# Patient Record
Sex: MALE | Race: OTHER | HISPANIC OR LATINO | URBAN - METROPOLITAN AREA
[De-identification: names, ages, dates, MRNs, and addresses within clinical notes are randomized per-mention and may not be internally consistent; named-entity substitution may affect disease eponyms.]

---

## 2017-03-29 ENCOUNTER — EMERGENCY (EMERGENCY)
Facility: HOSPITAL | Age: 57
LOS: 0 days | Discharge: HOME | End: 2017-03-29
Attending: EMERGENCY MEDICINE
Payer: MEDICAID

## 2017-03-29 VITALS
RESPIRATION RATE: 18 BRPM | SYSTOLIC BLOOD PRESSURE: 162 MMHG | OXYGEN SATURATION: 100 % | HEART RATE: 64 BPM | DIASTOLIC BLOOD PRESSURE: 97 MMHG | TEMPERATURE: 98 F

## 2017-03-29 VITALS
TEMPERATURE: 98 F | OXYGEN SATURATION: 99 % | RESPIRATION RATE: 16 BRPM | DIASTOLIC BLOOD PRESSURE: 114 MMHG | SYSTOLIC BLOOD PRESSURE: 185 MMHG | HEART RATE: 70 BPM | HEIGHT: 67 IN | WEIGHT: 278 LBS

## 2017-03-29 DIAGNOSIS — K26.9 DUODENAL ULCER, UNSPECIFIED AS ACUTE OR CHRONIC, WITHOUT HEMORRHAGE OR PERFORATION: ICD-10-CM

## 2017-03-29 DIAGNOSIS — Z79.1 LONG TERM (CURRENT) USE OF NON-STEROIDAL ANTI-INFLAMMATORIES (NSAID): ICD-10-CM

## 2017-03-29 DIAGNOSIS — K27.9 PEPTIC ULCER, SITE UNSPECIFIED, UNSPECIFIED AS ACUTE OR CHRONIC, WITHOUT HEMORRHAGE OR PERFORATION: ICD-10-CM

## 2017-03-29 DIAGNOSIS — M17.9 OSTEOARTHRITIS OF KNEE, UNSPECIFIED: ICD-10-CM

## 2017-03-29 DIAGNOSIS — R60.9 EDEMA, UNSPECIFIED: ICD-10-CM

## 2017-03-29 DIAGNOSIS — E66.01 MORBID (SEVERE) OBESITY DUE TO EXCESS CALORIES: ICD-10-CM

## 2017-03-29 DIAGNOSIS — K46.9 UNSPECIFIED ABDOMINAL HERNIA WITHOUT OBSTRUCTION OR GANGRENE: Chronic | ICD-10-CM

## 2017-03-29 DIAGNOSIS — R58 HEMORRHAGE, NOT ELSEWHERE CLASSIFIED: Chronic | ICD-10-CM

## 2017-03-29 DIAGNOSIS — Z79.82 LONG TERM (CURRENT) USE OF ASPIRIN: ICD-10-CM

## 2017-03-29 DIAGNOSIS — M79.606 PAIN IN LEG, UNSPECIFIED: ICD-10-CM

## 2017-03-29 DIAGNOSIS — Z98.89 OTHER SPECIFIED POSTPROCEDURAL STATES: Chronic | ICD-10-CM

## 2017-03-29 DIAGNOSIS — I10 ESSENTIAL (PRIMARY) HYPERTENSION: ICD-10-CM

## 2017-03-29 LAB
ALBUMIN SERPL ELPH-MCNC: 3.2 G/DL — LOW (ref 3.3–5)
ALP SERPL-CCNC: 93 U/L — SIGNIFICANT CHANGE UP (ref 40–120)
ALT FLD-CCNC: 24 U/L — SIGNIFICANT CHANGE UP (ref 12–78)
ANION GAP SERPL CALC-SCNC: 6 MMOL/L — SIGNIFICANT CHANGE UP (ref 5–17)
ANISOCYTOSIS BLD QL: SIGNIFICANT CHANGE UP
APTT BLD: 24.9 SEC — LOW (ref 27.5–37.4)
AST SERPL-CCNC: 27 U/L — SIGNIFICANT CHANGE UP (ref 15–37)
BILIRUB SERPL-MCNC: 0.3 MG/DL — SIGNIFICANT CHANGE UP (ref 0.2–1.2)
BUN SERPL-MCNC: 19 MG/DL — SIGNIFICANT CHANGE UP (ref 7–23)
CALCIUM SERPL-MCNC: 8 MG/DL — LOW (ref 8.5–10.1)
CHLORIDE SERPL-SCNC: 104 MMOL/L — SIGNIFICANT CHANGE UP (ref 96–108)
CO2 SERPL-SCNC: 32 MMOL/L — HIGH (ref 22–31)
CREAT SERPL-MCNC: 0.8 MG/DL — SIGNIFICANT CHANGE UP (ref 0.5–1.3)
EOSINOPHIL NFR BLD AUTO: 3 % — SIGNIFICANT CHANGE UP (ref 0–6)
GLUCOSE SERPL-MCNC: 102 MG/DL — HIGH (ref 70–99)
HCT VFR BLD CALC: 25.9 % — LOW (ref 39–50)
HGB BLD-MCNC: 7.5 G/DL — LOW (ref 13–17)
HYPOCHROMIA BLD QL: SIGNIFICANT CHANGE UP
INR BLD: 1.07 RATIO — SIGNIFICANT CHANGE UP (ref 0.88–1.16)
LYMPHOCYTES # BLD AUTO: 24 % — SIGNIFICANT CHANGE UP (ref 13–44)
MCHC RBC-ENTMCNC: 17.8 PG — LOW (ref 27–34)
MCHC RBC-ENTMCNC: 29.1 GM/DL — LOW (ref 32–36)
MCV RBC AUTO: 61.2 FL — LOW (ref 80–100)
MICROCYTES BLD QL: SLIGHT — SIGNIFICANT CHANGE UP
MONOCYTES NFR BLD AUTO: 14 % — SIGNIFICANT CHANGE UP (ref 2–14)
NEUTROPHILS NFR BLD AUTO: 59 % — SIGNIFICANT CHANGE UP (ref 43–77)
NT-PROBNP SERPL-SCNC: 278 PG/ML — HIGH (ref 0–125)
PLAT MORPH BLD: NORMAL — SIGNIFICANT CHANGE UP
PLATELET # BLD AUTO: 353 K/UL — SIGNIFICANT CHANGE UP (ref 150–400)
POIKILOCYTOSIS BLD QL AUTO: SIGNIFICANT CHANGE UP
POLYCHROMASIA BLD QL SMEAR: SLIGHT — SIGNIFICANT CHANGE UP
POTASSIUM SERPL-MCNC: 3.8 MMOL/L — SIGNIFICANT CHANGE UP (ref 3.5–5.3)
POTASSIUM SERPL-SCNC: 3.8 MMOL/L — SIGNIFICANT CHANGE UP (ref 3.5–5.3)
PROT SERPL-MCNC: 6.9 GM/DL — SIGNIFICANT CHANGE UP (ref 6–8.3)
PROTHROM AB SERPL-ACNC: 11.7 SEC — SIGNIFICANT CHANGE UP (ref 9.8–12.7)
RBC # BLD: 4.23 M/UL — SIGNIFICANT CHANGE UP (ref 4.2–5.8)
RBC # FLD: 17.4 % — HIGH (ref 11–15)
RBC BLD AUTO: ABNORMAL
SODIUM SERPL-SCNC: 142 MMOL/L — SIGNIFICANT CHANGE UP (ref 135–145)
WBC # BLD: 7.4 K/UL — SIGNIFICANT CHANGE UP (ref 3.8–10.5)
WBC # FLD AUTO: 7.4 K/UL — SIGNIFICANT CHANGE UP (ref 3.8–10.5)

## 2017-03-29 PROCEDURE — 99285 EMERGENCY DEPT VISIT HI MDM: CPT

## 2017-03-29 PROCEDURE — 71010: CPT | Mod: 26

## 2017-03-29 PROCEDURE — 93970 EXTREMITY STUDY: CPT | Mod: 26

## 2017-03-29 RX ORDER — FUROSEMIDE 40 MG
20 TABLET ORAL ONCE
Qty: 0 | Refills: 0 | Status: COMPLETED | OUTPATIENT
Start: 2017-03-29 | End: 2017-03-29

## 2017-03-29 RX ADMIN — Medication 20 MILLIGRAM(S): at 17:24

## 2017-03-29 NOTE — ED PROVIDER NOTE - OBJECTIVE STATEMENT
58 yo male with hypertension presents with bilateral leg swelling x3 days. Patient denies chest pain, shortness of breath, fever, chills, nausea, vomiting, abd pain, trauma, fall.

## 2017-03-29 NOTE — ED PROVIDER NOTE - MEDICAL DECISION MAKING DETAILS
Patient with lower extremity edema for three days, negative for dvt; patient instructed to f/u with pmd

## 2017-03-29 NOTE — ED PROVIDER NOTE - PMH
Duodenal ulcer disease    Hypertension    Hypertension    Knee osteoarthritis    Morbid obesity    Osteoarthritis    PUD (peptic ulcer disease)

## 2017-03-29 NOTE — ED PROVIDER NOTE - PSH
Abdominal hernia    Gastroduodenal artery bleed  IR embolization of gastroduodenal artery  H/O ventral hernia repair

## 2017-04-24 ENCOUNTER — INPATIENT (INPATIENT)
Facility: HOSPITAL | Age: 57
LOS: 7 days | Discharge: HOME | End: 2017-05-02
Attending: INTERNAL MEDICINE | Admitting: INTERNAL MEDICINE
Payer: MEDICAID

## 2017-04-24 VITALS
WEIGHT: 274.92 LBS | OXYGEN SATURATION: 100 % | HEART RATE: 70 BPM | RESPIRATION RATE: 18 BRPM | DIASTOLIC BLOOD PRESSURE: 119 MMHG | SYSTOLIC BLOOD PRESSURE: 213 MMHG | HEIGHT: 66 IN | TEMPERATURE: 99 F

## 2017-04-24 DIAGNOSIS — R58 HEMORRHAGE, NOT ELSEWHERE CLASSIFIED: Chronic | ICD-10-CM

## 2017-04-24 DIAGNOSIS — Z98.89 OTHER SPECIFIED POSTPROCEDURAL STATES: Chronic | ICD-10-CM

## 2017-04-24 DIAGNOSIS — K46.9 UNSPECIFIED ABDOMINAL HERNIA WITHOUT OBSTRUCTION OR GANGRENE: Chronic | ICD-10-CM

## 2017-04-24 LAB — OB PNL STL: NEGATIVE — SIGNIFICANT CHANGE UP

## 2017-04-24 PROCEDURE — 99285 EMERGENCY DEPT VISIT HI MDM: CPT

## 2017-04-24 RX ORDER — FAMOTIDINE 10 MG/ML
20 INJECTION INTRAVENOUS ONCE
Qty: 0 | Refills: 0 | Status: COMPLETED | OUTPATIENT
Start: 2017-04-24 | End: 2017-04-24

## 2017-04-24 NOTE — ED ADULT TRIAGE NOTE - CHIEF COMPLAINT QUOTE
Sent by PMD for low H&H pt states that he was having bloody stools ongoing for 2 weeks which has now dissipated, states he is having abdominal pain " a little", pt states he is having swelling to the BLE taking lasix however states its not helping denies SOB or CP, HTN in triage, denies HA

## 2017-04-25 DIAGNOSIS — K92.1 MELENA: ICD-10-CM

## 2017-04-25 DIAGNOSIS — I10 ESSENTIAL (PRIMARY) HYPERTENSION: ICD-10-CM

## 2017-04-25 DIAGNOSIS — D64.9 ANEMIA, UNSPECIFIED: ICD-10-CM

## 2017-04-25 DIAGNOSIS — E66.01 MORBID (SEVERE) OBESITY DUE TO EXCESS CALORIES: ICD-10-CM

## 2017-04-25 DIAGNOSIS — D50.0 IRON DEFICIENCY ANEMIA SECONDARY TO BLOOD LOSS (CHRONIC): ICD-10-CM

## 2017-04-25 DIAGNOSIS — R10.13 EPIGASTRIC PAIN: ICD-10-CM

## 2017-04-25 LAB
ALBUMIN SERPL ELPH-MCNC: 3.2 G/DL — LOW (ref 3.3–5)
ALP SERPL-CCNC: 96 U/L — SIGNIFICANT CHANGE UP (ref 40–120)
ALT FLD-CCNC: 17 U/L — SIGNIFICANT CHANGE UP (ref 12–78)
ANION GAP SERPL CALC-SCNC: 7 MMOL/L — SIGNIFICANT CHANGE UP (ref 5–17)
ANION GAP SERPL CALC-SCNC: 7 MMOL/L — SIGNIFICANT CHANGE UP (ref 5–17)
ANISOCYTOSIS BLD QL: SIGNIFICANT CHANGE UP
APPEARANCE UR: CLEAR — SIGNIFICANT CHANGE UP
APTT BLD: 31.4 SEC — SIGNIFICANT CHANGE UP (ref 27.5–37.4)
AST SERPL-CCNC: 27 U/L — SIGNIFICANT CHANGE UP (ref 15–37)
BILIRUB SERPL-MCNC: 0.2 MG/DL — SIGNIFICANT CHANGE UP (ref 0.2–1.2)
BILIRUB UR-MCNC: NEGATIVE — SIGNIFICANT CHANGE UP
BLD GP AB SCN SERPL QL: SIGNIFICANT CHANGE UP
BUN SERPL-MCNC: 13 MG/DL — SIGNIFICANT CHANGE UP (ref 7–23)
BUN SERPL-MCNC: 18 MG/DL — SIGNIFICANT CHANGE UP (ref 7–23)
CALCIUM SERPL-MCNC: 8.1 MG/DL — LOW (ref 8.5–10.1)
CALCIUM SERPL-MCNC: 8.2 MG/DL — LOW (ref 8.5–10.1)
CHLORIDE SERPL-SCNC: 105 MMOL/L — SIGNIFICANT CHANGE UP (ref 96–108)
CHLORIDE SERPL-SCNC: 105 MMOL/L — SIGNIFICANT CHANGE UP (ref 96–108)
CO2 SERPL-SCNC: 30 MMOL/L — SIGNIFICANT CHANGE UP (ref 22–31)
CO2 SERPL-SCNC: 30 MMOL/L — SIGNIFICANT CHANGE UP (ref 22–31)
COLOR SPEC: YELLOW — SIGNIFICANT CHANGE UP
CREAT SERPL-MCNC: 0.59 MG/DL — SIGNIFICANT CHANGE UP (ref 0.5–1.3)
CREAT SERPL-MCNC: 0.72 MG/DL — SIGNIFICANT CHANGE UP (ref 0.5–1.3)
DIFF PNL FLD: NEGATIVE — SIGNIFICANT CHANGE UP
ELLIPTOCYTES BLD QL SMEAR: SLIGHT — SIGNIFICANT CHANGE UP
EOSINOPHIL NFR BLD AUTO: 2 % — SIGNIFICANT CHANGE UP (ref 0–6)
FERRITIN SERPL-MCNC: 8 NG/ML — LOW (ref 30–400)
GLUCOSE SERPL-MCNC: 89 MG/DL — SIGNIFICANT CHANGE UP (ref 70–99)
GLUCOSE SERPL-MCNC: 89 MG/DL — SIGNIFICANT CHANGE UP (ref 70–99)
GLUCOSE UR QL: NEGATIVE MG/DL — SIGNIFICANT CHANGE UP
HCT VFR BLD CALC: 25.2 % — LOW (ref 39–50)
HCT VFR BLD CALC: 25.5 % — LOW (ref 39–50)
HCT VFR BLD CALC: 25.9 % — LOW (ref 39–50)
HCT VFR BLD CALC: 26.4 % — LOW (ref 39–50)
HGB BLD-MCNC: 7.7 G/DL — LOW (ref 13–17)
HGB BLD-MCNC: 7.7 G/DL — LOW (ref 13–17)
HGB BLD-MCNC: 7.8 G/DL — LOW (ref 13–17)
HGB BLD-MCNC: 8.1 G/DL — LOW (ref 13–17)
HYPOCHROMIA BLD QL: SIGNIFICANT CHANGE UP
INR BLD: 1.13 RATIO — SIGNIFICANT CHANGE UP (ref 0.88–1.16)
IRON SATN MFR SERPL: 13 UG/DL — LOW (ref 45–165)
IRON SATN MFR SERPL: 3 % — LOW (ref 16–55)
KETONES UR-MCNC: NEGATIVE — SIGNIFICANT CHANGE UP
LEUKOCYTE ESTERASE UR-ACNC: NEGATIVE — SIGNIFICANT CHANGE UP
LYMPHOCYTES # BLD AUTO: 20 % — SIGNIFICANT CHANGE UP (ref 13–44)
MACROCYTES BLD QL: SLIGHT — SIGNIFICANT CHANGE UP
MCHC RBC-ENTMCNC: 18.4 PG — LOW (ref 27–34)
MCHC RBC-ENTMCNC: 18.6 PG — LOW (ref 27–34)
MCHC RBC-ENTMCNC: 30.2 GM/DL — LOW (ref 32–36)
MCHC RBC-ENTMCNC: 30.2 GM/DL — LOW (ref 32–36)
MCHC RBC-ENTMCNC: 30.5 GM/DL — LOW (ref 32–36)
MCHC RBC-ENTMCNC: 30.6 GM/DL — LOW (ref 32–36)
MCV RBC AUTO: 60 FL — LOW (ref 80–100)
MCV RBC AUTO: 60.8 FL — LOW (ref 80–100)
MCV RBC AUTO: 61 FL — LOW (ref 80–100)
MCV RBC AUTO: 61.1 FL — LOW (ref 80–100)
MICROCYTES BLD QL: SLIGHT — SIGNIFICANT CHANGE UP
MONOCYTES NFR BLD AUTO: 9 % — SIGNIFICANT CHANGE UP (ref 2–14)
NEUTROPHILS NFR BLD AUTO: 67 % — SIGNIFICANT CHANGE UP (ref 43–77)
NEUTS BAND # BLD: 2 % — SIGNIFICANT CHANGE UP (ref 0–8)
NITRITE UR-MCNC: NEGATIVE — SIGNIFICANT CHANGE UP
NT-PROBNP SERPL-SCNC: 247 PG/ML — HIGH (ref 0–125)
OVALOCYTES BLD QL SMEAR: SLIGHT — SIGNIFICANT CHANGE UP
PH UR: 7 — SIGNIFICANT CHANGE UP (ref 5–8)
PLAT MORPH BLD: NORMAL — SIGNIFICANT CHANGE UP
PLATELET # BLD AUTO: 299 K/UL — SIGNIFICANT CHANGE UP (ref 150–400)
PLATELET # BLD AUTO: 303 K/UL — SIGNIFICANT CHANGE UP (ref 150–400)
PLATELET # BLD AUTO: 308 K/UL — SIGNIFICANT CHANGE UP (ref 150–400)
PLATELET # BLD AUTO: 324 K/UL — SIGNIFICANT CHANGE UP (ref 150–400)
POIKILOCYTOSIS BLD QL AUTO: SIGNIFICANT CHANGE UP
POLYCHROMASIA BLD QL SMEAR: SLIGHT — SIGNIFICANT CHANGE UP
POTASSIUM SERPL-MCNC: 3.5 MMOL/L — SIGNIFICANT CHANGE UP (ref 3.5–5.3)
POTASSIUM SERPL-MCNC: 4 MMOL/L — SIGNIFICANT CHANGE UP (ref 3.5–5.3)
POTASSIUM SERPL-SCNC: 3.5 MMOL/L — SIGNIFICANT CHANGE UP (ref 3.5–5.3)
POTASSIUM SERPL-SCNC: 4 MMOL/L — SIGNIFICANT CHANGE UP (ref 3.5–5.3)
PROT SERPL-MCNC: 7.2 GM/DL — SIGNIFICANT CHANGE UP (ref 6–8.3)
PROT UR-MCNC: NEGATIVE MG/DL — SIGNIFICANT CHANGE UP
PROTHROM AB SERPL-ACNC: 12.3 SEC — SIGNIFICANT CHANGE UP (ref 9.8–12.7)
RBC # BLD: 4.17 M/UL — LOW (ref 4.2–5.8)
RBC # BLD: 4.2 M/UL — SIGNIFICANT CHANGE UP (ref 4.2–5.8)
RBC # BLD: 4.25 M/UL — SIGNIFICANT CHANGE UP (ref 4.2–5.8)
RBC # BLD: 4.34 M/UL — SIGNIFICANT CHANGE UP (ref 4.2–5.8)
RBC # FLD: 17.1 % — HIGH (ref 11–15)
RBC # FLD: 17.4 % — HIGH (ref 11–15)
RBC # FLD: 17.5 % — HIGH (ref 11–15)
RBC # FLD: 17.6 % — HIGH (ref 11–15)
RBC BLD AUTO: ABNORMAL
ROULEAUX BLD QL SMEAR: PRESENT — SIGNIFICANT CHANGE UP
SCHISTOCYTES BLD QL AUTO: SLIGHT — SIGNIFICANT CHANGE UP
SODIUM SERPL-SCNC: 142 MMOL/L — SIGNIFICANT CHANGE UP (ref 135–145)
SODIUM SERPL-SCNC: 142 MMOL/L — SIGNIFICANT CHANGE UP (ref 135–145)
SP GR SPEC: 1.01 — SIGNIFICANT CHANGE UP (ref 1.01–1.02)
SPHEROCYTES BLD QL SMEAR: SLIGHT — SIGNIFICANT CHANGE UP
TIBC SERPL-MCNC: 373 UG/DL — SIGNIFICANT CHANGE UP (ref 220–430)
TROPONIN I SERPL-MCNC: <.015 NG/ML — SIGNIFICANT CHANGE UP (ref 0.01–0.04)
UIBC SERPL-MCNC: 360 UG/DL — SIGNIFICANT CHANGE UP (ref 110–370)
UROBILINOGEN FLD QL: NEGATIVE MG/DL — SIGNIFICANT CHANGE UP
WBC # BLD: 6.5 K/UL — SIGNIFICANT CHANGE UP (ref 3.8–10.5)
WBC # BLD: 6.8 K/UL — SIGNIFICANT CHANGE UP (ref 3.8–10.5)
WBC # BLD: 7.2 K/UL — SIGNIFICANT CHANGE UP (ref 3.8–10.5)
WBC # BLD: 7.9 K/UL — SIGNIFICANT CHANGE UP (ref 3.8–10.5)
WBC # FLD AUTO: 6.5 K/UL — SIGNIFICANT CHANGE UP (ref 3.8–10.5)
WBC # FLD AUTO: 6.8 K/UL — SIGNIFICANT CHANGE UP (ref 3.8–10.5)
WBC # FLD AUTO: 7.2 K/UL — SIGNIFICANT CHANGE UP (ref 3.8–10.5)
WBC # FLD AUTO: 7.9 K/UL — SIGNIFICANT CHANGE UP (ref 3.8–10.5)

## 2017-04-25 PROCEDURE — 74177 CT ABD & PELVIS W/CONTRAST: CPT | Mod: 26

## 2017-04-25 PROCEDURE — 71010: CPT | Mod: 26

## 2017-04-25 PROCEDURE — 99223 1ST HOSP IP/OBS HIGH 75: CPT

## 2017-04-25 PROCEDURE — 93970 EXTREMITY STUDY: CPT | Mod: 26

## 2017-04-25 RX ORDER — PANTOPRAZOLE SODIUM 20 MG/1
40 TABLET, DELAYED RELEASE ORAL DAILY
Qty: 0 | Refills: 0 | Status: DISCONTINUED | OUTPATIENT
Start: 2017-04-25 | End: 2017-04-29

## 2017-04-25 RX ORDER — CARVEDILOL PHOSPHATE 80 MG/1
6.25 CAPSULE, EXTENDED RELEASE ORAL EVERY 12 HOURS
Qty: 0 | Refills: 0 | Status: DISCONTINUED | OUTPATIENT
Start: 2017-04-25 | End: 2017-05-02

## 2017-04-25 RX ADMIN — CARVEDILOL PHOSPHATE 6.25 MILLIGRAM(S): 80 CAPSULE, EXTENDED RELEASE ORAL at 17:10

## 2017-04-25 RX ADMIN — FAMOTIDINE 20 MILLIGRAM(S): 10 INJECTION INTRAVENOUS at 02:11

## 2017-04-25 RX ADMIN — Medication 5 MILLIGRAM(S): at 07:18

## 2017-04-25 RX ADMIN — PANTOPRAZOLE SODIUM 40 MILLIGRAM(S): 20 TABLET, DELAYED RELEASE ORAL at 11:48

## 2017-04-25 NOTE — ED PROVIDER NOTE - PHYSICAL EXAMINATION
GEN: Alert, NAD  HEAD: atraumatic, EOMI, PERRL, normal lids/conjunctiva  ENT: normal hearing, patent oropharynx without erythema/exudate, uvula midline  NECK: +supple, no tenderness/meningismus, +Trachea midline  PULM: Bilateral BS, normal resp effort, no wheeze/stridor/retractions  CV: RRR, no M/R/G, +dist ext pulses  ABD: soft, ND, mild epigastric pain   rectal - brown stools  BACK: no CVAT, no midline tenderness  MSK: no edema/erythema/cyanosis  SKIN: no rash  NEURO: AAOx3, no sensory/motor deficits, CN 2-12 intact

## 2017-04-25 NOTE — ED PROVIDER NOTE - CARE PLAN
Principal Discharge DX:	Anemia, unspecified type  Secondary Diagnosis:	Epigastric pain  Secondary Diagnosis:	Bloody stools

## 2017-04-25 NOTE — CONSULT NOTE ADULT - ASSESSMENT
HPI:  58 y/o man PMH of PUD, GI bleeding, HTN, comes in with epigastric pain, intermittent bloody stools for past 2 wks and was called by PMD to come in for low Hg. Pt denies feeling lightheaded, mild epigastric pain with some nausea, but no vomiting/hematemesis. Has noted melanotic stool several days ago. No use of NSAIDS.  No flank pain, no fever/chills, No photophobia/eye pain/changes in vision, No ear pain/sore throat/dysphagia, No chest pain/palpitations, no SOB/cough/wheeze/stridor, No V/D, no dysuria/frequency/discharge, No neck/back pain, no rash, no changes in neurological status/function. Pt with stable vital signs in ED. (25 Apr 2017 06:28)  Consult called for severe anemia. History obtained via Cyber[employer]  # [id number]  The patient has a history of GI bleed, the last one in February of 2016. EGD/colonoscopy revealed gastric ulcer. Chart reveals that patient had GI bleed 2015 which needed IR for embolization of gastric ulcer.   Mr. Keith states that a couple of days ago he had diarrhea that consisted of dark, red stool. He had a vague abdominal pain and mild nusea. He went to his PMD who referred him to the ER with a low hemoglobin. The ER reports that the rectal exam revealed brown stool which was heme negative.  He denies recent use os ASA, blood thinners, NSAIDs or ETOH.  The patient has been anemic in the past with a low MCV.

## 2017-04-25 NOTE — H&P ADULT. - HISTORY OF PRESENT ILLNESS
58 y/o man PMH of PUD, GI bleeding, HTN, comes in with epigastric pain, intermittent bloody stools for past 2 wks and was called by PMD to come in for low Hg. Pt denies feeling lightheaded, mild epigastric pain with some nausea, but no vomiting/hematemesis. Has noted melanotic stool several days ago. No use of NSAIDS.  No flank pain, no fever/chills, No photophobia/eye pain/changes in vision, No ear pain/sore throat/dysphagia, No chest pain/palpitations, no SOB/cough/wheeze/stridor, No V/D, no dysuria/frequency/discharge, No neck/back pain, no rash, no changes in neurological status/function. Pt with stable vital signs in ED.

## 2017-04-25 NOTE — H&P ADULT. - ASSESSMENT
56 y/o man with history PUD, GI bleed presents with melanotic stools and low HCT. No hematemesis, claims compliance with medications, and no use NSAIDS.  Pt typed and screened in ED, started on IV PPI. Pt has chronic anemia with low MCV, had previous decreased ferritin.

## 2017-04-25 NOTE — H&P ADULT. - MUSCULOSKELETAL
details… detailed exam no joint swelling/no joint erythema/ROM intact/no calf tenderness/normal strength/no joint warmth

## 2017-04-25 NOTE — CONSULT NOTE ADULT - SUBJECTIVE AND OBJECTIVE BOX
HPI:  56 y/o man PMH of PUD, GI bleeding, HTN, comes in with epigastric pain, intermittent bloody stools for past 2 wks and was called by PMD to come in for low Hg. Pt denies feeling lightheaded, mild epigastric pain with some nausea, but no vomiting/hematemesis. Has noted melanotic stool several days ago. No use of NSAIDS.  No flank pain, no fever/chills, No photophobia/eye pain/changes in vision, No ear pain/sore throat/dysphagia, No chest pain/palpitations, no SOB/cough/wheeze/stridor, No V/D, no dysuria/frequency/discharge, No neck/back pain, no rash, no changes in neurological status/function. Pt with stable vital signs in ED. (2017 06:28)  Consult called for severe anemia. History obtained via Cyber[employer]  # [id number]  The patient has a history of GI bleed, the last one in 2016. EGD/colonoscopy revealed gastric ulcer. Chart reveals that patient had GI bleed  which needed IR for embolization of gastric ulcer.   Mr. Keith states that a couple of days ago he had diarrhea that consisted of dark, red stool. He had a vague abdominal pain and mild nusea. He went to his PMD who referred him to the ER with a low hemoglobin. The ER reports that the rectal exam revealed brown stool which was heme negative.  He denies recent use os ASA, blood thinners, NSAIDs or ETOH.  The patient has been anemic in the past with a low MCV.       PAST MEDICAL & SURGICAL HISTORY:  Knee osteoarthritis  Duodenal ulcer disease  Morbid obesity  Hypertension  Osteoarthritis  PUD (peptic ulcer disease)  Hypertension  Gastroduodenal artery bleed: IR embolization of gastroduodenal artery  Abdominal hernia  H/O ventral hernia repair  No significant past surgical history      MEDICATIONS  (STANDING):  pantoprazole  Injectable 40milliGRAM(s) IV Push daily  enalapril 5milliGRAM(s) Oral daily  carvedilol 6.25milliGRAM(s) Oral every 12 hours    MEDICATIONS  (PRN):      Allergies    No Known Allergies    Intolerances        FAMILY HISTORY:  No pertinent family history in first degree relatives  No pertinent family history in first degree relatives      REVIEW OF SYSTEMS:    CONSTITUTIONAL: No fever, weight loss, or fatigue  EYES: No eye pain, visual disturbances, or discharge  ENMT:  No difficulty hearing, tinnitus, vertigo; No sinus or throat pain  NECK: No pain or stiffness  BREASTS: No pain, masses, or nipple discharge  RESPIRATORY: No cough, wheezing, chills or hemoptysis; No shortness of breath  CARDIOVASCULAR: No chest pain, palpitations, dizziness, or leg swelling  GASTROINTESTINAL: See above  GENITOURINARY: No dysuria, frequency, hematuria, or incontinence  NEUROLOGICAL: No headaches, memory loss, loss of strength, numbness, or tremors  SKIN: No itching, burning, rashes, or lesions   LYMPH NODES: No enlarged glands  ENDOCRINE: No heat or cold intolerance; No hair loss  MUSCULOSKELETAL: No joint pain or swelling; No muscle, back, or extremity pain  PSYCHIATRIC: No depression, anxiety, mood swings, or difficulty sleeping  HEME/LYMPH: No easy bruising, or bleeding gums  ALLERGY AND IMMUNOLOGIC: No hives or eczema          SOCIAL HISTORY:    FAMILY HISTORY:  No pertinent family history in first degree relatives  No pertinent family history in first degree relatives      Vital Signs Last 24 Hrs  T(C): 36.7, Max: 37.1 ( @ 20:23)  T(F): 98, Max: 98.7 ( @ 20:23)  HR: 78 (65 - 78)  BP: 148/99 (148/99 - 213/119)  BP(mean): --  RR: 17 (17 - 19)  SpO2: 98% (98% - 100%)    PHYSICAL EXAM:    GENERAL: NAD, well-groomed, well-developed  HEAD:  Atraumatic, Normocephalic  EYES: EOMI, PERRLA, conjunctiva and sclera clear  NECK: Supple, No JVD, Normal thyroid  NERVOUS SYSTEM:  Alert & Oriented X3, Good concentration; Motor Strength 5/5 B/L upper and lower extremities;   CHEST/LUNG: Clear to percussion bilaterally; No rales, rhonchi, wheezing, or rubs  HEART: Regular rate and rhythm; No murmurs, rubs, or gallops  ABDOMEN: Soft, Nontender, Nondistended; Bowel sounds present  EXTREMITIES:  2+ Peripheral Pulses, No clubbing, cyanosis, or edema  LYMPH: No lymphadenopathy noted   RECTAL: Deferred   SKIN: No rashes or lesions    LABS:                        7.8    6.5   )-----------( 303      ( 2017 11:46 )             25.9     2017 08:22    142    |  105    |  13     ----------------------------<  89     3.5     |  30     |  0.59   2017 01:14    142    |  105    |  18     ----------------------------<  89     4.0     |  30     |  0.72     Ca    8.2        2017 08:22  Ca    8.1        2017 01:14    TPro  7.2    /  Alb  3.2    /  TBili  0.2    /  DBili  x      /  AST  27     /  ALT  17     /  AlkPhos  96     2017 01:14    PT/INR - ( 2017 01:14 )   PT: 12.3 sec;   INR: 1.13 ratio         PTT - ( 2017 01:14 )  PTT:31.4 sec  Urinalysis Basic - ( 2017 02:20 )    Color: Yellow / Appearance: Clear / S.010 / pH: x  Gluc: x / Ketone: Negative  / Bili: Negative / Urobili: Negative mg/dL   Blood: x / Protein: Negative mg/dL / Nitrite: Negative   Leuk Esterase: Negative / RBC: x / WBC x   Sq Epi: x / Non Sq Epi: x / Bacteria: x          RADIOLOGY & ADDITIONAL STUDIES:    EXAM:  CT ABDOMEN AND PELVIS IC                            PROCEDURE DATE:  2017        INTERPRETATION:  CT ABDOMEN AND PELVIS WITH CONTRAST    INDICATION: Epigastric pain.    TECHNIQUE: Contrast enhanced CT of the abdomen and pelvis. 90 mL of   Omnipaque 350 contrast material was injected IV.    COMPARISON: 2016    FINDINGS:    Lower Thorax: No consolidation or effusion.        Liver: No suspicious lesions.      Biliary: No dilatation.      Spleen: No suspicious lesions.      Pancreas: No inflammatory changes or ductal dilatation. Surgical clips   noted near the neck of the pancreas.  Adrenals: Normal.      Kidneys: No hydronephrosis or solid mass.      Vessels: Normal caliber.        GI tract: No evidence of small bowel obstruction. No wall thickening or   inflammatory changes.    Peritoneum/retroperitoneum and mesentery: No free air. No organized fluid   collection. No adenopathy.        Pelvic organs/Bladder: No pelvic masses. Bladder is normal.        Abdominal wall: Fat-containing umbilical hernia.  Bones and soft tissues: No destructive lesion. Degenerative changes of   the spine. Grade 1 anterolisthesis of L5. Left L5 spondylolysis.    IMPRESSION:    No acute findings. No interval change.              YOUNG MARINELLI M.D., ATTENDING RADIOLOGIST  This document has been electronically signed. 2017  1:19AM

## 2017-04-25 NOTE — ED PROVIDER NOTE - PRESENTING SYMPTOMS DETAILS
57M w a hx of PUD, htn, comes in w epigastric pain, intermittent bloody stools past 2 wks and was called by pmd to come in for low Hg. pt denies feeling lightheaded, mild epigastric pain w nausea, but no vomiting/hematemesis. no flank pain  ROS: No fever/chills, No photophobia/eye pain/changes in vision, No ear pain/sore throat/dysphagia, No chest pain/palpitations, no SOB/cough/wheeze/stridor, No V/D, no dysuria/frequency/discharge, No neck/back pain, no rash, no changes in neurological status/function.

## 2017-04-25 NOTE — H&P ADULT. - NEGATIVE MUSCULOSKELETAL SYMPTOMS
no arthralgia/no arthritis/no neck pain/no back pain/no myalgia/no muscle weakness/no joint swelling

## 2017-04-25 NOTE — H&P ADULT. - NEGATIVE NEUROLOGICAL SYMPTOMS
no loss of sensation/no weakness/no transient paralysis/no loss of consciousness/no facial palsy/no vertigo/no paresthesias/no confusion/no syncope/no generalized seizures/no headache

## 2017-04-25 NOTE — H&P ADULT. - RS GEN PE MLT RESP DETAILS PC
no rhonchi/breath sounds equal/good air movement/clear to auscultation bilaterally/no wheezes/no rales/respirations non-labored/airway patent

## 2017-04-26 ENCOUNTER — RESULT REVIEW (OUTPATIENT)
Age: 57
End: 2017-04-26

## 2017-04-26 DIAGNOSIS — E66.9 OBESITY, UNSPECIFIED: ICD-10-CM

## 2017-04-26 DIAGNOSIS — K92.2 GASTROINTESTINAL HEMORRHAGE, UNSPECIFIED: ICD-10-CM

## 2017-04-26 DIAGNOSIS — I10 ESSENTIAL (PRIMARY) HYPERTENSION: ICD-10-CM

## 2017-04-26 DIAGNOSIS — K92.1 MELENA: ICD-10-CM

## 2017-04-26 DIAGNOSIS — K27.9 PEPTIC ULCER, SITE UNSPECIFIED, UNSPECIFIED AS ACUTE OR CHRONIC, WITHOUT HEMORRHAGE OR PERFORATION: ICD-10-CM

## 2017-04-26 LAB
CULTURE RESULTS: SIGNIFICANT CHANGE UP
HCT VFR BLD CALC: 26.5 % — LOW (ref 39–50)
HCT VFR BLD CALC: 26.7 % — LOW (ref 39–50)
HGB BLD-MCNC: 7.9 G/DL — LOW (ref 13–17)
HGB BLD-MCNC: 8.1 G/DL — LOW (ref 13–17)
MCHC RBC-ENTMCNC: 18.4 PG — LOW (ref 27–34)
MCHC RBC-ENTMCNC: 18.4 PG — LOW (ref 27–34)
MCHC RBC-ENTMCNC: 29.9 GM/DL — LOW (ref 32–36)
MCHC RBC-ENTMCNC: 30.3 GM/DL — LOW (ref 32–36)
MCV RBC AUTO: 60.9 FL — LOW (ref 80–100)
MCV RBC AUTO: 61.5 FL — LOW (ref 80–100)
PLATELET # BLD AUTO: 302 K/UL — SIGNIFICANT CHANGE UP (ref 150–400)
PLATELET # BLD AUTO: 318 K/UL — SIGNIFICANT CHANGE UP (ref 150–400)
RBC # BLD: 4.31 M/UL — SIGNIFICANT CHANGE UP (ref 4.2–5.8)
RBC # BLD: 4.38 M/UL — SIGNIFICANT CHANGE UP (ref 4.2–5.8)
RBC # FLD: 17.3 % — HIGH (ref 11–15)
RBC # FLD: 17.6 % — HIGH (ref 11–15)
SPECIMEN SOURCE: SIGNIFICANT CHANGE UP
WBC # BLD: 5.9 K/UL — SIGNIFICANT CHANGE UP (ref 3.8–10.5)
WBC # BLD: 8.1 K/UL — SIGNIFICANT CHANGE UP (ref 3.8–10.5)
WBC # FLD AUTO: 5.9 K/UL — SIGNIFICANT CHANGE UP (ref 3.8–10.5)
WBC # FLD AUTO: 8.1 K/UL — SIGNIFICANT CHANGE UP (ref 3.8–10.5)

## 2017-04-26 PROCEDURE — 99233 SBSQ HOSP IP/OBS HIGH 50: CPT

## 2017-04-26 RX ORDER — FUROSEMIDE 40 MG
40 TABLET ORAL DAILY
Qty: 0 | Refills: 0 | Status: DISCONTINUED | OUTPATIENT
Start: 2017-04-26 | End: 2017-05-02

## 2017-04-26 RX ADMIN — Medication 40 MILLIGRAM(S): at 12:08

## 2017-04-26 RX ADMIN — PANTOPRAZOLE SODIUM 40 MILLIGRAM(S): 20 TABLET, DELAYED RELEASE ORAL at 12:12

## 2017-04-26 RX ADMIN — Medication 5 MILLIGRAM(S): at 12:08

## 2017-04-26 RX ADMIN — CARVEDILOL PHOSPHATE 6.25 MILLIGRAM(S): 80 CAPSULE, EXTENDED RELEASE ORAL at 05:35

## 2017-04-26 RX ADMIN — Medication 5 MILLIGRAM(S): at 05:35

## 2017-04-26 RX ADMIN — CARVEDILOL PHOSPHATE 6.25 MILLIGRAM(S): 80 CAPSULE, EXTENDED RELEASE ORAL at 18:08

## 2017-04-26 NOTE — DIETITIAN INITIAL EVALUATION ADULT. - DIET TYPE
DASH/TLC (sodium and cholesterol restricted diet)/04-26, NPO after midnight except meds, ice chips , sips of water 04-26/regular

## 2017-04-26 NOTE — PROGRESS NOTE ADULT - SUBJECTIVE AND OBJECTIVE BOX
Patient is a 57y old  Male who presents with a chief complaint of     HPI:  56 y/o man PMH of PUD, GI bleeding, HTN, comes in with epigastric pain, intermittent bloody stools for past 2 wks and was called by PMD to come in for low Hg. Pt denies feeling lightheaded, mild epigastric pain with some nausea, but no vomiting/hematemesis. Has noted melanotic stool several days ago. No use of NSAIDS.  No flank pain, no fever/chills, No photophobia/eye pain/changes in vision, No ear pain/sore throat/dysphagia, No chest pain/palpitations, no SOB/cough/wheeze/stridor, No V/D, no dysuria/frequency/discharge, No neck/back pain, no rash, no changes in neurological status/function. Pt with stable vital signs in ED. (2017 06:28)      INTERVAL HPI/OVERNIGHT EVENTS:  Patient had one dark ( not black ) bowel movement this am. None other since I saw patient yesterday. No abdominal pain.    MEDICATIONS  (STANDING):  pantoprazole  Injectable 40milliGRAM(s) IV Push daily  enalapril 5milliGRAM(s) Oral daily  carvedilol 6.25milliGRAM(s) Oral every 12 hours    MEDICATIONS  (PRN):      FAMILY HISTORY:  No pertinent family history in first degree relatives  No pertinent family history in first degree relatives      Allergies    No Known Allergies    Intolerances        PMH/PSH:  Knee osteoarthritis  Duodenal ulcer disease  Morbid obesity  Hypertension  Osteoarthritis  PUD (peptic ulcer disease)  Hypertension  Gastroduodenal artery bleed  Abdominal hernia  H/O ventral hernia repair  No significant past surgical history        REVIEW OF SYSTEMS:  CONSTITUTIONAL: No fever, weight loss, or fatigue  EYES: No eye pain, visual disturbances, or discharge  ENMT:  No difficulty hearing, tinnitus, vertigo; No sinus or throat pain  NECK: No pain or stiffness  BREASTS: No pain, masses, or nipple discharge  RESPIRATORY: No cough, wheezing, chills or hemoptysis; No shortness of breath  CARDIOVASCULAR: No chest pain, palpitations, dizziness, or leg swelling  GASTROINTESTINAL: No abdominal or epigastric pain. No nausea, vomiting, or hematemesis; No diarrhea or constipation. No melena or hematochezia.  GENITOURINARY: No dysuria, frequency, hematuria, or incontinence  NEUROLOGICAL: No headaches, memory loss, loss of strength, numbness, or tremors  SKIN: No itching, burning, rashes, or lesions       Vital Signs Last 24 Hrs  T(C): 36.9, Max: 37.1 ( @ 10:09)  T(F): 98.4, Max: 98.8 ( @ 10:09)  HR: 66 (62 - 82)  BP: 146/92 (146/92 - 172/102)  BP(mean): --  RR: 16 (15 - 18)  SpO2: 98% (96% - 99%)    PHYSICAL EXAM:  GENERAL: NAD, well-groomed, well-developed  HEAD:  Atraumatic, Normocephalic  EYES: EOMI, PERRLA, conjunctiva and sclera clear  NECK: Supple, No JVD, Normal thyroid  NERVOUS SYSTEM:  Alert & Oriented X3, Good concentration; Motor Strength 5/5 B/L upper and lower extremities  CHEST/LUNG: Clear to percussion bilaterally; No rales, rhonchi, wheezing, or rubs  HEART: Regular rate and rhythm; No murmurs, rubs, or gallops  ABDOMEN: Soft, Nontender, Nondistended; Bowel sounds present  EXTREMITIES:  2+ Peripheral Pulses, No clubbing, orcyanosis,   LYMPH: No lymphadenopathy noted  SKIN: No rashes or lesions    LAB                          7.9    5.9   )-----------( 302      ( 2017 08:17 )             26.5       CBC:   @ 08:17  WBC 5.9   Hgb 7.9   Hct 26.5   Plts 302     @ 20:18  WBC 7.2   Hgb 8.1   Hct 26.4   Plts 324     @ 11:46  WBC 6.5   Hgb 7.8   Hct 25.9   Plts 303     @ 08:22  WBC 6.8   Hgb 7.7   Hct 25.5   Plts 299     @ 01:14  WBC 7.9   Hgb 7.7   Hct 25.2   Plts 308        Chemistry:   @ 08:22  Na+ 142  K+ 3.5  Cl- 105  CO2 30  BUN 13  Cr 0.59      @ 01:14  Na+ 142  K+ 4.0  Cl- 105  CO2 30  BUN 18  Cr 0.72         Glucose, Serum: 89 mg/dL ( @ 08:22)  Glucose, Serum: 89 mg/dL ( @ 01:14)      2017 08:22    142    |  105    |  13     ----------------------------<  89     3.5     |  30     |  0.59   2017 01:14    142    |  105    |  18     ----------------------------<  89     4.0     |  30     |  0.72     Ca    8.2        2017 08:22  Ca    8.1        2017 01:14    TPro  7.2    /  Alb  3.2    /  TBili  0.2    /  DBili  x      /  AST  27     /  ALT  17     /  AlkPhos  96     2017 01:14      PT/INR - ( 2017 01:14 )   PT: 12.3 sec;   INR: 1.13 ratio         PTT - ( 2017 01:14 )  PTT:31.4 sec    Urinalysis Basic - ( 2017 02:20 )    Color: Yellow / Appearance: Clear / S.010 / pH: x  Gluc: x / Ketone: Negative  / Bili: Negative / Urobili: Negative mg/dL   Blood: x / Protein: Negative mg/dL / Nitrite: Negative   Leuk Esterase: Negative / RBC: x / WBC x   Sq Epi: x / Non Sq Epi: x / Bacteria: x        CAPILLARY BLOOD GLUCOSE          RADIOLOGY & ADDITIONAL TESTS:    Imaging Personally Reviewed:  [ ] YES  [ ] NO    Consultant(s) Notes Reviewed:  [ ] YES  [ ] NO    Care Discussed with Consultants/Other Providers [ ] YES  [ ] NO

## 2017-04-26 NOTE — DIETITIAN INITIAL EVALUATION ADULT. - OTHER INFO
Pt seen due to RN consult for BMI>40.  Diet hx reveals consumption of 3 meals daily.  Pt reports that he tolerated clear liquids, diet progression to solid foods noted today.  Plan for -27 noted.

## 2017-04-26 NOTE — DIETITIAN INITIAL EVALUATION ADULT. - ENERGY NEEDS
Height (cm): 167.6 (04-25)  Weight (kg): 136 (04-25)  BMI (kg/m2): 48.4 (04-25)  IBW: 64.4kg  % IBW: 209%   UBW: 125 kg   %UBW: 107%, wt. loss of 1.4 kg since adm noted

## 2017-04-26 NOTE — PROGRESS NOTE ADULT - SUBJECTIVE AND OBJECTIVE BOX
CC/F/U for: GIB, anemia    HPI:  58 y/o man PMH of PUD, GI bleeding, HTN, comes in with epigastric pain, intermittent bloody stools for past 2 wks and was called by PMD to come in for low Hg. Pt denies feeling lightheaded, mild epigastric pain with some nausea, but no vomiting/hematemesis. Has noted melanotic stool several days ago. No use of NSAIDS.  No flank pain, no fever/chills, No photophobia/eye pain/changes in vision, No ear pain/sore throat/dysphagia, No chest pain/palpitations, no SOB/cough/wheeze/stridor, No V/D, no dysuria/frequency/discharge, No neck/back pain, no rash, no changes in neurological status/function. Pt with stable vital signs in ED. (2017 06:28)        INTERVAL HPI/OVERNIGHT EVENTS:  Pt seen and examined at bedside.     Allergies/Intolerance: No Known Allergies      MEDICATIONS  (STANDING):  pantoprazole  Injectable 40milliGRAM(s) IV Push daily  enalapril 5milliGRAM(s) Oral daily  carvedilol 6.25milliGRAM(s) Oral every 12 hours    MEDICATIONS  (PRN):        ROS: as above; all other systems reviewed and wnl      PHYSICAL EXAMINATION:  Vital Signs Last 24 Hrs  T(C): 36.9, Max: 36.9 (04-25 @ 16:56)  T(F): 98.4, Max: 98.4 (04-25 @ 16:56)  HR: 62 (62 - 82)  BP: 158/106 (157/92 - 172/102)  BP(mean): --  RR: 18 (17 - 18)  SpO2: 96% (96% - 99%)  CAPILLARY BLOOD GLUCOSE      GENERAL: obese, middle-aged male; NAD  HEAD:  atraumatic, normocephalic  EYES: sclera anicteric  ENMT: mucous membranes moist  NECK: supple, No JVD  CHEST/LUNG: clear to auscultation bilaterally; no rales, rhonchi, or wheezing b/l  HEART: normal S1, S2  ABDOMEN: obese, BS+, soft, NT   EXTREMITIES:  pulses palpable; no clubbing, cyanosis; 3+ edema b/l LEs from knees to feet  NEURO: awake, alert, interactive; moves all extremities        LABS:                        7.9    5.9   )-----------( 302      ( 2017 08:17 )             26.5         142  |  105  |  13  ----------------------------<  89  3.5   |  30  |  0.59    Ca    8.2<L>      2017 08:22    TPro  7.2  /  Alb  3.2<L>  /  TBili  0.2  /  DBili  x   /  AST  27  /  ALT  17  /  AlkPhos  96      PT/INR - ( 2017 01:14 )   PT: 12.3 sec;   INR: 1.13 ratio         PTT - ( 2017 01:14 )  PTT:31.4 sec  Urinalysis Basic - ( 2017 02:20 )    Color: Yellow / Appearance: Clear / S.010 / pH: x  Gluc: x / Ketone: Negative  / Bili: Negative / Urobili: Negative mg/dL   Blood: x / Protein: Negative mg/dL / Nitrite: Negative   Leuk Esterase: Negative / RBC: x / WBC x   Sq Epi: x / Non Sq Epi: x / Bacteria: x          RADIOLOGY & ADDITIONAL TESTS:  CXR : IMPRESSION: No focal airspace opacities.    CTAP w/IV contr : IMPRESSION: No acute findings. No interval change.    b/l LE dopplers : IMPRESSION:  No evidence of acute DVT within the visualized deep venous system of   either lower extremity.      ASSESSMENT AND PLAN:  57M, morbid obesity, former heavy etoh abuse (last etoh ), HTN, PUD, hx GIB w/venous duodenal ulcer 2015 (anemia to 5.6, IR embolization of gastroduodenal artery), hx chronic anemia (hgb 7.5 (3/2017), 8-9 (); adm w/epigastric pain, intermittent bloody stools x2wks, melanotic stools x past few day, found to have low h/h by PMD, called at home to go to ED for further eval; in ED, hgb 7.7, CTAP neg, UA neg, CXR neg, LE dopplers neg; GI/Bienstock consulted. Pt denies further episodes of bleeding in hospital. Pt currently NPO, and on IV PPI.  For repeat h/h this PM to determine trend; for now, holding off on transfusion as pt not far from baseline, though low threshold to transfuse (if hgb remains <8). CC/F/U for: GIB, anemia    HPI:  58 y/o man PMH of PUD, GI bleeding, HTN, comes in with epigastric pain, intermittent bloody stools for past 2 wks and was called by PMD to come in for low Hg. Pt denies feeling lightheaded, mild epigastric pain with some nausea, but no vomiting/hematemesis. Has noted melanotic stool several days ago. No use of NSAIDS.  No flank pain, no fever/chills, No photophobia/eye pain/changes in vision, No ear pain/sore throat/dysphagia, No chest pain/palpitations, no SOB/cough/wheeze/stridor, No V/D, no dysuria/frequency/discharge, No neck/back pain, no rash, no changes in neurological status/function. Pt with stable vital signs in ED. (2017 06:28)        INTERVAL HPI/OVERNIGHT EVENTS:  Pt seen and examined at bedside.     Allergies/Intolerance: No Known Allergies      MEDICATIONS  (STANDING):  pantoprazole  Injectable 40milliGRAM(s) IV Push daily  enalapril 5milliGRAM(s) Oral daily  carvedilol 6.25milliGRAM(s) Oral every 12 hours    MEDICATIONS  (PRN):        ROS: as above; all other systems reviewed and wnl      PHYSICAL EXAMINATION:  Vital Signs Last 24 Hrs  T(C): 36.9, Max: 36.9 (04-25 @ 16:56)  T(F): 98.4, Max: 98.4 (04-25 @ 16:56)  HR: 62 (62 - 82)  BP: 158/106 (157/92 - 172/102)  BP(mean): --  RR: 18 (17 - 18)  SpO2: 96% (96% - 99%)  CAPILLARY BLOOD GLUCOSE      GENERAL: obese, middle-aged male; NAD  HEAD:  atraumatic, normocephalic  EYES: sclera anicteric  ENMT: mucous membranes moist  NECK: supple, No JVD  CHEST/LUNG: clear to auscultation bilaterally; no rales, rhonchi, or wheezing b/l  HEART: normal S1, S2  ABDOMEN: obese, BS+, soft, NT   EXTREMITIES:  pulses palpable; no clubbing, cyanosis; 3+ edema b/l LEs from knees to feet  NEURO: awake, alert, interactive; moves all extremities        LABS:                        7.9    5.9   )-----------( 302      ( 2017 08:17 )             26.5         142  |  105  |  13  ----------------------------<  89  3.5   |  30  |  0.59    Ca    8.2<L>      2017 08:22    TPro  7.2  /  Alb  3.2<L>  /  TBili  0.2  /  DBili  x   /  AST  27  /  ALT  17  /  AlkPhos  96      PT/INR - ( 2017 01:14 )   PT: 12.3 sec;   INR: 1.13 ratio         PTT - ( 2017 01:14 )  PTT:31.4 sec  Urinalysis Basic - ( 2017 02:20 )    Color: Yellow / Appearance: Clear / S.010 / pH: x  Gluc: x / Ketone: Negative  / Bili: Negative / Urobili: Negative mg/dL   Blood: x / Protein: Negative mg/dL / Nitrite: Negative   Leuk Esterase: Negative / RBC: x / WBC x   Sq Epi: x / Non Sq Epi: x / Bacteria: x          RADIOLOGY & ADDITIONAL TESTS:  CXR : IMPRESSION: No focal airspace opacities.    CTAP w/IV contr : IMPRESSION: No acute findings. No interval change.    b/l LE dopplers : IMPRESSION:  No evidence of acute DVT within the visualized deep venous system of   either lower extremity.      ASSESSMENT AND PLAN:  57M, morbid obesity, former heavy etoh abuse (last etoh ), HTN, PUD, hx GIB w/venous duodenal ulcer 2015 (anemia to 5.6, IR embolization of gastroduodenal artery), hx chronic anemia (hgb 7.5 (3/2017), 8-9 (); adm w/epigastric pain, intermittent bloody stools x2wks, melanotic stools x past few day, found to have low h/h by PMD, called at home to go to ED for further eval; in ED, hgb 7.7, CTAP neg, UA neg, CXR neg, LE dopplers neg; undergoing GI eval; also w/significant LE edema    GI/HEME;   *GIB - GI/Bienstock consulted; no further episodes of bleeding in hospital; currently on clear liquids; for EGD tomorrow    *anemia - chronic component, w/h/h in range of 8s-9s chronically; etiology unclear; for transfusion for h/h <8; serial h/h to trend    CV:   *HTN, uncontrolled - restart o/p regimen of lasix; increase enalapril to o/p dose of 10mg; continue coreg as currently; d/c IVFs as pt taking po    *LE edema - TTE  w/nl EF 55-60; will repeat during this admit, given pt w/significant LE b/l; denies prior hx of cardiac dz      OTHER: dvt prophy w/IPCs given acute bleeding

## 2017-04-26 NOTE — DIETITIAN INITIAL EVALUATION ADULT. - PERTINENT LABORATORY DATA
04-26   Hgb 7.9 g/dL<L> Hct 26.5 %<L> 04-25 Alb 3.2 g/dL serum pro-brain natriuretic peptide 247 pg/dL

## 2017-04-27 DIAGNOSIS — R60.0 LOCALIZED EDEMA: ICD-10-CM

## 2017-04-27 DIAGNOSIS — L03.115 CELLULITIS OF RIGHT LOWER LIMB: ICD-10-CM

## 2017-04-27 DIAGNOSIS — S81.801A UNSPECIFIED OPEN WOUND, RIGHT LOWER LEG, INITIAL ENCOUNTER: ICD-10-CM

## 2017-04-27 LAB
HCT VFR BLD CALC: 26 % — LOW (ref 39–50)
HGB BLD-MCNC: 7.8 G/DL — LOW (ref 13–17)
MCHC RBC-ENTMCNC: 18.1 PG — LOW (ref 27–34)
MCHC RBC-ENTMCNC: 30 GM/DL — LOW (ref 32–36)
MCV RBC AUTO: 60.4 FL — LOW (ref 80–100)
PLATELET # BLD AUTO: 324 K/UL — SIGNIFICANT CHANGE UP (ref 150–400)
RBC # BLD: 4.3 M/UL — SIGNIFICANT CHANGE UP (ref 4.2–5.8)
RBC # FLD: 17.6 % — HIGH (ref 11–15)
WBC # BLD: 6.3 K/UL — SIGNIFICANT CHANGE UP (ref 3.8–10.5)
WBC # FLD AUTO: 6.3 K/UL — SIGNIFICANT CHANGE UP (ref 3.8–10.5)

## 2017-04-27 PROCEDURE — 99233 SBSQ HOSP IP/OBS HIGH 50: CPT

## 2017-04-27 PROCEDURE — 88305 TISSUE EXAM BY PATHOLOGIST: CPT | Mod: 26

## 2017-04-27 PROCEDURE — 88312 SPECIAL STAINS GROUP 1: CPT | Mod: 26

## 2017-04-27 RX ORDER — VANCOMYCIN HCL 1 G
1000 VIAL (EA) INTRAVENOUS ONCE
Qty: 0 | Refills: 0 | Status: COMPLETED | OUTPATIENT
Start: 2017-04-27 | End: 2017-04-27

## 2017-04-27 RX ORDER — VANCOMYCIN HCL 1 G
1000 VIAL (EA) INTRAVENOUS EVERY 8 HOURS
Qty: 0 | Refills: 0 | Status: DISCONTINUED | OUTPATIENT
Start: 2017-04-28 | End: 2017-05-02

## 2017-04-27 RX ORDER — SOD SULF/SODIUM/NAHCO3/KCL/PEG
1000 SOLUTION, RECONSTITUTED, ORAL ORAL ONCE
Qty: 0 | Refills: 0 | Status: COMPLETED | OUTPATIENT
Start: 2017-04-27 | End: 2017-04-27

## 2017-04-27 RX ORDER — SODIUM CHLORIDE 9 MG/ML
1000 INJECTION, SOLUTION INTRAVENOUS
Qty: 0 | Refills: 0 | Status: DISCONTINUED | OUTPATIENT
Start: 2017-04-27 | End: 2017-04-27

## 2017-04-27 RX ORDER — VANCOMYCIN HCL 1 G
VIAL (EA) INTRAVENOUS
Qty: 0 | Refills: 0 | Status: DISCONTINUED | OUTPATIENT
Start: 2017-04-27 | End: 2017-05-02

## 2017-04-27 RX ADMIN — Medication 250 MILLIGRAM(S): at 23:39

## 2017-04-27 RX ADMIN — Medication 40 MILLIGRAM(S): at 06:03

## 2017-04-27 RX ADMIN — PANTOPRAZOLE SODIUM 40 MILLIGRAM(S): 20 TABLET, DELAYED RELEASE ORAL at 11:55

## 2017-04-27 RX ADMIN — CARVEDILOL PHOSPHATE 6.25 MILLIGRAM(S): 80 CAPSULE, EXTENDED RELEASE ORAL at 17:01

## 2017-04-27 RX ADMIN — SODIUM CHLORIDE 75 MILLILITER(S): 9 INJECTION, SOLUTION INTRAVENOUS at 09:32

## 2017-04-27 RX ADMIN — CARVEDILOL PHOSPHATE 6.25 MILLIGRAM(S): 80 CAPSULE, EXTENDED RELEASE ORAL at 06:03

## 2017-04-27 RX ADMIN — Medication 10 MILLIGRAM(S): at 06:03

## 2017-04-27 RX ADMIN — Medication 1000 MILLILITER(S): at 17:01

## 2017-04-27 RX ADMIN — Medication 1000 MILLILITER(S): at 20:48

## 2017-04-27 NOTE — BRIEF OPERATIVE NOTE - PRE-OP DX
Gastrointestinal hemorrhage, unspecified gastrointestinal hemorrhage type  04/27/2017    Active  Mina Norton

## 2017-04-27 NOTE — PROGRESS NOTE ADULT - SUBJECTIVE AND OBJECTIVE BOX
Upper esophagogastroduodenoscopy/ENDOSCOPY    -Informed consent obtained from patient prior to exam.     Indication:   GI bleed     Anesthesia: as per anesthesiologist  provided by:    Esophogus:  WNL                                                Large Hiatal Hernia    Stomach: Focal areas of erythema, edematous pre pyloric area.   - antrum , s/p biopsy    Duodenum:  WNL    The patient tolerated the procedure well.    Findings:  As above     Plan:  colonoscopy

## 2017-04-27 NOTE — PROGRESS NOTE ADULT - SUBJECTIVE AND OBJECTIVE BOX
CC/F/U for: GIB, anemia    HPI:  56 y/o man PMH of PUD, GI bleeding, HTN, comes in with epigastric pain, intermittent bloody stools for past 2 wks and was called by PMD to come in for low Hg. Pt denies feeling lightheaded, mild epigastric pain with some nausea, but no vomiting/hematemesis. Has noted melanotic stool several days ago. No use of NSAIDS.  No flank pain, no fever/chills, No photophobia/eye pain/changes in vision, No ear pain/sore throat/dysphagia, No chest pain/palpitations, no SOB/cough/wheeze/stridor, No V/D, no dysuria/frequency/discharge, No neck/back pain, no rash, no changes in neurological status/function. Pt with stable vital signs in ED. (25 Apr 2017 06:28)        INTERVAL HPI/OVERNIGHT EVENTS:  Pt seen and examined at bedside.     Allergies/Intolerance: No Known Allergies      MEDICATIONS  (STANDING):  pantoprazole  Injectable 40milliGRAM(s) IV Push daily  carvedilol 6.25milliGRAM(s) Oral every 12 hours  enalapril 10milliGRAM(s) Oral daily  furosemide    Tablet 40milliGRAM(s) Oral daily  polyethylene glycol/electrolyte Solution 1000milliLiter(s) Oral once  polyethylene glycol/electrolyte Solution 1000milliLiter(s) Oral once    MEDICATIONS  (PRN):        ROS: as above; all other systems reviewed and wnl      PHYSICAL EXAMINATION:  Vital Signs Last 24 Hrs  T(C): 36.6, Max: 37.1 (04-26 @ 17:12)  T(F): 97.8, Max: 98.8 (04-26 @ 17:12)  HR: 68 (58 - 68)  BP: 132/92 (122/61 - 170/111)  BP(mean): --  RR: 18 (14 - 21)  SpO2: 98% (97% - 100%)  CAPILLARY BLOOD GLUCOSE      GENERAL: obese, middle-aged male; NAD  HEAD:  atraumatic, normocephalic  EYES: sclera anicteric  ENMT: mucous membranes moist  NECK: supple, No JVD  CHEST/LUNG: clear to auscultation bilaterally; no rales, rhonchi, or wheezing b/l  HEART: normal S1, S2  ABDOMEN: obese, BS+, soft, NT   EXTREMITIES:  pulses palpable; no clubbing, cyanosis; 3+ edema b/l LEs from knees to feet; RLE wound, now open, draining  NEURO: awake, alert, interactive; moves all extremities      LABS:                        7.8    6.3   )-----------( 324      ( 27 Apr 2017 08:01 )             26.0       ASSESSMENT AND PLAN:  57M, morbid obesity, former heavy etoh abuse (last etoh 2010), HTN, PUD, hx GIB w/venous duodenal ulcer 2/2015 (anemia to 5.6, IR embolization of gastroduodenal artery), hx chronic anemia (hgb 7.5 (3/2017), 8-9 (2016); adm w/epigastric pain, intermittent bloody stools x2wks, melanotic stools x past few day, found to have low h/h by PMD, called at home to go to ED for further eval; in ED, hgb 7.7, CTAP neg, UA neg, CXR neg, LE dopplers neg; undergoing GI eval; also w/significant LE edema    GI/HEME;   *GIB - GI/Bienstock consulted; no further episodes of bleeding in hospital; s/p EGD 4/26 w/findings of gastritis and hiatal hernia; pt for colonoscopy as per GI    *anemia - chronic component, w/h/h in range of 8s-9s chronically; etiology unclear; for transfusion for h/h <8; serial h/h to trend      CV:   *HTN - BP now improved on regimen of lasix, enalapril, and coreg    *LE edema - TTE 2015 w/nl EF 55-60; repeat TTE 4/26/17 w/nl EF, no valvular heartdz; etiology of LE edema likely venous insufficiency; now pt w/open leg wound, draining - continue Lasix as per home meds; consult ID/Mangraj for input on cellulitis/abscess and need for tx; pt w/o leukocytosis or fevers      OTHER: dvt prophy w/IPCs given acute bleeding

## 2017-04-27 NOTE — CONSULT NOTE ADULT - SUBJECTIVE AND OBJECTIVE BOX
Infectious Diseases - Attending at Dr. Coburn    HPI:  56 y/o man PMH of PUD, GI bleeding, HTN, comes in with epigastric pain, intermittent bloody stools for past 2 wks and was called by PMD to come in for low Hg. Pt denies feeling lightheaded, mild epigastric pain with some nausea, but no vomiting/hematemesis. Has noted melanotic stool several days ago. No use of NSAIDS.  No flank pain, no fever/chills, No photophobia/eye pain/changes in vision, No ear pain/sore throat/dysphagia, No chest pain/palpitations, no SOB/cough/wheeze/stridor, No V/D, no dysuria/frequency/discharge, No neck/back pain, no rash, no changes in neurological status/function. Pt with stable vital signs in ED. (25 Apr 2017 06:28)  c/o right leg swelling and pain with wound for 3 weeks,no direct trauma.      PAST MEDICAL & SURGICAL HISTORY:  Knee osteoarthritis  Duodenal ulcer disease  Morbid obesity  Hypertension  Osteoarthritis  PUD (peptic ulcer disease)  Hypertension  Gastroduodenal artery bleed: IR embolization of gastroduodenal artery  Abdominal hernia  H/O ventral hernia repair  No significant past surgical history      Allergies    No Known Allergies    Intolerances        FAMILY HISTORY:  No pertinent family history in first degree relatives  No pertinent family history in first degree relatives      SOCIAL HISTORY:no smoking    REVIEW OF SYSTEMS:    Constitutional: No fever, weight loss or fatigue  Eyes: No eye pain, visual disturbances, or discharge  ENT:  No difficulty hearing, tinnitus, vertigo; No sinus or throat pain  Neck: No pain or stiffness  Respiratory: No cough, wheezing, chills or hemoptysis  Cardiovascular: No chest pain, palpitations, shortness of breath, dizziness or leg swelling  Gastrointestinal: diarrhea, melanotic stool  Genitourinary: No dysuria, frequency, hematuria or incontinence  Neurological: No headaches, memory loss, loss of strength, numbness or tremors  Skin: rle wound with pus  Musculoskletal:right leg pain      MEDICATIONS  (STANDING):  pantoprazole  Injectable 40milliGRAM(s) IV Push daily  carvedilol 6.25milliGRAM(s) Oral every 12 hours  enalapril 10milliGRAM(s) Oral daily  furosemide    Tablet 40milliGRAM(s) Oral daily  vancomycin  IVPB  IV Intermittent     MEDICATIONS  (PRN):      Vital Signs Last 24 Hrs  T(C): 37.1, Max: 37.1 (04-26 @ 23:42)  T(F): 98.8, Max: 98.8 (04-26 @ 23:42)  HR: 69 (60 - 69)  BP: 155/99 (122/61 - 158/89)  BP(mean): --  RR: 18 (14 - 21)  SpO2: 99% (97% - 100%)    PHYSICAL EXAM:    Constitutional: NAD, well-groomed, well-developed  HEENT: PERRLA, EOMI, Normal Hearing, MMM  Neck: No LAD, No JVD  Back: Normal spine flexure, No CVA tenderness  Respiratory: CTAB/L  Cardiovascular: S1 and S2, RRR, no M/G/R  Gastrointestinal: BS+, soft, NT/ND  Extremities: No peripheral edema  Vascular: 2+ peripheral pulses  Neurological: A/O x 3, no focal deficits  Skin:RLE erythema and open wound with pus on the shin      LABS:                        7.8    6.3   )-----------( 324      ( 27 Apr 2017 08:01 )             26.0                 MICROBIOLOGY:  RECENT CULTURES:  04-25 .Urine Clean Catch (Midstream) XXXX XXXX   <10,000 CFU/ml Normal Urogenital maureen present          RADIOLOGY & ADDITIONAL STUDIES:

## 2017-04-28 ENCOUNTER — TRANSCRIPTION ENCOUNTER (OUTPATIENT)
Age: 57
End: 2017-04-28

## 2017-04-28 LAB
ANION GAP SERPL CALC-SCNC: 9 MMOL/L — SIGNIFICANT CHANGE UP (ref 5–17)
BUN SERPL-MCNC: 14 MG/DL — SIGNIFICANT CHANGE UP (ref 7–23)
CALCIUM SERPL-MCNC: 8.6 MG/DL — SIGNIFICANT CHANGE UP (ref 8.5–10.1)
CHLORIDE SERPL-SCNC: 105 MMOL/L — SIGNIFICANT CHANGE UP (ref 96–108)
CO2 SERPL-SCNC: 29 MMOL/L — SIGNIFICANT CHANGE UP (ref 22–31)
CREAT SERPL-MCNC: 0.86 MG/DL — SIGNIFICANT CHANGE UP (ref 0.5–1.3)
GLUCOSE SERPL-MCNC: 94 MG/DL — SIGNIFICANT CHANGE UP (ref 70–99)
HCT VFR BLD CALC: 27.4 % — LOW (ref 39–50)
HGB BLD-MCNC: 8.3 G/DL — LOW (ref 13–17)
MCHC RBC-ENTMCNC: 18.4 PG — LOW (ref 27–34)
MCHC RBC-ENTMCNC: 30.3 GM/DL — LOW (ref 32–36)
MCV RBC AUTO: 60.8 FL — LOW (ref 80–100)
PLATELET # BLD AUTO: 321 K/UL — SIGNIFICANT CHANGE UP (ref 150–400)
POTASSIUM SERPL-MCNC: 3.4 MMOL/L — LOW (ref 3.5–5.3)
POTASSIUM SERPL-SCNC: 3.4 MMOL/L — LOW (ref 3.5–5.3)
RBC # BLD: 4.5 M/UL — SIGNIFICANT CHANGE UP (ref 4.2–5.8)
RBC # FLD: 17.3 % — HIGH (ref 11–15)
SODIUM SERPL-SCNC: 143 MMOL/L — SIGNIFICANT CHANGE UP (ref 135–145)
SURGICAL PATHOLOGY FINAL REPORT - CH: SIGNIFICANT CHANGE UP
VANCOMYCIN TROUGH SERPL-MCNC: 17.5 UG/ML — SIGNIFICANT CHANGE UP (ref 10–20)
WBC # BLD: 8 K/UL — SIGNIFICANT CHANGE UP (ref 3.8–10.5)
WBC # FLD AUTO: 8 K/UL — SIGNIFICANT CHANGE UP (ref 3.8–10.5)

## 2017-04-28 RX ADMIN — Medication 250 MILLIGRAM(S): at 13:55

## 2017-04-28 RX ADMIN — Medication 40 MILLIGRAM(S): at 05:43

## 2017-04-28 RX ADMIN — CARVEDILOL PHOSPHATE 6.25 MILLIGRAM(S): 80 CAPSULE, EXTENDED RELEASE ORAL at 05:43

## 2017-04-28 RX ADMIN — Medication 250 MILLIGRAM(S): at 07:00

## 2017-04-28 RX ADMIN — Medication 10 MILLIGRAM(S): at 05:43

## 2017-04-28 RX ADMIN — CARVEDILOL PHOSPHATE 6.25 MILLIGRAM(S): 80 CAPSULE, EXTENDED RELEASE ORAL at 17:53

## 2017-04-28 RX ADMIN — PANTOPRAZOLE SODIUM 40 MILLIGRAM(S): 20 TABLET, DELAYED RELEASE ORAL at 13:36

## 2017-04-28 RX ADMIN — Medication 250 MILLIGRAM(S): at 21:48

## 2017-04-28 NOTE — CHART NOTE - NSCHARTNOTEFT_GEN_A_CORE
No further bleeding  VSS, afebrile  Lungs - clear  Heart - s1s2 , wnl  abd - bs (+), wnl    For colonoscopy today

## 2017-04-28 NOTE — PHYSICAL THERAPY INITIAL EVALUATION ADULT - MODALITIES TREATMENT COMMENTS
Pt is on a Cyanto P500 low air loss surface. Pt presents with ruptured bullae over anteromedial aspect of RLE likely due to BLE edema. Pt endorses recent ruptured of serous-filled bualle at home. Wound is clean with no signs of infection. No drainage noted at this time. Periwound skin intact. + edema noted bilaterally. + erythema & increased warmth noted bilaterally. Faint DP/PT pulses bilaterally likely due to edema.

## 2017-04-28 NOTE — PROGRESS NOTE ADULT - SUBJECTIVE AND OBJECTIVE BOX
Colonoscopy    -Informed consent obtained from patient prior to exam.     Indication: GI bleed    Anesthesia:   as per anesthesia  provided by:    Rectum:  hemorrhoid    Sigmoid:     Descending colon:   Transverse Colon:  Ascending Colon:  Diverticulum    Cecum:  WNL      The patient tolerated the procedure well.    Findings:  diverticulum, hemorrhoids    Plan:  advance diet, patient cleared from GI point of view for discharge.

## 2017-04-28 NOTE — PHYSICAL THERAPY INITIAL EVALUATION ADULT - MODIFIED CLINICAL TEST OF SENSORY INTEGRATION IN BALANCE TEST
Barthel Index: Feeding Score _10/10__, Bathing Score _5/5__, Grooming Score _5/5__, Dressing Score _10/10__, Bowels Score __10/10_, Bladder Score _10/10__, Toilet Score _10/10__, Transfers Score _10/15__, Mobility Score __0/15_, Stairs Score _0/10__,     Total Score ___0/100

## 2017-04-28 NOTE — PHYSICAL THERAPY INITIAL EVALUATION ADULT - PERTINENT HX OF CURRENT PROBLEM, REHAB EVAL
Pt is a 58yo [place]ish-speaking M admitted secondary to abdominal pain & melena. TTE: EF: 55-60%, mild aortic regurgitation, dilation of aortic root, mildly dilated aortic root, & mild aortic valve disease. X-ray chest: negative. UD Duplex BLEs: negative for DVT in either LE. CT abdomen & pelvis: no acute findings. Pt s/p EGD with biospy on 4/27/17.

## 2017-04-28 NOTE — BRIEF OPERATIVE NOTE - POST-OP DX
Diverticulosis of large intestine without hemorrhage  04/28/2017    Mina Deshpande  Hemorrhoids, unspecified hemorrhoid type  04/28/2017    Mina Deshpande  Hiatal hernia  04/27/2017    Mina Deshpande

## 2017-04-28 NOTE — PROGRESS NOTE ADULT - SUBJECTIVE AND OBJECTIVE BOX
Patient is a 57y old  Male who presents with a chief complaint of diarrhea with melanotic stool    INTERVAL HPI / OVERNIGHT EVENTS:continues to have RLE pain ,no improvement    MEDICATIONS  (STANDING):  pantoprazole  Injectable 40milliGRAM(s) IV Push daily  carvedilol 6.25milliGRAM(s) Oral every 12 hours  enalapril 10milliGRAM(s) Oral daily  furosemide    Tablet 40milliGRAM(s) Oral daily  vancomycin  IVPB  IV Intermittent   vancomycin  IVPB 1000milliGRAM(s) IV Intermittent every 8 hours    MEDICATIONS  (PRN):      Vital Signs Last 24 Hrs  T(C): 36.3, Max: 37.7 (04-27 @ 23:22)  T(F): 97.4, Max: 99.8 (04-27 @ 23:22)  HR: 60 (60 - 69)  BP: 150/78 (144/72 - 158/85)  BP(mean): --  RR: 16 (15 - 18)  SpO2: 98% (97% - 99%)    PHYSICAL EXAM:  General :NAD  Constitutional:  well-groomed, well-developed  Respiratory: CTAB/L  Cardiovascular: S1 and S2, RRR, no M/G/R  Gastrointestinal: BS+, soft, NT/ND  Extremities: No peripheral edema  Vascular: 2+ peripheral pulses  Skin RLE swelling >Left leg  Right leg wound dressed      LABS:                        8.3    8.0   )-----------( 321      ( 28 Apr 2017 07:35 )             27.4     04-28    143  |  105  |  14  ----------------------------<  94  3.4<L>   |  29  |  0.86    Ca    8.6      28 Apr 2017 07:35            MICROBIOLOGY:  RECENT CULTURES:  04-25 .Urine Clean Catch (Midstream) XXXX XXXX   <10,000 CFU/ml Normal Urogenital maureen present          RADIOLOGY & ADDITIONAL STUDIES:

## 2017-04-28 NOTE — PROGRESS NOTE ADULT - SUBJECTIVE AND OBJECTIVE BOX
CC/F/U for:     HPI:  58 y/o man PMH of PUD, GI bleeding, HTN, comes in with epigastric pain, intermittent bloody stools for past 2 wks and was called by PMD to come in for low Hg. Pt denies feeling lightheaded, mild epigastric pain with some nausea, but no vomiting/hematemesis. Has noted melanotic stool several days ago. No use of NSAIDS.  No flank pain, no fever/chills, No photophobia/eye pain/changes in vision, No ear pain/sore throat/dysphagia, No chest pain/palpitations, no SOB/cough/wheeze/stridor, No V/D, no dysuria/frequency/discharge, No neck/back pain, no rash, no changes in neurological status/function. Pt with stable vital signs in ED. (25 Apr 2017 06:28)        INTERVAL HPI/OVERNIGHT EVENTS:  Pt seen and examined at bedside; for colonoscopy today.     Allergies/Intolerance: No Known Allergies      MEDICATIONS  (STANDING):  pantoprazole  Injectable 40milliGRAM(s) IV Push daily  carvedilol 6.25milliGRAM(s) Oral every 12 hours  enalapril 10milliGRAM(s) Oral daily  furosemide    Tablet 40milliGRAM(s) Oral daily  vancomycin  IVPB  IV Intermittent   vancomycin  IVPB 1000milliGRAM(s) IV Intermittent every 8 hours    MEDICATIONS  (PRN):        ROS: as above; all other systems reviewed and wnl      PHYSICAL EXAMINATION:  Vital Signs Last 24 Hrs  T(C): 36.3, Max: 37.7 (04-27 @ 23:22)  T(F): 97.4, Max: 99.8 (04-27 @ 23:22)  HR: 60 (60 - 69)  BP: 150/78 (144/72 - 158/85)  BP(mean): --  RR: 16 (15 - 18)  SpO2: 98% (97% - 99%)  CAPILLARY BLOOD GLUCOSE      GENERAL: obese, middle-aged male; NAD  HEAD:  atraumatic, normocephalic  EYES: sclera anicteric  ENMT: mucous membranes moist  NECK: supple, No JVD  CHEST/LUNG: clear to auscultation bilaterally; no rales, rhonchi, or wheezing b/l  HEART: normal S1, S2  ABDOMEN: obese, BS+, soft, NT   EXTREMITIES:  pulses palpable; no clubbing, cyanosis; 3+ edema b/l LEs from knees to feet; RLE wound, bandaged  NEURO: awake, alert, interactive; moves all extremities    LABS:                        8.3    8.0   )-----------( 321      ( 28 Apr 2017 07:35 )             27.4     04-28    143  |  105  |  14  ----------------------------<  94  3.4<L>   |  29  |  0.86    Ca    8.6      28 Apr 2017 07:35        ASSESSMENT AND PLAN:  57M, morbid obesity, former heavy etoh abuse (last etoh 2010), HTN, PUD, hx GIB w/venous duodenal ulcer 2/2015 (anemia to 5.6, IR embolization of gastroduodenal artery), hx chronic anemia (hgb 7.5 (3/2017), 8-9 (2016); adm w/epigastric pain, intermittent bloody stools x2wks, melanotic stools x past few day, found to have low h/h by PMD, called at home to go to ED for further eval; in ED, hgb 7.7, CTAP neg, UA neg, CXR neg, LE dopplers neg; undergoing GI eval; also w/significant LE edema    GI/HEME;   *GIB - GI/Bienstock consulted; no further episodes of bleeding in hospital; s/p EGD 4/26 w/findings of gastritis and hiatal hernia; pt for colonoscopy as per GI    *anemia - chronic component, w/h/h in range of 8s-9s chronically; etiology unclear; for transfusion for h/h <8; serial h/h to trend      CV:   *HTN - BP now improved on regimen of lasix, enalapril, and coreg    *LE edema - TTE 2015 w/nl EF 55-60; repeat TTE 4/26/17 w/nl EF, no valvular heartdz; etiology of LE edema likely venous insufficiency; now pt w/open leg wound, draining - continue Lasix as per home meds; consult ID/Mangraj for input on cellulitis/abscess and need for tx; pt w/o leukocytosis or fevers      OTHER: dvt prophy w/IPCs given acute bleeding CC/F/U for: GIB, anemia, RLE cellulitis    HPI:  58 y/o man PMH of PUD, GI bleeding, HTN, comes in with epigastric pain, intermittent bloody stools for past 2 wks and was called by PMD to come in for low Hg. Pt denies feeling lightheaded, mild epigastric pain with some nausea, but no vomiting/hematemesis. Has noted melanotic stool several days ago. No use of NSAIDS.  No flank pain, no fever/chills, No photophobia/eye pain/changes in vision, No ear pain/sore throat/dysphagia, No chest pain/palpitations, no SOB/cough/wheeze/stridor, No V/D, no dysuria/frequency/discharge, No neck/back pain, no rash, no changes in neurological status/function. Pt with stable vital signs in ED. (25 Apr 2017 06:28)        INTERVAL HPI/OVERNIGHT EVENTS:  Pt seen and examined at bedside; for colonoscopy today.     Allergies/Intolerance: No Known Allergies      MEDICATIONS  (STANDING):  pantoprazole  Injectable 40milliGRAM(s) IV Push daily  carvedilol 6.25milliGRAM(s) Oral every 12 hours  enalapril 10milliGRAM(s) Oral daily  furosemide    Tablet 40milliGRAM(s) Oral daily  vancomycin  IVPB  IV Intermittent   vancomycin  IVPB 1000milliGRAM(s) IV Intermittent every 8 hours    MEDICATIONS  (PRN):        ROS: as above; all other systems reviewed and wnl      PHYSICAL EXAMINATION:  Vital Signs Last 24 Hrs  T(C): 36.3, Max: 37.7 (04-27 @ 23:22)  T(F): 97.4, Max: 99.8 (04-27 @ 23:22)  HR: 60 (60 - 69)  BP: 150/78 (144/72 - 158/85)  BP(mean): --  RR: 16 (15 - 18)  SpO2: 98% (97% - 99%)  CAPILLARY BLOOD GLUCOSE      GENERAL: obese, middle-aged male; NAD  HEAD:  atraumatic, normocephalic  EYES: sclera anicteric  ENMT: mucous membranes moist  NECK: supple, No JVD  CHEST/LUNG: clear to auscultation bilaterally; no rales, rhonchi, or wheezing b/l  HEART: normal S1, S2  ABDOMEN: obese, BS+, soft, NT   EXTREMITIES:  pulses palpable; no clubbing, cyanosis; 3+ edema b/l LEs from knees to feet; RLE wound, bandaged  NEURO: awake, alert, interactive; moves all extremities    LABS:                        8.3    8.0   )-----------( 321      ( 28 Apr 2017 07:35 )             27.4     04-28    143  |  105  |  14  ----------------------------<  94  3.4<L>   |  29  |  0.86    Ca    8.6      28 Apr 2017 07:35        ASSESSMENT AND PLAN:  57M, morbid obesity, former heavy etoh abuse (last etoh 2010), HTN, PUD, hx GIB w/venous duodenal ulcer 2/2015 (anemia to 5.6, IR embolization of gastroduodenal artery), hx chronic anemia (hgb 7.5 (3/2017), 8-9 (2016); adm w/epigastric pain, intermittent bloody stools x2wks, melanotic stools x past few day, found to have low h/h by PMD, called at home to go to ED for further eval; in ED, hgb 7.7, CTAP neg, UA neg, CXR neg, LE dopplers neg; undergoing GI eval; also w/significant LE edema    GI/HEME;   *GIB - GI/Bienstock consulted; no further episodes of bleeding in hospital; s/p EGD 4/26 w/findings of gastritis and hiatal hernia; pt for colonoscopy today as per GI    *anemia - chronic component, w/h/h in range of 8s-9s chronically; etiology unclear; for transfusion for h/h <8; serial h/h to trend      CV:   *HTN - BP improved on regimen of lasix, enalapril, and coreg    *LE edema - TTE 2015 w/nl EF 55-60; repeat TTE 4/26/17 w/nl EF, no valvular heartdz; etiology of LE edema likely venous insufficiency - continue Lasix as per home meds;       ID: RLE cellulitis w/open leg wound, draining   -started on vancomycin as per ID/Sheryl; follow vanco levels  -f/u wound cxs  -pt w/o leukocytosis or fevers; follow wbcs      OTHER: dvt prophy w/IPCs given acute bleeding

## 2017-04-29 LAB
ANION GAP SERPL CALC-SCNC: 8 MMOL/L — SIGNIFICANT CHANGE UP (ref 5–17)
BUN SERPL-MCNC: 14 MG/DL — SIGNIFICANT CHANGE UP (ref 7–23)
CALCIUM SERPL-MCNC: 8.3 MG/DL — LOW (ref 8.5–10.1)
CHLORIDE SERPL-SCNC: 103 MMOL/L — SIGNIFICANT CHANGE UP (ref 96–108)
CO2 SERPL-SCNC: 29 MMOL/L — SIGNIFICANT CHANGE UP (ref 22–31)
CREAT SERPL-MCNC: 0.84 MG/DL — SIGNIFICANT CHANGE UP (ref 0.5–1.3)
GLUCOSE SERPL-MCNC: 111 MG/DL — HIGH (ref 70–99)
HCT VFR BLD CALC: 27.2 % — LOW (ref 39–50)
HGB BLD-MCNC: 7.9 G/DL — LOW (ref 13–17)
MCHC RBC-ENTMCNC: 17.8 PG — LOW (ref 27–34)
MCHC RBC-ENTMCNC: 29.1 GM/DL — LOW (ref 32–36)
MCV RBC AUTO: 61.3 FL — LOW (ref 80–100)
PLATELET # BLD AUTO: 318 K/UL — SIGNIFICANT CHANGE UP (ref 150–400)
POTASSIUM SERPL-MCNC: 3.9 MMOL/L — SIGNIFICANT CHANGE UP (ref 3.5–5.3)
POTASSIUM SERPL-SCNC: 3.9 MMOL/L — SIGNIFICANT CHANGE UP (ref 3.5–5.3)
RBC # BLD: 4.43 M/UL — SIGNIFICANT CHANGE UP (ref 4.2–5.8)
RBC # FLD: 18.2 % — HIGH (ref 11–15)
SODIUM SERPL-SCNC: 140 MMOL/L — SIGNIFICANT CHANGE UP (ref 135–145)
VANCOMYCIN TROUGH SERPL-MCNC: 15.2 UG/ML — SIGNIFICANT CHANGE UP (ref 10–20)
WBC # BLD: 7.8 K/UL — SIGNIFICANT CHANGE UP (ref 3.8–10.5)
WBC # FLD AUTO: 7.8 K/UL — SIGNIFICANT CHANGE UP (ref 3.8–10.5)

## 2017-04-29 PROCEDURE — 99232 SBSQ HOSP IP/OBS MODERATE 35: CPT

## 2017-04-29 RX ORDER — SODIUM CHLORIDE 9 MG/ML
1000 INJECTION, SOLUTION INTRAVENOUS
Qty: 0 | Refills: 0 | Status: DISCONTINUED | OUTPATIENT
Start: 2017-04-29 | End: 2017-04-29

## 2017-04-29 RX ORDER — FERROUS SULFATE 325(65) MG
325 TABLET ORAL DAILY
Qty: 0 | Refills: 0 | Status: DISCONTINUED | OUTPATIENT
Start: 2017-04-29 | End: 2017-05-02

## 2017-04-29 RX ORDER — PANTOPRAZOLE SODIUM 20 MG/1
40 TABLET, DELAYED RELEASE ORAL
Qty: 0 | Refills: 0 | Status: DISCONTINUED | OUTPATIENT
Start: 2017-04-29 | End: 2017-05-02

## 2017-04-29 RX ADMIN — SODIUM CHLORIDE 100 MILLILITER(S): 9 INJECTION, SOLUTION INTRAVENOUS at 00:17

## 2017-04-29 RX ADMIN — Medication 10 MILLIGRAM(S): at 05:20

## 2017-04-29 RX ADMIN — CARVEDILOL PHOSPHATE 6.25 MILLIGRAM(S): 80 CAPSULE, EXTENDED RELEASE ORAL at 17:30

## 2017-04-29 RX ADMIN — CARVEDILOL PHOSPHATE 6.25 MILLIGRAM(S): 80 CAPSULE, EXTENDED RELEASE ORAL at 05:20

## 2017-04-29 RX ADMIN — Medication 250 MILLIGRAM(S): at 22:11

## 2017-04-29 RX ADMIN — PANTOPRAZOLE SODIUM 40 MILLIGRAM(S): 20 TABLET, DELAYED RELEASE ORAL at 12:43

## 2017-04-29 RX ADMIN — Medication 40 MILLIGRAM(S): at 05:20

## 2017-04-29 RX ADMIN — Medication 250 MILLIGRAM(S): at 05:20

## 2017-04-29 RX ADMIN — Medication 250 MILLIGRAM(S): at 14:54

## 2017-04-29 NOTE — PROGRESS NOTE ADULT - SUBJECTIVE AND OBJECTIVE BOX
CC/F/U for:    INTERVAL HPI/OVERNIGHT EVENTS:  Pt seen and examined at bedside.     Allergies/Intolerance: No Known Allergies      MEDICATIONS  (STANDING):  carvedilol 6.25milliGRAM(s) Oral every 12 hours  enalapril 10milliGRAM(s) Oral daily  furosemide    Tablet 40milliGRAM(s) Oral daily  vancomycin  IVPB  IV Intermittent   vancomycin  IVPB 1000milliGRAM(s) IV Intermittent every 8 hours  pantoprazole    Tablet 40milliGRAM(s) Oral before breakfast    MEDICATIONS  (PRN):        ROS: all systems reviewed and wnl      PHYSICAL EXAMINATION:  Vital Signs Last 24 Hrs  T(C): 36.7, Max: 37.1 (04-28 @ 16:31)  T(F): 98, Max: 98.8 (04-28 @ 16:31)  HR: 67 (58 - 76)  BP: 119/79 (119/79 - 167/97)  BP(mean): --  RR: 18 (16 - 21)  SpO2: 96% (95% - 100%)  CAPILLARY BLOOD GLUCOSE      GENERAL: NAD, well-groomed, well-developed  HEAD:  atraumatic, normocephalic  EYES: sclera anicteric  ENMT: mucous membranes moist  NECK: supple, No JVD  CHEST/LUNG: clear to auscultation bilaterally; no rales, rhonchi, or wheezing b/l  HEART: normal S1, S2  ABDOMEN: BS+, soft, ND, NT   EXTREMITIES:  pulses palpable; no clubbing, cyanosis, or edema b/l LEs  NEURO: awake, alert, interactive; moves all extremities  SKIN: no rashes or lesions      LABS:                        7.9    7.8   )-----------( 318      ( 29 Apr 2017 07:49 )             27.2     04-29    140  |  103  |  14  ----------------------------<  111<H>  3.9   |  29  |  0.84    Ca    8.3<L>      29 Apr 2017 07:49              RADIOLOGY & ADDITIONAL TESTS: none.      ASSESSMENT AND PLAN: CC/F/U for:    INTERVAL HPI/OVERNIGHT EVENTS:  Pt seen and examined at bedside.     Allergies/Intolerance: No Known Allergies      MEDICATIONS  (STANDING):  carvedilol 6.25milliGRAM(s) Oral every 12 hours  enalapril 10milliGRAM(s) Oral daily  furosemide    Tablet 40milliGRAM(s) Oral daily  vancomycin  IVPB  IV Intermittent   vancomycin  IVPB 1000milliGRAM(s) IV Intermittent every 8 hours  pantoprazole    Tablet 40milliGRAM(s) Oral before breakfast    MEDICATIONS  (PRN):        ROS: all systems reviewed and wnl      PHYSICAL EXAMINATION:  Vital Signs Last 24 Hrs  T(C): 36.7, Max: 37.1 (04-28 @ 16:31)  T(F): 98, Max: 98.8 (04-28 @ 16:31)  HR: 67 (58 - 76)  BP: 119/79 (119/79 - 167/97)  BP(mean): --  RR: 18 (16 - 21)  SpO2: 96% (95% - 100%)  CAPILLARY BLOOD GLUCOSE      GENERAL: NAD, well-groomed, well-developed  HEAD:  atraumatic, normocephalic  EYES: sclera anicteric  ENMT: mucous membranes moist  NECK: supple, No JVD  CHEST/LUNG: clear to auscultation bilaterally; no rales, rhonchi, or wheezing b/l  HEART: normal S1, S2  ABDOMEN: BS+, soft, ND, NT   EXTREMITIES:  pulses palpable; no clubbing, cyanosis, or edema b/l LEs  NEURO: awake, alert, interactive; moves all extremities  SKIN: no rashes or lesions      LABS:                        7.9    7.8   )-----------( 318      ( 29 Apr 2017 07:49 )             27.2     04-29    140  |  103  |  14  ----------------------------<  111<H>  3.9   |  29  |  0.84    Ca    8.3<L>      29 Apr 2017 07:49              RADIOLOGY & ADDITIONAL TESTS: none.      ASSESSMENT AND PLAN: ASSESSMENT AND PLAN:  57 year old male morbid obesity, former heavy etoh abuse (last etoh 2010), HTN, PUD, hx GIB w/venous duodenal ulcer 2/2015 (anemia to 5.6, IR embolization of gastroduodenal artery), hx chronic anemia (hgb 7.5 (3/2017), 8-9 (2016); adm w/epigastric pain, intermittent bloody stools x2wks, melanotic stools x past few day, found to have low h/h by PMD, called at home to go to ED for further eval; in ED, hgb 7.7, CTAP neg, UA neg, CXR neg, LE dopplers neg; undergoing GI eval; also w/significant LE edema    GI/HEME;   *GIB - GI/Bienstock consulted; no further episodes of bleeding in hospital; s/p EGD 4/26 w/findings of gastritis and hiatal hernia; pt for colonoscopy today as per GI. CBC stable tody. Anemia - chronic component, w/h/h in range of 8s-9s chronically; etiology unclear; for transfusion for h/h <8; serial h/h to trend. No need for PRBC today.       CV:   *HTN - BP improved on regimen of lasix, enalapril, and coreg    *LE edema - TTE 2015 w/nl EF 55-60; repeat TTE 4/26/17 w/nl EF, no valvular heartdz; etiology of LE edema likely venous insufficiency - continue Lasix as per home meds;       ID: RLE cellulitis w/open leg wound, draining. Keep leg elevated. Continue IV Vancomycin. Vancomycin trough requested. CC/F/U for: Anemia, GI blood loss and right leg cellulitis.     INTERVAL HPI/OVERNIGHT EVENTS:  Pt seen and examined at bedside.     Allergies/Intolerance: No Known Allergies      MEDICATIONS  (STANDING):  carvedilol 6.25milliGRAM(s) Oral every 12 hours  enalapril 10milliGRAM(s) Oral daily  furosemide    Tablet 40milliGRAM(s) Oral daily  vancomycin  IVPB  IV Intermittent   vancomycin  IVPB 1000milliGRAM(s) IV Intermittent every 8 hours  pantoprazole    Tablet 40milliGRAM(s) Oral before breakfast    MEDICATIONS  (PRN):        ROS: all systems reviewed and wnl      PHYSICAL EXAMINATION:  Vital Signs Last 24 Hrs  T(C): 36.7, Max: 37.1 (04-28 @ 16:31)  T(F): 98, Max: 98.8 (04-28 @ 16:31)  HR: 67 (58 - 76)  BP: 119/79 (119/79 - 167/97)  BP(mean): --  RR: 18 (16 - 21)  SpO2: 96% (95% - 100%)  CAPILLARY BLOOD GLUCOSE      GENERAL: NAD, well-groomed, well-developed  HEAD:  atraumatic, normocephalic  EYES: sclera anicteric  ENMT: mucous membranes moist  NECK: supple, No JVD  CHEST/LUNG: clear to auscultation bilaterally; no rales, rhonchi, or wheezing b/l  HEART: normal S1, S2  ABDOMEN: BS+, soft, ND, NT   EXTREMITIES:  pulses palpable; no clubbing, cyanosis, or edema b/l LEs  NEURO: awake, alert, interactive; moves all extremities  SKIN: no rashes or lesions      LABS:                        7.9    7.8   )-----------( 318      ( 29 Apr 2017 07:49 )             27.2     04-29    140  |  103  |  14  ----------------------------<  111<H>  3.9   |  29  |  0.84    Ca    8.3<L>      29 Apr 2017 07:49              RADIOLOGY & ADDITIONAL TESTS: none.      ASSESSMENT AND PLAN: ASSESSMENT AND PLAN:  57 year old male morbid obesity, former heavy etoh abuse (last etoh 2010), HTN, PUD, hx GIB w/venous duodenal ulcer 2/2015 (anemia to 5.6, IR embolization of gastroduodenal artery), hx chronic anemia (hgb 7.5 (3/2017), 8-9 (2016); adm w/epigastric pain, intermittent bloody stools x2wks, melanotic stools x past few day, found to have low h/h by PMD, called at home to go to ED for further eval; in ED, hgb 7.7, CTAP neg, UA neg, CXR neg, LE dopplers neg; undergoing GI eval; also w/significant LE edema    GI/HEME;   *GIB - GI/Bienstock consulted; no further episodes of bleeding in hospital; s/p EGD 4/26 w/findings of gastritis and hiatal hernia; pt for colonoscopy today as per GI. CBC stable tody. Anemia - chronic component, w/h/h in range of 8s-9s chronically; etiology unclear; for transfusion for h/h <8; serial h/h to trend. No need for PRBC today.       CV:   *HTN - BP improved on regimen of lasix 40 mg/day, enalapril 10 mg/day and coreg 6.25 mg bid.     *LE edema - TTE 2015 w/nl EF 55-60; repeat TTE 4/26/17 w/nl EF, no valvular heart dz; etiology of LE edema likely venous insufficiency - continue Lasix as per home meds;       ID: RLE cellulitis w/open leg wound, draining. Keep leg elevated. Continue IV Vancomycin. Vancomycin trough requested. CC/F/U for: Anemia, GI blood loss and right leg cellulitis improving.     INTERVAL HPI/OVERNIGHT EVENTS:  Pt seen and examined at bedside.     Allergies/Intolerance: No Known Allergies      MEDICATIONS  (STANDING):  carvedilol 6.25milliGRAM(s) Oral every 12 hours  enalapril 10milliGRAM(s) Oral daily  furosemide    Tablet 40milliGRAM(s) Oral daily  vancomycin  IVPB  IV Intermittent   vancomycin  IVPB 1000milliGRAM(s) IV Intermittent every 8 hours  pantoprazole    Tablet 40milliGRAM(s) Oral before breakfast    MEDICATIONS  (PRN):        ROS: all systems reviewed and wnl      PHYSICAL EXAMINATION:  Vital Signs Last 24 Hrs  T(C): 36.7, Max: 37.1 (04-28 @ 16:31)  T(F): 98, Max: 98.8 (04-28 @ 16:31)  HR: 67 (58 - 76)  BP: 119/79 (119/79 - 167/97)  BP(mean): --  RR: 18 (16 - 21)  SpO2: 96% (95% - 100%)  CAPILLARY BLOOD GLUCOSE      GENERAL: NAD, well-groomed, well-developed  HEAD:  atraumatic, normocephalic  EYES: sclera anicteric  ENMT: mucous membranes moist  NECK: supple, No JVD  CHEST/LUNG: clear to auscultation bilaterally; no rales, rhonchi, or wheezing b/l  HEART: normal S1, S2  ABDOMEN: BS+, soft, ND, NT   EXTREMITIES:  pulses palpable; no clubbing, cyanosis. Mild leg edema. Right leg cellulitis improving.   NEURO: awake, alert, interactive; moves all extremities  SKIN: no rashes or lesions      LABS:                        7.9    7.8   )-----------( 318      ( 29 Apr 2017 07:49 )             27.2     04-29    140  |  103  |  14  ----------------------------<  111<H>  3.9   |  29  |  0.84    Ca    8.3<L>      29 Apr 2017 07:49              RADIOLOGY & ADDITIONAL TESTS: none.      ASSESSMENT AND PLAN: ASSESSMENT AND PLAN:  57 year old male morbid obesity, former heavy etoh abuse (last etoh 2010), HTN, PUD, hx GIB w/venous duodenal ulcer 2/2015 (anemia to 5.6, IR embolization of gastroduodenal artery), hx chronic anemia (hgb 7.5 (3/2017), 8-9 (2016); adm w/epigastric pain, intermittent bloody stools x2wks, melanotic stools x past few day, found to have low h/h by PMD, called at home to go to ED for further eval; in ED, hgb 7.7, CTAP neg, UA neg, CXR neg, LE dopplers neg; undergoing GI eval; also w/significant LE edema    GI/HEME;   *GIB - GI/Bienstock consulted; no further episodes of bleeding in hospital; s/p EGD 4/26 w/findings of gastritis and hiatal hernia; pt had colonoscopy yesterday. CBC stable tody. Anemia - chronic component, w/h/h in range of 8s-9s chronically; etiology unclear; for transfusion for h/h <8; serial h/h to trend. No need for PRBC today. Continue PPI. Add ferous sulfate 325 mg/day.        CV:   *HTN - BP improved on regimen of lasix 40 mg/day, enalapril 10 mg/day and coreg 6.25 mg bid.     *LE edema - TTE 2015 w/nl EF 55-60; repeat TTE 4/26/17 w/nl EF, no valvular heart dz; etiology of LE edema likely venous insufficiency - continue Lasix as per home meds;       ID: RLE cellulitis w/open leg wound, draining. Keep leg elevated. Continue IV Vancomycin. Vancomycin trough requested.  Discharge home when cleared by ID.

## 2017-04-30 LAB
-  AMPICILLIN/SULBACTAM: SIGNIFICANT CHANGE UP
-  CEFAZOLIN: SIGNIFICANT CHANGE UP
-  CIPROFLOXACIN: SIGNIFICANT CHANGE UP
-  CLINDAMYCIN: SIGNIFICANT CHANGE UP
-  DAPTOMYCIN: SIGNIFICANT CHANGE UP
-  ERYTHROMYCIN: SIGNIFICANT CHANGE UP
-  GENTAMICIN: SIGNIFICANT CHANGE UP
-  LEVOFLOXACIN: SIGNIFICANT CHANGE UP
-  LINEZOLID: SIGNIFICANT CHANGE UP
-  MOXIFLOXACIN(AEROBIC): SIGNIFICANT CHANGE UP
-  OXACILLIN: SIGNIFICANT CHANGE UP
-  PENICILLIN: SIGNIFICANT CHANGE UP
-  RIFAMPIN: SIGNIFICANT CHANGE UP
-  TETRACYCLINE: SIGNIFICANT CHANGE UP
-  TRIMETHOPRIM/SULFAMETHOXAZOLE: SIGNIFICANT CHANGE UP
-  VANCOMYCIN: SIGNIFICANT CHANGE UP
HCT VFR BLD CALC: 28 % — LOW (ref 39–50)
HGB BLD-MCNC: 8.5 G/DL — LOW (ref 13–17)
MCHC RBC-ENTMCNC: 18.5 PG — LOW (ref 27–34)
MCHC RBC-ENTMCNC: 30.4 GM/DL — LOW (ref 32–36)
MCV RBC AUTO: 61 FL — LOW (ref 80–100)
METHOD TYPE: SIGNIFICANT CHANGE UP
PLATELET # BLD AUTO: 295 K/UL — SIGNIFICANT CHANGE UP (ref 150–400)
RBC # BLD: 4.59 M/UL — SIGNIFICANT CHANGE UP (ref 4.2–5.8)
RBC # FLD: 17.6 % — HIGH (ref 11–15)
WBC # BLD: 7.3 K/UL — SIGNIFICANT CHANGE UP (ref 3.8–10.5)
WBC # FLD AUTO: 7.3 K/UL — SIGNIFICANT CHANGE UP (ref 3.8–10.5)

## 2017-04-30 PROCEDURE — 99232 SBSQ HOSP IP/OBS MODERATE 35: CPT

## 2017-04-30 RX ORDER — POTASSIUM CHLORIDE 20 MEQ
20 PACKET (EA) ORAL DAILY
Qty: 0 | Refills: 0 | Status: DISCONTINUED | OUTPATIENT
Start: 2017-04-30 | End: 2017-05-02

## 2017-04-30 RX ADMIN — PANTOPRAZOLE SODIUM 40 MILLIGRAM(S): 20 TABLET, DELAYED RELEASE ORAL at 08:15

## 2017-04-30 RX ADMIN — Medication 250 MILLIGRAM(S): at 05:48

## 2017-04-30 RX ADMIN — Medication 40 MILLIGRAM(S): at 05:46

## 2017-04-30 RX ADMIN — CARVEDILOL PHOSPHATE 6.25 MILLIGRAM(S): 80 CAPSULE, EXTENDED RELEASE ORAL at 17:06

## 2017-04-30 RX ADMIN — Medication 10 MILLIGRAM(S): at 05:46

## 2017-04-30 RX ADMIN — Medication 325 MILLIGRAM(S): at 12:44

## 2017-04-30 RX ADMIN — CARVEDILOL PHOSPHATE 6.25 MILLIGRAM(S): 80 CAPSULE, EXTENDED RELEASE ORAL at 05:46

## 2017-04-30 RX ADMIN — Medication 20 MILLIEQUIVALENT(S): at 12:44

## 2017-04-30 RX ADMIN — Medication 250 MILLIGRAM(S): at 22:24

## 2017-04-30 RX ADMIN — Medication 250 MILLIGRAM(S): at 13:51

## 2017-04-30 NOTE — PROGRESS NOTE ADULT - SUBJECTIVE AND OBJECTIVE BOX
CC/F/U for:  GI bleed, anemia and RLE MRSA cellulitis. No signs of bleeding past 24 hours.     INTERVAL HPI/OVERNIGHT EVENTS:  Pt seen and examined at bedside.     Allergies/Intolerance: No Known Allergies      MEDICATIONS  (STANDING):  carvedilol 6.25milliGRAM(s) Oral every 12 hours  enalapril 10milliGRAM(s) Oral daily  furosemide    Tablet 40milliGRAM(s) Oral daily  vancomycin  IVPB  IV Intermittent   vancomycin  IVPB 1000milliGRAM(s) IV Intermittent every 8 hours  pantoprazole    Tablet 40milliGRAM(s) Oral before breakfast  ferrous    sulfate 325milliGRAM(s) Oral daily    MEDICATIONS  (PRN):        ROS: all systems reviewed and wnl      PHYSICAL EXAMINATION:  Vital Signs Last 24 Hrs  T(C): 36.2, Max: 37.6 (04-29 @ 23:37)  T(F): 97.2, Max: 99.6 (04-29 @ 23:37)  HR: 65 (63 - 67)  BP: 127/86 (119/79 - 142/85)  BP(mean): --  RR: 18 (17 - 18)  SpO2: 98% (96% - 100%)  CAPILLARY BLOOD GLUCOSE      GENERAL: NAD, well-groomed, well-developed  HEAD:  atraumatic, normocephalic  EYES: sclera anicteric  ENMT: mucous membranes moist  NECK: supple, No JVD  CHEST/LUNG: clear to auscultation bilaterally; no rales, rhonchi, or wheezing b/l  HEART: normal S1, S2  ABDOMEN: BS+, soft, ND, NT   EXTREMITIES:  pulses palpable; no clubbing, cyanosis, or edema b/l LEs  NEURO: awake, alert, interactive; moves all extremities  SKIN: no rashes or lesions      LABS:                        7.9    7.8   )-----------( 318      ( 29 Apr 2017 07:49 )             27.2     04-29    140  |  103  |  14  ----------------------------<  111<H>  3.9   |  29  |  0.84    Ca    8.3<L>      29 Apr 2017 07:49        Assesment and Plan: 57 year old male morbid obesity, former heavy etoh abuse (last etoh 2010), HTN, PUD, hx GIB w/venous duodenal ulcer 2/2015 (anemia to 5.6, IR embolization of gastroduodenal artery), hx chronic anemia (hgb 7.5 (3/2017), 8-9 (2016); adm w/epigastric pain, intermittent bloody stools x2wks, melanotic stools x past few day, found to have low h/h by PMD, called at home to go to ED for further eval; in ED, hgb 7.7, CTAP neg, UA neg, CXR neg, LE dopplers neg. Also w/significant LE edema    GI/HEME;   *GIB - GI/[facility] consulted; no further episodes of bleeding in hospital; s/p EGD 4/26 w/findings of gastritis and hiatal hernia; pt had colonoscopy yesterday notable for diverticulosis, no signs of active bleeding sere seen. CBC pending. Anemia - chronic component, w/h/h in range of 8s-9s chronically; etiology unclear; for transfusion for h/h <8; serial h/h to trend. No need for PRBC today. Continue PPI. Add ferous sulfate 325 mg/day.  Follow CBC today.       CV:   *HTN - BP improved on regimen of lasix 40 mg/day, enalapril 10 mg/day and coreg 6.25 mg bid. BP at goal 127/86.  TTE 4/26/17 w/nl EF, no valvular heart dz; etiology of LE edema likely venous insufficiency - continue Lasix as per home meds.        ID: RLE cellulitis w/open leg wound, draining. Keep leg elevated. Continue IV Vancomycin. Vancomycin trough at goal 15.2. Discharge home when cleared by ID. CC/F/U for:  GI bleed, anemia and RLE MRSA cellulitis. No signs of bleeding past 24 hours. No events past 24 hours.      INTERVAL HPI/OVERNIGHT EVENTS:  Pt seen and examined at bedside.     Allergies/Intolerance: No Known Allergies      MEDICATIONS  (STANDING):  carvedilol 6.25milliGRAM(s) Oral every 12 hours  enalapril 10milliGRAM(s) Oral daily  furosemide    Tablet 40milliGRAM(s) Oral daily  vancomycin  IVPB  IV Intermittent   vancomycin  IVPB 1000milliGRAM(s) IV Intermittent every 8 hours  pantoprazole    Tablet 40milliGRAM(s) Oral before breakfast  ferrous    sulfate 325milliGRAM(s) Oral daily    MEDICATIONS  (PRN):        ROS: all systems reviewed and wnl      PHYSICAL EXAMINATION:  Vital Signs Last 24 Hrs  T(C): 36.2, Max: 37.6 (04-29 @ 23:37)  T(F): 97.2, Max: 99.6 (04-29 @ 23:37)  HR: 65 (63 - 67)  BP: 127/86 (119/79 - 142/85)  BP(mean): --  RR: 18 (17 - 18)  SpO2: 98% (96% - 100%)  CAPILLARY BLOOD GLUCOSE      GENERAL: NAD, well-groomed, well-developed  HEAD:  atraumatic, normocephalic  EYES: sclera anicteric  ENMT: mucous membranes moist  NECK: supple, No JVD  CHEST/LUNG: clear to auscultation bilaterally; no rales, rhonchi, or wheezing b/l  HEART: normal S1, S2  ABDOMEN: BS+, soft, ND, NT   EXTREMITIES:  pulses palpable; no clubbing, cyanosis, or edema b/l LEs  NEURO: awake, alert, interactive; moves all extremities  SKIN: no rashes or lesions      LABS:                        7.9    7.8   )-----------( 318      ( 29 Apr 2017 07:49 )             27.2     04-29    140  |  103  |  14  ----------------------------<  111<H>  3.9   |  29  |  0.84    Ca    8.3<L>      29 Apr 2017 07:49        Assesment and Plan: 57 year old male morbid obesity, former heavy etoh abuse (last etoh 2010), HTN, PUD, hx GIB w/venous duodenal ulcer 2/2015 (anemia to 5.6, IR embolization of gastroduodenal artery), hx chronic anemia (hgb 7.5 (3/2017), 8-9 (2016); adm w/epigastric pain, intermittent bloody stools x2wks, melanotic stools x past few day, found to have low h/h by PMD, called at home to go to ED for further eval; in ED, hgb 7.7, CTAP neg, UA neg, CXR neg, LE dopplers neg. Also w/significant LE edema    GI/HEME;   GIB - Dr. Norton consulted; no further episodes of bleeding in hospital; s/p EGD 4/26 w/findings of gastritis and hiatal hernia; pt had colonoscopy yesterday notable for diverticulosis, no signs of active bleeding sere seen. CBC pending. Anemia - chronic component, w/h/h in range of 8s-9s chronically; etiology unclear; for transfusion for h/h <8; serial h/h to trend. No need for PRBC today. Continue PPI. Add ferous sulfate 325 mg/day.  Follow CBC today.       CV:   *HTN - BP improved on regimen of lasix 40 mg/day, enalapril 10 mg/day and coreg 6.25 mg bid. BP at goal 127/86.  TTE 4/26/17 w/nl EF, no valvular heart dz; etiology of LE edema likely venous insufficiency - continue Lasix as per home meds.  Add daily KCL.       ID: RLE cellulitis w/open leg wound, draining. Keep leg elevated. Continue IV Vancomycin. Vancomycin trough at goal 15.2. Improving with IV Vancomycin.     Discharge home likely AM Monday with home care for dressing changes on right leg. CC/F/U for:  GI bleed, anemia and RLE MRSA cellulitis. No signs of bleeding past 24 hours. No events past 24 hours.      INTERVAL HPI/OVERNIGHT EVENTS:  Pt seen and examined at bedside.     Allergies/Intolerance: No Known Allergies      MEDICATIONS  (STANDING):  carvedilol 6.25milliGRAM(s) Oral every 12 hours  enalapril 10milliGRAM(s) Oral daily  furosemide    Tablet 40milliGRAM(s) Oral daily  vancomycin  IVPB  IV Intermittent   vancomycin  IVPB 1000milliGRAM(s) IV Intermittent every 8 hours  pantoprazole    Tablet 40milliGRAM(s) Oral before breakfast  ferrous    sulfate 325milliGRAM(s) Oral daily    MEDICATIONS  (PRN):        ROS: all systems reviewed and wnl      PHYSICAL EXAMINATION:  Vital Signs Last 24 Hrs  T(C): 36.2, Max: 37.6 (04-29 @ 23:37)  T(F): 97.2, Max: 99.6 (04-29 @ 23:37)  HR: 65 (63 - 67)  BP: 127/86 (119/79 - 142/85)  BP(mean): --  RR: 18 (17 - 18)  SpO2: 98% (96% - 100%)  CAPILLARY BLOOD GLUCOSE      GENERAL: NAD, well-groomed, well-developed  HEAD:  atraumatic, normocephalic  EYES: sclera anicteric  ENMT: mucous membranes moist  NECK: supple, No JVD  CHEST/LUNG: clear to auscultation bilaterally; no rales, rhonchi, or wheezing b/l  HEART: normal S1, S2  ABDOMEN: BS+, soft, ND, NT   EXTREMITIES:  pulses palpable; no clubbing, cyanosis. Mild leg edema. RLL cellulitis improved. Fully dressed.   NEURO: awake, alert, interactive; moves all extremities  SKIN: no rashes or lesions      LABS:                        7.9    7.8   )-----------( 318      ( 29 Apr 2017 07:49 )             27.2     04-29    140  |  103  |  14  ----------------------------<  111<H>  3.9   |  29  |  0.84    Ca    8.3<L>      29 Apr 2017 07:49        Assesment and Plan: 57 year old male morbid obesity, former heavy etoh abuse (last etoh 2010), HTN, PUD, hx GIB w/venous duodenal ulcer 2/2015 (anemia to 5.6, IR embolization of gastroduodenal artery), hx chronic anemia (hgb 7.5 (3/2017), 8-9 (2016); adm w/epigastric pain, intermittent bloody stools x2wks, melanotic stools x past few day, found to have low h/h by PMD, called at home to go to ED for further eval; in ED, hgb 7.7, CTAP neg, UA neg, CXR neg, LE dopplers neg. Also w/significant LE edema    GI/HEME;   GIB - Dr. Norton consulted; no further episodes of bleeding in hospital; s/p EGD 4/26 w/findings of gastritis and hiatal hernia; pt had colonoscopy yesterday notable for diverticulosis, no signs of active bleeding sere seen. CBC pending. Anemia - chronic component, w/h/h in range of 8s-9s chronically; etiology unclear; for transfusion for h/h <8; serial h/h to trend. No need for PRBC today. Continue PPI. Add ferous sulfate 325 mg/day.  Follow CBC today.       CV:   *HTN - BP improved on regimen of lasix 40 mg/day, enalapril 10 mg/day and coreg 6.25 mg bid. BP at goal 127/86.  TTE 4/26/17 w/nl EF, no valvular heart dz; etiology of LE edema likely venous insufficiency - continue Lasix as per home meds.  Add daily KCL.       ID: RLE cellulitis w/open leg wound, draining. Keep leg elevated. Continue IV Vancomycin. Vancomycin trough at goal 15.2. Improving with IV Vancomycin.     Discharge home likely AM Monday with home care for dressing changes on right leg.

## 2017-04-30 NOTE — CHART NOTE - NSCHARTNOTEFT_GEN_A_CORE
Medicine PA    informed by RN that patient c/o b/l leg pain. Patient seen and examined at bedside. Patient c/o b/l knee pain, Left>right, x a few days, states this pain is not new. He states swelling has decreased over the past week. He uses crutches for ambulation. Hx of knee OA.  Denies CP, palpitations, sob, dizziness.    Vital Signs Last 24 Hrs  T(C): 37.1, Max: 37.6 (04-29 @ 23:37)  T(F): 98.8, Max: 99.6 (04-29 @ 23:37)  HR: 59 (59 - 67)  BP: 142/80 (127/86 - 142/85)  BP(mean): --  RR: 18 (18 - 18)  SpO2: 97% (97% - 98%)    G: Laying in bed, mild distress due to pain in legs with movement.  Lungs:   Heart:  Extr: B/L LEs with extensive swelling, 4+ pitting edema b/l. Diffuse tenderness to palpation over legs b/l. Right leg with open wound, dressing clean/dry/intact and surrounding cellulitis.    A/P: 56yo morbidly obese male with PMH knee osteoarthrtis, HTN, PUD, HTN admitted with Medicine PA    Informed by RN that patient c/o b/l leg pain. Patient seen and examined at bedside. Patient c/o b/l LE pain, Left knee>right, x a few days, states he has had this pain for a long time. He states swelling has decreased over the past week. He uses crutches for ambulation. Hx of knee OA.  Denies CP, palpitations, sob, dizziness.    Vital Signs Last 24 Hrs  T(C): 37.1, Max: 37.6 (04-29 @ 23:37)  T(F): 98.8, Max: 99.6 (04-29 @ 23:37)  HR: 59 (59 - 67)  BP: 142/80 (127/86 - 142/85)  BP(mean): --  RR: 18 (18 - 18)  SpO2: 97% (97% - 98%)    G: Laying in bed, mild distress due to pain in legs with movement.  Lungs: CTA b/l  Heart: S1S2  Extr: B/L LEs with extensive swelling, 4+ pitting edema b/l. Diffuse tenderness to palpation over legs b/l. Right leg with open wound, dressing clean/dry/intact and surrounding cellulitis. Right calf tenderness >Left leg.    A/P: 58yo morbidly obese male with PMH knee osteoarthrtis, HTN, PUD, HTN admitted with epigastric pain, intermittent bloody stools for past 2 wks and was called by PMD to come in for low Hg. A/w Right leg cellulitis and open wound to RLE. Now c/o Left knee pain>right.  -Patient with extensive LE swelling and diffuse tenderness, right calf tenderness>left.  -Duplex venous US B/L ordered to r/o DVT  -Percocet given for pain x1.  -continue current medical management. Continue to monitor patient. Medicine PA    Informed by RN that patient c/o b/l leg pain. Patient seen and examined at bedside. Patient c/o b/l LE pain, Left knee>right, x a few days, states he has had this pain for a long time. He states swelling has decreased over the past week. He uses crutches for ambulation. Hx of knee OA.  Denies CP, palpitations, sob, dizziness.    Vital Signs Last 24 Hrs  T(C): 37.1, Max: 37.6 (04-29 @ 23:37)  T(F): 98.8, Max: 99.6 (04-29 @ 23:37)  HR: 59 (59 - 67)  BP: 142/80 (127/86 - 142/85)  BP(mean): --  RR: 18 (18 - 18)  SpO2: 97% (97% - 98%)    G: Laying in bed, mild distress due to pain in legs with movement.  Lungs: CTA b/l  Heart: S1S2  Extr: B/L LEs with extensive swelling, 4+ pitting edema b/l. Diffuse tenderness to palpation over legs b/l. Right leg with open wound, dressing clean/dry/intact and surrounding cellulitis. Right calf tenderness >Left leg.    A/P: 56yo morbidly obese male with PMH knee osteoarthrtis, HTN, PUD, HTN admitted with epigastric pain, intermittent bloody stools for past 2 wks and was called by PMD to come in for low Hg. A/w Right leg cellulitis and open wound to RLE. Now c/o Left knee pain>right.  -Patient with extensive LE swelling and diffuse tenderness, right calf tenderness>left.  -Duplex venous US B/L ordered to r/o DVT  -Percocet given for pain x1.  -continue current medical management. Continue to monitor patient.    Addendum 5:46am  Patient states pain is better after receiving percocet. Patient resting comfortably in bed.

## 2017-04-30 NOTE — PROGRESS NOTE ADULT - SUBJECTIVE AND OBJECTIVE BOX
Patient is a 57y old  Male who presents with a chief complaint of     HPI:  56 y/o man PMH of PUD, GI bleeding, HTN, comes in with epigastric pain, intermittent bloody stools for past 2 wks and was called by PMD to come in for low Hg. Pt denies feeling lightheaded, mild epigastric pain with some nausea, but no vomiting/hematemesis. Has noted melanotic stool several days ago. No use of NSAIDS.  No flank pain, no fever/chills, No photophobia/eye pain/changes in vision, No ear pain/sore throat/dysphagia, No chest pain/palpitations, no SOB/cough/wheeze/stridor, No V/D, no dysuria/frequency/discharge, No neck/back pain, no rash, no changes in neurological status/function. Pt with stable vital signs in ED. (25 Apr 2017 06:28)      INTERVAL HPI/OVERNIGHT EVENTS:  The patient denies melena, hematochezia, hematemesis, nausea, vomiting, abdominal pain, constipation, diarrhea, or change in bowel movements. He is tolerating a regular diet.   MEDICATIONS  (STANDING):  carvedilol 6.25milliGRAM(s) Oral every 12 hours  enalapril 10milliGRAM(s) Oral daily  furosemide    Tablet 40milliGRAM(s) Oral daily  vancomycin  IVPB  IV Intermittent   vancomycin  IVPB 1000milliGRAM(s) IV Intermittent every 8 hours  pantoprazole    Tablet 40milliGRAM(s) Oral before breakfast  ferrous    sulfate 325milliGRAM(s) Oral daily  potassium chloride    Tablet ER 20milliEquivalent(s) Oral daily    MEDICATIONS  (PRN):      FAMILY HISTORY:  No pertinent family history in first degree relatives  No pertinent family history in first degree relatives      Allergies    No Known Allergies    Intolerances        PMH/PSH:  Knee osteoarthritis  Duodenal ulcer disease  Morbid obesity  Hypertension  Osteoarthritis  PUD (peptic ulcer disease)  Hypertension  Gastroduodenal artery bleed  Abdominal hernia  H/O ventral hernia repair  No significant past surgical history        REVIEW OF SYSTEMS:  CONSTITUTIONAL: No fever, weight loss, or fatigue  NECK: No pain or stiffness  BREASTS: No pain, masses, or nipple discharge  RESPIRATORY: No cough, wheezing, chills or hemoptysis; No shortness of breath  CARDIOVASCULAR: No chest pain, palpitations, dizziness, or leg swelling  GASTROINTESTINAL: No abdominal or epigastric pain. No nausea, vomiting, or hematemesis; No diarrhea or constipation. No melena or hematochezia.      Vital Signs Last 24 Hrs  T(C): 37.1, Max: 37.6 (04-29 @ 23:37)  T(F): 98.8, Max: 99.6 (04-29 @ 23:37)  HR: 59 (59 - 67)  BP: 142/80 (127/86 - 142/85)  BP(mean): --  RR: 18 (18 - 18)  SpO2: 97% (97% - 98%)    PHYSICAL EXAM:  GENERAL: NAD, well-groomed, well-developed  HEAD:  Atraumatic, Normocephalic  EYES: EOMI, PERRLA, conjunctiva and sclera clear  ENMT: No tonsillar erythema, exudates, or enlargement; Moist mucous membranes, Good dentition, No lesions  NECK: Supple, No JVD, Normal thyroid  NERVOUS SYSTEM:  Alert & Oriented X3, Good concentration; Motor Strength 5/5 B/L upper and lower extremities; DTRs 2+ intact and symmetric  CHEST/LUNG: Clear to percussion bilaterally; No rales, rhonchi, wheezing, or rubs  HEART: Regular rate and rhythm; No murmurs, rubs, or gallops  ABDOMEN: Soft, Nontender, Nondistended; Bowel sounds present    LAB                          8.5    7.3   )-----------( 295      ( 30 Apr 2017 12:37 )             28.0       CBC:  04-30 @ 12:37  WBC 7.3   Hgb 8.5   Hct 28.0   Plts 295    04-29 @ 07:49  WBC 7.8   Hgb 7.9   Hct 27.2   Plts 318    04-28 @ 07:35  WBC 8.0   Hgb 8.3   Hct 27.4   Plts 321    04-27 @ 08:01  WBC 6.3   Hgb 7.8   Hct 26.0   Plts 324    04-26 @ 20:00  WBC 8.1   Hgb 8.1   Hct 26.7   Plts 318    04-26 @ 08:17  WBC 5.9   Hgb 7.9   Hct 26.5   Plts 302    04-25 @ 20:18  WBC 7.2   Hgb 8.1   Hct 26.4   Plts 324    04-25 @ 11:46  WBC 6.5   Hgb 7.8   Hct 25.9   Plts 303    04-25 @ 08:22  WBC 6.8   Hgb 7.7   Hct 25.5   Plts 299    04-25 @ 01:14  WBC 7.9   Hgb 7.7   Hct 25.2   Plts 308        Chemistry:  04-29 @ 07:49  Na+ 140  K+ 3.9  Cl- 103  CO2 29  BUN 14  Cr 0.84     04-28 @ 07:35  Na+ 143  K+ 3.4  Cl- 105  CO2 29  BUN 14  Cr 0.86     04-25 @ 08:22  Na+ 142  K+ 3.5  Cl- 105  CO2 30  BUN 13  Cr 0.59     04-25 @ 01:14  Na+ 142  K+ 4.0  Cl- 105  CO2 30  BUN 18  Cr 0.72         Glucose, Serum: 111 mg/dL (04-29 @ 07:49)  Glucose, Serum: 94 mg/dL (04-28 @ 07:35)  Glucose, Serum: 89 mg/dL (04-25 @ 08:22)  Glucose, Serum: 89 mg/dL (04-25 @ 01:14)      29 Apr 2017 07:49    140    |  103    |  14     ----------------------------<  111    3.9     |  29     |  0.84   28 Apr 2017 07:35    143    |  105    |  14     ----------------------------<  94     3.4     |  29     |  0.86   25 Apr 2017 08:22    142    |  105    |  13     ----------------------------<  89     3.5     |  30     |  0.59   25 Apr 2017 01:14    142    |  105    |  18     ----------------------------<  89     4.0     |  30     |  0.72     Ca    8.3        29 Apr 2017 07:49  Ca    8.6        28 Apr 2017 07:35  Ca    8.2        25 Apr 2017 08:22  Ca    8.1        25 Apr 2017 01:14    TPro  7.2    /  Alb  3.2    /  TBili  0.2    /  DBili  x      /  AST  27     /  ALT  17     /  AlkPhos  96     25 Apr 2017 01:14              CAPILLARY BLOOD GLUCOSE          RADIOLOGY & ADDITIONAL TESTS:    Imaging Personally Reviewed:  [ ] YES  [ ] NO    Consultant(s) Notes Reviewed:  [ ] YES  [ ] NO    Care Discussed with Consultants/Other Providers [ ] YES  [ ] NO

## 2017-05-01 PROCEDURE — 99233 SBSQ HOSP IP/OBS HIGH 50: CPT

## 2017-05-01 PROCEDURE — 74250 X-RAY XM SM INT 1CNTRST STD: CPT | Mod: 26

## 2017-05-01 PROCEDURE — 93970 EXTREMITY STUDY: CPT | Mod: 26

## 2017-05-01 RX ADMIN — Medication 250 MILLIGRAM(S): at 05:17

## 2017-05-01 RX ADMIN — Medication 250 MILLIGRAM(S): at 21:29

## 2017-05-01 RX ADMIN — Medication 40 MILLIGRAM(S): at 05:24

## 2017-05-01 RX ADMIN — Medication 325 MILLIGRAM(S): at 15:59

## 2017-05-01 RX ADMIN — Medication 10 MILLIGRAM(S): at 05:24

## 2017-05-01 RX ADMIN — Medication 20 MILLIEQUIVALENT(S): at 15:59

## 2017-05-01 RX ADMIN — Medication 250 MILLIGRAM(S): at 13:59

## 2017-05-01 RX ADMIN — CARVEDILOL PHOSPHATE 6.25 MILLIGRAM(S): 80 CAPSULE, EXTENDED RELEASE ORAL at 17:23

## 2017-05-01 NOTE — PROGRESS NOTE ADULT - SUBJECTIVE AND OBJECTIVE BOX
Patient is a 57y old  Male who presents with a chief complaint of     INTERVAL HPI / OVERNIGHT EVENTS:    MEDICATIONS  (STANDING):  carvedilol 6.25milliGRAM(s) Oral every 12 hours  enalapril 10milliGRAM(s) Oral daily  furosemide    Tablet 40milliGRAM(s) Oral daily  vancomycin  IVPB  IV Intermittent   vancomycin  IVPB 1000milliGRAM(s) IV Intermittent every 8 hours  pantoprazole    Tablet 40milliGRAM(s) Oral before breakfast  ferrous    sulfate 325milliGRAM(s) Oral daily  potassium chloride    Tablet ER 20milliEquivalent(s) Oral daily    MEDICATIONS  (PRN):      Vital Signs Last 24 Hrs  T(C): 36.5, Max: 37.1 (05-01 @ 05:28)  T(F): 97.7, Max: 98.8 (05-01 @ 05:28)  HR: 68 (54 - 68)  BP: 155/99 (135/54 - 155/99)  BP(mean): --  RR: 17 (17 - 18)  SpO2: 100% (98% - 100%)    PHYSICAL EXAM:  General :NAD  Constitutional:  well-groomed, well-developed  Respiratory: CTAB/L  Cardiovascular: S1 and S2, RRR, no M/G/R  Gastrointestinal: BS+, soft, NT/ND  Extremities: No peripheral edema  Vascular: 2+ peripheral pulses  Skin: No rashes      LABS:                        8.5    7.3   )-----------( 295      ( 30 Apr 2017 12:37 )             28.0                 MICROBIOLOGY:  RECENT CULTURES:  04-28 .Abscess Leg - Right Methicillin resistant Staphylococcus aureus XXXX   Numerous Methicillin resistant Staphylococcus aureus  ***********Note************  This isolate demonstrates inducible  clindamycin resistance.  Clindamycin may still be effective in some patients.  Numerous Corynebacterium species    04-25 .Urine Clean Catch (Midstream) XXXX XXXX   <10,000 CFU/ml Normal Urogenital maureen present          RADIOLOGY & ADDITIONAL STUDIES: Patient is a 57y old  Male who presents with a chief complaint of diarrhea     INTERVAL HPI / OVERNIGHT EVENTS: Right leg pain resolved    MEDICATIONS  (STANDING):  carvedilol 6.25milliGRAM(s) Oral every 12 hours  enalapril 10milliGRAM(s) Oral daily  furosemide    Tablet 40milliGRAM(s) Oral daily  vancomycin  IVPB  IV Intermittent   vancomycin  IVPB 1000milliGRAM(s) IV Intermittent every 8 hours  pantoprazole    Tablet 40milliGRAM(s) Oral before breakfast  ferrous    sulfate 325milliGRAM(s) Oral daily  potassium chloride    Tablet ER 20milliEquivalent(s) Oral daily    MEDICATIONS  (PRN):      Vital Signs Last 24 Hrs  T(C): 36.5, Max: 37.1 (05-01 @ 05:28)  T(F): 97.7, Max: 98.8 (05-01 @ 05:28)  HR: 68 (54 - 68)  BP: 155/99 (135/54 - 155/99)  BP(mean): --  RR: 17 (17 - 18)  SpO2: 100% (98% - 100%)    PHYSICAL EXAM:  General :NAD  Constitutional:  well-groomed, well-developed  Respiratory: CTAB/L  Cardiovascular: S1 and S2, RRR, no M/G/R  Gastrointestinal: BS+, soft, NT/ND  Extremities: No peripheral edema  Vascular: 2+ peripheral pulses  Skin: right leg erythema significantly better,wound is clean ,both LE have some pitting edema      LABS:                        8.5    7.3   )-----------( 295      ( 30 Apr 2017 12:37 )             28.0                 MICROBIOLOGY:  RECENT CULTURES:  04-28 .Abscess Leg - Right Methicillin resistant Staphylococcus aureus XXXX   Numerous Methicillin resistant Staphylococcus aureus  ***********Note************  This isolate demonstrates inducible  clindamycin resistance.  Clindamycin may still be effective in some patients.  Numerous Corynebacterium species    04-25 .Urine Clean Catch (Midstream) XXXX XXXX   <10,000 CFU/ml Normal Urogenital maureen present          RADIOLOGY & ADDITIONAL STUDIES:

## 2017-05-01 NOTE — PROGRESS NOTE ADULT - SUBJECTIVE AND OBJECTIVE BOX
Patient is a 57y old  Male who presents with a chief complaint of     HPI:  58 y/o man PMH of PUD, GI bleeding, HTN, comes in with epigastric pain, intermittent bloody stools for past 2 wks and was called by PMD to come in for low Hg. Pt denies feeling lightheaded, mild epigastric pain with some nausea, but no vomiting/hematemesis. Has noted melanotic stool several days ago. No use of NSAIDS.  No flank pain, no fever/chills, No photophobia/eye pain/changes in vision, No ear pain/sore throat/dysphagia, No chest pain/palpitations, no SOB/cough/wheeze/stridor, No V/D, no dysuria/frequency/discharge, No neck/back pain, no rash, no changes in neurological status/function. Pt with stable vital signs in ED. (25 Apr 2017 06:28)      INTERVAL HPI/OVERNIGHT EVENTS:  The patient denies melena, hematochezia, hematemesis, nausea, vomiting, abdominal pain, constipation, diarrhea, or change in bowel movements   Had SBS today.     MEDICATIONS  (STANDING):  carvedilol 6.25milliGRAM(s) Oral every 12 hours  enalapril 10milliGRAM(s) Oral daily  furosemide    Tablet 40milliGRAM(s) Oral daily  vancomycin  IVPB  IV Intermittent   vancomycin  IVPB 1000milliGRAM(s) IV Intermittent every 8 hours  pantoprazole    Tablet 40milliGRAM(s) Oral before breakfast  ferrous    sulfate 325milliGRAM(s) Oral daily  potassium chloride    Tablet ER 20milliEquivalent(s) Oral daily    MEDICATIONS  (PRN):      FAMILY HISTORY:  No pertinent family history in first degree relatives  No pertinent family history in first degree relatives      Allergies    No Known Allergies    Intolerances        PMH/PSH:  Knee osteoarthritis  Duodenal ulcer disease  Morbid obesity  Hypertension  Osteoarthritis  PUD (peptic ulcer disease)  Hypertension  Gastroduodenal artery bleed  Abdominal hernia  H/O ventral hernia repair  No significant past surgical history        REVIEW OF SYSTEMS:  CONSTITUTIONAL: No fever, weight loss, or fatigue  EYES: No eye pain, visual disturbances, or discharge  ENMT:  No difficulty hearing, tinnitus, vertigo; No sinus or throat pain  NECK: No pain or stiffness  BREASTS: No pain, masses, or nipple discharge  RESPIRATORY: No cough, wheezing, chills or hemoptysis; No shortness of breath  CARDIOVASCULAR: No chest pain, palpitations, dizziness, or leg swelling  GASTROINTESTINAL: No abdominal or epigastric pain. No nausea, vomiting, or hematemesis; No diarrhea or constipation. No melena or hematochezia.  GENITOURINARY: No dysuria, frequency, hematuria, or incontinence  NEUROLOGICAL: No headaches, memory loss, loss of strength, numbness, or tremors  SKIN: No itching, burning, rashes, or lesions       Vital Signs Last 24 Hrs  T(C): 36.5, Max: 37.1 (05-01 @ 05:28)  T(F): 97.7, Max: 98.8 (05-01 @ 05:28)  HR: 68 (54 - 68)  BP: 155/99 (135/54 - 155/99)  BP(mean): --  RR: 17 (17 - 18)  SpO2: 100% (98% - 100%)    PHYSICAL EXAM:  GENERAL: NAD, well-groomed, well-developed  HEAD:  Atraumatic, Normocephalic  EYES: EOMI, PERRLA, conjunctiva and sclera clear  NECK: Supple, No JVD, Normal thyroid  NERVOUS SYSTEM:  Alert & Oriented X3, Good concentration; Motor Strength 5/5 B/L upper and lower extremities; DTRs 2+ intact and symmetric  CHEST/LUNG: Clear to percussion bilaterally; No rales, rhonchi, wheezing, or rubs  HEART: Regular rate and rhythm; No murmurs, rubs, or gallops  ABDOMEN: Soft, Nontender, Nondistended; Bowel sounds present      LAB                          8.5    7.3   )-----------( 295      ( 30 Apr 2017 12:37 )             28.0       CBC:  04-30 @ 12:37  WBC 7.3   Hgb 8.5   Hct 28.0   Plts 295    04-29 @ 07:49  WBC 7.8   Hgb 7.9   Hct 27.2   Plts 318    04-28 @ 07:35  WBC 8.0   Hgb 8.3   Hct 27.4   Plts 321    04-27 @ 08:01  WBC 6.3   Hgb 7.8   Hct 26.0   Plts 324    04-26 @ 20:00  WBC 8.1   Hgb 8.1   Hct 26.7   Plts 318    04-26 @ 08:17  WBC 5.9   Hgb 7.9   Hct 26.5   Plts 302    04-25 @ 20:18  WBC 7.2   Hgb 8.1   Hct 26.4   Plts 324    04-25 @ 11:46  WBC 6.5   Hgb 7.8   Hct 25.9   Plts 303    04-25 @ 08:22  WBC 6.8   Hgb 7.7   Hct 25.5   Plts 299    04-25 @ 01:14  WBC 7.9   Hgb 7.7   Hct 25.2   Plts 308        Chemistry:  04-29 @ 07:49  Na+ 140  K+ 3.9  Cl- 103  CO2 29  BUN 14  Cr 0.84     04-28 @ 07:35  Na+ 143  K+ 3.4  Cl- 105  CO2 29  BUN 14  Cr 0.86     04-25 @ 08:22  Na+ 142  K+ 3.5  Cl- 105  CO2 30  BUN 13  Cr 0.59     04-25 @ 01:14  Na+ 142  K+ 4.0  Cl- 105  CO2 30  BUN 18  Cr 0.72         Glucose, Serum: 111 mg/dL (04-29 @ 07:49)  Glucose, Serum: 94 mg/dL (04-28 @ 07:35)  Glucose, Serum: 89 mg/dL (04-25 @ 08:22)  Glucose, Serum: 89 mg/dL (04-25 @ 01:14)      29 Apr 2017 07:49    140    |  103    |  14     ----------------------------<  111    3.9     |  29     |  0.84   28 Apr 2017 07:35    143    |  105    |  14     ----------------------------<  94     3.4     |  29     |  0.86   25 Apr 2017 08:22    142    |  105    |  13     ----------------------------<  89     3.5     |  30     |  0.59   25 Apr 2017 01:14    142    |  105    |  18     ----------------------------<  89     4.0     |  30     |  0.72     Ca    8.3        29 Apr 2017 07:49  Ca    8.6        28 Apr 2017 07:35  Ca    8.2        25 Apr 2017 08:22  Ca    8.1        25 Apr 2017 01:14    TPro  7.2    /  Alb  3.2    /  TBili  0.2    /  DBili  x      /  AST  27     /  ALT  17     /  AlkPhos  96     25 Apr 2017 01:14              CAPILLARY BLOOD GLUCOSE          RADIOLOGY & ADDITIONAL TESTS:    EXAM:  SMALL BOWEL ONLY                            PROCEDURE DATE:  05/01/2017        INTERPRETATION:  CLINICAL INFORMATION: GI bleed    PROCEDURE:     Single contrast small bowel follow through study were performed. Multiple   digital spot films were obtained during the study.    FINDINGS:      image demonstrates coils within the right upper quadrant.    The stomach is well outlined and there is no evidence of ulcer, mass or   other significant abnormality.  Contrast medium flows through the pylorus   into the duodenum without evidence of obstruction or significant delay.    The duodenal bulb and sweep as well as the visualized small bowel are   unremarkable.    The small bowel mucosal pattern appears unremarkable.  There is no   evidence of mass or ulcerations. Contrast reaches the distal small bowel   and 4 hours and 15 minutes. Adequate compression could not be obtained to   evaluate the terminal ileum with spot radiographs.    IMPRESSION:     Slightly delayed small bowel transit time. Otherwise, unremarkable exam.                SABAS ROBERSON M.D., ATTENDING RADIOLOGIST  This document has been electronically signed. May  1 2017  2:44PM                Imaging Personally Reviewed:  [ ] YES  [ ] NO    Consultant(s) Notes Reviewed:  [ ] YES  [ ] NO    Care Discussed with Consultants/Other Providers [ ] YES  [ ] NO

## 2017-05-01 NOTE — PROGRESS NOTE ADULT - SUBJECTIVE AND OBJECTIVE BOX
CC/F/U for: GIB, anemia, RLE cellulitis    HPI:  56 y/o man PMH of PUD, GI bleeding, HTN, comes in with epigastric pain, intermittent bloody stools for past 2 wks and was called by PMD to come in for low Hg. Pt denies feeling lightheaded, mild epigastric pain with some nausea, but no vomiting/hematemesis. Has noted melanotic stool several days ago. No use of NSAIDS.  No flank pain, no fever/chills, No photophobia/eye pain/changes in vision, No ear pain/sore throat/dysphagia, No chest pain/palpitations, no SOB/cough/wheeze/stridor, No V/D, no dysuria/frequency/discharge, No neck/back pain, no rash, no changes in neurological status/function. Pt with stable vital signs in ED. (25 Apr 2017 06:28)        INTERVAL HPI/OVERNIGHT EVENTS:  Pt seen and examined at bedside; for small bowel series today    Allergies/Intolerance: No Known Allergies      MEDICATIONS  (STANDING):  carvedilol 6.25milliGRAM(s) Oral every 12 hours  enalapril 10milliGRAM(s) Oral daily  furosemide    Tablet 40milliGRAM(s) Oral daily  vancomycin  IVPB  IV Intermittent   vancomycin  IVPB 1000milliGRAM(s) IV Intermittent every 8 hours  pantoprazole    Tablet 40milliGRAM(s) Oral before breakfast  ferrous    sulfate 325milliGRAM(s) Oral daily  potassium chloride    Tablet ER 20milliEquivalent(s) Oral daily    MEDICATIONS  (PRN):        ROS: as above; all other systems reviewed and wnl      PHYSICAL EXAMINATION:  Vital Signs Last 24 Hrs  T(C): 37.1, Max: 37.1 (04-30 @ 16:22)  T(F): 98.8, Max: 98.8 (04-30 @ 16:22)  HR: 54 (54 - 64)  BP: 142/96 (135/54 - 142/96)  BP(mean): --  RR: 18 (17 - 18)  SpO2: 100% (97% - 100%)  CAPILLARY BLOOD GLUCOSE      GENERAL: obese, middle-aged male; NAD  HEAD:  atraumatic, normocephalic  EYES: sclera anicteric  ENMT: mucous membranes moist  NECK: supple, No JVD  CHEST/LUNG: clear to auscultation bilaterally; no rales, rhonchi, or wheezing b/l  HEART: normal S1, S2  ABDOMEN: obese, BS+, soft, NT   EXTREMITIES:  pulses palpable; no clubbing, cyanosis; 3+ edema b/l LEs from knees to feet; RLE wound, bandaged  NEURO: awake, alert, interactive; moves all extremities        LABS:                        8.5    7.3   )-----------( 295      ( 30 Apr 2017 12:37 )             28.0         ASSESSMENT AND PLAN:  57M, morbid obesity, former heavy etoh abuse (last etoh 2010), HTN, PUD, hx GIB w/venous duodenal ulcer 2/2015 (anemia to 5.6, IR embolization of gastroduodenal artery), hx chronic anemia (hgb 7.5 (3/2017), 8-9 (2016); adm w/epigastric pain, intermittent bloody stools x2wks, melanotic stools x past few day, found to have low h/h by PMD, called at home to go to ED for further eval; in ED, hgb 7.7, CTAP neg, UA neg, CXR neg, LE dopplers neg; undergoing GI eval; also w/significant LE edema, +cellulitis, and draining wound, cxs w/+MRSA    GI/HEME;   *GIB - GI/Bienstock consulted; no further episodes of bleeding in hospital; s/p EGD 4/26 w/findings of gastritis and hiatal hernia; s/p colonoscopy 4/28 w/diverticulosis, but no acute bleeding; for small bowel series today as per GI    *anemia - chronic component, w/h/h in range of 8s-9s chronically; etiology unclear; for transfusion for h/h <8; serial h/h to trend      CV:   *HTN - BP better on regimen of lasix, enalapril, and coreg; monitor for need to uptitrate    *LE edema - TTE 2015 w/nl EF 55-60; repeat TTE 4/26/17 w/nl EF, no valvular heart dz; etiology of LE edema likely venous insufficiency - continue Lasix as per home meds;       ID: RLE MRSA cellulitis w/open leg wound, draining  -continue  vancomycin as per ID/Celioj; follow vanco levels  -pt w/o leukocytosis or fevers; follow wbcs      OTHER: dvt prophy w/IPCs given acute bleeding

## 2017-05-01 NOTE — PROGRESS NOTE ADULT - PROBLEM SELECTOR PROBLEM 6
Uncontrolled hypertension

## 2017-05-01 NOTE — PROGRESS NOTE ADULT - PROBLEM SELECTOR PROBLEM 2
Anemia, unspecified type
Anemia, unspecified type
Cellulitis of right lower extremity
Anemia, unspecified type
Bilateral edema of lower extremity
Anemia, unspecified type

## 2017-05-01 NOTE — PROGRESS NOTE ADULT - ASSESSMENT
57 yr old male seen with
57 yr old male seen with
GI bleed - See EGD/colonoscopy report. H/H stable. No GI symptoms. Tolerating regular diet.  - SBS in AM
GI bleed - See EGD/colonoscopy report. SBS negative. H/H stable. No GI symptoms. Tolerating regular diet.  - Will follow on PRN basis
HPI:  58 y/o man PMH of PUD, GI bleeding, HTN, comes in with epigastric pain, intermittent bloody stools for past 2 wks and was called by PMD to come in for low Hg. Pt denies feeling lightheaded, mild epigastric pain with some nausea, but no vomiting/hematemesis. Has noted melanotic stool several days ago. No use of NSAIDS.  No flank pain, no fever/chills, No photophobia/eye pain/changes in vision, No ear pain/sore throat/dysphagia, No chest pain/palpitations, no SOB/cough/wheeze/stridor, No V/D, no dysuria/frequency/discharge, No neck/back pain, no rash, no changes in neurological status/function. Pt with stable vital signs in ED. (25 Apr 2017 06:28)  Consult called for severe anemia. History obtained via [employer]  # [id number]  The patient has a history of GI bleed, the last one in February of 2016. EGD/colonoscopy revealed gastric ulcer. Chart reveals that patient had GI bleed 2015 which needed IR for embolization of gastric ulcer.   Mr. Keith states that a couple of days ago he had diarrhea that consisted of dark, red stool. He had a vague abdominal pain and mild nusea. He went to his PMD who referred him to the ER with a low hemoglobin. The ER reports that the rectal exam revealed brown stool which was heme negative.  He denies recent use os ASA, blood thinners, NSAIDs or ETOH.  The patient has been anemic in the past with a low MCV.
See above assesent and plan.
See above assesent and plan.

## 2017-05-01 NOTE — PROGRESS NOTE ADULT - PROBLEM SELECTOR PROBLEM 10
Cellulitis of right lower extremity

## 2017-05-01 NOTE — PROGRESS NOTE ADULT - PROBLEM SELECTOR PROBLEM 4
Melanotic stools

## 2017-05-01 NOTE — PROGRESS NOTE ADULT - PROBLEM SELECTOR PROBLEM 5
PUD (peptic ulcer disease)

## 2017-05-01 NOTE — PROGRESS NOTE ADULT - PROBLEM SELECTOR PLAN 1
cont Vanco (day 5 ) wound now clean and cellulitis resolving   switch to PO doxy to finish the course of 2 weeks of antibiotics on discharge  cont wound care  cleared for d.c from ID side  while on vanco -monitor BMP and check levels
No signs of active bleeding. H/H unchanged. 1) advance diet and observe  2) EGD Thursday.
f/u on c/s   wound dressed   cont vanco

## 2017-05-01 NOTE — PROGRESS NOTE ADULT - PROBLEM SELECTOR PROBLEM 8
Bilateral edema of lower extremity

## 2017-05-01 NOTE — PROGRESS NOTE ADULT - PROBLEM SELECTOR PROBLEM 1
Gastrointestinal hemorrhage, unspecified gastrointestinal hemorrhage type
Anemia, unspecified type
Gastrointestinal hemorrhage, unspecified gastrointestinal hemorrhage type
MRSA cellulitis
Open wound of lower extremity, right, initial encounter
Gastrointestinal hemorrhage, unspecified gastrointestinal hemorrhage type

## 2017-05-01 NOTE — PROGRESS NOTE ADULT - PROBLEM SELECTOR PROBLEM 7
Non morbid obesity, unspecified obesity type

## 2017-05-01 NOTE — PROGRESS NOTE ADULT - PROBLEM SELECTOR PROBLEM 9
Open wound of lower extremity, right, initial encounter

## 2017-05-01 NOTE — PROGRESS NOTE ADULT - PROBLEM SELECTOR PROBLEM 3
Bilateral edema of lower extremity
Bloody stools

## 2017-05-02 VITALS — RESPIRATION RATE: 18 BRPM | OXYGEN SATURATION: 97 % | HEART RATE: 66 BPM

## 2017-05-02 DIAGNOSIS — L03.90 CELLULITIS, UNSPECIFIED: ICD-10-CM

## 2017-05-02 PROCEDURE — 99239 HOSP IP/OBS DSCHRG MGMT >30: CPT

## 2017-05-02 RX ORDER — FERROUS SULFATE 325(65) MG
1 TABLET ORAL
Qty: 30 | Refills: 0 | OUTPATIENT
Start: 2017-05-02 | End: 2017-06-01

## 2017-05-02 RX ADMIN — Medication 325 MILLIGRAM(S): at 11:33

## 2017-05-02 RX ADMIN — PANTOPRAZOLE SODIUM 40 MILLIGRAM(S): 20 TABLET, DELAYED RELEASE ORAL at 08:24

## 2017-05-02 RX ADMIN — Medication 40 MILLIGRAM(S): at 05:54

## 2017-05-02 RX ADMIN — Medication 20 MILLIEQUIVALENT(S): at 11:33

## 2017-05-02 RX ADMIN — CARVEDILOL PHOSPHATE 6.25 MILLIGRAM(S): 80 CAPSULE, EXTENDED RELEASE ORAL at 05:54

## 2017-05-02 RX ADMIN — Medication 250 MILLIGRAM(S): at 13:32

## 2017-05-02 RX ADMIN — Medication 10 MILLIGRAM(S): at 05:54

## 2017-05-02 RX ADMIN — CARVEDILOL PHOSPHATE 6.25 MILLIGRAM(S): 80 CAPSULE, EXTENDED RELEASE ORAL at 17:10

## 2017-05-02 RX ADMIN — Medication 250 MILLIGRAM(S): at 05:54

## 2017-05-02 NOTE — DISCHARGE NOTE ADULT - HOSPITAL COURSE
57M, morbid obesity, former heavy etoh abuse (last etoh 2010), HTN, PUD, hx GIB w/venous duodenal ulcer 2/2015 (anemia to 5.6, IR embolization of gastroduodenal artery), hx chronic anemia (hgb 7.5 (3/2017), 8-9 (2016); adm w/epigastric pain, intermittent bloody stools x2wks, melanotic stools x past few day, found to have low h/h by PMD, called at home to go to ED for further eval; in ED, hgb 7.7, CTAP neg, UA neg, CXR neg, LE dopplers neg; undergoing GI eval; also w/significant LE edema, +cellulitis, and draining wound, cxs w/+MRSA    GI/HEME;   *GIB - GI/Bienstock consulted; no further episodes of bleeding in hospital; s/p EGD 4/26 w/findings of gastritis and hiatal hernia; s/p colonoscopy 4/28 w/diverticulosis, but no acute bleeding; for small bowel series today as per GI    *anemia - chronic component, w/h/h in range of 8s-9s chronically; etiology unclear; for transfusion for h/h <8; serial h/h to trend      CV:   *HTN - BP better on regimen of lasix, enalapril, and coreg; monitor for need to uptitrate    *LE edema - TTE 2015 w/nl EF 55-60; repeat TTE 4/26/17 w/nl EF, no valvular heart dz; etiology of LE edema likely venous insufficiency - continue Lasix as per home meds;       ID: GLORIA MRSA cellulitis w/open leg wound, draining  -continue  vancomycin as per ID/Celioj; follow vanco levels  -pt w/o leukocytosis or fevers; follow wbcs      OTHER: dvt prophy w/IPCs given acute bleeding 57M, morbid obesity, former heavy etoh abuse (last etoh 2010), HTN, PUD, hx GIB w/venous duodenal ulcer 2/2015 (anemia to 5.6, IR embolization of gastroduodenal artery), hx chronic anemia (hgb 7.5 (3/2017), 8-9 (2016); adm w/epigastric pain, intermittent bloody stools x2wks, melanotic stools x past few day, found to have low h/h by PMD, called at home to go to ED for further eval; in ED, hgb 7.7, CTAP neg, UA neg, CXR neg, LE dopplers neg; underwent GI eval w/GI consult/Dr Norton, s/ps/p EGD 4/26 w/findings of gastritis and hiatal hernia; s/p colonoscopy 4/28 w/diverticulosis, but no acute bleeding; s/p small bowel series 5/1 w/neg findings; recomm for outpatient followup with GI. Of note, hemoglobin remained stable during hospitalization ~8s range, and pt did not require transfusion.      Pt also w/significant LE edema, +cellulitis, and draining wound, cxs w/+MRSA; ID/Mangraj consulted; pt tx'd w/vanco IV, then transitioned to oral doxycyline and recomm for 14d total therapy.  Pt also eval for LE edema, with LE dopplers which were negative, and TTE which showed normal EF.  LE edema likely 2/2 chronic venostasis.  Pt to continue wound care for RLE wound as outpatient with home services. Pt continued on home regimen of Lasix.  For BP control, pt continued on home meds.  All diagnoses/mgmt plans/followup instructions reviewed w/pt, all questions answered.

## 2017-05-02 NOTE — DISCHARGE NOTE ADULT - MEDICATION SUMMARY - MEDICATIONS TO TAKE
I will START or STAY ON the medications listed below when I get home from the hospital:    acetaminophen-codeine 300 mg-15 mg oral tablet  -- 1 tab(s) by mouth every 6 hours MDD:4 tabs as needed for severe pain only  -- Caution federal law prohibits the transfer of this drug to any person other  than the person for whom it was prescribed.  May cause drowsiness.  Alcohol may intensify this effect.  Use care when operating dangerous machinery.  This product contains acetaminophen.  Do not use  with any other product containing acetaminophen to prevent possible liver damage.  Using more of this medication than prescribed may cause serious breathing problems.    -- Indication: For Pain    aspirin 81 mg oral tablet  -- 1 tab(s) by mouth once a day  Do not take for 1 week. Resume on March 23, 2016.  -- Indication: For Prevention    enalapril 10 mg oral tablet  -- 1 tab(s) by mouth once a day  -- Indication: For Bp    doxycycline hyclate 100 mg oral tablet  -- 1 tab(s) by mouth every 12 hours x 10 days  -- Avoid prolonged or excessive exposure to direct and/or artificial sunlight while taking this medication.  Do not take this drug if you are pregnant.  Finish all this medication unless otherwise directed by prescriber.  Medication should be taken with plenty of water.    -- Indication: For ANtibiotics    carvedilol 3.125 mg oral tablet  -- 1 tab(s) by mouth every 12 hours  -- Indication: For heart, bp    furosemide 40 mg oral tablet  -- 1 tab(s) by mouth once a day  -- Indication: For Bp    FeroSul 325 mg (65 mg elemental iron) oral tablet  -- 1 tab(s) by mouth once a day  -- Indication: For ANEMIA    potassium chloride 20 mEq oral tablet, extended release  -- 1 tab(s) by mouth once a day  -- Indication: For Mineral    pantoprazole 40 mg oral delayed release tablet  -- 1 tab(s) by mouth 2 times a day (before meals)  -- Indication: For stomach

## 2017-05-02 NOTE — DISCHARGE NOTE ADULT - CARE PLAN
Principal Discharge DX:	Gastrointestinal hemorrhage, unspecified gastrointestinal hemorrhage type  Goal:	resolved; continue medications; followup with Gastroenterologist within 3-5 days of discharge  Instructions for follow-up, activity and diet:	resolved; continue medications; followup with Gastroenterologist within 3-5 days of discharge  Secondary Diagnosis:	Bloody stools  Goal:	resolved; continue medications; followup with Gastroenterologist within 3-5 days of discharge  Instructions for follow-up, activity and diet:	resolved; continue medications; followup with Gastroenterologist within 3-5 days of discharge  Secondary Diagnosis:	Melanotic stools  Goal:	resolved; continue medications; followup with Gastroenterologist within 3-5 days of discharge  Instructions for follow-up, activity and diet:	resolved; continue medications; followup with Gastroenterologist within 3-5 days of discharge  Secondary Diagnosis:	Anemia, unspecified type  Goal:	stable; continue medications; followup with Gastroenterologist within 3-5 days of discharge  Instructions for follow-up, activity and diet:	stable; continue medications; followup with Gastroenterologist within 3-5 days of discharge  Secondary Diagnosis:	MRSA cellulitis  Goal:	continue antibiotics; followup with PMD and Infectious Diseases, Dr Saucedo, within 3-5 days of discharge  Instructions for follow-up, activity and diet:	continue antibiotics; followup with PMD and Infectious Diseases, Dr Saucedo, within 3-5 days of discharge  Secondary Diagnosis:	Open wound of lower extremity, right, initial encounter  Goal:	continue wound care; followup with PMD within 3-5 days of discharge  Instructions for follow-up, activity and diet:	continue wound care; followup with PMD within 3-5 days of discharge  Secondary Diagnosis:	Essential hypertension  Goal:	continue medications; followup with PMD within 3-5 days of discharge  Instructions for follow-up, activity and diet:	continue medications; followup with PMD within 3-5 days of discharge

## 2017-05-02 NOTE — DISCHARGE NOTE ADULT - MEDICATION SUMMARY - MEDICATIONS TO STOP TAKING
I will STOP taking the medications listed below when I get home from the hospital:    mupirocin 2% topical cream  -- Apply on skin to affected area 3 times a day     mg oral tablet  -- 1 tab(s) by mouth every 6 hours  -- Do not take this drug if you are pregnant.  It is very important that you take or use this exactly as directed.  Do not skip doses or discontinue unless directed by your doctor.  May cause drowsiness or dizziness.  Obtain medical advice before taking any non-prescription drugs as some may affect the action of this medication.  Take with food or milk.    Cetraxal 0.2% otic solution  -- 4 drops to left ear twice daily  -- For the ear.  It is very important that you take or use this exactly as directed.  Do not skip doses or discontinue unless directed by your doctor.    Pepcid AC Maximum Strength 20 mg oral tablet  -- 1 tab(s) by mouth once a day  -- It is very important that you take or use this exactly as directed.  Do not skip doses or discontinue unless directed by your doctor.  Obtain medical advice before taking any non-prescription drugs as some may affect the action of this medication.

## 2017-05-02 NOTE — DISCHARGE NOTE ADULT - PLAN OF CARE
resolved; continue medications; followup with Gastroenterologist within 3-5 days of discharge stable; continue medications; followup with Gastroenterologist within 3-5 days of discharge continue antibiotics; followup with PMD and Infectious Diseases, Dr Saucedo, within 3-5 days of discharge continue wound care; followup with PMD within 3-5 days of discharge continue medications; followup with PMD within 3-5 days of discharge

## 2017-05-02 NOTE — DISCHARGE NOTE ADULT - PATIENT PORTAL LINK FT
“You can access the Follow[facility] Patient Portal, offered by [facility], by registering with the following website: [email or web address]”

## 2017-05-02 NOTE — DISCHARGE NOTE ADULT - PROVIDER TOKENS
FREE:[LAST:[Followup with PMD and Infectious Diseases, Dr Saucedo , within 3-5 days of discharge],PHONE:[(   )    -],FAX:[(   )    -]],TOKEN:'[id number]'

## 2017-05-02 NOTE — DISCHARGE NOTE ADULT - CARE PROVIDER_API CALL
Followup with PMD and Infectious Diseases, Dr Saucedo , within 3-5 days of discharge,   Phone: (   )    -  Fax: (   )    -    Archana Saucedo (HERMINIA), Infectious Disease; Internal Medicine  [address] [place] [address]  Phone: [phone]  Fax: [phone]

## 2017-05-03 LAB
CULTURE RESULTS: SIGNIFICANT CHANGE UP
ORGANISM # SPEC MICROSCOPIC CNT: SIGNIFICANT CHANGE UP
ORGANISM # SPEC MICROSCOPIC CNT: SIGNIFICANT CHANGE UP
SPECIMEN SOURCE: SIGNIFICANT CHANGE UP

## 2017-05-04 DIAGNOSIS — B95.62 METHICILLIN RESISTANT STAPHYLOCOCCUS AUREUS INFECTION AS THE CAUSE OF DISEASES CLASSIFIED ELSEWHERE: ICD-10-CM

## 2017-05-04 DIAGNOSIS — K92.1 MELENA: ICD-10-CM

## 2017-05-04 DIAGNOSIS — K44.9 DIAPHRAGMATIC HERNIA WITHOUT OBSTRUCTION OR GANGRENE: ICD-10-CM

## 2017-05-04 DIAGNOSIS — K57.30 DIVERTICULOSIS OF LARGE INTESTINE WITHOUT PERFORATION OR ABSCESS WITHOUT BLEEDING: ICD-10-CM

## 2017-05-04 DIAGNOSIS — Z79.1 LONG TERM (CURRENT) USE OF NON-STEROIDAL ANTI-INFLAMMATORIES (NSAID): ICD-10-CM

## 2017-05-04 DIAGNOSIS — L03.115 CELLULITIS OF RIGHT LOWER LIMB: ICD-10-CM
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
## 2017-05-04 DIAGNOSIS — D50.0 IRON DEFICIENCY ANEMIA SECONDARY TO BLOOD LOSS (CHRONIC): ICD-10-CM
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
## 2017-05-04 DIAGNOSIS — I87.8 OTHER SPECIFIED DISORDERS OF VEINS: ICD-10-CM

## 2017-05-04 DIAGNOSIS — Z79.82 LONG TERM (CURRENT) USE OF ASPIRIN: ICD-10-CM

## 2017-05-04 DIAGNOSIS — E66.01 MORBID (SEVERE) OBESITY DUE TO EXCESS CALORIES: ICD-10-CM

## 2017-05-04 DIAGNOSIS — K27.9 PEPTIC ULCER, SITE UNSPECIFIED, UNSPECIFIED AS ACUTE OR CHRONIC, WITHOUT HEMORRHAGE OR PERFORATION: ICD-10-CM

## 2017-05-04 DIAGNOSIS — Z79.2 LONG TERM (CURRENT) USE OF ANTIBIOTICS: ICD-10-CM

## 2017-05-04 DIAGNOSIS — Z79.891 LONG TERM (CURRENT) USE OF OPIATE ANALGESIC: ICD-10-CM

## 2017-05-04 DIAGNOSIS — S81.801A UNSPECIFIED OPEN WOUND, RIGHT LOWER LEG, INITIAL ENCOUNTER: ICD-10-CM

## 2017-05-04 DIAGNOSIS — Z87.19 PERSONAL HISTORY OF OTHER DISEASES OF THE DIGESTIVE SYSTEM: ICD-10-CM

## 2017-05-04 DIAGNOSIS — K64.9 UNSPECIFIED HEMORRHOIDS: ICD-10-CM

## 2017-05-04 DIAGNOSIS — M17.10 UNILATERAL PRIMARY OSTEOARTHRITIS, UNSPECIFIED KNEE: ICD-10-CM

## 2017-05-04 DIAGNOSIS — I10 ESSENTIAL (PRIMARY) HYPERTENSION: ICD-10-CM
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
## 2017-05-04 DIAGNOSIS — K29.70 GASTRITIS, UNSPECIFIED, WITHOUT BLEEDING: ICD-10-CM

## 2017-09-20 NOTE — PHYSICAL THERAPY INITIAL EVALUATION ADULT - HEALTH SCREEN CRITERIA
montelukast (SINGULAIR) 10 MG tablet       Last Written Prescription Date: 03/24/17  Last Fill Quantity: 90 tablet, # refills: 1    Last Office Visit with [facility], [facility] or [facility] prescribing provider:  05/05/2017   Future Office Visit:       Date of Last Asthma Action Plan Letter:   Asthma Action Plan Q1 Year    Topic Date Due     Asthma Action Plan - yearly  09/03/2016      Asthma Control Test:   ACT Total Scores 5/5/2017   ACT TOTAL SCORE -   ASTHMA ER VISITS -   ASTHMA HOSPITALIZATIONS -   ACT TOTAL SCORE (Goal Greater than or Equal to 20) 18   In the past 12 months, how many times did you visit the emergency room for your asthma without being admitted to the hospital? 0   In the past 12 months, how many times were you hospitalized overnight because of your asthma? 0       Date of Last Spirometry Test:   No results found for this or any previous visit.            
yes

## 2017-10-17 ENCOUNTER — INPATIENT (INPATIENT)
Facility: HOSPITAL | Age: 57
LOS: 1 days | Discharge: HOME | End: 2017-10-19
Attending: INTERNAL MEDICINE | Admitting: INTERNAL MEDICINE
Payer: MEDICAID

## 2017-10-17 VITALS
WEIGHT: 274.92 LBS | HEART RATE: 69 BPM | SYSTOLIC BLOOD PRESSURE: 199 MMHG | TEMPERATURE: 98 F | RESPIRATION RATE: 19 BRPM | DIASTOLIC BLOOD PRESSURE: 132 MMHG | OXYGEN SATURATION: 99 % | HEIGHT: 66 IN

## 2017-10-17 DIAGNOSIS — L97.909 NON-PRESSURE CHRONIC ULCER OF UNSPECIFIED PART OF UNSPECIFIED LOWER LEG WITH UNSPECIFIED SEVERITY: ICD-10-CM

## 2017-10-17 DIAGNOSIS — K46.9 UNSPECIFIED ABDOMINAL HERNIA WITHOUT OBSTRUCTION OR GANGRENE: Chronic | ICD-10-CM

## 2017-10-17 DIAGNOSIS — M17.10 UNILATERAL PRIMARY OSTEOARTHRITIS, UNSPECIFIED KNEE: ICD-10-CM

## 2017-10-17 DIAGNOSIS — R26.2 DIFFICULTY IN WALKING, NOT ELSEWHERE CLASSIFIED: ICD-10-CM

## 2017-10-17 DIAGNOSIS — R58 HEMORRHAGE, NOT ELSEWHERE CLASSIFIED: Chronic | ICD-10-CM

## 2017-10-17 DIAGNOSIS — Z98.89 OTHER SPECIFIED POSTPROCEDURAL STATES: Chronic | ICD-10-CM

## 2017-10-17 DIAGNOSIS — I10 ESSENTIAL (PRIMARY) HYPERTENSION: ICD-10-CM

## 2017-10-17 LAB
ALBUMIN SERPL ELPH-MCNC: 3.2 G/DL — LOW (ref 3.3–5)
ALP SERPL-CCNC: 112 U/L — SIGNIFICANT CHANGE UP (ref 40–120)
ALT FLD-CCNC: 20 U/L — SIGNIFICANT CHANGE UP (ref 12–78)
ANION GAP SERPL CALC-SCNC: 8 MMOL/L — SIGNIFICANT CHANGE UP (ref 5–17)
APPEARANCE UR: CLEAR — SIGNIFICANT CHANGE UP
APTT BLD: 33.9 SEC — SIGNIFICANT CHANGE UP (ref 27.5–37.4)
AST SERPL-CCNC: 19 U/L — SIGNIFICANT CHANGE UP (ref 15–37)
BACTERIA # UR AUTO: ABNORMAL
BASOPHILS # BLD AUTO: 0.1 K/UL — SIGNIFICANT CHANGE UP (ref 0–0.2)
BASOPHILS NFR BLD AUTO: 0.9 % — SIGNIFICANT CHANGE UP (ref 0–2)
BILIRUB SERPL-MCNC: 0.3 MG/DL — SIGNIFICANT CHANGE UP (ref 0.2–1.2)
BILIRUB UR-MCNC: NEGATIVE — SIGNIFICANT CHANGE UP
BUN SERPL-MCNC: 22 MG/DL — SIGNIFICANT CHANGE UP (ref 7–23)
CALCIUM SERPL-MCNC: 8.7 MG/DL — SIGNIFICANT CHANGE UP (ref 8.5–10.1)
CHLORIDE SERPL-SCNC: 103 MMOL/L — SIGNIFICANT CHANGE UP (ref 96–108)
CK MB BLD-MCNC: 2 % — SIGNIFICANT CHANGE UP (ref 0–3.5)
CK MB CFR SERPL CALC: 3 NG/ML — SIGNIFICANT CHANGE UP (ref 0.5–3.6)
CK SERPL-CCNC: 151 U/L — SIGNIFICANT CHANGE UP (ref 26–308)
CO2 SERPL-SCNC: 29 MMOL/L — SIGNIFICANT CHANGE UP (ref 22–31)
COLOR SPEC: YELLOW — SIGNIFICANT CHANGE UP
COMMENT - URINE: SIGNIFICANT CHANGE UP
CREAT SERPL-MCNC: 0.69 MG/DL — SIGNIFICANT CHANGE UP (ref 0.5–1.3)
DIFF PNL FLD: ABNORMAL
EOSINOPHIL # BLD AUTO: 0.2 K/UL — SIGNIFICANT CHANGE UP (ref 0–0.5)
EOSINOPHIL NFR BLD AUTO: 2 % — SIGNIFICANT CHANGE UP (ref 0–6)
EPI CELLS # UR: SIGNIFICANT CHANGE UP
GLUCOSE SERPL-MCNC: 114 MG/DL — HIGH (ref 70–99)
GLUCOSE UR QL: NEGATIVE MG/DL — SIGNIFICANT CHANGE UP
HCT VFR BLD CALC: 40.1 % — SIGNIFICANT CHANGE UP (ref 39–50)
HGB BLD-MCNC: 12.9 G/DL — LOW (ref 13–17)
INR BLD: 1.05 RATIO — SIGNIFICANT CHANGE UP (ref 0.88–1.16)
KETONES UR-MCNC: NEGATIVE — SIGNIFICANT CHANGE UP
LACTATE SERPL-SCNC: 0.9 MMOL/L — SIGNIFICANT CHANGE UP (ref 0.7–2)
LEUKOCYTE ESTERASE UR-ACNC: NEGATIVE — SIGNIFICANT CHANGE UP
LYMPHOCYTES # BLD AUTO: 1.5 K/UL — SIGNIFICANT CHANGE UP (ref 1–3.3)
LYMPHOCYTES # BLD AUTO: 18.2 % — SIGNIFICANT CHANGE UP (ref 13–44)
MCHC RBC-ENTMCNC: 28.5 PG — SIGNIFICANT CHANGE UP (ref 27–34)
MCHC RBC-ENTMCNC: 32.2 GM/DL — SIGNIFICANT CHANGE UP (ref 32–36)
MCV RBC AUTO: 88.4 FL — SIGNIFICANT CHANGE UP (ref 80–100)
MONOCYTES # BLD AUTO: 0.6 K/UL — SIGNIFICANT CHANGE UP (ref 0–0.9)
MONOCYTES NFR BLD AUTO: 7.1 % — SIGNIFICANT CHANGE UP (ref 2–14)
NEUTROPHILS # BLD AUTO: 6 K/UL — SIGNIFICANT CHANGE UP (ref 1.8–7.4)
NEUTROPHILS NFR BLD AUTO: 71.8 % — SIGNIFICANT CHANGE UP (ref 43–77)
NITRITE UR-MCNC: NEGATIVE — SIGNIFICANT CHANGE UP
NT-PROBNP SERPL-SCNC: 225 PG/ML — HIGH (ref 0–125)
PH UR: 7 — SIGNIFICANT CHANGE UP (ref 5–8)
PLATELET # BLD AUTO: 250 K/UL — SIGNIFICANT CHANGE UP (ref 150–400)
POTASSIUM SERPL-MCNC: 3.8 MMOL/L — SIGNIFICANT CHANGE UP (ref 3.5–5.3)
POTASSIUM SERPL-SCNC: 3.8 MMOL/L — SIGNIFICANT CHANGE UP (ref 3.5–5.3)
PROT SERPL-MCNC: 7.5 GM/DL — SIGNIFICANT CHANGE UP (ref 6–8.3)
PROT UR-MCNC: NEGATIVE MG/DL — SIGNIFICANT CHANGE UP
PROTHROM AB SERPL-ACNC: 11.5 SEC — SIGNIFICANT CHANGE UP (ref 9.8–12.7)
RBC # BLD: 4.53 M/UL — SIGNIFICANT CHANGE UP (ref 4.2–5.8)
RBC # FLD: 14.3 % — SIGNIFICANT CHANGE UP (ref 11–15)
RBC CASTS # UR COMP ASSIST: SIGNIFICANT CHANGE UP /HPF (ref 0–4)
SODIUM SERPL-SCNC: 140 MMOL/L — SIGNIFICANT CHANGE UP (ref 135–145)
SP GR SPEC: 1.01 — SIGNIFICANT CHANGE UP (ref 1.01–1.02)
TROPONIN I SERPL-MCNC: <.015 NG/ML — SIGNIFICANT CHANGE UP (ref 0.01–0.04)
UROBILINOGEN FLD QL: NEGATIVE MG/DL — SIGNIFICANT CHANGE UP
WBC # BLD: 8.3 K/UL — SIGNIFICANT CHANGE UP (ref 3.8–10.5)
WBC # FLD AUTO: 8.3 K/UL — SIGNIFICANT CHANGE UP (ref 3.8–10.5)

## 2017-10-17 PROCEDURE — 73562 X-RAY EXAM OF KNEE 3: CPT | Mod: 26,50

## 2017-10-17 PROCEDURE — 99285 EMERGENCY DEPT VISIT HI MDM: CPT | Mod: 25

## 2017-10-17 PROCEDURE — 99223 1ST HOSP IP/OBS HIGH 75: CPT

## 2017-10-17 PROCEDURE — 73590 X-RAY EXAM OF LOWER LEG: CPT | Mod: 26,RT

## 2017-10-17 PROCEDURE — 93970 EXTREMITY STUDY: CPT | Mod: 26

## 2017-10-17 RX ORDER — FUROSEMIDE 40 MG
40 TABLET ORAL DAILY
Qty: 0 | Refills: 0 | Status: DISCONTINUED | OUTPATIENT
Start: 2017-10-17 | End: 2017-10-19

## 2017-10-17 RX ORDER — MORPHINE SULFATE 50 MG/1
4 CAPSULE, EXTENDED RELEASE ORAL ONCE
Qty: 0 | Refills: 0 | Status: DISCONTINUED | OUTPATIENT
Start: 2017-10-17 | End: 2017-10-17

## 2017-10-17 RX ORDER — ENOXAPARIN SODIUM 100 MG/ML
40 INJECTION SUBCUTANEOUS EVERY 24 HOURS
Qty: 0 | Refills: 0 | Status: DISCONTINUED | OUTPATIENT
Start: 2017-10-17 | End: 2017-10-19

## 2017-10-17 RX ORDER — CARVEDILOL PHOSPHATE 80 MG/1
3.12 CAPSULE, EXTENDED RELEASE ORAL EVERY 12 HOURS
Qty: 0 | Refills: 0 | Status: DISCONTINUED | OUTPATIENT
Start: 2017-10-17 | End: 2017-10-19

## 2017-10-17 RX ORDER — ACETAMINOPHEN 500 MG
650 TABLET ORAL EVERY 6 HOURS
Qty: 0 | Refills: 0 | Status: DISCONTINUED | OUTPATIENT
Start: 2017-10-17 | End: 2017-10-19

## 2017-10-17 RX ORDER — KETOROLAC TROMETHAMINE 30 MG/ML
30 SYRINGE (ML) INJECTION ONCE
Qty: 0 | Refills: 0 | Status: DISCONTINUED | OUTPATIENT
Start: 2017-10-17 | End: 2017-10-17

## 2017-10-17 RX ORDER — INFLUENZA VIRUS VACCINE 15; 15; 15; 15 UG/.5ML; UG/.5ML; UG/.5ML; UG/.5ML
0.5 SUSPENSION INTRAMUSCULAR ONCE
Qty: 0 | Refills: 0 | Status: DISCONTINUED | OUTPATIENT
Start: 2017-10-17 | End: 2017-10-19

## 2017-10-17 RX ADMIN — Medication 650 MILLIGRAM(S): at 17:10

## 2017-10-17 RX ADMIN — MORPHINE SULFATE 4 MILLIGRAM(S): 50 CAPSULE, EXTENDED RELEASE ORAL at 07:05

## 2017-10-17 RX ADMIN — Medication 10 MILLIGRAM(S): at 12:21

## 2017-10-17 RX ADMIN — ENOXAPARIN SODIUM 40 MILLIGRAM(S): 100 INJECTION SUBCUTANEOUS at 12:21

## 2017-10-17 RX ADMIN — Medication 650 MILLIGRAM(S): at 18:17

## 2017-10-17 RX ADMIN — Medication 40 MILLIGRAM(S): at 12:21

## 2017-10-17 RX ADMIN — MORPHINE SULFATE 4 MILLIGRAM(S): 50 CAPSULE, EXTENDED RELEASE ORAL at 05:47

## 2017-10-17 RX ADMIN — CARVEDILOL PHOSPHATE 3.12 MILLIGRAM(S): 80 CAPSULE, EXTENDED RELEASE ORAL at 17:09

## 2017-10-17 RX ADMIN — Medication 30 MILLIGRAM(S): at 05:47

## 2017-10-17 RX ADMIN — Medication 100 MILLIGRAM(S): at 05:47

## 2017-10-17 RX ADMIN — Medication 30 MILLIGRAM(S): at 07:05

## 2017-10-17 NOTE — ED PROVIDER NOTE - MEDICAL DECISION MAKING DETAILS
Pt w above dx, improved in ED w meds given.  Pt unable to ambulate.  Given first dose of abx in ED.  Plan to admit for further eval/mgmt.

## 2017-10-17 NOTE — ED ADULT TRIAGE NOTE - CHIEF COMPLAINT QUOTE
BIBA  bilateral knee & LE swelling x 2 weeks.  pt denies injury trauma.  pt states he is unable to walk

## 2017-10-17 NOTE — ED PROVIDER NOTE - OBJECTIVE STATEMENT
Pertinent PMH/PSH/FHx/SHx and Review of Systems contained within:    56yo M w PMH of HTN presents to ED c/o bilateral leg pain & inability to ambulate.  Pt states he noted incr swelling in LE for few weeks, then he had fall about 2wks ago w injury to RLE.  Pt since has had large wound.  Pt has been dressing wound and using crutches.  Pt states he now has severe pain in knees bilaterally.  Denies fever, chills, CP, SOB, abd pain.    No fever/chills, No photophobia/eye pain/changes in vision, No ear pain/sore throat/dysphagia, No chest pain/palpitations, no SOB/cough/wheeze/stridor, No abdominal pain, no dysuria/frequency/discharge, No neck/back pain, no rash, no changes in neurological status/function.

## 2017-10-17 NOTE — ED PROVIDER NOTE - PHYSICAL EXAMINATION
Gen: Alert, c/o pain  Head: NC, AT, EOMI, normal lids/conjunctiva  ENT: normal hearing, patent oropharynx, MMM  Neck: supple, no tenderness/meningismus, FROM  Pulm: Bilateral clear BS, normal resp effort, no wheeze/stridor/retractions  CV: RRR, no M/R/G, +dist pulses  Abd: soft, NT/ND, +BS, no guarding/rebound tenderness, +obsese  Mskel: bilateral LE swelling w erythema, +10x7cm ulcer over medial RLE w purulent drainage, +bilateral knee pain w ROM, sensation intact  Skin: no rash  Neuro: AAOx3, no sensory/motor deficits

## 2017-10-17 NOTE — H&P ADULT - HISTORY OF PRESENT ILLNESS
56yo M w PMH of HTN presents to ED c/o bilateral leg pain & inability to ambulate.  Pt states he noted incr swelling in LE for few weeks, then he had fall about 2wks ago w injury to RLE.  Pt since has had large wound.  Pt has been dressing wound and using crutches.  Pt states he now has severe pain in knees bilaterally.  Denies fever, chills, CP, SOB, abd pain.

## 2017-10-17 NOTE — H&P ADULT - NSHPREVIEWOFSYSTEMS_GEN_ALL_CORE
CONSTITUTIONAL: No fever, weight loss, or fatigue  EYES: No eye pain, visual disturbances, or discharge  ENMT:  No difficulty hearing, tinnitus, vertigo; No sinus or throat pain  NECK: No pain or stiffness  RESPIRATORY: No cough, wheezing, chills or hemoptysis; No shortness of breath  CARDIOVASCULAR: No chest pain, palpitations, dizziness, or leg swelling  GASTROINTESTINAL: No abdominal or epigastric pain. No nausea, vomiting, or hematemesis; No diarrhea or constipation. No melena or hematochezia.  GENITOURINARY: No dysuria, frequency, hematuria, or incontinence  NEUROLOGICAL: No headaches, memory loss, loss of strength, numbness, or tremors  SKIN: right leg ulcer   LYMPH NODES: No enlarged glands  ENDOCRINE: No heat or cold intolerance; No hair loss  MUSCULOSKELETAL: bilateral knee swelling and bilateral leg edema   PSYCHIATRIC: No depression, anxiety, mood swings, or difficulty sleeping  HEME/LYMPH: No easy bruising, or bleeding gums  ALLERGY AND IMMUNOLOGIC: No hives or eczema

## 2017-10-17 NOTE — ED ADULT NURSE REASSESSMENT NOTE - NS ED NURSE REASSESS COMMENT FT1
Assumed care of patient at this time with complaint of bilateral leg edema, pt A/0x4, right leg ulcer covered and wrapped by MD, no signs of acute distress, will continue to monitor and provide care as needed.

## 2017-10-18 DIAGNOSIS — E66.01 MORBID (SEVERE) OBESITY DUE TO EXCESS CALORIES: ICD-10-CM

## 2017-10-18 PROCEDURE — 99233 SBSQ HOSP IP/OBS HIGH 50: CPT

## 2017-10-18 RX ORDER — OXYCODONE AND ACETAMINOPHEN 5; 325 MG/1; MG/1
1 TABLET ORAL ONCE
Qty: 0 | Refills: 0 | Status: DISCONTINUED | OUTPATIENT
Start: 2017-10-18 | End: 2017-10-18

## 2017-10-18 RX ADMIN — OXYCODONE AND ACETAMINOPHEN 1 TABLET(S): 5; 325 TABLET ORAL at 05:55

## 2017-10-18 RX ADMIN — ENOXAPARIN SODIUM 40 MILLIGRAM(S): 100 INJECTION SUBCUTANEOUS at 13:04

## 2017-10-18 RX ADMIN — CARVEDILOL PHOSPHATE 3.12 MILLIGRAM(S): 80 CAPSULE, EXTENDED RELEASE ORAL at 17:19

## 2017-10-18 RX ADMIN — Medication 10 MILLIGRAM(S): at 05:10

## 2017-10-18 RX ADMIN — Medication 40 MILLIGRAM(S): at 05:07

## 2017-10-18 RX ADMIN — CARVEDILOL PHOSPHATE 3.12 MILLIGRAM(S): 80 CAPSULE, EXTENDED RELEASE ORAL at 05:08

## 2017-10-18 RX ADMIN — OXYCODONE AND ACETAMINOPHEN 1 TABLET(S): 5; 325 TABLET ORAL at 07:00

## 2017-10-18 NOTE — PHYSICAL THERAPY INITIAL EVALUATION ADULT - CRITERIA FOR SKILLED THERAPEUTIC INTERVENTIONS
impairments found/therapy frequency/rehab potential/anticipated discharge recommendation/functional limitations in following categories/risk reduction/prevention
risk reduction/prevention/impairments found/rehab potential/anticipated discharge recommendation/functional limitations in following categories/therapy frequency

## 2017-10-18 NOTE — PROGRESS NOTE ADULT - ASSESSMENT
58yo M w PMH of HTN presents to ED c/o bilateral leg pain & inability to ambulate.  Pt states he noted incr swelling in LE for few weeks, then he had fall about 2wks ago w injury to RLE.  Pt since has had large wound.  Pt has been dressing wound and using crutches.  Pt states he now has severe pain in knees bilaterally.  Denies fever, chills, CP, SOB, abd pain. BL LE doppler us- No evidence of bilateral lower extremity deep venous thrombosis. Subcutaneous edema bilaterally. BL Knee xr- Severe arthritic changes both knees.

## 2017-10-18 NOTE — PHYSICAL THERAPY INITIAL EVALUATION ADULT - PLANNED THERAPY INTERVENTIONS, PT EVAL
gait training/strengthening/transfer training/balance training
gait training/transfer training/strengthening/balance training

## 2017-10-18 NOTE — PHYSICAL THERAPY INITIAL EVALUATION ADULT - ASR EQUIP NEEDS DISCH PT EVAL
pt has friends axillary crutches. Pt has his own standard cane
pt has friends axillary crutches. Pt has his own standard cane

## 2017-10-18 NOTE — PHYSICAL THERAPY INITIAL EVALUATION ADULT - MODIFIED CLINICAL TEST OF SENSORY INTEGRATION IN BALANCE TEST
Barthel Index: Feeding Score _10__, Bathing Score __0_, Grooming Score _5__, Dressing Score _5__, Bowels Score __10_, Bladder Score __10_, Toilet Score __10_, Transfers Score _10__, Mobility Score _0__, Stairs Score _0__,     Total Score __60_
Barthel Index: Feeding Score _10__, Bathing Score __0_, Grooming Score _5__, Dressing Score _5__, Bowels Score __10_, Bladder Score __10_, Toilet Score __10_, Transfers Score _10__, Mobility Score _0__, Stairs Score _0__,     Total Score __60_

## 2017-10-18 NOTE — PHYSICAL THERAPY INITIAL EVALUATION ADULT - IMPAIRMENTS FOUND, PT EVAL
gait, locomotion, and balance/aerobic capacity/endurance/muscle strength
aerobic capacity/endurance/gait, locomotion, and balance/muscle strength

## 2017-10-19 ENCOUNTER — TRANSCRIPTION ENCOUNTER (OUTPATIENT)
Age: 57
End: 2017-10-19

## 2017-10-19 VITALS
RESPIRATION RATE: 17 BRPM | SYSTOLIC BLOOD PRESSURE: 142 MMHG | DIASTOLIC BLOOD PRESSURE: 62 MMHG | HEART RATE: 64 BPM | TEMPERATURE: 99 F | OXYGEN SATURATION: 92 %

## 2017-10-19 PROCEDURE — 99239 HOSP IP/OBS DSCHRG MGMT >30: CPT

## 2017-10-19 RX ADMIN — Medication 650 MILLIGRAM(S): at 05:48

## 2017-10-19 RX ADMIN — Medication 40 MILLIGRAM(S): at 05:48

## 2017-10-19 RX ADMIN — CARVEDILOL PHOSPHATE 3.12 MILLIGRAM(S): 80 CAPSULE, EXTENDED RELEASE ORAL at 05:49

## 2017-10-19 RX ADMIN — Medication 650 MILLIGRAM(S): at 06:15

## 2017-10-19 RX ADMIN — Medication 10 MILLIGRAM(S): at 12:09

## 2017-10-19 RX ADMIN — Medication 10 MILLIGRAM(S): at 05:48

## 2017-10-19 RX ADMIN — ENOXAPARIN SODIUM 40 MILLIGRAM(S): 100 INJECTION SUBCUTANEOUS at 12:09

## 2017-10-19 NOTE — DIETITIAN INITIAL EVALUATION ADULT. - NS AS NUTRI INTERV MEALS SNACK
General/healthful diet/Recommend: Dash/TLC, 1800 calorie controlled/[place] - modified diet/Energy - modified diet/Fat - modified diet

## 2017-10-19 NOTE — DISCHARGE NOTE ADULT - SECONDARY DIAGNOSIS.
Morbid obesity with body mass index of 45.0-49.9 in adult Essential hypertension Ulcer of right lower extremity, unspecified ulcer stage Primary osteoarthritis of knee, unspecified laterality

## 2017-10-19 NOTE — DISCHARGE NOTE ADULT - PLAN OF CARE
pain mgmt 1800 calorie controlled, Dash/TLC follow up with pmd cont on home meds and follow up with pmd follow up with pmd and wound care clinic

## 2017-10-19 NOTE — DIETITIAN INITIAL EVALUATION ADULT. - NS AS NUTRI INTERV ED CONTENT
Pressure ulcer nutrition therapy; weight loss tips; low sodium nutrition therapy, heart healthy cooking and shopping tips - Vietnamese literature/Purpose of the nutrition education/Survival information

## 2017-10-19 NOTE — CHART NOTE - NSCHARTNOTEFT_GEN_A_CORE
Upon Nutritional Assessment by the Registered Dietitian your patient was determined to meet criteria / has evidence of the following diagnosis/diagnoses:          [ ]  Mild Protein Calorie Malnutrition        [ ]  Moderate Protein Calorie Malnutrition        [ ] Severe Protein Calorie Malnutrition        [ ] Unspecified Protein Calorie Malnutrition        [ ] Underweight / BMI <19        [x ] Morbid Obesity / BMI > 40      Findings as based on:  •  Comprehensive nutrition assessment and consultation  •  Calorie counts (nutrient intake analysis)  •  Food acceptance and intake status from observations by staff  •  Follow up  •  Patient education  •  Intervention secondary to interdisciplinary rounds  •   concerns      Treatment:    The following diet has been recommended: 1800 calorie controlled, Dash/TLC      PROVIDER Section:     By signing this assessment you are acknowledging and agree with the diagnosis/diagnoses assigned by the Registered Dietitian    Comments:

## 2017-10-19 NOTE — DISCHARGE NOTE ADULT - MEDICATION SUMMARY - MEDICATIONS TO TAKE
I will START or STAY ON the medications listed below when I get home from the hospital:    enalapril 10 mg oral tablet  -- 1 tab(s) by mouth once a day  -- Indication: For Essential hypertension    carvedilol 3.125 mg oral tablet  -- 1 tab(s) by mouth 2 times a day  -- Indication: For Essential hypertension    furosemide 40 mg oral tablet  -- 1 tab(s) by mouth once a day  -- Indication: For Essential hypertension

## 2017-10-19 NOTE — DISCHARGE NOTE ADULT - CARE PROVIDER_API CALL
Marc Lima), Surgery  [address] [facility]  [address] [place] [address]  Phone: [phone]  Fax: [phone]

## 2017-10-19 NOTE — DISCHARGE NOTE ADULT - HOSPITAL COURSE
58yo M w PMH of HTN presents to ED c/o bilateral leg pain & inability to ambulate.  Pt states he noted incr swelling in LE for few weeks, then he had fall about 2wks ago w injury to RLE.  Pt since has had large wound.  Pt has been dressing wound and using crutches.  Pt states he now has severe pain in knees bilaterally.  Denies fever, chills, CP, SOB, abd pain. BL LE doppler us- No evidence of bilateral lower extremity deep venous thrombosis. Subcutaneous edema bilaterally. BL Knee xr- Severe arthritic changes both knees. Evaluated by wound care, stable for d/c and follow up as out pt wound care clinic

## 2017-10-19 NOTE — DISCHARGE NOTE ADULT - CARE PLAN
Principal Discharge DX:	Inability to ambulate due to multiple joints  Goal:	pain mgmt  Instructions for follow-up, activity and diet:	follow up with pmd  Secondary Diagnosis:	Essential hypertension  Instructions for follow-up, activity and diet:	cont on home meds and follow up with pmd  Secondary Diagnosis:	Ulcer of right lower extremity, unspecified ulcer stage  Instructions for follow-up, activity and diet:	follow up with pmd and wound care clinic  Secondary Diagnosis:	Primary osteoarthritis of knee, unspecified laterality  Instructions for follow-up, activity and diet:	pain mgmt  Secondary Diagnosis:	Morbid obesity with body mass index of 45.0-49.9 in adult  Instructions for follow-up, activity and diet:	1800 calorie controlled, Dash/TLC

## 2017-10-19 NOTE — DISCHARGE NOTE ADULT - PATIENT PORTAL LINK FT
“You can access the Follow[facility] Patient Portal, offered by [facility], by registering with the following website: [email or web address]”

## 2017-10-19 NOTE — DIETITIAN INITIAL EVALUATION ADULT. - OTHER INFO
Pt seen today due to MD referral for Assessment & Education. Pt lives @ home alone. His cousin brings him food. He denies food allergies. Last BM on 10/17. Consuming 100% of Regular diet. Diet recall reflects high fat/sodium/calorie intake.

## 2017-10-23 DIAGNOSIS — R60.0 LOCALIZED EDEMA: ICD-10-CM

## 2017-10-23 DIAGNOSIS — L97.919 NON-PRESSURE CHRONIC ULCER OF UNSPECIFIED PART OF RIGHT LOWER LEG WITH UNSPECIFIED SEVERITY: ICD-10-CM

## 2017-10-23 DIAGNOSIS — E66.01 MORBID (SEVERE) OBESITY DUE TO EXCESS CALORIES: ICD-10-CM

## 2017-10-23 DIAGNOSIS — R26.2 DIFFICULTY IN WALKING, NOT ELSEWHERE CLASSIFIED: ICD-10-CM

## 2017-10-23 DIAGNOSIS — M17.0 BILATERAL PRIMARY OSTEOARTHRITIS OF KNEE: ICD-10-CM

## 2017-10-23 DIAGNOSIS — I87.2 VENOUS INSUFFICIENCY (CHRONIC) (PERIPHERAL): ICD-10-CM

## 2017-10-23 DIAGNOSIS — I10 ESSENTIAL (PRIMARY) HYPERTENSION: ICD-10-CM

## 2017-12-07 RX ORDER — POTASSIUM CHLORIDE 20 MEQ
1 PACKET (EA) ORAL
Qty: 0 | Refills: 0 | COMMUNITY

## 2018-02-14 ENCOUNTER — RESULT REVIEW (OUTPATIENT)
Age: 58
End: 2018-02-14

## 2018-02-14 ENCOUNTER — INPATIENT (INPATIENT)
Facility: HOSPITAL | Age: 58
LOS: 1 days | Discharge: HOME | End: 2018-02-16
Attending: SURGERY | Admitting: SURGERY
Payer: MEDICAID

## 2018-02-14 ENCOUNTER — TRANSCRIPTION ENCOUNTER (OUTPATIENT)
Age: 58
End: 2018-02-14

## 2018-02-14 VITALS
OXYGEN SATURATION: 98 % | TEMPERATURE: 98 F | WEIGHT: 265 LBS | HEART RATE: 87 BPM | DIASTOLIC BLOOD PRESSURE: 120 MMHG | HEIGHT: 66 IN | RESPIRATION RATE: 22 BRPM | SYSTOLIC BLOOD PRESSURE: 160 MMHG

## 2018-02-14 DIAGNOSIS — I10 ESSENTIAL (PRIMARY) HYPERTENSION: ICD-10-CM

## 2018-02-14 DIAGNOSIS — K46.9 UNSPECIFIED ABDOMINAL HERNIA WITHOUT OBSTRUCTION OR GANGRENE: Chronic | ICD-10-CM

## 2018-02-14 DIAGNOSIS — Z98.89 OTHER SPECIFIED POSTPROCEDURAL STATES: Chronic | ICD-10-CM

## 2018-02-14 DIAGNOSIS — R58 HEMORRHAGE, NOT ELSEWHERE CLASSIFIED: Chronic | ICD-10-CM

## 2018-02-14 DIAGNOSIS — K42.9 UMBILICAL HERNIA WITHOUT OBSTRUCTION OR GANGRENE: ICD-10-CM

## 2018-02-14 DIAGNOSIS — Z29.9 ENCOUNTER FOR PROPHYLACTIC MEASURES, UNSPECIFIED: ICD-10-CM

## 2018-02-14 DIAGNOSIS — L97.919 NON-PRESSURE CHRONIC ULCER OF UNSPECIFIED PART OF RIGHT LOWER LEG WITH UNSPECIFIED SEVERITY: ICD-10-CM

## 2018-02-14 LAB
ALBUMIN SERPL ELPH-MCNC: 3.4 G/DL — SIGNIFICANT CHANGE UP (ref 3.3–5)
ALP SERPL-CCNC: 107 U/L — SIGNIFICANT CHANGE UP (ref 40–120)
ALT FLD-CCNC: 19 U/L — SIGNIFICANT CHANGE UP (ref 12–78)
ANION GAP SERPL CALC-SCNC: 10 MMOL/L — SIGNIFICANT CHANGE UP (ref 5–17)
APTT BLD: 33.9 SEC — SIGNIFICANT CHANGE UP (ref 27.5–37.4)
AST SERPL-CCNC: 18 U/L — SIGNIFICANT CHANGE UP (ref 15–37)
BASOPHILS # BLD AUTO: 0.06 K/UL — SIGNIFICANT CHANGE UP (ref 0–0.2)
BASOPHILS NFR BLD AUTO: 0.6 % — SIGNIFICANT CHANGE UP (ref 0–2)
BILIRUB SERPL-MCNC: 0.3 MG/DL — SIGNIFICANT CHANGE UP (ref 0.2–1.2)
BLD GP AB SCN SERPL QL: SIGNIFICANT CHANGE UP
BUN SERPL-MCNC: 13 MG/DL — SIGNIFICANT CHANGE UP (ref 7–23)
CALCIUM SERPL-MCNC: 8.4 MG/DL — LOW (ref 8.5–10.1)
CHLORIDE SERPL-SCNC: 101 MMOL/L — SIGNIFICANT CHANGE UP (ref 96–108)
CK MB BLD-MCNC: 2 % — SIGNIFICANT CHANGE UP (ref 0–3.5)
CK MB CFR SERPL CALC: 2.9 NG/ML — SIGNIFICANT CHANGE UP (ref 0.5–3.6)
CK SERPL-CCNC: 143 U/L — SIGNIFICANT CHANGE UP (ref 26–308)
CO2 SERPL-SCNC: 28 MMOL/L — SIGNIFICANT CHANGE UP (ref 22–31)
CREAT SERPL-MCNC: 0.64 MG/DL — SIGNIFICANT CHANGE UP (ref 0.5–1.3)
EOSINOPHIL # BLD AUTO: 0.14 K/UL — SIGNIFICANT CHANGE UP (ref 0–0.5)
EOSINOPHIL NFR BLD AUTO: 1.4 % — SIGNIFICANT CHANGE UP (ref 0–6)
GLUCOSE SERPL-MCNC: 92 MG/DL — SIGNIFICANT CHANGE UP (ref 70–99)
HCT VFR BLD CALC: 41.9 % — SIGNIFICANT CHANGE UP (ref 39–50)
HGB BLD-MCNC: 13.5 G/DL — SIGNIFICANT CHANGE UP (ref 13–17)
IMM GRANULOCYTES NFR BLD AUTO: 0.6 % — SIGNIFICANT CHANGE UP (ref 0–1.5)
INR BLD: 1.1 RATIO — SIGNIFICANT CHANGE UP (ref 0.88–1.16)
LACTATE SERPL-SCNC: 0.8 MMOL/L — SIGNIFICANT CHANGE UP (ref 0.7–2)
LIDOCAIN IGE QN: 87 U/L — SIGNIFICANT CHANGE UP (ref 73–393)
LYMPHOCYTES # BLD AUTO: 1.56 K/UL — SIGNIFICANT CHANGE UP (ref 1–3.3)
LYMPHOCYTES # BLD AUTO: 15.3 % — SIGNIFICANT CHANGE UP (ref 13–44)
MCHC RBC-ENTMCNC: 27.8 PG — SIGNIFICANT CHANGE UP (ref 27–34)
MCHC RBC-ENTMCNC: 32.2 GM/DL — SIGNIFICANT CHANGE UP (ref 32–36)
MCV RBC AUTO: 86.4 FL — SIGNIFICANT CHANGE UP (ref 80–100)
MONOCYTES # BLD AUTO: 0.65 K/UL — SIGNIFICANT CHANGE UP (ref 0–0.9)
MONOCYTES NFR BLD AUTO: 6.4 % — SIGNIFICANT CHANGE UP (ref 2–14)
NEUTROPHILS # BLD AUTO: 7.7 K/UL — HIGH (ref 1.8–7.4)
NEUTROPHILS NFR BLD AUTO: 75.7 % — SIGNIFICANT CHANGE UP (ref 43–77)
NRBC # BLD: 0 /100 WBCS — SIGNIFICANT CHANGE UP (ref 0–0)
PLATELET # BLD AUTO: 354 K/UL — SIGNIFICANT CHANGE UP (ref 150–400)
POTASSIUM SERPL-MCNC: 3.1 MMOL/L — LOW (ref 3.5–5.3)
POTASSIUM SERPL-SCNC: 3.1 MMOL/L — LOW (ref 3.5–5.3)
PROT SERPL-MCNC: 8.4 GM/DL — HIGH (ref 6–8.3)
PROTHROM AB SERPL-ACNC: 12 SEC — SIGNIFICANT CHANGE UP (ref 9.8–12.7)
RBC # BLD: 4.85 M/UL — SIGNIFICANT CHANGE UP (ref 4.2–5.8)
RBC # FLD: 13.7 % — SIGNIFICANT CHANGE UP (ref 10.3–14.5)
SODIUM SERPL-SCNC: 139 MMOL/L — SIGNIFICANT CHANGE UP (ref 135–145)
TROPONIN I SERPL-MCNC: <.015 NG/ML — SIGNIFICANT CHANGE UP (ref 0.01–0.04)
WBC # BLD: 10.17 K/UL — SIGNIFICANT CHANGE UP (ref 3.8–10.5)
WBC # FLD AUTO: 10.17 K/UL — SIGNIFICANT CHANGE UP (ref 3.8–10.5)

## 2018-02-14 PROCEDURE — 99285 EMERGENCY DEPT VISIT HI MDM: CPT | Mod: 25

## 2018-02-14 PROCEDURE — 74177 CT ABD & PELVIS W/CONTRAST: CPT | Mod: 26

## 2018-02-14 PROCEDURE — 88302 TISSUE EXAM BY PATHOLOGIST: CPT | Mod: 26

## 2018-02-14 RX ORDER — HEPARIN SODIUM 5000 [USP'U]/ML
5000 INJECTION INTRAVENOUS; SUBCUTANEOUS EVERY 12 HOURS
Qty: 0 | Refills: 0 | Status: DISCONTINUED | OUTPATIENT
Start: 2018-02-14 | End: 2018-02-14

## 2018-02-14 RX ORDER — SODIUM CHLORIDE 9 MG/ML
1000 INJECTION, SOLUTION INTRAVENOUS
Qty: 0 | Refills: 0 | Status: DISCONTINUED | OUTPATIENT
Start: 2018-02-14 | End: 2018-02-14

## 2018-02-14 RX ORDER — CARVEDILOL PHOSPHATE 80 MG/1
1 CAPSULE, EXTENDED RELEASE ORAL
Qty: 0 | Refills: 0 | COMMUNITY

## 2018-02-14 RX ORDER — ACETAMINOPHEN 500 MG
650 TABLET ORAL EVERY 6 HOURS
Qty: 0 | Refills: 0 | Status: DISCONTINUED | OUTPATIENT
Start: 2018-02-14 | End: 2018-02-16

## 2018-02-14 RX ORDER — KETOROLAC TROMETHAMINE 30 MG/ML
30 SYRINGE (ML) INJECTION ONCE
Qty: 0 | Refills: 0 | Status: DISCONTINUED | OUTPATIENT
Start: 2018-02-14 | End: 2018-02-14

## 2018-02-14 RX ORDER — OXYCODONE AND ACETAMINOPHEN 5; 325 MG/1; MG/1
1 TABLET ORAL EVERY 4 HOURS
Qty: 0 | Refills: 0 | Status: DISCONTINUED | OUTPATIENT
Start: 2018-02-14 | End: 2018-02-16

## 2018-02-14 RX ORDER — SODIUM CHLORIDE 9 MG/ML
1000 INJECTION, SOLUTION INTRAVENOUS
Qty: 0 | Refills: 0 | Status: DISCONTINUED | OUTPATIENT
Start: 2018-02-14 | End: 2018-02-16

## 2018-02-14 RX ORDER — MORPHINE SULFATE 50 MG/1
4 CAPSULE, EXTENDED RELEASE ORAL ONCE
Qty: 0 | Refills: 0 | Status: DISCONTINUED | OUTPATIENT
Start: 2018-02-14 | End: 2018-02-14

## 2018-02-14 RX ORDER — DOCUSATE SODIUM 100 MG
100 CAPSULE ORAL THREE TIMES A DAY
Qty: 0 | Refills: 0 | Status: DISCONTINUED | OUTPATIENT
Start: 2018-02-14 | End: 2018-02-16

## 2018-02-14 RX ORDER — ONDANSETRON 8 MG/1
4 TABLET, FILM COATED ORAL EVERY 6 HOURS
Qty: 0 | Refills: 0 | Status: DISCONTINUED | OUTPATIENT
Start: 2018-02-14 | End: 2018-02-14

## 2018-02-14 RX ORDER — HYDROMORPHONE HYDROCHLORIDE 2 MG/ML
1 INJECTION INTRAMUSCULAR; INTRAVENOUS; SUBCUTANEOUS EVERY 4 HOURS
Qty: 0 | Refills: 0 | Status: DISCONTINUED | OUTPATIENT
Start: 2018-02-14 | End: 2018-02-14

## 2018-02-14 RX ORDER — HEPARIN SODIUM 5000 [USP'U]/ML
5000 INJECTION INTRAVENOUS; SUBCUTANEOUS EVERY 8 HOURS
Qty: 0 | Refills: 0 | Status: DISCONTINUED | OUTPATIENT
Start: 2018-02-14 | End: 2018-02-16

## 2018-02-14 RX ORDER — POTASSIUM CHLORIDE 20 MEQ
40 PACKET (EA) ORAL ONCE
Qty: 0 | Refills: 0 | Status: COMPLETED | OUTPATIENT
Start: 2018-02-14 | End: 2018-02-14

## 2018-02-14 RX ORDER — SODIUM CHLORIDE 9 MG/ML
3 INJECTION INTRAMUSCULAR; INTRAVENOUS; SUBCUTANEOUS ONCE
Qty: 0 | Refills: 0 | Status: COMPLETED | OUTPATIENT
Start: 2018-02-14 | End: 2018-02-14

## 2018-02-14 RX ORDER — FUROSEMIDE 40 MG
40 TABLET ORAL DAILY
Qty: 0 | Refills: 0 | Status: DISCONTINUED | OUTPATIENT
Start: 2018-02-14 | End: 2018-02-16

## 2018-02-14 RX ORDER — SODIUM CHLORIDE 9 MG/ML
1000 INJECTION INTRAMUSCULAR; INTRAVENOUS; SUBCUTANEOUS ONCE
Qty: 0 | Refills: 0 | Status: COMPLETED | OUTPATIENT
Start: 2018-02-14 | End: 2018-02-14

## 2018-02-14 RX ORDER — SENNA PLUS 8.6 MG/1
2 TABLET ORAL AT BEDTIME
Qty: 0 | Refills: 0 | Status: DISCONTINUED | OUTPATIENT
Start: 2018-02-14 | End: 2018-02-16

## 2018-02-14 RX ORDER — OXYCODONE AND ACETAMINOPHEN 5; 325 MG/1; MG/1
2 TABLET ORAL EVERY 4 HOURS
Qty: 0 | Refills: 0 | Status: DISCONTINUED | OUTPATIENT
Start: 2018-02-14 | End: 2018-02-16

## 2018-02-14 RX ORDER — ONDANSETRON 8 MG/1
4 TABLET, FILM COATED ORAL EVERY 6 HOURS
Qty: 0 | Refills: 0 | Status: DISCONTINUED | OUTPATIENT
Start: 2018-02-14 | End: 2018-02-16

## 2018-02-14 RX ORDER — PIPERACILLIN AND TAZOBACTAM 4; .5 G/20ML; G/20ML
3.38 INJECTION, POWDER, LYOPHILIZED, FOR SOLUTION INTRAVENOUS ONCE
Qty: 0 | Refills: 0 | Status: COMPLETED | OUTPATIENT
Start: 2018-02-14 | End: 2018-02-14

## 2018-02-14 RX ORDER — HYDROMORPHONE HYDROCHLORIDE 2 MG/ML
1 INJECTION INTRAMUSCULAR; INTRAVENOUS; SUBCUTANEOUS
Qty: 0 | Refills: 0 | Status: DISCONTINUED | OUTPATIENT
Start: 2018-02-14 | End: 2018-02-14

## 2018-02-14 RX ORDER — ACETAMINOPHEN 500 MG
1000 TABLET ORAL ONCE
Qty: 0 | Refills: 0 | Status: COMPLETED | OUTPATIENT
Start: 2018-02-14 | End: 2018-02-14

## 2018-02-14 RX ORDER — FENTANYL CITRATE 50 UG/ML
50 INJECTION INTRAVENOUS
Qty: 0 | Refills: 0 | Status: DISCONTINUED | OUTPATIENT
Start: 2018-02-14 | End: 2018-02-14

## 2018-02-14 RX ORDER — PIPERACILLIN AND TAZOBACTAM 4; .5 G/20ML; G/20ML
3.38 INJECTION, POWDER, LYOPHILIZED, FOR SOLUTION INTRAVENOUS EVERY 8 HOURS
Qty: 0 | Refills: 0 | Status: DISCONTINUED | OUTPATIENT
Start: 2018-02-14 | End: 2018-02-16

## 2018-02-14 RX ORDER — IOHEXOL 300 MG/ML
30 INJECTION, SOLUTION INTRAVENOUS ONCE
Qty: 0 | Refills: 0 | Status: COMPLETED | OUTPATIENT
Start: 2018-02-14 | End: 2018-02-14

## 2018-02-14 RX ORDER — MORPHINE SULFATE 50 MG/1
4 CAPSULE, EXTENDED RELEASE ORAL EVERY 4 HOURS
Qty: 0 | Refills: 0 | Status: DISCONTINUED | OUTPATIENT
Start: 2018-02-14 | End: 2018-02-16

## 2018-02-14 RX ORDER — HYDROMORPHONE HYDROCHLORIDE 2 MG/ML
0.5 INJECTION INTRAMUSCULAR; INTRAVENOUS; SUBCUTANEOUS EVERY 4 HOURS
Qty: 0 | Refills: 0 | Status: DISCONTINUED | OUTPATIENT
Start: 2018-02-14 | End: 2018-02-14

## 2018-02-14 RX ORDER — POTASSIUM CHLORIDE 20 MEQ
20 PACKET (EA) ORAL DAILY
Qty: 0 | Refills: 0 | Status: DISCONTINUED | OUTPATIENT
Start: 2018-02-14 | End: 2018-02-16

## 2018-02-14 RX ORDER — PIPERACILLIN AND TAZOBACTAM 4; .5 G/20ML; G/20ML
3.38 INJECTION, POWDER, LYOPHILIZED, FOR SOLUTION INTRAVENOUS EVERY 8 HOURS
Qty: 0 | Refills: 0 | Status: DISCONTINUED | OUTPATIENT
Start: 2018-02-14 | End: 2018-02-14

## 2018-02-14 RX ADMIN — Medication 400 MILLIGRAM(S): at 20:15

## 2018-02-14 RX ADMIN — SODIUM CHLORIDE 3 MILLILITER(S): 9 INJECTION INTRAMUSCULAR; INTRAVENOUS; SUBCUTANEOUS at 03:17

## 2018-02-14 RX ADMIN — Medication 40 MILLIEQUIVALENT(S): at 15:12

## 2018-02-14 RX ADMIN — SODIUM CHLORIDE 1000 MILLILITER(S): 9 INJECTION INTRAMUSCULAR; INTRAVENOUS; SUBCUTANEOUS at 03:17

## 2018-02-14 RX ADMIN — Medication 30 MILLIGRAM(S): at 07:45

## 2018-02-14 RX ADMIN — HEPARIN SODIUM 5000 UNIT(S): 5000 INJECTION INTRAVENOUS; SUBCUTANEOUS at 22:32

## 2018-02-14 RX ADMIN — SODIUM CHLORIDE 120 MILLILITER(S): 9 INJECTION, SOLUTION INTRAVENOUS at 15:12

## 2018-02-14 RX ADMIN — SODIUM CHLORIDE 120 MILLILITER(S): 9 INJECTION, SOLUTION INTRAVENOUS at 22:31

## 2018-02-14 RX ADMIN — Medication 2.5 MILLIGRAM(S): at 16:22

## 2018-02-14 RX ADMIN — Medication 100 MILLIGRAM(S): at 22:32

## 2018-02-14 RX ADMIN — SODIUM CHLORIDE 100 MILLILITER(S): 9 INJECTION, SOLUTION INTRAVENOUS at 19:25

## 2018-02-14 RX ADMIN — IOHEXOL 30 MILLILITER(S): 300 INJECTION, SOLUTION INTRAVENOUS at 03:17

## 2018-02-14 RX ADMIN — Medication 30 MILLIGRAM(S): at 10:40

## 2018-02-14 RX ADMIN — MORPHINE SULFATE 4 MILLIGRAM(S): 50 CAPSULE, EXTENDED RELEASE ORAL at 04:15

## 2018-02-14 RX ADMIN — SENNA PLUS 2 TABLET(S): 8.6 TABLET ORAL at 22:31

## 2018-02-14 RX ADMIN — Medication 1000 MILLIGRAM(S): at 20:30

## 2018-02-14 RX ADMIN — MORPHINE SULFATE 4 MILLIGRAM(S): 50 CAPSULE, EXTENDED RELEASE ORAL at 03:16

## 2018-02-14 RX ADMIN — PIPERACILLIN AND TAZOBACTAM 200 GRAM(S): 4; .5 INJECTION, POWDER, LYOPHILIZED, FOR SOLUTION INTRAVENOUS at 12:22

## 2018-02-14 RX ADMIN — Medication 40 MILLIEQUIVALENT(S): at 05:25

## 2018-02-14 NOTE — H&P ADULT - NSHPPHYSICALEXAM_GEN_ALL_CORE
Vital Signs Last 24 Hrs  T(C): 36.7 (14 Feb 2018 13:15), Max: 36.9 (14 Feb 2018 00:13)  T(F): 98 (14 Feb 2018 13:15), Max: 98.4 (14 Feb 2018 00:13)  HR: 70 (14 Feb 2018 13:15) (68 - 87)  BP: 156/96 (14 Feb 2018 13:15) (156/96 - 161/100)  RR: 18 (14 Feb 2018 13:15) (18 - 22)  SpO2: 99% (14 Feb 2018 13:15) (98% - 99%)    PHYSICAL EXAM:  GENERAL: NAD, morbidly obese, well-groomed  HEAD:  Atraumatic, Normocephalic  EYES: EOMI, PERRL, conjunctiva and sclera clear  ENMT: No tonsillar erythema, exudates, or enlargement; Moist mucous membranes  NECK: Supple, No JVD, Normal thyroid  NERVOUS SYSTEM:  Alert & Oriented X3, Good concentration; Motor Strength 5/5 B/L upper and lower extremities  CHEST/LUNG: Clear to auscultation bilaterally; No rales, rhonchi, wheezing, or rubs  HEART: Regular rate and rhythm; No murmurs appreciated  ABDOMEN: INCARCERATED UMBILICAL HERNIA, TENDER, Bowel sounds present  MSK: ROM intact in all extremities, 5/5 strength in upper and lower extremities, B/L.  EXTREMITIES:  2+ Peripheral Pulses, No clubbing, cyanosis, or edema  LYMPH: No lymphadenopathy noted  SKIN: No rashes or lesions Vital Signs Last 24 Hrs  T(C): 36.7 (14 Feb 2018 13:15), Max: 36.9 (14 Feb 2018 00:13)  T(F): 98 (14 Feb 2018 13:15), Max: 98.4 (14 Feb 2018 00:13)  HR: 70 (14 Feb 2018 13:15) (68 - 87)  BP: 156/96 (14 Feb 2018 13:15) (156/96 - 161/100)  RR: 18 (14 Feb 2018 13:15) (18 - 22)  SpO2: 99% (14 Feb 2018 13:15) (98% - 99%)    PHYSICAL EXAM:  GENERAL: NAD, morbidly obese, well-groomed  HEAD:  Atraumatic, Normocephalic  EYES: EOMI, PERRL, conjunctiva and sclera clear  ENMT: No tonsillar erythema, exudates, or enlargement; Moist mucous membranes  NECK: Supple, No JVD, Normal thyroid  NERVOUS SYSTEM:  Alert & Oriented X3, Good concentration; Motor Strength 5/5 B/L upper and lower extremities  CHEST/LUNG: Clear to auscultation bilaterally; No rales, rhonchi, wheezing, or rubs  HEART: Regular rate and rhythm; No murmurs appreciated  ABDOMEN: healed upper abdomen midline incision.  + INCARCERATED UMBILICAL HERNIA mass , TENDER to touch, + Bowel sounds no guarding   MSK: ROM intact in all extremities, 5/5 strength in upper and lower extremities, B/L.  EXTREMITIES:  2+ Peripheral Pulses, No clubbing, cyanosis, or edema  LYMPH: No lymphadenopathy noted  SKIN: No rashes or lesions

## 2018-02-14 NOTE — H&P ADULT - PSH
Abdominal hernia    Gastroduodenal artery bleed  IR embolization of gastroduodenal artery  H/O ventral hernia repair    No significant past surgical history

## 2018-02-14 NOTE — ED PROVIDER NOTE - OBJECTIVE STATEMENT
Pertinent PMH/PSH/FHx/SHx and Review of Systems contained within:  59 yo m with PMH of HTN and morbid obesity presents in ED c/o umbilical pain from his hernia. No aggravating or relieving factors, No fever/chills, No photophobia/eye pain/changes in vision, No ear pain/sore throat/dysphagia, No chest pain/palpitations, no SOB/cough/wheeze/stridor, No N/V/D, no dysuria/frequency/discharge, No neck/back pain, no rash, no changes in neurological status/function.

## 2018-02-14 NOTE — ED PROVIDER NOTE - MEDICAL DECISION MAKING DETAILS
Case discussed with Tiki. RLE ulcers cleaned and dressed. No pending re-eval for pain control. Patient care transitioned to incoming team.  All decisions regarding the progression of care will be made at their discretion.

## 2018-02-14 NOTE — ED ADULT NURSE REASSESSMENT NOTE - NS ED NURSE REASSESS COMMENT FT1
No apparent change in pt's condition. No apparent respiratory distress noted. NPO status maintained. Pt awaiting transfer to bed assigned.

## 2018-02-14 NOTE — ED ADULT NURSE NOTE - OBJECTIVE STATEMENT
59 y/o male PMHx HTN, presents to the ED with RLQ pain, dysuriaand R- leg pain, patient states I have an ulcer on my leg for a couple months now and I have a hernia. Denies N/V, fevers, chills 57 y/o male PMHx HTN, presents to the ED with RLQ pain, and R- leg pain,x 1week patient states I have an ulcer on my leg for a couple months now and I have a hernia. Denies N/V, fevers, chills

## 2018-02-14 NOTE — ED PROVIDER NOTE - PROGRESS NOTE DETAILS
endorsed by dr. cardoza pending reassessment. Pt still in pain, dr. villanueva called, aware for admission. p[t well appearing otherwise and in no distress.

## 2018-02-14 NOTE — H&P ADULT - ASSESSMENT
59 YO M with a sig PMHx of HTN and sig PSHx of abdominal hernia repair, presenting today for abdominal pain x 5 days. Patient being admitted for emergent hernia repair for incarcerated ventral hernia repair. 59 YO M with a sig PMHx of HTN and sig PSHx of abdominal hernia repair, presenting today for abdominal pain x 5 days. due to incarcerated ventral hernia

## 2018-02-14 NOTE — H&P ADULT - HISTORY OF PRESENT ILLNESS
57 YO M with a sig PMHx of HTN and sig PSHx of abdominal hernia repair, presenting today for abdominal pain x 5 days. Patient states that the pain has been progressively getting worse and came into the ED for 10/10 stabbing pain last night. Patient states the 3 days ago he had chills and night sweats. Patient denies any nausea, vomiting, diarrhea, constipation, chest pain, SOB, palpitations, lightheadedness, or dizziness. 57 YO M with a sig PMHx of HTN and sig PSHx of abdominal hernia repair, presenting today for periumbilical abdominal pain x 5 days.  5 days PTA, patient developed periumbilical pain associated with tender bulging mass on the umbilical area.  There were no known triggering factors.  Pain persist and worsened on activity that inc abdominal pressure. Patient complaint of chills and night sweats.  Pain gradually worsened and hernia mass increased prompting this consult and admission. Patient denies any nausea, vomiting, diarrhea, constipation, chest pain, SOB, palpitations, lightheadedness, or dizziness.

## 2018-02-14 NOTE — BRIEF OPERATIVE NOTE - PRE-OP DX
Ventral hernia with gangrene  02/14/2018  Grangrenous omentum hernial content  Active  Sherly Suarez

## 2018-02-14 NOTE — BRIEF OPERATIVE NOTE - PROCEDURE
<<-----Click on this checkbox to enter Procedure Ventral hernia repair with mesh  02/14/2018    Active  [id number]

## 2018-02-14 NOTE — ED ADULT NURSE NOTE - PMH
Duodenal ulcer disease    HTN (hypertension)    Hypertension    Hypertension    Knee osteoarthritis    Morbid obesity    Osteoarthritis    PUD (peptic ulcer disease)

## 2018-02-14 NOTE — H&P ADULT - PROBLEM SELECTOR PLAN 1
- Emergent OR today for hernia repair of incarcerated ventral hernia repair  - Electrolyte replacement as needed  - LR @ 120/hour  - Continue to monitor - Emergent OR today for hernia repair of incarcerated ventral hernia repair  NPO    - Electrolyte replacement as needed  - LR @ 120/hour  - Continue to monitor

## 2018-02-15 LAB
ALBUMIN SERPL ELPH-MCNC: 2.6 G/DL — LOW (ref 3.3–5)
ALP SERPL-CCNC: 84 U/L — SIGNIFICANT CHANGE UP (ref 40–120)
ALT FLD-CCNC: 15 U/L — SIGNIFICANT CHANGE UP (ref 12–78)
ANION GAP SERPL CALC-SCNC: 4 MMOL/L — LOW (ref 5–17)
AST SERPL-CCNC: 13 U/L — LOW (ref 15–37)
BASOPHILS # BLD AUTO: 0.05 K/UL — SIGNIFICANT CHANGE UP (ref 0–0.2)
BASOPHILS NFR BLD AUTO: 0.7 % — SIGNIFICANT CHANGE UP (ref 0–2)
BILIRUB SERPL-MCNC: 0.5 MG/DL — SIGNIFICANT CHANGE UP (ref 0.2–1.2)
BUN SERPL-MCNC: 12 MG/DL — SIGNIFICANT CHANGE UP (ref 7–23)
CALCIUM SERPL-MCNC: 7.9 MG/DL — LOW (ref 8.5–10.1)
CHLORIDE SERPL-SCNC: 104 MMOL/L — SIGNIFICANT CHANGE UP (ref 96–108)
CO2 SERPL-SCNC: 31 MMOL/L — SIGNIFICANT CHANGE UP (ref 22–31)
CREAT SERPL-MCNC: 0.65 MG/DL — SIGNIFICANT CHANGE UP (ref 0.5–1.3)
EOSINOPHIL # BLD AUTO: 0.08 K/UL — SIGNIFICANT CHANGE UP (ref 0–0.5)
EOSINOPHIL NFR BLD AUTO: 1.1 % — SIGNIFICANT CHANGE UP (ref 0–6)
GLUCOSE SERPL-MCNC: 73 MG/DL — SIGNIFICANT CHANGE UP (ref 70–99)
HCT VFR BLD CALC: 37.2 % — LOW (ref 39–50)
HGB BLD-MCNC: 11.5 G/DL — LOW (ref 13–17)
IMM GRANULOCYTES NFR BLD AUTO: 0.5 % — SIGNIFICANT CHANGE UP (ref 0–1.5)
LYMPHOCYTES # BLD AUTO: 1.36 K/UL — SIGNIFICANT CHANGE UP (ref 1–3.3)
LYMPHOCYTES # BLD AUTO: 17.9 % — SIGNIFICANT CHANGE UP (ref 13–44)
MAGNESIUM SERPL-MCNC: 2.1 MG/DL — SIGNIFICANT CHANGE UP (ref 1.6–2.6)
MCHC RBC-ENTMCNC: 27.2 PG — SIGNIFICANT CHANGE UP (ref 27–34)
MCHC RBC-ENTMCNC: 30.9 GM/DL — LOW (ref 32–36)
MCV RBC AUTO: 87.9 FL — SIGNIFICANT CHANGE UP (ref 80–100)
MONOCYTES # BLD AUTO: 0.49 K/UL — SIGNIFICANT CHANGE UP (ref 0–0.9)
MONOCYTES NFR BLD AUTO: 6.5 % — SIGNIFICANT CHANGE UP (ref 2–14)
NEUTROPHILS # BLD AUTO: 5.56 K/UL — SIGNIFICANT CHANGE UP (ref 1.8–7.4)
NEUTROPHILS NFR BLD AUTO: 73.3 % — SIGNIFICANT CHANGE UP (ref 43–77)
NRBC # BLD: 0 /100 WBCS — SIGNIFICANT CHANGE UP (ref 0–0)
PHOSPHATE SERPL-MCNC: 3.4 MG/DL — SIGNIFICANT CHANGE UP (ref 2.5–4.5)
PLATELET # BLD AUTO: 289 K/UL — SIGNIFICANT CHANGE UP (ref 150–400)
POTASSIUM SERPL-MCNC: 3.5 MMOL/L — SIGNIFICANT CHANGE UP (ref 3.5–5.3)
POTASSIUM SERPL-SCNC: 3.5 MMOL/L — SIGNIFICANT CHANGE UP (ref 3.5–5.3)
PROT SERPL-MCNC: 6.6 GM/DL — SIGNIFICANT CHANGE UP (ref 6–8.3)
RBC # BLD: 4.23 M/UL — SIGNIFICANT CHANGE UP (ref 4.2–5.8)
RBC # FLD: 14.1 % — SIGNIFICANT CHANGE UP (ref 10.3–14.5)
SODIUM SERPL-SCNC: 139 MMOL/L — SIGNIFICANT CHANGE UP (ref 135–145)
WBC # BLD: 7.58 K/UL — SIGNIFICANT CHANGE UP (ref 3.8–10.5)
WBC # FLD AUTO: 7.58 K/UL — SIGNIFICANT CHANGE UP (ref 3.8–10.5)

## 2018-02-15 PROCEDURE — 49561: CPT | Mod: AS

## 2018-02-15 RX ORDER — CARVEDILOL PHOSPHATE 80 MG/1
3.12 CAPSULE, EXTENDED RELEASE ORAL EVERY 12 HOURS
Qty: 0 | Refills: 0 | Status: DISCONTINUED | OUTPATIENT
Start: 2018-02-15 | End: 2018-02-16

## 2018-02-15 RX ORDER — HYDRALAZINE HCL 50 MG
10 TABLET ORAL ONCE
Qty: 0 | Refills: 0 | Status: COMPLETED | OUTPATIENT
Start: 2018-02-15 | End: 2018-02-15

## 2018-02-15 RX ADMIN — CARVEDILOL PHOSPHATE 3.12 MILLIGRAM(S): 80 CAPSULE, EXTENDED RELEASE ORAL at 18:28

## 2018-02-15 RX ADMIN — HEPARIN SODIUM 5000 UNIT(S): 5000 INJECTION INTRAVENOUS; SUBCUTANEOUS at 21:39

## 2018-02-15 RX ADMIN — OXYCODONE AND ACETAMINOPHEN 1 TABLET(S): 5; 325 TABLET ORAL at 21:40

## 2018-02-15 RX ADMIN — Medication 40 MILLIGRAM(S): at 05:14

## 2018-02-15 RX ADMIN — SENNA PLUS 2 TABLET(S): 8.6 TABLET ORAL at 21:39

## 2018-02-15 RX ADMIN — Medication 20 MILLIEQUIVALENT(S): at 11:49

## 2018-02-15 RX ADMIN — PIPERACILLIN AND TAZOBACTAM 25 GRAM(S): 4; .5 INJECTION, POWDER, LYOPHILIZED, FOR SOLUTION INTRAVENOUS at 18:28

## 2018-02-15 RX ADMIN — HEPARIN SODIUM 5000 UNIT(S): 5000 INJECTION INTRAVENOUS; SUBCUTANEOUS at 05:15

## 2018-02-15 RX ADMIN — Medication 10 MILLIGRAM(S): at 16:13

## 2018-02-15 RX ADMIN — OXYCODONE AND ACETAMINOPHEN 1 TABLET(S): 5; 325 TABLET ORAL at 20:40

## 2018-02-15 RX ADMIN — OXYCODONE AND ACETAMINOPHEN 2 TABLET(S): 5; 325 TABLET ORAL at 08:50

## 2018-02-15 RX ADMIN — Medication 100 MILLIGRAM(S): at 21:39

## 2018-02-15 RX ADMIN — PIPERACILLIN AND TAZOBACTAM 25 GRAM(S): 4; .5 INJECTION, POWDER, LYOPHILIZED, FOR SOLUTION INTRAVENOUS at 10:12

## 2018-02-15 RX ADMIN — HEPARIN SODIUM 5000 UNIT(S): 5000 INJECTION INTRAVENOUS; SUBCUTANEOUS at 13:34

## 2018-02-15 RX ADMIN — PIPERACILLIN AND TAZOBACTAM 25 GRAM(S): 4; .5 INJECTION, POWDER, LYOPHILIZED, FOR SOLUTION INTRAVENOUS at 02:49

## 2018-02-15 RX ADMIN — SODIUM CHLORIDE 120 MILLILITER(S): 9 INJECTION, SOLUTION INTRAVENOUS at 01:52

## 2018-02-15 RX ADMIN — OXYCODONE AND ACETAMINOPHEN 2 TABLET(S): 5; 325 TABLET ORAL at 09:47

## 2018-02-15 RX ADMIN — Medication 10 MILLIGRAM(S): at 05:14

## 2018-02-15 RX ADMIN — Medication 100 MILLIGRAM(S): at 05:13

## 2018-02-15 RX ADMIN — Medication 100 MILLIGRAM(S): at 13:34

## 2018-02-15 NOTE — DISCHARGE NOTE ADULT - ADDITIONAL INSTRUCTIONS
Please notify MD sooner or return to the ER with any new or worsening symptoms, uncontrollable pain, foul smelling discharge or drainage from wound, excessive bleeding or swelling, fever >101, chest pain, shortness of breath, abdominal pain, nausea/vomiting, or other concerns/problems.

## 2018-02-15 NOTE — DISCHARGE NOTE ADULT - MEDICATION SUMMARY - MEDICATIONS TO TAKE
I will START or STAY ON the medications listed below when I get home from the hospital:    oxyCODONE-acetaminophen 7.5 mg-325 mg oral tablet  -- 1 tab(s) by mouth every 6 hours, As Needed -for moderate pain MDD:4   -- Caution federal law prohibits the transfer of this drug to any person other  than the person for whom it was prescribed.  May cause drowsiness.  Alcohol may intensify this effect.  Use care when operating dangerous machinery.  This prescription cannot be refilled.  This product contains acetaminophen.  Do not use  with any other product containing acetaminophen to prevent possible liver damage.  Using more of this medication than prescribed may cause serious breathing problems.    -- Indication: For Postop pain    enalapril 10 mg oral tablet  -- 1 tab(s) by mouth once a day  -- Indication: For Htn    carvedilol 3.125 mg oral tablet  -- 1 tab(s) by mouth every 12 hours  -- Indication: For Htn    furosemide 40 mg oral tablet  -- 1 tab(s) by mouth once a day  -- Indication: For Htn    docusate sodium 100 mg oral capsule  -- 1 cap(s) by mouth 2 times a day, As Needed -for constipation   -- Indication: For constipation    potassium chloride 20 mEq oral tablet, extended release  -- 1 tab(s) by mouth once a day  -- Indication: For supplement

## 2018-02-15 NOTE — DISCHARGE NOTE ADULT - NS AS ACTIVITY OBS
No Heavy lifting/straining/Showering allowed Walking-Outdoors allowed/No Heavy lifting/straining/Showering allowed/Walking-Indoors allowed

## 2018-02-15 NOTE — DISCHARGE NOTE ADULT - HOSPITAL COURSE
58 year old male PMHx HTN, duodenal ulcer, OA, PUD S/P repair of incarcerated ventral hernia. Operation went. Patient remained hemodynamically stable. Patient tolerated advancement of diet.  At the time of discharge, the patient was hemodynamically stable, was tolerating PO diet, was voiding urine, was ambulating, and was comfortable with adequate pain control. No nausea, vomiting, fever, chills. Patient instructed to follow up and to call the office to make an appointment. Patient instructed to call for any fever over 101, nausea, vomiting, severe pain, no passing of gas or bowel movement. Patient understood. 58 year old male PMHx HTN, duodenal ulcer, OA, PUD S/P repair of incarcerated ventral hernia. Operation went well, no intraop complications. Patient did well postoperatively, on IV abx for gangrenous omental content, remained hemodynamically stable. Patient tolerated advancement of diet with return of bowel function. Leukocytosis resolved, and patient afebrile.   At the time of discharge, the patient was hemodynamically stable, was tolerating PO diet, was voiding urine, was ambulating, and was comfortable with adequate pain control. No nausea, vomiting, fever, chills. Patient instructed to follow up and to call the office to make an appointment. Patient instructed to call for any fever over 101, nausea, vomiting, severe pain, no passing of gas or bowel movement. Patient understood.

## 2018-02-15 NOTE — DISCHARGE NOTE ADULT - CARE PLAN
Principal Discharge DX:	Incarcerated ventral hernia  Goal:	Post-op pain control  Assessment and plan of treatment:	Follow up in 7-10 days with Dr. Chavarria. Please call the office to make an appointment. Activity as tolerated. Rest as needed. You may eat a regular diet. Do not lift anything heavier than 10 pounds. You may take motrin or advil for mild pain as needed, in addition to prescribed narcotic medication. Do not drive while taking narcotic pain medication. You may take over the counter stool softeners as needed. Call for any fever over 101, nausea, vomiting, severe pain, no passing of gas or bowel movement. You may shower and pat dry abdomen. Leave the white steri strips in place; they will fall off in 5-7 days.  Secondary Diagnosis:	Essential hypertension  Assessment and plan of treatment:	Follow up with your primary care physician.  Secondary Diagnosis:	PUD (peptic ulcer disease)  Assessment and plan of treatment:	Follow up with your primary care physician. Principal Discharge DX:	Incarcerated ventral hernia  Goal:	Post-op pain control  Assessment and plan of treatment:	Follow up in 7-10 days with Dr. Chavarria. Please call the office to make an appointment. Activity as tolerated. Rest as needed. You may eat a low residue diet. Do not lift anything heavier than 10 pounds. You may take motrin or advil for mild pain as needed, in addition to prescribed narcotic medication. Do not drive while taking narcotic pain medication. You may take over the counter stool softeners as needed. Call for any fever over 101, nausea, vomiting, severe pain, no passing of gas or bowel movement. You may shower and pat dry abdomen. Leave the white steri strips in place; they will fall off in 5-7 days.  Secondary Diagnosis:	Essential hypertension  Assessment and plan of treatment:	Follow up with your primary care physician.  Secondary Diagnosis:	PUD (peptic ulcer disease)  Assessment and plan of treatment:	Follow up with your primary care physician. Principal Discharge DX:	Incarcerated ventral hernia  Goal:	Post-op pain control  Assessment and plan of treatment:	Follow up in 7-10 days with Dr. Chavarria. Please call the office to make an appointment. Activity as tolerated. Rest as needed. You may eat a low residue diet. Do not lift anything heavier than 10 pounds. You may take motrin or advil for mild pain as needed, in addition to prescribed narcotic medication. Do not drive while taking narcotic pain medication. You may take over the counter stool softeners as needed. Call for any fever over 101, nausea, vomiting, severe pain, no passing of gas or bowel movement. You may shower and pat dry abdomen. Leave the white steri strips in place; they will fall off on their own.  Secondary Diagnosis:	Essential hypertension  Assessment and plan of treatment:	Resume home medications, and follow up with primary care physician.  Secondary Diagnosis:	PUD (peptic ulcer disease)  Assessment and plan of treatment:	Resume home medications, and follow up with primary care physician.

## 2018-02-15 NOTE — DISCHARGE NOTE ADULT - PLAN OF CARE
Post-op pain control Follow up in 7-10 days with Dr. Chavarria. Please call the office to make an appointment. Activity as tolerated. Rest as needed. You may eat a regular diet. Do not lift anything heavier than 10 pounds. You may take motrin or advil for mild pain as needed, in addition to prescribed narcotic medication. Do not drive while taking narcotic pain medication. You may take over the counter stool softeners as needed. Call for any fever over 101, nausea, vomiting, severe pain, no passing of gas or bowel movement. You may shower and pat dry abdomen. Leave the white steri strips in place; they will fall off in 5-7 days. Follow up with your primary care physician. Follow up in 7-10 days with Dr. Chavarria. Please call the office to make an appointment. Activity as tolerated. Rest as needed. You may eat a low residue diet. Do not lift anything heavier than 10 pounds. You may take motrin or advil for mild pain as needed, in addition to prescribed narcotic medication. Do not drive while taking narcotic pain medication. You may take over the counter stool softeners as needed. Call for any fever over 101, nausea, vomiting, severe pain, no passing of gas or bowel movement. You may shower and pat dry abdomen. Leave the white steri strips in place; they will fall off in 5-7 days. Follow up in 7-10 days with Dr. Chavarria. Please call the office to make an appointment. Activity as tolerated. Rest as needed. You may eat a low residue diet. Do not lift anything heavier than 10 pounds. You may take motrin or advil for mild pain as needed, in addition to prescribed narcotic medication. Do not drive while taking narcotic pain medication. You may take over the counter stool softeners as needed. Call for any fever over 101, nausea, vomiting, severe pain, no passing of gas or bowel movement. You may shower and pat dry abdomen. Leave the white steri strips in place; they will fall off on their own. Resume home medications, and follow up with primary care physician.

## 2018-02-15 NOTE — DISCHARGE NOTE ADULT - CARE PROVIDER_API CALL
Simin Chvaarria), Surgery  [address] [facility] [address] [place] [address]  Phone: [phone]  Fax: [phone]

## 2018-02-15 NOTE — DISCHARGE NOTE ADULT - PATIENT PORTAL LINK FT
You can access the [facility] Patient Portal, offered by [facility], by registering with the following website: [email or web address]

## 2018-02-15 NOTE — DISCHARGE NOTE ADULT - INSTRUCTIONS
You may eat a regular diet. You may eat a low residue diet. You may eat a low residue diet.  drink plenty of fluids

## 2018-02-16 VITALS
SYSTOLIC BLOOD PRESSURE: 135 MMHG | OXYGEN SATURATION: 98 % | TEMPERATURE: 98 F | RESPIRATION RATE: 17 BRPM | HEART RATE: 62 BPM | DIASTOLIC BLOOD PRESSURE: 79 MMHG

## 2018-02-16 LAB
ALBUMIN SERPL ELPH-MCNC: 2.5 G/DL — LOW (ref 3.3–5)
ALP SERPL-CCNC: 87 U/L — SIGNIFICANT CHANGE UP (ref 40–120)
ALT FLD-CCNC: 14 U/L — SIGNIFICANT CHANGE UP (ref 12–78)
ANION GAP SERPL CALC-SCNC: 7 MMOL/L — SIGNIFICANT CHANGE UP (ref 5–17)
AST SERPL-CCNC: 13 U/L — LOW (ref 15–37)
BILIRUB SERPL-MCNC: 0.5 MG/DL — SIGNIFICANT CHANGE UP (ref 0.2–1.2)
BUN SERPL-MCNC: 6 MG/DL — LOW (ref 7–23)
CALCIUM SERPL-MCNC: 8.1 MG/DL — LOW (ref 8.5–10.1)
CHLORIDE SERPL-SCNC: 102 MMOL/L — SIGNIFICANT CHANGE UP (ref 96–108)
CO2 SERPL-SCNC: 30 MMOL/L — SIGNIFICANT CHANGE UP (ref 22–31)
CREAT SERPL-MCNC: 0.69 MG/DL — SIGNIFICANT CHANGE UP (ref 0.5–1.3)
GLUCOSE SERPL-MCNC: 91 MG/DL — SIGNIFICANT CHANGE UP (ref 70–99)
HCT VFR BLD CALC: 37.1 % — LOW (ref 39–50)
HGB BLD-MCNC: 11.8 G/DL — LOW (ref 13–17)
MAGNESIUM SERPL-MCNC: 2 MG/DL — SIGNIFICANT CHANGE UP (ref 1.6–2.6)
MCHC RBC-ENTMCNC: 28 PG — SIGNIFICANT CHANGE UP (ref 27–34)
MCHC RBC-ENTMCNC: 31.8 GM/DL — LOW (ref 32–36)
MCV RBC AUTO: 88.1 FL — SIGNIFICANT CHANGE UP (ref 80–100)
NRBC # BLD: 0 /100 WBCS — SIGNIFICANT CHANGE UP (ref 0–0)
PHOSPHATE SERPL-MCNC: 3.1 MG/DL — SIGNIFICANT CHANGE UP (ref 2.5–4.5)
PLATELET # BLD AUTO: 282 K/UL — SIGNIFICANT CHANGE UP (ref 150–400)
POTASSIUM SERPL-MCNC: 3.6 MMOL/L — SIGNIFICANT CHANGE UP (ref 3.5–5.3)
POTASSIUM SERPL-SCNC: 3.6 MMOL/L — SIGNIFICANT CHANGE UP (ref 3.5–5.3)
PROT SERPL-MCNC: 6.7 GM/DL — SIGNIFICANT CHANGE UP (ref 6–8.3)
RBC # BLD: 4.21 M/UL — SIGNIFICANT CHANGE UP (ref 4.2–5.8)
RBC # FLD: 14.1 % — SIGNIFICANT CHANGE UP (ref 10.3–14.5)
SODIUM SERPL-SCNC: 139 MMOL/L — SIGNIFICANT CHANGE UP (ref 135–145)
SURGICAL PATHOLOGY FINAL REPORT - CH: SIGNIFICANT CHANGE UP
WBC # BLD: 6.93 K/UL — SIGNIFICANT CHANGE UP (ref 3.8–10.5)
WBC # FLD AUTO: 6.93 K/UL — SIGNIFICANT CHANGE UP (ref 3.8–10.5)

## 2018-02-16 RX ORDER — CARVEDILOL PHOSPHATE 80 MG/1
1 CAPSULE, EXTENDED RELEASE ORAL
Qty: 0 | Refills: 0 | COMMUNITY
Start: 2018-02-16

## 2018-02-16 RX ORDER — DOCUSATE SODIUM 100 MG
1 CAPSULE ORAL
Qty: 40 | Refills: 0 | OUTPATIENT
Start: 2018-02-16 | End: 2018-03-07

## 2018-02-16 RX ADMIN — HEPARIN SODIUM 5000 UNIT(S): 5000 INJECTION INTRAVENOUS; SUBCUTANEOUS at 13:29

## 2018-02-16 RX ADMIN — CARVEDILOL PHOSPHATE 3.12 MILLIGRAM(S): 80 CAPSULE, EXTENDED RELEASE ORAL at 05:04

## 2018-02-16 RX ADMIN — Medication 10 MILLIGRAM(S): at 05:04

## 2018-02-16 RX ADMIN — PIPERACILLIN AND TAZOBACTAM 25 GRAM(S): 4; .5 INJECTION, POWDER, LYOPHILIZED, FOR SOLUTION INTRAVENOUS at 01:03

## 2018-02-16 RX ADMIN — Medication 100 MILLIGRAM(S): at 13:29

## 2018-02-16 RX ADMIN — Medication 20 MILLIEQUIVALENT(S): at 12:52

## 2018-02-16 RX ADMIN — HEPARIN SODIUM 5000 UNIT(S): 5000 INJECTION INTRAVENOUS; SUBCUTANEOUS at 05:04

## 2018-02-16 RX ADMIN — OXYCODONE AND ACETAMINOPHEN 2 TABLET(S): 5; 325 TABLET ORAL at 13:41

## 2018-02-16 RX ADMIN — OXYCODONE AND ACETAMINOPHEN 2 TABLET(S): 5; 325 TABLET ORAL at 04:59

## 2018-02-16 RX ADMIN — Medication 40 MILLIGRAM(S): at 05:04

## 2018-02-16 RX ADMIN — OXYCODONE AND ACETAMINOPHEN 2 TABLET(S): 5; 325 TABLET ORAL at 05:59

## 2018-02-16 RX ADMIN — PIPERACILLIN AND TAZOBACTAM 25 GRAM(S): 4; .5 INJECTION, POWDER, LYOPHILIZED, FOR SOLUTION INTRAVENOUS at 12:51

## 2018-02-16 RX ADMIN — Medication 100 MILLIGRAM(S): at 05:04

## 2018-02-16 NOTE — PROGRESS NOTE ADULT - SUBJECTIVE AND OBJECTIVE BOX
SURGERY PROGRESS HPI:  S/P repair of incarcerated ventral hernia POD#1  Pt seen and examined at bedside. Pain is well controlled on pain medication. Pt denies complaints. Pt tolerating clear liquid diet. Pt denies nausea and vomiting. No BM/flatus. Voiding well. Pt denies chest pain, SOB, dizziness, fever, chills. Ambulating to the bathroom.      Vital Signs Last 24 Hrs  T(C): 36.9 (15 Feb 2018 04:50), Max: 37.2 (14 Feb 2018 21:50)  T(F): 98.4 (15 Feb 2018 04:50), Max: 98.9 (14 Feb 2018 21:50)  HR: 67 (15 Feb 2018 04:50) (56 - 81)  BP: 141/82 (15 Feb 2018 04:50) (116/73 - 170/100)  BP(mean): --  RR: 18 (15 Feb 2018 04:50) (14 - 20)  SpO2: 94% (15 Feb 2018 04:50) (94% - 99%)      PHYSICAL EXAM:    GENERAL: Alert, NAD  CHEST/LUNG: Clear to auscultation bilaterally, respirations nonlabored  HEART: S1S2, Regular rate and rhythm  ABDOMEN: Dressing C/D/I; hypoactive Bowel sounds, soft, Appropriate incisional tenderness, obese Nondistended  EXTREMITIES:  no calf tenderness, No edema, intermittent compression devices in place bilaterally    I&O's Detail    14 Feb 2018 07:01  -  15 Feb 2018 05:46  --------------------------------------------------------  IN:    IV PiggyBack: 100 mL    lactated ringers.: 120 mL    lactated ringers.: 195 mL  Total IN: 415 mL    OUT:    Voided: 300 mL  Total OUT: 300 mL    Total NET: 115 mL          LABS:                        13.5   10.17 )-----------( 354      ( 14 Feb 2018 03:07 )             41.9     02-14    139  |  101  |  13  ----------------------------<  92  3.1<L>   |  28  |  0.64    Ca    8.4<L>      14 Feb 2018 03:07    TPro  8.4<H>  /  Alb  3.4  /  TBili  0.3  /  DBili  x   /  AST  18  /  ALT  19  /  AlkPhos  107  02-14    PT/INR - ( 14 Feb 2018 03:07 )   PT: 12.0 sec;   INR: 1.10 ratio         PTT - ( 14 Feb 2018 03:07 )  PTT:33.9 sec      Assessment: 58 year old male PMHx HTN, duodenal ulcer, OA, PUD S/P repair of incarcerated ventral hernia POD#1    Plan:  - local wound care  - DVT prophylaxis, Incentive Spirometer, OOB, Ambulating, pain control  - Continue antibiotics therapeutically  - f/u labs   - will discuss with surgical attending
Post-op check    S/P repair of incarcerated ventral hernia POD#0  58 year old Male seen and examined at bedside. Admits to abdominal pain well controlled with medication. Denies chest pain, shortness of breath, nausea/ vomiting, and dizziness. Tolerating water at beside. No void yet. No ambulation yet.    Vital Signs Last 24 Hrs  T(F): 98.9 (02-14-18 @ 21:50), Max: 98.9 (02-14-18 @ 21:50)  HR: 56 (02-14-18 @ 21:50)  BP: 127/75 (02-14-18 @ 21:50)  RR: 16 (02-14-18 @ 21:50)  SpO2: 99% (02-14-18 @ 21:50)  Wt(kg): --   CAPILLARY BLOOD GLUCOSE      GENERAL: Alert, NAD  CHEST/LUNG: Clear to auscultation bilaterally, respirations nonlabored  HEART: S1S2, Regular rate and rhythm  ABDOMEN: Dressing C/D/I; hypoactive Bowel sounds, soft, Appropriate incisional tenderness, obese Nondistended  EXTREMITIES:  no calf tenderness, No edema, intermittent compression devices in place bilaterally       Assessment: 58 year old male PMHx HTN, duodenal ulcer, OA, PUD S/P repair of incarcerated ventral hernia POD#0    Plan:  - local wound care  - DVT prophylaxis, Incentive Spirometer, OOB, Ambulating, pain control  - Continue antibiotics therapeutically  - f/u labs   - will discuss with surgical attending
SURGERY PROGRESS HPI:  S/P repair of incarcerated ventral hernia POD#2  Pt seen and examined at bedside. Pain is well controlled on pain medication. Pt denies complaints. Pt tolerating clear liquid diet. Pt denies nausea and vomiting. +Flatus. No BM. Voiding well. Pt denies chest pain, SOB, dizziness, fever, chills. Ambulating.      Vital Signs Last 24 Hrs  T(C): 37.3 (15 Feb 2018 23:32), Max: 37.3 (15 Feb 2018 23:32)  T(F): 99.1 (15 Feb 2018 23:32), Max: 99.1 (15 Feb 2018 23:32)  HR: 73 (15 Feb 2018 23:32) (67 - 73)  BP: 155/84 (15 Feb 2018 23:32) (140/76 - 180/117)  BP(mean): --  RR: 18 (15 Feb 2018 23:32) (17 - 20)  SpO2: 96% (15 Feb 2018 23:32) (94% - 99%)      PHYSICAL EXAM:    GENERAL: NAD  HEAD:  Atraumatic, Normocephalic  CHEST/LUNG: Clear to ausculation, bilaterally   HEART: RRR S1S2  ABDOMEN: Obese, Dressing clean/dry/intact and removed to find steri strips intact. +BS, soft, appropriate incisional tenderness  EXTREMITIES:  calf soft, non tender b/l    I&O's Detail    14 Feb 2018 07:01  -  15 Feb 2018 07:00  --------------------------------------------------------  IN:    lactated ringers.: 195 mL    lactated ringers.: 1320 mL    Solution: 200 mL  Total IN: 1715 mL    OUT:    Voided: 300 mL  Total OUT: 300 mL    Total NET: 1415 mL      15 Feb 2018 07:01  -  16 Feb 2018 05:09  --------------------------------------------------------  IN:    lactated ringers.: 1440 mL    Oral Fluid: 450 mL    Solution: 150 mL  Total IN: 2040 mL    OUT:    Voided: 1400 mL  Total OUT: 1400 mL    Total NET: 640 mL          LABS:                        11.5   7.58  )-----------( 289      ( 15 Feb 2018 07:01 )             37.2     02-15    139  |  104  |  12  ----------------------------<  73  3.5   |  31  |  0.65    Ca    7.9<L>      15 Feb 2018 07:01  Phos  3.4     02-15  Mg     2.1     02-15    TPro  6.6  /  Alb  2.6<L>  /  TBili  0.5  /  DBili  x   /  AST  13<L>  /  ALT  15  /  AlkPhos  84  02-15        Assessment: 58 year old male PMHx HTN, duodenal ulcer, OA, PUD S/P repair of incarcerated ventral hernia POD#2    Plan:  - advance diet  - local wound care  - DVT prophylaxis, Incentive Spirometer, OOB, Ambulating, pain control  - Continue antibiotics therapeutically  - f/u labs   - will discuss with surgical attending

## 2018-02-20 LAB
CULTURE RESULTS: SIGNIFICANT CHANGE UP
SPECIMEN SOURCE: SIGNIFICANT CHANGE UP

## 2018-02-22 ENCOUNTER — TRANSCRIPTION ENCOUNTER (OUTPATIENT)
Age: 58
End: 2018-02-22

## 2018-02-28 DIAGNOSIS — R10.9 UNSPECIFIED ABDOMINAL PAIN: ICD-10-CM

## 2018-02-28 DIAGNOSIS — D72.829 ELEVATED WHITE BLOOD CELL COUNT, UNSPECIFIED: ICD-10-CM

## 2018-02-28 DIAGNOSIS — K43.6 OTHER AND UNSPECIFIED VENTRAL HERNIA WITH OBSTRUCTION, WITHOUT GANGRENE: ICD-10-CM

## 2018-02-28 DIAGNOSIS — I10 ESSENTIAL (PRIMARY) HYPERTENSION: ICD-10-CM

## 2018-02-28 DIAGNOSIS — K26.9 DUODENAL ULCER, UNSPECIFIED AS ACUTE OR CHRONIC, WITHOUT HEMORRHAGE OR PERFORATION: ICD-10-CM

## 2018-02-28 DIAGNOSIS — M19.90 UNSPECIFIED OSTEOARTHRITIS, UNSPECIFIED SITE: ICD-10-CM

## 2018-02-28 DIAGNOSIS — L97.819 NON-PRESSURE CHRONIC ULCER OF OTHER PART OF RIGHT LOWER LEG WITH UNSPECIFIED SEVERITY: ICD-10-CM

## 2018-02-28 DIAGNOSIS — Z28.21 IMMUNIZATION NOT CARRIED OUT BECAUSE OF PATIENT REFUSAL: ICD-10-CM

## 2018-02-28 DIAGNOSIS — K42.1 UMBILICAL HERNIA WITH GANGRENE: ICD-10-CM

## 2018-02-28 DIAGNOSIS — E66.01 MORBID (SEVERE) OBESITY DUE TO EXCESS CALORIES: ICD-10-CM

## 2018-03-20 ENCOUNTER — EMERGENCY (EMERGENCY)
Facility: HOSPITAL | Age: 58
LOS: 0 days | Discharge: HOME | End: 2018-03-20
Attending: EMERGENCY MEDICINE
Payer: MEDICAID

## 2018-03-20 VITALS
SYSTOLIC BLOOD PRESSURE: 154 MMHG | OXYGEN SATURATION: 98 % | TEMPERATURE: 99 F | DIASTOLIC BLOOD PRESSURE: 90 MMHG | HEART RATE: 92 BPM | RESPIRATION RATE: 17 BRPM

## 2018-03-20 VITALS
OXYGEN SATURATION: 98 % | DIASTOLIC BLOOD PRESSURE: 117 MMHG | SYSTOLIC BLOOD PRESSURE: 161 MMHG | TEMPERATURE: 98 F | HEIGHT: 66 IN | WEIGHT: 287.04 LBS | HEART RATE: 87 BPM | RESPIRATION RATE: 20 BRPM

## 2018-03-20 DIAGNOSIS — Z98.89 OTHER SPECIFIED POSTPROCEDURAL STATES: Chronic | ICD-10-CM

## 2018-03-20 DIAGNOSIS — L97.911 NON-PRESSURE CHRONIC ULCER OF UNSPECIFIED PART OF RIGHT LOWER LEG LIMITED TO BREAKDOWN OF SKIN: ICD-10-CM

## 2018-03-20 DIAGNOSIS — L03.115 CELLULITIS OF RIGHT LOWER LIMB: ICD-10-CM

## 2018-03-20 DIAGNOSIS — I10 ESSENTIAL (PRIMARY) HYPERTENSION: ICD-10-CM

## 2018-03-20 DIAGNOSIS — R58 HEMORRHAGE, NOT ELSEWHERE CLASSIFIED: Chronic | ICD-10-CM

## 2018-03-20 DIAGNOSIS — E66.9 OBESITY, UNSPECIFIED: ICD-10-CM

## 2018-03-20 DIAGNOSIS — K46.9 UNSPECIFIED ABDOMINAL HERNIA WITHOUT OBSTRUCTION OR GANGRENE: Chronic | ICD-10-CM

## 2018-03-20 DIAGNOSIS — M79.661 PAIN IN RIGHT LOWER LEG: ICD-10-CM

## 2018-03-20 LAB
ALBUMIN SERPL ELPH-MCNC: 2.9 G/DL — LOW (ref 3.3–5)
ALP SERPL-CCNC: 90 U/L — SIGNIFICANT CHANGE UP (ref 40–120)
ALT FLD-CCNC: 16 U/L — SIGNIFICANT CHANGE UP (ref 12–78)
ANION GAP SERPL CALC-SCNC: 6 MMOL/L — SIGNIFICANT CHANGE UP (ref 5–17)
AST SERPL-CCNC: 20 U/L — SIGNIFICANT CHANGE UP (ref 15–37)
BASOPHILS # BLD AUTO: 0.04 K/UL — SIGNIFICANT CHANGE UP (ref 0–0.2)
BASOPHILS NFR BLD AUTO: 0.5 % — SIGNIFICANT CHANGE UP (ref 0–2)
BILIRUB SERPL-MCNC: 0.4 MG/DL — SIGNIFICANT CHANGE UP (ref 0.2–1.2)
BUN SERPL-MCNC: 16 MG/DL — SIGNIFICANT CHANGE UP (ref 7–23)
CALCIUM SERPL-MCNC: 8.3 MG/DL — LOW (ref 8.5–10.1)
CHLORIDE SERPL-SCNC: 106 MMOL/L — SIGNIFICANT CHANGE UP (ref 96–108)
CO2 SERPL-SCNC: 31 MMOL/L — SIGNIFICANT CHANGE UP (ref 22–31)
CREAT SERPL-MCNC: 0.59 MG/DL — SIGNIFICANT CHANGE UP (ref 0.5–1.3)
CRP SERPL-MCNC: 0.9 MG/DL — HIGH (ref 0–0.4)
EOSINOPHIL # BLD AUTO: 0.15 K/UL — SIGNIFICANT CHANGE UP (ref 0–0.5)
EOSINOPHIL NFR BLD AUTO: 2 % — SIGNIFICANT CHANGE UP (ref 0–6)
ERYTHROCYTE [SEDIMENTATION RATE] IN BLOOD: 14 MM/HR — SIGNIFICANT CHANGE UP (ref 0–20)
GLUCOSE SERPL-MCNC: 91 MG/DL — SIGNIFICANT CHANGE UP (ref 70–99)
HCT VFR BLD CALC: 37.9 % — LOW (ref 39–50)
HGB BLD-MCNC: 11.9 G/DL — LOW (ref 13–17)
IMM GRANULOCYTES NFR BLD AUTO: 0.4 % — SIGNIFICANT CHANGE UP (ref 0–1.5)
LACTATE SERPL-SCNC: 1.6 MMOL/L — SIGNIFICANT CHANGE UP (ref 0.7–2)
LYMPHOCYTES # BLD AUTO: 1.47 K/UL — SIGNIFICANT CHANGE UP (ref 1–3.3)
LYMPHOCYTES # BLD AUTO: 19.9 % — SIGNIFICANT CHANGE UP (ref 13–44)
MCHC RBC-ENTMCNC: 27.3 PG — SIGNIFICANT CHANGE UP (ref 27–34)
MCHC RBC-ENTMCNC: 31.4 GM/DL — LOW (ref 32–36)
MCV RBC AUTO: 86.9 FL — SIGNIFICANT CHANGE UP (ref 80–100)
MONOCYTES # BLD AUTO: 0.56 K/UL — SIGNIFICANT CHANGE UP (ref 0–0.9)
MONOCYTES NFR BLD AUTO: 7.6 % — SIGNIFICANT CHANGE UP (ref 2–14)
NEUTROPHILS # BLD AUTO: 5.15 K/UL — SIGNIFICANT CHANGE UP (ref 1.8–7.4)
NEUTROPHILS NFR BLD AUTO: 69.6 % — SIGNIFICANT CHANGE UP (ref 43–77)
NRBC # BLD: 0 /100 WBCS — SIGNIFICANT CHANGE UP (ref 0–0)
PLATELET # BLD AUTO: 238 K/UL — SIGNIFICANT CHANGE UP (ref 150–400)
POTASSIUM SERPL-MCNC: 3.6 MMOL/L — SIGNIFICANT CHANGE UP (ref 3.5–5.3)
POTASSIUM SERPL-SCNC: 3.6 MMOL/L — SIGNIFICANT CHANGE UP (ref 3.5–5.3)
PROT SERPL-MCNC: 6.9 GM/DL — SIGNIFICANT CHANGE UP (ref 6–8.3)
RBC # BLD: 4.36 M/UL — SIGNIFICANT CHANGE UP (ref 4.2–5.8)
RBC # FLD: 14.2 % — SIGNIFICANT CHANGE UP (ref 10.3–14.5)
SODIUM SERPL-SCNC: 143 MMOL/L — SIGNIFICANT CHANGE UP (ref 135–145)
WBC # BLD: 7.4 K/UL — SIGNIFICANT CHANGE UP (ref 3.8–10.5)
WBC # FLD AUTO: 7.4 K/UL — SIGNIFICANT CHANGE UP (ref 3.8–10.5)

## 2018-03-20 PROCEDURE — 99284 EMERGENCY DEPT VISIT MOD MDM: CPT | Mod: 25

## 2018-03-20 PROCEDURE — 93970 EXTREMITY STUDY: CPT | Mod: 26

## 2018-03-20 RX ORDER — KETOROLAC TROMETHAMINE 30 MG/ML
30 SYRINGE (ML) INJECTION ONCE
Qty: 0 | Refills: 0 | Status: DISCONTINUED | OUTPATIENT
Start: 2018-03-20 | End: 2018-03-20

## 2018-03-20 RX ADMIN — Medication 100 MILLIGRAM(S): at 09:08

## 2018-03-20 RX ADMIN — Medication 30 MILLIGRAM(S): at 05:34

## 2018-03-20 NOTE — ED PROVIDER NOTE - PHYSICAL EXAMINATION
Gen: Alert, pleasant M in NAD  Head: NC, AT, EOMI, normal lids/conjunctiva  ENT: normal hearing, patent oropharynx, MMM  Neck: supple, no tenderness/meningismus, FROM  Pulm: Bilateral clear BS, normal resp effort, no wheeze/stridor/retractions  CV: RRR, no M/R/G, +dist pulses  Abd: soft, NT/ND, +BS, no guarding/rebound tenderness, +obese  Mskel: no edema/erythema/cyanosis  Skin: no rash  Neuro: AAOx3, no sensory/motor deficits, CN 2-12 intact

## 2018-03-20 NOTE — ED PROVIDER NOTE - PROGRESS NOTE DETAILS
Pt signed out by Dr. Vyas as pending labs and DC if WBC is wnl. Pt is comfortable, labs wnl, continued on abx, and referred to wound clinic and pmd as well as edin Chavarria.

## 2018-03-20 NOTE — ED PROVIDER NOTE - OBJECTIVE STATEMENT
Pertinent PMH/PSH/FHx/SHx and Review of Systems contained within:    59yo M w PMH of obesity & HTN, s/p recent repair of incarcerated ventral hernia presents to ED c/o pain to RLE.  Pt states he accidentally scratched RLE about 1y ago, and developed ulcer, states ulcer has been increasing in size & he has been having incr pain w drainage.  Pt also reports small amount of of drainage from umbilicus.  Denies fever, chills, CP, SOB, N/V/D, obstipation.    No fever/chills, No photophobia/eye pain/changes in vision, No ear pain/sore throat/dysphagia, No chest pain/palpitations, no SOB/cough/wheeze/stridor, No abdominal pain, No N/V/D, no dysuria/frequency/discharge, No neck/back pain, no changes in neurological status/function.

## 2018-03-20 NOTE — ED PROVIDER NOTE - MEDICAL DECISION MAKING DETAILS
Pt w cellulitis, US neg for DVT.  Pending labs.  Patient care transitioned to incoming team.  All decisions regarding the progression of care will be made at their discretion.

## 2018-03-20 NOTE — ED ADULT TRIAGE NOTE - CHIEF COMPLAINT QUOTE
BIBA,  pt c/o R-LE pain x 1 month.  pt states he has a wound on medial aspect of leg x 6 months.  pt has ace bandage around leg and is using crutches to walk

## 2018-06-01 ENCOUNTER — OUTPATIENT (OUTPATIENT)
Dept: OUTPATIENT SERVICES | Facility: HOSPITAL | Age: 58
LOS: 1 days | End: 2018-06-01
Payer: MEDICAID

## 2018-06-01 DIAGNOSIS — R58 HEMORRHAGE, NOT ELSEWHERE CLASSIFIED: Chronic | ICD-10-CM

## 2018-06-01 DIAGNOSIS — Z98.89 OTHER SPECIFIED POSTPROCEDURAL STATES: Chronic | ICD-10-CM

## 2018-06-01 DIAGNOSIS — K46.9 UNSPECIFIED ABDOMINAL HERNIA WITHOUT OBSTRUCTION OR GANGRENE: Chronic | ICD-10-CM

## 2018-06-01 PROCEDURE — G9005: CPT

## 2018-06-07 ENCOUNTER — INPATIENT (INPATIENT)
Facility: HOSPITAL | Age: 58
LOS: 4 days | Discharge: HOME HEALTH | End: 2018-06-12
Attending: HOSPITALIST | Admitting: HOSPITALIST
Payer: MEDICAID

## 2018-06-07 VITALS
HEART RATE: 78 BPM | TEMPERATURE: 98 F | RESPIRATION RATE: 18 BRPM | SYSTOLIC BLOOD PRESSURE: 232 MMHG | DIASTOLIC BLOOD PRESSURE: 139 MMHG | OXYGEN SATURATION: 100 %

## 2018-06-07 DIAGNOSIS — R58 HEMORRHAGE, NOT ELSEWHERE CLASSIFIED: Chronic | ICD-10-CM

## 2018-06-07 DIAGNOSIS — Z98.89 OTHER SPECIFIED POSTPROCEDURAL STATES: Chronic | ICD-10-CM

## 2018-06-07 DIAGNOSIS — K46.9 UNSPECIFIED ABDOMINAL HERNIA WITHOUT OBSTRUCTION OR GANGRENE: Chronic | ICD-10-CM

## 2018-06-07 DIAGNOSIS — L03.90 CELLULITIS, UNSPECIFIED: ICD-10-CM

## 2018-06-07 LAB
ALBUMIN SERPL ELPH-MCNC: 3.5 G/DL — SIGNIFICANT CHANGE UP (ref 3.3–5)
ALP SERPL-CCNC: 92 U/L — SIGNIFICANT CHANGE UP (ref 40–120)
ALT FLD-CCNC: 10 U/L — SIGNIFICANT CHANGE UP (ref 4–41)
AST SERPL-CCNC: 54 U/L — HIGH (ref 4–40)
BASE EXCESS BLDV CALC-SCNC: 7.3 MMOL/L — SIGNIFICANT CHANGE UP
BASOPHILS # BLD AUTO: 0.07 K/UL — SIGNIFICANT CHANGE UP (ref 0–0.2)
BASOPHILS NFR BLD AUTO: 0.8 % — SIGNIFICANT CHANGE UP (ref 0–2)
BILIRUB SERPL-MCNC: 0.3 MG/DL — SIGNIFICANT CHANGE UP (ref 0.2–1.2)
BLOOD GAS VENOUS - CREATININE: 0.73 MG/DL — SIGNIFICANT CHANGE UP (ref 0.5–1.3)
BUN SERPL-MCNC: 16 MG/DL — SIGNIFICANT CHANGE UP (ref 7–23)
CALCIUM SERPL-MCNC: 9 MG/DL — SIGNIFICANT CHANGE UP (ref 8.4–10.5)
CHLORIDE BLDV-SCNC: 103 MMOL/L — SIGNIFICANT CHANGE UP (ref 96–108)
CHLORIDE SERPL-SCNC: 99 MMOL/L — SIGNIFICANT CHANGE UP (ref 98–107)
CO2 SERPL-SCNC: 29 MMOL/L — SIGNIFICANT CHANGE UP (ref 22–31)
CREAT SERPL-MCNC: 0.71 MG/DL — SIGNIFICANT CHANGE UP (ref 0.5–1.3)
EOSINOPHIL # BLD AUTO: 0.11 K/UL — SIGNIFICANT CHANGE UP (ref 0–0.5)
EOSINOPHIL NFR BLD AUTO: 1.2 % — SIGNIFICANT CHANGE UP (ref 0–6)
GAS PNL BLDV: 137 MMOL/L — SIGNIFICANT CHANGE UP (ref 136–146)
GLUCOSE BLDV-MCNC: 130 — HIGH (ref 70–99)
GLUCOSE SERPL-MCNC: 130 MG/DL — HIGH (ref 70–99)
HCO3 BLDV-SCNC: 29 MMOL/L — HIGH (ref 20–27)
HCT VFR BLD CALC: 39.4 % — SIGNIFICANT CHANGE UP (ref 39–50)
HCT VFR BLDV CALC: 38.4 % — LOW (ref 39–51)
HGB BLD-MCNC: 12.4 G/DL — LOW (ref 13–17)
HGB BLDV-MCNC: 12.5 G/DL — LOW (ref 13–17)
IMM GRANULOCYTES # BLD AUTO: 0.05 # — SIGNIFICANT CHANGE UP
IMM GRANULOCYTES NFR BLD AUTO: 0.5 % — SIGNIFICANT CHANGE UP (ref 0–1.5)
INR BLD: 1.11 — SIGNIFICANT CHANGE UP (ref 0.88–1.17)
LACTATE BLDV-MCNC: 1.7 MMOL/L — SIGNIFICANT CHANGE UP (ref 0.5–2)
LYMPHOCYTES # BLD AUTO: 1.62 K/UL — SIGNIFICANT CHANGE UP (ref 1–3.3)
LYMPHOCYTES # BLD AUTO: 17.4 % — SIGNIFICANT CHANGE UP (ref 13–44)
MCHC RBC-ENTMCNC: 27.1 PG — SIGNIFICANT CHANGE UP (ref 27–34)
MCHC RBC-ENTMCNC: 31.5 % — LOW (ref 32–36)
MCV RBC AUTO: 86 FL — SIGNIFICANT CHANGE UP (ref 80–100)
MONOCYTES # BLD AUTO: 0.71 K/UL — SIGNIFICANT CHANGE UP (ref 0–0.9)
MONOCYTES NFR BLD AUTO: 7.6 % — SIGNIFICANT CHANGE UP (ref 2–14)
NEUTROPHILS # BLD AUTO: 6.73 K/UL — SIGNIFICANT CHANGE UP (ref 1.8–7.4)
NEUTROPHILS NFR BLD AUTO: 72.5 % — SIGNIFICANT CHANGE UP (ref 43–77)
NRBC # FLD: 0 — SIGNIFICANT CHANGE UP
PCO2 BLDV: 59 MMHG — HIGH (ref 41–51)
PH BLDV: 7.36 PH — SIGNIFICANT CHANGE UP (ref 7.32–7.43)
PLATELET # BLD AUTO: 350 K/UL — SIGNIFICANT CHANGE UP (ref 150–400)
PMV BLD: 11.3 FL — SIGNIFICANT CHANGE UP (ref 7–13)
PO2 BLDV: 42 MMHG — HIGH (ref 35–40)
POTASSIUM BLDV-SCNC: 5.1 MMOL/L — HIGH (ref 3.4–4.5)
POTASSIUM SERPL-MCNC: 5.2 MMOL/L — SIGNIFICANT CHANGE UP (ref 3.5–5.3)
POTASSIUM SERPL-SCNC: 5.2 MMOL/L — SIGNIFICANT CHANGE UP (ref 3.5–5.3)
PROT SERPL-MCNC: 7.7 G/DL — SIGNIFICANT CHANGE UP (ref 6–8.3)
PROTHROM AB SERPL-ACNC: 12.4 SEC — SIGNIFICANT CHANGE UP (ref 9.8–13.1)
RBC # BLD: 4.58 M/UL — SIGNIFICANT CHANGE UP (ref 4.2–5.8)
RBC # FLD: 13.8 % — SIGNIFICANT CHANGE UP (ref 10.3–14.5)
SAO2 % BLDV: 72.1 % — SIGNIFICANT CHANGE UP (ref 60–85)
SODIUM SERPL-SCNC: 140 MMOL/L — SIGNIFICANT CHANGE UP (ref 135–145)
WBC # BLD: 9.29 K/UL — SIGNIFICANT CHANGE UP (ref 3.8–10.5)
WBC # FLD AUTO: 9.29 K/UL — SIGNIFICANT CHANGE UP (ref 3.8–10.5)

## 2018-06-07 RX ORDER — MORPHINE SULFATE 50 MG/1
4 CAPSULE, EXTENDED RELEASE ORAL ONCE
Qty: 0 | Refills: 0 | Status: DISCONTINUED | OUTPATIENT
Start: 2018-06-07 | End: 2018-06-07

## 2018-06-07 RX ORDER — CARVEDILOL PHOSPHATE 80 MG/1
3.12 CAPSULE, EXTENDED RELEASE ORAL ONCE
Qty: 0 | Refills: 0 | Status: COMPLETED | OUTPATIENT
Start: 2018-06-07 | End: 2018-06-07

## 2018-06-07 RX ORDER — SODIUM CHLORIDE 9 MG/ML
1000 INJECTION INTRAMUSCULAR; INTRAVENOUS; SUBCUTANEOUS ONCE
Qty: 0 | Refills: 0 | Status: COMPLETED | OUTPATIENT
Start: 2018-06-07 | End: 2018-06-07

## 2018-06-07 RX ADMIN — Medication 10 MILLIGRAM(S): at 20:28

## 2018-06-07 RX ADMIN — SODIUM CHLORIDE 1000 MILLILITER(S): 9 INJECTION INTRAMUSCULAR; INTRAVENOUS; SUBCUTANEOUS at 20:43

## 2018-06-07 RX ADMIN — Medication 100 MILLIGRAM(S): at 20:43

## 2018-06-07 RX ADMIN — MORPHINE SULFATE 4 MILLIGRAM(S): 50 CAPSULE, EXTENDED RELEASE ORAL at 21:05

## 2018-06-07 RX ADMIN — MORPHINE SULFATE 4 MILLIGRAM(S): 50 CAPSULE, EXTENDED RELEASE ORAL at 20:28

## 2018-06-07 RX ADMIN — CARVEDILOL PHOSPHATE 3.12 MILLIGRAM(S): 80 CAPSULE, EXTENDED RELEASE ORAL at 22:02

## 2018-06-07 NOTE — ED ADULT TRIAGE NOTE - CHIEF COMPLAINT QUOTE
Patient has a large open wound to right lower leg that he has had for 7 months that has not gotten better, For three days patient has not had blood pressure medicine :enalapril and his lasix.

## 2018-06-07 NOTE — ED ADULT NURSE NOTE - OBJECTIVE STATEMENT
Pt awake and alert arrives with b/l lower leg edema right leg ulceration. Pt co pain to area and states its not healing. Pt also with elevated b/p states ran out of medication. IV placed labs drawn pt awaiting dispo.

## 2018-06-07 NOTE — ED PROVIDER NOTE - ATTENDING CONTRIBUTION TO CARE
I performed a face-to-face evaluation of the patient and performed a history and physical examination. I agree with the history and physical examination.    [place]: R leg superficial large wound. Worse pain 2 days. No F. Large area of surrounding redness. Concern for superinfection cellulitis. Plan: Abx, pain meds, admit.

## 2018-06-07 NOTE — ED PROVIDER NOTE - MEDICAL DECISION MAKING DETAILS
[place]: R leg superficial large wound. Worse pain 2 days. No F. Large area of surrounding redness. Concern for superinfection cellulitis. Plan: Abx, pain meds, admit.

## 2018-06-07 NOTE — ED PROVIDER NOTE - OBJECTIVE STATEMENT
58M h/o HTN, obesity, RLE chronic ulcer (x 7 months) p/w worsening redness and pain to ulcer x 1 week. Ran out of his enalapril 4 days ago. Denies CP, SOB, fever, n/v/d. Does not have PMD, he had his friend have been taking care of his wounds.

## 2018-06-08 DIAGNOSIS — K27.9 PEPTIC ULCER, SITE UNSPECIFIED, UNSPECIFIED AS ACUTE OR CHRONIC, WITHOUT HEMORRHAGE OR PERFORATION: ICD-10-CM

## 2018-06-08 DIAGNOSIS — Z29.9 ENCOUNTER FOR PROPHYLACTIC MEASURES, UNSPECIFIED: ICD-10-CM

## 2018-06-08 DIAGNOSIS — I16.0 HYPERTENSIVE URGENCY: ICD-10-CM

## 2018-06-08 DIAGNOSIS — L97.919 NON-PRESSURE CHRONIC ULCER OF UNSPECIFIED PART OF RIGHT LOWER LEG WITH UNSPECIFIED SEVERITY: ICD-10-CM

## 2018-06-08 DIAGNOSIS — R60.0 LOCALIZED EDEMA: ICD-10-CM

## 2018-06-08 DIAGNOSIS — I10 ESSENTIAL (PRIMARY) HYPERTENSION: ICD-10-CM

## 2018-06-08 LAB
BUN SERPL-MCNC: 14 MG/DL — SIGNIFICANT CHANGE UP (ref 7–23)
CALCIUM SERPL-MCNC: 8.1 MG/DL — LOW (ref 8.4–10.5)
CHLORIDE SERPL-SCNC: 101 MMOL/L — SIGNIFICANT CHANGE UP (ref 98–107)
CO2 SERPL-SCNC: 27 MMOL/L — SIGNIFICANT CHANGE UP (ref 22–31)
CREAT SERPL-MCNC: 0.82 MG/DL — SIGNIFICANT CHANGE UP (ref 0.5–1.3)
GLUCOSE SERPL-MCNC: 104 MG/DL — HIGH (ref 70–99)
HBA1C BLD-MCNC: 5.7 % — HIGH (ref 4–5.6)
HCT VFR BLD CALC: 34.4 % — LOW (ref 39–50)
HGB BLD-MCNC: 10.5 G/DL — LOW (ref 13–17)
MCHC RBC-ENTMCNC: 26.6 PG — LOW (ref 27–34)
MCHC RBC-ENTMCNC: 30.5 % — LOW (ref 32–36)
MCV RBC AUTO: 87.1 FL — SIGNIFICANT CHANGE UP (ref 80–100)
NRBC # FLD: 0 — SIGNIFICANT CHANGE UP
PLATELET # BLD AUTO: 314 K/UL — SIGNIFICANT CHANGE UP (ref 150–400)
PMV BLD: 11.3 FL — SIGNIFICANT CHANGE UP (ref 7–13)
POTASSIUM SERPL-MCNC: 3.2 MMOL/L — LOW (ref 3.5–5.3)
POTASSIUM SERPL-SCNC: 3.2 MMOL/L — LOW (ref 3.5–5.3)
RBC # BLD: 3.95 M/UL — LOW (ref 4.2–5.8)
RBC # FLD: 13.9 % — SIGNIFICANT CHANGE UP (ref 10.3–14.5)
SODIUM SERPL-SCNC: 140 MMOL/L — SIGNIFICANT CHANGE UP (ref 135–145)
SPECIMEN SOURCE: SIGNIFICANT CHANGE UP
SPECIMEN SOURCE: SIGNIFICANT CHANGE UP
WBC # BLD: 8.56 K/UL — SIGNIFICANT CHANGE UP (ref 3.8–10.5)
WBC # FLD AUTO: 8.56 K/UL — SIGNIFICANT CHANGE UP (ref 3.8–10.5)

## 2018-06-08 PROCEDURE — 99223 1ST HOSP IP/OBS HIGH 75: CPT

## 2018-06-08 PROCEDURE — 73590 X-RAY EXAM OF LOWER LEG: CPT | Mod: 26,RT

## 2018-06-08 PROCEDURE — 12345: CPT | Mod: NC

## 2018-06-08 RX ORDER — OXYCODONE AND ACETAMINOPHEN 5; 325 MG/1; MG/1
1 TABLET ORAL EVERY 6 HOURS
Qty: 0 | Refills: 0 | Status: DISCONTINUED | OUTPATIENT
Start: 2018-06-08 | End: 2018-06-11

## 2018-06-08 RX ORDER — POTASSIUM CHLORIDE 20 MEQ
40 PACKET (EA) ORAL ONCE
Qty: 0 | Refills: 0 | Status: COMPLETED | OUTPATIENT
Start: 2018-06-08 | End: 2018-06-08

## 2018-06-08 RX ORDER — PANTOPRAZOLE SODIUM 20 MG/1
40 TABLET, DELAYED RELEASE ORAL
Qty: 0 | Refills: 0 | Status: DISCONTINUED | OUTPATIENT
Start: 2018-06-08 | End: 2018-06-12

## 2018-06-08 RX ORDER — POTASSIUM CHLORIDE 20 MEQ
10 PACKET (EA) ORAL DAILY
Qty: 0 | Refills: 0 | Status: DISCONTINUED | OUTPATIENT
Start: 2018-06-08 | End: 2018-06-12

## 2018-06-08 RX ORDER — POLYETHYLENE GLYCOL 3350 17 G/17G
17 POWDER, FOR SOLUTION ORAL DAILY
Qty: 0 | Refills: 0 | Status: DISCONTINUED | OUTPATIENT
Start: 2018-06-08 | End: 2018-06-12

## 2018-06-08 RX ORDER — FUROSEMIDE 40 MG
40 TABLET ORAL DAILY
Qty: 0 | Refills: 0 | Status: DISCONTINUED | OUTPATIENT
Start: 2018-06-08 | End: 2018-06-12

## 2018-06-08 RX ORDER — CIPROFLOXACIN LACTATE 400MG/40ML
400 VIAL (ML) INTRAVENOUS EVERY 12 HOURS
Qty: 0 | Refills: 0 | Status: DISCONTINUED | OUTPATIENT
Start: 2018-06-08 | End: 2018-06-08

## 2018-06-08 RX ORDER — SENNA PLUS 8.6 MG/1
2 TABLET ORAL AT BEDTIME
Qty: 0 | Refills: 0 | Status: DISCONTINUED | OUTPATIENT
Start: 2018-06-08 | End: 2018-06-12

## 2018-06-08 RX ORDER — CARVEDILOL PHOSPHATE 80 MG/1
3.12 CAPSULE, EXTENDED RELEASE ORAL EVERY 12 HOURS
Qty: 0 | Refills: 0 | Status: DISCONTINUED | OUTPATIENT
Start: 2018-06-08 | End: 2018-06-12

## 2018-06-08 RX ORDER — DOCUSATE SODIUM 100 MG
100 CAPSULE ORAL
Qty: 0 | Refills: 0 | Status: DISCONTINUED | OUTPATIENT
Start: 2018-06-08 | End: 2018-06-12

## 2018-06-08 RX ORDER — ENOXAPARIN SODIUM 100 MG/ML
40 INJECTION SUBCUTANEOUS EVERY 24 HOURS
Qty: 0 | Refills: 0 | Status: DISCONTINUED | OUTPATIENT
Start: 2018-06-08 | End: 2018-06-12

## 2018-06-08 RX ADMIN — Medication 100 MILLIGRAM(S): at 17:29

## 2018-06-08 RX ADMIN — OXYCODONE AND ACETAMINOPHEN 1 TABLET(S): 5; 325 TABLET ORAL at 06:25

## 2018-06-08 RX ADMIN — Medication 100 MILLIGRAM(S): at 17:19

## 2018-06-08 RX ADMIN — PANTOPRAZOLE SODIUM 40 MILLIGRAM(S): 20 TABLET, DELAYED RELEASE ORAL at 05:27

## 2018-06-08 RX ADMIN — Medication 40 MILLIEQUIVALENT(S): at 13:24

## 2018-06-08 RX ADMIN — CARVEDILOL PHOSPHATE 3.12 MILLIGRAM(S): 80 CAPSULE, EXTENDED RELEASE ORAL at 05:26

## 2018-06-08 RX ADMIN — Medication 100 MILLIGRAM(S): at 21:36

## 2018-06-08 RX ADMIN — CARVEDILOL PHOSPHATE 3.12 MILLIGRAM(S): 80 CAPSULE, EXTENDED RELEASE ORAL at 17:29

## 2018-06-08 RX ADMIN — Medication 100 MILLIGRAM(S): at 05:01

## 2018-06-08 RX ADMIN — ENOXAPARIN SODIUM 40 MILLIGRAM(S): 100 INJECTION SUBCUTANEOUS at 05:25

## 2018-06-08 RX ADMIN — Medication 200 MILLIGRAM(S): at 05:25

## 2018-06-08 RX ADMIN — POLYETHYLENE GLYCOL 3350 17 GRAM(S): 17 POWDER, FOR SOLUTION ORAL at 13:24

## 2018-06-08 RX ADMIN — Medication 10 MILLIGRAM(S): at 05:26

## 2018-06-08 RX ADMIN — Medication 40 MILLIGRAM(S): at 05:27

## 2018-06-08 RX ADMIN — Medication 10 MILLIEQUIVALENT(S): at 17:19

## 2018-06-08 RX ADMIN — OXYCODONE AND ACETAMINOPHEN 1 TABLET(S): 5; 325 TABLET ORAL at 05:27

## 2018-06-08 RX ADMIN — SENNA PLUS 2 TABLET(S): 8.6 TABLET ORAL at 21:36

## 2018-06-08 NOTE — PROGRESS NOTE ADULT - PROBLEM SELECTOR PLAN 1
2/2 venous stasis  - will d/c cipro and c/w clindamycin for possible cellulitis now improving  - pending right leg XR to r/o subQ air although clinically not suspected  - wound care consulted  - percocet prn

## 2018-06-08 NOTE — H&P ADULT - PROBLEM SELECTOR PLAN 5
IMPROVE VTE Individual Risk Assessment    RISK                                                          Points  [] Previous VTE                                           3  [] Thrombophilia                                        2  [] Lower limb paralysis                              2   [] Current Cancer                                       2   [x] Immobilization > 24 hrs                        1  [] ICU/CCU stay > 24 hours                       1  [] Age > 60                                                   1    IMPROVE VTE Score: 1

## 2018-06-08 NOTE — H&P ADULT - HISTORY OF PRESENT ILLNESS
57 yo m with morbid obesity, PUD, HTN, likely undiagnosed LORI, severe OA of BL knees/ dependent on crutches for 2 years, on coreg and lasix, with LVH  criteria on ekg, but normal EF and sytsolic function based on April 2017 TTE  Pt with chronic lower anterior  right leg ulcer with increasing pain and discharge over last several days. Reports associated subjective fevers, nausea, chills. Denies recent fall or trauma. Pt also reports running out of enalapril over last few days. SBP initially  from 232/139; most recently 157/118 following coreg, enalapril and morphine. Currently denies HA, acute vision deficit, chest, abd or back pain.  Given clindamycin in ed

## 2018-06-08 NOTE — H&P ADULT - NSHPPHYSICALEXAM_GEN_ALL_CORE
PHYSICAL EXAM:      Constitutional: NAD, well-groomed, well-developed  HEENT:  EOMI, Normal Hearing  Neck: No LAD, No JVD  Back: Normal spine flexure, No CVA tenderness  Respiratory: CTAB  Cardiovascular: S1 and S2, RRR, no M/G/R  Gastrointestinal: BS+, soft, NT/ND  Extremities: large anterior ulceration on lower right leg, appears macerated after peeling away dressing placed in ED  Vascular: 2+ peripheral pulses (RA)  Neurological: A/O x 3, no focal deficits  Psychiatric: Normal mood, normal affect  Musculoskeletal: 5/5 strength b/l upper and lower extremities

## 2018-06-08 NOTE — H&P ADULT - NSHPREVIEWOFSYSTEMS_GEN_ALL_CORE
Review of Systems:   CONSTITUTIONAL: + fever  EYES: No eye pain, visual disturbances, or discharge  ENMT:  No difficulty hearing, tinnitus, vertigo; No sinus or throat pain  NECK: No pain or stiffness  RESPIRATORY: No cough, wheezing, chills or hemoptysis; No shortness of breath  CARDIOVASCULAR: No chest pain, palpitations, dizziness  GASTROINTESTINAL: No abdominal or epigastric pain. + nausea, no vomiting, or hematemesis; No diarrhea or constipation. No melena or hematochezia.  GENITOURINARY: No dysuria, frequency, hematuria, or incontinence  NEUROLOGICAL: No headaches  MUSCULOSKELETAL: right leg pain; chronic BL knee pain

## 2018-06-08 NOTE — PROGRESS NOTE ADULT - PROBLEM SELECTOR PLAN 2
SBP 230s on presentation, asymptomatic, may be exacerbated by pain and medication non compliance, now resolved  - goal SBP 170s for today  - c/w enalapril, coreg and lasix

## 2018-06-08 NOTE — PROGRESS NOTE ADULT - SUBJECTIVE AND OBJECTIVE BOX
Angela Bennett MD (PGY-1)  Pager: [phone]  7AM-7PM      Patient is a 58y old  Male who presents with a chief complaint of right leg pain (2018 00:55)      SUBJECTIVE / OVERNIGHT EVENTS:  Patient without complaints this morning, reports right leg is mildly tender but swelling and warmth much improved. No BM in several days. No N/V.    MEDICATIONS  (STANDING):  carvedilol 3.125 milliGRAM(s) Oral every 12 hours  clindamycin IVPB 600 milliGRAM(s) IV Intermittent every 8 hours  docusate sodium 100 milliGRAM(s) Oral two times a day  enalapril 10 milliGRAM(s) Oral daily  enoxaparin Injectable 40 milliGRAM(s) SubCutaneous every 24 hours  furosemide    Tablet 40 milliGRAM(s) Oral daily  pantoprazole    Tablet 40 milliGRAM(s) Oral before breakfast  polyethylene glycol 3350 17 Gram(s) Oral daily  potassium chloride    Tablet ER 40 milliEquivalent(s) Oral once  potassium chloride    Tablet ER 10 milliEquivalent(s) Oral daily  senna 2 Tablet(s) Oral at bedtime    MEDICATIONS  (PRN):  oxyCODONE    5 mG/acetaminophen 325 mG 1 Tablet(s) Oral every 6 hours PRN moderate and severe pain      Vital Signs:  Vital Signs Last 24 Hrs  T(C): 36.7 (2018 06:30), Max: 37.2 (2018 23:11)  T(F): 98 (2018 06:30), Max: 99 (2018 23:11)  HR: 72 (2018 06:30) (72 - 86)  BP: 136/85 (2018 06:30) (136/85 - 232/139)  BP(mean): --  RR: 18 (2018 06:30) (16 - 20)  SpO2: 98% (2018 06:30) (95% - 100%)  Daily Height in cm: 152.4 (2018 23:11)    Daily Weight in k.4 (2018 23:11)  CAPILLARY BLOOD GLUCOSE        I&O's Summary      PHYSICAL EXAM  GENERAL: Morbidly obese male, NAD, sitting comfortably in bed, responding to questions appropriately  HEAD:  Atraumatic, Normocephalic  EYES: conjunctiva and sclera clear  CHEST/LUNG: Clear to auscultation bilaterally; No wheeze, rhonchi or rales  HEART: Regular rate and rhythm; No murmurs, rubs, or gallops  ABDOMEN: Soft, Nontender, No guarding, Nondistended; Bowel sounds present  EXTREMITIES:  R medial leg with venous stasis ulcer w/ overlying dressing c/d/i, no purulence or fluctuance noted. 2+ pitting edema BLE , no clubbing or cyanosis  NEURO: AAOx3, moving all extremities, no tremors  SKIN: Venous stasis dermatitis R leg w/ minimal warmth and erythema    LABS:                        10.5   8.56  )-----------( 314      ( 2018 07:20 )             34.4     06-08    140  |  101  |  14  ----------------------------<  104<H>  3.2<L>   |  27  |  0.82    Ca    8.1<L>      2018 07:20    TPro  7.7  /  Alb  3.5  /  TBili  0.3  /  DBili  x   /  AST  54<H>  /  ALT  10  /  AlkPhos  92  06-07    PT/INR - ( 2018 19:50 )   PT: 12.4 SEC;   INR: 1.11                    RADIOLOGY & ADDITIONAL TESTS: Angela Bennett MD (PGY-1)  Pager: [phone]  7AM-7PM      Patient is a 58y old  Male who presents with a chief complaint of right leg pain (2018 00:55)      SUBJECTIVE / OVERNIGHT EVENTS:  Patient without complaints this morning, reports right leg is mildly tender but swelling and warmth much improved. No BM in several days. No N/V.  No F/C, N/V, CP, SOB, Cough, lightheadedness, dizziness, abdominal pain, diarrhea, dysuria.    MEDICATIONS  (STANDING):  carvedilol 3.125 milliGRAM(s) Oral every 12 hours  clindamycin IVPB 600 milliGRAM(s) IV Intermittent every 8 hours  docusate sodium 100 milliGRAM(s) Oral two times a day  enalapril 10 milliGRAM(s) Oral daily  enoxaparin Injectable 40 milliGRAM(s) SubCutaneous every 24 hours  furosemide    Tablet 40 milliGRAM(s) Oral daily  pantoprazole    Tablet 40 milliGRAM(s) Oral before breakfast  polyethylene glycol 3350 17 Gram(s) Oral daily  potassium chloride    Tablet ER 40 milliEquivalent(s) Oral once  potassium chloride    Tablet ER 10 milliEquivalent(s) Oral daily  senna 2 Tablet(s) Oral at bedtime    MEDICATIONS  (PRN):  oxyCODONE    5 mG/acetaminophen 325 mG 1 Tablet(s) Oral every 6 hours PRN moderate and severe pain      Vital Signs:  Vital Signs Last 24 Hrs  T(C): 36.7 (2018 06:30), Max: 37.2 (2018 23:11)  T(F): 98 (2018 06:30), Max: 99 (2018 23:11)  HR: 72 (2018 06:30) (72 - 86)  BP: 136/85 (2018 06:30) (136/85 - 232/139)  BP(mean): --  RR: 18 (2018 06:30) (16 - 20)  SpO2: 98% (2018 06:30) (95% - 100%)  Daily Height in cm: 152.4 (2018 23:11)    Daily Weight in k.4 (2018 23:11)  CAPILLARY BLOOD GLUCOSE        I&O's Summary      PHYSICAL EXAM  GENERAL: Morbidly obese male, NAD, sitting comfortably in bed, responding to questions appropriately  HEAD:  Atraumatic, Normocephalic  EYES: conjunctiva and sclera clear  CHEST/LUNG: Clear to auscultation bilaterally; No wheeze, rhonchi or rales  HEART: Regular rate and rhythm; No murmurs, rubs, or gallops  ABDOMEN: Soft, Nontender, No guarding, Nondistended; Bowel sounds present  EXTREMITIES:  R medial leg with venous stasis ulcer w/ overlying dressing with serosanguinous drainage.  however no fluctuance noted. 2+ pitting edema BLE , no clubbing or cyanosis  NEURO: AAOx3, moving all extremities, no tremors  SKIN: Venous stasis dermatitis R leg w/ minimal warmth and erythema    LABS:                        10.5   8.56  )-----------( 314      ( 2018 07:20 )             34.4     06-08    140  |  101  |  14  ----------------------------<  104<H>  3.2<L>   |  27  |  0.82    Ca    8.1<L>      2018 07:20    TPro  7.7  /  Alb  3.5  /  TBili  0.3  /  DBili  x   /  AST  54<H>  /  ALT  10  /  AlkPhos  92  06-07    PT/INR - ( 2018 19:50 )   PT: 12.4 SEC;   INR: 1.11                    RADIOLOGY & ADDITIONAL TESTS:

## 2018-06-08 NOTE — H&P ADULT - NSHPLABSRESULTS_GEN_ALL_CORE
12.4   9.29  )-----------( 350      ( 07 Jun 2018 19:50 )             39.4     06-07    140  |  99  |  16  ----------------------------<  130<H>  5.2   |  29  |  0.71    Ca    9.0      07 Jun 2018 19:50    TPro  7.7  /  Alb  3.5  /  TBili  0.3  /  DBili  x   /  AST  54<H>  /  ALT  10  /  AlkPhos  92  06-07    CAPILLARY BLOOD GLUCOSE        PT/INR - ( 07 Jun 2018 19:50 )   PT: 12.4 SEC;   INR: 1.11              Vital Signs Last 24 Hrs  T(C): 37.2 (07 Jun 2018 23:11), Max: 37.2 (07 Jun 2018 23:11)  T(F): 99 (07 Jun 2018 23:11), Max: 99 (07 Jun 2018 23:11)  HR: 86 (07 Jun 2018 23:11) (72 - 86)  BP: 157/118 (07 Jun 2018 23:11) (157/118 - 232/139)  BP(mean): --  RR: 19 (07 Jun 2018 23:11) (16 - 20)  SpO2: 95% (07 Jun 2018 23:11) (95% - 100%)

## 2018-06-08 NOTE — H&P ADULT - PROBLEM SELECTOR PLAN 2
-pt reports med regimen to consist of lasix, enalapril, coreg, and K supplements  -will place on dosage as per 2015 discharge summary pending pharm med rec tomorrow

## 2018-06-09 LAB
ALBUMIN SERPL ELPH-MCNC: 3.4 G/DL — SIGNIFICANT CHANGE UP (ref 3.3–5)
ALP SERPL-CCNC: 96 U/L — SIGNIFICANT CHANGE UP (ref 40–120)
ALT FLD-CCNC: 7 U/L — SIGNIFICANT CHANGE UP (ref 4–41)
AST SERPL-CCNC: 20 U/L — SIGNIFICANT CHANGE UP (ref 4–40)
BILIRUB SERPL-MCNC: 0.4 MG/DL — SIGNIFICANT CHANGE UP (ref 0.2–1.2)
BUN SERPL-MCNC: 14 MG/DL — SIGNIFICANT CHANGE UP (ref 7–23)
CALCIUM SERPL-MCNC: 9 MG/DL — SIGNIFICANT CHANGE UP (ref 8.4–10.5)
CHLORIDE SERPL-SCNC: 99 MMOL/L — SIGNIFICANT CHANGE UP (ref 98–107)
CO2 SERPL-SCNC: 27 MMOL/L — SIGNIFICANT CHANGE UP (ref 22–31)
CREAT SERPL-MCNC: 0.82 MG/DL — SIGNIFICANT CHANGE UP (ref 0.5–1.3)
GLUCOSE SERPL-MCNC: 81 MG/DL — SIGNIFICANT CHANGE UP (ref 70–99)
HCT VFR BLD CALC: 42.5 % — SIGNIFICANT CHANGE UP (ref 39–50)
HGB BLD-MCNC: 12.9 G/DL — LOW (ref 13–17)
MAGNESIUM SERPL-MCNC: 2.2 MG/DL — SIGNIFICANT CHANGE UP (ref 1.6–2.6)
MCHC RBC-ENTMCNC: 26.7 PG — LOW (ref 27–34)
MCHC RBC-ENTMCNC: 30.4 % — LOW (ref 32–36)
MCV RBC AUTO: 88 FL — SIGNIFICANT CHANGE UP (ref 80–100)
NRBC # FLD: 0 — SIGNIFICANT CHANGE UP
PHOSPHATE SERPL-MCNC: 3 MG/DL — SIGNIFICANT CHANGE UP (ref 2.5–4.5)
PLATELET # BLD AUTO: 353 K/UL — SIGNIFICANT CHANGE UP (ref 150–400)
PMV BLD: 11.4 FL — SIGNIFICANT CHANGE UP (ref 7–13)
POTASSIUM SERPL-MCNC: 4.1 MMOL/L — SIGNIFICANT CHANGE UP (ref 3.5–5.3)
POTASSIUM SERPL-SCNC: 4.1 MMOL/L — SIGNIFICANT CHANGE UP (ref 3.5–5.3)
PROT SERPL-MCNC: 7.5 G/DL — SIGNIFICANT CHANGE UP (ref 6–8.3)
RBC # BLD: 4.83 M/UL — SIGNIFICANT CHANGE UP (ref 4.2–5.8)
RBC # FLD: 14.2 % — SIGNIFICANT CHANGE UP (ref 10.3–14.5)
SODIUM SERPL-SCNC: 140 MMOL/L — SIGNIFICANT CHANGE UP (ref 135–145)
WBC # BLD: 8.18 K/UL — SIGNIFICANT CHANGE UP (ref 3.8–10.5)
WBC # FLD AUTO: 8.18 K/UL — SIGNIFICANT CHANGE UP (ref 3.8–10.5)

## 2018-06-09 PROCEDURE — 99233 SBSQ HOSP IP/OBS HIGH 50: CPT | Mod: GC

## 2018-06-09 RX ADMIN — Medication 100 MILLIGRAM(S): at 06:12

## 2018-06-09 RX ADMIN — Medication 100 MILLIGRAM(S): at 13:32

## 2018-06-09 RX ADMIN — Medication 10 MILLIGRAM(S): at 13:31

## 2018-06-09 RX ADMIN — Medication 40 MILLIGRAM(S): at 06:12

## 2018-06-09 RX ADMIN — Medication 100 MILLIGRAM(S): at 17:37

## 2018-06-09 RX ADMIN — Medication 10 MILLIGRAM(S): at 06:11

## 2018-06-09 RX ADMIN — OXYCODONE AND ACETAMINOPHEN 1 TABLET(S): 5; 325 TABLET ORAL at 17:38

## 2018-06-09 RX ADMIN — OXYCODONE AND ACETAMINOPHEN 1 TABLET(S): 5; 325 TABLET ORAL at 18:08

## 2018-06-09 RX ADMIN — Medication 20 MILLIGRAM(S): at 17:38

## 2018-06-09 RX ADMIN — ENOXAPARIN SODIUM 40 MILLIGRAM(S): 100 INJECTION SUBCUTANEOUS at 06:12

## 2018-06-09 RX ADMIN — PANTOPRAZOLE SODIUM 40 MILLIGRAM(S): 20 TABLET, DELAYED RELEASE ORAL at 06:12

## 2018-06-09 RX ADMIN — CARVEDILOL PHOSPHATE 3.12 MILLIGRAM(S): 80 CAPSULE, EXTENDED RELEASE ORAL at 17:37

## 2018-06-09 RX ADMIN — SENNA PLUS 2 TABLET(S): 8.6 TABLET ORAL at 21:36

## 2018-06-09 RX ADMIN — Medication 100 MILLIGRAM(S): at 21:36

## 2018-06-09 RX ADMIN — Medication 10 MILLIEQUIVALENT(S): at 13:33

## 2018-06-09 RX ADMIN — CARVEDILOL PHOSPHATE 3.12 MILLIGRAM(S): 80 CAPSULE, EXTENDED RELEASE ORAL at 06:11

## 2018-06-09 NOTE — PROGRESS NOTE ADULT - SUBJECTIVE AND OBJECTIVE BOX
Tam De La Cruz MD PGY2    Pager [phone]/ [phone]    For Night coverage 7pm-7am: NS- page [phone] Team1-3, page [phone] Team4 & Care Model  Sat/[facility] Coverage 12pm-7pm: NS- page [phone] for Team1-4, [facility]- pager forwarded to covering Resident    Patient is a 58y old  Male who presents with a chief complaint of right leg pain (08 Jun 2018 00:55)      INTERVAL HPI/OVERNIGHT EVENTS: No overnight events, denies any fevers, chills, nausea, vomiting, CP/SOB.    MEDICATIONS  (STANDING):  carvedilol 3.125 milliGRAM(s) Oral every 12 hours  clindamycin IVPB 600 milliGRAM(s) IV Intermittent every 8 hours  docusate sodium 100 milliGRAM(s) Oral two times a day  enalapril 20 milliGRAM(s) Oral daily  enalapril 10 milliGRAM(s) Oral once  enoxaparin Injectable 40 milliGRAM(s) SubCutaneous every 24 hours  furosemide    Tablet 40 milliGRAM(s) Oral daily  pantoprazole    Tablet 40 milliGRAM(s) Oral before breakfast  polyethylene glycol 3350 17 Gram(s) Oral daily  potassium chloride    Tablet ER 10 milliEquivalent(s) Oral daily  senna 2 Tablet(s) Oral at bedtime    MEDICATIONS  (PRN):  oxyCODONE    5 mG/acetaminophen 325 mG 1 Tablet(s) Oral every 6 hours PRN moderate and severe pain      Allergies    No Known Allergies    Intolerances        REVIEW OF SYSTEMS:  CONSTITUTIONAL: No fever, weight loss, or fatigue  EYES: No eye pain, visual disturbances, or discharge  ENMT:  No difficulty hearing, tinnitus, vertigo; No sinus or throat pain  NECK: No pain or stiffness  BREASTS: No pain or masses  RESPIRATORY: No cough, wheezing, chills or hemoptysis; No shortness of breath  CARDIOVASCULAR: No chest pain, palpitations, dizziness, or leg swelling  GASTROINTESTINAL: No abdominal or epigastric pain. No nausea, vomiting, or hematemesis; No diarrhea or constipation. GENITOURINARY: No dysuria, frequency, hematuria, or incontinence  NEUROLOGICAL: No headaches, memory loss, loss of strength, numbness, or tremors  SKIN: No itching, burning, rashes, or lesions   LYMPH NODES: No enlarged glands  ENDOCRINE: No heat or cold intolerance  MUSCULOSKELETAL: No joint pain or swelling; No muscle, back, or extremity pain  PSYCHIATRIC: No depression, anxiety, mood swings, or difficulty sleeping  HEME/LYMPH: No easy bruising, or bleeding gums  ALLERGY AND IMMUNOLOGIC: No hives or eczema    Vital Signs Last 24 Hrs  T(C): 36.8 (09 Jun 2018 10:48), Max: 37.1 (08 Jun 2018 14:08)  T(F): 98.2 (09 Jun 2018 10:48), Max: 98.8 (08 Jun 2018 14:08)  HR: 67 (09 Jun 2018 10:48) (62 - 76)  BP: 151/99 (09 Jun 2018 10:48) (149/83 - 175/96)  BP(mean): --  RR: 18 (09 Jun 2018 10:48) (18 - 18)  SpO2: 98% (09 Jun 2018 10:48) (97% - 99%)  I&O's Summary      PHYSICAL EXAM:  GENERAL: Morbidly obese male, NAD, sitting comfortably in bed, responding to questions appropriately  HEAD:  Atraumatic, Normocephalic  EYES: conjunctiva and sclera clear  CHEST/LUNG: Clear to auscultation bilaterally; No wheeze, rhonchi or rales  HEART: Regular rate and rhythm; No murmurs, rubs, or gallops  ABDOMEN: Soft, Nontender, No guarding, Nondistended; Bowel sounds present  EXTREMITIES:  R medial leg with venous stasis ulcer w/ overlying dressing with serosanguinous drainage.  however no fluctuance noted. 2+ pitting edema BLE , no clubbing or cyanosis  NEURO: AAOx3, moving all extremities, no tremors  SKIN: Venous stasis dermatitis R leg w/ minimal warmth and erythema    LABS:                        12.9   8.18  )-----------( 353      ( 09 Jun 2018 06:00 )             42.5     06-09    140  |  99  |  14  ----------------------------<  81  4.1   |  27  |  0.82    Ca    9.0      09 Jun 2018 06:00  Phos  3.0     06-09  Mg     2.2     06-09    TPro  7.5  /  Alb  3.4  /  TBili  0.4  /  DBili  x   /  AST  20  /  ALT  7   /  AlkPhos  96  06-09    PT/INR - ( 07 Jun 2018 19:50 )   PT: 12.4 SEC;   INR: 1.11              CAPILLARY BLOOD GLUCOSE          Microbiology  06-07 @ 20:41  BLOOD PERIPHERAL  --  --  --        RADIOLOGY & ADDITIONAL TESTS:    Imaging Personally Reviewed:  [ ] YES  [ ] NO    Consultant(s) Notes Reviewed:  [ ] YES  [ ] NO    Care Discussed with Consultants/Other Providers [ ] YES  [ ] NO

## 2018-06-09 NOTE — PROGRESS NOTE ADULT - PROBLEM SELECTOR PLAN 5
Lovenox SQ  PT consult- recommending home with home PT Lovenox SQ  PT consult- recommending home with home PT once ulcer management is initiated with good outpatient follow up.

## 2018-06-09 NOTE — PROGRESS NOTE ADULT - PROBLEM SELECTOR PLAN 2
SBP 230s on presentation, asymptomatic, may be exacerbated by pain and medication non compliance, now resolved  - BP improved since admission, however still not at goal. SBP goal<140  - c/w enalapril, coreg and lasix. Will increase enalapril to 20mg

## 2018-06-09 NOTE — PROGRESS NOTE ADULT - PROBLEM SELECTOR PLAN 1
2/2 venous stasis  - c/w IV clindamycin for possible cellulitis now improving, can transition to PO on d/c  - RLE X-ray with diffuse soft tissue swelling   - wound care consulted  - percocet prn 2/2 venous stasis  - c/w IV clindamycin for possible cellulitis now improving, can transition to PO on d/c  - awaiting wound care consult for dressing care  - percocet prn for pain control

## 2018-06-10 PROCEDURE — 99232 SBSQ HOSP IP/OBS MODERATE 35: CPT | Mod: GC

## 2018-06-10 RX ADMIN — PANTOPRAZOLE SODIUM 40 MILLIGRAM(S): 20 TABLET, DELAYED RELEASE ORAL at 06:52

## 2018-06-10 RX ADMIN — POLYETHYLENE GLYCOL 3350 17 GRAM(S): 17 POWDER, FOR SOLUTION ORAL at 11:38

## 2018-06-10 RX ADMIN — Medication 10 MILLIEQUIVALENT(S): at 11:39

## 2018-06-10 RX ADMIN — Medication 20 MILLIGRAM(S): at 11:38

## 2018-06-10 RX ADMIN — CARVEDILOL PHOSPHATE 3.12 MILLIGRAM(S): 80 CAPSULE, EXTENDED RELEASE ORAL at 18:43

## 2018-06-10 RX ADMIN — Medication 20 MILLIGRAM(S): at 06:52

## 2018-06-10 RX ADMIN — Medication 100 MILLIGRAM(S): at 22:00

## 2018-06-10 RX ADMIN — OXYCODONE AND ACETAMINOPHEN 1 TABLET(S): 5; 325 TABLET ORAL at 22:00

## 2018-06-10 RX ADMIN — Medication 40 MILLIGRAM(S): at 06:52

## 2018-06-10 RX ADMIN — ENOXAPARIN SODIUM 40 MILLIGRAM(S): 100 INJECTION SUBCUTANEOUS at 06:52

## 2018-06-10 RX ADMIN — SENNA PLUS 2 TABLET(S): 8.6 TABLET ORAL at 22:00

## 2018-06-10 RX ADMIN — OXYCODONE AND ACETAMINOPHEN 1 TABLET(S): 5; 325 TABLET ORAL at 07:37

## 2018-06-10 RX ADMIN — OXYCODONE AND ACETAMINOPHEN 1 TABLET(S): 5; 325 TABLET ORAL at 07:07

## 2018-06-10 RX ADMIN — CARVEDILOL PHOSPHATE 3.12 MILLIGRAM(S): 80 CAPSULE, EXTENDED RELEASE ORAL at 06:52

## 2018-06-10 RX ADMIN — Medication 100 MILLIGRAM(S): at 15:17

## 2018-06-10 RX ADMIN — OXYCODONE AND ACETAMINOPHEN 1 TABLET(S): 5; 325 TABLET ORAL at 22:30

## 2018-06-10 RX ADMIN — Medication 100 MILLIGRAM(S): at 06:52

## 2018-06-10 RX ADMIN — Medication 100 MILLIGRAM(S): at 06:51

## 2018-06-10 NOTE — PROGRESS NOTE ADULT - PROBLEM SELECTOR PLAN 2
SBP 230s on presentation, asymptomatic, may be exacerbated by pain and medication non compliance, now resolved  - BP improved since admission, however still not at goal. SBP goal<140  - c/w enalapril, coreg and lasix. Enalapril increased to 20mg SBP 230s on presentation, asymptomatic, may be exacerbated by pain and medication non compliance, now resolved  - BP improved since admission, however still not at goal. SBP goal<140  - Enalapril increased to 20mg yesterday, will increase to 40 mg today and monitor BP   - c/w lasix and coreg

## 2018-06-10 NOTE — PROGRESS NOTE ADULT - SUBJECTIVE AND OBJECTIVE BOX
Patient is a 58y old  Male who presents with a chief complaint of right leg pain (08 Jun 2018 00:55)        SUBJECTIVE / OVERNIGHT EVENTS: Patient had no acute events overnight. Patient seen and examined at bedside this morning. Patient endorsing pain near ulcer this morning, but improved since admission.     ROS: [ - ] Fever [ - ] Chills [ - ] Nausea/Vomiting [ - ] Chest Pain [ - ] Shortness of breath     MEDICATIONS  (STANDING):  carvedilol 3.125 milliGRAM(s) Oral every 12 hours  clindamycin IVPB 600 milliGRAM(s) IV Intermittent every 8 hours  docusate sodium 100 milliGRAM(s) Oral two times a day  enalapril 20 milliGRAM(s) Oral daily  enoxaparin Injectable 40 milliGRAM(s) SubCutaneous every 24 hours  furosemide    Tablet 40 milliGRAM(s) Oral daily  pantoprazole    Tablet 40 milliGRAM(s) Oral before breakfast  polyethylene glycol 3350 17 Gram(s) Oral daily  potassium chloride    Tablet ER 10 milliEquivalent(s) Oral daily  senna 2 Tablet(s) Oral at bedtime    MEDICATIONS  (PRN):  oxyCODONE    5 mG/acetaminophen 325 mG 1 Tablet(s) Oral every 6 hours PRN moderate and severe pain      Vital Signs Last 24 Hrs  T(C): 36.6 (10 Baltazar 2018 06:49), Max: 37.4 (09 Jun 2018 19:17)  T(F): 97.9 (10 Baltazar 2018 06:49), Max: 99.4 (09 Jun 2018 19:17)  HR: 60 (10 Baltazar 2018 06:49) (60 - 73)  BP: 160/100 (10 Baltazar 2018 06:49) (148/94 - 187/122)  BP(mean): --  RR: 18 (10 Baltazar 2018 06:49) (18 - 19)  SpO2: 98% (10 Baltazar 2018 06:49) (97% - 99%)  CAPILLARY BLOOD GLUCOSE        I&O's Summary      PHYSICAL EXAM  GENERAL: Morbidly obese male, NAD, lying comfortably in bed  HEAD:  Atraumatic, Normocephalic  EYES: conjunctiva and sclera clear  CHEST/LUNG: Clear to auscultation bilaterally; No wheeze, rhonchi or rales  HEART: Regular rate and rhythm; No murmurs, rubs, or gallops  ABDOMEN: Soft, Nontender, No guarding, Nondistended; Bowel sounds present  EXTREMITIES:  R medial leg with venous stasis ulcer w/ overlying dressing with serosanguinous drainage.  however no fluctuance noted. 2+ pitting edema BLE , no clubbing or cyanosis  NEURO: AAOx3, moving all extremities, no tremors  SKIN: Venous stasis dermatitis R leg w/ minimal warmth and erythema      LABS:                        12.9   8.18  )-----------( 353      ( 09 Jun 2018 06:00 )             42.5     06-09    140  |  99  |  14  ----------------------------<  81  4.1   |  27  |  0.82    Ca    9.0      09 Jun 2018 06:00  Phos  3.0     06-09  Mg     2.2     06-09    TPro  7.5  /  Alb  3.4  /  TBili  0.4  /  DBili  x   /  AST  20  /  ALT  7   /  AlkPhos  96  06-09

## 2018-06-10 NOTE — PROGRESS NOTE ADULT - PROBLEM SELECTOR PLAN 1
2/2 venous stasis  - c/w IV clindamycin for possible cellulitis now improving, can transition to PO on d/c  - awaiting wound care consult for dressing care  - percocet prn for pain control 2/2 venous stasis  - c/w IV clindamycin for possible cellulitis now improving, can transition to PO on d/c  - awaiting wound care consult for dressing care - clinically improving  - percocet prn for pain control

## 2018-06-10 NOTE — PROGRESS NOTE ADULT - PROBLEM SELECTOR PLAN 5
Lovenox SQ  PT consult- recommending home with home PT once ulcer management is initiated with good outpatient follow up.

## 2018-06-11 ENCOUNTER — TRANSCRIPTION ENCOUNTER (OUTPATIENT)
Age: 58
End: 2018-06-11

## 2018-06-11 LAB
BUN SERPL-MCNC: 15 MG/DL — SIGNIFICANT CHANGE UP (ref 7–23)
CALCIUM SERPL-MCNC: 8.7 MG/DL — SIGNIFICANT CHANGE UP (ref 8.4–10.5)
CHLORIDE SERPL-SCNC: 100 MMOL/L — SIGNIFICANT CHANGE UP (ref 98–107)
CO2 SERPL-SCNC: 26 MMOL/L — SIGNIFICANT CHANGE UP (ref 22–31)
CREAT SERPL-MCNC: 0.79 MG/DL — SIGNIFICANT CHANGE UP (ref 0.5–1.3)
GLUCOSE SERPL-MCNC: 87 MG/DL — SIGNIFICANT CHANGE UP (ref 70–99)
HCT VFR BLD CALC: 41.4 % — SIGNIFICANT CHANGE UP (ref 39–50)
HGB BLD-MCNC: 12.7 G/DL — LOW (ref 13–17)
MCHC RBC-ENTMCNC: 26.7 PG — LOW (ref 27–34)
MCHC RBC-ENTMCNC: 30.7 % — LOW (ref 32–36)
MCV RBC AUTO: 87 FL — SIGNIFICANT CHANGE UP (ref 80–100)
NRBC # FLD: 0 — SIGNIFICANT CHANGE UP
PLATELET # BLD AUTO: 312 K/UL — SIGNIFICANT CHANGE UP (ref 150–400)
PMV BLD: 11.4 FL — SIGNIFICANT CHANGE UP (ref 7–13)
POTASSIUM SERPL-MCNC: 4 MMOL/L — SIGNIFICANT CHANGE UP (ref 3.5–5.3)
POTASSIUM SERPL-SCNC: 4 MMOL/L — SIGNIFICANT CHANGE UP (ref 3.5–5.3)
RBC # BLD: 4.76 M/UL — SIGNIFICANT CHANGE UP (ref 4.2–5.8)
RBC # FLD: 14.2 % — SIGNIFICANT CHANGE UP (ref 10.3–14.5)
SODIUM SERPL-SCNC: 138 MMOL/L — SIGNIFICANT CHANGE UP (ref 135–145)
WBC # BLD: 8.32 K/UL — SIGNIFICANT CHANGE UP (ref 3.8–10.5)
WBC # FLD AUTO: 8.32 K/UL — SIGNIFICANT CHANGE UP (ref 3.8–10.5)

## 2018-06-11 PROCEDURE — 99233 SBSQ HOSP IP/OBS HIGH 50: CPT | Mod: GC

## 2018-06-11 PROCEDURE — 93923 UPR/LXTR ART STDY 3+ LVLS: CPT | Mod: 26

## 2018-06-11 RX ORDER — LACTOBACILLUS ACIDOPHILUS 100MM CELL
1 CAPSULE ORAL DAILY
Qty: 0 | Refills: 0 | Status: DISCONTINUED | OUTPATIENT
Start: 2018-06-11 | End: 2018-06-12

## 2018-06-11 RX ORDER — SENNA PLUS 8.6 MG/1
2 TABLET ORAL
Qty: 20 | Refills: 0 | OUTPATIENT
Start: 2018-06-11 | End: 2018-06-20

## 2018-06-11 RX ORDER — CELECOXIB 200 MG/1
1 CAPSULE ORAL
Qty: 30 | Refills: 0 | OUTPATIENT
Start: 2018-06-11 | End: 2018-07-10

## 2018-06-11 RX ORDER — AMLODIPINE BESYLATE 2.5 MG/1
10 TABLET ORAL DAILY
Qty: 0 | Refills: 0 | Status: DISCONTINUED | OUTPATIENT
Start: 2018-06-11 | End: 2018-06-12

## 2018-06-11 RX ORDER — AMLODIPINE BESYLATE 2.5 MG/1
5 TABLET ORAL DAILY
Qty: 0 | Refills: 0 | Status: DISCONTINUED | OUTPATIENT
Start: 2018-06-11 | End: 2018-06-11

## 2018-06-11 RX ORDER — POLYETHYLENE GLYCOL 3350 17 G/17G
17 POWDER, FOR SOLUTION ORAL
Qty: 170 | Refills: 0 | OUTPATIENT
Start: 2018-06-11 | End: 2018-06-20

## 2018-06-11 RX ORDER — PANTOPRAZOLE SODIUM 20 MG/1
1 TABLET, DELAYED RELEASE ORAL
Qty: 30 | Refills: 0 | OUTPATIENT
Start: 2018-06-11 | End: 2018-07-10

## 2018-06-11 RX ORDER — POTASSIUM CHLORIDE 20 MEQ
1 PACKET (EA) ORAL
Qty: 30 | Refills: 0 | OUTPATIENT
Start: 2018-06-11 | End: 2018-07-10

## 2018-06-11 RX ORDER — LACTOBACILLUS ACIDOPHILUS 100MM CELL
1 CAPSULE ORAL
Qty: 10 | Refills: 0 | OUTPATIENT
Start: 2018-06-11 | End: 2018-06-20

## 2018-06-11 RX ORDER — CELECOXIB 200 MG/1
200 CAPSULE ORAL DAILY
Qty: 0 | Refills: 0 | Status: DISCONTINUED | OUTPATIENT
Start: 2018-06-11 | End: 2018-06-12

## 2018-06-11 RX ORDER — HYDRALAZINE HCL 50 MG
10 TABLET ORAL ONCE
Qty: 0 | Refills: 0 | Status: COMPLETED | OUTPATIENT
Start: 2018-06-11 | End: 2018-06-11

## 2018-06-11 RX ORDER — AMLODIPINE BESYLATE 2.5 MG/1
1 TABLET ORAL
Qty: 30 | Refills: 0 | OUTPATIENT
Start: 2018-06-11 | End: 2018-07-10

## 2018-06-11 RX ADMIN — AMLODIPINE BESYLATE 5 MILLIGRAM(S): 2.5 TABLET ORAL at 15:11

## 2018-06-11 RX ADMIN — Medication 100 MILLIGRAM(S): at 17:22

## 2018-06-11 RX ADMIN — Medication 100 MILLIGRAM(S): at 06:49

## 2018-06-11 RX ADMIN — Medication 450 MILLIGRAM(S): at 23:10

## 2018-06-11 RX ADMIN — Medication 1 TABLET(S): at 13:18

## 2018-06-11 RX ADMIN — Medication 100 MILLIGRAM(S): at 06:50

## 2018-06-11 RX ADMIN — Medication 10 MILLIEQUIVALENT(S): at 11:35

## 2018-06-11 RX ADMIN — CARVEDILOL PHOSPHATE 3.12 MILLIGRAM(S): 80 CAPSULE, EXTENDED RELEASE ORAL at 15:12

## 2018-06-11 RX ADMIN — CARVEDILOL PHOSPHATE 3.12 MILLIGRAM(S): 80 CAPSULE, EXTENDED RELEASE ORAL at 06:49

## 2018-06-11 RX ADMIN — Medication 450 MILLIGRAM(S): at 17:22

## 2018-06-11 RX ADMIN — POLYETHYLENE GLYCOL 3350 17 GRAM(S): 17 POWDER, FOR SOLUTION ORAL at 11:35

## 2018-06-11 RX ADMIN — Medication 10 MILLIGRAM(S): at 17:21

## 2018-06-11 RX ADMIN — Medication 40 MILLIGRAM(S): at 06:49

## 2018-06-11 RX ADMIN — SENNA PLUS 2 TABLET(S): 8.6 TABLET ORAL at 21:45

## 2018-06-11 RX ADMIN — ENOXAPARIN SODIUM 40 MILLIGRAM(S): 100 INJECTION SUBCUTANEOUS at 06:49

## 2018-06-11 RX ADMIN — PANTOPRAZOLE SODIUM 40 MILLIGRAM(S): 20 TABLET, DELAYED RELEASE ORAL at 06:50

## 2018-06-11 RX ADMIN — CELECOXIB 200 MILLIGRAM(S): 200 CAPSULE ORAL at 13:15

## 2018-06-11 RX ADMIN — Medication 450 MILLIGRAM(S): at 13:15

## 2018-06-11 RX ADMIN — AMLODIPINE BESYLATE 10 MILLIGRAM(S): 2.5 TABLET ORAL at 21:45

## 2018-06-11 NOTE — DISCHARGE NOTE ADULT - HOME CARE AGENCY
[facility] [place] [phone]. Nurse to visit on day after discharge. Nurse to call prior to visit to arrange. Physical therapy to follow.

## 2018-06-11 NOTE — DISCHARGE NOTE ADULT - MEDICATION SUMMARY - MEDICATIONS TO TAKE
I will START or STAY ON the medications listed below when I get home from the hospital:    celecoxib 200 mg oral capsule  -- 1 cap(s) by mouth once a day  -- Indication: For Osteoarthritis    enalapril 20 mg oral tablet  -- 2 tab(s) by mouth once a day   -- Do not take this drug if you are pregnant.  It is very important that you take or use this exactly as directed.  Do not skip doses or discontinue unless directed by your doctor.  Some non-prescription drugs may aggravate your condition.  Read all labels carefully.  If a warning appears, check with your doctor before taking.    -- Indication: For Hypertension    carvedilol 3.125 mg oral tablet  -- 1 tab(s) by mouth every 12 hours  -- Indication: For Hypertension    furosemide 40 mg oral tablet  -- 1 tab(s) by mouth once a day  -- Indication: For Leg edema    docusate sodium 100 mg oral capsule  -- 1 cap(s) by mouth 2 times a day, As Needed -for constipation   -- Indication: For Constipation    polyethylene glycol 3350 oral powder for reconstitution  -- 17 gram(s) by mouth once a day, AS NEEDED for constipation  -- Indication: For Constipation    senna oral tablet  -- 2 tab(s) by mouth once a day (at bedtime), AS NEEDED for constipation  -- Indication: For Constipation    clindamycin 150 mg oral capsule  -- 3 cap(s) by mouth 4 times a day until 6/13  -- Indication: For Cellulitis (skin infection)    potassium chloride 10 mEq oral tablet, extended release  -- 1 tab(s) by mouth once a day  -- Indication: For Low potassium    lactobacillus acidophilus oral capsule  -- 1 cap(s) by mouth once a day   -- Indication: For Probiotic    pantoprazole 40 mg oral delayed release tablet  -- 1 tab(s) by mouth once a day (before a meal)  -- Indication: For Acid reflux I will START or STAY ON the medications listed below when I get home from the hospital:    celecoxib 200 mg oral capsule  -- 1 cap(s) by mouth once a day  -- Indication: For Osteoarthritis    enalapril 20 mg oral tablet  -- 2 tab(s) by mouth once a day   -- Do not take this drug if you are pregnant.  It is very important that you take or use this exactly as directed.  Do not skip doses or discontinue unless directed by your doctor.  Some non-prescription drugs may aggravate your condition.  Read all labels carefully.  If a warning appears, check with your doctor before taking.    -- Indication: For Hypertension    carvedilol 3.125 mg oral tablet  -- 1 tab(s) by mouth every 12 hours  -- Indication: For Hypertension    amLODIPine 5 mg oral tablet  -- 1 tab(s) by mouth once a day  -- Indication: For Hypertension    furosemide 40 mg oral tablet  -- 1 tab(s) by mouth once a day  -- Indication: For Leg edema    docusate sodium 100 mg oral capsule  -- 1 cap(s) by mouth 2 times a day, As Needed -for constipation   -- Indication: For Constipation    polyethylene glycol 3350 oral powder for reconstitution  -- 17 gram(s) by mouth once a day, AS NEEDED for constipation  -- Indication: For Constipation    senna oral tablet  -- 2 tab(s) by mouth once a day (at bedtime), AS NEEDED for constipation  -- Indication: For Constipation    clindamycin 150 mg oral capsule  -- 3 cap(s) by mouth 4 times a day until 6/13  -- Indication: For Cellulitis (skin infection)    potassium chloride 10 mEq oral tablet, extended release  -- 1 tab(s) by mouth once a day  -- Indication: For Low potassium    lactobacillus acidophilus oral capsule  -- 1 cap(s) by mouth once a day   -- Indication: For Probiotic    pantoprazole 40 mg oral delayed release tablet  -- 1 tab(s) by mouth once a day (before a meal)  -- Indication: For Acid reflux I will START or STAY ON the medications listed below when I get home from the hospital:    celecoxib 200 mg oral capsule  -- 1 cap(s) by mouth once a day  -- Indication: For Osteoarthritis    enalapril 20 mg oral tablet  -- 2 tab(s) by mouth once a day   -- Do not take this drug if you are pregnant.  It is very important that you take or use this exactly as directed.  Do not skip doses or discontinue unless directed by your doctor.  Some non-prescription drugs may aggravate your condition.  Read all labels carefully.  If a warning appears, check with your doctor before taking.    -- Indication: For Hypertension    carvedilol 3.125 mg oral tablet  -- 1 tab(s) by mouth every 12 hours  -- Indication: For Hypertension    amLODIPine 10 mg oral tablet  -- 1 tab(s) by mouth once a day   -- It is very important that you take or use this exactly as directed.  Do not skip doses or discontinue unless directed by your doctor.  Some non-prescription drugs may aggravate your condition.  Read all labels carefully.  If a warning appears, check with your doctor before taking.    -- Indication: For Hypertension    furosemide 40 mg oral tablet  -- 1 tab(s) by mouth once a day  -- Indication: For Leg edema    docusate sodium 100 mg oral capsule  -- 1 cap(s) by mouth 2 times a day, As Needed -for constipation   -- Indication: For Constipation    polyethylene glycol 3350 oral powder for reconstitution  -- 17 gram(s) by mouth once a day, AS NEEDED for constipation  -- Indication: For Constipation    senna oral tablet  -- 2 tab(s) by mouth once a day (at bedtime), AS NEEDED for constipation  -- Indication: For Constipation    clindamycin 150 mg oral capsule  -- 3 cap(s) by mouth 4 times a day until 6/13  -- Indication: For Cellulitis (skin infection)    potassium chloride 10 mEq oral tablet, extended release  -- 1 tab(s) by mouth once a day  -- Indication: For Low potassium    lactobacillus acidophilus oral capsule  -- 1 cap(s) by mouth once a day   -- Indication: For Probiotic    pantoprazole 40 mg oral delayed release tablet  -- 1 tab(s) by mouth once a day (before a meal)  -- Indication: For Acid reflux

## 2018-06-11 NOTE — ADVANCED PRACTICE NURSE CONSULT - RECOMMEDATIONS
Obtain ORLANDO and PVR to guide level of compression.    Recommend follow up care at [facility]: [phone]. Address: [address] [place] [address]; to evaluate for layered compression wraps and/or wound biopsy due to chronicity of wound.    Topical Recommendations:  Right medial lower leg Venous wound- Cleanse wound and periwound skin with SAF-clens, rinse with NS, pat dry. Apply Liquid barrier film to periwound skin. Apply Aquacel Ag hydrofiber to wound base, cover with silicone foam with border. Secure with Kerlix wrap, paper. Apply Ace bandage.    Continue low air loss bed therapy, continue leg/heel elevation while in bed, continue to encourage turning & repositioning q2h, continue moisture management with rosario moisture barrier cream & single breathable pad, continue measures to decrease friction/shear/pressure.     Continue with nutritional support as per dietary/orders.    Findings and plan discussed with patient and primary team. Patient educated on topical wound therapy all questions and concerns addressed.    Please contact Wound Care Service Line if we can be of further assistance (ext [phone]).

## 2018-06-11 NOTE — PROGRESS NOTE ADULT - SUBJECTIVE AND OBJECTIVE BOX
Angela Bennett MD (PGY-1)  Pager: [phone]  7AM-7PM      Patient is a 58y old  Male who presents with a chief complaint of right leg pain (11 Jun 2018 12:54)      SUBJECTIVE / OVERNIGHT EVENTS:  Patient reports feeling better, leg pain is improved, however, still with chronic b/l knee pain 2/2 OA. Normal BM overnight.     MEDICATIONS  (STANDING):  carvedilol 3.125 milliGRAM(s) Oral every 12 hours  celecoxib 200 milliGRAM(s) Oral daily  clindamycin   Capsule 450 milliGRAM(s) Oral four times a day  docusate sodium 100 milliGRAM(s) Oral two times a day  enalapril 40 milliGRAM(s) Oral daily  enoxaparin Injectable 40 milliGRAM(s) SubCutaneous every 24 hours  furosemide    Tablet 40 milliGRAM(s) Oral daily  lactobacillus acidophilus 1 Tablet(s) Oral daily  pantoprazole    Tablet 40 milliGRAM(s) Oral before breakfast  polyethylene glycol 3350 17 Gram(s) Oral daily  potassium chloride    Tablet ER 10 milliEquivalent(s) Oral daily  senna 2 Tablet(s) Oral at bedtime    MEDICATIONS  (PRN):      Vital Signs:  Vital Signs Last 24 Hrs  T(C): 37.5 (11 Jun 2018 06:47), Max: 37.5 (11 Jun 2018 06:47)  T(F): 99.5 (11 Jun 2018 06:47), Max: 99.5 (11 Jun 2018 06:47)  HR: 68 (11 Jun 2018 06:47) (62 - 88)  BP: 145/95 (11 Jun 2018 06:47) (145/95 - 170/113)  BP(mean): --  RR: 18 (11 Jun 2018 06:47) (18 - 19)  SpO2: 96% (11 Jun 2018 06:47) (95% - 96%)  Daily     Daily   CAPILLARY BLOOD GLUCOSE        I&O's Summary      PHYSICAL EXAM  GENERAL: Morbidly obese, NAD, sitting comfortably in bed, responding to questions appropriately  HEAD:  Atraumatic, Normocephalic  EYES: conjunctiva and sclera clear  CHEST/LUNG: Clear to auscultation bilaterally; No wheeze, rhonchi or rales  HEART: Regular rate and rhythm; No murmurs, rubs, or gallops  ABDOMEN: Soft, Nontender, No guarding, Nondistended; Bowel sounds present  EXTREMITIES:  2+ non pitting edema BLE, 2+ Peripheral Pulses, No clubbing, cyanosis  NEURO: AAOx3, moving all extremities, no tremors  SKIN: RLE with bandage c/d/i, no surrounding erythema, warmth or fluctuance No rashes      LABS:                        12.7   8.32  )-----------( 312      ( 11 Jun 2018 06:19 )             41.4     06-11    138  |  100  |  15  ----------------------------<  87  4.0   |  26  |  0.79    Ca    8.7      11 Jun 2018 06:19                RADIOLOGY & ADDITIONAL TESTS:

## 2018-06-11 NOTE — DISCHARGE NOTE ADULT - CARE PROVIDER_API CALL
Jennifer Goetz  [facility]  [address] [place] [id number]  Phone: [phone]  Fax: (   )    -    [facility],   [address] [place], [place]  Phone: [phone]  Fax: (   )    -

## 2018-06-11 NOTE — DISCHARGE NOTE ADULT - PROVIDER TOKENS
FREE:[LAST:[[facility]],FIRST:[[facility]],PHONE:[phone]],FAX:[(   )    -],ADDRESS:[[place] [address]]],FREE:[LAST:[[facility]],PHONE:[phone]],FAX:[(   )    -],ADDRESS:[[address] [place] [address]]]

## 2018-06-11 NOTE — PROGRESS NOTE ADULT - PROBLEM SELECTOR PLAN 1
Infected ulcer 2/2 venous stasis, ORLANDO/PVR normal, clinically improving  - will switch to oral clindamycin to complete 7 total days (end date 6/13)  - wound care recommendations noted  - outpatient wound care follow up  - celebrex for OA knee pain

## 2018-06-11 NOTE — DISCHARGE NOTE ADULT - PATIENT PORTAL LINK FT
You can access the [facility], offered by [facility], by registering with the following website: [email or web address]

## 2018-06-11 NOTE — ADVANCED PRACTICE NURSE CONSULT - ASSESSMENT
General: A & Ox 4, ambulates with crutches due to arthritis of Right knee. Continent of urine and stool. Skin warm, dry with increased moisture in intertriginous folds, adequate skin turgor.    Vascular: Bilateral lower extremities with hemosiderin staining. Thickened-yellow toenails. Wound of Right medial lower leg (see description below). No temperature changes noted. +3 pitting edema bilaterally. Capillary refill <3 seconds. +2 DP/PT pulses, with biphasic doppler sounds. Reports increased pain at end of day, relieved by elevation.      Right medial lower leg- Chronic wound secondary to Venous insufficieny- 10.3s5gnw3.2cm, 100% deep red, moist hypergranular base, suspicious of bioburden. Moderate serosanguineous drainage, no odor. Periwound skin with circumferential maceration. No increased warmth, no increased edema, no erythema noted. Goals of care: decrease bioburden, increased/absorb drainage, protect periwound skin.

## 2018-06-11 NOTE — DISCHARGE NOTE ADULT - PLAN OF CARE
Resolution of infection and wound healing Patient was diagnosed with cellulitis (infection) of ulcer on his right leg. He was treated with antibiotics (clindamycin) and wound care: Right medial lower leg Venous wound- Cleanse wound and periwound skin with SAF-clens, rinse with NS, pat dry. Apply Liquid barrier film to periwound skin. Apply Aquacel Ag hydrofiber to wound base, cover with silicone foam with border. Secure with Kerlix wrap, paper. Apply Ace bandage. Continue leg/heel elevation while in bed, continue to encourage turning & repositioning q2h, continue moisture management with rosario moisture barrier cream & single breathable pad, continue measures to decrease friction/shear/pressure. Patient instructed to continue taking clindamycin until 6/13. Patient should follow up with [facility]: [phone]. Address: [address] [place] [address]; to evaluate for layered compression wraps and/or wound biopsy due to chronicity of wound. Ongoing mangement Patient presented with severe hypertension with SBP >200s. Patient treated with enalapril 40mg daily, coreg 3.125mg twice daily and lasix 40mg daily. Patient instructed to take all medications as prescribed. Patient instructed to follow up with primary doctor within 1 week of discharge for further management and repeat lab work. Pain management Patient complained of severe bilateral knee pain 2/2 osteoarthritis. Patient treated with celecoxib 200mg daily. Patient instructed to lose weight through diet and exercise. Patient instructed to follow up with primary doctor within 1 week of discharge. Physical therapy recommending home physical therapy on discharge. Patient presented with severe hypertension with SBP >200s. Patient treated with enalapril 40mg daily, amlodipine 5mg daily, coreg 3.125mg twice daily and lasix 40mg daily. Patient instructed to take all medications as prescribed. Patient instructed to follow up with primary doctor within 1 week of discharge for further management and repeat lab work. Patient presented with severe hypertension with SBP >200s. Patient treated with enalapril 40mg daily, amlodipine 10mg daily, coreg 3.125mg twice daily and lasix 40mg daily. Patient instructed to take all medications as prescribed. Patient instructed to follow up with primary doctor within 1 week of discharge for further management and repeat lab work. Ongoing management

## 2018-06-11 NOTE — DISCHARGE NOTE ADULT - HOSPITAL COURSE
58M PMH morbid obesity, HTN, PUD, OA b/l knees who presents with infected ulcer on right leg and hypertensive urgency. Patient treated with clindamycin with clinical improvement in cellulitis. "Wound care consulted and recommended: Right medial lower leg Venous wound- Cleanse wound and periwound skin with SAF-clens, rinse with NS, pat dry. Apply Liquid barrier film to periwound skin. Apply Aquacel Ag hydrofiber to wound base, cover with silicone foam with border. Secure with Kerlix wrap, paper. Apply Ace bandage. Continue low air loss bed therapy, continue leg/heel elevation while in bed, continue to encourage turning & repositioning q2h, continue moisture management with rosario moisture barrier cream & single breathable pad, continue measures to decrease friction/shear/pressure." Patient had ORLANDO/PVR obtained on bilateral lower extremities which were normal. Patient also treated with enalapril 40mg daily, coreg 3.125mg twice daily and lasix 40mg daily for hypertension with improvement in blood pressures. Patient maintained on 10mg potassium chloride daily for hypokalemia secondary to lasix. Patient started on celecoxib 200mg daily for osteoarthritis pain. Patient instructed to follow up with [facility] within 1 week of discharge as well as primary care doctor (Jennifer garrido/ [facility]) within 1 week of discharge for repeat blood work and management of hypertension. Physical therapy evaluated patient and recommended home physical therapy. Patient stable for discharge home with services. 58M PMH morbid obesity, HTN, PUD, OA b/l knees who presents with infected ulcer on right leg and hypertensive urgency. Patient treated with clindamycin with clinical improvement in cellulitis. "Wound care consulted and recommended: Right medial lower leg Venous wound- Cleanse wound and periwound skin with SAF-clens, rinse with NS, pat dry. Apply Liquid barrier film to periwound skin. Apply Aquacel Ag hydrofiber to wound base, cover with silicone foam with border. Secure with Kerlix wrap, paper. Apply Ace bandage. Continue low air loss bed therapy, continue leg/heel elevation while in bed, continue to encourage turning & repositioning q2h, continue moisture management with rosario moisture barrier cream & single breathable pad, continue measures to decrease friction/shear/pressure." Patient had ORLANDO/PVR obtained on bilateral lower extremities which were normal. Patient also treated with enalapril 40mg daily, amlodipine 5mg daily, coreg 3.125mg twice daily and lasix 40mg daily for hypertension with improvement in blood pressures. Patient maintained on 10mg potassium chloride daily for hypokalemia secondary to lasix. Patient started on celecoxib 200mg daily for osteoarthritis pain. Patient instructed to follow up with [facility] within 1 week of discharge as well as primary care doctor (Jennifer garrido/ [facility]) within 1 week of discharge for repeat blood work and management of hypertension. Physical therapy evaluated patient and recommended home physical therapy. Patient stable for discharge home with services. 58M PMH morbid obesity, HTN, PUD, OA b/l knees who presents with infected ulcer on right leg and hypertensive urgency. Patient treated with clindamycin with clinical improvement in cellulitis. "Wound care consulted and recommended: Right medial lower leg Venous wound- Cleanse wound and periwound skin with SAF-clens, rinse with NS, pat dry. Apply Liquid barrier film to periwound skin. Apply Aquacel Ag hydrofiber to wound base, cover with silicone foam with border. Secure with Kerlix wrap, paper. Apply Ace bandage. Continue low air loss bed therapy, continue leg/heel elevation while in bed, continue to encourage turning & repositioning q2h, continue moisture management with rosario moisture barrier cream & single breathable pad, continue measures to decrease friction/shear/pressure." Patient had ORLANDO/PVR obtained on bilateral lower extremities which were normal. Patient also treated with enalapril 40mg daily, amlodipine 10mg daily, coreg 3.125mg twice daily and lasix 40mg daily for hypertension with improvement in blood pressures. Patient maintained on 10mg potassium chloride daily for hypokalemia secondary to lasix. Patient started on celecoxib 200mg daily for osteoarthritis pain. Patient instructed to follow up with [facility] within 1 week of discharge as well as primary care doctor (Jennifer garrido/ [facility]) within 1 week of discharge for repeat blood work and management of hypertension. Physical therapy evaluated patient and recommended home physical therapy. Patient stable for discharge home with services.

## 2018-06-11 NOTE — PROGRESS NOTE ADULT - PROBLEM SELECTOR PLAN 2
SBP 230s on presentation, asymptomatic, may be exacerbated by pain and medication non compliance, now resolved  - BP improved since admission, however still not at goal. SBP goal<140  - Enalapril increased to 40mg yesterday, if continues to be hypertensive today will add on amlodipine  - c/w lasix and coreg

## 2018-06-11 NOTE — DISCHARGE NOTE ADULT - CARE PLAN
Principal Discharge DX:	Ulcer of right lower extremity, unspecified ulcer stage  Goal:	Resolution of infection and wound healing  Assessment and plan of treatment:	Patient was diagnosed with cellulitis (infection) of ulcer on his right leg. He was treated with antibiotics (clindamycin) and wound care: Right medial lower leg Venous wound- Cleanse wound and periwound skin with SAF-clens, rinse with NS, pat dry. Apply Liquid barrier film to periwound skin. Apply Aquacel Ag hydrofiber to wound base, cover with silicone foam with border. Secure with Kerlix wrap, paper. Apply Ace bandage. Continue leg/heel elevation while in bed, continue to encourage turning & repositioning q2h, continue moisture management with rosario moisture barrier cream & single breathable pad, continue measures to decrease friction/shear/pressure. Patient instructed to continue taking clindamycin until 6/13. Patient should follow up with [facility]: [phone]. Address: [address] [place] [address]; to evaluate for layered compression wraps and/or wound biopsy due to chronicity of wound.  Secondary Diagnosis:	Hypertension  Goal:	Ongoing mangement  Assessment and plan of treatment:	Patient presented with severe hypertension with SBP >200s. Patient treated with enalapril 40mg daily, coreg 3.125mg twice daily and lasix 40mg daily. Patient instructed to take all medications as prescribed. Patient instructed to follow up with primary doctor within 1 week of discharge for further management and repeat lab work.  Secondary Diagnosis:	Osteoarthritis  Goal:	Pain management  Assessment and plan of treatment:	Patient complained of severe bilateral knee pain 2/2 osteoarthritis. Patient treated with celecoxib 200mg daily. Patient instructed to lose weight through diet and exercise. Patient instructed to follow up with primary doctor within 1 week of discharge. Physical therapy recommending home physical therapy on discharge. Principal Discharge DX:	Ulcer of right lower extremity, unspecified ulcer stage  Goal:	Resolution of infection and wound healing  Assessment and plan of treatment:	Patient was diagnosed with cellulitis (infection) of ulcer on his right leg. He was treated with antibiotics (clindamycin) and wound care: Right medial lower leg Venous wound- Cleanse wound and periwound skin with SAF-clens, rinse with NS, pat dry. Apply Liquid barrier film to periwound skin. Apply Aquacel Ag hydrofiber to wound base, cover with silicone foam with border. Secure with Kerlix wrap, paper. Apply Ace bandage. Continue leg/heel elevation while in bed, continue to encourage turning & repositioning q2h, continue moisture management with rosario moisture barrier cream & single breathable pad, continue measures to decrease friction/shear/pressure. Patient instructed to continue taking clindamycin until 6/13. Patient should follow up with [facility]: [phone]. Address: [address] [place] [address]; to evaluate for layered compression wraps and/or wound biopsy due to chronicity of wound.  Secondary Diagnosis:	Hypertension  Goal:	Ongoing mangement  Assessment and plan of treatment:	Patient presented with severe hypertension with SBP >200s. Patient treated with enalapril 40mg daily, amlodipine 5mg daily, coreg 3.125mg twice daily and lasix 40mg daily. Patient instructed to take all medications as prescribed. Patient instructed to follow up with primary doctor within 1 week of discharge for further management and repeat lab work.  Secondary Diagnosis:	Osteoarthritis  Goal:	Pain management  Assessment and plan of treatment:	Patient complained of severe bilateral knee pain 2/2 osteoarthritis. Patient treated with celecoxib 200mg daily. Patient instructed to lose weight through diet and exercise. Patient instructed to follow up with primary doctor within 1 week of discharge. Physical therapy recommending home physical therapy on discharge. Principal Discharge DX:	Ulcer of right lower extremity, unspecified ulcer stage  Goal:	Resolution of infection and wound healing  Assessment and plan of treatment:	Patient was diagnosed with cellulitis (infection) of ulcer on his right leg. He was treated with antibiotics (clindamycin) and wound care: Right medial lower leg Venous wound- Cleanse wound and periwound skin with SAF-clens, rinse with NS, pat dry. Apply Liquid barrier film to periwound skin. Apply Aquacel Ag hydrofiber to wound base, cover with silicone foam with border. Secure with Kerlix wrap, paper. Apply Ace bandage. Continue leg/heel elevation while in bed, continue to encourage turning & repositioning q2h, continue moisture management with rosario moisture barrier cream & single breathable pad, continue measures to decrease friction/shear/pressure. Patient instructed to continue taking clindamycin until 6/13. Patient should follow up with [facility]: [phone]. Address: [address] [place] [address]; to evaluate for layered compression wraps and/or wound biopsy due to chronicity of wound.  Secondary Diagnosis:	Hypertension  Goal:	Ongoing mangement  Assessment and plan of treatment:	Patient presented with severe hypertension with SBP >200s. Patient treated with enalapril 40mg daily, amlodipine 10mg daily, coreg 3.125mg twice daily and lasix 40mg daily. Patient instructed to take all medications as prescribed. Patient instructed to follow up with primary doctor within 1 week of discharge for further management and repeat lab work.  Secondary Diagnosis:	Osteoarthritis  Goal:	Pain management  Assessment and plan of treatment:	Patient complained of severe bilateral knee pain 2/2 osteoarthritis. Patient treated with celecoxib 200mg daily. Patient instructed to lose weight through diet and exercise. Patient instructed to follow up with primary doctor within 1 week of discharge. Physical therapy recommending home physical therapy on discharge. Principal Discharge DX:	Ulcer of right lower extremity, unspecified ulcer stage  Goal:	Resolution of infection and wound healing  Assessment and plan of treatment:	Patient was diagnosed with cellulitis (infection) of ulcer on his right leg. He was treated with antibiotics (clindamycin) and wound care: Right medial lower leg Venous wound- Cleanse wound and periwound skin with SAF-clens, rinse with NS, pat dry. Apply Liquid barrier film to periwound skin. Apply Aquacel Ag hydrofiber to wound base, cover with silicone foam with border. Secure with Kerlix wrap, paper. Apply Ace bandage. Continue leg/heel elevation while in bed, continue to encourage turning & repositioning q2h, continue moisture management with rosario moisture barrier cream & single breathable pad, continue measures to decrease friction/shear/pressure. Patient instructed to continue taking clindamycin until 6/13. Patient should follow up with [facility]: [phone]. Address: [address] [place] [address]; to evaluate for layered compression wraps and/or wound biopsy due to chronicity of wound.  Secondary Diagnosis:	Hypertension  Goal:	Ongoing management  Assessment and plan of treatment:	Patient presented with severe hypertension with SBP >200s. Patient treated with enalapril 40mg daily, amlodipine 10mg daily, coreg 3.125mg twice daily and lasix 40mg daily. Patient instructed to take all medications as prescribed. Patient instructed to follow up with primary doctor within 1 week of discharge for further management and repeat lab work.  Secondary Diagnosis:	Osteoarthritis  Goal:	Pain management  Assessment and plan of treatment:	Patient complained of severe bilateral knee pain 2/2 osteoarthritis. Patient treated with celecoxib 200mg daily. Patient instructed to lose weight through diet and exercise. Patient instructed to follow up with primary doctor within 1 week of discharge. Physical therapy recommending home physical therapy on discharge.

## 2018-06-11 NOTE — DISCHARGE NOTE ADULT - MEDICATION SUMMARY - MEDICATIONS TO CHANGE
I will SWITCH the dose or number of times a day I take the medications listed below when I get home from the hospital:    potassium chloride 20 mEq oral tablet, extended release  -- 1 tab(s) by mouth once a day    enalapril 10 mg oral tablet  -- 1 tab(s) by mouth once a day

## 2018-06-11 NOTE — ADVANCED PRACTICE NURSE CONSULT - REASON FOR CONSULT
Patient seen on skin care rounds after wound care referral received for assessment of skin impairment and recommendations of topical management. Chart reviewed: Serum albumin 3.4g/dL, HgbA1C 5.7%, C-RP 0.9, BMI 58.3kg/m2, Jermain 20, (-) DVT in visualized portion of LE, (-) blood cultures, patient interviewed. Patient H/O morbid obesity, HTN, PUD, osteoarthritis of b/l knees who presents with infected RLE venous stasis ulcer and hypertensive urgency (now resolved). Followed by internal medicine. Full note to follow. Patient seen on skin care rounds after wound care referral received for assessment of skin impairment and recommendations of topical management. Chart reviewed: Serum albumin 3.4g/dL, HgbA1C 5.7%, C-RP 0.9, BMI 58.3kg/m2, Jermain 20, (-) DVT in visualized portion of LE, (-) blood cultures, patient interviewed. Patient H/O morbid obesity, HTN, PUD, osteoarthritis of b/l knees who presents with infected RLE venous stasis ulcer and hypertensive urgency (now resolved). Followed by internal medicine. Pt interviewed; pt had a small blister apx 7 months ago that opened on RLE, was treated by VNS services daily for several months until wound healed. Apx 2 months ago had a hernia repair which resulted in swelling of his lower legs and reopening of the wound. RLE wound has been opened for apx 2 months; has not had visiting nurse services due to insurance. Wound has not been seen by an outpatient MD at any point. Assessment completed with Estonian speaking bedside RN in patient's primary language. Discussed with pt following up at [facility] outpatient wound [facility], pt agreeable. Will include information at end of note.

## 2018-06-11 NOTE — DISCHARGE NOTE ADULT - CONDITIONS AT DISCHARGE
This Patient is stable for discharge [place] only, Instructions are discussed in [place] via  Phone.  The Right Lower Extremity has the dressing  done by Podiatry today with Aquacel  and covered with Foam and wrapped with Kerlix.  He has 2+ edema, red   and his Pain is managed with celebrex. Supplies for dressing changes are provided and he will have Home care.   Discharge Instructions are given

## 2018-06-12 VITALS — DIASTOLIC BLOOD PRESSURE: 105 MMHG | RESPIRATION RATE: 17 BRPM | HEART RATE: 74 BPM | SYSTOLIC BLOOD PRESSURE: 148 MMHG

## 2018-06-12 LAB
BACTERIA BLD CULT: SIGNIFICANT CHANGE UP
BACTERIA BLD CULT: SIGNIFICANT CHANGE UP
BUN SERPL-MCNC: 14 MG/DL — SIGNIFICANT CHANGE UP (ref 7–23)
CALCIUM SERPL-MCNC: 8.9 MG/DL — SIGNIFICANT CHANGE UP (ref 8.4–10.5)
CHLORIDE SERPL-SCNC: 101 MMOL/L — SIGNIFICANT CHANGE UP (ref 98–107)
CO2 SERPL-SCNC: 28 MMOL/L — SIGNIFICANT CHANGE UP (ref 22–31)
CREAT SERPL-MCNC: 0.7 MG/DL — SIGNIFICANT CHANGE UP (ref 0.5–1.3)
GLUCOSE SERPL-MCNC: 85 MG/DL — SIGNIFICANT CHANGE UP (ref 70–99)
MAGNESIUM SERPL-MCNC: 2.2 MG/DL — SIGNIFICANT CHANGE UP (ref 1.6–2.6)
PHOSPHATE SERPL-MCNC: 3.2 MG/DL — SIGNIFICANT CHANGE UP (ref 2.5–4.5)
POTASSIUM SERPL-MCNC: 4 MMOL/L — SIGNIFICANT CHANGE UP (ref 3.5–5.3)
POTASSIUM SERPL-SCNC: 4 MMOL/L — SIGNIFICANT CHANGE UP (ref 3.5–5.3)
SODIUM SERPL-SCNC: 140 MMOL/L — SIGNIFICANT CHANGE UP (ref 135–145)

## 2018-06-12 PROCEDURE — 99239 HOSP IP/OBS DSCHRG MGMT >30: CPT

## 2018-06-12 RX ORDER — AMLODIPINE BESYLATE 2.5 MG/1
1 TABLET ORAL
Qty: 30 | Refills: 0 | OUTPATIENT
Start: 2018-06-12 | End: 2018-07-11

## 2018-06-12 RX ORDER — DICLOFENAC SODIUM/CAPSAICIN 1.5-0.025%
1 KIT, CREAM AND SOLUTION TOPICAL
Qty: 1 | Refills: 0 | OUTPATIENT
Start: 2018-06-12 | End: 2018-06-25

## 2018-06-12 RX ADMIN — Medication 10 MILLIEQUIVALENT(S): at 12:21

## 2018-06-12 RX ADMIN — AMLODIPINE BESYLATE 10 MILLIGRAM(S): 2.5 TABLET ORAL at 05:29

## 2018-06-12 RX ADMIN — Medication 100 MILLIGRAM(S): at 05:29

## 2018-06-12 RX ADMIN — Medication 450 MILLIGRAM(S): at 05:29

## 2018-06-12 RX ADMIN — PANTOPRAZOLE SODIUM 40 MILLIGRAM(S): 20 TABLET, DELAYED RELEASE ORAL at 05:30

## 2018-06-12 RX ADMIN — Medication 40 MILLIGRAM(S): at 05:30

## 2018-06-12 RX ADMIN — POLYETHYLENE GLYCOL 3350 17 GRAM(S): 17 POWDER, FOR SOLUTION ORAL at 12:21

## 2018-06-12 RX ADMIN — Medication 40 MILLIGRAM(S): at 05:29

## 2018-06-12 RX ADMIN — ENOXAPARIN SODIUM 40 MILLIGRAM(S): 100 INJECTION SUBCUTANEOUS at 05:29

## 2018-06-12 RX ADMIN — Medication 450 MILLIGRAM(S): at 12:21

## 2018-06-12 RX ADMIN — CARVEDILOL PHOSPHATE 3.12 MILLIGRAM(S): 80 CAPSULE, EXTENDED RELEASE ORAL at 05:29

## 2018-06-12 RX ADMIN — CELECOXIB 200 MILLIGRAM(S): 200 CAPSULE ORAL at 12:21

## 2018-06-12 RX ADMIN — Medication 1 TABLET(S): at 12:21

## 2018-06-12 NOTE — PROGRESS NOTE ADULT - PROBLEM SELECTOR PROBLEM 3
PUD (peptic ulcer disease)

## 2018-06-12 NOTE — PROGRESS NOTE ADULT - ASSESSMENT
58M PMH morbid obesity, HTN, PUD, OA b/l knees who presents with infected RLE venous stasis ulcer and hypertensive urgency (now resolved)

## 2018-06-12 NOTE — PROGRESS NOTE ADULT - PROBLEM SELECTOR PROBLEM 1
Ulcer of right lower extremity, unspecified ulcer stage

## 2018-06-12 NOTE — PROGRESS NOTE ADULT - PROBLEM SELECTOR PLAN 4
Patient on lasix daily for chronic b/l LE edema, may be component of venous stasis as last TTE without systolic or diastolic dysfunction, patient without CP/SOB/FONSECA/PND  - c/w lasix 40mg daily  - will place compression stocking on right leg, hold off on compression stocking on left leg for now given ulcer
Patient on lasix daily for chronic b/l LE edema, may be component of venous stasis as last TTE without systolic or diastolic dysfunction, patient without CP/SOB/FNOSECA/PND  - c/w lasix 40mg daily  - will place compression stocking on right leg, hold off on compression stocking on left leg for now given ulcer
Patient on lasix daily for chronic b/l LE edema, may be component of venous stasis as last TTE without systolic or diastolic dysfunction, patient without CP/SOB/FONSECA/PND  - c/w lasix 40mg daily  - will place compression stocking on right leg, hold off on compression stocking on left leg for now given ulcer
Patient on lasix daily for chronic b/l LE edema, may be component of venous stasis as last TTE without systolic or diastolic dysfunction, patient without CP/SOB/FONSECA/PND  - c/w lasix 40mg daily  - will place compression stocking on right leg, hold off on compression stocking on left leg for now given ulcer
Patient on lasix daily for chronic b/l LE edema, may be component of venous stasis as last TTE without systolic or diastolic dysfunction, patient without CP/SOB/FONSECA/PND  - c/w lasix 40mg daily

## 2018-06-12 NOTE — PROGRESS NOTE ADULT - PROBLEM SELECTOR PLAN 2
SBP 230s on presentation, asymptomatic, may be exacerbated by pain and medication non compliance, now resolved  - BP improved this AM but still not at goal <140/80  - Enalapril increased to 40mg  - Add amlodipine 5mg daily  - c/w lasix and coreg SBP 230s on presentation, asymptomatic, may be exacerbated by pain and medication non compliance, now resolved  - BP improved this AM but still not at goal <140/80  - Enalapril increased to 40mg  - Add amlodipine 10mg daily  - c/w lasix and coreg

## 2018-06-12 NOTE — PROGRESS NOTE ADULT - SUBJECTIVE AND OBJECTIVE BOX
Angela Bennett MD (PGY-1)  Pager: [phone]  7AM-7PM      Patient is a 58y old  Male who presents with a chief complaint of right leg pain (11 Jun 2018 12:54)      SUBJECTIVE / OVERNIGHT EVENTS:  Patient with hypertension to SBP 180s last night likely 2/2 leg pain after ORLANDO performed. Patient with better controlled BP this AM. Patient reporting improved leg pain, able to ambulate with crutches. Regular BM last night.     MEDICATIONS  (STANDING):  amLODIPine   Tablet 10 milliGRAM(s) Oral daily  carvedilol 3.125 milliGRAM(s) Oral every 12 hours  celecoxib 200 milliGRAM(s) Oral daily  clindamycin   Capsule 450 milliGRAM(s) Oral four times a day  docusate sodium 100 milliGRAM(s) Oral two times a day  enalapril 40 milliGRAM(s) Oral daily  enoxaparin Injectable 40 milliGRAM(s) SubCutaneous every 24 hours  furosemide    Tablet 40 milliGRAM(s) Oral daily  lactobacillus acidophilus 1 Tablet(s) Oral daily  pantoprazole    Tablet 40 milliGRAM(s) Oral before breakfast  polyethylene glycol 3350 17 Gram(s) Oral daily  potassium chloride    Tablet ER 10 milliEquivalent(s) Oral daily  senna 2 Tablet(s) Oral at bedtime    MEDICATIONS  (PRN):      Vital Signs:  Vital Signs Last 24 Hrs  T(C): 36.8 (12 Jun 2018 05:28), Max: 36.8 (12 Jun 2018 05:28)  T(F): 98.3 (12 Jun 2018 05:28), Max: 98.3 (12 Jun 2018 05:28)  HR: 65 (12 Jun 2018 05:28) (65 - 72)  BP: 148/99 (12 Jun 2018 05:28) (148/99 - 182/106)  BP(mean): --  RR: 18 (12 Jun 2018 05:28) (18 - 20)  SpO2: 100% (12 Jun 2018 05:28) (100% - 100%)  Daily     Daily   CAPILLARY BLOOD GLUCOSE        I&O's Summary    11 Jun 2018 07:01  -  12 Jun 2018 07:00  --------------------------------------------------------  IN: 0 mL / OUT: 800 mL / NET: -800 mL        PHYSICAL EXAM  GENERAL: Morbidly obese, NAD, sitting comfortably in bed, responding to questions appropriately  HEAD:  Atraumatic, Normocephalic  EYES: conjunctiva and sclera clear  CHEST/LUNG: Clear to auscultation bilaterally; No wheeze, rhonchi or rales  HEART: Regular rate and rhythm; No murmurs, rubs, or gallops  ABDOMEN: Obese, Soft, Nontender, No guarding, Nondistended; Bowel sounds present  EXTREMITIES: 2+ non pitting edema b/l LE; 2+ Peripheral Pulses, No clubbing or cyanosis  NEURO: AAOx3, moving all extremities, no tremors  SKIN: Right leg wound w/ dressing c/d/i, no surrounding warmth or erythema, minimal tenderness on palpation      LABS:                        12.7   8.32  )-----------( 312      ( 11 Jun 2018 06:19 )             41.4     06-12    140  |  101  |  14  ----------------------------<  85  4.0   |  28  |  0.70    Ca    8.9      12 Jun 2018 06:10  Phos  3.2     06-12  Mg     2.2     06-12                RADIOLOGY & ADDITIONAL TESTS:

## 2018-06-12 NOTE — PROGRESS NOTE ADULT - PROBLEM SELECTOR PLAN 3
Hx of PUD but patient not on PPI outpatient  - will monitor off PPI for now
Hx of PUD but patient not on PPI outpatient  - c/w protonix

## 2018-06-12 NOTE — PROGRESS NOTE ADULT - ATTENDING COMMENTS
Patient was seen and examined personally by me. I have discussed the plan and reviewed the resident's note and agree with the above physical exam findings including assessment and plan except as indicated below.    Will give NSAIDs PRN for OA pain instead of opiates  [place]O: nml study  Add Norvasc for better BP control  Continue Clindamycin for 7 days total  Wound care followup  If BP better controlled, will DC home with home PT
Patient was seen and examined personally by me. I have discussed the plan and reviewed the resident's note and agree with the above physical exam findings including assessment and plan except as indicated below.    BP better controlled today, continue on current meds  Continue Clindamycin for 7 days total  Outpt Wound care followup  Stable for DC home with home PT. DC time: 33 min

## 2018-06-22 DIAGNOSIS — R69 ILLNESS, UNSPECIFIED: ICD-10-CM

## 2018-06-28 ENCOUNTER — APPOINTMENT (OUTPATIENT)
Dept: WOUND CARE | Facility: CLINIC | Age: 58
End: 2018-06-28
Payer: MEDICAID

## 2018-06-28 VITALS
HEIGHT: 66 IN | BODY MASS INDEX: 44.68 KG/M2 | HEART RATE: 64 BPM | WEIGHT: 278 LBS | TEMPERATURE: 98 F | DIASTOLIC BLOOD PRESSURE: 108 MMHG | SYSTOLIC BLOOD PRESSURE: 157 MMHG

## 2018-06-28 PROCEDURE — 99203 OFFICE O/P NEW LOW 30 MIN: CPT

## 2018-07-02 ENCOUNTER — APPOINTMENT (OUTPATIENT)
Dept: VASCULAR SURGERY | Facility: CLINIC | Age: 58
End: 2018-07-02

## 2018-07-06 ENCOUNTER — APPOINTMENT (OUTPATIENT)
Dept: WOUND CARE | Facility: CLINIC | Age: 58
End: 2018-07-06
Payer: SELF-PAY

## 2018-07-06 VITALS
HEIGHT: 66 IN | WEIGHT: 278 LBS | BODY MASS INDEX: 44.68 KG/M2 | DIASTOLIC BLOOD PRESSURE: 105 MMHG | HEART RATE: 65 BPM | TEMPERATURE: 98 F | SYSTOLIC BLOOD PRESSURE: 165 MMHG

## 2018-07-06 DIAGNOSIS — S81.801A UNSPECIFIED OPEN WOUND, RIGHT LOWER LEG, INITIAL ENCOUNTER: ICD-10-CM

## 2018-07-06 PROCEDURE — 99213 OFFICE O/P EST LOW 20 MIN: CPT

## 2018-07-12 ENCOUNTER — APPOINTMENT (OUTPATIENT)
Dept: WOUND CARE | Facility: CLINIC | Age: 58
End: 2018-07-12
Payer: SELF-PAY

## 2018-07-12 PROCEDURE — 99213 OFFICE O/P EST LOW 20 MIN: CPT

## 2018-07-18 ENCOUNTER — APPOINTMENT (OUTPATIENT)
Dept: WOUND CARE | Facility: CLINIC | Age: 58
End: 2018-07-18
Payer: SELF-PAY

## 2018-07-18 VITALS
DIASTOLIC BLOOD PRESSURE: 78 MMHG | RESPIRATION RATE: 18 BRPM | BODY MASS INDEX: 44.68 KG/M2 | SYSTOLIC BLOOD PRESSURE: 145 MMHG | HEIGHT: 66 IN | HEART RATE: 77 BPM | TEMPERATURE: 97.6 F | WEIGHT: 278 LBS

## 2018-07-18 DIAGNOSIS — Z78.9 OTHER SPECIFIED HEALTH STATUS: ICD-10-CM

## 2018-07-18 DIAGNOSIS — S81.801D UNSPECIFIED OPEN WOUND, RIGHT LOWER LEG, SUBSEQUENT ENCOUNTER: ICD-10-CM

## 2018-07-18 DIAGNOSIS — I87.2 VENOUS INSUFFICIENCY (CHRONIC) (PERIPHERAL): ICD-10-CM

## 2018-07-18 PROCEDURE — 99213 OFFICE O/P EST LOW 20 MIN: CPT

## 2018-07-23 ENCOUNTER — APPOINTMENT (OUTPATIENT)
Dept: VASCULAR SURGERY | Facility: CLINIC | Age: 58
End: 2018-07-23

## 2018-08-03 ENCOUNTER — APPOINTMENT (OUTPATIENT)
Dept: VASCULAR SURGERY | Facility: CLINIC | Age: 58
End: 2018-08-03

## 2018-10-16 ENCOUNTER — INPATIENT (INPATIENT)
Facility: HOSPITAL | Age: 58
LOS: 6 days | Discharge: HOME | End: 2018-10-23
Attending: INTERNAL MEDICINE | Admitting: INTERNAL MEDICINE
Payer: MEDICAID

## 2018-10-16 VITALS
OXYGEN SATURATION: 99 % | SYSTOLIC BLOOD PRESSURE: 185 MMHG | HEART RATE: 85 BPM | WEIGHT: 279.99 LBS | TEMPERATURE: 99 F | DIASTOLIC BLOOD PRESSURE: 120 MMHG | RESPIRATION RATE: 20 BRPM | HEIGHT: 66 IN

## 2018-10-16 DIAGNOSIS — S81.801S UNSPECIFIED OPEN WOUND, RIGHT LOWER LEG, SEQUELA: ICD-10-CM

## 2018-10-16 DIAGNOSIS — B95.61 METHICILLIN SUSCEPTIBLE STAPHYLOCOCCUS AUREUS INFECTION AS THE CAUSE OF DISEASES CLASSIFIED ELSEWHERE: ICD-10-CM

## 2018-10-16 DIAGNOSIS — L03.115 CELLULITIS OF RIGHT LOWER LIMB: ICD-10-CM

## 2018-10-16 DIAGNOSIS — Z98.89 OTHER SPECIFIED POSTPROCEDURAL STATES: Chronic | ICD-10-CM

## 2018-10-16 DIAGNOSIS — K21.9 GASTRO-ESOPHAGEAL REFLUX DISEASE WITHOUT ESOPHAGITIS: ICD-10-CM

## 2018-10-16 DIAGNOSIS — E53.8 DEFICIENCY OF OTHER SPECIFIED B GROUP VITAMINS: ICD-10-CM

## 2018-10-16 DIAGNOSIS — K46.9 UNSPECIFIED ABDOMINAL HERNIA WITHOUT OBSTRUCTION OR GANGRENE: Chronic | ICD-10-CM

## 2018-10-16 DIAGNOSIS — I10 ESSENTIAL (PRIMARY) HYPERTENSION: ICD-10-CM

## 2018-10-16 DIAGNOSIS — B95.2 ENTEROCOCCUS AS THE CAUSE OF DISEASES CLASSIFIED ELSEWHERE: ICD-10-CM

## 2018-10-16 DIAGNOSIS — E66.01 MORBID (SEVERE) OBESITY DUE TO EXCESS CALORIES: ICD-10-CM

## 2018-10-16 DIAGNOSIS — M17.0 BILATERAL PRIMARY OSTEOARTHRITIS OF KNEE: ICD-10-CM

## 2018-10-16 DIAGNOSIS — R58 HEMORRHAGE, NOT ELSEWHERE CLASSIFIED: Chronic | ICD-10-CM

## 2018-10-16 DIAGNOSIS — D50.9 IRON DEFICIENCY ANEMIA, UNSPECIFIED: ICD-10-CM

## 2018-10-16 DIAGNOSIS — K26.7 CHRONIC DUODENAL ULCER WITHOUT HEMORRHAGE OR PERFORATION: ICD-10-CM

## 2018-10-16 LAB
ALBUMIN SERPL ELPH-MCNC: 2.9 G/DL — LOW (ref 3.3–5)
ALP SERPL-CCNC: 91 U/L — SIGNIFICANT CHANGE UP (ref 40–120)
ALT FLD-CCNC: 17 U/L — SIGNIFICANT CHANGE UP (ref 12–78)
ANION GAP SERPL CALC-SCNC: 8 MMOL/L — SIGNIFICANT CHANGE UP (ref 5–17)
ANION GAP SERPL CALC-SCNC: 9 MMOL/L — SIGNIFICANT CHANGE UP (ref 5–17)
AST SERPL-CCNC: 21 U/L — SIGNIFICANT CHANGE UP (ref 15–37)
BASOPHILS # BLD AUTO: 0.06 K/UL — SIGNIFICANT CHANGE UP (ref 0–0.2)
BASOPHILS # BLD AUTO: 0.06 K/UL — SIGNIFICANT CHANGE UP (ref 0–0.2)
BASOPHILS NFR BLD AUTO: 0.6 % — SIGNIFICANT CHANGE UP (ref 0–2)
BASOPHILS NFR BLD AUTO: 0.6 % — SIGNIFICANT CHANGE UP (ref 0–2)
BILIRUB SERPL-MCNC: 0.3 MG/DL — SIGNIFICANT CHANGE UP (ref 0.2–1.2)
BUN SERPL-MCNC: 20 MG/DL — SIGNIFICANT CHANGE UP (ref 7–23)
BUN SERPL-MCNC: 22 MG/DL — SIGNIFICANT CHANGE UP (ref 7–23)
CALCIUM SERPL-MCNC: 8 MG/DL — LOW (ref 8.5–10.1)
CALCIUM SERPL-MCNC: 8.2 MG/DL — LOW (ref 8.5–10.1)
CHLORIDE SERPL-SCNC: 104 MMOL/L — SIGNIFICANT CHANGE UP (ref 96–108)
CHLORIDE SERPL-SCNC: 104 MMOL/L — SIGNIFICANT CHANGE UP (ref 96–108)
CO2 SERPL-SCNC: 29 MMOL/L — SIGNIFICANT CHANGE UP (ref 22–31)
CO2 SERPL-SCNC: 29 MMOL/L — SIGNIFICANT CHANGE UP (ref 22–31)
CREAT SERPL-MCNC: 0.99 MG/DL — SIGNIFICANT CHANGE UP (ref 0.5–1.3)
CREAT SERPL-MCNC: 1.08 MG/DL — SIGNIFICANT CHANGE UP (ref 0.5–1.3)
CRP SERPL-MCNC: 2.54 MG/DL — HIGH (ref 0–0.4)
CRP SERPL-MCNC: 2.72 MG/DL — HIGH (ref 0–0.4)
EOSINOPHIL # BLD AUTO: 0.15 K/UL — SIGNIFICANT CHANGE UP (ref 0–0.5)
EOSINOPHIL # BLD AUTO: 0.17 K/UL — SIGNIFICANT CHANGE UP (ref 0–0.5)
EOSINOPHIL NFR BLD AUTO: 1.5 % — SIGNIFICANT CHANGE UP (ref 0–6)
EOSINOPHIL NFR BLD AUTO: 1.8 % — SIGNIFICANT CHANGE UP (ref 0–6)
ERYTHROCYTE [SEDIMENTATION RATE] IN BLOOD: 21 MM/HR — HIGH (ref 0–20)
GLUCOSE SERPL-MCNC: 103 MG/DL — HIGH (ref 70–99)
GLUCOSE SERPL-MCNC: 95 MG/DL — SIGNIFICANT CHANGE UP (ref 70–99)
HCT VFR BLD CALC: 35.7 % — LOW (ref 39–50)
HCT VFR BLD CALC: 36.5 % — LOW (ref 39–50)
HGB BLD-MCNC: 10.8 G/DL — LOW (ref 13–17)
HGB BLD-MCNC: 11.3 G/DL — LOW (ref 13–17)
IMM GRANULOCYTES NFR BLD AUTO: 0.3 % — SIGNIFICANT CHANGE UP (ref 0–1.5)
IMM GRANULOCYTES NFR BLD AUTO: 0.4 % — SIGNIFICANT CHANGE UP (ref 0–1.5)
LACTATE SERPL-SCNC: 0.9 MMOL/L — SIGNIFICANT CHANGE UP (ref 0.7–2)
LYMPHOCYTES # BLD AUTO: 1.47 K/UL — SIGNIFICANT CHANGE UP (ref 1–3.3)
LYMPHOCYTES # BLD AUTO: 1.64 K/UL — SIGNIFICANT CHANGE UP (ref 1–3.3)
LYMPHOCYTES # BLD AUTO: 15.2 % — SIGNIFICANT CHANGE UP (ref 13–44)
LYMPHOCYTES # BLD AUTO: 17.5 % — SIGNIFICANT CHANGE UP (ref 13–44)
MCHC RBC-ENTMCNC: 26.9 PG — LOW (ref 27–34)
MCHC RBC-ENTMCNC: 27.1 PG — SIGNIFICANT CHANGE UP (ref 27–34)
MCHC RBC-ENTMCNC: 30.3 GM/DL — LOW (ref 32–36)
MCHC RBC-ENTMCNC: 31 GM/DL — LOW (ref 32–36)
MCV RBC AUTO: 87.5 FL — SIGNIFICANT CHANGE UP (ref 80–100)
MCV RBC AUTO: 89 FL — SIGNIFICANT CHANGE UP (ref 80–100)
MONOCYTES # BLD AUTO: 0.63 K/UL — SIGNIFICANT CHANGE UP (ref 0–0.9)
MONOCYTES # BLD AUTO: 0.73 K/UL — SIGNIFICANT CHANGE UP (ref 0–0.9)
MONOCYTES NFR BLD AUTO: 6.5 % — SIGNIFICANT CHANGE UP (ref 2–14)
MONOCYTES NFR BLD AUTO: 7.8 % — SIGNIFICANT CHANGE UP (ref 2–14)
NEUTROPHILS # BLD AUTO: 6.75 K/UL — SIGNIFICANT CHANGE UP (ref 1.8–7.4)
NEUTROPHILS # BLD AUTO: 7.33 K/UL — SIGNIFICANT CHANGE UP (ref 1.8–7.4)
NEUTROPHILS NFR BLD AUTO: 72 % — SIGNIFICANT CHANGE UP (ref 43–77)
NEUTROPHILS NFR BLD AUTO: 75.8 % — SIGNIFICANT CHANGE UP (ref 43–77)
NRBC # BLD: 0 /100 WBCS — SIGNIFICANT CHANGE UP (ref 0–0)
NRBC # BLD: 0 /100 WBCS — SIGNIFICANT CHANGE UP (ref 0–0)
PLATELET # BLD AUTO: 364 K/UL — SIGNIFICANT CHANGE UP (ref 150–400)
PLATELET # BLD AUTO: 372 K/UL — SIGNIFICANT CHANGE UP (ref 150–400)
POTASSIUM SERPL-MCNC: 3.3 MMOL/L — LOW (ref 3.5–5.3)
POTASSIUM SERPL-MCNC: 3.4 MMOL/L — LOW (ref 3.5–5.3)
POTASSIUM SERPL-SCNC: 3.3 MMOL/L — LOW (ref 3.5–5.3)
POTASSIUM SERPL-SCNC: 3.4 MMOL/L — LOW (ref 3.5–5.3)
PROT SERPL-MCNC: 7.2 GM/DL — SIGNIFICANT CHANGE UP (ref 6–8.3)
RBC # BLD: 4.01 M/UL — LOW (ref 4.2–5.8)
RBC # BLD: 4.17 M/UL — LOW (ref 4.2–5.8)
RBC # FLD: 13.7 % — SIGNIFICANT CHANGE UP (ref 10.3–14.5)
RBC # FLD: 13.9 % — SIGNIFICANT CHANGE UP (ref 10.3–14.5)
SODIUM SERPL-SCNC: 141 MMOL/L — SIGNIFICANT CHANGE UP (ref 135–145)
SODIUM SERPL-SCNC: 142 MMOL/L — SIGNIFICANT CHANGE UP (ref 135–145)
WBC # BLD: 9.38 K/UL — SIGNIFICANT CHANGE UP (ref 3.8–10.5)
WBC # BLD: 9.68 K/UL — SIGNIFICANT CHANGE UP (ref 3.8–10.5)
WBC # FLD AUTO: 9.38 K/UL — SIGNIFICANT CHANGE UP (ref 3.8–10.5)
WBC # FLD AUTO: 9.68 K/UL — SIGNIFICANT CHANGE UP (ref 3.8–10.5)

## 2018-10-16 PROCEDURE — 99223 1ST HOSP IP/OBS HIGH 75: CPT

## 2018-10-16 PROCEDURE — 12345: CPT | Mod: NC

## 2018-10-16 PROCEDURE — 71045 X-RAY EXAM CHEST 1 VIEW: CPT | Mod: 26

## 2018-10-16 PROCEDURE — 99284 EMERGENCY DEPT VISIT MOD MDM: CPT | Mod: 25

## 2018-10-16 PROCEDURE — 73590 X-RAY EXAM OF LOWER LEG: CPT | Mod: 26,RT

## 2018-10-16 PROCEDURE — 93010 ELECTROCARDIOGRAM REPORT: CPT

## 2018-10-16 RX ORDER — PANTOPRAZOLE SODIUM 20 MG/1
40 TABLET, DELAYED RELEASE ORAL
Qty: 0 | Refills: 0 | Status: DISCONTINUED | OUTPATIENT
Start: 2018-10-16 | End: 2018-10-23

## 2018-10-16 RX ORDER — HEPARIN SODIUM 5000 [USP'U]/ML
5000 INJECTION INTRAVENOUS; SUBCUTANEOUS EVERY 12 HOURS
Qty: 0 | Refills: 0 | Status: DISCONTINUED | OUTPATIENT
Start: 2018-10-16 | End: 2018-10-23

## 2018-10-16 RX ORDER — VANCOMYCIN HCL 1 G
1000 VIAL (EA) INTRAVENOUS ONCE
Qty: 0 | Refills: 0 | Status: COMPLETED | OUTPATIENT
Start: 2018-10-16 | End: 2018-10-16

## 2018-10-16 RX ORDER — POTASSIUM CHLORIDE 20 MEQ
40 PACKET (EA) ORAL EVERY 4 HOURS
Qty: 0 | Refills: 0 | Status: COMPLETED | OUTPATIENT
Start: 2018-10-16 | End: 2018-10-16

## 2018-10-16 RX ORDER — CARVEDILOL PHOSPHATE 80 MG/1
3.12 CAPSULE, EXTENDED RELEASE ORAL EVERY 12 HOURS
Qty: 0 | Refills: 0 | Status: DISCONTINUED | OUTPATIENT
Start: 2018-10-16 | End: 2018-10-23

## 2018-10-16 RX ORDER — VANCOMYCIN HCL 1 G
1000 VIAL (EA) INTRAVENOUS EVERY 12 HOURS
Qty: 0 | Refills: 0 | Status: DISCONTINUED | OUTPATIENT
Start: 2018-10-16 | End: 2018-10-17

## 2018-10-16 RX ORDER — PIPERACILLIN AND TAZOBACTAM 4; .5 G/20ML; G/20ML
3.38 INJECTION, POWDER, LYOPHILIZED, FOR SOLUTION INTRAVENOUS EVERY 8 HOURS
Qty: 0 | Refills: 0 | Status: DISCONTINUED | OUTPATIENT
Start: 2018-10-16 | End: 2018-10-17

## 2018-10-16 RX ORDER — OXYCODONE AND ACETAMINOPHEN 5; 325 MG/1; MG/1
2 TABLET ORAL EVERY 6 HOURS
Qty: 0 | Refills: 0 | Status: DISCONTINUED | OUTPATIENT
Start: 2018-10-16 | End: 2018-10-22

## 2018-10-16 RX ORDER — CARVEDILOL PHOSPHATE 80 MG/1
3.12 CAPSULE, EXTENDED RELEASE ORAL ONCE
Qty: 0 | Refills: 0 | Status: COMPLETED | OUTPATIENT
Start: 2018-10-16 | End: 2018-10-16

## 2018-10-16 RX ORDER — AMLODIPINE BESYLATE 2.5 MG/1
2.5 TABLET ORAL DAILY
Qty: 0 | Refills: 0 | Status: DISCONTINUED | OUTPATIENT
Start: 2018-10-16 | End: 2018-10-17

## 2018-10-16 RX ORDER — SODIUM CHLORIDE 9 MG/ML
1000 INJECTION, SOLUTION INTRAVENOUS
Qty: 0 | Refills: 0 | Status: DISCONTINUED | OUTPATIENT
Start: 2018-10-16 | End: 2018-10-17

## 2018-10-16 RX ORDER — MORPHINE SULFATE 50 MG/1
4 CAPSULE, EXTENDED RELEASE ORAL ONCE
Qty: 0 | Refills: 0 | Status: DISCONTINUED | OUTPATIENT
Start: 2018-10-16 | End: 2018-10-16

## 2018-10-16 RX ORDER — OXYCODONE AND ACETAMINOPHEN 5; 325 MG/1; MG/1
1 TABLET ORAL EVERY 4 HOURS
Qty: 0 | Refills: 0 | Status: DISCONTINUED | OUTPATIENT
Start: 2018-10-16 | End: 2018-10-16

## 2018-10-16 RX ORDER — ACETAMINOPHEN 500 MG
650 TABLET ORAL EVERY 6 HOURS
Qty: 0 | Refills: 0 | Status: DISCONTINUED | OUTPATIENT
Start: 2018-10-16 | End: 2018-10-23

## 2018-10-16 RX ORDER — PIPERACILLIN AND TAZOBACTAM 4; .5 G/20ML; G/20ML
3.38 INJECTION, POWDER, LYOPHILIZED, FOR SOLUTION INTRAVENOUS ONCE
Qty: 0 | Refills: 0 | Status: COMPLETED | OUTPATIENT
Start: 2018-10-16 | End: 2018-10-16

## 2018-10-16 RX ORDER — AMLODIPINE BESYLATE 2.5 MG/1
10 TABLET ORAL ONCE
Qty: 0 | Refills: 0 | Status: COMPLETED | OUTPATIENT
Start: 2018-10-16 | End: 2018-10-16

## 2018-10-16 RX ORDER — OXYCODONE AND ACETAMINOPHEN 5; 325 MG/1; MG/1
1 TABLET ORAL EVERY 6 HOURS
Qty: 0 | Refills: 0 | Status: DISCONTINUED | OUTPATIENT
Start: 2018-10-16 | End: 2018-10-17

## 2018-10-16 RX ADMIN — Medication 20 MILLIGRAM(S): at 03:12

## 2018-10-16 RX ADMIN — OXYCODONE AND ACETAMINOPHEN 2 TABLET(S): 5; 325 TABLET ORAL at 18:40

## 2018-10-16 RX ADMIN — PIPERACILLIN AND TAZOBACTAM 3.38 GRAM(S): 4; .5 INJECTION, POWDER, LYOPHILIZED, FOR SOLUTION INTRAVENOUS at 01:30

## 2018-10-16 RX ADMIN — AMLODIPINE BESYLATE 10 MILLIGRAM(S): 2.5 TABLET ORAL at 03:12

## 2018-10-16 RX ADMIN — OXYCODONE AND ACETAMINOPHEN 1 TABLET(S): 5; 325 TABLET ORAL at 08:20

## 2018-10-16 RX ADMIN — PIPERACILLIN AND TAZOBACTAM 200 GRAM(S): 4; .5 INJECTION, POWDER, LYOPHILIZED, FOR SOLUTION INTRAVENOUS at 01:00

## 2018-10-16 RX ADMIN — Medication 40 MILLIEQUIVALENT(S): at 10:18

## 2018-10-16 RX ADMIN — CARVEDILOL PHOSPHATE 3.12 MILLIGRAM(S): 80 CAPSULE, EXTENDED RELEASE ORAL at 03:12

## 2018-10-16 RX ADMIN — Medication 1000 MILLIGRAM(S): at 03:00

## 2018-10-16 RX ADMIN — Medication 250 MILLIGRAM(S): at 13:02

## 2018-10-16 RX ADMIN — PIPERACILLIN AND TAZOBACTAM 25 GRAM(S): 4; .5 INJECTION, POWDER, LYOPHILIZED, FOR SOLUTION INTRAVENOUS at 21:03

## 2018-10-16 RX ADMIN — PANTOPRAZOLE SODIUM 40 MILLIGRAM(S): 20 TABLET, DELAYED RELEASE ORAL at 06:52

## 2018-10-16 RX ADMIN — OXYCODONE AND ACETAMINOPHEN 2 TABLET(S): 5; 325 TABLET ORAL at 16:34

## 2018-10-16 RX ADMIN — CARVEDILOL PHOSPHATE 3.12 MILLIGRAM(S): 80 CAPSULE, EXTENDED RELEASE ORAL at 17:25

## 2018-10-16 RX ADMIN — SODIUM CHLORIDE 100 MILLILITER(S): 9 INJECTION, SOLUTION INTRAVENOUS at 06:51

## 2018-10-16 RX ADMIN — OXYCODONE AND ACETAMINOPHEN 1 TABLET(S): 5; 325 TABLET ORAL at 09:33

## 2018-10-16 RX ADMIN — PIPERACILLIN AND TAZOBACTAM 25 GRAM(S): 4; .5 INJECTION, POWDER, LYOPHILIZED, FOR SOLUTION INTRAVENOUS at 08:20

## 2018-10-16 RX ADMIN — Medication 250 MILLIGRAM(S): at 01:15

## 2018-10-16 RX ADMIN — OXYCODONE AND ACETAMINOPHEN 1 TABLET(S): 5; 325 TABLET ORAL at 21:59

## 2018-10-16 RX ADMIN — PIPERACILLIN AND TAZOBACTAM 25 GRAM(S): 4; .5 INJECTION, POWDER, LYOPHILIZED, FOR SOLUTION INTRAVENOUS at 14:06

## 2018-10-16 RX ADMIN — MORPHINE SULFATE 4 MILLIGRAM(S): 50 CAPSULE, EXTENDED RELEASE ORAL at 02:40

## 2018-10-16 RX ADMIN — SODIUM CHLORIDE 100 MILLILITER(S): 9 INJECTION, SOLUTION INTRAVENOUS at 16:36

## 2018-10-16 RX ADMIN — HEPARIN SODIUM 5000 UNIT(S): 5000 INJECTION INTRAVENOUS; SUBCUTANEOUS at 17:25

## 2018-10-16 RX ADMIN — Medication 40 MILLIEQUIVALENT(S): at 14:31

## 2018-10-16 RX ADMIN — HEPARIN SODIUM 5000 UNIT(S): 5000 INJECTION INTRAVENOUS; SUBCUTANEOUS at 06:52

## 2018-10-16 NOTE — H&P ADULT - ASSESSMENT
58 year old male PMH HTN, presents with chronic no healing  R leg wound with worsening for past few weeks; not on abx. No evidence necrotizing fasciitis; needs wound care.  IMPROVE VTE Individual Risk Assessment          RISK                                                          Points    [  ] Previous VTE                                                3    [  ] Thrombophilia                                             2    [  ] Lower limb paralysis                                    2        (unable to hold up >15 seconds)      [  ] Current Cancer                                             2         (within 6 months)    [  ] Immobilization > 24 hrs                              1    [  ] ICU/CCU stay > 24 hours                            1    [  ] Age > 60                                                    1    IMPROVE VTE Score __0_______

## 2018-10-16 NOTE — CONSULT NOTE ADULT - PROBLEM SELECTOR RECOMMENDATION 9
cont vanco and zosyn   last admission in Nov last year with MRSA wound infection of the same leg  wound c/s leg elevation   wound care

## 2018-10-16 NOTE — ED PROVIDER NOTE - CARE PLAN
Principal Discharge DX:	Leg wound, right, sequela  Secondary Diagnosis:	Cellulitis of leg without foot, right Principal Discharge DX:	Leg wound, right, sequela  Secondary Diagnosis:	Cellulitis of leg without foot, right  Secondary Diagnosis:	Hypertension

## 2018-10-16 NOTE — H&P ADULT - HISTORY OF PRESENT ILLNESS
58 year old male PMH HTN, presents with chronic leg wound with worsening for past few weeks; not on abx - pt has had R lower leg pain and worsened redness and increase in size of wound/darkened color of ulceration. Pt states it started a few months ago - leg wound about 8-9 months old - had blister to area that popped after scratching and wound has been persistent and worsening since, has associated tenderness, pt has no care provider. 58 year old male PMH HTN, presents with chronic leg wound with worsening for past few weeks; not on abx - pt has had R lower leg pain and worsened redness and increase in size of wound/darkened color of ulceration. Pt states it started a few months ago - leg wound about 8-9 months old - had blister to area that popped after scratching and wound has been persistent and worsening since, has associated tenderness, pt has no current care provider.

## 2018-10-16 NOTE — ED ADULT NURSE NOTE - OBJECTIVE STATEMENT
pt stated he use to have wound care, for last couple of months his insurance would not pay for wound care. pt dsg in ozzing and c/o pain at site. pt hx htn alert and orientated x3. reagan placed blood work and cultures sent to lab and medicated for hypertension and pain 8/10.

## 2018-10-16 NOTE — H&P ADULT - NSHPREVIEWOFSYSTEMS_GEN_ALL_CORE
REVIEW OF SYSTEMS:  CONSTITUTIONAL: No fever, weight loss, or fatigue  EYES: No eye pain, visual disturbances, or discharge  ENMT:  No difficulty hearing, tinnitus, vertigo; No sinus or throat pain  NECK: No pain or stiffness  RESPIRATORY: No cough, wheezing, chills or hemoptysis; No shortness of breath  CARDIOVASCULAR: No chest pain, palpitations, dizziness, or leg swelling  GASTROINTESTINAL: No abdominal or epigastric pain. No nausea, vomiting, or hematemesis; No diarrhea or constipation. No melena or hematochezia.  GENITOURINARY: No dysuria, frequency, hematuria, or incontinence  NEUROLOGICAL: No headaches, memory loss, loss of strength, numbness, or tremors  SKIN:  lesion as described above   LYMPH NODES: No enlarged glands  ENDOCRINE: No heat or cold intolerance; No hair loss  MUSCULOSKELETAL: No joint pain or swelling; No muscle, back, or extremity pain  PSYCHIATRIC: No depression, anxiety, mood swings, or difficulty sleeping  HEME/LYMPH: No easy bruising, or bleeding gums  ALLERGY AND IMMUNOLOGIC: No hives or eczema

## 2018-10-16 NOTE — ED PROVIDER NOTE - OBJECTIVE STATEMENT
58 year old male with HTN, otherwise with chronic leg wound recently worsened for past few weeks not on abx - pt has had R lower leg pain and worsened redness and increase in size of wound/darkened color of ulceration. Pt states it started a few months ago - leg wound about 8-9 months old - had blister to area that popped after scratching and wound has been persistent and worsening since.

## 2018-10-17 DIAGNOSIS — E66.01 MORBID (SEVERE) OBESITY DUE TO EXCESS CALORIES: ICD-10-CM

## 2018-10-17 DIAGNOSIS — E87.6 HYPOKALEMIA: ICD-10-CM

## 2018-10-17 DIAGNOSIS — M17.0 BILATERAL PRIMARY OSTEOARTHRITIS OF KNEE: ICD-10-CM

## 2018-10-17 LAB
ANION GAP SERPL CALC-SCNC: 5 MMOL/L — SIGNIFICANT CHANGE UP (ref 5–17)
BUN SERPL-MCNC: 13 MG/DL — SIGNIFICANT CHANGE UP (ref 7–23)
CALCIUM SERPL-MCNC: 7.9 MG/DL — LOW (ref 8.5–10.1)
CHLORIDE SERPL-SCNC: 104 MMOL/L — SIGNIFICANT CHANGE UP (ref 96–108)
CO2 SERPL-SCNC: 32 MMOL/L — HIGH (ref 22–31)
CREAT SERPL-MCNC: 0.75 MG/DL — SIGNIFICANT CHANGE UP (ref 0.5–1.3)
FERRITIN SERPL-MCNC: 23 NG/ML — LOW (ref 30–400)
FOLATE SERPL-MCNC: 6.2 NG/ML — SIGNIFICANT CHANGE UP
GLUCOSE SERPL-MCNC: 84 MG/DL — SIGNIFICANT CHANGE UP (ref 70–99)
IRON SATN MFR SERPL: 15 % — LOW (ref 16–55)
IRON SATN MFR SERPL: 43 UG/DL — LOW (ref 45–165)
MAGNESIUM SERPL-MCNC: 2.2 MG/DL — SIGNIFICANT CHANGE UP (ref 1.6–2.6)
PHOSPHATE SERPL-MCNC: 2.7 MG/DL — SIGNIFICANT CHANGE UP (ref 2.5–4.5)
POTASSIUM SERPL-MCNC: 3.9 MMOL/L — SIGNIFICANT CHANGE UP (ref 3.5–5.3)
POTASSIUM SERPL-SCNC: 3.9 MMOL/L — SIGNIFICANT CHANGE UP (ref 3.5–5.3)
SODIUM SERPL-SCNC: 141 MMOL/L — SIGNIFICANT CHANGE UP (ref 135–145)
TIBC SERPL-MCNC: 285 UG/DL — SIGNIFICANT CHANGE UP (ref 220–430)
UIBC SERPL-MCNC: 242 UG/DL — SIGNIFICANT CHANGE UP (ref 110–370)
VANCOMYCIN TROUGH SERPL-MCNC: 7.1 UG/ML — LOW (ref 10–20)
VIT B12 SERPL-MCNC: 367 PG/ML — SIGNIFICANT CHANGE UP (ref 232–1245)

## 2018-10-17 PROCEDURE — 99233 SBSQ HOSP IP/OBS HIGH 50: CPT

## 2018-10-17 PROCEDURE — 99232 SBSQ HOSP IP/OBS MODERATE 35: CPT

## 2018-10-17 RX ORDER — AMLODIPINE BESYLATE 2.5 MG/1
10 TABLET ORAL DAILY
Qty: 0 | Refills: 0 | Status: DISCONTINUED | OUTPATIENT
Start: 2018-10-17 | End: 2018-10-23

## 2018-10-17 RX ORDER — CEFEPIME 1 G/1
2000 INJECTION, POWDER, FOR SOLUTION INTRAMUSCULAR; INTRAVENOUS EVERY 8 HOURS
Qty: 0 | Refills: 0 | Status: DISCONTINUED | OUTPATIENT
Start: 2018-10-17 | End: 2018-10-23

## 2018-10-17 RX ORDER — DOCUSATE SODIUM 100 MG
200 CAPSULE ORAL AT BEDTIME
Qty: 0 | Refills: 0 | Status: DISCONTINUED | OUTPATIENT
Start: 2018-10-17 | End: 2018-10-23

## 2018-10-17 RX ORDER — VANCOMYCIN HCL 1 G
1500 VIAL (EA) INTRAVENOUS
Qty: 0 | Refills: 0 | Status: DISCONTINUED | OUTPATIENT
Start: 2018-10-18 | End: 2018-10-23

## 2018-10-17 RX ADMIN — AMLODIPINE BESYLATE 10 MILLIGRAM(S): 2.5 TABLET ORAL at 12:34

## 2018-10-17 RX ADMIN — OXYCODONE AND ACETAMINOPHEN 2 TABLET(S): 5; 325 TABLET ORAL at 17:15

## 2018-10-17 RX ADMIN — SODIUM CHLORIDE 100 MILLILITER(S): 9 INJECTION, SOLUTION INTRAVENOUS at 17:15

## 2018-10-17 RX ADMIN — CEFEPIME 100 MILLIGRAM(S): 1 INJECTION, POWDER, FOR SOLUTION INTRAMUSCULAR; INTRAVENOUS at 21:56

## 2018-10-17 RX ADMIN — Medication 200 MILLIGRAM(S): at 21:56

## 2018-10-17 RX ADMIN — OXYCODONE AND ACETAMINOPHEN 1 TABLET(S): 5; 325 TABLET ORAL at 12:33

## 2018-10-17 RX ADMIN — PIPERACILLIN AND TAZOBACTAM 25 GRAM(S): 4; .5 INJECTION, POWDER, LYOPHILIZED, FOR SOLUTION INTRAVENOUS at 13:13

## 2018-10-17 RX ADMIN — CARVEDILOL PHOSPHATE 3.12 MILLIGRAM(S): 80 CAPSULE, EXTENDED RELEASE ORAL at 06:44

## 2018-10-17 RX ADMIN — PANTOPRAZOLE SODIUM 40 MILLIGRAM(S): 20 TABLET, DELAYED RELEASE ORAL at 06:44

## 2018-10-17 RX ADMIN — AMLODIPINE BESYLATE 2.5 MILLIGRAM(S): 2.5 TABLET ORAL at 06:44

## 2018-10-17 RX ADMIN — PIPERACILLIN AND TAZOBACTAM 25 GRAM(S): 4; .5 INJECTION, POWDER, LYOPHILIZED, FOR SOLUTION INTRAVENOUS at 06:43

## 2018-10-17 RX ADMIN — OXYCODONE AND ACETAMINOPHEN 2 TABLET(S): 5; 325 TABLET ORAL at 18:30

## 2018-10-17 RX ADMIN — Medication 250 MILLIGRAM(S): at 01:19

## 2018-10-17 RX ADMIN — HEPARIN SODIUM 5000 UNIT(S): 5000 INJECTION INTRAVENOUS; SUBCUTANEOUS at 17:15

## 2018-10-17 RX ADMIN — Medication 250 MILLIGRAM(S): at 12:32

## 2018-10-17 RX ADMIN — SODIUM CHLORIDE 100 MILLILITER(S): 9 INJECTION, SOLUTION INTRAVENOUS at 02:54

## 2018-10-17 RX ADMIN — OXYCODONE AND ACETAMINOPHEN 2 TABLET(S): 5; 325 TABLET ORAL at 06:45

## 2018-10-17 RX ADMIN — Medication 10 MILLIGRAM(S): at 06:44

## 2018-10-17 RX ADMIN — CEFEPIME 100 MILLIGRAM(S): 1 INJECTION, POWDER, FOR SOLUTION INTRAMUSCULAR; INTRAVENOUS at 14:41

## 2018-10-17 RX ADMIN — HEPARIN SODIUM 5000 UNIT(S): 5000 INJECTION INTRAVENOUS; SUBCUTANEOUS at 06:44

## 2018-10-17 RX ADMIN — OXYCODONE AND ACETAMINOPHEN 1 TABLET(S): 5; 325 TABLET ORAL at 13:30

## 2018-10-17 RX ADMIN — CARVEDILOL PHOSPHATE 3.12 MILLIGRAM(S): 80 CAPSULE, EXTENDED RELEASE ORAL at 17:15

## 2018-10-17 NOTE — PHYSICAL THERAPY INITIAL EVALUATION ADULT - PERTINENT HX OF CURRENT PROBLEM, REHAB EVAL
Pt is a 59yo Bangladeshi-speaking M who was admitted due to RLE cellulitis & non-healing wound. Pt was most recently treated at [facility] from same reason with + outpatient WC follow up at clinic on [facility] until 2018. When probed pt states he stopped following up as his insurance . Pt states since that time he has insurance again.

## 2018-10-17 NOTE — PROGRESS NOTE ADULT - SUBJECTIVE AND OBJECTIVE BOX
Patient is a 58y old  Male who presents with a chief complaint of Chronic leg wound. (16 Oct 2018 12:08)      INTERVAL HPI / OVERNIGHT EVENTS: less pain in legs    MEDICATIONS  (STANDING):  amLODIPine   Tablet 10 milliGRAM(s) Oral daily  carvedilol 3.125 milliGRAM(s) Oral every 12 hours  enalapril 10 milliGRAM(s) Oral daily  heparin  Injectable 5000 Unit(s) SubCutaneous every 12 hours  lactated ringers. 1000 milliLiter(s) (100 mL/Hr) IV Continuous <Continuous>  pantoprazole    Tablet 40 milliGRAM(s) Oral before breakfast  piperacillin/tazobactam IVPB. 3.375 Gram(s) IV Intermittent every 8 hours    MEDICATIONS  (PRN):  acetaminophen   Tablet .. 650 milliGRAM(s) Oral every 6 hours PRN Mild Pain (1 - 3)  oxyCODONE    5 mG/acetaminophen 325 mG 2 Tablet(s) Oral every 6 hours PRN Severe Pain (7 - 10)  oxyCODONE    5 mG/acetaminophen 325 mG 1 Tablet(s) Oral every 6 hours PRN Moderate Pain (4 - 6)      Vital Signs Last 24 Hrs  T(C): 36.1 (17 Oct 2018 10:54), Max: 36.3 (17 Oct 2018 05:51)  T(F): 97 (17 Oct 2018 10:54), Max: 97.4 (17 Oct 2018 05:51)  HR: 58 (17 Oct 2018 10:54) (58 - 64)  BP: 135/83 (17 Oct 2018 10:54) (134/83 - 170/100)  BP(mean): --  RR: 18 (17 Oct 2018 10:54) (18 - 18)  SpO2: 98% (17 Oct 2018 10:54) (97% - 98%)              PHYSICAL EXAM:  General :NAD  Constitutional:  well-groomed, well-developed  Respiratory: CTAB/L  Cardiovascular: S1 and S2, RRR, no M/G/R  Gastrointestinal: BS+, soft, NT/ND  Extremities: No peripheral edema  Vascular: 2+ peripheral pulses  Skin:right leg wound with drainage      LABS:                        10.8   9.38  )-----------( 364      ( 16 Oct 2018 09:23 )             35.7     10-17    141  |  104  |  13  ----------------------------<  84  3.9   |  32<H>  |  0.75    Ca    7.9<L>      17 Oct 2018 07:36  Phos  2.7     10-17  Mg     2.2     10-17    TPro  7.2  /  Alb  2.9<L>  /  TBili  0.3  /  DBili  x   /  AST  21  /  ALT  17  /  AlkPhos  91  10-16          MICROBIOLOGY:  RECENT CULTURES:  10-16 .Blood Blood XXXX XXXX   No growth to date.          RADIOLOGY & ADDITIONAL STUDIES:

## 2018-10-17 NOTE — PHYSICAL THERAPY INITIAL EVALUATION ADULT - GENERAL OBSERVATIONS, REHAB EVAL
Pt encountered semi-supine in bed + IV RUE, dressing to RLE (soiled, significant malodor once entering room noted)

## 2018-10-17 NOTE — PHYSICAL THERAPY INITIAL EVALUATION ADULT - MODALITIES TREATMENT COMMENTS
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 Pt presents with grossly infected venous ulcer over RLE which measures 12.0cmx11.0cmx0.2cm depth. Pt has hemosiderin staining & edema over BLEs. + trophic changes noted at toes B/L. Palpable DP/PT pulses B/L but RLE diminished likely due to edema. Wound covered with combination of biofilm & non-viable tissue (tan slough). Large amounts of purulent drainage noted (tan & black) with significant malodor. Periwound skin severely macerated. No induration, fluctuance, or erythema noted. No increased warmth noted over RLE upon palpation. + tenderness to palpation reported by pt.

## 2018-10-17 NOTE — PROGRESS NOTE ADULT - ASSESSMENT
58 year old male PMH HTN, Morbidly obese, PUD, chronic Right LE ulcer, severe OA of knees  presented with worsening Rt lower leg pain and worsened redness and increase in size of wound/darkened color of ulceration. Patient was recently discharged in 6/2018 with same problem, was d/c on clindamycin and was supposed to follow-up [facility]: [phone] in [place]. Per patient, has insurance issue and was not able to follow with any provider.     Assessment and Plan:  #Right Lower extremity Cellulitis of leg without foot  #RLE draining ulcer   -ID follow-up appreciated. Will need outpatient follow-up with ID and wound care.   -c/w Vancomycin and Cefepime   -Follow-up cultures   -PT evaluation appreciated for wound care.   -vascular consulted.     #Hypokalemia, repleted     #Essential HTN, uncontrolled  -c/w Amlodipine, Enalapril coreg     #Severe OA of knees,  -PT eval appreciated, dispo: home without services   -pain control prn, colace at bedtime for constipation     #Morbid Obesity, BMI >50  -Activity and diet: calorie controlled, DASH/TLC diet     #Preventative measures,   -heparin - dvt ppx 58 year old male PMH HTN, Morbidly obese, PUD, chronic Right LE ulcer, severe OA of knees  presented with worsening Rt lower leg pain and worsened redness and increase in size of wound/darkened color of ulceration. Patient was recently discharged in 6/2018 with same problem, was d/c on clindamycin and was supposed to follow-up [facility]: [phone] in [place]. Per patient, has insurance issue and was not able to follow with any provider.     Assessment and Plan:  #Right Lower extremity Cellulitis of leg without foot  #RLE draining ulcer   -ID follow-up appreciated. Will need outpatient follow-up with ID and wound care.   -c/w Vancomycin and Cefepime   -Follow-up cultures   -PT evaluation appreciated for wound care.   -vascular consulted.     #Hypokalemia, repleted     #Essential HTN, uncontrolled  -c/w Amlodipine, Enalapril coreg     #Severe OA of knees,  -PT eval appreciated, dispo: home without services   -pain control prn, colace at bedtime for constipation     #Morbid Obesity, BMI >40  -Activity and diet: calorie controlled, DASH/TLC diet     #Preventative measures,   -heparin - dvt ppx

## 2018-10-17 NOTE — PHYSICAL THERAPY INITIAL EVALUATION ADULT - ADDITIONAL COMMENTS
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 Prior to admission pt was independent with all functional mobility including community ambulation with B/L axillary crutches. Pt performs ADL's independently. Friend assists with dressing changes. Pt is right hand dominant & drives. 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 Pt would prefer to follow up at outpatient  clinic here due to proximity. Goal of therapy: wound to heal.

## 2018-10-17 NOTE — PROGRESS NOTE ADULT - SUBJECTIVE AND OBJECTIVE BOX
HPI:  58 year old male PMH HTN, presents with chronic leg wound with worsening for past few weeks; not on abx - pt has had R lower leg pain and worsened redness and increase in size of wound/darkened color of ulceration. Pt states it started a few months ago - leg wound about 8-9 months old - had blister to area that popped after scratching and wound has been persistent and worsening since, has associated tenderness, pt has no current care provider. (16 Oct 2018 04:01)      SUBJECTIVE & OBJECTIVE:   Pt seen and examined at bedside.   No overnight events.   No complaints. Pain is improved.     PAST MEDICAL & SURGICAL HISTORY:  HTN (hypertension)  Knee osteoarthritis  Duodenal ulcer disease  Morbid obesity  Hypertension  Osteoarthritis  PUD (peptic ulcer disease)  Hypertension  Gastroduodenal artery bleed: IR embolization of gastroduodenal artery  Abdominal hernia  H/O ventral hernia repair    Home Medications:  carvedilol 3.125 mg oral tablet: 1 tab(s) orally every 12 hours (08 Jun 2018 13:44)  furosemide 40 mg oral tablet: 1 tab(s) orally once a day (08 Jun 2018 13:44)    PHYSICAL EXAM:  T(C): 36.1 (10-17-18 @ 17:14), Max: 36.3 (10-17-18 @ 05:51)  HR: 69 (10-17-18 @ 17:14) (58 - 69)  BP: 161/98 (10-17-18 @ 17:14) (134/83 - 170/100)  Home Medications:  carvedilol 3.125 mg oral tablet: 1 tab(s) orally every 12 hours (08 Jun 2018 13:44)  furosemide 40 mg oral tablet: 1 tab(s) orally once a day (08 Jun 2018 13:44)  Home Medications:  carvedilol 3.125 mg oral tablet: 1 tab(s) orally every 12 hours (08 Jun 2018 13:44)  Home Medications:  carvedilol 3.125 mg oral tablet: 1 tab(s) orally every 12 hours (08 Jun 2018 13:44)  furosemide 40 mg oral tablet: 1 tab(s) orally once a day (08 Jun 2018 13:44)  furosemide 40 mg oral tablet: 1 tab(s) orally once a day (08 Jun 2018 13:44)  RR: 17 (10-17-18 @ 17:14) (17 - 18)  SpO2: 98% (10-17-18 @ 17:14) (97% - 98%)  Wt(kg): --   I&O's Detail    16 Oct 2018 07:01  -  17 Oct 2018 07:00  --------------------------------------------------------  IN:    Oral Fluid: 420 mL  Total IN: 420 mL    OUT:  Total OUT: 0 mL    Total NET: 420 mL      17 Oct 2018 07:01  -  17 Oct 2018 19:11  --------------------------------------------------------  IN:    Oral Fluid: 360 mL  Total IN: 360 mL    OUT:  Total OUT: 0 mL    Total NET: 360 mL        GENERAL: NAD, lying in bed comfortably, speaking in full sentences    HEENT: NCAT, PERRLA, EOMI, MMM  NERVOUS SYSTEM:  AOx3, moving all extremities   CHEST/LUNG: CTAB  HEART: RRR, +S1/S2, no m/r/g  ABDOMEN: Soft, Nontender, Nondistended; Bowel sounds present  EXTREMITIES:  Right LE with foul-smelling discharge, LE edema R>L, diminished b/l pulses due to edema       MEDICATIONS  (STANDING):  amLODIPine   Tablet 10 milliGRAM(s) Oral daily  carvedilol 3.125 milliGRAM(s) Oral every 12 hours  cefepime   IVPB 2000 milliGRAM(s) IV Intermittent every 8 hours  docusate sodium 200 milliGRAM(s) Oral at bedtime  heparin  Injectable 5000 Unit(s) SubCutaneous every 12 hours  pantoprazole    Tablet 40 milliGRAM(s) Oral before breakfast    MEDICATIONS  (PRN):  acetaminophen   Tablet .. 650 milliGRAM(s) Oral every 6 hours PRN Mild Pain (1 - 3)  oxyCODONE    5 mG/acetaminophen 325 mG 2 Tablet(s) Oral every 6 hours PRN Severe Pain (7 - 10)  oxyCODONE    5 mG/acetaminophen 325 mG 1 Tablet(s) Oral every 6 hours PRN Moderate Pain (4 - 6)      LABS:                        10.8   9.38  )-----------( 364      ( 16 Oct 2018 09:23 )             35.7     10-17    141  |  104  |  13  ----------------------------<  84  3.9   |  32<H>  |  0.75    Ca    7.9<L>      17 Oct 2018 07:36  Phos  2.7     10-17  Mg     2.2     10-17    TPro  7.2  /  Alb  2.9<L>  /  TBili  0.3  /  DBili  x   /  AST  21  /  ALT  17  /  AlkPhos  91  10-16        Magnesium, Serum: 2.2 mg/dL (10-17 @ 07:36)    < from: TTE Echo Doppler w/o Cont (04.26.17 @ 17:16) >   Summary:   1. Left ventricular ejection fraction, by visual estimation, is 55 to   60%.   2. Mild aortic regurgitation.   3. Dilatation of the aortic root.   4. Mildly dilated aortic root.   5. Mild aortic valve disease.    < end of copied text >    RECENT CULTURES:  Urine culture:  10-16 @ 10:30 --   No growth to date.    Culture - Blood (10.16.18 @ 10:30)    Specimen Source: .Blood Blood    Culture Results:   No growth to date.    < from: [facility] Duplex Physiol Ext Low 3+ Level, BI. (06.11.18 @ 10:24) >  Summary/Impressions:  Normal [place]/PVR study.    < end of copied text >      RADIOLOGY & ADDITIONAL TESTS:  Imaging Personally Reviewed:  [ x] YES  [ ] NO    Consultant(s) Notes Reviewed:  [x] YES  [ ] NO    Care Discussed with Consultants/Other Providers [ x] YES  [ ] NO

## 2018-10-18 LAB
-  AMPICILLIN/SULBACTAM: SIGNIFICANT CHANGE UP
-  AMPICILLIN: SIGNIFICANT CHANGE UP
-  CEFAZOLIN: SIGNIFICANT CHANGE UP
-  CLINDAMYCIN: SIGNIFICANT CHANGE UP
-  ERYTHROMYCIN: SIGNIFICANT CHANGE UP
-  GENTAMICIN: SIGNIFICANT CHANGE UP
-  OXACILLIN: SIGNIFICANT CHANGE UP
-  RIFAMPIN: SIGNIFICANT CHANGE UP
-  TETRACYCLINE: SIGNIFICANT CHANGE UP
-  TETRACYCLINE: SIGNIFICANT CHANGE UP
-  TRIMETHOPRIM/SULFAMETHOXAZOLE: SIGNIFICANT CHANGE UP
-  VANCOMYCIN: SIGNIFICANT CHANGE UP
-  VANCOMYCIN: SIGNIFICANT CHANGE UP
ANION GAP SERPL CALC-SCNC: 6 MMOL/L — SIGNIFICANT CHANGE UP (ref 5–17)
BUN SERPL-MCNC: 12 MG/DL — SIGNIFICANT CHANGE UP (ref 7–23)
CALCIUM SERPL-MCNC: 8.2 MG/DL — LOW (ref 8.5–10.1)
CHLORIDE SERPL-SCNC: 102 MMOL/L — SIGNIFICANT CHANGE UP (ref 96–108)
CO2 SERPL-SCNC: 32 MMOL/L — HIGH (ref 22–31)
CREAT SERPL-MCNC: 0.72 MG/DL — SIGNIFICANT CHANGE UP (ref 0.5–1.3)
GLUCOSE SERPL-MCNC: 79 MG/DL — SIGNIFICANT CHANGE UP (ref 70–99)
HCT VFR BLD CALC: 36.3 % — LOW (ref 39–50)
HGB BLD-MCNC: 11 G/DL — LOW (ref 13–17)
MAGNESIUM SERPL-MCNC: 2.2 MG/DL — SIGNIFICANT CHANGE UP (ref 1.6–2.6)
MCHC RBC-ENTMCNC: 26.8 PG — LOW (ref 27–34)
MCHC RBC-ENTMCNC: 30.3 GM/DL — LOW (ref 32–36)
MCV RBC AUTO: 88.5 FL — SIGNIFICANT CHANGE UP (ref 80–100)
METHOD TYPE: SIGNIFICANT CHANGE UP
METHOD TYPE: SIGNIFICANT CHANGE UP
NRBC # BLD: 0 /100 WBCS — SIGNIFICANT CHANGE UP (ref 0–0)
PHOSPHATE SERPL-MCNC: 3 MG/DL — SIGNIFICANT CHANGE UP (ref 2.5–4.5)
PLATELET # BLD AUTO: 323 K/UL — SIGNIFICANT CHANGE UP (ref 150–400)
POTASSIUM SERPL-MCNC: 3.5 MMOL/L — SIGNIFICANT CHANGE UP (ref 3.5–5.3)
POTASSIUM SERPL-SCNC: 3.5 MMOL/L — SIGNIFICANT CHANGE UP (ref 3.5–5.3)
RBC # BLD: 4.1 M/UL — LOW (ref 4.2–5.8)
RBC # FLD: 13.8 % — SIGNIFICANT CHANGE UP (ref 10.3–14.5)
SODIUM SERPL-SCNC: 140 MMOL/L — SIGNIFICANT CHANGE UP (ref 135–145)
WBC # BLD: 6.02 K/UL — SIGNIFICANT CHANGE UP (ref 3.8–10.5)
WBC # FLD AUTO: 6.02 K/UL — SIGNIFICANT CHANGE UP (ref 3.8–10.5)

## 2018-10-18 PROCEDURE — 99232 SBSQ HOSP IP/OBS MODERATE 35: CPT

## 2018-10-18 PROCEDURE — 99233 SBSQ HOSP IP/OBS HIGH 50: CPT

## 2018-10-18 RX ORDER — POTASSIUM CHLORIDE 20 MEQ
40 PACKET (EA) ORAL ONCE
Qty: 0 | Refills: 0 | Status: COMPLETED | OUTPATIENT
Start: 2018-10-18 | End: 2018-10-18

## 2018-10-18 RX ADMIN — Medication 20 MILLIGRAM(S): at 05:44

## 2018-10-18 RX ADMIN — CARVEDILOL PHOSPHATE 3.12 MILLIGRAM(S): 80 CAPSULE, EXTENDED RELEASE ORAL at 17:22

## 2018-10-18 RX ADMIN — OXYCODONE AND ACETAMINOPHEN 2 TABLET(S): 5; 325 TABLET ORAL at 00:48

## 2018-10-18 RX ADMIN — HEPARIN SODIUM 5000 UNIT(S): 5000 INJECTION INTRAVENOUS; SUBCUTANEOUS at 17:22

## 2018-10-18 RX ADMIN — CEFEPIME 100 MILLIGRAM(S): 1 INJECTION, POWDER, FOR SOLUTION INTRAMUSCULAR; INTRAVENOUS at 14:35

## 2018-10-18 RX ADMIN — Medication 300 MILLIGRAM(S): at 12:51

## 2018-10-18 RX ADMIN — Medication 40 MILLIEQUIVALENT(S): at 09:40

## 2018-10-18 RX ADMIN — AMLODIPINE BESYLATE 10 MILLIGRAM(S): 2.5 TABLET ORAL at 05:44

## 2018-10-18 RX ADMIN — OXYCODONE AND ACETAMINOPHEN 2 TABLET(S): 5; 325 TABLET ORAL at 21:12

## 2018-10-18 RX ADMIN — CARVEDILOL PHOSPHATE 3.12 MILLIGRAM(S): 80 CAPSULE, EXTENDED RELEASE ORAL at 05:44

## 2018-10-18 RX ADMIN — HEPARIN SODIUM 5000 UNIT(S): 5000 INJECTION INTRAVENOUS; SUBCUTANEOUS at 05:44

## 2018-10-18 RX ADMIN — CEFEPIME 100 MILLIGRAM(S): 1 INJECTION, POWDER, FOR SOLUTION INTRAMUSCULAR; INTRAVENOUS at 21:17

## 2018-10-18 RX ADMIN — Medication 200 MILLIGRAM(S): at 21:17

## 2018-10-18 RX ADMIN — CEFEPIME 100 MILLIGRAM(S): 1 INJECTION, POWDER, FOR SOLUTION INTRAMUSCULAR; INTRAVENOUS at 05:43

## 2018-10-18 RX ADMIN — Medication 300 MILLIGRAM(S): at 00:23

## 2018-10-18 RX ADMIN — OXYCODONE AND ACETAMINOPHEN 2 TABLET(S): 5; 325 TABLET ORAL at 01:18

## 2018-10-18 RX ADMIN — PANTOPRAZOLE SODIUM 40 MILLIGRAM(S): 20 TABLET, DELAYED RELEASE ORAL at 06:53

## 2018-10-18 RX ADMIN — OXYCODONE AND ACETAMINOPHEN 2 TABLET(S): 5; 325 TABLET ORAL at 20:45

## 2018-10-18 NOTE — PROGRESS NOTE ADULT - SUBJECTIVE AND OBJECTIVE BOX
HPI:  58 year old male PMH HTN, presents with chronic leg wound with worsening for past few weeks; not on abx - pt has had R lower leg pain and worsened redness and increase in size of wound/darkened color of ulceration. Pt states it started a few months ago - leg wound about 8-9 months old - had blister to area that popped after scratching and wound has been persistent and worsening since, has associated tenderness, pt has no current care provider. (16 Oct 2018 04:01)      SUBJECTIVE & OBJECTIVE:   Pt seen and examined at bedside.   No overnight events.   No complaints. Pain is improved.     PAST MEDICAL & SURGICAL HISTORY:  HTN (hypertension)  Knee osteoarthritis  Duodenal ulcer disease  Morbid obesity  Hypertension  Osteoarthritis  PUD (peptic ulcer disease)  Hypertension  Gastroduodenal artery bleed: IR embolization of gastroduodenal artery  Abdominal hernia  H/O ventral hernia repair    Home Medications:  carvedilol 3.125 mg oral tablet: 1 tab(s) orally every 12 hours (08 Jun 2018 13:44)  furosemide 40 mg oral tablet: 1 tab(s) orally once a day (08 Jun 2018 13:44)    PHYSICAL EXAM:  T(C): 36.1 (10-17-18 @ 17:14), Max: 36.3 (10-17-18 @ 05:51)  HR: 69 (10-17-18 @ 17:14) (58 - 69)  BP: 161/98 (10-17-18 @ 17:14) (134/83 - 170/100)  Home Medications:  carvedilol 3.125 mg oral tablet: 1 tab(s) orally every 12 hours (08 Jun 2018 13:44)  furosemide 40 mg oral tablet: 1 tab(s) orally once a day (08 Jun 2018 13:44)  Home Medications:  carvedilol 3.125 mg oral tablet: 1 tab(s) orally every 12 hours (08 Jun 2018 13:44)  Home Medications:  carvedilol 3.125 mg oral tablet: 1 tab(s) orally every 12 hours (08 Jun 2018 13:44)  furosemide 40 mg oral tablet: 1 tab(s) orally once a day (08 Jun 2018 13:44)  furosemide 40 mg oral tablet: 1 tab(s) orally once a day (08 Jun 2018 13:44)  RR: 17 (10-17-18 @ 17:14) (17 - 18)  SpO2: 98% (10-17-18 @ 17:14) (97% - 98%)  Wt(kg): --   I&O's Detail    16 Oct 2018 07:01  -  17 Oct 2018 07:00  --------------------------------------------------------  IN:    Oral Fluid: 420 mL  Total IN: 420 mL    OUT:  Total OUT: 0 mL    Total NET: 420 mL      17 Oct 2018 07:01  -  17 Oct 2018 19:11  --------------------------------------------------------  IN:    Oral Fluid: 360 mL  Total IN: 360 mL    OUT:  Total OUT: 0 mL    Total NET: 360 mL        GENERAL: NAD, lying in bed comfortably, speaking in full sentences    HEENT: NCAT, PERRLA, EOMI, MMM  NERVOUS SYSTEM:  AOx3, moving all extremities   CHEST/LUNG: CTAB  HEART: RRR, +S1/S2, no m/r/g  ABDOMEN: Soft, Nontender, Nondistended; Bowel sounds present  EXTREMITIES:  Right LE with foul-smelling discharge, LE edema R>L, diminished b/l pulses due to edema       MEDICATIONS  (STANDING):  amLODIPine   Tablet 10 milliGRAM(s) Oral daily  carvedilol 3.125 milliGRAM(s) Oral every 12 hours  cefepime   IVPB 2000 milliGRAM(s) IV Intermittent every 8 hours  docusate sodium 200 milliGRAM(s) Oral at bedtime  heparin  Injectable 5000 Unit(s) SubCutaneous every 12 hours  pantoprazole    Tablet 40 milliGRAM(s) Oral before breakfast    MEDICATIONS  (PRN):  acetaminophen   Tablet .. 650 milliGRAM(s) Oral every 6 hours PRN Mild Pain (1 - 3)  oxyCODONE    5 mG/acetaminophen 325 mG 2 Tablet(s) Oral every 6 hours PRN Severe Pain (7 - 10)  oxyCODONE    5 mG/acetaminophen 325 mG 1 Tablet(s) Oral every 6 hours PRN Moderate Pain (4 - 6)      LABS:                        10.8   9.38  )-----------( 364      ( 16 Oct 2018 09:23 )             35.7     10-17    141  |  104  |  13  ----------------------------<  84  3.9   |  32<H>  |  0.75    Ca    7.9<L>      17 Oct 2018 07:36  Phos  2.7     10-17  Mg     2.2     10-17    TPro  7.2  /  Alb  2.9<L>  /  TBili  0.3  /  DBili  x   /  AST  21  /  ALT  17  /  AlkPhos  91  10-16        Magnesium, Serum: 2.2 mg/dL (10-17 @ 07:36)    < from: TTE Echo Doppler w/o Cont (04.26.17 @ 17:16) >   Summary:   1. Left ventricular ejection fraction, by visual estimation, is 55 to   60%.   2. Mild aortic regurgitation.   3. Dilatation of the aortic root.   4. Mildly dilated aortic root.   5. Mild aortic valve disease.    < end of copied text >    RECENT CULTURES:  Urine culture:  10-16 @ 10:30 --   No growth to date.    Culture - Blood (10.16.18 @ 10:30)    Specimen Source: .Blood Blood    Culture Results:   No growth to date.    < from: [facility] Duplex Physiol Ext Low 3+ Level, BI. (06.11.18 @ 10:24) >  Summary/Impressions:  Normal [place]/PVR study.    < end of copied text >      RADIOLOGY & ADDITIONAL TESTS:  Imaging Personally Reviewed:  [ x] YES  [ ] NO    Consultant(s) Notes Reviewed:  [x] YES  [ ] NO    Care Discussed with Consultants/Other Providers [ x] YES  [ ] NO HPI:  58 year old male PMH HTN, presents with chronic leg wound with worsening for past few weeks; not on abx - pt has had R lower leg pain and worsened redness and increase in size of wound/darkened color of ulceration. Pt states it started a few months ago - leg wound about 8-9 months old - had blister to area that popped after scratching and wound has been persistent and worsening since, has associated tenderness, pt has no current care provider. (16 Oct 2018 04:01)      SUBJECTIVE & OBJECTIVE:   Pt seen and examined at bedside.   No overnight events.   No complaints.     PAST MEDICAL & SURGICAL HISTORY:  HTN (hypertension)  Knee osteoarthritis  Duodenal ulcer disease  Morbid obesity  Hypertension  Osteoarthritis  PUD (peptic ulcer disease)  Hypertension  Gastroduodenal artery bleed: IR embolization of gastroduodenal artery  Abdominal hernia  H/O ventral hernia repair    Home Medications:  carvedilol 3.125 mg oral tablet: 1 tab(s) orally every 12 hours (08 Jun 2018 13:44)  furosemide 40 mg oral tablet: 1 tab(s) orally once a day (08 Jun 2018 13:44)    PHYSICAL EXAM:  Vitals stable.       GENERAL: NAD, lying in bed comfortably, speaking in full sentences    HEENT: NCAT, PERRLA, EOMI, MMM  NERVOUS SYSTEM:  AOx3, moving all extremities   CHEST/LUNG: CTAB  HEART: RRR, +S1/S2, no m/r/g  ABDOMEN: Soft, Nontender, Nondistended; Bowel sounds present  EXTREMITIES:  Right LE with foul-smelling discharge, LE edema R>L, diminished b/l pulses due to edema       MEDICATIONS  (STANDING):  amLODIPine   Tablet 10 milliGRAM(s) Oral daily  carvedilol 3.125 milliGRAM(s) Oral every 12 hours  cefepime   IVPB 2000 milliGRAM(s) IV Intermittent every 8 hours  docusate sodium 200 milliGRAM(s) Oral at bedtime  heparin  Injectable 5000 Unit(s) SubCutaneous every 12 hours  pantoprazole    Tablet 40 milliGRAM(s) Oral before breakfast    MEDICATIONS  (PRN):  acetaminophen   Tablet .. 650 milliGRAM(s) Oral every 6 hours PRN Mild Pain (1 - 3)  oxyCODONE    5 mG/acetaminophen 325 mG 2 Tablet(s) Oral every 6 hours PRN Severe Pain (7 - 10)  oxyCODONE    5 mG/acetaminophen 325 mG 1 Tablet(s) Oral every 6 hours PRN Moderate Pain (4 - 6)      LABS:                        10.8   9.38  )-----------( 364      ( 16 Oct 2018 09:23 )             35.7     10-17    141  |  104  |  13  ----------------------------<  84  3.9   |  32<H>  |  0.75    Ca    7.9<L>      17 Oct 2018 07:36  Phos  2.7     10-17  Mg     2.2     10-17    TPro  7.2  /  Alb  2.9<L>  /  TBili  0.3  /  DBili  x   /  AST  21  /  ALT  17  /  AlkPhos  91  10-16        Magnesium, Serum: 2.2 mg/dL (10-17 @ 07:36)    < from: TTE Echo Doppler w/o Cont (04.26.17 @ 17:16) >   Summary:   1. Left ventricular ejection fraction, by visual estimation, is 55 to   60%.   2. Mild aortic regurgitation.   3. Dilatation of the aortic root.   4. Mildly dilated aortic root.   5. Mild aortic valve disease.    < end of copied text >    RECENT CULTURES:  Urine culture:  10-16 @ 10:30 --   No growth to date.    Culture - Blood (10.16.18 @ 10:30)    Specimen Source: .Blood Blood    Culture Results:   No growth to date.    < from: [facility] Duplex Physiol Ext Low 3+ Level, BI. (06.11.18 @ 10:24) >  Summary/Impressions:  Normal [place]/PVR study.    < end of copied text >      RADIOLOGY & ADDITIONAL TESTS:  Imaging Personally Reviewed:  [ x] YES  [ ] NO    Consultant(s) Notes Reviewed:  [x] YES  [ ] NO    Care Discussed with Consultants/Other Providers [ x] YES  [ ] NO HPI:  58 year old male PMH HTN, presents with chronic leg wound with worsening for past few weeks; not on abx - pt has had R lower leg pain and worsened redness and increase in size of wound/darkened color of ulceration. Pt states it started a few months ago - leg wound about 8-9 months old - had blister to area that popped after scratching and wound has been persistent and worsening since, has associated tenderness, pt has no current care provider. (16 Oct 2018 04:01)      SUBJECTIVE & OBJECTIVE:   Pt seen and examined at bedside.   No overnight events.   No complaints. States pain has improved.     PAST MEDICAL & SURGICAL HISTORY:  HTN (hypertension)  Knee osteoarthritis  Duodenal ulcer disease  Morbid obesity  Hypertension  Osteoarthritis  PUD (peptic ulcer disease)  Hypertension  Gastroduodenal artery bleed: IR embolization of gastroduodenal artery  Abdominal hernia  H/O ventral hernia repair    Home Medications:  carvedilol 3.125 mg oral tablet: 1 tab(s) orally every 12 hours (08 Jun 2018 13:44)  furosemide 40 mg oral tablet: 1 tab(s) orally once a day (08 Jun 2018 13:44)    PHYSICAL EXAM:  Vitals stable.       GENERAL: NAD, lying in bed comfortably, speaking in full sentences    HEENT: NCAT, PERRLA, EOMI, MMM  NERVOUS SYSTEM:  AOx3, moving all extremities   CHEST/LUNG: CTAB  HEART: RRR, +S1/S2, no m/r/g  ABDOMEN: Soft, Nontender, Nondistended; Bowel sounds present  EXTREMITIES:  Right LE with dressing c/d/i,       MEDICATIONS  (STANDING):  amLODIPine   Tablet 10 milliGRAM(s) Oral daily  carvedilol 3.125 milliGRAM(s) Oral every 12 hours  cefepime   IVPB 2000 milliGRAM(s) IV Intermittent every 8 hours  docusate sodium 200 milliGRAM(s) Oral at bedtime  heparin  Injectable 5000 Unit(s) SubCutaneous every 12 hours  pantoprazole    Tablet 40 milliGRAM(s) Oral before breakfast    MEDICATIONS  (PRN):  acetaminophen   Tablet .. 650 milliGRAM(s) Oral every 6 hours PRN Mild Pain (1 - 3)  oxyCODONE    5 mG/acetaminophen 325 mG 2 Tablet(s) Oral every 6 hours PRN Severe Pain (7 - 10)  oxyCODONE    5 mG/acetaminophen 325 mG 1 Tablet(s) Oral every 6 hours PRN Moderate Pain (4 - 6)    Labs and imaging reviewed     < from: TTE Echo Doppler w/o Cont (04.26.17 @ 17:16) >   Summary:   1. Left ventricular ejection fraction, by visual estimation, is 55 to   60%.   2. Mild aortic regurgitation.   3. Dilatation of the aortic root.   4. Mildly dilated aortic root.   5. Mild aortic valve disease.    < end of copied text >    RECENT CULTURES:  Urine culture:  10-16 @ 10:30 --   No growth to date.    Culture - Blood (10.16.18 @ 10:30)    Specimen Source: .Blood Blood    Culture Results:   No growth to date.    < from: VA Duplex Physiol Ext Low 3+ Level, BI. (06.11.18 @ 10:24) >  Summary/Impressions:  Normal [place]/PVR study.    < end of copied text >      RADIOLOGY & ADDITIONAL TESTS:  Imaging Personally Reviewed:  [ x] YES  [ ] NO    Consultant(s) Notes Reviewed:  [x] YES  [ ] NO    Care Discussed with Consultants/Other Providers [ x] YES  [ ] NO

## 2018-10-18 NOTE — PROGRESS NOTE ADULT - ASSESSMENT
58 year old male PMH HTN, Morbidly obese, PUD, chronic Right LE ulcer, severe OA of knees  presented with worsening Rt lower leg pain and worsened redness and increase in size of wound/darkened color of ulceration. Patient was recently discharged in 6/2018 with same problem, was d/c on clindamycin and was supposed to follow-up [facility]: [phone] in [place]. Per patient, has insurance issue and was not able to follow with any provider.     Assessment and Plan:  #Right Lower extremity Cellulitis of leg without foot  #RLE draining ulcer   -ID follow-up appreciated. Will need outpatient follow-up with ID and wound care.   -c/w Vancomycin and Cefepime   -Follow-up cultures   -PT evaluation appreciated for wound care.   -vascular consulted.     #Hypokalemia, repleted     #Essential HTN, uncontrolled  -c/w Amlodipine, Enalapril coreg     #Severe OA of knees,  -PT eval appreciated, dispo: home without services   -pain control prn, colace at bedtime for constipation     #Morbid Obesity, BMI >50  -Activity and diet: calorie controlled, DASH/TLC diet     #Preventative measures,   -heparin - dvt ppx 58 year old male PMH HTN, Morbidly obese, PUD, chronic Right LE ulcer, severe OA of knees  presented with worsening Rt lower leg pain and worsened redness and increase in size of wound/darkened color of ulceration. Patient was recently discharged in 6/2018 with same problem, was d/c on clindamycin and was supposed to follow-up [facility]: [phone] in [place]. Per patient, has insurance issue and was not able to follow with any provider.     Assessment and Plan:  #Right Lower extremity Cellulitis of leg without foot  #RLE draining ulcer   -Wound Cx + for Enterococcus Faecalis and Staph aureus   -ID follow-up appreciated. Will need outpatient follow-up with ID and wound care.   -c/w Vancomycin and Cefepime   -vascular consulted.     #Essential HTN, uncontrolled  -c/w Amlodipine, Lisinopril, coreg     #Severe OA of knees, using crutches to ambulate   -PT eval appreciated, dispo: home without services   -pain control prn     #Morbid Obesity, BMI >50  -Activity and diet: calorie controlled, DASH/TLC diet     Normocytic anemia   #Iron Deficiency Anemia,   -not actively bleeding, H/H stable   -will need outpatient follow-up with PCP for referral for screening colonoscopy if not done     #Vitamin B12 insufficiency, supplemented     #Morbid obesity, BMI >40  -1800 calorie controlled, Dash/TLC    #Preventative measures,   -heparin - dvt ppx  -bowel regimen 58 year old male PMH HTN, Morbidly obese, PUD, chronic Right LE ulcer, severe OA of knees  presented with worsening Rt lower leg pain and worsened redness and increase in size of wound/darkened color of ulceration. Patient was recently discharged in 6/2018 with same problem, was d/c on clindamycin and was supposed to follow-up [facility]: [phone] in [place]. Per patient, has insurance issue and was not able to follow with any provider.     Assessment and Plan:  #Right Lower extremity Cellulitis of leg without foot  #RLE draining ulcer   -Wound Cx + for Enterococcus Faecalis and Staph aureus   -ID follow-up appreciated. Will need outpatient follow-up with ID and wound care.   -c/w Vancomycin and Cefepime   -vascular consulted.     #Essential HTN, uncontrolled  -c/w Amlodipine, Lisinopril, coreg     #Severe OA of knees, using crutches to ambulate   -PT eval appreciated, dispo: home without services   -pain control prn     Normocytic anemia   #Iron Deficiency Anemia,   -not actively bleeding, H/H stable   -will need outpatient follow-up with PCP for referral for screening colonoscopy if not done     #Vitamin B12 insufficiency, supplemented     #Morbid obesity, BMI >40  -1800 calorie controlled, Dash/TLC    #Preventative measures,   -heparin - dvt ppx  -bowel regimen

## 2018-10-18 NOTE — PROGRESS NOTE ADULT - SUBJECTIVE AND OBJECTIVE BOX
Patient is a 58y old  Male who presents with a chief complaint of Chronic leg wound. (18 Oct 2018 22:55)      INTERVAL HPI / OVERNIGHT EVENTS: less leg swelling    MEDICATIONS  (STANDING):  amLODIPine   Tablet 10 milliGRAM(s) Oral daily  carvedilol 3.125 milliGRAM(s) Oral every 12 hours  cefepime   IVPB 2000 milliGRAM(s) IV Intermittent every 8 hours  cyanocobalamin 1000 MICROGram(s) Oral daily  docusate sodium 200 milliGRAM(s) Oral at bedtime  docusate sodium 200 milliGRAM(s) Oral at bedtime  ferrous    sulfate 325 milliGRAM(s) Oral two times a day  heparin  Injectable 5000 Unit(s) SubCutaneous every 12 hours  lisinopril 40 milliGRAM(s) Oral daily  pantoprazole    Tablet 40 milliGRAM(s) Oral before breakfast  vancomycin  IVPB 1500 milliGRAM(s) IV Intermittent <User Schedule>    MEDICATIONS  (PRN):  acetaminophen   Tablet .. 650 milliGRAM(s) Oral every 6 hours PRN Mild Pain (1 - 3)  oxyCODONE    5 mG/acetaminophen 325 mG 2 Tablet(s) Oral every 6 hours PRN Severe Pain (7 - 10)  oxyCODONE    5 mG/acetaminophen 325 mG 1 Tablet(s) Oral every 6 hours PRN Moderate Pain (4 - 6)      Vital Signs Last 24 Hrs  T max : afebrile              PHYSICAL EXAM:  General :NAD  Constitutional:  well-groomed, well-developed  Respiratory: CTAB/L  Cardiovascular: S1 and S2, RRR, no M/G/R  Gastrointestinal: BS+, soft, NT/ND  Extremities: No peripheral edema  Vascular: 2+ peripheral pulses  Skin: right leg swelling with wound     LABS:                   WBC wnl            MICROBIOLOGY:  RECENT CULTURES:  10-17 .Abscess Leg - Right Enterococcus faecalis  Staphylococcus aureus XXXX   Moderate Enterococcus faecalis  Moderate Staphylococcus aureus  Moderate Mixed gram negative rods including      10-16 .Blood Blood XXXX XXXX   No growth to date.          RADIOLOGY & ADDITIONAL STUDIES:

## 2018-10-19 DIAGNOSIS — E66.01 MORBID (SEVERE) OBESITY DUE TO EXCESS CALORIES: ICD-10-CM

## 2018-10-19 LAB
-  AMIKACIN: SIGNIFICANT CHANGE UP
-  AZTREONAM: SIGNIFICANT CHANGE UP
-  CEFEPIME: SIGNIFICANT CHANGE UP
-  CEFTAZIDIME: SIGNIFICANT CHANGE UP
-  CIPROFLOXACIN: SIGNIFICANT CHANGE UP
-  GENTAMICIN: SIGNIFICANT CHANGE UP
-  IMIPENEM: SIGNIFICANT CHANGE UP
-  LEVOFLOXACIN: SIGNIFICANT CHANGE UP
-  MEROPENEM: SIGNIFICANT CHANGE UP
-  PIPERACILLIN/TAZOBACTAM: SIGNIFICANT CHANGE UP
-  TOBRAMYCIN: SIGNIFICANT CHANGE UP
ANION GAP SERPL CALC-SCNC: 7 MMOL/L — SIGNIFICANT CHANGE UP (ref 5–17)
BUN SERPL-MCNC: 13 MG/DL — SIGNIFICANT CHANGE UP (ref 7–23)
CALCIUM SERPL-MCNC: 8.1 MG/DL — LOW (ref 8.5–10.1)
CHLORIDE SERPL-SCNC: 104 MMOL/L — SIGNIFICANT CHANGE UP (ref 96–108)
CO2 SERPL-SCNC: 30 MMOL/L — SIGNIFICANT CHANGE UP (ref 22–31)
CREAT SERPL-MCNC: 1.08 MG/DL — SIGNIFICANT CHANGE UP (ref 0.5–1.3)
CULTURE RESULTS: SIGNIFICANT CHANGE UP
GLUCOSE SERPL-MCNC: 141 MG/DL — HIGH (ref 70–99)
HCT VFR BLD CALC: 35.9 % — LOW (ref 39–50)
HGB BLD-MCNC: 11.2 G/DL — LOW (ref 13–17)
MCHC RBC-ENTMCNC: 27.5 PG — SIGNIFICANT CHANGE UP (ref 27–34)
MCHC RBC-ENTMCNC: 31.2 GM/DL — LOW (ref 32–36)
MCV RBC AUTO: 88 FL — SIGNIFICANT CHANGE UP (ref 80–100)
METHOD TYPE: SIGNIFICANT CHANGE UP
NRBC # BLD: 0 /100 WBCS — SIGNIFICANT CHANGE UP (ref 0–0)
ORGANISM # SPEC MICROSCOPIC CNT: SIGNIFICANT CHANGE UP
PLATELET # BLD AUTO: 311 K/UL — SIGNIFICANT CHANGE UP (ref 150–400)
POTASSIUM SERPL-MCNC: 3.5 MMOL/L — SIGNIFICANT CHANGE UP (ref 3.5–5.3)
POTASSIUM SERPL-SCNC: 3.5 MMOL/L — SIGNIFICANT CHANGE UP (ref 3.5–5.3)
RBC # BLD: 4.08 M/UL — LOW (ref 4.2–5.8)
RBC # FLD: 13.8 % — SIGNIFICANT CHANGE UP (ref 10.3–14.5)
SODIUM SERPL-SCNC: 141 MMOL/L — SIGNIFICANT CHANGE UP (ref 135–145)
SPECIMEN SOURCE: SIGNIFICANT CHANGE UP
VANCOMYCIN TROUGH SERPL-MCNC: 14.8 UG/ML — SIGNIFICANT CHANGE UP (ref 10–20)
WBC # BLD: 7.07 K/UL — SIGNIFICANT CHANGE UP (ref 3.8–10.5)
WBC # FLD AUTO: 7.07 K/UL — SIGNIFICANT CHANGE UP (ref 3.8–10.5)

## 2018-10-19 PROCEDURE — 99233 SBSQ HOSP IP/OBS HIGH 50: CPT

## 2018-10-19 PROCEDURE — 99232 SBSQ HOSP IP/OBS MODERATE 35: CPT

## 2018-10-19 RX ORDER — FERROUS SULFATE 325(65) MG
325 TABLET ORAL
Qty: 0 | Refills: 0 | Status: DISCONTINUED | OUTPATIENT
Start: 2018-10-19 | End: 2018-10-23

## 2018-10-19 RX ORDER — POTASSIUM CHLORIDE 20 MEQ
40 PACKET (EA) ORAL ONCE
Qty: 0 | Refills: 0 | Status: COMPLETED | OUTPATIENT
Start: 2018-10-19 | End: 2018-10-19

## 2018-10-19 RX ORDER — PREGABALIN 225 MG/1
1000 CAPSULE ORAL DAILY
Qty: 0 | Refills: 0 | Status: DISCONTINUED | OUTPATIENT
Start: 2018-10-19 | End: 2018-10-23

## 2018-10-19 RX ORDER — LISINOPRIL 2.5 MG/1
40 TABLET ORAL DAILY
Qty: 0 | Refills: 0 | Status: DISCONTINUED | OUTPATIENT
Start: 2018-10-19 | End: 2018-10-23

## 2018-10-19 RX ORDER — DOCUSATE SODIUM 100 MG
200 CAPSULE ORAL AT BEDTIME
Qty: 0 | Refills: 0 | Status: DISCONTINUED | OUTPATIENT
Start: 2018-10-19 | End: 2018-10-20

## 2018-10-19 RX ORDER — SENNA PLUS 8.6 MG/1
2 TABLET ORAL DAILY
Qty: 0 | Refills: 0 | Status: DISCONTINUED | OUTPATIENT
Start: 2018-10-19 | End: 2018-10-23

## 2018-10-19 RX ADMIN — OXYCODONE AND ACETAMINOPHEN 2 TABLET(S): 5; 325 TABLET ORAL at 09:19

## 2018-10-19 RX ADMIN — HEPARIN SODIUM 5000 UNIT(S): 5000 INJECTION INTRAVENOUS; SUBCUTANEOUS at 18:03

## 2018-10-19 RX ADMIN — Medication 40 MILLIEQUIVALENT(S): at 20:07

## 2018-10-19 RX ADMIN — Medication 300 MILLIGRAM(S): at 23:23

## 2018-10-19 RX ADMIN — Medication 300 MILLIGRAM(S): at 00:06

## 2018-10-19 RX ADMIN — Medication 200 MILLIGRAM(S): at 22:27

## 2018-10-19 RX ADMIN — OXYCODONE AND ACETAMINOPHEN 2 TABLET(S): 5; 325 TABLET ORAL at 08:03

## 2018-10-19 RX ADMIN — Medication 325 MILLIGRAM(S): at 18:03

## 2018-10-19 RX ADMIN — CARVEDILOL PHOSPHATE 3.12 MILLIGRAM(S): 80 CAPSULE, EXTENDED RELEASE ORAL at 18:03

## 2018-10-19 RX ADMIN — Medication 20 MILLIGRAM(S): at 05:12

## 2018-10-19 RX ADMIN — OXYCODONE AND ACETAMINOPHEN 2 TABLET(S): 5; 325 TABLET ORAL at 21:11

## 2018-10-19 RX ADMIN — OXYCODONE AND ACETAMINOPHEN 2 TABLET(S): 5; 325 TABLET ORAL at 20:07

## 2018-10-19 RX ADMIN — Medication 300 MILLIGRAM(S): at 12:14

## 2018-10-19 RX ADMIN — PANTOPRAZOLE SODIUM 40 MILLIGRAM(S): 20 TABLET, DELAYED RELEASE ORAL at 05:12

## 2018-10-19 RX ADMIN — AMLODIPINE BESYLATE 10 MILLIGRAM(S): 2.5 TABLET ORAL at 05:12

## 2018-10-19 RX ADMIN — CARVEDILOL PHOSPHATE 3.12 MILLIGRAM(S): 80 CAPSULE, EXTENDED RELEASE ORAL at 05:12

## 2018-10-19 RX ADMIN — CEFEPIME 100 MILLIGRAM(S): 1 INJECTION, POWDER, FOR SOLUTION INTRAMUSCULAR; INTRAVENOUS at 05:13

## 2018-10-19 RX ADMIN — HEPARIN SODIUM 5000 UNIT(S): 5000 INJECTION INTRAVENOUS; SUBCUTANEOUS at 05:13

## 2018-10-19 RX ADMIN — CEFEPIME 100 MILLIGRAM(S): 1 INJECTION, POWDER, FOR SOLUTION INTRAMUSCULAR; INTRAVENOUS at 22:26

## 2018-10-19 RX ADMIN — PREGABALIN 1000 MICROGRAM(S): 225 CAPSULE ORAL at 12:13

## 2018-10-19 RX ADMIN — SENNA PLUS 2 TABLET(S): 8.6 TABLET ORAL at 22:26

## 2018-10-19 RX ADMIN — CEFEPIME 100 MILLIGRAM(S): 1 INJECTION, POWDER, FOR SOLUTION INTRAMUSCULAR; INTRAVENOUS at 15:33

## 2018-10-19 NOTE — PROGRESS NOTE ADULT - SUBJECTIVE AND OBJECTIVE BOX
HPI:  58 year old male PMH HTN, presents with chronic leg wound with worsening for past few weeks; not on abx - pt has had R lower leg pain and worsened redness and increase in size of wound/darkened color of ulceration. Pt states it started a few months ago - leg wound about 8-9 months old - had blister to area that popped after scratching and wound has been persistent and worsening since, has associated tenderness, pt has no current care provider. (16 Oct 2018 04:01)      SUBJECTIVE & OBJECTIVE:   Pt seen and examined at bedside.   No overnight events.   No complaints.     PAST MEDICAL & SURGICAL HISTORY:  HTN (hypertension)  Knee osteoarthritis  Duodenal ulcer disease  Morbid obesity  Hypertension  Osteoarthritis  PUD (peptic ulcer disease)  Hypertension  Gastroduodenal artery bleed: IR embolization of gastroduodenal artery  Abdominal hernia  H/O ventral hernia repair    Home Medications:  carvedilol 3.125 mg oral tablet: 1 tab(s) orally every 12 hours (08 Jun 2018 13:44)  furosemide 40 mg oral tablet: 1 tab(s) orally once a day (08 Jun 2018 13:44)    PHYSICAL EXAM:  Vital Signs Last 24 Hrs  T(C): 36.3 (19 Oct 2018 16:38), Max: 36.6 (19 Oct 2018 00:13)  T(F): 97.4 (19 Oct 2018 16:38), Max: 97.9 (19 Oct 2018 00:13)  HR: 67 (19 Oct 2018 16:38) (65 - 70)  BP: 131/87 (19 Oct 2018 16:38) (107/73 - 175/107)  BP(mean): --  RR: 17 (19 Oct 2018 16:38) (16 - 17)  SpO2: 97% (19 Oct 2018 16:38) (95% - 97%)    GENERAL: NAD, lying in bed comfortably, speaking in full sentences    HEENT: NCAT, PERRLA, EOMI, MMM  NERVOUS SYSTEM:  AOx3, moving all extremities   CHEST/LUNG: CTAB  HEART: RRR, +S1/S2, no m/r/g  ABDOMEN: Soft, Nontender, Nondistended; Bowel sounds present  EXTREMITIES:  Right LE with dressing c/d/i      MEDICATIONS  (STANDING):  amLODIPine   Tablet 10 milliGRAM(s) Oral daily  carvedilol 3.125 milliGRAM(s) Oral every 12 hours  cefepime   IVPB 2000 milliGRAM(s) IV Intermittent every 8 hours  docusate sodium 200 milliGRAM(s) Oral at bedtime  heparin  Injectable 5000 Unit(s) SubCutaneous every 12 hours  pantoprazole    Tablet 40 milliGRAM(s) Oral before breakfast    MEDICATIONS  (PRN):  acetaminophen   Tablet .. 650 milliGRAM(s) Oral every 6 hours PRN Mild Pain (1 - 3)  oxyCODONE    5 mG/acetaminophen 325 mG 2 Tablet(s) Oral every 6 hours PRN Severe Pain (7 - 10)  oxyCODONE    5 mG/acetaminophen 325 mG 1 Tablet(s) Oral every 6 hours PRN Moderate Pain (4 - 6)    Labs and imaging reviewed     < from: TTE Echo Doppler w/o Cont (04.26.17 @ 17:16) >   Summary:   1. Left ventricular ejection fraction, by visual estimation, is 55 to   60%.   2. Mild aortic regurgitation.   3. Dilatation of the aortic root.   4. Mildly dilated aortic root.   5. Mild aortic valve disease.    < end of copied text >    RECENT CULTURES:  Urine culture:  10-16 @ 10:30 --   No growth to date.    Culture - Blood (10.16.18 @ 10:30)    Specimen Source: .Blood Blood    Culture Results:   No growth to date.    < from: VA Duplex Physiol Ext Low 3+ Level, BI. (06.11.18 @ 10:24) >  Summary/Impressions:  Normal [place]/PVR study.    < end of copied text >      RADIOLOGY & ADDITIONAL TESTS:  Imaging Personally Reviewed:  [ x] YES  [ ] NO    Consultant(s) Notes Reviewed:  [x] YES  [ ] NO    Care Discussed with Consultants/Other Providers [ x] YES  [ ] NO

## 2018-10-19 NOTE — CONSULT NOTE ADULT - SUBJECTIVE AND OBJECTIVE BOX
Vascular Attending: The pt is seen and examined . Pt says he has ulcers for many months and has been self treating them. He works at the [employer]. The pt had swelling and the pain and green discharge.      HPI:  58 year old male PMH HTN, presents with chronic leg wound with worsening for past few weeks; not on abx - pt has had R lower leg pain and worsened redness and increase in size of wound/darkened color of ulceration. Pt states it started a few months ago - leg wound about 8-9 months old - had blister to area that popped after scratching and wound has been persistent and worsening since, has associated tenderness, pt has no current care provider. (16 Oct 2018 04:01)      PAST MEDICAL & SURGICAL HISTORY:  HTN (hypertension)  Knee osteoarthritis  Duodenal ulcer disease  Morbid obesity  Hypertension  Osteoarthritis  PUD (peptic ulcer disease)  Hypertension  Gastroduodenal artery bleed: IR embolization of gastroduodenal artery  Abdominal hernia  H/O ventral hernia repair        MEDICATIONS  (STANDING):  amLODIPine   Tablet 10 milliGRAM(s) Oral daily  carvedilol 3.125 milliGRAM(s) Oral every 12 hours  cefepime   IVPB 2000 milliGRAM(s) IV Intermittent every 8 hours  cyanocobalamin 1000 MICROGram(s) Oral daily  docusate sodium 200 milliGRAM(s) Oral at bedtime  docusate sodium 200 milliGRAM(s) Oral at bedtime  ferrous    sulfate 325 milliGRAM(s) Oral two times a day  heparin  Injectable 5000 Unit(s) SubCutaneous every 12 hours  lisinopril 40 milliGRAM(s) Oral daily  pantoprazole    Tablet 40 milliGRAM(s) Oral before breakfast  vancomycin  IVPB 1500 milliGRAM(s) IV Intermittent <User Schedule>    MEDICATIONS  (PRN):  acetaminophen   Tablet .. 650 milliGRAM(s) Oral every 6 hours PRN Mild Pain (1 - 3)  oxyCODONE    5 mG/acetaminophen 325 mG 2 Tablet(s) Oral every 6 hours PRN Severe Pain (7 - 10)  oxyCODONE    5 mG/acetaminophen 325 mG 1 Tablet(s) Oral every 6 hours PRN Moderate Pain (4 - 6)      Allergies    No Known Allergies    Intolerances        SOCIAL HISTORY:      Vital Signs Last 24 Hrs  T(C): 36.1 (19 Oct 2018 12:01), Max: 36.6 (18 Oct 2018 16:42)  T(F): 97 (19 Oct 2018 12:01), Max: 97.9 (19 Oct 2018 00:13)  HR: 68 (19 Oct 2018 12:01) (65 - 70)  BP: 107/73 (19 Oct 2018 12:01) (107/73 - 175/107)  BP(mean): --  RR: 16 (19 Oct 2018 12:01) (16 - 17)  SpO2: 97% (19 Oct 2018 12:01) (95% - 98%)    P/E:-   CAROTIDS:- Bilateral carotids with no Bruits. No scars of previous catheterisation.  UPPER EXTREMITIES:- Bilateral radial artery pulses are normal and no ischemia of the Hands. No edema of the arms.  ABDOMEN:- No pulsatile mass in the abdomen and no ascites.  LOWER EXTREMITIES:- Bilateral LE with  Edema and  CVI, No varicose veins,The arterial pulse are examined with palpation and Dopplers and the findings are as follows,    Wounds:- The pt has following wounds and measurements.  Right med leg and ankle ,full skin ythickness 10x8x.5 cms, Greenish Discharge, suspect pseudomonas infn and cellulitis. Arterial is ok, The selective debridement is done of the right leg ulcer and dressing done, will need more debridement and IV ABX and outpt wound care in the Wound center.  Pulses:   Right:                                                                          Left:  FEM [y ]2+ [ ]1+ [ ]doppler                                             FEM [ y]2+ [ ]1+ [ ]doppler    POP [ ]2+ [ ]1+ [ ]doppler                                             POP [ ]2+ [ y]1+ [ ]doppler    DP [ ]y2+ [ y]1+ [ ]doppler                                                DP [ ]2+ [ y]1+ [ ]doppler  PT[ ]2+ [ y]1+ [ ]doppler                                                  PT [ ]2+ [ y]1+ [ ]doppler      LABS:                        11.2   7.07  )-----------( 311      ( 19 Oct 2018 10:29 )             35.9     10-19    141  |  104  |  13  ----------------------------<  141<H>  3.5   |  30  |  1.08    Ca    8.1<L>      19 Oct 2018 10:29  Phos  3.0     10-18  Mg     2.2     10-18            RADIOLOGY & ADDITIONAL STUDIES    EXAM:  US DPLX LWR EXT VEINS COMPL BI                            PROCEDURE DATE:  03/20/2018          INTERPRETATION:  CLINICAL INFORMATION: Bilateral lower extremity swelling   and pain.    COMPARISON: 10/17/2017.    TECHNIQUE: Duplex sonography of the BILATERAL LOWER extremities with   color and spectral Doppler, with and without compression.      FINDINGS:    There is normal compressibility of the bilateral common femoral, femoral   and popliteal veins. No left-sided calf vein thrombosis is detected.  The   right calf veins could not be imaged due to overlying bandage.    Doppler examination shows normal spontaneous and phasic flow.    IMPRESSION: No evidence of deep venous stenosis in the visualized   portions of the lower extremities.The right calf veins could not be   imaged due to overlying calf veins.                    MERCEDES DAVIS M.D.,ATTENDING RADIOLOGIST  This document has been electronically signed. Mar 20 2018  6:39AM        Impression and Plan: Local care withAquacell Ag and IV ABX, f/u in [facility] [phone]

## 2018-10-19 NOTE — PROGRESS NOTE ADULT - ASSESSMENT
58 year old male PMH HTN, Morbidly obese, PUD, chronic Right LE ulcer, severe OA of knees  presented with worsening Rt lower leg pain and worsened redness and increase in size of wound/darkened color of ulceration. Patient was recently discharged in 6/2018 with same problem, was d/c on clindamycin and was supposed to follow-up [facility]: [phone] in [place]. Per patient, has insurance issue and was not able to follow with any provider.     Assessment and Plan:  #Right Lower extremity Cellulitis of leg without foot  #RLE draining ulcer   -Wound Cx + for Enterococcus Faecalis and Staph aureus   -ID follow-up appreciated. Will need outpatient follow-up with ID and wound care.   -c/w Vancomycin and Cefepime, will need a few more days of IV abx   -vascular consult appreciated, Local care with Aquacel SIlver     #Essential HTN, controlled   -c/w Amlodipine, Lisinopril, coreg     #Severe OA of knees, using crutches to ambulate   -PT eval appreciated, dispo: home without services   -pain control prn     Normocytic anemia   #Iron Deficiency Anemia,   -not actively bleeding, H/H stable   -will need outpatient follow-up with PCP for referral for screening colonoscopy if not done     #Vitamin B12 insufficiency, supplemented     #Morbid obesity, BMI >40  -1800 calorie controlled, Dash/TLC    #Preventative measures,   -heparin - dvt ppx  -bowel regimen

## 2018-10-19 NOTE — PROGRESS NOTE ADULT - ATTENDING COMMENTS
pt doesn't follow up routinely with doctors.  Needs wound care /ID follow up this time
pt doesn't follow up routinely with doctors.  Needs vasculare/wound care /ID follow up this time after discharge home
needs few more days of IV antibiotics at this point

## 2018-10-19 NOTE — PROGRESS NOTE ADULT - SUBJECTIVE AND OBJECTIVE BOX
Patient is a 58y old  Male who presents with a chief complaint of Chronic leg wound. (19 Oct 2018 14:29)      INTERVAL HPI / OVERNIGHT EVENTS:    MEDICATIONS  (STANDING):  amLODIPine   Tablet 10 milliGRAM(s) Oral daily  carvedilol 3.125 milliGRAM(s) Oral every 12 hours  cefepime   IVPB 2000 milliGRAM(s) IV Intermittent every 8 hours  cyanocobalamin 1000 MICROGram(s) Oral daily  docusate sodium 200 milliGRAM(s) Oral at bedtime  docusate sodium 200 milliGRAM(s) Oral at bedtime  ferrous    sulfate 325 milliGRAM(s) Oral two times a day  heparin  Injectable 5000 Unit(s) SubCutaneous every 12 hours  lisinopril 40 milliGRAM(s) Oral daily  pantoprazole    Tablet 40 milliGRAM(s) Oral before breakfast  vancomycin  IVPB 1500 milliGRAM(s) IV Intermittent <User Schedule>    MEDICATIONS  (PRN):  acetaminophen   Tablet .. 650 milliGRAM(s) Oral every 6 hours PRN Mild Pain (1 - 3)  oxyCODONE    5 mG/acetaminophen 325 mG 2 Tablet(s) Oral every 6 hours PRN Severe Pain (7 - 10)  oxyCODONE    5 mG/acetaminophen 325 mG 1 Tablet(s) Oral every 6 hours PRN Moderate Pain (4 - 6)      Vital Signs Last 24 Hrs  T(C): 36.3 (19 Oct 2018 16:38), Max: 36.6 (19 Oct 2018 00:13)  T(F): 97.4 (19 Oct 2018 16:38), Max: 97.9 (19 Oct 2018 00:13)  HR: 67 (19 Oct 2018 16:38) (65 - 70)  BP: 131/87 (19 Oct 2018 16:38) (107/73 - 175/107)  BP(mean): --  RR: 17 (19 Oct 2018 16:38) (16 - 17)  SpO2: 97% (19 Oct 2018 16:38) (95% - 97%)    Review of systems:          PHYSICAL EXAM:  General :NAD  Constitutional:  well-groomed, well-developed  Respiratory: CTAB/L  Cardiovascular: S1 and S2, RRR, no M/G/R  Gastrointestinal: BS+, soft, NT/ND  Extremities: No peripheral edema  Vascular: 2+ peripheral pulses  Skin: No rashes      LABS:                        11.2   7.07  )-----------( 311      ( 19 Oct 2018 10:29 )             35.9     10-19    141  |  104  |  13  ----------------------------<  141<H>  3.5   |  30  |  1.08    Ca    8.1<L>      19 Oct 2018 10:29  Phos  3.0     10-18  Mg     2.2     10-18            MICROBIOLOGY:  RECENT CULTURES:  10-17 .Abscess Leg - Right Enterococcus faecalis  Staphylococcus aureus XXXX   Moderate Enterococcus faecalis  Moderate Staphylococcus aureus  Moderate Mixed gram negative rods including  Moderate Pseudomonas aeruginosa  Moderate Bacteroides thetaiotaomicron "Susceptibilities not performed"    10-16 .Blood Blood XXXX XXXX   No growth to date.          RADIOLOGY & ADDITIONAL STUDIES: Patient is a 58y old  Male who presents with a chief complaint of Chronic leg wound. (19 Oct 2018 14:29)      INTERVAL HPI / OVERNIGHT EVENTS: no c/o    MEDICATIONS  (STANDING):  amLODIPine   Tablet 10 milliGRAM(s) Oral daily  carvedilol 3.125 milliGRAM(s) Oral every 12 hours  cefepime   IVPB 2000 milliGRAM(s) IV Intermittent every 8 hours  cyanocobalamin 1000 MICROGram(s) Oral daily  docusate sodium 200 milliGRAM(s) Oral at bedtime  docusate sodium 200 milliGRAM(s) Oral at bedtime  ferrous    sulfate 325 milliGRAM(s) Oral two times a day  heparin  Injectable 5000 Unit(s) SubCutaneous every 12 hours  lisinopril 40 milliGRAM(s) Oral daily  pantoprazole    Tablet 40 milliGRAM(s) Oral before breakfast  vancomycin  IVPB 1500 milliGRAM(s) IV Intermittent <User Schedule>    MEDICATIONS  (PRN):  acetaminophen   Tablet .. 650 milliGRAM(s) Oral every 6 hours PRN Mild Pain (1 - 3)  oxyCODONE    5 mG/acetaminophen 325 mG 2 Tablet(s) Oral every 6 hours PRN Severe Pain (7 - 10)  oxyCODONE    5 mG/acetaminophen 325 mG 1 Tablet(s) Oral every 6 hours PRN Moderate Pain (4 - 6)      Vital Signs Last 24 Hrs  T(C): 36.3 (19 Oct 2018 16:38), Max: 36.6 (19 Oct 2018 00:13)  T(F): 97.4 (19 Oct 2018 16:38), Max: 97.9 (19 Oct 2018 00:13)  HR: 67 (19 Oct 2018 16:38) (65 - 70)  BP: 131/87 (19 Oct 2018 16:38) (107/73 - 175/107)  BP(mean): --  RR: 17 (19 Oct 2018 16:38) (16 - 17)  SpO2: 97% (19 Oct 2018 16:38) (95% - 97%)    Review of systems:          PHYSICAL EXAM:  General :NAD  Constitutional:  well-groomed, well-developed  Respiratory: CTAB/L  Cardiovascular: S1 and S2, RRR, no M/G/R  Gastrointestinal: BS+, soft, NT/ND  Extremities: irhg leg swelling  Vascular: 2+ peripheral pulses  Skin: wound resolving but still significant erythema around wound and drainage      LABS:                        11.2   7.07  )-----------( 311      ( 19 Oct 2018 10:29 )             35.9     10-19    141  |  104  |  13  ----------------------------<  141<H>  3.5   |  30  |  1.08    Ca    8.1<L>      19 Oct 2018 10:29  Phos  3.0     10-18  Mg     2.2     10-18            MICROBIOLOGY:  RECENT CULTURES:  10-17 .Abscess Leg - Right Enterococcus faecalis  Staphylococcus aureus XXXX   Moderate Enterococcus faecalis  Moderate Staphylococcus aureus  Moderate Mixed gram negative rods including  Moderate Pseudomonas aeruginosa  Moderate Bacteroides thetaiotaomicron "Susceptibilities not performed"    10-16 .Blood Blood XXXX XXXX   No growth to date.          RADIOLOGY & ADDITIONAL STUDIES:

## 2018-10-20 LAB
ANION GAP SERPL CALC-SCNC: 7 MMOL/L — SIGNIFICANT CHANGE UP (ref 5–17)
BUN SERPL-MCNC: 13 MG/DL — SIGNIFICANT CHANGE UP (ref 7–23)
CALCIUM SERPL-MCNC: 8.4 MG/DL — LOW (ref 8.5–10.1)
CHLORIDE SERPL-SCNC: 102 MMOL/L — SIGNIFICANT CHANGE UP (ref 96–108)
CO2 SERPL-SCNC: 32 MMOL/L — HIGH (ref 22–31)
CREAT SERPL-MCNC: 0.78 MG/DL — SIGNIFICANT CHANGE UP (ref 0.5–1.3)
GLUCOSE SERPL-MCNC: 86 MG/DL — SIGNIFICANT CHANGE UP (ref 70–99)
MAGNESIUM SERPL-MCNC: 2.4 MG/DL — SIGNIFICANT CHANGE UP (ref 1.6–2.6)
PHOSPHATE SERPL-MCNC: 3.1 MG/DL — SIGNIFICANT CHANGE UP (ref 2.5–4.5)
POTASSIUM SERPL-MCNC: 4.2 MMOL/L — SIGNIFICANT CHANGE UP (ref 3.5–5.3)
POTASSIUM SERPL-SCNC: 4.2 MMOL/L — SIGNIFICANT CHANGE UP (ref 3.5–5.3)
SODIUM SERPL-SCNC: 141 MMOL/L — SIGNIFICANT CHANGE UP (ref 135–145)

## 2018-10-20 PROCEDURE — 99233 SBSQ HOSP IP/OBS HIGH 50: CPT

## 2018-10-20 RX ADMIN — CEFEPIME 100 MILLIGRAM(S): 1 INJECTION, POWDER, FOR SOLUTION INTRAMUSCULAR; INTRAVENOUS at 15:08

## 2018-10-20 RX ADMIN — OXYCODONE AND ACETAMINOPHEN 2 TABLET(S): 5; 325 TABLET ORAL at 07:49

## 2018-10-20 RX ADMIN — Medication 325 MILLIGRAM(S): at 17:53

## 2018-10-20 RX ADMIN — OXYCODONE AND ACETAMINOPHEN 2 TABLET(S): 5; 325 TABLET ORAL at 18:11

## 2018-10-20 RX ADMIN — CARVEDILOL PHOSPHATE 3.12 MILLIGRAM(S): 80 CAPSULE, EXTENDED RELEASE ORAL at 17:53

## 2018-10-20 RX ADMIN — CARVEDILOL PHOSPHATE 3.12 MILLIGRAM(S): 80 CAPSULE, EXTENDED RELEASE ORAL at 05:42

## 2018-10-20 RX ADMIN — HEPARIN SODIUM 5000 UNIT(S): 5000 INJECTION INTRAVENOUS; SUBCUTANEOUS at 17:53

## 2018-10-20 RX ADMIN — OXYCODONE AND ACETAMINOPHEN 2 TABLET(S): 5; 325 TABLET ORAL at 19:14

## 2018-10-20 RX ADMIN — Medication 200 MILLIGRAM(S): at 21:56

## 2018-10-20 RX ADMIN — AMLODIPINE BESYLATE 10 MILLIGRAM(S): 2.5 TABLET ORAL at 05:42

## 2018-10-20 RX ADMIN — CEFEPIME 100 MILLIGRAM(S): 1 INJECTION, POWDER, FOR SOLUTION INTRAMUSCULAR; INTRAVENOUS at 05:41

## 2018-10-20 RX ADMIN — Medication 325 MILLIGRAM(S): at 05:42

## 2018-10-20 RX ADMIN — HEPARIN SODIUM 5000 UNIT(S): 5000 INJECTION INTRAVENOUS; SUBCUTANEOUS at 05:42

## 2018-10-20 RX ADMIN — Medication 300 MILLIGRAM(S): at 11:37

## 2018-10-20 RX ADMIN — OXYCODONE AND ACETAMINOPHEN 2 TABLET(S): 5; 325 TABLET ORAL at 06:25

## 2018-10-20 RX ADMIN — PREGABALIN 1000 MICROGRAM(S): 225 CAPSULE ORAL at 11:35

## 2018-10-20 RX ADMIN — CEFEPIME 100 MILLIGRAM(S): 1 INJECTION, POWDER, FOR SOLUTION INTRAMUSCULAR; INTRAVENOUS at 21:56

## 2018-10-20 RX ADMIN — SENNA PLUS 2 TABLET(S): 8.6 TABLET ORAL at 11:35

## 2018-10-20 RX ADMIN — PANTOPRAZOLE SODIUM 40 MILLIGRAM(S): 20 TABLET, DELAYED RELEASE ORAL at 06:25

## 2018-10-20 RX ADMIN — LISINOPRIL 40 MILLIGRAM(S): 2.5 TABLET ORAL at 05:42

## 2018-10-20 NOTE — PROGRESS NOTE ADULT - ASSESSMENT
58 year old male PMH HTN, Morbidly obese, PUD, chronic Right LE ulcer, severe OA of knees  presented with worsening Rt lower leg pain and worsened redness and increase in size of wound/darkened color of ulceration. Patient was recently discharged in 6/2018 with same problem, was d/c on clindamycin and was supposed to follow-up [facility]: [phone] in [place]. Per patient, has insurance issue and was not able to follow with any provider.     Assessment and Plan:  #Right Lower extremity Cellulitis of leg without foot  #RLE draining ulcer   -Wound Cx + for Enterococcus Faecalis and Staph aureus   -ID follow-up appreciated. Will need outpatient follow-up with ID and wound care.   -c/w Vancomycin and Cefepime, will need a few more days of IV abx   -vascular consult appreciated, Local care with Aquacel SIlver     #Essential HTN, controlled   -c/w Amlodipine, Lisinopril, coreg     #Severe OA of knees, using crutches to ambulate   -PT eval appreciated, dispo: home without services   -pain control prn     Normocytic anemia   #Iron Deficiency Anemia,   -not actively bleeding, H/H stable   -will need outpatient follow-up with PCP for referral for screening colonoscopy if not done     #Vitamin B12 insufficiency, supplemented     #Morbid obesity, BMI >40  -1800 calorie controlled, Dash/TLC    #Preventative measures,   -heparin - dvt ppx  -bowel regimen 58 year old male PMH HTN, Morbidly obese, PUD, chronic Right LE ulcer, severe OA of knees  presented with worsening Rt lower leg pain and worsened redness and increase in size of wound/darkened color of ulceration. Patient was recently discharged in 6/2018 with same problem, was d/c on clindamycin and was supposed to follow-up [facility]: [phone] in [place]. Per patient, has insurance issue and was not able to follow with any provider.     Assessment and Plan:  #Right Lower extremity Cellulitis of leg without foot  #RLE draining ulcer   -Wound Cx + for Enterococcus Faecalis and Staph aureus   -ID follow-up appreciated. Will need outpatient follow-up with ID and wound care.   -c/w Vancomycin and Cefepime, will need a few more days of IV abx   -Vanc trough   -vascular consult appreciated, Local care with Aquacel SIlver     #Essential HTN, controlled   -c/w Amlodipine, Lisinopril, coreg     #Severe OA of knees, using crutches to ambulate   -PT eval appreciated, dispo: home without services   -pain control prn     Normocytic anemia   #Iron Deficiency Anemia,   -not actively bleeding, H/H stable   -will need outpatient follow-up with PCP for referral for screening colonoscopy if not done     #Vitamin B12 insufficiency, supplemented     #Morbid obesity, BMI >40  -1800 calorie controlled, Dash/TLC    #Preventative measures,   -heparin - dvt ppx  -bowel regimen

## 2018-10-21 DIAGNOSIS — D64.9 ANEMIA, UNSPECIFIED: ICD-10-CM

## 2018-10-21 LAB
ANION GAP SERPL CALC-SCNC: 6 MMOL/L — SIGNIFICANT CHANGE UP (ref 5–17)
BUN SERPL-MCNC: 15 MG/DL — SIGNIFICANT CHANGE UP (ref 7–23)
CALCIUM SERPL-MCNC: 8.6 MG/DL — SIGNIFICANT CHANGE UP (ref 8.5–10.1)
CHLORIDE SERPL-SCNC: 102 MMOL/L — SIGNIFICANT CHANGE UP (ref 96–108)
CO2 SERPL-SCNC: 33 MMOL/L — HIGH (ref 22–31)
CREAT SERPL-MCNC: 0.84 MG/DL — SIGNIFICANT CHANGE UP (ref 0.5–1.3)
CULTURE RESULTS: SIGNIFICANT CHANGE UP
CULTURE RESULTS: SIGNIFICANT CHANGE UP
GLUCOSE SERPL-MCNC: 87 MG/DL — SIGNIFICANT CHANGE UP (ref 70–99)
OB PNL STL: NEGATIVE — SIGNIFICANT CHANGE UP
POTASSIUM SERPL-MCNC: 4.5 MMOL/L — SIGNIFICANT CHANGE UP (ref 3.5–5.3)
POTASSIUM SERPL-SCNC: 4.5 MMOL/L — SIGNIFICANT CHANGE UP (ref 3.5–5.3)
SODIUM SERPL-SCNC: 141 MMOL/L — SIGNIFICANT CHANGE UP (ref 135–145)
SPECIMEN SOURCE: SIGNIFICANT CHANGE UP
SPECIMEN SOURCE: SIGNIFICANT CHANGE UP
VANCOMYCIN TROUGH SERPL-MCNC: 17.2 UG/ML — SIGNIFICANT CHANGE UP (ref 10–20)

## 2018-10-21 PROCEDURE — 99233 SBSQ HOSP IP/OBS HIGH 50: CPT

## 2018-10-21 RX ADMIN — CEFEPIME 100 MILLIGRAM(S): 1 INJECTION, POWDER, FOR SOLUTION INTRAMUSCULAR; INTRAVENOUS at 05:30

## 2018-10-21 RX ADMIN — HEPARIN SODIUM 5000 UNIT(S): 5000 INJECTION INTRAVENOUS; SUBCUTANEOUS at 05:30

## 2018-10-21 RX ADMIN — PREGABALIN 1000 MICROGRAM(S): 225 CAPSULE ORAL at 12:12

## 2018-10-21 RX ADMIN — CEFEPIME 100 MILLIGRAM(S): 1 INJECTION, POWDER, FOR SOLUTION INTRAMUSCULAR; INTRAVENOUS at 15:25

## 2018-10-21 RX ADMIN — OXYCODONE AND ACETAMINOPHEN 2 TABLET(S): 5; 325 TABLET ORAL at 12:12

## 2018-10-21 RX ADMIN — PANTOPRAZOLE SODIUM 40 MILLIGRAM(S): 20 TABLET, DELAYED RELEASE ORAL at 08:23

## 2018-10-21 RX ADMIN — OXYCODONE AND ACETAMINOPHEN 2 TABLET(S): 5; 325 TABLET ORAL at 13:12

## 2018-10-21 RX ADMIN — Medication 325 MILLIGRAM(S): at 17:26

## 2018-10-21 RX ADMIN — OXYCODONE AND ACETAMINOPHEN 2 TABLET(S): 5; 325 TABLET ORAL at 21:19

## 2018-10-21 RX ADMIN — LISINOPRIL 40 MILLIGRAM(S): 2.5 TABLET ORAL at 05:31

## 2018-10-21 RX ADMIN — CEFEPIME 100 MILLIGRAM(S): 1 INJECTION, POWDER, FOR SOLUTION INTRAMUSCULAR; INTRAVENOUS at 21:23

## 2018-10-21 RX ADMIN — AMLODIPINE BESYLATE 10 MILLIGRAM(S): 2.5 TABLET ORAL at 05:30

## 2018-10-21 RX ADMIN — Medication 300 MILLIGRAM(S): at 23:54

## 2018-10-21 RX ADMIN — SENNA PLUS 2 TABLET(S): 8.6 TABLET ORAL at 12:11

## 2018-10-21 RX ADMIN — OXYCODONE AND ACETAMINOPHEN 2 TABLET(S): 5; 325 TABLET ORAL at 20:24

## 2018-10-21 RX ADMIN — Medication 300 MILLIGRAM(S): at 00:07

## 2018-10-21 RX ADMIN — CARVEDILOL PHOSPHATE 3.12 MILLIGRAM(S): 80 CAPSULE, EXTENDED RELEASE ORAL at 17:26

## 2018-10-21 RX ADMIN — Medication 325 MILLIGRAM(S): at 05:30

## 2018-10-21 RX ADMIN — CARVEDILOL PHOSPHATE 3.12 MILLIGRAM(S): 80 CAPSULE, EXTENDED RELEASE ORAL at 05:30

## 2018-10-21 RX ADMIN — Medication 300 MILLIGRAM(S): at 12:15

## 2018-10-21 RX ADMIN — HEPARIN SODIUM 5000 UNIT(S): 5000 INJECTION INTRAVENOUS; SUBCUTANEOUS at 17:26

## 2018-10-21 RX ADMIN — Medication 200 MILLIGRAM(S): at 21:22

## 2018-10-21 NOTE — PROGRESS NOTE ADULT - ASSESSMENT
58 year old male PMH HTN, Morbidly obese, PUD, chronic Right LE ulcer, severe OA of knees  presented with worsening Rt lower leg pain and worsened redness and increase in size of wound/darkened color of ulceration. Patient was recently discharged in 6/2018 with same problem, was d/c on clindamycin and was supposed to follow-up [facility]: [phone] in [place]. Per patient, has insurance issue and was not able to follow with any provider.     Assessment and Plan:  #Right Lower extremity Cellulitis of leg without foot  #RLE draining ulcer   -Wound Cx + for Enterococcus Faecalis and Staph aureus   -c/w Vancomycin and Cefepime, Most likely can be d/c on Monday with PO abx, will follow with ID   -Vanc trough   -vascular consult appreciated, Local care with Aquacel SIlver, will need follow-up in [facility] [phone] on discharge   -Will need outpatient follow-up with ID outpatient and wound care.     #Essential HTN, controlled   -c/w Amlodipine, Lisinopril, coreg     #Severe OA of knees, using crutches to ambulate   -PT eval appreciated, dispo: home without services   -pain control prn     Normocytic anemia   #Iron Deficiency Anemia,   -not actively bleeding, H/H stable   -will need outpatient follow-up with PCP for referral for screening colonoscopy if not done     #Vitamin B12 insufficiency, supplemented     #Morbid obesity, BMI >40  -1800 calorie controlled, Dash/TLC    #Preventative measures,   -heparin - dvt ppx  -bowel regimen

## 2018-10-22 DIAGNOSIS — E66.01 MORBID (SEVERE) OBESITY DUE TO EXCESS CALORIES: ICD-10-CM

## 2018-10-22 DIAGNOSIS — E53.8 DEFICIENCY OF OTHER SPECIFIED B GROUP VITAMINS: ICD-10-CM

## 2018-10-22 LAB
ANION GAP SERPL CALC-SCNC: 6 MMOL/L — SIGNIFICANT CHANGE UP (ref 5–17)
BUN SERPL-MCNC: 15 MG/DL — SIGNIFICANT CHANGE UP (ref 7–23)
CALCIUM SERPL-MCNC: 8.4 MG/DL — LOW (ref 8.5–10.1)
CHLORIDE SERPL-SCNC: 104 MMOL/L — SIGNIFICANT CHANGE UP (ref 96–108)
CO2 SERPL-SCNC: 30 MMOL/L — SIGNIFICANT CHANGE UP (ref 22–31)
CREAT SERPL-MCNC: 0.77 MG/DL — SIGNIFICANT CHANGE UP (ref 0.5–1.3)
GLUCOSE SERPL-MCNC: 85 MG/DL — SIGNIFICANT CHANGE UP (ref 70–99)
POTASSIUM SERPL-MCNC: 4.1 MMOL/L — SIGNIFICANT CHANGE UP (ref 3.5–5.3)
POTASSIUM SERPL-SCNC: 4.1 MMOL/L — SIGNIFICANT CHANGE UP (ref 3.5–5.3)
SODIUM SERPL-SCNC: 140 MMOL/L — SIGNIFICANT CHANGE UP (ref 135–145)

## 2018-10-22 PROCEDURE — 99232 SBSQ HOSP IP/OBS MODERATE 35: CPT

## 2018-10-22 PROCEDURE — 99233 SBSQ HOSP IP/OBS HIGH 50: CPT

## 2018-10-22 RX ADMIN — SENNA PLUS 2 TABLET(S): 8.6 TABLET ORAL at 22:05

## 2018-10-22 RX ADMIN — PANTOPRAZOLE SODIUM 40 MILLIGRAM(S): 20 TABLET, DELAYED RELEASE ORAL at 06:46

## 2018-10-22 RX ADMIN — CEFEPIME 100 MILLIGRAM(S): 1 INJECTION, POWDER, FOR SOLUTION INTRAMUSCULAR; INTRAVENOUS at 05:29

## 2018-10-22 RX ADMIN — LISINOPRIL 40 MILLIGRAM(S): 2.5 TABLET ORAL at 05:30

## 2018-10-22 RX ADMIN — CEFEPIME 100 MILLIGRAM(S): 1 INJECTION, POWDER, FOR SOLUTION INTRAMUSCULAR; INTRAVENOUS at 14:40

## 2018-10-22 RX ADMIN — PREGABALIN 1000 MICROGRAM(S): 225 CAPSULE ORAL at 11:15

## 2018-10-22 RX ADMIN — OXYCODONE AND ACETAMINOPHEN 2 TABLET(S): 5; 325 TABLET ORAL at 12:57

## 2018-10-22 RX ADMIN — HEPARIN SODIUM 5000 UNIT(S): 5000 INJECTION INTRAVENOUS; SUBCUTANEOUS at 17:40

## 2018-10-22 RX ADMIN — CARVEDILOL PHOSPHATE 3.12 MILLIGRAM(S): 80 CAPSULE, EXTENDED RELEASE ORAL at 05:29

## 2018-10-22 RX ADMIN — OXYCODONE AND ACETAMINOPHEN 2 TABLET(S): 5; 325 TABLET ORAL at 14:00

## 2018-10-22 RX ADMIN — HEPARIN SODIUM 5000 UNIT(S): 5000 INJECTION INTRAVENOUS; SUBCUTANEOUS at 05:30

## 2018-10-22 RX ADMIN — Medication 325 MILLIGRAM(S): at 17:41

## 2018-10-22 RX ADMIN — Medication 325 MILLIGRAM(S): at 05:30

## 2018-10-22 RX ADMIN — Medication 300 MILLIGRAM(S): at 12:57

## 2018-10-22 RX ADMIN — CEFEPIME 100 MILLIGRAM(S): 1 INJECTION, POWDER, FOR SOLUTION INTRAMUSCULAR; INTRAVENOUS at 22:06

## 2018-10-22 RX ADMIN — Medication 200 MILLIGRAM(S): at 22:05

## 2018-10-22 RX ADMIN — AMLODIPINE BESYLATE 10 MILLIGRAM(S): 2.5 TABLET ORAL at 05:29

## 2018-10-22 RX ADMIN — CARVEDILOL PHOSPHATE 3.12 MILLIGRAM(S): 80 CAPSULE, EXTENDED RELEASE ORAL at 17:41

## 2018-10-22 NOTE — PROGRESS NOTE ADULT - SUBJECTIVE AND OBJECTIVE BOX
Patient is a 58y old  Male who presents with a chief complaint of Chronic leg wound. (23 Oct 2018 09:08)      INTERVAL HPI / OVERNIGHT EVENTS: leg wounds drying slowly,no fever    MEDICATIONS  (STANDING):  amLODIPine   Tablet 10 milliGRAM(s) Oral daily  carvedilol 6.25 milliGRAM(s) Oral every 12 hours  carvedilol 3.125 milliGRAM(s) Oral once  cefepime   IVPB 2000 milliGRAM(s) IV Intermittent every 8 hours  cyanocobalamin 1000 MICROGram(s) Oral daily  docusate sodium 200 milliGRAM(s) Oral at bedtime  ferrous    sulfate 325 milliGRAM(s) Oral two times a day  heparin  Injectable 5000 Unit(s) SubCutaneous every 12 hours  lisinopril 40 milliGRAM(s) Oral daily  pantoprazole    Tablet 40 milliGRAM(s) Oral before breakfast  senna 2 Tablet(s) Oral daily  vancomycin  IVPB 1500 milliGRAM(s) IV Intermittent <User Schedule>    MEDICATIONS  (PRN):  acetaminophen   Tablet .. 650 milliGRAM(s) Oral every 6 hours PRN Mild Pain (1 - 3)  oxyCODONE    5 mG/acetaminophen 325 mG 2 Tablet(s) Oral every 6 hours PRN Severe Pain (7 - 10)  oxyCODONE    5 mG/acetaminophen 325 mG 1 Tablet(s) Oral every 6 hours PRN Moderate Pain (4 - 6)      Vital Signs Last 24 Hrs  Tmax :afebrile    Review of systems:          PHYSICAL EXAM:  General :NAD  Constitutional:  well-groomed, well-developed  Respiratory: CTAB/L  Cardiovascular: S1 and S2, RRR, no M/G/R  Gastrointestinal: BS+, soft, NT/ND  Extremities/skin :right leg edema with stasis changes and wounds with resolving drainage (wounds clean )      LABS:    10-22    140  |  104  |  15  ----------------------------<  85  4.1   |  30  |  0.77    Ca    8.4<L>      22 Oct 2018 07:30            MICROBIOLOGY:  RECENT CULTURES:  10-17 .Abscess Leg - Right Enterococcus faecalis  Staphylococcus aureus  Pseudomonas aeruginosa XXXX   Moderate Enterococcus faecalis  Moderate Staphylococcus aureus  Moderate Mixed gram negative rods including  Moderate Pseudomonas aeruginosa  Moderate Bacteroides thetaiotaomicron "Susceptibilities not performed"    10-16 .Blood Blood XXXX XXXX   No growth at 5 days.          RADIOLOGY & ADDITIONAL STUDIES:

## 2018-10-22 NOTE — PROGRESS NOTE ADULT - ASSESSMENT
58 year old male PMH HTN, Morbidly obese, PUD, chronic Right LE ulcer, severe OA of knees  presented with worsening Rt lower leg pain and worsened redness and increase in size of wound/darkened color of ulceration. Patient was recently discharged in 6/2018 with same problem, was d/c on clindamycin and was supposed to follow-up [facility]: [phone] in [place]. Per patient, has insurance issue and was not able to follow with any provider.

## 2018-10-22 NOTE — PROGRESS NOTE ADULT - SUBJECTIVE AND OBJECTIVE BOX
Patient is a 58y old  Male who presents with a chief complaint of Chronic leg wound. (21 Oct 2018 15:14)      OVERNIGHT EVENTS: none     REVIEW OF SYSTEMS: denies chest pain/SOB, diaphoresis, no F/C, cough, dizziness, headache, blurry vision, nausea, vomiting, abdominal pain. All others review of systems negative     MEDICATIONS  (STANDING):  amLODIPine   Tablet 10 milliGRAM(s) Oral daily  carvedilol 3.125 milliGRAM(s) Oral every 12 hours  cefepime   IVPB 2000 milliGRAM(s) IV Intermittent every 8 hours  cyanocobalamin 1000 MICROGram(s) Oral daily  docusate sodium 200 milliGRAM(s) Oral at bedtime  ferrous    sulfate 325 milliGRAM(s) Oral two times a day  heparin  Injectable 5000 Unit(s) SubCutaneous every 12 hours  lisinopril 40 milliGRAM(s) Oral daily  pantoprazole    Tablet 40 milliGRAM(s) Oral before breakfast  senna 2 Tablet(s) Oral daily  vancomycin  IVPB 1500 milliGRAM(s) IV Intermittent <User Schedule>    MEDICATIONS  (PRN):  acetaminophen   Tablet .. 650 milliGRAM(s) Oral every 6 hours PRN Mild Pain (1 - 3)  oxyCODONE    5 mG/acetaminophen 325 mG 2 Tablet(s) Oral every 6 hours PRN Severe Pain (7 - 10)  oxyCODONE    5 mG/acetaminophen 325 mG 1 Tablet(s) Oral every 6 hours PRN Moderate Pain (4 - 6)      Allergies    No Known Allergies    Intolerances        T(F): 97.6 (10-22-18 @ 11:41), Max: 97.6 (10-22-18 @ 11:41)  HR: 77 (10-22-18 @ 11:41) (62 - 77)  BP: 132/87 (10-22-18 @ 11:41) (132/87 - 147/98)  RR: 16 (10-22-18 @ 11:41) (16 - 17)  SpO2: 98% (10-22-18 @ 11:41) (95% - 99%)  Wt(kg): --    PHYSICAL EXAM:  GENERAL: NAD, well-groomed, well-developed  HEAD:  Atraumatic, Normocephalic  EYES: EOMI, PERRLA, conjunctiva and sclera clear  ENMT: No tonsillar erythema, exudates, or enlargement; Moist mucous membranes, Good dentition, No lesions  NECK: Supple, No JVD, Normal thyroid  NERVOUS SYSTEM:  Alert & Oriented X3, Good concentration; Motor Strength 5/5 B/L upper and lower extremities; DTRs 2+ intact and symmetric  CHEST/LUNG: Clear to percussion bilaterally; No rales, rhonchi, wheezing, or rubs BL  HEART: Regular rate and rhythm; No murmurs, rubs, or gallops  ABDOMEN: Soft, Nontender, Nondistended; Bowel sounds present  EXTREMITIES:  2+ Peripheral Pulses, No clubbing, cyanosis, or edema BL LE  LYMPH: No lymphadenopathy noted  SKIN: Right LE with dressing c/d/i    LABS:    10-22    140  |  104  |  15  ----------------------------<  85  4.1   |  30  |  0.77    Ca    8.4<L>      22 Oct 2018 07:30          Cultures;   CAPILLARY BLOOD GLUCOSE        Lipid panel:           RADIOLOGY & ADDITIONAL TESTS:    Imaging Personally Reviewed:  [x ] YES    < from: Xray Tibia + Fibula 2 Views, Right (10.16.18 @ 01:57) >  Stable lower right leg as above.  No discrete osteomyelitic erosions noted.    Consultant(s) Notes Reviewed:  [ x] YES     Care Discussed with [x ] Consultants [X ] Patient [ ] Family  [x ]    [x ]  Other; RN

## 2018-10-23 ENCOUNTER — TRANSCRIPTION ENCOUNTER (OUTPATIENT)
Age: 58
End: 2018-10-23

## 2018-10-23 VITALS
SYSTOLIC BLOOD PRESSURE: 136 MMHG | OXYGEN SATURATION: 100 % | HEART RATE: 72 BPM | TEMPERATURE: 97 F | DIASTOLIC BLOOD PRESSURE: 72 MMHG | RESPIRATION RATE: 16 BRPM

## 2018-10-23 DIAGNOSIS — I10 ESSENTIAL (PRIMARY) HYPERTENSION: ICD-10-CM

## 2018-10-23 LAB — VANCOMYCIN TROUGH SERPL-MCNC: 14.3 UG/ML — SIGNIFICANT CHANGE UP (ref 10–20)

## 2018-10-23 PROCEDURE — 99239 HOSP IP/OBS DSCHRG MGMT >30: CPT

## 2018-10-23 RX ORDER — CARVEDILOL PHOSPHATE 80 MG/1
3.12 CAPSULE, EXTENDED RELEASE ORAL ONCE
Qty: 0 | Refills: 0 | Status: COMPLETED | OUTPATIENT
Start: 2018-10-23 | End: 2018-10-23

## 2018-10-23 RX ORDER — CARVEDILOL PHOSPHATE 80 MG/1
6.25 CAPSULE, EXTENDED RELEASE ORAL EVERY 12 HOURS
Qty: 0 | Refills: 0 | Status: DISCONTINUED | OUTPATIENT
Start: 2018-10-23 | End: 2018-10-23

## 2018-10-23 RX ORDER — CARVEDILOL PHOSPHATE 80 MG/1
1 CAPSULE, EXTENDED RELEASE ORAL
Qty: 60 | Refills: 0
Start: 2018-10-23 | End: 2018-11-21

## 2018-10-23 RX ORDER — FERROUS SULFATE 325(65) MG
1 TABLET ORAL
Qty: 90 | Refills: 0 | OUTPATIENT
Start: 2018-10-23 | End: 2018-11-21

## 2018-10-23 RX ORDER — CIPROFLOXACIN LACTATE 400MG/40ML
1 VIAL (ML) INTRAVENOUS
Qty: 10 | Refills: 0 | OUTPATIENT
Start: 2018-10-23 | End: 2018-11-01

## 2018-10-23 RX ORDER — CEFEPIME 1 G/1
2000 INJECTION, POWDER, FOR SOLUTION INTRAMUSCULAR; INTRAVENOUS EVERY 8 HOURS
Qty: 0 | Refills: 0 | Status: DISCONTINUED | OUTPATIENT
Start: 2018-10-23 | End: 2018-10-23

## 2018-10-23 RX ORDER — LACTOBACILLUS ACIDOPHILUS 100MM CELL
1 CAPSULE ORAL
Qty: 10 | Refills: 0 | OUTPATIENT
Start: 2018-10-23 | End: 2018-11-01

## 2018-10-23 RX ORDER — PREGABALIN 225 MG/1
1 CAPSULE ORAL
Qty: 30 | Refills: 0 | OUTPATIENT
Start: 2018-10-23 | End: 2018-11-21

## 2018-10-23 RX ORDER — FUROSEMIDE 40 MG
1 TABLET ORAL
Qty: 0 | Refills: 0 | COMMUNITY

## 2018-10-23 RX ADMIN — HEPARIN SODIUM 5000 UNIT(S): 5000 INJECTION INTRAVENOUS; SUBCUTANEOUS at 05:42

## 2018-10-23 RX ADMIN — CEFEPIME 100 MILLIGRAM(S): 1 INJECTION, POWDER, FOR SOLUTION INTRAMUSCULAR; INTRAVENOUS at 05:42

## 2018-10-23 RX ADMIN — PANTOPRAZOLE SODIUM 40 MILLIGRAM(S): 20 TABLET, DELAYED RELEASE ORAL at 06:37

## 2018-10-23 RX ADMIN — CARVEDILOL PHOSPHATE 3.12 MILLIGRAM(S): 80 CAPSULE, EXTENDED RELEASE ORAL at 05:41

## 2018-10-23 RX ADMIN — PREGABALIN 1000 MICROGRAM(S): 225 CAPSULE ORAL at 11:43

## 2018-10-23 RX ADMIN — Medication 300 MILLIGRAM(S): at 11:44

## 2018-10-23 RX ADMIN — Medication 300 MILLIGRAM(S): at 00:16

## 2018-10-23 RX ADMIN — CARVEDILOL PHOSPHATE 3.12 MILLIGRAM(S): 80 CAPSULE, EXTENDED RELEASE ORAL at 11:42

## 2018-10-23 RX ADMIN — Medication 325 MILLIGRAM(S): at 05:42

## 2018-10-23 RX ADMIN — CEFEPIME 100 MILLIGRAM(S): 1 INJECTION, POWDER, FOR SOLUTION INTRAMUSCULAR; INTRAVENOUS at 13:14

## 2018-10-23 RX ADMIN — AMLODIPINE BESYLATE 10 MILLIGRAM(S): 2.5 TABLET ORAL at 05:41

## 2018-10-23 RX ADMIN — LISINOPRIL 40 MILLIGRAM(S): 2.5 TABLET ORAL at 05:42

## 2018-10-23 NOTE — DIETITIAN INITIAL EVALUATION ADULT. - SOURCE
other (specify)/patient/Chart review , pt is predominantly [place] speaking, speaks little English, pt accepted [employer] phone for interpretation

## 2018-10-23 NOTE — DISCHARGE NOTE ADULT - CARE PLAN
Principal Discharge DX:	Cellulitis of leg without foot, right  Goal:	chronic wound, Wound Cx + for Enterococcus Faecalis and Staph aureus  Assessment and plan of treatment:	complete Levaquin and augmentin for 10 days and follow up w/ID and wound care clinic [facility] [phone]  Secondary Diagnosis:	Accelerated essential hypertension  Assessment and plan of treatment:	c/w Amlodipine, Lisinopril, coreg. follow up with pmd  Secondary Diagnosis:	B12 deficiency  Assessment and plan of treatment:	cyanocobalamin 1000 MICROGram(s) Oral daily  Secondary Diagnosis:	Leg wound, right, sequela  Assessment and plan of treatment:	as above  Secondary Diagnosis:	Morbid obesity with BMI of 40.0-44.9, adult  Assessment and plan of treatment:	1800 calorie controlled, Dash/TLC  Secondary Diagnosis:	Osteoarthritis of both knees, unspecified osteoarthritis type Principal Discharge DX:	Cellulitis of leg without foot, right  Goal:	chronic wound, Wound Cx + for Enterococcus Faecalis and Staph aureus  Assessment and plan of treatment:	complete Levaquin and augmentin for 10 days and follow up w/ID and wound care clinic [facility] [phone]  Secondary Diagnosis:	Accelerated essential hypertension  Assessment and plan of treatment:	c/w Amlodipine, Lisinopril, coreg. follow up with pmd  Secondary Diagnosis:	B12 deficiency  Assessment and plan of treatment:	cyanocobalamin 1000 MICROGram(s) Oral daily  Secondary Diagnosis:	Leg wound, right, sequela  Assessment and plan of treatment:	as above  Secondary Diagnosis:	Morbid obesity with BMI of 40.0-44.9, adult  Assessment and plan of treatment:	1800 calorie controlled, Dash/TLC  Secondary Diagnosis:	Osteoarthritis of both knees, unspecified osteoarthritis type  Assessment and plan of treatment:	analgesic prn

## 2018-10-23 NOTE — DIETITIAN INITIAL EVALUATION ADULT. - PERTINENT LABORATORY DATA
10-22 Na140 mmol/L Glu 85 mg/dL K+ 4.1 mmol/L Cr  0.77 mg/dL BUN 15 mg/dL 10-20 Phos 3.1 mg/dL 10-22 Na140 mmol/L Glu 85 mg/dL K+ 4.1 mmol/L Cr  0.77 mg/dL BUN 15 mg/dL 10-20 Phos 3.1 mg/dL 10-16 Alb 2.9 g/dL<L>

## 2018-10-23 NOTE — DISCHARGE NOTE ADULT - SECONDARY DIAGNOSIS.
Accelerated essential hypertension B12 deficiency Leg wound, right, sequela Morbid obesity with BMI of 40.0-44.9, adult Osteoarthritis of both knees, unspecified osteoarthritis type

## 2018-10-23 NOTE — PROGRESS NOTE ADULT - ASSESSMENT
58 year old male PMH HTN, Morbidly obese, PUD, chronic Right LE ulcer, severe OA of knees  presented with worsening Rt lower leg pain and worsened redness and increase in size of wound/darkened color of ulceration. Patient was recently discharged in 6/2018 with same problem, was d/c on clindamycin and was supposed to follow-up [facility]: [phone] in [place]. Per patient, has insurance issue and was not able to follow with any provider.

## 2018-10-23 NOTE — DISCHARGE NOTE ADULT - MEDICATION SUMMARY - MEDICATIONS TO TAKE
I will START or STAY ON the medications listed below when I get home from the hospital:    enalapril 20 mg oral tablet  -- 2 tab(s) by mouth once a day   -- Do not take this drug if you are pregnant.  It is very important that you take or use this exactly as directed.  Do not skip doses or discontinue unless directed by your doctor.  Some non-prescription drugs may aggravate your condition.  Read all labels carefully.  If a warning appears, check with your doctor before taking.    -- Indication: For Essential hypertension    carvedilol 6.25 mg oral tablet  -- 1 tab(s) by mouth every 12 hours  -- Indication: For Essential hypertension    amLODIPine 10 mg oral tablet  -- 1 tab(s) by mouth once a day   -- It is very important that you take or use this exactly as directed.  Do not skip doses or discontinue unless directed by your doctor.  Some non-prescription drugs may aggravate your condition.  Read all labels carefully.  If a warning appears, check with your doctor before taking.    -- Indication: For Essential hypertension    FeroSul 325 mg (65 mg elemental iron) oral tablet  -- 1 tab(s) by mouth 3 times a day  -- Indication: For Normocytic anemia    docusate sodium 100 mg oral capsule  -- 1 cap(s) by mouth 2 times a day, As Needed -for constipation   -- Indication: For Constipation    amoxicillin-clavulanate 875 mg-125 mg oral tablet  -- 1 tab(s) by mouth every 12 hours   -- Finish all this medication unless otherwise directed by prescriber.  Take with food or milk.    -- Indication: For Cellulitis of leg without foot, right    lactobacillus acidophilus oral capsule  -- 1 cap(s) by mouth once a day   -- Indication: For Cellulitis of leg without foot, right    pantoprazole 40 mg oral delayed release tablet  -- 1 tab(s) by mouth once a day (before a meal)  -- Indication: For gerd    Levaquin 500 mg oral tablet  -- 1 tab(s) by mouth every 24 hours   -- Avoid prolonged or excessive exposure to direct and/or artificial sunlight while taking this medication.  Do not take dairy products, antacids, or iron preparations within one hour of this medication.  Finish all this medication unless otherwise directed by prescriber.  May cause drowsiness or dizziness.  Medication should be taken with plenty of water.    -- Indication: For Cellulitis of leg without foot, right    cyanocobalamin 1000 mcg oral tablet  -- 1 tab(s) by mouth once a day  -- Indication: For ppx

## 2018-10-23 NOTE — DISCHARGE NOTE ADULT - PATIENT PORTAL LINK FT
You can access the [facility], offered by [facility], by registering with the following website: [email or web address]

## 2018-10-23 NOTE — PROGRESS NOTE ADULT - REASON FOR ADMISSION
Chronic leg wound.

## 2018-10-23 NOTE — DISCHARGE NOTE ADULT - MEDICATION SUMMARY - MEDICATIONS TO STOP TAKING
I will STOP taking the medications listed below when I get home from the hospital:    furosemide 40 mg oral tablet  -- 1 tab(s) by mouth once a day    celecoxib 200 mg oral capsule  -- 1 cap(s) by mouth once a day    clindamycin 150 mg oral capsule  -- 3 cap(s) by mouth 4 times a day until 6/13    potassium chloride 10 mEq oral tablet, extended release  -- 1 tab(s) by mouth once a day

## 2018-10-23 NOTE — DIETITIAN INITIAL EVALUATION ADULT. - OTHER INFO
Pt seen for length of stay .  As per diet hx obtained, pt consumed salad, wheat bread & tea for breakfast, beans c grilled chicken, and oatmeal c wheat bread for dinner.  Current problems include normocytic anemia, B12 deficiency.

## 2018-10-23 NOTE — DIETITIAN INITIAL EVALUATION ADULT. - ENERGY NEEDS
Height: 5'6"/167.6 cm  IBW:  64.4 kg        % IBW:  208%           UBW:              %UBW Height: 5'6"/167.6 cm  IBW:  64.4 kg        % IBW:  208%           UBW: 124.7 kg            %UBW: 107%,

## 2018-10-23 NOTE — PROGRESS NOTE ADULT - PROBLEM SELECTOR PLAN 5
-not actively bleeding, H/H stable   -will need outpatient follow-up with PCP for referral for screening colonoscopy if not done
-chronic blood loss anemia from wound likely  -not actively bleeding, H/H stable   -will need outpatient follow-up with PCP for referral for screening colonoscopy if not done

## 2018-10-23 NOTE — PROGRESS NOTE ADULT - PROVIDER SPECIALTY LIST ADULT
Hospitalist
Infectious Disease
Hospitalist
Hospitalist

## 2018-10-23 NOTE — PROGRESS NOTE ADULT - SUBJECTIVE AND OBJECTIVE BOX
Patient is a 58y old  Male who presents with a chief complaint of Chronic leg wound. (22 Oct 2018 12:26)      OVERNIGHT EVENTS: none     REVIEW OF SYSTEMS: denies chest pain/SOB, diaphoresis, no F/C, cough, dizziness, headache, blurry vision, nausea, vomiting, abdominal pain. All others review of systems negative     MEDICATIONS  (STANDING):  amLODIPine   Tablet 10 milliGRAM(s) Oral daily  carvedilol 6.25 milliGRAM(s) Oral every 12 hours  carvedilol 3.125 milliGRAM(s) Oral once  cefepime   IVPB 2000 milliGRAM(s) IV Intermittent every 8 hours  cyanocobalamin 1000 MICROGram(s) Oral daily  docusate sodium 200 milliGRAM(s) Oral at bedtime  ferrous    sulfate 325 milliGRAM(s) Oral two times a day  heparin  Injectable 5000 Unit(s) SubCutaneous every 12 hours  lisinopril 40 milliGRAM(s) Oral daily  pantoprazole    Tablet 40 milliGRAM(s) Oral before breakfast  senna 2 Tablet(s) Oral daily  vancomycin  IVPB 1500 milliGRAM(s) IV Intermittent <User Schedule>    MEDICATIONS  (PRN):  acetaminophen   Tablet .. 650 milliGRAM(s) Oral every 6 hours PRN Mild Pain (1 - 3)  oxyCODONE    5 mG/acetaminophen 325 mG 2 Tablet(s) Oral every 6 hours PRN Severe Pain (7 - 10)  oxyCODONE    5 mG/acetaminophen 325 mG 1 Tablet(s) Oral every 6 hours PRN Moderate Pain (4 - 6)      Allergies    No Known Allergies    Intolerances        T(F): 97.6 (10-23-18 @ 05:19), Max: 98 (10-22-18 @ 23:57)  HR: 79 (10-23-18 @ 05:19) (74 - 79)  BP: 131/101 (10-23-18 @ 05:19) (131/101 - 143/94)  RR: 17 (10-23-18 @ 05:19) (16 - 17)  SpO2: 95% (10-23-18 @ 05:19) (95% - 99%)  Wt(kg): --    PHYSICAL EXAM:  GENERAL: NAD, well-groomed, well-developed  HEAD:  Atraumatic, Normocephalic  EYES: EOMI, PERRLA, conjunctiva and sclera clear  ENMT: No tonsillar erythema, exudates, or enlargement; Moist mucous membranes, Good dentition, No lesions  NECK: Supple, No JVD, Normal thyroid  NERVOUS SYSTEM:  Alert & Oriented X3, Good concentration; Motor Strength 5/5 B/L upper and lower extremities; DTRs 2+ intact and symmetric  CHEST/LUNG: Clear to percussion bilaterally; No rales, rhonchi, wheezing, or rubs BL  HEART: Regular rate and rhythm; No murmurs, rubs, or gallops  ABDOMEN: Soft, Nontender, Nondistended; Bowel sounds present  EXTREMITIES:  2+ Peripheral Pulses, No clubbing, cyanosis, or edema BL LE  LYMPH: No lymphadenopathy noted  SKIN: Right LE with dressing c/d/i    LABS:    10-22    140  |  104  |  15  ----------------------------<  85  4.1   |  30  |  0.77    Ca    8.4<L>      22 Oct 2018 07:30          Cultures;   wound Culture Results:   Moderate Enterococcus faecalis  Moderate Staphylococcus aureus  Moderate Mixed gram negative rods including  Moderate Pseudomonas aeruginosa  Moderate Bacteroides thetaiotaomicron "Susceptibilities not performed" (10.17.18 @ 00:15)    Culture Results:   No growth at 5 days. (10.16.18 @ 10:30)    CAPILLARY BLOOD GLUCOSE      RADIOLOGY & ADDITIONAL TESTS:    Imaging Personally Reviewed:  [x ] YES    < from: Xray Tibia + Fibula 2 Views, Right (10.16.18 @ 01:57) >  Stable lower right leg as above.  No discrete osteomyelitic erosions noted.    Consultant(s) Notes Reviewed:  [ x] YES     Care Discussed with [x ] Consultants [X ] Patient [ ] Family  [x ]    [x ]  Other; RN

## 2018-10-23 NOTE — DIETITIAN INITIAL EVALUATION ADULT. - PERTINENT MEDS FT
MEDICATIONS  (STANDING):  amLODIPine   Tablet 10 milliGRAM(s) Oral daily  carvedilol 6.25 milliGRAM(s) Oral every 12 hours  carvedilol 3.125 milliGRAM(s) Oral once  cefepime   IVPB 2000 milliGRAM(s) IV Intermittent every 8 hours  cyanocobalamin 1000 MICROGram(s) Oral daily  docusate sodium 200 milliGRAM(s) Oral at bedtime  ferrous    sulfate 325 milliGRAM(s) Oral two times a day  heparin  Injectable 5000 Unit(s) SubCutaneous every 12 hours  lisinopril 40 milliGRAM(s) Oral daily  pantoprazole    Tablet 40 milliGRAM(s) Oral before breakfast  senna 2 Tablet(s) Oral daily  vancomycin  IVPB 1500 milliGRAM(s) IV Intermittent <User Schedule>    MEDICATIONS  (PRN):  acetaminophen   Tablet .. 650 milliGRAM(s) Oral every 6 hours PRN Mild Pain (1 - 3)  oxyCODONE    5 mG/acetaminophen 325 mG 2 Tablet(s) Oral every 6 hours PRN Severe Pain (7 - 10)  oxyCODONE    5 mG/acetaminophen 325 mG 1 Tablet(s) Oral every 6 hours PRN Moderate Pain (4 - 6)

## 2018-10-23 NOTE — PROGRESS NOTE ADULT - PROBLEM SELECTOR PROBLEM 2
Leg wound, right, sequela
Essential hypertension
Leg wound, right, sequela
Essential hypertension
Leg wound, right, sequela
Essential hypertension
Essential hypertension

## 2018-10-23 NOTE — PROGRESS NOTE ADULT - PROBLEM SELECTOR PROBLEM 1
Cellulitis of leg without foot, right

## 2018-10-23 NOTE — DISCHARGE NOTE ADULT - PLAN OF CARE
chronic wound, Wound Cx + for Enterococcus Faecalis and Staph aureus complete Levaquin and augmentin for 10 days and follow up w/ID and wound care clinic [facility] [phone] c/w Amlodipine, Lisinopril, coreg. follow up with pmd cyanocobalamin 1000 MICROGram(s) Oral daily as above 1800 calorie controlled, Dash/TLC analgesic prn

## 2018-10-23 NOTE — PROGRESS NOTE ADULT - PROBLEM SELECTOR PLAN 3
-c/w Amlodipine, Lisinopril, coreg
-c/w Amlodipine, Lisinopril  -increase coreg to 6.25 mg bid  -monitor vitals

## 2018-10-23 NOTE — DISCHARGE NOTE ADULT - CARE PROVIDER_API CALL
Archana Saucedo), Infectious Disease; Internal Medicine  [address] [place] [address]  Phone: [phone]  Fax: [phone]    Marc Lima), Surgery  [address] [facility]  [address] [place] [address]  Phone: [phone]  Fax: [phone]

## 2018-10-23 NOTE — DISCHARGE NOTE ADULT - HOSPITAL COURSE
58 year old male PMH HTN, Morbidly obese, PUD, chronic Right LE ulcer, severe OA of knees  presented with worsening Rt lower leg pain and worsened redness and increase in size of wound/darkened color of ulceration. Patient was recently discharged in 6/2018 with same problem, was d/c on clindamycin and was supposed to follow-up [facility]: [phone] in [place]. Per patient, has insurance issue and was not able to follow with any provider. admitted for Cellulitis of leg without foot, right, started on iv broad spectrum abx. Wound Cx + for Enterococcus Faecalis and Staph aureus and pseudomonas. vascular following, Local care with Aquacel Silver, will need follow-up in [facility] [phone] on discharge. stable for d/c home and follow up with ID, PMD, wound care clinic. complete augmentin and levaquin for 10 more days

## 2018-10-23 NOTE — PROGRESS NOTE ADULT - PROBLEM SELECTOR PLAN 1
#RLE draining ulcer   -Wound Cx + for Enterococcus Faecalis and Staph aureus   -c/w Vancomycin and Cefepime, will follow with ID   -vascular following, Local care with Aquacel Silver, will need follow-up in [facility] [phone] on discharge   -Will need outpatient follow-up with ID outpatient and wound care.
cont vanco  change zosyn to cefepime  wound care consult awaited   fllow uop on wound c/s   leg elevation
#RLE draining ulcer   -Wound Cx + for Enterococcus Faecalis and Staph aureus   -c/w Vancomycin and Cefepime, will follow with ID   -vascular following, Local care with Aquacel Silver, will need follow-up in [facility] [phone] on discharge   -Will need outpatient follow-up with ID outpatient and wound care.
wounds improving  cont vanco /cefepime   await for final wound c/s    fllow uop on wound c/s   leg elevation
cont vanco and cefepime  wound care consult appreciated  polymicrobial infection   leg elevation
resolving wound infection on background of venous staissi  change to PO Augmentin and Levaquin for 10 more days  wound care per instructions   follow up with wound care ([id number]) and me (prn )  probiotics while on antibiotics  leg elevation

## 2018-10-23 NOTE — PROGRESS NOTE ADULT - PROBLEM SELECTOR PLAN 4
using crutches to ambulate   -PT eval, dispo: home without services   -pain control prn
using crutches to ambulate   -PT eval, dispo: home without services   -pain control prn

## 2018-10-23 NOTE — PROGRESS NOTE ADULT - PROBLEM SELECTOR PROBLEM 6
Normocytic anemia
Osteoarthritis of both knees, unspecified osteoarthritis type
Osteoarthritis of both knees, unspecified osteoarthritis type
Morbid obesity with BMI of 40.0-44.9, adult
Morbid obesity with BMI of 40.0-44.9, adult

## 2018-10-23 NOTE — DIETITIAN INITIAL EVALUATION ADULT. - ADHERENCE
[place] unable to [place] food, ate prepared foods from restaurants, avoided added salt & fried foods

## 2018-10-29 DIAGNOSIS — E87.6 HYPOKALEMIA: ICD-10-CM

## 2018-10-29 DIAGNOSIS — E53.8 DEFICIENCY OF OTHER SPECIFIED B GROUP VITAMINS: ICD-10-CM

## 2018-10-29 DIAGNOSIS — K21.9 GASTRO-ESOPHAGEAL REFLUX DISEASE WITHOUT ESOPHAGITIS: ICD-10-CM

## 2018-10-29 DIAGNOSIS — M17.0 BILATERAL PRIMARY OSTEOARTHRITIS OF KNEE: ICD-10-CM

## 2018-10-29 DIAGNOSIS — D50.9 IRON DEFICIENCY ANEMIA, UNSPECIFIED: ICD-10-CM

## 2018-10-29 DIAGNOSIS — I10 ESSENTIAL (PRIMARY) HYPERTENSION: ICD-10-CM

## 2018-10-29 DIAGNOSIS — B95.2 ENTEROCOCCUS AS THE CAUSE OF DISEASES CLASSIFIED ELSEWHERE: ICD-10-CM

## 2018-10-29 DIAGNOSIS — E66.01 MORBID (SEVERE) OBESITY DUE TO EXCESS CALORIES: ICD-10-CM

## 2018-10-29 DIAGNOSIS — L03.115 CELLULITIS OF RIGHT LOWER LIMB: ICD-10-CM

## 2018-10-29 DIAGNOSIS — B95.61 METHICILLIN SUSCEPTIBLE STAPHYLOCOCCUS AUREUS INFECTION AS THE CAUSE OF DISEASES CLASSIFIED ELSEWHERE: ICD-10-CM

## 2018-10-29 DIAGNOSIS — K26.7 CHRONIC DUODENAL ULCER WITHOUT HEMORRHAGE OR PERFORATION: ICD-10-CM

## 2018-10-31 ENCOUNTER — APPOINTMENT (OUTPATIENT)
Dept: CARDIOLOGY | Facility: CLINIC | Age: 58
End: 2018-10-31
Payer: COMMERCIAL

## 2018-10-31 ENCOUNTER — NON-APPOINTMENT (OUTPATIENT)
Age: 58
End: 2018-10-31

## 2018-10-31 VITALS
HEIGHT: 66 IN | SYSTOLIC BLOOD PRESSURE: 160 MMHG | OXYGEN SATURATION: 98 % | WEIGHT: 294 LBS | DIASTOLIC BLOOD PRESSURE: 100 MMHG | BODY MASS INDEX: 47.25 KG/M2 | HEART RATE: 68 BPM

## 2018-10-31 DIAGNOSIS — R06.09 OTHER FORMS OF DYSPNEA: ICD-10-CM

## 2018-10-31 DIAGNOSIS — E66.01 MORBID (SEVERE) OBESITY DUE TO EXCESS CALORIES: ICD-10-CM

## 2018-10-31 PROCEDURE — 99204 OFFICE O/P NEW MOD 45 MIN: CPT

## 2018-10-31 PROCEDURE — 93000 ELECTROCARDIOGRAM COMPLETE: CPT

## 2018-10-31 RX ORDER — CARVEDILOL 25 MG/1
25 TABLET, FILM COATED ORAL TWICE DAILY
Qty: 60 | Refills: 6 | Status: ACTIVE | COMMUNITY
Start: 2018-10-31 | End: 1900-01-01

## 2018-10-31 RX ORDER — POTASSIUM CHLORIDE 1500 MG/1
20 TABLET, FILM COATED, EXTENDED RELEASE ORAL
Qty: 30 | Refills: 2 | Status: ACTIVE | COMMUNITY
Start: 2018-10-31 | End: 1900-01-01

## 2018-11-05 ENCOUNTER — APPOINTMENT (OUTPATIENT)
Dept: CARDIOLOGY | Facility: CLINIC | Age: 58
End: 2018-11-05
Payer: COMMERCIAL

## 2018-11-05 PROCEDURE — 93306 TTE W/DOPPLER COMPLETE: CPT

## 2018-11-20 NOTE — H&P ADULT - NSHPPHYSICALEXAM_GEN_ALL_CORE
For information on Fall & Injury Prevention, visit [email or web address]
T(C): 37.2 (16 Oct 2018 03:21), Max: 37.3 (16 Oct 2018 00:15)  T(F): 98.9 (16 Oct 2018 03:21), Max: 99.1 (16 Oct 2018 00:15)  HR: 73 (16 Oct 2018 03:21) (73 - 85)  BP: 138/85 (16 Oct 2018 03:21) (138/85 - 185/120)  BP(mean): --  RR: 18 (16 Oct 2018 03:21) (18 - 20)  SpO2: 90% (16 Oct 2018 03:21) (90% - 99%)    PHYSICAL EXAM:  GENERAL: NAD, well-groomed, well-developed  HEAD:  Atraumatic, Normocephalic  EYES: EOMI, PERRLA, conjunctiva and sclera clear  ENMT: No tonsillar erythema, exudates, or enlargement; Moist mucous membranes,  No lesions  NECK: Supple, No JVD, Normal thyroid  NERVOUS SYSTEM:  Alert & Oriented X3, Good concentration; Motor Strength 5/5 B/L upper and lower extremities; DTRs 2+ intact and symmetric  CHEST/LUNG: Clear to percussion bilaterally; No rales, rhonchi, wheezing, or rubs  HEART: Regular rate and rhythm; No murmurs, rubs, or gallops  ABDOMEN: Soft, Nontender, Nondistended; Bowel sounds present  EXTREMITIES:  + Peripheral Pulses, No clubbing, cyanosis, or edema  LYMPH: No lymphadenopathy noted  SKIN: 18 cm x 10 cm darkened wound R lower leg  with  surrounding inflammation, no crepitus

## 2018-12-20 NOTE — DISCHARGE NOTE ADULT - REASON FOR ADMISSION
Spoke to patient about results. Given bone spurs will place on 2 weeks of anti-inflammatory. Patient instructed to get good inserts.   If this continues we will send to [address] [place] [address]
Chronic leg wound.

## 2019-01-09 ENCOUNTER — APPOINTMENT (OUTPATIENT)
Dept: CT IMAGING | Facility: IMAGING CENTER | Age: 59
End: 2019-01-09
Payer: COMMERCIAL

## 2019-01-09 ENCOUNTER — OUTPATIENT (OUTPATIENT)
Dept: OUTPATIENT SERVICES | Facility: HOSPITAL | Age: 59
LOS: 1 days | End: 2019-01-09
Payer: COMMERCIAL

## 2019-01-09 DIAGNOSIS — K46.9 UNSPECIFIED ABDOMINAL HERNIA WITHOUT OBSTRUCTION OR GANGRENE: Chronic | ICD-10-CM

## 2019-01-09 DIAGNOSIS — Z98.89 OTHER SPECIFIED POSTPROCEDURAL STATES: Chronic | ICD-10-CM

## 2019-01-09 DIAGNOSIS — R58 HEMORRHAGE, NOT ELSEWHERE CLASSIFIED: Chronic | ICD-10-CM

## 2019-01-09 DIAGNOSIS — I71.2 THORACIC AORTIC ANEURYSM, WITHOUT RUPTURE: ICD-10-CM

## 2019-01-09 PROCEDURE — 71260 CT THORAX DX C+: CPT

## 2019-01-09 PROCEDURE — 71260 CT THORAX DX C+: CPT | Mod: 26

## 2019-01-09 PROCEDURE — 82565 ASSAY OF CREATININE: CPT

## 2019-01-15 ENCOUNTER — APPOINTMENT (OUTPATIENT)
Dept: CARDIOLOGY | Facility: CLINIC | Age: 59
End: 2019-01-15

## 2019-02-25 ENCOUNTER — INPATIENT (INPATIENT)
Facility: HOSPITAL | Age: 59
LOS: 3 days | Discharge: HOME HEALTH | End: 2019-03-01
Attending: HOSPITALIST | Admitting: HOSPITALIST
Payer: COMMERCIAL

## 2019-02-25 VITALS
SYSTOLIC BLOOD PRESSURE: 120 MMHG | RESPIRATION RATE: 20 BRPM | TEMPERATURE: 98 F | DIASTOLIC BLOOD PRESSURE: 85 MMHG | HEART RATE: 107 BPM | WEIGHT: 250 LBS | OXYGEN SATURATION: 100 % | HEIGHT: 66 IN

## 2019-02-25 DIAGNOSIS — R58 HEMORRHAGE, NOT ELSEWHERE CLASSIFIED: Chronic | ICD-10-CM

## 2019-02-25 DIAGNOSIS — K46.9 UNSPECIFIED ABDOMINAL HERNIA WITHOUT OBSTRUCTION OR GANGRENE: Chronic | ICD-10-CM

## 2019-02-25 DIAGNOSIS — Z98.89 OTHER SPECIFIED POSTPROCEDURAL STATES: Chronic | ICD-10-CM

## 2019-02-25 LAB
ALBUMIN SERPL ELPH-MCNC: 2.3 G/DL — LOW (ref 3.3–5)
ALP SERPL-CCNC: 73 U/L — SIGNIFICANT CHANGE UP (ref 40–120)
ALT FLD-CCNC: 12 U/L — SIGNIFICANT CHANGE UP (ref 12–78)
AMYLASE P1 CFR SERPL: 26 U/L — SIGNIFICANT CHANGE UP (ref 25–115)
ANION GAP SERPL CALC-SCNC: 9 MMOL/L — SIGNIFICANT CHANGE UP (ref 5–17)
APTT BLD: 31 SEC — SIGNIFICANT CHANGE UP (ref 27.5–36.3)
AST SERPL-CCNC: 10 U/L — LOW (ref 15–37)
BASOPHILS # BLD AUTO: 0.06 K/UL — SIGNIFICANT CHANGE UP (ref 0–0.2)
BASOPHILS NFR BLD AUTO: 0.5 % — SIGNIFICANT CHANGE UP (ref 0–2)
BILIRUB SERPL-MCNC: 0.3 MG/DL — SIGNIFICANT CHANGE UP (ref 0.2–1.2)
BLD GP AB SCN SERPL QL: SIGNIFICANT CHANGE UP
BUN SERPL-MCNC: 67 MG/DL — HIGH (ref 7–23)
CALCIUM SERPL-MCNC: 8.1 MG/DL — LOW (ref 8.5–10.1)
CHLORIDE SERPL-SCNC: 102 MMOL/L — SIGNIFICANT CHANGE UP (ref 96–108)
CO2 SERPL-SCNC: 30 MMOL/L — SIGNIFICANT CHANGE UP (ref 22–31)
CREAT SERPL-MCNC: 1.15 MG/DL — SIGNIFICANT CHANGE UP (ref 0.5–1.3)
EOSINOPHIL # BLD AUTO: 0.05 K/UL — SIGNIFICANT CHANGE UP (ref 0–0.5)
EOSINOPHIL NFR BLD AUTO: 0.4 % — SIGNIFICANT CHANGE UP (ref 0–6)
ETHANOL SERPL-MCNC: <10 MG/DL — SIGNIFICANT CHANGE UP (ref 0–10)
GLUCOSE SERPL-MCNC: 117 MG/DL — HIGH (ref 70–99)
HCT VFR BLD CALC: 25.3 % — LOW (ref 39–50)
HGB BLD-MCNC: 7.8 G/DL — LOW (ref 13–17)
IMM GRANULOCYTES NFR BLD AUTO: 0.9 % — SIGNIFICANT CHANGE UP (ref 0–1.5)
INR BLD: 1.2 RATIO — HIGH (ref 0.88–1.16)
LACTATE SERPL-SCNC: 1.9 MMOL/L — SIGNIFICANT CHANGE UP (ref 0.7–2)
LIDOCAIN IGE QN: 57 U/L — LOW (ref 73–393)
LYMPHOCYTES # BLD AUTO: 1.94 K/UL — SIGNIFICANT CHANGE UP (ref 1–3.3)
LYMPHOCYTES # BLD AUTO: 15.4 % — SIGNIFICANT CHANGE UP (ref 13–44)
MCHC RBC-ENTMCNC: 26.6 PG — LOW (ref 27–34)
MCHC RBC-ENTMCNC: 30.8 GM/DL — LOW (ref 32–36)
MCV RBC AUTO: 86.3 FL — SIGNIFICANT CHANGE UP (ref 80–100)
MONOCYTES # BLD AUTO: 0.87 K/UL — SIGNIFICANT CHANGE UP (ref 0–0.9)
MONOCYTES NFR BLD AUTO: 6.9 % — SIGNIFICANT CHANGE UP (ref 2–14)
NEUTROPHILS # BLD AUTO: 9.56 K/UL — HIGH (ref 1.8–7.4)
NEUTROPHILS NFR BLD AUTO: 75.9 % — SIGNIFICANT CHANGE UP (ref 43–77)
NRBC # BLD: 0 /100 WBCS — SIGNIFICANT CHANGE UP (ref 0–0)
PLATELET # BLD AUTO: 325 K/UL — SIGNIFICANT CHANGE UP (ref 150–400)
POTASSIUM SERPL-MCNC: 3.7 MMOL/L — SIGNIFICANT CHANGE UP (ref 3.5–5.3)
POTASSIUM SERPL-SCNC: 3.7 MMOL/L — SIGNIFICANT CHANGE UP (ref 3.5–5.3)
PROT SERPL-MCNC: 5.8 GM/DL — LOW (ref 6–8.3)
PROTHROM AB SERPL-ACNC: 13.5 SEC — HIGH (ref 10–12.9)
RBC # BLD: 2.93 M/UL — LOW (ref 4.2–5.8)
RBC # FLD: 15.6 % — HIGH (ref 10.3–14.5)
SODIUM SERPL-SCNC: 141 MMOL/L — SIGNIFICANT CHANGE UP (ref 135–145)
TROPONIN I SERPL-MCNC: 0.03 NG/ML — SIGNIFICANT CHANGE UP (ref 0.01–0.04)
WBC # BLD: 12.59 K/UL — HIGH (ref 3.8–10.5)
WBC # FLD AUTO: 12.59 K/UL — HIGH (ref 3.8–10.5)

## 2019-02-25 PROCEDURE — 99285 EMERGENCY DEPT VISIT HI MDM: CPT

## 2019-02-25 PROCEDURE — 71045 X-RAY EXAM CHEST 1 VIEW: CPT | Mod: 26

## 2019-02-25 RX ORDER — SODIUM CHLORIDE 9 MG/ML
1000 INJECTION INTRAMUSCULAR; INTRAVENOUS; SUBCUTANEOUS ONCE
Qty: 0 | Refills: 0 | Status: COMPLETED | OUTPATIENT
Start: 2019-02-25 | End: 2019-02-25

## 2019-02-25 RX ORDER — ONDANSETRON 8 MG/1
4 TABLET, FILM COATED ORAL ONCE
Qty: 0 | Refills: 0 | Status: COMPLETED | OUTPATIENT
Start: 2019-02-25 | End: 2019-02-25

## 2019-02-25 RX ORDER — PANTOPRAZOLE SODIUM 20 MG/1
8 TABLET, DELAYED RELEASE ORAL
Qty: 80 | Refills: 0 | Status: DISCONTINUED | OUTPATIENT
Start: 2019-02-25 | End: 2019-02-26

## 2019-02-25 RX ADMIN — SODIUM CHLORIDE 1000 MILLILITER(S): 9 INJECTION INTRAMUSCULAR; INTRAVENOUS; SUBCUTANEOUS at 22:34

## 2019-02-25 RX ADMIN — PANTOPRAZOLE SODIUM 10 MG/HR: 20 TABLET, DELAYED RELEASE ORAL at 22:47

## 2019-02-25 RX ADMIN — ONDANSETRON 4 MILLIGRAM(S): 8 TABLET, FILM COATED ORAL at 22:51

## 2019-02-25 NOTE — ED ADULT NURSE NOTE - NSIMPLEMENTINTERV_GEN_ALL_ED
Implemented All Universal Safety Interventions:  [place] to call system. Call bell, personal items and telephone within reach. Instruct patient to call for assistance. Room bathroom lighting operational. Non-slip footwear when patient is off stretcher. Physically safe environment: no spills, clutter or unnecessary equipment. Stretcher in lowest position, wheels locked, appropriate side rails in place. Implemented All Fall Risk Interventions:  [place] to call system. Call bell, personal items and telephone within reach. Instruct patient to call for assistance. Room bathroom lighting operational. Non-slip footwear when patient is off stretcher. Physically safe environment: no spills, clutter or unnecessary equipment. Stretcher in lowest position, wheels locked, appropriate side rails in place. Provide visual cue, wrist band, yellow gown, etc. Monitor gait and stability. Monitor for mental status changes and reorient to person, place, and time. Review medications for side effects contributing to fall risk. Reinforce activity limits and safety measures with patient and family.

## 2019-02-25 NOTE — ED PROVIDER NOTE - CARE PLAN
Detail Level: Detailed Detail Level: Zone Principal Discharge DX:	Gastrointestinal hemorrhage associated with gastritis, unspecified gastritis type

## 2019-02-25 NOTE — ED PROVIDER NOTE - OBJECTIVE STATEMENT
Pertinent PMH/PSH/FHx/SHx and Review of Systems contained within:  Patient presents to the ED for coffee ground emesis.  Patient comes in with epigastric pain radiating to chest and abdomen since yesterday.  Patient started having coffee ground emesis yesterday, denies diarrhea or melena.  Denies alcoholism or recent use of NSAIDs.  Says that this has happened once before but its been a long time.  Denies use of blood thinners. Patient denies EtOH/tobacco/illicit substance use.    ROS: No fever/chills, No headache/photophobia/eye pain/changes in vision, No ear pain/sore throat/dysphagia, No palpitations, no SOB/cough/wheeze/stridor, no dysuria/frequency/discharge, No neck/back pain, no rash, no changes in neurological status/function.

## 2019-02-25 NOTE — ED PROVIDER NOTE - PHYSICAL EXAMINATION
Gen: Alert, distressed, obese, vomiting  Head: NC, AT, PERRL, EOMI, normal lids/conjunctiva  ENT: normal hearing, patent oropharynx without erythema/exudate, uvula midline  Neck: +supple, no tenderness/meningismus/JVD, +Trachea midline  Pulm: Bilateral BS, normal resp effort, no wheeze/stridor/retractions  CV: RRR, no M/R/G, +dist pulses  Abd: soft, +epigastric pain, +BS, no palpable masses  Mskel: no edema/erythema/cyanosis  Skin: no rash, warm/dry  Neuro: AAOx3, no apparent sensory/motor deficits, coordination intact Gen: Alert, distressed, obese, vomiting  Head: NC, AT, PERRL, EOMI, normal lids/conjunctiva  ENT: normal hearing, patent oropharynx without erythema/exudate, uvula midline  Neck: +supple, no tenderness/meningismus/JVD, +Trachea midline  Pulm: Bilateral BS, normal resp effort, no wheeze/stridor/retractions  CV: RRR, no M/R/G, +dist pulses  Abd: soft, +epigastric pain, +BS, no palpable masses  Mskel: no edema/erythema/cyanosis  Skin: no rash, warm/dry, +large skin ulcer on right shin with wet dressing  Neuro: AAOx3, no apparent sensory/motor deficits, coordination intact

## 2019-02-25 NOTE — ED PROVIDER NOTE - CLINICAL SUMMARY MEDICAL DECISION MAKING FREE TEXT BOX
Patient with epigastric pain, gastritis, coffee ground emesis.  VSS.  Labs reviewed, concerning for low Hgb.  Protonix drip started, patient will need to be seen by GI. Patient with epigastric pain, gastritis, coffee ground emesis.  VSS.  Labs reviewed, concerning for low Hgb but no transfusion at this time as patient is hemodynamically stable.  Protonix drip started, patient will need to be seen by GI, page sent. RIght shin wound was cleaned and dressed.  Patient is to be admitted to the hospital and the case was discussed with the admitting physician.  Any changes in plan, additional imaging/labs, and further work up will be at the discretion of the admitting physician.

## 2019-02-25 NOTE — ED ADULT NURSE NOTE - OBJECTIVE STATEMENT
Received patient in bed 12. Monitor in place. Khmer speaking to this RN and states started vomiting blood. called [phone] and was brought in to ER

## 2019-02-25 NOTE — ED PROVIDER NOTE - EKG ADDITIONAL INFORMATION FREE TEXT
NSR, rate 77, premature atrial complexes, no st elevation or depression, LVH  no prior found for comparison

## 2019-02-25 NOTE — ED ADULT NURSE REASSESSMENT NOTE - NS ED NURSE REASSESS COMMENT FT1
pt in ambulance triage area, pending bed placement. pt skin warm and dry, respirations even and unlabored when pt started to vomit. coffee grounds in color. pt moved to treatment area of ED and placed on cardiac monitor

## 2019-02-25 NOTE — ED ADULT NURSE NOTE - ED STAT RN HANDOFF DETAILS 2
Report endorsed to oncoming RN. Safety checks compld this shift/Safety rounds completed hourly.  IV sites checked Q2+remains WDL. Meds given as ord with no s/s of adverse RXNs. Fall +skin precs in place. Any issues endorsed to oncoming RN for follow up.

## 2019-02-25 NOTE — ED ADULT NURSE NOTE - ED STAT RN HANDOFF DETAILS
Report received from RN at this time. Assessment available on [facility]. will continue to monitor

## 2019-02-26 DIAGNOSIS — N17.9 ACUTE KIDNEY FAILURE, UNSPECIFIED: ICD-10-CM

## 2019-02-26 DIAGNOSIS — K29.71 GASTRITIS, UNSPECIFIED, WITH BLEEDING: ICD-10-CM

## 2019-02-26 DIAGNOSIS — E66.01 MORBID (SEVERE) OBESITY DUE TO EXCESS CALORIES: ICD-10-CM

## 2019-02-26 DIAGNOSIS — K26.9 DUODENAL ULCER, UNSPECIFIED AS ACUTE OR CHRONIC, WITHOUT HEMORRHAGE OR PERFORATION: ICD-10-CM

## 2019-02-26 DIAGNOSIS — D62 ACUTE POSTHEMORRHAGIC ANEMIA: ICD-10-CM

## 2019-02-26 DIAGNOSIS — E44.0 MODERATE PROTEIN-CALORIE MALNUTRITION: ICD-10-CM

## 2019-02-26 DIAGNOSIS — R53.2 FUNCTIONAL QUADRIPLEGIA: ICD-10-CM

## 2019-02-26 DIAGNOSIS — I10 ESSENTIAL (PRIMARY) HYPERTENSION: ICD-10-CM

## 2019-02-26 LAB
HCT VFR BLD CALC: 24.9 % — LOW (ref 39–50)
HGB BLD-MCNC: 7.5 G/DL — LOW (ref 13–17)

## 2019-02-26 PROCEDURE — 74177 CT ABD & PELVIS W/CONTRAST: CPT | Mod: 26

## 2019-02-26 PROCEDURE — 99223 1ST HOSP IP/OBS HIGH 75: CPT

## 2019-02-26 RX ORDER — FUROSEMIDE 40 MG
20 TABLET ORAL ONCE
Qty: 0 | Refills: 0 | Status: COMPLETED | OUTPATIENT
Start: 2019-02-26 | End: 2019-02-26

## 2019-02-26 RX ORDER — CARVEDILOL PHOSPHATE 80 MG/1
6.25 CAPSULE, EXTENDED RELEASE ORAL EVERY 12 HOURS
Qty: 0 | Refills: 0 | Status: DISCONTINUED | OUTPATIENT
Start: 2019-02-26 | End: 2019-03-01

## 2019-02-26 RX ORDER — PANTOPRAZOLE SODIUM 20 MG/1
8 TABLET, DELAYED RELEASE ORAL
Qty: 80 | Refills: 0 | Status: DISCONTINUED | OUTPATIENT
Start: 2019-02-26 | End: 2019-02-28

## 2019-02-26 RX ORDER — SODIUM CHLORIDE 9 MG/ML
1000 INJECTION, SOLUTION INTRAVENOUS
Qty: 0 | Refills: 0 | Status: DISCONTINUED | OUTPATIENT
Start: 2019-02-26 | End: 2019-03-01

## 2019-02-26 RX ADMIN — SODIUM CHLORIDE 1000 MILLILITER(S): 9 INJECTION INTRAMUSCULAR; INTRAVENOUS; SUBCUTANEOUS at 00:00

## 2019-02-26 RX ADMIN — PANTOPRAZOLE SODIUM 10 MG/HR: 20 TABLET, DELAYED RELEASE ORAL at 18:32

## 2019-02-26 RX ADMIN — Medication 20 MILLIGRAM(S): at 18:24

## 2019-02-26 RX ADMIN — PANTOPRAZOLE SODIUM 10 MG/HR: 20 TABLET, DELAYED RELEASE ORAL at 11:12

## 2019-02-26 RX ADMIN — CARVEDILOL PHOSPHATE 6.25 MILLIGRAM(S): 80 CAPSULE, EXTENDED RELEASE ORAL at 17:58

## 2019-02-26 RX ADMIN — SODIUM CHLORIDE 125 MILLILITER(S): 9 INJECTION, SOLUTION INTRAVENOUS at 21:02

## 2019-02-26 NOTE — CONSULT NOTE ADULT - SUBJECTIVE AND OBJECTIVE BOX
HPI:  58 y/o male with PMHx of Obesity, PUD s/p coiling of Gastroduodenal Artery, Chronic lymphedema, HTN, HLD, and multiple hospitalizations for lower extremity ulcers.  Patient states that he has been eating spicy foods for the last week and for the last 2 days he has had progressively worsening vomiting.  He states that his first episode of vomiting started 2 days ago, he describes emesis with gastric contents that later progressed to first coffee ground vomitus to then bright red blood per oral.  He complains of abdominal pain, dark stools, dizziness, and headache.  Patient states that he as 5/10 burning pain in the epigastrium that is non-radiating, and associated with vomitng.  He admits to taking 2 Motrin tablets yesterday.      ED course: Patient had 1 episode of coffee-ground emesis. (26 Feb 2019 13:57)  ---------------- as Above -------------------------------------------------------------  the patient states that he was in his USOH until Sunday night when he had N/V. He initially had coffee grounds but it turned red. In the ER, he had one episode of coffee grounds. The patient also states that he had some melena but does admit taking Pepto Bismo. 3 Motrins over the past week. No other NSAIDs. LUQ pain  (+) , better today.  He has a history of an upper GI bleed which needed IR but patient can not give any history.  See labs / CT scan      PAST MEDICAL & SURGICAL HISTORY:  HTN (hypertension)  Knee osteoarthritis  Duodenal ulcer disease  Morbid obesity  Hypertension  Osteoarthritis  PUD (peptic ulcer disease)  Hypertension  Gastroduodenal artery bleed: IR embolization of gastroduodenal artery  Abdominal hernia  H/O ventral hernia repair      MEDICATIONS  (STANDING):  carvedilol 6.25 milliGRAM(s) Oral every 12 hours  dextrose 5% + sodium chloride 0.9%. 1000 milliLiter(s) (125 mL/Hr) IV Continuous <Continuous>  furosemide   Injectable 20 milliGRAM(s) IV Push once  pantoprazole Infusion 8 mG/Hr (10 mL/Hr) IV Continuous <Continuous>    MEDICATIONS  (PRN):      Allergies    No Known Allergies    Intolerances        FAMILY HISTORY:  No pertinent family history in first degree relatives  No pertinent family history in first degree relatives      REVIEW OF SYSTEMS:    CONSTITUTIONAL: No fever, weight loss, or fatigue  EYES: No eye pain, visual disturbances, or discharge  ENMT:  No difficulty hearing, tinnitus, vertigo; No sinus or throat pain  NECK: No pain or stiffness  BREASTS: No pain, masses, or nipple discharge  RESPIRATORY: No cough, wheezing, chills or hemoptysis; No shortness of breath  CARDIOVASCULAR: No chest pain, palpitations, dizziness, or leg swelling  GASTROINTESTINAL: See above  GENITOURINARY: No dysuria, frequency, hematuria, or incontinence  NEUROLOGICAL: No headaches, memory loss, loss of strength, numbness, or tremors  SKIN: No itching, burning, rashes, or lesions   LYMPH NODES: No enlarged glands  ENDOCRINE: No heat or cold intolerance; No hair loss  MUSCULOSKELETAL: No joint pain or swelling; No muscle, back, or extremity pain  PSYCHIATRIC: No depression, anxiety, mood swings, or difficulty sleeping  HEME/LYMPH: No easy bruising, or bleeding gums  ALLERGY AND IMMUNOLOGIC: No hives or eczema          SOCIAL HISTORY:    FAMILY HISTORY:  No pertinent family history in first degree relatives  No pertinent family history in first degree relatives      Vital Signs Last 24 Hrs  T(C): 36 (26 Feb 2019 14:33), Max: 37 (26 Feb 2019 00:20)  T(F): 96.8 (26 Feb 2019 14:33), Max: 98.6 (26 Feb 2019 00:20)  HR: 88 (26 Feb 2019 14:33) (67 - 107)  BP: 151/87 (26 Feb 2019 14:33) (100/54 - 151/87)  BP(mean): --  RR: 22 (26 Feb 2019 14:33) (12 - 22)  SpO2: 99% (26 Feb 2019 14:33) (96% - 100%)    PHYSICAL EXAM:    GENERAL: NAD, well-groomed, well-developed  HEAD:  Atraumatic, Normocephalic  EYES: EOMI, PERRLA, conjunctiva and sclera clear  NECK: Supple, No JVD, Normal thyroid  NERVOUS SYSTEM:  Alert & Oriented X3, Good concentration;   CHEST/LUNG: Clear to percussion bilaterally; No rales, rhonchi, wheezing, or rubs  HEART: Regular rate and rhythm; No murmurs, rubs, or gallops  ABDOMEN: Soft, Nontender, Nondistended; Bowel sounds present  EXTREMITIES:  2+ Peripheral Pulses, No clubbing, cyanosis, or edema  LYMPH: No lymphadenopathy noted   RECTAL: Deferred   SKIN: No rashes or lesions    LABS:                        7.8    12.59 )-----------( 325      ( 25 Feb 2019 22:42 )             25.3       CBC:  02-25 @ 22:42  WBC  12.59  HGB 7.8  HCT 25.3 Plate 325  MCV 86.3           25 Feb 2019 22:42    141    |  102    |  67     ----------------------------<  117    3.7     |  30     |  1.15     Ca    8.1        25 Feb 2019 22:42    TPro  5.8    /  Alb  2.3    /  TBili  0.3    /  DBili  x      /  AST  10     /  ALT  12     /  AlkPhos  73     25 Feb 2019 22:42    PT/INR - ( 25 Feb 2019 22:42 )   PT: 13.5 sec;   INR: 1.20 ratio         PTT - ( 25 Feb 2019 22:42 )  PTT:31.0 sec        RADIOLOGY & ADDITIONAL STUDIES:  < from: CT Abdomen and Pelvis w/ IV Cont (02.26.19 @ 06:06) >    EXAM:  CT ABDOMEN AND PELVIS IC                            PROCEDURE DATE:  02/26/2019          INTERPRETATION:  CLINICAL INFORMATION: Vomiting blood. Epigastric pain   radiating to chest and abdomen since yesterday. No diarrhea or melena    COMPARISON: 2/14/2018    PROCEDURE:   Contiguous axial scan of the abdomen and pelvis with intravenous but   without oral contrast, followed by coronal and sagittal reformation.   85   mL of Omnipaque were administered without adverse reaction.   15 mL of   contrast were discarded.    FINDINGS:    LOWER CHEST: A metallic density at the right diaphragm remains unchanged..    LIVER: No focal abdomen acute.   BILE DUCTS: Normal caliber.  GALLBLADDER: Within normal limits.  SPLEEN: Within normal limits.  PANCREAS: Within normal limits.  ADRENALS: Within normal limits.  KIDNEYS/URETERS: Within normal limits.    BLADDER: Within normal limits.  REPRODUCTIVE ORGANS: Within normal limits.     BOWEL: No bowel obstruction. Appendix is normal.  PERITONEUM: No ascites. No hemoperitoneum. No fat stranding.  VESSELS:  Embolization coil is again noted at the area of gastroduodenal   artery.. No occlusion, stenosis or irregularity of the celiac trunk or   superior mesenteric artery.  RETROPERITONEUM: No lymphadenopathy.    ABDOMINAL WALL: Status post removal of fat-containing ventral hernia..  BONES: Diffuse osteopenia. No acute osseous abnormality..    IMPRESSION: No hemoperitoneum or fat stranding.    Unremarkable celiac trunk or superior mesenteric artery.                PRIMITIVO SADLER M.D., ATTENDING RADIOLOGIST  This document has been electronically signed. Feb 26 2019  6:25AM                < end of copied text >

## 2019-02-26 NOTE — H&P ADULT - ASSESSMENT
60 y/o male with PMHx of Obesity, PUD s/p coiling of Gastroduodenal Artery, Chronic lymphedema, HTN, HLD, and multiple hospitalizations for lower extremity ulcers    IMPROVE VTE Individual Risk Assessment    RISK                                                                Points    [  ] Previous VTE                                                  3    [  ] Thrombophilia                                               2    [ x  ] Lower limb paralysis                                      2        (unable to hold up >15 seconds)      [  ] Current Cancer                                              2         (within 6 months)    [  ] Immobilization > 24 hrs                                1    [  ] ICU/CCU stay > 24 hours                              1    [  ] Age > 60                                                      1    IMPROVE VTE Score ___2__    Active GIB will hold vTE

## 2019-02-26 NOTE — H&P ADULT - HISTORY OF PRESENT ILLNESS
60 y/o male with PMHx of Obesity, PUD s/p coiling of Gastroduodenal Artery, Chronic lymphedema, HTN, HLD, and multiple hospitalizations for lower extremity ulcers.  Patient states that he has been eating spicy foods for the last week and for the last 2 days he has had progressively worsening vomiting.  He states that his first episode of vomiting started 2 days ago, he describes emesis with gastric contents that later progressed to first coffee ground vomitus to then bright red blood per oral.  He complains of abdominal pain, dark stools, dizziness, and headache.  Patient states that he as 5/10 burning pain in the epigastrium that is non-radiating, and associated with vomitng.  He admits to taking 2 Motrin tablets yesterday.      ED course: Patient had 1 episode of coffee-ground emesis.

## 2019-02-26 NOTE — CONSULT NOTE ADULT - ASSESSMENT
HPI:  60 y/o male with PMHx of Obesity, PUD s/p coiling of Gastroduodenal Artery, Chronic lymphedema, HTN, HLD, and multiple hospitalizations for lower extremity ulcers.  Patient states that he has been eating spicy foods for the last week and for the last 2 days he has had progressively worsening vomiting.  He states that his first episode of vomiting started 2 days ago, he describes emesis with gastric contents that later progressed to first coffee ground vomitus to then bright red blood per oral.  He complains of abdominal pain, dark stools, dizziness, and headache.  Patient states that he as 5/10 burning pain in the epigastrium that is non-radiating, and associated with vomitng.  He admits to taking 2 Motrin tablets yesterday.      ED course: Patient had 1 episode of coffee-ground emesis. (26 Feb 2019 13:57)  ---------------- as Above -------------------------------------------------------------  the patient states that he was in his USOH until Sunday night when he had N/V. He initially had coffee grounds but it turned red. In the ER, he had one episode of coffee grounds. The patient also states that he had some melena but does admit taking Pepto Bismo. 3 Motrins over the past week. No other NSAIDs. LUQ pain  (+) , better today.  He has a history of an upper GI bleed which needed IR but patient can not give any history.  See labs / CT scan    Upper GI bleed- Getting transfused. elevated BUN 1) slowly advance diet 2) EGD Thursday 3) PPI  4) f/u labs.

## 2019-02-26 NOTE — H&P ADULT - PROBLEM SELECTOR PLAN 2
- secondary to GIB likely Celia Jo tear vs. duodenal ulcer   would transfuse 2U PRBC  - type and screen, consent on chart  - serial h/h  - ICD's for vte prophylaxis

## 2019-02-26 NOTE — H&P ADULT - NSHPPHYSICALEXAM_GEN_ALL_CORE
ICU Vital Signs Last 24 Hrs  T(C): 36.9 (26 Feb 2019 11:37), Max: 37 (26 Feb 2019 00:20)  T(F): 98.5 (26 Feb 2019 11:37), Max: 98.6 (26 Feb 2019 00:20)  HR: 67 (26 Feb 2019 11:37) (67 - 107)  BP: 124/67 (26 Feb 2019 11:37) (100/54 - 127/61)  RR: 14 (26 Feb 2019 11:37) (12 - 20)  SpO2: 100% (26 Feb 2019 11:37) (96% - 100%)

## 2019-02-27 DIAGNOSIS — I71.2 THORACIC AORTIC ANEURYSM, WITHOUT RUPTURE: ICD-10-CM

## 2019-02-27 LAB
24R-OH-CALCIDIOL SERPL-MCNC: 14.8 NG/ML — LOW (ref 30–80)
HCT VFR BLD CALC: 25 % — LOW (ref 39–50)
HCT VFR BLD CALC: 26.9 % — LOW (ref 39–50)
HCT VFR BLD CALC: 28.1 % — LOW (ref 39–50)
HCV AB S/CO SERPL IA: 0.11 S/CO — SIGNIFICANT CHANGE UP (ref 0–0.79)
HCV AB SERPL-IMP: SIGNIFICANT CHANGE UP
HGB BLD-MCNC: 7.9 G/DL — LOW (ref 13–17)
HGB BLD-MCNC: 8.2 G/DL — LOW (ref 13–17)
HGB BLD-MCNC: 8.9 G/DL — LOW (ref 13–17)
MAGNESIUM SERPL-MCNC: 2.5 MG/DL — SIGNIFICANT CHANGE UP (ref 1.6–2.6)
MCHC RBC-ENTMCNC: 27.4 PG — SIGNIFICANT CHANGE UP (ref 27–34)
MCHC RBC-ENTMCNC: 31.7 GM/DL — LOW (ref 32–36)
MCV RBC AUTO: 86.5 FL — SIGNIFICANT CHANGE UP (ref 80–100)
NRBC # BLD: 0 /100 WBCS — SIGNIFICANT CHANGE UP (ref 0–0)
PLATELET # BLD AUTO: 249 K/UL — SIGNIFICANT CHANGE UP (ref 150–400)
RBC # BLD: 3.25 M/UL — LOW (ref 4.2–5.8)
RBC # FLD: 16.1 % — HIGH (ref 10.3–14.5)
WBC # BLD: 7.95 K/UL — SIGNIFICANT CHANGE UP (ref 3.8–10.5)
WBC # FLD AUTO: 7.95 K/UL — SIGNIFICANT CHANGE UP (ref 3.8–10.5)

## 2019-02-27 PROCEDURE — 99233 SBSQ HOSP IP/OBS HIGH 50: CPT

## 2019-02-27 RX ADMIN — CARVEDILOL PHOSPHATE 6.25 MILLIGRAM(S): 80 CAPSULE, EXTENDED RELEASE ORAL at 17:50

## 2019-02-28 ENCOUNTER — RESULT REVIEW (OUTPATIENT)
Age: 59
End: 2019-02-28

## 2019-02-28 LAB
ANION GAP SERPL CALC-SCNC: 5 MMOL/L — SIGNIFICANT CHANGE UP (ref 5–17)
APTT BLD: 28.1 SEC — LOW (ref 28.5–37)
BUN SERPL-MCNC: 13 MG/DL — SIGNIFICANT CHANGE UP (ref 7–23)
CALCIUM SERPL-MCNC: 7.8 MG/DL — LOW (ref 8.5–10.1)
CHLORIDE SERPL-SCNC: 108 MMOL/L — SIGNIFICANT CHANGE UP (ref 96–108)
CO2 SERPL-SCNC: 29 MMOL/L — SIGNIFICANT CHANGE UP (ref 22–31)
CREAT SERPL-MCNC: 0.63 MG/DL — SIGNIFICANT CHANGE UP (ref 0.5–1.3)
GLUCOSE SERPL-MCNC: 78 MG/DL — SIGNIFICANT CHANGE UP (ref 70–99)
HCT VFR BLD CALC: 28.1 % — LOW (ref 39–50)
HGB BLD-MCNC: 8.6 G/DL — LOW (ref 13–17)
INR BLD: 1.22 RATIO — HIGH (ref 0.88–1.16)
MCHC RBC-ENTMCNC: 27 PG — SIGNIFICANT CHANGE UP (ref 27–34)
MCHC RBC-ENTMCNC: 30.6 GM/DL — LOW (ref 32–36)
MCV RBC AUTO: 88.1 FL — SIGNIFICANT CHANGE UP (ref 80–100)
NRBC # BLD: 0 /100 WBCS — SIGNIFICANT CHANGE UP (ref 0–0)
PLATELET # BLD AUTO: 246 K/UL — SIGNIFICANT CHANGE UP (ref 150–400)
POTASSIUM SERPL-MCNC: 3.7 MMOL/L — SIGNIFICANT CHANGE UP (ref 3.5–5.3)
POTASSIUM SERPL-SCNC: 3.7 MMOL/L — SIGNIFICANT CHANGE UP (ref 3.5–5.3)
PROTHROM AB SERPL-ACNC: 13.8 SEC — HIGH (ref 10–12.9)
RBC # BLD: 3.19 M/UL — LOW (ref 4.2–5.8)
RBC # FLD: 15.9 % — HIGH (ref 10.3–14.5)
SODIUM SERPL-SCNC: 142 MMOL/L — SIGNIFICANT CHANGE UP (ref 135–145)
WBC # BLD: 6.33 K/UL — SIGNIFICANT CHANGE UP (ref 3.8–10.5)
WBC # FLD AUTO: 6.33 K/UL — SIGNIFICANT CHANGE UP (ref 3.8–10.5)

## 2019-02-28 PROCEDURE — 99233 SBSQ HOSP IP/OBS HIGH 50: CPT

## 2019-02-28 PROCEDURE — 88312 SPECIAL STAINS GROUP 1: CPT | Mod: 26

## 2019-02-28 PROCEDURE — 88305 TISSUE EXAM BY PATHOLOGIST: CPT | Mod: 26

## 2019-02-28 RX ORDER — SUCRALFATE 1 G
1 TABLET ORAL
Qty: 0 | Refills: 0 | Status: DISCONTINUED | OUTPATIENT
Start: 2019-02-28 | End: 2019-03-01

## 2019-02-28 RX ORDER — ACETAMINOPHEN 500 MG
650 TABLET ORAL EVERY 6 HOURS
Qty: 0 | Refills: 0 | Status: DISCONTINUED | OUTPATIENT
Start: 2019-02-28 | End: 2019-03-01

## 2019-02-28 RX ORDER — PANTOPRAZOLE SODIUM 20 MG/1
40 TABLET, DELAYED RELEASE ORAL
Qty: 0 | Refills: 0 | Status: DISCONTINUED | OUTPATIENT
Start: 2019-02-28 | End: 2019-03-01

## 2019-02-28 RX ORDER — OXYCODONE AND ACETAMINOPHEN 5; 325 MG/1; MG/1
1 TABLET ORAL ONCE
Qty: 0 | Refills: 0 | Status: DISCONTINUED | OUTPATIENT
Start: 2019-02-28 | End: 2019-02-28

## 2019-02-28 RX ADMIN — PANTOPRAZOLE SODIUM 40 MILLIGRAM(S): 20 TABLET, DELAYED RELEASE ORAL at 17:30

## 2019-02-28 RX ADMIN — OXYCODONE AND ACETAMINOPHEN 1 TABLET(S): 5; 325 TABLET ORAL at 01:47

## 2019-02-28 RX ADMIN — PANTOPRAZOLE SODIUM 10 MG/HR: 20 TABLET, DELAYED RELEASE ORAL at 04:35

## 2019-02-28 RX ADMIN — SODIUM CHLORIDE 42 MILLILITER(S): 9 INJECTION, SOLUTION INTRAVENOUS at 17:30

## 2019-02-28 RX ADMIN — CARVEDILOL PHOSPHATE 6.25 MILLIGRAM(S): 80 CAPSULE, EXTENDED RELEASE ORAL at 05:35

## 2019-02-28 RX ADMIN — Medication 650 MILLIGRAM(S): at 13:57

## 2019-02-28 RX ADMIN — CARVEDILOL PHOSPHATE 6.25 MILLIGRAM(S): 80 CAPSULE, EXTENDED RELEASE ORAL at 17:30

## 2019-02-28 RX ADMIN — Medication 1 GRAM(S): at 23:11

## 2019-02-28 RX ADMIN — Medication 1 GRAM(S): at 18:01

## 2019-02-28 RX ADMIN — Medication 650 MILLIGRAM(S): at 23:00

## 2019-02-28 RX ADMIN — Medication 650 MILLIGRAM(S): at 22:00

## 2019-02-28 RX ADMIN — OXYCODONE AND ACETAMINOPHEN 1 TABLET(S): 5; 325 TABLET ORAL at 01:17

## 2019-02-28 RX ADMIN — Medication 650 MILLIGRAM(S): at 14:57

## 2019-02-28 NOTE — PHYSICAL THERAPY INITIAL EVALUATION ADULT - MODALITIES TREATMENT COMMENTS
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 Pt presents with grossly infected venous ulcer over RLE which measures 12.0cmx11.0cmx0.2cm depth. Pt has hemosiderin staining & edema over BLEs. + trophic changes noted at toes B/L. Palpable DP/PT pulses B/L but RLE diminished likely due to edema. Wound covered with combination of biofilm & non-viable tissue (tan slough). Large amounts of purulent drainage noted (tan & black) with significant malodor. Periwound skin severely macerated. No induration, fluctuance, or erythema noted. No increased warmth noted over RLE upon palpation. + tenderness to palpation reported by pt.

## 2019-02-28 NOTE — PHYSICAL THERAPY INITIAL EVALUATION ADULT - ADDITIONAL COMMENTS
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 Prior to admission pt was independent with all functional mobility including community ambulation with B/L axillary crutches. Pt performs ADL's independently. Friend assists with dressing changes. 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 Pt would prefer to follow up at outpatient  clinic here due to proximity.
no stairs to enter home, no stairs in home. has axillary crutches and RW.

## 2019-02-28 NOTE — CHART NOTE - NSCHARTNOTEFT_GEN_A_CORE
No N/V, abdominal pain  Vss, afebrile  Lungs - clear to ausultation, no RRW   Heart - S1S@ (+), wnl  Abd - BS (+), WNL      For EGD today

## 2019-02-28 NOTE — BRIEF OPERATIVE NOTE - POST-OP DX
Acute gastric ulcer with hemorrhage  02/28/2019    Active  Mina Norton  Hiatal hernia  02/28/2019    Active  Mina Norton  Ulcer of esophagus with bleeding  02/28/2019    Active  Mina Norton

## 2019-02-28 NOTE — PROGRESS NOTE ADULT - PROBLEM SELECTOR PLAN 3
- plan as above  - for EGD Thursday  - slowly advance diet
- plan as above  - for EGD thursday  - slowly advance diet

## 2019-02-28 NOTE — PHYSICAL THERAPY INITIAL EVALUATION ADULT - GENERAL OBSERVATIONS, REHAB EVAL
Pt encountered semi-supine in bed + IV RUE, dressing to RLE
Seated on EOB, NAD, Agreeable to tx, RLE wound wraped

## 2019-02-28 NOTE — PROGRESS NOTE ADULT - PROBLEM SELECTOR PLAN 2
- secondary to GIB likely Celia Jo tear vs. duodenal ulcer   - serial h/h  - ICD's for vte prophylaxis
- secondary to GIB likely Celia Jo tear vs. duodenal ulcer   - serial h/h  - ICD's for vte prophylaxis

## 2019-02-28 NOTE — PHYSICAL THERAPY INITIAL EVALUATION ADULT - PERTINENT HX OF CURRENT PROBLEM, REHAB EVAL
Pt is a 60yo Macanese-speaking M who was admitted due to RLE cellulitis & non-healing wound. Pt goes to [facility] and does wound dressing
GI hemorrhage, chronic lymphedema

## 2019-03-01 ENCOUNTER — TRANSCRIPTION ENCOUNTER (OUTPATIENT)
Age: 59
End: 2019-03-01

## 2019-03-01 VITALS
SYSTOLIC BLOOD PRESSURE: 142 MMHG | HEART RATE: 59 BPM | DIASTOLIC BLOOD PRESSURE: 92 MMHG | OXYGEN SATURATION: 98 % | TEMPERATURE: 98 F | RESPIRATION RATE: 18 BRPM

## 2019-03-01 LAB
ANION GAP SERPL CALC-SCNC: 4 MMOL/L — LOW (ref 5–17)
BUN SERPL-MCNC: 8 MG/DL — SIGNIFICANT CHANGE UP (ref 7–23)
CALCIUM SERPL-MCNC: 8.3 MG/DL — LOW (ref 8.5–10.1)
CHLORIDE SERPL-SCNC: 106 MMOL/L — SIGNIFICANT CHANGE UP (ref 96–108)
CO2 SERPL-SCNC: 31 MMOL/L — SIGNIFICANT CHANGE UP (ref 22–31)
CREAT SERPL-MCNC: 0.63 MG/DL — SIGNIFICANT CHANGE UP (ref 0.5–1.3)
GLUCOSE SERPL-MCNC: 85 MG/DL — SIGNIFICANT CHANGE UP (ref 70–99)
HCT VFR BLD CALC: 30 % — LOW (ref 39–50)
HGB BLD-MCNC: 9.3 G/DL — LOW (ref 13–17)
MCHC RBC-ENTMCNC: 27.2 PG — SIGNIFICANT CHANGE UP (ref 27–34)
MCHC RBC-ENTMCNC: 31 GM/DL — LOW (ref 32–36)
MCV RBC AUTO: 87.7 FL — SIGNIFICANT CHANGE UP (ref 80–100)
NRBC # BLD: 0 /100 WBCS — SIGNIFICANT CHANGE UP (ref 0–0)
PLATELET # BLD AUTO: 275 K/UL — SIGNIFICANT CHANGE UP (ref 150–400)
POTASSIUM SERPL-MCNC: 3.4 MMOL/L — LOW (ref 3.5–5.3)
POTASSIUM SERPL-SCNC: 3.4 MMOL/L — LOW (ref 3.5–5.3)
RBC # BLD: 3.42 M/UL — LOW (ref 4.2–5.8)
RBC # FLD: 15.9 % — HIGH (ref 10.3–14.5)
SODIUM SERPL-SCNC: 141 MMOL/L — SIGNIFICANT CHANGE UP (ref 135–145)
WBC # BLD: 5.62 K/UL — SIGNIFICANT CHANGE UP (ref 3.8–10.5)
WBC # FLD AUTO: 5.62 K/UL — SIGNIFICANT CHANGE UP (ref 3.8–10.5)

## 2019-03-01 PROCEDURE — 99239 HOSP IP/OBS DSCHRG MGMT >30: CPT

## 2019-03-01 RX ORDER — PANTOPRAZOLE SODIUM 20 MG/1
1 TABLET, DELAYED RELEASE ORAL
Qty: 60 | Refills: 0
Start: 2019-03-01 | End: 2019-03-30

## 2019-03-01 RX ORDER — PANTOPRAZOLE SODIUM 20 MG/1
1 TABLET, DELAYED RELEASE ORAL
Qty: 0 | Refills: 0 | COMMUNITY
Start: 2019-03-01

## 2019-03-01 RX ORDER — SUCRALFATE 1 G
1 TABLET ORAL
Qty: 120 | Refills: 0
Start: 2019-03-01 | End: 2019-03-30

## 2019-03-01 RX ORDER — SUCRALFATE 1 G
1 TABLET ORAL
Qty: 0 | Refills: 0 | COMMUNITY
Start: 2019-03-01

## 2019-03-01 RX ORDER — POTASSIUM CHLORIDE 20 MEQ
40 PACKET (EA) ORAL ONCE
Qty: 0 | Refills: 0 | Status: COMPLETED | OUTPATIENT
Start: 2019-03-01 | End: 2019-03-01

## 2019-03-01 RX ADMIN — CARVEDILOL PHOSPHATE 6.25 MILLIGRAM(S): 80 CAPSULE, EXTENDED RELEASE ORAL at 06:10

## 2019-03-01 RX ADMIN — PANTOPRAZOLE SODIUM 40 MILLIGRAM(S): 20 TABLET, DELAYED RELEASE ORAL at 06:10

## 2019-03-01 RX ADMIN — Medication 40 MILLIEQUIVALENT(S): at 16:12

## 2019-03-01 RX ADMIN — Medication 1 GRAM(S): at 11:09

## 2019-03-01 RX ADMIN — Medication 1 GRAM(S): at 06:10

## 2019-03-01 NOTE — DISCHARGE NOTE PROVIDER - PROVIDER TOKENS
PROVIDER:[TOKEN:[[id number]]],FREE:[LAST:[[id number]],FIRST:[Yana],PHONE:[phone]],FAX:[(   [phone]    -]]

## 2019-03-01 NOTE — PROGRESS NOTE ADULT - SUBJECTIVE AND OBJECTIVE BOX
Patient is a 59y old  Male who presents with a chief complaint of I threw up blood (27 Feb 2019 09:28)      INTERVAL HPI/OVERNIGHT EVENTS: no new bleeding. denies abd pain     MEDICATIONS  (STANDING):  carvedilol 6.25 milliGRAM(s) Oral every 12 hours  dextrose 5% + sodium chloride 0.9%. 1000 milliLiter(s) (42 mL/Hr) IV Continuous <Continuous>  pantoprazole Infusion 8 mG/Hr (10 mL/Hr) IV Continuous <Continuous>    MEDICATIONS  (PRN):  acetaminophen   Tablet .. 650 milliGRAM(s) Oral every 6 hours PRN Mild Pain (1 - 3)        Allergies    No Known Allergies    Intolerances        REVIEW OF SYSTEMS:  CONSTITUTIONAL: No fever, weight loss, or fatigue  EYES: No eye pain, visual disturbances, or discharge  ENMT:  No difficulty hearing, tinnitus, vertigo; No sinus or throat pain  NECK: No pain or stiffness  RESPIRATORY: No cough, wheezing, chills or hemoptysis; No shortness of breath  CARDIOVASCULAR: No chest pain, palpitations, dizziness, or leg swelling  GASTROINTESTINAL: No abdominal or epigastric pain. No nausea, vomiting, or hematemesis; No diarrhea or constipation. No melena or hematochezia.  GENITOURINARY: No dysuria, frequency, hematuria, or incontinence  NEUROLOGICAL: No headaches, memory loss, loss of strength, numbness, or tremors  SKIN: No itching, burning, rashes, or lesions   LYMPH NODES: No enlarged glands  ENDOCRINE: No heat or cold intolerance; No hair loss  MUSCULOSKELETAL: No joint pain or swelling; No muscle, back, or extremity pain  PSYCHIATRIC: No depression, anxiety, mood swings, or difficulty sleeping      Vital Signs Last 24 Hrs  T(C): 37 (27 Feb 2019 12:10), Max: 37 (26 Feb 2019 17:15)  T(F): 98.6 (27 Feb 2019 12:10), Max: 98.6 (26 Feb 2019 17:15)  HR: 68 (27 Feb 2019 12:10) (68 - 88)  BP: 115/84 (27 Feb 2019 12:10) (106/66 - 151/87)  BP(mean): --  RR: 18 (27 Feb 2019 12:10) (17 - 22)  SpO2: 98% (27 Feb 2019 12:10) (96% - 100%)    PHYSICAL EXAM:  GENERAL: NAD, well-groomed, well-developed  HEAD:  Atraumatic, Normocephalic  EYES: EOMI, PERRLA, conjunctiva and sclera clear  ENMT: No tonsillar erythema, exudates, or enlargement; Moist mucous membranes, Good dentition, No lesions  NECK: Supple, No JVD, Normal thyroid  NERVOUS SYSTEM:  Alert & Oriented X3, Good concentration; Motor Strength 5/5 B/L upper and lower extremities; DTRs 2+ intact and symmetric  CHEST/LUNG: Clear to percussion bilaterally; No rales, rhonchi, wheezing, or rubs  HEART: Regular rate and rhythm; No murmurs, rubs, or gallops  ABDOMEN: Soft, Nontender, Nondistended; Bowel sounds present  EXTREMITIES:  2+ Peripheral Pulses, No clubbing, cyanosis, or edema  LYMPH: No lymphadenopathy noted  SKIN: No rashes or lesions    LABS:                                       8.6    6.33  )-----------( 246      ( 28 Feb 2019 07:25 )             28.1     02-28    142  |  108  |  13  ----------------------------<  78  3.7   |  29  |  0.63    Ca    7.8<L>      28 Feb 2019 07:25  Mg     2.5     02-27      PT/INR - ( 28 Feb 2019 07:41 )   PT: 13.8 sec;   INR: 1.22 ratio         PTT - ( 28 Feb 2019 07:41 )  PTT:28.1 sec              RADIOLOGY & ADDITIONAL TESTS:    Imaging Personally Reviewed:  [ ] YES  [ ] NO    Consultant(s) Notes Reviewed:  [ ] YES  [ ] NO    Care Discussed with Consultants/Other Providers [ ] YES  [ ] NO
Patient is a 59y old  Male who presents with a chief complaint of I threw up blood (26 Feb 2019 15:28)      HPI:  60 y/o male with PMHx of Obesity, PUD s/p coiling of Gastroduodenal Artery, Chronic lymphedema, HTN, HLD, and multiple hospitalizations for lower extremity ulcers.  Patient states that he has been eating spicy foods for the last week and for the last 2 days he has had progressively worsening vomiting.  He states that his first episode of vomiting started 2 days ago, he describes emesis with gastric contents that later progressed to first coffee ground vomitus to then bright red blood per oral.  He complains of abdominal pain, dark stools, dizziness, and headache.  Patient states that he as 5/10 burning pain in the epigastrium that is non-radiating, and associated with vomitng.  He admits to taking 2 Motrin tablets yesterday.      ED course: Patient had 1 episode of coffee-ground emesis. (26 Feb 2019 13:57)        INTERVAL HPI/OVERNIGHT EVENTS:  Spoke with [employer] phone  #  [phone]  No N/V, or BMs since last seen. Abdominal pain better compared to admission but unchanged since yesterday. Patient can't characterize the pain.  HGB increased from 7.5 -- > 7.9 after 2 units PRBC    MEDICATIONS  (STANDING):  carvedilol 6.25 milliGRAM(s) Oral every 12 hours  dextrose 5% + sodium chloride 0.9%. 1000 milliLiter(s) (125 mL/Hr) IV Continuous <Continuous>  pantoprazole Infusion 8 mG/Hr (10 mL/Hr) IV Continuous <Continuous>    MEDICATIONS  (PRN):      FAMILY HISTORY:  No pertinent family history in first degree relatives  No pertinent family history in first degree relatives      Allergies    No Known Allergies    Intolerances        PMH/PSH:  HTN (hypertension)  Knee osteoarthritis  Duodenal ulcer disease  Morbid obesity  Hypertension  Osteoarthritis  PUD (peptic ulcer disease)  Hypertension  No significant past surgical history  Gastroduodenal artery bleed  Abdominal hernia  H/O ventral hernia repair  No significant past surgical history        REVIEW OF SYSTEMS:  CONSTITUTIONAL: No fever, weight loss, or fatigue  EYES: No eye pain, visual disturbances, or discharge  ENMT:  No difficulty hearing, tinnitus, vertigo; No sinus or throat pain  NECK: No pain or stiffness  BREASTS: No pain, masses, or nipple discharge  RESPIRATORY: No cough, wheezing, chills or hemoptysis; No shortness of breath  CARDIOVASCULAR: No chest pain, palpitations, dizziness, or leg swelling  GASTROINTESTINAL: See above  GENITOURINARY: No dysuria, frequency, hematuria, or incontinence  NEUROLOGICAL: No headaches, memory loss, loss of strength, numbness, or tremors  SKIN: No itching, burning, rashes, or lesions   LYMPH NODES: No enlarged glands  ENDOCRINE: No heat or cold intolerance; No hair loss  MUSCULOSKELETAL: No joint pain or swelling; No muscle, back, or extremity pain  PSYCHIATRIC: No depression, anxiety, mood swings, or difficulty sleeping  HEME/LYMPH: No easy bruising, or bleeding gums  ALLERGY AND IMMUNOLOGIC: No hives or eczema    Vital Signs Last 24 Hrs  T(C): 36.6 (27 Feb 2019 06:30), Max: 37 (26 Feb 2019 17:15)  T(F): 97.8 (27 Feb 2019 06:30), Max: 98.6 (26 Feb 2019 17:15)  HR: 69 (27 Feb 2019 06:30) (67 - 88)  BP: 106/66 (27 Feb 2019 06:30) (106/66 - 151/87)  BP(mean): --  RR: 18 (27 Feb 2019 06:30) (14 - 22)  SpO2: 97% (27 Feb 2019 06:30) (96% - 100%)    PHYSICAL EXAM:  GENERAL: NAD, well-groomed, well-developed  HEAD:  Atraumatic, Normocephalic  EYES: EOMI, PERRLA, conjunctiva and sclera clear  NECK: Supple, No JVD, Normal thyroid  NERVOUS SYSTEM:  Alert & Oriented X3, Good concentration; Motor Strength 5/5 B/L upper and lower extremities; DTRs 2+ intact and symmetric  CHEST/LUNG: Clear to percussion bilaterally; No rales, rhonchi, wheezing, or rubs  HEART: Regular rate and rhythm; No murmurs, rubs, or gallops  ABDOMEN: Soft, Nontender, Nondistended; Bowel sounds present  EXTREMITIES:  2+ Peripheral Pulses, No clubbing, cyanosis, or edema  LYMPH: No lymphadenopathy noted  SKIN: No rashes or lesions    LAB  02-25 @ 22:42  amylase 26   lipase 57                           7.9    x     )-----------( x        ( 27 Feb 2019 07:33 )             25.0       CBC:  02-27 @ 07:33  WBC --   Hgb 7.9   Hct 25.0   Plts --  MCV --  02-26 @ 20:06  WBC --   Hgb 7.5   Hct 24.9   Plts --  MCV --  02-25 @ 22:42  WBC 12.59   Hgb 7.8   Hct 25.3   Plts 325  MCV 86.3      Chemistry:  02-25 @ 22:42  Na+ 141  K+ 3.7  Cl- 102  CO2 30  BUN 67  Cr 1.15         Glucose, Serum: 117 mg/dL (02-25 @ 22:42)      25 Feb 2019 22:42    141    |  102    |  67     ----------------------------<  117    3.7     |  30     |  1.15     Ca    8.1        25 Feb 2019 22:42  Mg     2.5       27 Feb 2019 07:33    TPro  5.8    /  Alb  2.3    /  TBili  0.3    /  DBili  x      /  AST  10     /  ALT  12     /  AlkPhos  73     25 Feb 2019 22:42      PT/INR - ( 25 Feb 2019 22:42 )   PT: 13.5 sec;   INR: 1.20 ratio         PTT - ( 25 Feb 2019 22:42 )  PTT:31.0 sec        CAPILLARY BLOOD GLUCOSE              RADIOLOGY & ADDITIONAL TESTS:    Imaging Personally Reviewed:  [ ] YES  [ ] NO    Consultant(s) Notes Reviewed:  [ ] YES  [ ] NO    Care Discussed with Consultants/Other Providers [ ] YES  [ ] NO
Patient is a 59y old  Male who presents with a chief complaint of I threw up blood (27 Feb 2019 09:28)      INTERVAL HPI/OVERNIGHT EVENTS: no new bleeding. denies abd pain     MEDICATIONS  (STANDING):  carvedilol 6.25 milliGRAM(s) Oral every 12 hours  dextrose 5% + sodium chloride 0.9%. 1000 milliLiter(s) (42 mL/Hr) IV Continuous <Continuous>  pantoprazole Infusion 8 mG/Hr (10 mL/Hr) IV Continuous <Continuous>    MEDICATIONS  (PRN):      Allergies    No Known Allergies    Intolerances        REVIEW OF SYSTEMS:  CONSTITUTIONAL: No fever, weight loss, or fatigue  EYES: No eye pain, visual disturbances, or discharge  ENMT:  No difficulty hearing, tinnitus, vertigo; No sinus or throat pain  NECK: No pain or stiffness  RESPIRATORY: No cough, wheezing, chills or hemoptysis; No shortness of breath  CARDIOVASCULAR: No chest pain, palpitations, dizziness, or leg swelling  GASTROINTESTINAL: No abdominal or epigastric pain. No nausea, vomiting, or hematemesis; No diarrhea or constipation. No melena or hematochezia.  GENITOURINARY: No dysuria, frequency, hematuria, or incontinence  NEUROLOGICAL: No headaches, memory loss, loss of strength, numbness, or tremors  SKIN: No itching, burning, rashes, or lesions   LYMPH NODES: No enlarged glands  ENDOCRINE: No heat or cold intolerance; No hair loss  MUSCULOSKELETAL: No joint pain or swelling; No muscle, back, or extremity pain  PSYCHIATRIC: No depression, anxiety, mood swings, or difficulty sleeping      Vital Signs Last 24 Hrs  T(C): 37 (27 Feb 2019 12:10), Max: 37 (26 Feb 2019 17:15)  T(F): 98.6 (27 Feb 2019 12:10), Max: 98.6 (26 Feb 2019 17:15)  HR: 68 (27 Feb 2019 12:10) (68 - 88)  BP: 115/84 (27 Feb 2019 12:10) (106/66 - 151/87)  BP(mean): --  RR: 18 (27 Feb 2019 12:10) (17 - 22)  SpO2: 98% (27 Feb 2019 12:10) (96% - 100%)    PHYSICAL EXAM:  GENERAL: NAD, well-groomed, well-developed  HEAD:  Atraumatic, Normocephalic  EYES: EOMI, PERRLA, conjunctiva and sclera clear  ENMT: No tonsillar erythema, exudates, or enlargement; Moist mucous membranes, Good dentition, No lesions  NECK: Supple, No JVD, Normal thyroid  NERVOUS SYSTEM:  Alert & Oriented X3, Good concentration; Motor Strength 5/5 B/L upper and lower extremities; DTRs 2+ intact and symmetric  CHEST/LUNG: Clear to percussion bilaterally; No rales, rhonchi, wheezing, or rubs  HEART: Regular rate and rhythm; No murmurs, rubs, or gallops  ABDOMEN: Soft, Nontender, Nondistended; Bowel sounds present  EXTREMITIES:  2+ Peripheral Pulses, No clubbing, cyanosis, or edema  LYMPH: No lymphadenopathy noted  SKIN: No rashes or lesions    LABS:                        8.2    x     )-----------( x        ( 27 Feb 2019 10:44 )             26.9     02-25    141  |  102  |  67<H>  ----------------------------<  117<H>  3.7   |  30  |  1.15    Ca    8.1<L>      25 Feb 2019 22:42  Mg     2.5     02-27    TPro  5.8<L>  /  Alb  2.3<L>  /  TBili  0.3  /  DBili  x   /  AST  10<L>  /  ALT  12  /  AlkPhos  73  02-25    PT/INR - ( 25 Feb 2019 22:42 )   PT: 13.5 sec;   INR: 1.20 ratio         PTT - ( 25 Feb 2019 22:42 )  PTT:31.0 sec    CAPILLARY BLOOD GLUCOSE          RADIOLOGY & ADDITIONAL TESTS:    Imaging Personally Reviewed:  [ ] YES  [ ] NO    Consultant(s) Notes Reviewed:  [ ] YES  [ ] NO    Care Discussed with Consultants/Other Providers [ ] YES  [ ] NO
Patient is a 59y old  Male who presents with a chief complaint of I threw up blood (28 Feb 2019 16:42)      HPI:  58 y/o male with PMHx of Obesity, PUD s/p coiling of Gastroduodenal Artery, Chronic lymphedema, HTN, HLD, and multiple hospitalizations for lower extremity ulcers.  Patient states that he has been eating spicy foods for the last week and for the last 2 days he has had progressively worsening vomiting.  He states that his first episode of vomiting started 2 days ago, he describes emesis with gastric contents that later progressed to first coffee ground vomitus to then bright red blood per oral.  He complains of abdominal pain, dark stools, dizziness, and headache.  Patient states that he as 5/10 burning pain in the epigastrium that is non-radiating, and associated with vomitng.  He admits to taking 2 Motrin tablets yesterday.      ED course: Patient had 1 episode of coffee-ground emesis. (26 Feb 2019 13:57)      INTERVAL HPI/OVERNIGHT EVENTS:  The patient denies melena, hematochezia, hematemesis, nausea, vomiting, abdominal pain, constipation, diarrhea, or change in bowel movements Tolerating diet    MEDICATIONS  (STANDING):  carvedilol 6.25 milliGRAM(s) Oral every 12 hours  dextrose 5% + sodium chloride 0.9%. 1000 milliLiter(s) (42 mL/Hr) IV Continuous <Continuous>  pantoprazole    Tablet 40 milliGRAM(s) Oral two times a day  sucralfate 1 Gram(s) Oral four times a day    MEDICATIONS  (PRN):  acetaminophen   Tablet .. 650 milliGRAM(s) Oral every 6 hours PRN Mild Pain (1 - 3)      FAMILY HISTORY:  No pertinent family history in first degree relatives  No pertinent family history in first degree relatives      Allergies    No Known Allergies    Intolerances        PMH/PSH:  HTN (hypertension)  Knee osteoarthritis  Duodenal ulcer disease  Morbid obesity  Hypertension  Osteoarthritis  PUD (peptic ulcer disease)  Hypertension  No significant past surgical history  Gastroduodenal artery bleed  Abdominal hernia  H/O ventral hernia repair  No significant past surgical history        REVIEW OF SYSTEMS:  CONSTITUTIONAL: No fever, weight loss, or fatigue  EYES: No eye pain, visual disturbances, or discharge  ENMT:  No difficulty hearing, tinnitus, vertigo; No sinus or throat pain  NECK: No pain or stiffness  BREASTS: No pain, masses, or nipple discharge  RESPIRATORY: No cough, wheezing, chills or hemoptysis; No shortness of breath  CARDIOVASCULAR: No chest pain, palpitations, dizziness, or leg swelling  GASTROINTESTINAL: See above  GENITOURINARY: No dysuria, frequency, hematuria, or incontinence  NEUROLOGICAL: No headaches, memory loss, loss of strength, numbness, or tremors  SKIN: No itching, burning, rashes, or lesions   LYMPH NODES: No enlarged glands  ENDOCRINE: No heat or cold intolerance; No hair loss  MUSCULOSKELETAL: No joint pain or swelling; No muscle, back, or extremity pain  PSYCHIATRIC: No depression, anxiety, mood swings, or difficulty sleeping  HEME/LYMPH: No easy bruising, or bleeding gums  ALLERGY AND IMMUNOLOGIC: No hives or eczema    Vital Signs Last 24 Hrs  T(C): 36.2 (01 Mar 2019 05:22), Max: 36.7 (28 Feb 2019 22:35)  T(F): 97.1 (01 Mar 2019 05:22), Max: 98 (28 Feb 2019 22:35)  HR: 64 (01 Mar 2019 05:22) (61 - 72)  BP: 157/93 (01 Mar 2019 05:22) (147/92 - 160/98)  BP(mean): --  RR: 18 (01 Mar 2019 05:22) (18 - 18)  SpO2: 96% (01 Mar 2019 05:22) (96% - 99%)    PHYSICAL EXAM:  GENERAL: NAD, well-groomed, well-developed  HEAD:  Atraumatic, Normocephalic  EYES: EOMI, PERRLA, conjunctiva and sclera clear  NECK: Supple, No JVD, Normal thyroid  NERVOUS SYSTEM:  Alert & Oriented X3, Good concentration;   CHEST/LUNG: Clear to percussion bilaterally; No rales, rhonchi, wheezing, or rubs  HEART: Regular rate and rhythm; No murmurs, rubs, or gallops  ABDOMEN: Soft, Nontender, Nondistended; Bowel sounds present  EXTREMITIES:  2+ Peripheral Pulses, No clubbing, cyanosis, or edema  LYMPH: No lymphadenopathy noted  SKIN: No rashes or lesions    LAB  02-25 @ 22:42  amylase 26   lipase 57                           9.3    5.62  )-----------( 275      ( 01 Mar 2019 07:18 )             30.0       CBC:  03-01 @ 07:18  WBC 5.62   Hgb 9.3   Hct 30.0   Plts 275  MCV 87.7  02-28 @ 07:25  WBC 6.33   Hgb 8.6   Hct 28.1   Plts 246  MCV 88.1  02-27 @ 18:21  WBC 7.95   Hgb 8.9   Hct 28.1   Plts 249  MCV 86.5  02-27 @ 10:44  WBC --   Hgb 8.2   Hct 26.9   Plts --  MCV --  02-27 @ 07:33  WBC --   Hgb 7.9   Hct 25.0   Plts --  MCV --  02-26 @ 20:06  WBC --   Hgb 7.5   Hct 24.9   Plts --  MCV --  02-25 @ 22:42  WBC 12.59   Hgb 7.8   Hct 25.3   Plts 325  MCV 86.3      Chemistry:  03-01 @ 07:18  Na+ 141  K+ 3.4  Cl- 106  CO2 31  BUN 8  Cr 0.63     02-28 @ 07:25  Na+ 142  K+ 3.7  Cl- 108  CO2 29  BUN 13  Cr 0.63     02-25 @ 22:42  Na+ 141  K+ 3.7  Cl- 102  CO2 30  BUN 67  Cr 1.15         Glucose, Serum: 85 mg/dL (03-01 @ 07:18)  Glucose, Serum: 78 mg/dL (02-28 @ 07:25)  Glucose, Serum: 117 mg/dL (02-25 @ 22:42)      01 Mar 2019 07:18    141    |  106    |  8      ----------------------------<  85     3.4     |  31     |  0.63   28 Feb 2019 07:25    142    |  108    |  13     ----------------------------<  78     3.7     |  29     |  0.63   25 Feb 2019 22:42    141    |  102    |  67     ----------------------------<  117    3.7     |  30     |  1.15     Ca    8.3        01 Mar 2019 07:18  Ca    7.8        28 Feb 2019 07:25  Ca    8.1        25 Feb 2019 22:42  Mg     2.5       27 Feb 2019 07:33    TPro  5.8    /  Alb  2.3    /  TBili  0.3    /  DBili  x      /  AST  10     /  ALT  12     /  AlkPhos  73     25 Feb 2019 22:42      PT/INR - ( 28 Feb 2019 07:41 )   PT: 13.8 sec;   INR: 1.22 ratio         PTT - ( 28 Feb 2019 07:41 )  PTT:28.1 sec        CAPILLARY BLOOD GLUCOSE              RADIOLOGY & ADDITIONAL TESTS:    Imaging Personally Reviewed:  [ ] YES  [ ] NO    Consultant(s) Notes Reviewed:  [ ] YES  [ ] NO    Care Discussed with Consultants/Other Providers [ ] YES  [ ] NO
Procedure:           Upper GI endoscopy 02-28-19 @ 16:42    Indications:                     Monitored Anesthesia Care Provided by :     ____________________________________________________________________________________________________  Procedure:           Pre-Anesthesia Assessment:                       - Prior to the procedure, a History and Physical was performed, and patient                        medications and allergies were reviewed. The patient is competent. The risks                        and benefits of the procedure and the sedation options and risks were                        discussed with the patient. All questions were answered and informed consent                        was obtained. Patient identification and proposed procedure were verified by                        the physician, the nurse and the anesthesiologist in the procedure room.                        Mental Status Examination:    alert and oriented. Airway Examination: normal                        oropharyngeal airway and neck mobility. Respiratory Examination: clear to                        auscultation. CV Examination: normal. Prophylactic Antibiotics: The patient                        does not require prophylactic antibiotics.                        Grade Assessment:  After                        reviewing the risks and benefits, the patient was deemed in satisfactory                        condition to undergo the procedure. The anesthesia plan was to use monitored                        anesthesia care (MAC). Immediately prior to administration of medications,                        the patient was re-assessed for adequacy to receive sedatives. The heart        		     rate, respiratory rate, oxygen saturations, blood pressure, adequacy of                        pulmonary ventilation, and response to care were monitored throughout the                        procedure. The physical status of the patient was re-assessed after the                        procedure.                       After obtaining informed consent, the endoscope was passed under direct                        vision. Throughout the procedure, the patient's blood pressure, pulse, and                        oxygen saturations were monitored continuously. The Endoscope was introduced                        through the mouth, and advanced to the second part of duodenum. Retroflexion was done in the stomach. The upper GI                        endoscopy was accomplished with ease. The patient tolerated the procedure                        well.    ESOPHAGUS:   1 CM distal ulcer s/p biopsy ( procedure terminated before 4 biopsies could be done )            Large HH    STOMACH:   1 cm pre pyloric ulcer s/p biopsy    DUODENUM:   WNL      Assessment : As Above    PLAN :  PO Carafate , PO  PPI BID, hold NSAIDs, check histology

## 2019-03-01 NOTE — PROGRESS NOTE ADULT - PROBLEM SELECTOR PLAN 1
- hold all ASA and vte  - Protonix gtt  - advance diet as per GI   - serial h/h
- hold all ASA and vte  - Protonix gtt  - advance diet as per GI   - GI eval appreciated. For EGD tomorrow   - serial h/h
No signs of active bleeding. On clear liquids / IV PPI continuous. 1) Additional 1 unit PRBC ordered. 2) Advance diet and observe 3) EGD in AM. Discussed with patient.
No signs of active bleeding. See EGD results ( Esophageal / gastric ulcer ) On regular diet /  PO PPI BID/ Carafate.  HGB stable ( higher compared to yesterday )  1) Awaiting histology results  2) Stable from GI point of view for discharge ( name / number given to patient )

## 2019-03-01 NOTE — CHART NOTE - NSCHARTNOTEFT_GEN_A_CORE
Upon Nutritional Assessment by the Registered Dietitian your patient was determined to meet criteria / has evidence of the following diagnosis/diagnoses:          [ ]  Mild Protein Calorie Malnutrition        [ ]  Moderate Protein Calorie Malnutrition        [ ] Severe Protein Calorie Malnutrition        [ ] Unspecified Protein Calorie Malnutrition        [ ] Underweight / BMI <19        [x ] Morbid Obesity / BMI > 40      Findings as based on:  •  Comprehensive nutrition assessment and consultation  •  Calorie counts (nutrient intake analysis)  •  Food acceptance and intake status from observations by staff  •  Follow up  •  Patient education  •  Intervention secondary to interdisciplinary rounds  •   concerns      Treatment:    The following diet has been recommended: Dash/TLC ; soft w/ 1pkg no carb pro souce daily = 15 g pro & 60 kcal      PROVIDER Section:     By signing this assessment you are acknowledging and agree with the diagnosis/diagnoses assigned by the Registered Dietitian    Comments:

## 2019-03-01 NOTE — DIETITIAN INITIAL EVALUATION ADULT. - OTHER INFO
Pt seen today due BMI > 40. 2/28 s/p EGD. Pt without c/o at this time. [employer] phone used to obtain usual diet information. Pt has never followed a diet for weight loss PTA. Consuming 75 % of meal.

## 2019-03-01 NOTE — DISCHARGE NOTE PROVIDER - HOSPITAL COURSE
60 y/o male with PMHx of Obesity, PUD s/p coiling of Gastroduodenal Artery, Chronic lymphedema, HTN, HLD, and multiple hospitalizations     for lower extremity ulcers.  Patient states that he has been eating spicy foods for the last week and for the last 2 days he has had progressively     worsening vomiting.  He states that his first episode of vomiting started 2 days ago, he describes emesis with gastric contents     that later progressed to first coffee ground vomitus to then bright red blood per oral.  He complains of abdominal pain,     dark stools, dizziness, and headache.  Patient states that he as 5/10 burning pain in the epigastrium that is non-radiating,     and associated with vomiting.  He admits to taking 2 Motrin tablets yesterday.  ED course: Patient had 1 episode of coffee-ground emesis.    Upper GI bleed- transfused.     Problem/Plan - 1:    Problem: Gastrointestinal hemorrhage associated with gastritis, unspecified gastritis type.      Plan: No signs of active bleeding. EGD results ( Esophageal / gastric ulcer ) On regular diet /  PO PPI BID/ Carafate.      HGB stable higher compared to previous results.  1) Awaiting histology results  2) Stable from GI point of view for discharge.    to be followed up with GI as out patient. 60 y/o male with PMHx of Obesity, PUD s/p coiling of Gastroduodenal Artery, Chronic lymphedema, HTN, HLD, and multiple hospitalizations     for lower extremity ulcers.  Patient states that he has been eating spicy foods for the last week and for the last 2 days he has had progressively     worsening vomiting.  He states that his first episode of vomiting started 2 days ago, he describes emesis with gastric contents     that later progressed to first coffee ground vomitus to then bright red blood per oral.  He complains of abdominal pain,     dark stools, dizziness, and headache.  Patient states that he as 5/10 burning pain in the epigastrium that is non-radiating,     and associated with vomiting.  He admits to taking 2 Motrin tablets yesterday.  ED course: Patient had 1 episode of coffee-ground emesis.    Upper GI bleed- transfused.     Problem/Plan - 1:    Problem: Gastrointestinal hemorrhage associated with gastritis, unspecified gastritis type.      Plan: No signs of active bleeding. EGD results ( Esophageal / gastric ulcer ) On regular diet /  PO PPI BID/ Carafate.      HGB stable higher compared to previous results.  1) Awaiting histology results  2) Stable from GI point of view for discharge.    to be followed up with GI as out patient.         Wound Management    Clean wound daily, dress with adaptick, acquacel, abdominal pad, and wrap with cling and home care nursing.

## 2019-03-01 NOTE — DIETITIAN INITIAL EVALUATION ADULT. - PERTINENT LABORATORY DATA
03-01 Na141 mmol/L Glu 85 mg/dL K+ 3.4 mmol/L<L> Cr  0.63 mg/dL BUN 8 mg/dL 02-25 Alb 2.3 g/dL<L>02-25 ALT 12 U/L AST 10 U/L<L> Alkaline Phosphatase 73 U/L

## 2019-03-01 NOTE — PROGRESS NOTE ADULT - ASSESSMENT
60 y/o male with PMHx of Obesity, PUD s/p coiling of Gastroduodenal Artery, Chronic lymphedema, HTN, HLD, and multiple hospitalizations for lower extremity ulcers admitted for UGIB. Pt is now s/p 3 units of prbc and h/h is now stable  denies any new signs of bleeding and abd pain and vomiting has resolved. Pt scheduled for EGD today.
60 y/o male with PMHx of Obesity, PUD s/p coiling of Gastroduodenal Artery, Chronic lymphedema, HTN, HLD, and multiple hospitalizations for lower extremity ulcers admitted for UGIB, was trasnfused 2 units overnuight and h/h unchanged. denies any new signs of bleeding and abd pain and vomiting has resolved. Will transfuse 1 more unit and reevl cbc
HPI:  58 y/o male with PMHx of Obesity, PUD s/p coiling of Gastroduodenal Artery, Chronic lymphedema, HTN, HLD, and multiple hospitalizations for lower extremity ulcers.  Patient states that he has been eating spicy foods for the last week and for the last 2 days he has had progressively worsening vomiting.  He states that his first episode of vomiting started 2 days ago, he describes emesis with gastric contents that later progressed to first coffee ground vomitus to then bright red blood per oral.  He complains of abdominal pain, dark stools, dizziness, and headache.  Patient states that he as 5/10 burning pain in the epigastrium that is non-radiating, and associated with vomitng.  He admits to taking 2 Motrin tablets yesterday.      ED course: Patient had 1 episode of coffee-ground emesis. (26 Feb 2019 13:57)  ---------------- as Above -------------------------------------------------------------  the patient states that he was in his USOH until Sunday night when he had N/V. He initially had coffee grounds but it turned red. In the ER, he had one episode of coffee grounds. The patient also states that he had some melena but does admit taking Pepto Bismo. 3 Motrins over the past week. No other NSAIDs. LUQ pain  (+) , better today.  He has a history of an upper GI bleed which needed IR but patient can not give any history.  See labs / CT scan    Upper GI bleed- Getting transfused. elevated BUN 1) slowly advance diet 2) EGD Thursday 3) PPI  4) f/u labs.
HPI:  58 y/o male with PMHx of Obesity, PUD s/p coiling of Gastroduodenal Artery, Chronic lymphedema, HTN, HLD, and multiple hospitalizations for lower extremity ulcers.  Patient states that he has been eating spicy foods for the last week and for the last 2 days he has had progressively worsening vomiting.  He states that his first episode of vomiting started 2 days ago, he describes emesis with gastric contents that later progressed to first coffee ground vomitus to then bright red blood per oral.  He complains of abdominal pain, dark stools, dizziness, and headache.  Patient states that he as 5/10 burning pain in the epigastrium that is non-radiating, and associated with vomitng.  He admits to taking 2 Motrin tablets yesterday.      ED course: Patient had 1 episode of coffee-ground emesis. (26 Feb 2019 13:57)  ---------------- as Above -------------------------------------------------------------  the patient states that he was in his USOH until Sunday night when he had N/V. He initially had coffee grounds but it turned red. In the ER, he had one episode of coffee grounds. The patient also states that he had some melena but does admit taking Pepto Bismo. 3 Motrins over the past week. No other NSAIDs. LUQ pain  (+) , better today.  He has a history of an upper GI bleed which needed IR but patient can not give any history.  See labs / CT scan    Upper GI bleed- Getting transfused. elevated BUN 1) slowly advance diet 2) EGD Thursday 3) PPI  4) f/u labs.

## 2019-03-01 NOTE — DIETITIAN INITIAL EVALUATION ADULT. - PERTINENT MEDS FT
MEDICATIONS  (STANDING):  carvedilol 6.25 milliGRAM(s) Oral every 12 hours  dextrose 5% + sodium chloride 0.9%. 1000 milliLiter(s) (42 mL/Hr) IV Continuous <Continuous>  pantoprazole    Tablet 40 milliGRAM(s) Oral two times a day  sucralfate 1 Gram(s) Oral four times a day    MEDICATIONS  (PRN):  acetaminophen   Tablet .. 650 milliGRAM(s) Oral every 6 hours PRN Mild Pain (1 - 3)

## 2019-03-01 NOTE — PROGRESS NOTE ADULT - PROBLEM SELECTOR PROBLEM 1
Gastrointestinal hemorrhage associated with gastritis, unspecified gastritis type

## 2019-03-01 NOTE — DISCHARGE NOTE PROVIDER - CARE PROVIDER_API CALL
Mina Norton)  [place] [address]  Phone: [phone]  Fax: [phone]  Follow Up Time:     Yana osorio  Phone: [phone]  Fax: (   )    -  Follow Up Time:

## 2019-03-01 NOTE — DISCHARGE NOTE PROVIDER - NSDCCPCAREPLAN_GEN_ALL_CORE_FT
PRINCIPAL DISCHARGE DIAGNOSIS  Problem: Gastrointestinal hemorrhage associated with gastritis, unspecified gastritis type  Assessment and Plan of Treatment: stable PRINCIPAL DISCHARGE DIAGNOSIS  Problem: Gastrointestinal hemorrhage associated with gastritis, unspecified gastritis type  Assessment and Plan of Treatment: stable  Clean wound daily, dress with adaptick, acquacel, abdominal pad, and wrap with cling

## 2019-03-04 LAB — SURGICAL PATHOLOGY STUDY: SIGNIFICANT CHANGE UP

## 2019-03-05 ENCOUNTER — OUTPATIENT (OUTPATIENT)
Dept: OUTPATIENT SERVICES | Facility: HOSPITAL | Age: 59
LOS: 1 days | Discharge: HOME | End: 2019-03-05

## 2019-03-05 DIAGNOSIS — M17.12 UNILATERAL PRIMARY OSTEOARTHRITIS, LEFT KNEE: ICD-10-CM

## 2019-03-05 DIAGNOSIS — Z79.899 OTHER LONG TERM (CURRENT) DRUG THERAPY: ICD-10-CM

## 2019-03-05 DIAGNOSIS — I87.311 CHRONIC VENOUS HYPERTENSION (IDIOPATHIC) WITH ULCER OF RIGHT LOWER EXTREMITY: ICD-10-CM

## 2019-03-05 DIAGNOSIS — Z98.89 OTHER SPECIFIED POSTPROCEDURAL STATES: Chronic | ICD-10-CM

## 2019-03-05 DIAGNOSIS — Z87.19 PERSONAL HISTORY OF OTHER DISEASES OF THE DIGESTIVE SYSTEM: ICD-10-CM

## 2019-03-05 DIAGNOSIS — M25.562 PAIN IN LEFT KNEE: ICD-10-CM

## 2019-03-05 DIAGNOSIS — I10 ESSENTIAL (PRIMARY) HYPERTENSION: ICD-10-CM

## 2019-03-05 DIAGNOSIS — I73.9 PERIPHERAL VASCULAR DISEASE, UNSPECIFIED: ICD-10-CM

## 2019-03-05 DIAGNOSIS — Z74.1 NEED FOR ASSISTANCE WITH PERSONAL CARE: ICD-10-CM

## 2019-03-05 DIAGNOSIS — Z72.4 INAPPROPRIATE DIET AND EATING HABITS: ICD-10-CM

## 2019-03-05 DIAGNOSIS — Z91.81 HISTORY OF FALLING: ICD-10-CM

## 2019-03-05 DIAGNOSIS — R58 HEMORRHAGE, NOT ELSEWHERE CLASSIFIED: Chronic | ICD-10-CM

## 2019-03-05 DIAGNOSIS — E66.9 OBESITY, UNSPECIFIED: ICD-10-CM

## 2019-03-05 DIAGNOSIS — D62 ACUTE POSTHEMORRHAGIC ANEMIA: ICD-10-CM

## 2019-03-05 DIAGNOSIS — R60.9 EDEMA, UNSPECIFIED: ICD-10-CM

## 2019-03-05 DIAGNOSIS — L89.90 PRESSURE ULCER OF UNSPECIFIED SITE, UNSPECIFIED STAGE: ICD-10-CM

## 2019-03-05 DIAGNOSIS — I87.2 VENOUS INSUFFICIENCY (CHRONIC) (PERIPHERAL): ICD-10-CM

## 2019-03-05 DIAGNOSIS — R26.2 DIFFICULTY IN WALKING, NOT ELSEWHERE CLASSIFIED: ICD-10-CM

## 2019-03-05 DIAGNOSIS — L97.812 NON-PRESSURE CHRONIC ULCER OF OTHER PART OF RIGHT LOWER LEG WITH FAT LAYER EXPOSED: ICD-10-CM

## 2019-03-05 DIAGNOSIS — K46.9 UNSPECIFIED ABDOMINAL HERNIA WITHOUT OBSTRUCTION OR GANGRENE: Chronic | ICD-10-CM

## 2019-03-07 DIAGNOSIS — S81.801A UNSPECIFIED OPEN WOUND, RIGHT LOWER LEG, INITIAL ENCOUNTER: ICD-10-CM

## 2019-03-07 DIAGNOSIS — D62 ACUTE POSTHEMORRHAGIC ANEMIA: ICD-10-CM

## 2019-03-07 DIAGNOSIS — I71.2 THORACIC AORTIC ANEURYSM, WITHOUT RUPTURE: ICD-10-CM

## 2019-03-07 DIAGNOSIS — K22.11 ULCER OF ESOPHAGUS WITH BLEEDING: ICD-10-CM

## 2019-03-07 DIAGNOSIS — K44.9 DIAPHRAGMATIC HERNIA WITHOUT OBSTRUCTION OR GANGRENE: ICD-10-CM

## 2019-03-07 DIAGNOSIS — K25.4 CHRONIC OR UNSPECIFIED GASTRIC ULCER WITH HEMORRHAGE: ICD-10-CM

## 2019-03-07 DIAGNOSIS — K29.01 ACUTE GASTRITIS WITH BLEEDING: ICD-10-CM

## 2019-03-07 DIAGNOSIS — E78.5 HYPERLIPIDEMIA, UNSPECIFIED: ICD-10-CM

## 2019-03-07 DIAGNOSIS — E66.01 MORBID (SEVERE) OBESITY DUE TO EXCESS CALORIES: ICD-10-CM

## 2019-03-07 DIAGNOSIS — K92.0 HEMATEMESIS: ICD-10-CM

## 2019-03-07 DIAGNOSIS — I89.0 LYMPHEDEMA, NOT ELSEWHERE CLASSIFIED: ICD-10-CM

## 2019-03-07 DIAGNOSIS — I10 ESSENTIAL (PRIMARY) HYPERTENSION: ICD-10-CM

## 2019-03-07 DIAGNOSIS — E44.0 MODERATE PROTEIN-CALORIE MALNUTRITION: ICD-10-CM

## 2019-03-07 LAB
-  AMPICILLIN/SULBACTAM: SIGNIFICANT CHANGE UP
-  CEFAZOLIN: SIGNIFICANT CHANGE UP
-  CLINDAMYCIN: SIGNIFICANT CHANGE UP
-  ERYTHROMYCIN: SIGNIFICANT CHANGE UP
-  GENTAMICIN: SIGNIFICANT CHANGE UP
-  OXACILLIN: SIGNIFICANT CHANGE UP
-  RIFAMPIN: SIGNIFICANT CHANGE UP
-  TETRACYCLINE: SIGNIFICANT CHANGE UP
-  TRIMETHOPRIM/SULFAMETHOXAZOLE: SIGNIFICANT CHANGE UP
-  VANCOMYCIN: SIGNIFICANT CHANGE UP
CULTURE RESULTS: SIGNIFICANT CHANGE UP
METHOD TYPE: SIGNIFICANT CHANGE UP
ORGANISM # SPEC MICROSCOPIC CNT: SIGNIFICANT CHANGE UP
ORGANISM # SPEC MICROSCOPIC CNT: SIGNIFICANT CHANGE UP
SPECIMEN SOURCE: SIGNIFICANT CHANGE UP

## 2019-03-12 ENCOUNTER — OUTPATIENT (OUTPATIENT)
Dept: OUTPATIENT SERVICES | Facility: HOSPITAL | Age: 59
LOS: 1 days | Discharge: HOME | End: 2019-03-12

## 2019-03-12 DIAGNOSIS — L89.90 PRESSURE ULCER OF UNSPECIFIED SITE, UNSPECIFIED STAGE: ICD-10-CM

## 2019-03-12 DIAGNOSIS — Z98.89 OTHER SPECIFIED POSTPROCEDURAL STATES: Chronic | ICD-10-CM

## 2019-03-12 DIAGNOSIS — K46.9 UNSPECIFIED ABDOMINAL HERNIA WITHOUT OBSTRUCTION OR GANGRENE: Chronic | ICD-10-CM

## 2019-03-12 DIAGNOSIS — R58 HEMORRHAGE, NOT ELSEWHERE CLASSIFIED: Chronic | ICD-10-CM

## 2019-03-19 ENCOUNTER — OUTPATIENT (OUTPATIENT)
Dept: OUTPATIENT SERVICES | Facility: HOSPITAL | Age: 59
LOS: 1 days | Discharge: HOME | End: 2019-03-19

## 2019-03-19 DIAGNOSIS — R58 HEMORRHAGE, NOT ELSEWHERE CLASSIFIED: Chronic | ICD-10-CM

## 2019-03-19 DIAGNOSIS — I87.311 CHRONIC VENOUS HYPERTENSION (IDIOPATHIC) WITH ULCER OF RIGHT LOWER EXTREMITY: ICD-10-CM

## 2019-03-19 DIAGNOSIS — Z79.899 OTHER LONG TERM (CURRENT) DRUG THERAPY: ICD-10-CM

## 2019-03-19 DIAGNOSIS — D62 ACUTE POSTHEMORRHAGIC ANEMIA: ICD-10-CM

## 2019-03-19 DIAGNOSIS — I10 ESSENTIAL (PRIMARY) HYPERTENSION: ICD-10-CM

## 2019-03-19 DIAGNOSIS — M17.12 UNILATERAL PRIMARY OSTEOARTHRITIS, LEFT KNEE: ICD-10-CM

## 2019-03-19 DIAGNOSIS — R60.9 EDEMA, UNSPECIFIED: ICD-10-CM

## 2019-03-19 DIAGNOSIS — Z98.89 OTHER SPECIFIED POSTPROCEDURAL STATES: Chronic | ICD-10-CM

## 2019-03-19 DIAGNOSIS — Z74.1 NEED FOR ASSISTANCE WITH PERSONAL CARE: ICD-10-CM

## 2019-03-19 DIAGNOSIS — I87.2 VENOUS INSUFFICIENCY (CHRONIC) (PERIPHERAL): ICD-10-CM

## 2019-03-19 DIAGNOSIS — I73.9 PERIPHERAL VASCULAR DISEASE, UNSPECIFIED: ICD-10-CM

## 2019-03-19 DIAGNOSIS — K46.9 UNSPECIFIED ABDOMINAL HERNIA WITHOUT OBSTRUCTION OR GANGRENE: Chronic | ICD-10-CM

## 2019-03-19 DIAGNOSIS — L97.812 NON-PRESSURE CHRONIC ULCER OF OTHER PART OF RIGHT LOWER LEG WITH FAT LAYER EXPOSED: ICD-10-CM

## 2019-03-19 DIAGNOSIS — Z91.81 HISTORY OF FALLING: ICD-10-CM

## 2019-03-19 DIAGNOSIS — Z72.4 INAPPROPRIATE DIET AND EATING HABITS: ICD-10-CM

## 2019-03-19 DIAGNOSIS — L89.90 PRESSURE ULCER OF UNSPECIFIED SITE, UNSPECIFIED STAGE: ICD-10-CM

## 2019-03-27 ENCOUNTER — OUTPATIENT (OUTPATIENT)
Dept: OUTPATIENT SERVICES | Facility: HOSPITAL | Age: 59
LOS: 1 days | Discharge: HOME | End: 2019-03-27
Payer: COMMERCIAL

## 2019-03-27 ENCOUNTER — APPOINTMENT (OUTPATIENT)
Dept: ULTRASOUND IMAGING | Facility: HOSPITAL | Age: 59
End: 2019-03-27

## 2019-03-27 DIAGNOSIS — I87.311 CHRONIC VENOUS HYPERTENSION (IDIOPATHIC) WITH ULCER OF RIGHT LOWER EXTREMITY: ICD-10-CM

## 2019-03-27 DIAGNOSIS — Z98.89 OTHER SPECIFIED POSTPROCEDURAL STATES: Chronic | ICD-10-CM

## 2019-03-27 DIAGNOSIS — I73.9 PERIPHERAL VASCULAR DISEASE, UNSPECIFIED: ICD-10-CM

## 2019-03-27 DIAGNOSIS — K46.9 UNSPECIFIED ABDOMINAL HERNIA WITHOUT OBSTRUCTION OR GANGRENE: Chronic | ICD-10-CM

## 2019-03-27 DIAGNOSIS — L97.812 NON-PRESSURE CHRONIC ULCER OF OTHER PART OF RIGHT LOWER LEG WITH FAT LAYER EXPOSED: ICD-10-CM

## 2019-03-27 DIAGNOSIS — R60.9 EDEMA, UNSPECIFIED: ICD-10-CM

## 2019-03-27 DIAGNOSIS — R58 HEMORRHAGE, NOT ELSEWHERE CLASSIFIED: Chronic | ICD-10-CM

## 2019-03-27 DIAGNOSIS — I87.2 VENOUS INSUFFICIENCY (CHRONIC) (PERIPHERAL): ICD-10-CM

## 2019-03-27 PROCEDURE — 93970 EXTREMITY STUDY: CPT | Mod: 26

## 2019-03-28 ENCOUNTER — OUTPATIENT (OUTPATIENT)
Dept: OUTPATIENT SERVICES | Facility: HOSPITAL | Age: 59
LOS: 1 days | Discharge: HOME | End: 2019-03-28

## 2019-03-28 DIAGNOSIS — Z72.4 INAPPROPRIATE DIET AND EATING HABITS: ICD-10-CM

## 2019-03-28 DIAGNOSIS — I87.2 VENOUS INSUFFICIENCY (CHRONIC) (PERIPHERAL): ICD-10-CM

## 2019-03-28 DIAGNOSIS — Z98.89 OTHER SPECIFIED POSTPROCEDURAL STATES: Chronic | ICD-10-CM

## 2019-03-28 DIAGNOSIS — D62 ACUTE POSTHEMORRHAGIC ANEMIA: ICD-10-CM

## 2019-03-28 DIAGNOSIS — Z74.1 NEED FOR ASSISTANCE WITH PERSONAL CARE: ICD-10-CM

## 2019-03-28 DIAGNOSIS — K46.9 UNSPECIFIED ABDOMINAL HERNIA WITHOUT OBSTRUCTION OR GANGRENE: Chronic | ICD-10-CM

## 2019-03-28 DIAGNOSIS — I87.311 CHRONIC VENOUS HYPERTENSION (IDIOPATHIC) WITH ULCER OF RIGHT LOWER EXTREMITY: ICD-10-CM

## 2019-03-28 DIAGNOSIS — R58 HEMORRHAGE, NOT ELSEWHERE CLASSIFIED: Chronic | ICD-10-CM

## 2019-03-28 DIAGNOSIS — L97.812 NON-PRESSURE CHRONIC ULCER OF OTHER PART OF RIGHT LOWER LEG WITH FAT LAYER EXPOSED: ICD-10-CM

## 2019-03-28 DIAGNOSIS — M17.12 UNILATERAL PRIMARY OSTEOARTHRITIS, LEFT KNEE: ICD-10-CM

## 2019-03-28 DIAGNOSIS — Z79.899 OTHER LONG TERM (CURRENT) DRUG THERAPY: ICD-10-CM

## 2019-03-28 DIAGNOSIS — L89.90 PRESSURE ULCER OF UNSPECIFIED SITE, UNSPECIFIED STAGE: ICD-10-CM

## 2019-03-28 DIAGNOSIS — I10 ESSENTIAL (PRIMARY) HYPERTENSION: ICD-10-CM

## 2019-03-28 DIAGNOSIS — Z91.81 HISTORY OF FALLING: ICD-10-CM

## 2019-03-28 DIAGNOSIS — I73.9 PERIPHERAL VASCULAR DISEASE, UNSPECIFIED: ICD-10-CM

## 2019-03-28 DIAGNOSIS — R60.9 EDEMA, UNSPECIFIED: ICD-10-CM

## 2019-04-02 ENCOUNTER — NON-APPOINTMENT (OUTPATIENT)
Age: 59
End: 2019-04-02

## 2019-04-02 ENCOUNTER — APPOINTMENT (OUTPATIENT)
Dept: CARDIOLOGY | Facility: CLINIC | Age: 59
End: 2019-04-02
Payer: COMMERCIAL

## 2019-04-02 VITALS
HEIGHT: 66 IN | DIASTOLIC BLOOD PRESSURE: 90 MMHG | BODY MASS INDEX: 43.71 KG/M2 | HEART RATE: 76 BPM | SYSTOLIC BLOOD PRESSURE: 150 MMHG | OXYGEN SATURATION: 93 % | WEIGHT: 272 LBS

## 2019-04-02 PROCEDURE — 99214 OFFICE O/P EST MOD 30 MIN: CPT

## 2019-04-02 PROCEDURE — 93000 ELECTROCARDIOGRAM COMPLETE: CPT

## 2019-04-02 RX ORDER — OXYCODONE AND ACETAMINOPHEN 7.5; 325 MG/1; MG/1
7.5-325 TABLET ORAL
Qty: 20 | Refills: 0 | Status: DISCONTINUED | COMMUNITY
Start: 2018-02-16 | End: 2019-04-02

## 2019-04-02 RX ORDER — OMEPRAZOLE 20 MG/1
20 CAPSULE, DELAYED RELEASE ORAL
Qty: 90 | Refills: 1 | Status: ACTIVE | COMMUNITY
Start: 2019-04-02 | End: 1900-01-01

## 2019-04-02 NOTE — DISCUSSION/SUMMARY
[___ Month(s)] : [unfilled] month(s) [FreeTextEntry1] : He will continue his current amlodipine, carvedilol, enalapril for blood pressure management and Lasix 40 mg daily for leg edema reduction. For some gastric upset and giving him prescription omeprazole 20 mg daily is a low-fat, low-cholesterol, low-salt diet with carbohydrate restriction and further weight loss over time. He will follow up with you can see me in 3 months or sooner if needed.

## 2019-04-02 NOTE — REASON FOR VISIT
[Abnormal ECG] : an abnormal ECG [Hypertension] : hypertension [Follow-Up - Clinic] : a clinic follow-up of [FreeTextEntry1] : Thoracic aortic aneurysm history

## 2019-04-02 NOTE — PHYSICAL EXAM
[General Appearance - Well Developed] : well developed [Normal Appearance] : normal appearance [Well Groomed] : well groomed [General Appearance - Well Nourished] : well nourished [No Deformities] : no deformities [General Appearance - In No Acute Distress] : no acute distress [Normal Conjunctiva] : the conjunctiva exhibited no abnormalities [Eyelids - No Xanthelasma] : the eyelids demonstrated no xanthelasmas [Normal Oral Mucosa] : normal oral mucosa [No Oral Pallor] : no oral pallor [No Oral Cyanosis] : no oral cyanosis [Normal Jugular Venous A Waves Present] : normal jugular venous A waves present [Normal Jugular Venous V Waves Present] : normal jugular venous V waves present [No Jugular Venous Leyva A Waves] : no jugular venous leyva A waves [Respiration, Rhythm And Depth] : normal respiratory rhythm and effort [Exaggerated Use Of Accessory Muscles For Inspiration] : no accessory muscle use [Auscultation Breath Sounds / Voice Sounds] : lungs were clear to auscultation bilaterally [Heart Rate And Rhythm] : heart rate and rhythm were normal [Heart Sounds] : normal S1 and S2 [Murmurs] : no murmurs present [1+] : left 1+ [Bowel Sounds] : normal bowel sounds [Abdomen Soft] : soft [Abdomen Tenderness] : non-tender [Abnormal Walk] : normal gait [Gait - Sufficient For Exercise Testing] : the gait was sufficient for exercise testing [Nail Clubbing] : no clubbing of the fingernails [Cyanosis, Localized] : no localized cyanosis [Petechial Hemorrhages (___cm)] : no petechial hemorrhages [Skin Color & Pigmentation] : normal skin color and pigmentation [] : no rash [No Venous Stasis] : no venous stasis [Skin Lesions] : no skin lesions [No Skin Ulcers] : no skin ulcer [No Xanthoma] : no  xanthoma was observed [Oriented To Time, Place, And Person] : oriented to person, place, and time [Affect] : the affect was normal [Mood] : the mood was normal [Memory Recent] : recent memory was not impaired [No Anxiety] : not feeling anxious [Right Carotid Bruit] : no bruit heard over the right carotid [Left Carotid Bruit] : no bruit heard over the left carotid [Bruit] : no bruit heard [FreeTextEntry1] : 1+ bilateral pedal and ankle edema noted.

## 2019-04-02 NOTE — HISTORY OF PRESENT ILLNESS
[FreeTextEntry1] : Gurpreet is 59 years of age with morbid obesity by BMI, hypertension, known aortic root thoracic aneurysm, getting wound care therapy and has limited ambulation with use of crutches. He reports taking medications faithfully and denies any current chest symptoms.

## 2019-04-02 NOTE — REVIEW OF SYSTEMS
[Recent Weight Gain (___ Lbs)] : recent [unfilled] ~Ulb weight gain [Dyspnea on exertion] : dyspnea during exertion [Heartburn] : heartburn [Joint Pain] : joint pain [Negative] : Heme/Lymph

## 2019-04-04 ENCOUNTER — OUTPATIENT (OUTPATIENT)
Dept: OUTPATIENT SERVICES | Facility: HOSPITAL | Age: 59
LOS: 1 days | Discharge: HOME | End: 2019-04-04

## 2019-04-04 DIAGNOSIS — L89.90 PRESSURE ULCER OF UNSPECIFIED SITE, UNSPECIFIED STAGE: ICD-10-CM

## 2019-04-04 DIAGNOSIS — K46.9 UNSPECIFIED ABDOMINAL HERNIA WITHOUT OBSTRUCTION OR GANGRENE: Chronic | ICD-10-CM

## 2019-04-04 DIAGNOSIS — Z98.89 OTHER SPECIFIED POSTPROCEDURAL STATES: Chronic | ICD-10-CM

## 2019-04-04 DIAGNOSIS — R58 HEMORRHAGE, NOT ELSEWHERE CLASSIFIED: Chronic | ICD-10-CM

## 2019-04-10 DIAGNOSIS — R60.9 EDEMA, UNSPECIFIED: ICD-10-CM

## 2019-04-10 DIAGNOSIS — L97.812 NON-PRESSURE CHRONIC ULCER OF OTHER PART OF RIGHT LOWER LEG WITH FAT LAYER EXPOSED: ICD-10-CM

## 2019-04-10 DIAGNOSIS — Z72.4 INAPPROPRIATE DIET AND EATING HABITS: ICD-10-CM

## 2019-04-10 DIAGNOSIS — I87.2 VENOUS INSUFFICIENCY (CHRONIC) (PERIPHERAL): ICD-10-CM

## 2019-04-10 DIAGNOSIS — I73.9 PERIPHERAL VASCULAR DISEASE, UNSPECIFIED: ICD-10-CM

## 2019-04-10 DIAGNOSIS — M79.604 PAIN IN RIGHT LEG: ICD-10-CM

## 2019-04-10 DIAGNOSIS — I87.311 CHRONIC VENOUS HYPERTENSION (IDIOPATHIC) WITH ULCER OF RIGHT LOWER EXTREMITY: ICD-10-CM

## 2019-04-10 DIAGNOSIS — I10 ESSENTIAL (PRIMARY) HYPERTENSION: ICD-10-CM

## 2019-04-10 DIAGNOSIS — M17.12 UNILATERAL PRIMARY OSTEOARTHRITIS, LEFT KNEE: ICD-10-CM

## 2019-04-10 DIAGNOSIS — Z91.81 HISTORY OF FALLING: ICD-10-CM

## 2019-04-10 DIAGNOSIS — Z74.1 NEED FOR ASSISTANCE WITH PERSONAL CARE: ICD-10-CM

## 2019-04-10 DIAGNOSIS — Z79.899 OTHER LONG TERM (CURRENT) DRUG THERAPY: ICD-10-CM

## 2019-04-11 ENCOUNTER — OUTPATIENT (OUTPATIENT)
Dept: OUTPATIENT SERVICES | Facility: HOSPITAL | Age: 59
LOS: 1 days | Discharge: HOME | End: 2019-04-11

## 2019-04-11 DIAGNOSIS — Z72.4 INAPPROPRIATE DIET AND EATING HABITS: ICD-10-CM

## 2019-04-11 DIAGNOSIS — I87.311 CHRONIC VENOUS HYPERTENSION (IDIOPATHIC) WITH ULCER OF RIGHT LOWER EXTREMITY: ICD-10-CM

## 2019-04-11 DIAGNOSIS — L89.90 PRESSURE ULCER OF UNSPECIFIED SITE, UNSPECIFIED STAGE: ICD-10-CM

## 2019-04-11 DIAGNOSIS — R60.9 EDEMA, UNSPECIFIED: ICD-10-CM

## 2019-04-11 DIAGNOSIS — Z91.81 HISTORY OF FALLING: ICD-10-CM

## 2019-04-11 DIAGNOSIS — Z98.89 OTHER SPECIFIED POSTPROCEDURAL STATES: Chronic | ICD-10-CM

## 2019-04-11 DIAGNOSIS — M17.12 UNILATERAL PRIMARY OSTEOARTHRITIS, LEFT KNEE: ICD-10-CM

## 2019-04-11 DIAGNOSIS — I73.9 PERIPHERAL VASCULAR DISEASE, UNSPECIFIED: ICD-10-CM

## 2019-04-11 DIAGNOSIS — Z74.1 NEED FOR ASSISTANCE WITH PERSONAL CARE: ICD-10-CM

## 2019-04-11 DIAGNOSIS — K46.9 UNSPECIFIED ABDOMINAL HERNIA WITHOUT OBSTRUCTION OR GANGRENE: Chronic | ICD-10-CM

## 2019-04-11 DIAGNOSIS — L97.812 NON-PRESSURE CHRONIC ULCER OF OTHER PART OF RIGHT LOWER LEG WITH FAT LAYER EXPOSED: ICD-10-CM

## 2019-04-11 DIAGNOSIS — M79.604 PAIN IN RIGHT LEG: ICD-10-CM

## 2019-04-11 DIAGNOSIS — I10 ESSENTIAL (PRIMARY) HYPERTENSION: ICD-10-CM

## 2019-04-11 DIAGNOSIS — I87.2 VENOUS INSUFFICIENCY (CHRONIC) (PERIPHERAL): ICD-10-CM

## 2019-04-11 DIAGNOSIS — R58 HEMORRHAGE, NOT ELSEWHERE CLASSIFIED: Chronic | ICD-10-CM

## 2019-04-11 DIAGNOSIS — Z79.899 OTHER LONG TERM (CURRENT) DRUG THERAPY: ICD-10-CM

## 2019-04-12 DIAGNOSIS — M18.12 UNILATERAL PRIMARY OSTEOARTHRITIS OF FIRST CARPOMETACARPAL JOINT, LEFT HAND: ICD-10-CM

## 2019-04-12 DIAGNOSIS — I87.2 VENOUS INSUFFICIENCY (CHRONIC) (PERIPHERAL): ICD-10-CM

## 2019-04-12 DIAGNOSIS — D62 ACUTE POSTHEMORRHAGIC ANEMIA: ICD-10-CM

## 2019-04-12 DIAGNOSIS — I87.311 CHRONIC VENOUS HYPERTENSION (IDIOPATHIC) WITH ULCER OF RIGHT LOWER EXTREMITY: ICD-10-CM

## 2019-04-12 DIAGNOSIS — M79.604 PAIN IN RIGHT LEG: ICD-10-CM

## 2019-04-12 DIAGNOSIS — I10 ESSENTIAL (PRIMARY) HYPERTENSION: ICD-10-CM

## 2019-04-12 DIAGNOSIS — L97.812 NON-PRESSURE CHRONIC ULCER OF OTHER PART OF RIGHT LOWER LEG WITH FAT LAYER EXPOSED: ICD-10-CM

## 2019-04-12 DIAGNOSIS — I73.9 PERIPHERAL VASCULAR DISEASE, UNSPECIFIED: ICD-10-CM

## 2019-04-12 DIAGNOSIS — Z72.4 INAPPROPRIATE DIET AND EATING HABITS: ICD-10-CM

## 2019-04-12 DIAGNOSIS — Z79.899 OTHER LONG TERM (CURRENT) DRUG THERAPY: ICD-10-CM

## 2019-04-12 DIAGNOSIS — Z91.81 HISTORY OF FALLING: ICD-10-CM

## 2019-04-12 DIAGNOSIS — R60.9 EDEMA, UNSPECIFIED: ICD-10-CM

## 2019-04-18 ENCOUNTER — OUTPATIENT (OUTPATIENT)
Dept: OUTPATIENT SERVICES | Facility: HOSPITAL | Age: 59
LOS: 1 days | Discharge: HOME | End: 2019-04-18

## 2019-04-18 DIAGNOSIS — L89.90 PRESSURE ULCER OF UNSPECIFIED SITE, UNSPECIFIED STAGE: ICD-10-CM

## 2019-04-18 DIAGNOSIS — Z98.89 OTHER SPECIFIED POSTPROCEDURAL STATES: Chronic | ICD-10-CM

## 2019-04-18 DIAGNOSIS — K46.9 UNSPECIFIED ABDOMINAL HERNIA WITHOUT OBSTRUCTION OR GANGRENE: Chronic | ICD-10-CM

## 2019-04-18 DIAGNOSIS — R58 HEMORRHAGE, NOT ELSEWHERE CLASSIFIED: Chronic | ICD-10-CM

## 2019-04-25 ENCOUNTER — OUTPATIENT (OUTPATIENT)
Dept: OUTPATIENT SERVICES | Facility: HOSPITAL | Age: 59
LOS: 1 days | Discharge: HOME | End: 2019-04-25

## 2019-04-25 DIAGNOSIS — K46.9 UNSPECIFIED ABDOMINAL HERNIA WITHOUT OBSTRUCTION OR GANGRENE: Chronic | ICD-10-CM

## 2019-04-25 DIAGNOSIS — R58 HEMORRHAGE, NOT ELSEWHERE CLASSIFIED: Chronic | ICD-10-CM

## 2019-04-25 DIAGNOSIS — I87.2 VENOUS INSUFFICIENCY (CHRONIC) (PERIPHERAL): ICD-10-CM

## 2019-04-25 DIAGNOSIS — Z98.89 OTHER SPECIFIED POSTPROCEDURAL STATES: Chronic | ICD-10-CM

## 2019-04-25 DIAGNOSIS — I73.9 PERIPHERAL VASCULAR DISEASE, UNSPECIFIED: ICD-10-CM

## 2019-04-25 DIAGNOSIS — L89.90 PRESSURE ULCER OF UNSPECIFIED SITE, UNSPECIFIED STAGE: ICD-10-CM

## 2019-04-25 DIAGNOSIS — M17.12 UNILATERAL PRIMARY OSTEOARTHRITIS, LEFT KNEE: ICD-10-CM

## 2019-04-25 DIAGNOSIS — R60.9 EDEMA, UNSPECIFIED: ICD-10-CM

## 2019-04-25 DIAGNOSIS — I87.311 CHRONIC VENOUS HYPERTENSION (IDIOPATHIC) WITH ULCER OF RIGHT LOWER EXTREMITY: ICD-10-CM

## 2019-04-25 DIAGNOSIS — Z72.4 INAPPROPRIATE DIET AND EATING HABITS: ICD-10-CM

## 2019-04-25 DIAGNOSIS — Z74.1 NEED FOR ASSISTANCE WITH PERSONAL CARE: ICD-10-CM

## 2019-04-25 DIAGNOSIS — I10 ESSENTIAL (PRIMARY) HYPERTENSION: ICD-10-CM

## 2019-04-25 DIAGNOSIS — D64.9 ANEMIA, UNSPECIFIED: ICD-10-CM

## 2019-04-25 DIAGNOSIS — Z79.899 OTHER LONG TERM (CURRENT) DRUG THERAPY: ICD-10-CM

## 2019-04-25 DIAGNOSIS — L92.8 OTHER GRANULOMATOUS DISORDERS OF THE SKIN AND SUBCUTANEOUS TISSUE: ICD-10-CM

## 2019-04-25 DIAGNOSIS — Z91.81 HISTORY OF FALLING: ICD-10-CM

## 2019-04-25 DIAGNOSIS — L97.812 NON-PRESSURE CHRONIC ULCER OF OTHER PART OF RIGHT LOWER LEG WITH FAT LAYER EXPOSED: ICD-10-CM

## 2019-05-02 ENCOUNTER — OUTPATIENT (OUTPATIENT)
Dept: OUTPATIENT SERVICES | Facility: HOSPITAL | Age: 59
LOS: 1 days | Discharge: HOME | End: 2019-05-02

## 2019-05-02 DIAGNOSIS — Z79.899 OTHER LONG TERM (CURRENT) DRUG THERAPY: ICD-10-CM

## 2019-05-02 DIAGNOSIS — L89.899 PRESSURE ULCER OF OTHER SITE, UNSPECIFIED STAGE: ICD-10-CM

## 2019-05-02 DIAGNOSIS — M17.12 UNILATERAL PRIMARY OSTEOARTHRITIS, LEFT KNEE: ICD-10-CM

## 2019-05-02 DIAGNOSIS — D64.9 ANEMIA, UNSPECIFIED: ICD-10-CM

## 2019-05-02 DIAGNOSIS — I87.311 CHRONIC VENOUS HYPERTENSION (IDIOPATHIC) WITH ULCER OF RIGHT LOWER EXTREMITY: ICD-10-CM

## 2019-05-02 DIAGNOSIS — R58 HEMORRHAGE, NOT ELSEWHERE CLASSIFIED: Chronic | ICD-10-CM

## 2019-05-02 DIAGNOSIS — L97.812 NON-PRESSURE CHRONIC ULCER OF OTHER PART OF RIGHT LOWER LEG WITH FAT LAYER EXPOSED: ICD-10-CM

## 2019-05-02 DIAGNOSIS — K46.9 UNSPECIFIED ABDOMINAL HERNIA WITHOUT OBSTRUCTION OR GANGRENE: Chronic | ICD-10-CM

## 2019-05-02 DIAGNOSIS — Z98.89 OTHER SPECIFIED POSTPROCEDURAL STATES: Chronic | ICD-10-CM

## 2019-05-02 DIAGNOSIS — R60.9 EDEMA, UNSPECIFIED: ICD-10-CM

## 2019-05-02 DIAGNOSIS — I87.2 VENOUS INSUFFICIENCY (CHRONIC) (PERIPHERAL): ICD-10-CM

## 2019-05-02 DIAGNOSIS — Z91.81 HISTORY OF FALLING: ICD-10-CM

## 2019-05-02 DIAGNOSIS — Z74.1 NEED FOR ASSISTANCE WITH PERSONAL CARE: ICD-10-CM

## 2019-05-02 DIAGNOSIS — I10 ESSENTIAL (PRIMARY) HYPERTENSION: ICD-10-CM

## 2019-05-02 DIAGNOSIS — L92.8 OTHER GRANULOMATOUS DISORDERS OF THE SKIN AND SUBCUTANEOUS TISSUE: ICD-10-CM

## 2019-05-02 DIAGNOSIS — Z72.4 INAPPROPRIATE DIET AND EATING HABITS: ICD-10-CM

## 2019-05-02 DIAGNOSIS — I73.9 PERIPHERAL VASCULAR DISEASE, UNSPECIFIED: ICD-10-CM

## 2019-05-02 LAB
CRP SERPL-MCNC: 0.93 MG/DL — HIGH (ref 0–0.4)
ERYTHROCYTE [SEDIMENTATION RATE] IN BLOOD: 26 MM/HR — HIGH (ref 0–20)
RHEUMATOID FACT SERPL-ACNC: <10 IU/ML — SIGNIFICANT CHANGE UP (ref 0–13)

## 2019-05-04 LAB
-  AMIKACIN: SIGNIFICANT CHANGE UP
-  AMPICILLIN/SULBACTAM: SIGNIFICANT CHANGE UP
-  AZTREONAM: SIGNIFICANT CHANGE UP
-  CEFAZOLIN: SIGNIFICANT CHANGE UP
-  CEFEPIME: SIGNIFICANT CHANGE UP
-  CEFTAZIDIME: SIGNIFICANT CHANGE UP
-  CIPROFLOXACIN: SIGNIFICANT CHANGE UP
-  CLINDAMYCIN: SIGNIFICANT CHANGE UP
-  ERYTHROMYCIN: SIGNIFICANT CHANGE UP
-  GENTAMICIN: SIGNIFICANT CHANGE UP
-  GENTAMICIN: SIGNIFICANT CHANGE UP
-  IMIPENEM: SIGNIFICANT CHANGE UP
-  LEVOFLOXACIN: SIGNIFICANT CHANGE UP
-  MEROPENEM: SIGNIFICANT CHANGE UP
-  OXACILLIN: SIGNIFICANT CHANGE UP
-  PIPERACILLIN/TAZOBACTAM: SIGNIFICANT CHANGE UP
-  RIFAMPIN: SIGNIFICANT CHANGE UP
-  TETRACYCLINE: SIGNIFICANT CHANGE UP
-  TOBRAMYCIN: SIGNIFICANT CHANGE UP
-  TRIMETHOPRIM/SULFAMETHOXAZOLE: SIGNIFICANT CHANGE UP
-  VANCOMYCIN: SIGNIFICANT CHANGE UP
CULTURE RESULTS: SIGNIFICANT CHANGE UP
METHOD TYPE: SIGNIFICANT CHANGE UP
METHOD TYPE: SIGNIFICANT CHANGE UP
ORGANISM # SPEC MICROSCOPIC CNT: SIGNIFICANT CHANGE UP
SPECIMEN SOURCE: SIGNIFICANT CHANGE UP

## 2019-05-05 LAB — ANA TITR SER: NEGATIVE — SIGNIFICANT CHANGE UP

## 2019-05-07 LAB — RHEUMATOID FACTOR IDENTRA RESULT: SIGNIFICANT CHANGE UP

## 2019-05-09 ENCOUNTER — OUTPATIENT (OUTPATIENT)
Dept: OUTPATIENT SERVICES | Facility: HOSPITAL | Age: 59
LOS: 1 days | Discharge: HOME | End: 2019-05-09

## 2019-05-09 DIAGNOSIS — I10 ESSENTIAL (PRIMARY) HYPERTENSION: ICD-10-CM

## 2019-05-09 DIAGNOSIS — Z72.4 INAPPROPRIATE DIET AND EATING HABITS: ICD-10-CM

## 2019-05-09 DIAGNOSIS — L97.812 NON-PRESSURE CHRONIC ULCER OF OTHER PART OF RIGHT LOWER LEG WITH FAT LAYER EXPOSED: ICD-10-CM

## 2019-05-09 DIAGNOSIS — Z98.89 OTHER SPECIFIED POSTPROCEDURAL STATES: Chronic | ICD-10-CM

## 2019-05-09 DIAGNOSIS — M17.12 UNILATERAL PRIMARY OSTEOARTHRITIS, LEFT KNEE: ICD-10-CM

## 2019-05-09 DIAGNOSIS — L89.90 PRESSURE ULCER OF UNSPECIFIED SITE, UNSPECIFIED STAGE: ICD-10-CM

## 2019-05-09 DIAGNOSIS — I87.311 CHRONIC VENOUS HYPERTENSION (IDIOPATHIC) WITH ULCER OF RIGHT LOWER EXTREMITY: ICD-10-CM

## 2019-05-09 DIAGNOSIS — Z79.899 OTHER LONG TERM (CURRENT) DRUG THERAPY: ICD-10-CM

## 2019-05-09 DIAGNOSIS — R60.9 EDEMA, UNSPECIFIED: ICD-10-CM

## 2019-05-09 DIAGNOSIS — Z74.1 NEED FOR ASSISTANCE WITH PERSONAL CARE: ICD-10-CM

## 2019-05-09 DIAGNOSIS — I73.9 PERIPHERAL VASCULAR DISEASE, UNSPECIFIED: ICD-10-CM

## 2019-05-09 DIAGNOSIS — K46.9 UNSPECIFIED ABDOMINAL HERNIA WITHOUT OBSTRUCTION OR GANGRENE: Chronic | ICD-10-CM

## 2019-05-09 DIAGNOSIS — D64.9 ANEMIA, UNSPECIFIED: ICD-10-CM

## 2019-05-09 DIAGNOSIS — Z91.81 HISTORY OF FALLING: ICD-10-CM

## 2019-05-09 DIAGNOSIS — R58 HEMORRHAGE, NOT ELSEWHERE CLASSIFIED: Chronic | ICD-10-CM

## 2019-05-10 DIAGNOSIS — Z72.4 INAPPROPRIATE DIET AND EATING HABITS: ICD-10-CM

## 2019-05-10 DIAGNOSIS — I87.2 VENOUS INSUFFICIENCY (CHRONIC) (PERIPHERAL): ICD-10-CM

## 2019-05-10 DIAGNOSIS — I87.311 CHRONIC VENOUS HYPERTENSION (IDIOPATHIC) WITH ULCER OF RIGHT LOWER EXTREMITY: ICD-10-CM

## 2019-05-10 DIAGNOSIS — Z79.899 OTHER LONG TERM (CURRENT) DRUG THERAPY: ICD-10-CM

## 2019-05-10 DIAGNOSIS — L97.812 NON-PRESSURE CHRONIC ULCER OF OTHER PART OF RIGHT LOWER LEG WITH FAT LAYER EXPOSED: ICD-10-CM

## 2019-05-10 DIAGNOSIS — M17.12 UNILATERAL PRIMARY OSTEOARTHRITIS, LEFT KNEE: ICD-10-CM

## 2019-05-10 DIAGNOSIS — Z91.81 HISTORY OF FALLING: ICD-10-CM

## 2019-05-10 DIAGNOSIS — R60.9 EDEMA, UNSPECIFIED: ICD-10-CM

## 2019-05-10 DIAGNOSIS — Z74.1 NEED FOR ASSISTANCE WITH PERSONAL CARE: ICD-10-CM

## 2019-05-10 DIAGNOSIS — I73.9 PERIPHERAL VASCULAR DISEASE, UNSPECIFIED: ICD-10-CM

## 2019-05-10 DIAGNOSIS — I10 ESSENTIAL (PRIMARY) HYPERTENSION: ICD-10-CM

## 2019-05-10 DIAGNOSIS — D64.9 ANEMIA, UNSPECIFIED: ICD-10-CM

## 2019-05-16 ENCOUNTER — OUTPATIENT (OUTPATIENT)
Dept: OUTPATIENT SERVICES | Facility: HOSPITAL | Age: 59
LOS: 1 days | Discharge: HOME | End: 2019-05-16

## 2019-05-16 DIAGNOSIS — I87.311 CHRONIC VENOUS HYPERTENSION (IDIOPATHIC) WITH ULCER OF RIGHT LOWER EXTREMITY: ICD-10-CM

## 2019-05-16 DIAGNOSIS — Z98.89 OTHER SPECIFIED POSTPROCEDURAL STATES: Chronic | ICD-10-CM

## 2019-05-16 DIAGNOSIS — I10 ESSENTIAL (PRIMARY) HYPERTENSION: ICD-10-CM

## 2019-05-16 DIAGNOSIS — Z91.81 HISTORY OF FALLING: ICD-10-CM

## 2019-05-16 DIAGNOSIS — Z72.4 INAPPROPRIATE DIET AND EATING HABITS: ICD-10-CM

## 2019-05-16 DIAGNOSIS — R58 HEMORRHAGE, NOT ELSEWHERE CLASSIFIED: Chronic | ICD-10-CM

## 2019-05-16 DIAGNOSIS — R60.9 EDEMA, UNSPECIFIED: ICD-10-CM

## 2019-05-16 DIAGNOSIS — K46.9 UNSPECIFIED ABDOMINAL HERNIA WITHOUT OBSTRUCTION OR GANGRENE: Chronic | ICD-10-CM

## 2019-05-16 DIAGNOSIS — Z79.899 OTHER LONG TERM (CURRENT) DRUG THERAPY: ICD-10-CM

## 2019-05-16 DIAGNOSIS — L97.812 NON-PRESSURE CHRONIC ULCER OF OTHER PART OF RIGHT LOWER LEG WITH FAT LAYER EXPOSED: ICD-10-CM

## 2019-05-16 DIAGNOSIS — I73.9 PERIPHERAL VASCULAR DISEASE, UNSPECIFIED: ICD-10-CM

## 2019-05-16 DIAGNOSIS — Z74.1 NEED FOR ASSISTANCE WITH PERSONAL CARE: ICD-10-CM

## 2019-05-16 DIAGNOSIS — M17.12 UNILATERAL PRIMARY OSTEOARTHRITIS, LEFT KNEE: ICD-10-CM

## 2019-05-16 DIAGNOSIS — L89.90 PRESSURE ULCER OF UNSPECIFIED SITE, UNSPECIFIED STAGE: ICD-10-CM

## 2019-05-16 DIAGNOSIS — D64.9 ANEMIA, UNSPECIFIED: ICD-10-CM

## 2019-05-17 NOTE — CONSULT NOTE ADULT - PROBLEM/RECOMMENDATION-3
Daily Progress Note           Patient ID: Derek Ivey is a 53 y.o. male.    53 year old male with diabetes mellitus and dyslipidemia presents for follow up visit         Reviewed pmhx/med rec  He notes fasting glucose 110-125, post meal glucose in 90s.  High 90's to 100s during day, 150 after meal.  Gained 7 lbs.    Taking:  Trulicity 0.75 mg weekly.  Had muscular pain with metformin.    Eating low carb diet.    Using freestyle remedios.    Gets regular exercise, eating low carb diet.     No numbness or pain feet.    No increased thirst/urination.    Sees eye doctor yearly, no DR per patient.  No blurry or lost vision.    Has dyslipidemia, had reaction of fatigue with lipitor, crestor.  Taking pravastatin 20 mg daily.  No chest or muscle pains.    No history of HTN.     Diagnosed with diabetes mellitus approximately age 49 in setting of steroid treatment for disck disease.  Treated initially with metformin not tolerated, with muscular side effects, then diet controlled.     Father has diabetes dx in his 70's.     No history of pancreatitis or thyroid cancer.              1/9/18  A1c 9.4  /29/164/270  urine alb/cr 4.9     4/24/18  A1c 5.8  /33/132/173  Cr/gfr 0.8/>60  Potassium 5.3  Calcitonin <2  cpeptide 3.8       The following have been reviewed and updated as appropriate in this visit:    Review of Systems   Constitutional: Positive for unexpected weight change.   HENT: Negative for trouble swallowing.    Eyes: Negative for visual disturbance.   Respiratory: Negative for chest tightness.    Cardiovascular: Negative for chest pain and palpitations.   Gastrointestinal: Negative for nausea.             Current Outpatient Prescriptions   Medication Sig Dispense Refill   • FREESTYLE REMEDIOS READER misc 4 (four) times a day. for testing  0   • FREESTYLE REMEDIOS SENSOR kit Change every 10 days 3 kit 11   • lansoprazole (PREVACID) 30 mg capsule 30 mg as needed.       • pravastatin (PRAVACHOL) 20 mg tablet Take 1  "tablet (20 mg total) by mouth daily. 90 tablet 3   • TRULICITY 0.75 mg/0.5 mL pen injector      • aspirin 81 mg enteric coated tablet 81 mg.       No current facility-administered medications for this visit.      Past Medical History:   Diagnosis Date   • Type 2 diabetes mellitus (CMS/[facility]) ([facility])      History reviewed. No pertinent family history.  History reviewed. No pertinent surgical history.  Social History     Social History   • Marital status:      Spouse name: N/A   • Number of children: N/A   • Years of education: N/A     Occupational History   • Not on file.     Social History Main Topics   • Smoking status: Never Smoker   • Smokeless tobacco: Never Used   • Alcohol use No   • Drug use: Unknown   • Sexual activity: Not on file     Other Topics Concern   • Not on file     Social History Narrative   • No narrative on file     Allergies   Allergen Reactions   • Penicillins Anaphylaxis   • Sulfa (Sulfonamide Antibiotics) Anaphylaxis     Vitals:    05/17/19 0756   BP: 120/90   BP Location: Left upper arm   Patient Position: Sitting   Pulse: 91   Resp: 18   SpO2: 98%   Weight: 135 kg (298 lb)   Height: 1.93 m (6' 4\")       Objective     Physical Exam   Constitutional: He is oriented to person, place, and time. He appears well-developed. No distress.   HENT:   Head: Atraumatic.   Eyes: EOM are normal.   Neck: Neck supple.   Pulmonary/Chest: Effort normal.   Abdominal: He exhibits no distension.   Musculoskeletal: He exhibits no edema.   Neurological: He is oriented to person, place, and time.   Skin: Skin is warm.   Psychiatric: He has a normal mood and affect.       Assessment/Plan     Type 2 diabetes mellitus (CMS/[facility]) ([facility])  Diabetes mellitus type 2 improved control still not at goal  Reviewed all diabetic blood testing results  Discussed home glucose and A1c goals  Rec ophtho, dental, podiatry, nutrition visits  Rec low consistent carb diet outlined, regular exercise, weight loss  Glucose testing with " freestyle erlin  trulicity 0.75 mg weekly  Discussed med side effects and patient accepts risks  Discussed hypoglycemia symptoms and treatment  No driving if glucose less than 100  Check testing for next visit  Spent 30 min with pt care greater than 50% of time spent counseling/coordinating care, counseling as outlined above  Will make attempt to obtain records from primary physician and relay recommendations to primary physician            Dyslipidemia  Dyslipidemia not at goal last visit  Pravastatin 20 mg daily  Yearly FLP check  Discussed med side effects              Mahin Weinberg MD  5/17/2019     DISPLAY PLAN FREE TEXT

## 2019-05-23 ENCOUNTER — OUTPATIENT (OUTPATIENT)
Dept: OUTPATIENT SERVICES | Facility: HOSPITAL | Age: 59
LOS: 1 days | Discharge: HOME | End: 2019-05-23

## 2019-05-23 DIAGNOSIS — L89.90 PRESSURE ULCER OF UNSPECIFIED SITE, UNSPECIFIED STAGE: ICD-10-CM

## 2019-05-23 DIAGNOSIS — R58 HEMORRHAGE, NOT ELSEWHERE CLASSIFIED: Chronic | ICD-10-CM

## 2019-05-23 DIAGNOSIS — Z98.89 OTHER SPECIFIED POSTPROCEDURAL STATES: Chronic | ICD-10-CM

## 2019-05-23 DIAGNOSIS — K46.9 UNSPECIFIED ABDOMINAL HERNIA WITHOUT OBSTRUCTION OR GANGRENE: Chronic | ICD-10-CM

## 2019-05-30 ENCOUNTER — OUTPATIENT (OUTPATIENT)
Dept: OUTPATIENT SERVICES | Facility: HOSPITAL | Age: 59
LOS: 1 days | Discharge: HOME | End: 2019-05-30

## 2019-05-30 DIAGNOSIS — R58 HEMORRHAGE, NOT ELSEWHERE CLASSIFIED: Chronic | ICD-10-CM

## 2019-05-30 DIAGNOSIS — Z98.89 OTHER SPECIFIED POSTPROCEDURAL STATES: Chronic | ICD-10-CM

## 2019-05-30 DIAGNOSIS — L89.90 PRESSURE ULCER OF UNSPECIFIED SITE, UNSPECIFIED STAGE: ICD-10-CM

## 2019-05-30 DIAGNOSIS — K46.9 UNSPECIFIED ABDOMINAL HERNIA WITHOUT OBSTRUCTION OR GANGRENE: Chronic | ICD-10-CM

## 2019-06-06 ENCOUNTER — OUTPATIENT (OUTPATIENT)
Dept: OUTPATIENT SERVICES | Facility: HOSPITAL | Age: 59
LOS: 1 days | Discharge: HOME | End: 2019-06-06

## 2019-06-06 DIAGNOSIS — R58 HEMORRHAGE, NOT ELSEWHERE CLASSIFIED: Chronic | ICD-10-CM

## 2019-06-06 DIAGNOSIS — K46.9 UNSPECIFIED ABDOMINAL HERNIA WITHOUT OBSTRUCTION OR GANGRENE: Chronic | ICD-10-CM

## 2019-06-06 DIAGNOSIS — Z98.89 OTHER SPECIFIED POSTPROCEDURAL STATES: Chronic | ICD-10-CM

## 2019-06-06 DIAGNOSIS — L89.90 PRESSURE ULCER OF UNSPECIFIED SITE, UNSPECIFIED STAGE: ICD-10-CM

## 2019-06-07 DIAGNOSIS — L97.812 NON-PRESSURE CHRONIC ULCER OF OTHER PART OF RIGHT LOWER LEG WITH FAT LAYER EXPOSED: ICD-10-CM

## 2019-06-07 DIAGNOSIS — Z72.4 INAPPROPRIATE DIET AND EATING HABITS: ICD-10-CM

## 2019-06-07 DIAGNOSIS — D64.9 ANEMIA, UNSPECIFIED: ICD-10-CM

## 2019-06-07 DIAGNOSIS — M17.12 UNILATERAL PRIMARY OSTEOARTHRITIS, LEFT KNEE: ICD-10-CM

## 2019-06-07 DIAGNOSIS — I10 ESSENTIAL (PRIMARY) HYPERTENSION: ICD-10-CM

## 2019-06-07 DIAGNOSIS — R60.9 EDEMA, UNSPECIFIED: ICD-10-CM

## 2019-06-07 DIAGNOSIS — I87.311 CHRONIC VENOUS HYPERTENSION (IDIOPATHIC) WITH ULCER OF RIGHT LOWER EXTREMITY: ICD-10-CM

## 2019-06-07 DIAGNOSIS — Z74.1 NEED FOR ASSISTANCE WITH PERSONAL CARE: ICD-10-CM

## 2019-06-07 DIAGNOSIS — Z79.899 OTHER LONG TERM (CURRENT) DRUG THERAPY: ICD-10-CM

## 2019-06-07 DIAGNOSIS — I87.2 VENOUS INSUFFICIENCY (CHRONIC) (PERIPHERAL): ICD-10-CM

## 2019-06-07 DIAGNOSIS — I73.9 PERIPHERAL VASCULAR DISEASE, UNSPECIFIED: ICD-10-CM

## 2019-06-07 DIAGNOSIS — Z91.81 HISTORY OF FALLING: ICD-10-CM

## 2019-06-07 DIAGNOSIS — Z91.14 PATIENT'S OTHER NONCOMPLIANCE WITH MEDICATION REGIMEN: ICD-10-CM

## 2019-06-11 DIAGNOSIS — I87.311 CHRONIC VENOUS HYPERTENSION (IDIOPATHIC) WITH ULCER OF RIGHT LOWER EXTREMITY: ICD-10-CM

## 2019-06-11 DIAGNOSIS — M17.12 UNILATERAL PRIMARY OSTEOARTHRITIS, LEFT KNEE: ICD-10-CM

## 2019-06-11 DIAGNOSIS — I73.9 PERIPHERAL VASCULAR DISEASE, UNSPECIFIED: ICD-10-CM

## 2019-06-11 DIAGNOSIS — R60.9 EDEMA, UNSPECIFIED: ICD-10-CM

## 2019-06-11 DIAGNOSIS — I87.2 VENOUS INSUFFICIENCY (CHRONIC) (PERIPHERAL): ICD-10-CM

## 2019-06-11 DIAGNOSIS — Z72.4 INAPPROPRIATE DIET AND EATING HABITS: ICD-10-CM

## 2019-06-11 DIAGNOSIS — D64.9 ANEMIA, UNSPECIFIED: ICD-10-CM

## 2019-06-11 DIAGNOSIS — B96.5 PSEUDOMONAS (AERUGINOSA) (MALLEI) (PSEUDOMALLEI) AS THE CAUSE OF DISEASES CLASSIFIED ELSEWHERE: ICD-10-CM

## 2019-06-11 DIAGNOSIS — Z74.1 NEED FOR ASSISTANCE WITH PERSONAL CARE: ICD-10-CM

## 2019-06-11 DIAGNOSIS — L97.812 NON-PRESSURE CHRONIC ULCER OF OTHER PART OF RIGHT LOWER LEG WITH FAT LAYER EXPOSED: ICD-10-CM

## 2019-06-11 DIAGNOSIS — I10 ESSENTIAL (PRIMARY) HYPERTENSION: ICD-10-CM

## 2019-06-11 DIAGNOSIS — Z79.899 OTHER LONG TERM (CURRENT) DRUG THERAPY: ICD-10-CM

## 2019-06-11 DIAGNOSIS — Z91.14 PATIENT'S OTHER NONCOMPLIANCE WITH MEDICATION REGIMEN: ICD-10-CM

## 2019-06-11 DIAGNOSIS — Z91.81 HISTORY OF FALLING: ICD-10-CM

## 2019-06-12 ENCOUNTER — RESULT REVIEW (OUTPATIENT)
Age: 59
End: 2019-06-12

## 2019-06-12 ENCOUNTER — OUTPATIENT (OUTPATIENT)
Dept: OUTPATIENT SERVICES | Facility: HOSPITAL | Age: 59
LOS: 1 days | Discharge: HOME | End: 2019-06-12
Payer: COMMERCIAL

## 2019-06-12 DIAGNOSIS — I87.311 CHRONIC VENOUS HYPERTENSION (IDIOPATHIC) WITH ULCER OF RIGHT LOWER EXTREMITY: ICD-10-CM

## 2019-06-12 DIAGNOSIS — Z74.1 NEED FOR ASSISTANCE WITH PERSONAL CARE: ICD-10-CM

## 2019-06-12 DIAGNOSIS — R60.9 EDEMA, UNSPECIFIED: ICD-10-CM

## 2019-06-12 DIAGNOSIS — I73.9 PERIPHERAL VASCULAR DISEASE, UNSPECIFIED: ICD-10-CM

## 2019-06-12 DIAGNOSIS — R58 HEMORRHAGE, NOT ELSEWHERE CLASSIFIED: Chronic | ICD-10-CM

## 2019-06-12 DIAGNOSIS — I10 ESSENTIAL (PRIMARY) HYPERTENSION: ICD-10-CM

## 2019-06-12 DIAGNOSIS — Z91.81 HISTORY OF FALLING: ICD-10-CM

## 2019-06-12 DIAGNOSIS — K46.9 UNSPECIFIED ABDOMINAL HERNIA WITHOUT OBSTRUCTION OR GANGRENE: Chronic | ICD-10-CM

## 2019-06-12 DIAGNOSIS — Z98.89 OTHER SPECIFIED POSTPROCEDURAL STATES: Chronic | ICD-10-CM

## 2019-06-12 DIAGNOSIS — L89.90 PRESSURE ULCER OF UNSPECIFIED SITE, UNSPECIFIED STAGE: ICD-10-CM

## 2019-06-12 DIAGNOSIS — B96.5 PSEUDOMONAS (AERUGINOSA) (MALLEI) (PSEUDOMALLEI) AS THE CAUSE OF DISEASES CLASSIFIED ELSEWHERE: ICD-10-CM

## 2019-06-12 DIAGNOSIS — Z79.899 OTHER LONG TERM (CURRENT) DRUG THERAPY: ICD-10-CM

## 2019-06-12 DIAGNOSIS — I87.2 VENOUS INSUFFICIENCY (CHRONIC) (PERIPHERAL): ICD-10-CM

## 2019-06-12 DIAGNOSIS — Z72.4 INAPPROPRIATE DIET AND EATING HABITS: ICD-10-CM

## 2019-06-12 DIAGNOSIS — D64.9 ANEMIA, UNSPECIFIED: ICD-10-CM

## 2019-06-12 DIAGNOSIS — L97.812 NON-PRESSURE CHRONIC ULCER OF OTHER PART OF RIGHT LOWER LEG WITH FAT LAYER EXPOSED: ICD-10-CM

## 2019-06-12 DIAGNOSIS — M17.12 UNILATERAL PRIMARY OSTEOARTHRITIS, LEFT KNEE: ICD-10-CM

## 2019-06-12 PROCEDURE — 88305 TISSUE EXAM BY PATHOLOGIST: CPT | Mod: 26

## 2019-06-14 LAB — SURGICAL PATHOLOGY STUDY: SIGNIFICANT CHANGE UP

## 2019-06-20 ENCOUNTER — OUTPATIENT (OUTPATIENT)
Dept: OUTPATIENT SERVICES | Facility: HOSPITAL | Age: 59
LOS: 1 days | Discharge: HOME | End: 2019-06-20

## 2019-06-20 DIAGNOSIS — R58 HEMORRHAGE, NOT ELSEWHERE CLASSIFIED: Chronic | ICD-10-CM

## 2019-06-20 DIAGNOSIS — Z98.89 OTHER SPECIFIED POSTPROCEDURAL STATES: Chronic | ICD-10-CM

## 2019-06-20 DIAGNOSIS — K46.9 UNSPECIFIED ABDOMINAL HERNIA WITHOUT OBSTRUCTION OR GANGRENE: Chronic | ICD-10-CM

## 2019-06-20 DIAGNOSIS — L89.90 PRESSURE ULCER OF UNSPECIFIED SITE, UNSPECIFIED STAGE: ICD-10-CM

## 2019-06-23 LAB
-  CEFTAZIDIME: SIGNIFICANT CHANGE UP
-  LEVOFLOXACIN: SIGNIFICANT CHANGE UP
-  TRIMETHOPRIM/SULFAMETHOXAZOLE: SIGNIFICANT CHANGE UP
CULTURE RESULTS: SIGNIFICANT CHANGE UP
METHOD TYPE: SIGNIFICANT CHANGE UP
ORGANISM # SPEC MICROSCOPIC CNT: SIGNIFICANT CHANGE UP
ORGANISM # SPEC MICROSCOPIC CNT: SIGNIFICANT CHANGE UP
SPECIMEN SOURCE: SIGNIFICANT CHANGE UP

## 2019-06-25 DIAGNOSIS — Z91.81 HISTORY OF FALLING: ICD-10-CM

## 2019-06-25 DIAGNOSIS — L97.812 NON-PRESSURE CHRONIC ULCER OF OTHER PART OF RIGHT LOWER LEG WITH FAT LAYER EXPOSED: ICD-10-CM

## 2019-06-25 DIAGNOSIS — Z74.1 NEED FOR ASSISTANCE WITH PERSONAL CARE: ICD-10-CM

## 2019-06-25 DIAGNOSIS — I87.311 CHRONIC VENOUS HYPERTENSION (IDIOPATHIC) WITH ULCER OF RIGHT LOWER EXTREMITY: ICD-10-CM

## 2019-06-25 DIAGNOSIS — I87.2 VENOUS INSUFFICIENCY (CHRONIC) (PERIPHERAL): ICD-10-CM

## 2019-06-25 DIAGNOSIS — Z72.4 INAPPROPRIATE DIET AND EATING HABITS: ICD-10-CM

## 2019-06-25 DIAGNOSIS — B96.5 PSEUDOMONAS (AERUGINOSA) (MALLEI) (PSEUDOMALLEI) AS THE CAUSE OF DISEASES CLASSIFIED ELSEWHERE: ICD-10-CM

## 2019-06-25 DIAGNOSIS — Z79.899 OTHER LONG TERM (CURRENT) DRUG THERAPY: ICD-10-CM

## 2019-06-25 DIAGNOSIS — M17.12 UNILATERAL PRIMARY OSTEOARTHRITIS, LEFT KNEE: ICD-10-CM

## 2019-06-25 DIAGNOSIS — I73.9 PERIPHERAL VASCULAR DISEASE, UNSPECIFIED: ICD-10-CM

## 2019-06-25 DIAGNOSIS — D64.9 ANEMIA, UNSPECIFIED: ICD-10-CM

## 2019-06-25 DIAGNOSIS — I10 ESSENTIAL (PRIMARY) HYPERTENSION: ICD-10-CM

## 2019-06-25 DIAGNOSIS — R60.9 EDEMA, UNSPECIFIED: ICD-10-CM

## 2019-06-26 ENCOUNTER — OUTPATIENT (OUTPATIENT)
Dept: OUTPATIENT SERVICES | Facility: HOSPITAL | Age: 59
LOS: 1 days | Discharge: HOME | End: 2019-06-26

## 2019-06-26 DIAGNOSIS — K46.9 UNSPECIFIED ABDOMINAL HERNIA WITHOUT OBSTRUCTION OR GANGRENE: Chronic | ICD-10-CM

## 2019-06-26 DIAGNOSIS — L89.90 PRESSURE ULCER OF UNSPECIFIED SITE, UNSPECIFIED STAGE: ICD-10-CM

## 2019-06-26 DIAGNOSIS — R58 HEMORRHAGE, NOT ELSEWHERE CLASSIFIED: Chronic | ICD-10-CM

## 2019-06-26 DIAGNOSIS — Z98.89 OTHER SPECIFIED POSTPROCEDURAL STATES: Chronic | ICD-10-CM

## 2019-07-02 ENCOUNTER — APPOINTMENT (OUTPATIENT)
Dept: CARDIOLOGY | Facility: CLINIC | Age: 59
End: 2019-07-02

## 2019-07-03 ENCOUNTER — OUTPATIENT (OUTPATIENT)
Dept: OUTPATIENT SERVICES | Facility: HOSPITAL | Age: 59
LOS: 1 days | Discharge: HOME | End: 2019-07-03

## 2019-07-03 DIAGNOSIS — L89.90 PRESSURE ULCER OF UNSPECIFIED SITE, UNSPECIFIED STAGE: ICD-10-CM

## 2019-07-03 DIAGNOSIS — R58 HEMORRHAGE, NOT ELSEWHERE CLASSIFIED: Chronic | ICD-10-CM

## 2019-07-03 DIAGNOSIS — K46.9 UNSPECIFIED ABDOMINAL HERNIA WITHOUT OBSTRUCTION OR GANGRENE: Chronic | ICD-10-CM

## 2019-07-03 DIAGNOSIS — Z98.89 OTHER SPECIFIED POSTPROCEDURAL STATES: Chronic | ICD-10-CM

## 2019-07-05 DIAGNOSIS — R60.9 EDEMA, UNSPECIFIED: ICD-10-CM

## 2019-07-05 DIAGNOSIS — M17.12 UNILATERAL PRIMARY OSTEOARTHRITIS, LEFT KNEE: ICD-10-CM

## 2019-07-05 DIAGNOSIS — L97.812 NON-PRESSURE CHRONIC ULCER OF OTHER PART OF RIGHT LOWER LEG WITH FAT LAYER EXPOSED: ICD-10-CM

## 2019-07-05 DIAGNOSIS — D64.9 ANEMIA, UNSPECIFIED: ICD-10-CM

## 2019-07-05 DIAGNOSIS — Z79.899 OTHER LONG TERM (CURRENT) DRUG THERAPY: ICD-10-CM

## 2019-07-05 DIAGNOSIS — I10 ESSENTIAL (PRIMARY) HYPERTENSION: ICD-10-CM

## 2019-07-05 DIAGNOSIS — Z91.81 HISTORY OF FALLING: ICD-10-CM

## 2019-07-05 DIAGNOSIS — Z74.1 NEED FOR ASSISTANCE WITH PERSONAL CARE: ICD-10-CM

## 2019-07-05 DIAGNOSIS — I73.9 PERIPHERAL VASCULAR DISEASE, UNSPECIFIED: ICD-10-CM

## 2019-07-05 DIAGNOSIS — I87.311 CHRONIC VENOUS HYPERTENSION (IDIOPATHIC) WITH ULCER OF RIGHT LOWER EXTREMITY: ICD-10-CM

## 2019-07-05 DIAGNOSIS — Z72.4 INAPPROPRIATE DIET AND EATING HABITS: ICD-10-CM

## 2019-07-10 ENCOUNTER — OUTPATIENT (OUTPATIENT)
Dept: OUTPATIENT SERVICES | Facility: HOSPITAL | Age: 59
LOS: 1 days | Discharge: HOME | End: 2019-07-10

## 2019-07-10 DIAGNOSIS — R60.9 EDEMA, UNSPECIFIED: ICD-10-CM

## 2019-07-10 DIAGNOSIS — M17.12 UNILATERAL PRIMARY OSTEOARTHRITIS, LEFT KNEE: ICD-10-CM

## 2019-07-10 DIAGNOSIS — D64.9 ANEMIA, UNSPECIFIED: ICD-10-CM

## 2019-07-10 DIAGNOSIS — R58 HEMORRHAGE, NOT ELSEWHERE CLASSIFIED: Chronic | ICD-10-CM

## 2019-07-10 DIAGNOSIS — I87.311 CHRONIC VENOUS HYPERTENSION (IDIOPATHIC) WITH ULCER OF RIGHT LOWER EXTREMITY: ICD-10-CM

## 2019-07-10 DIAGNOSIS — I10 ESSENTIAL (PRIMARY) HYPERTENSION: ICD-10-CM

## 2019-07-10 DIAGNOSIS — Z98.89 OTHER SPECIFIED POSTPROCEDURAL STATES: Chronic | ICD-10-CM

## 2019-07-10 DIAGNOSIS — L97.812 NON-PRESSURE CHRONIC ULCER OF OTHER PART OF RIGHT LOWER LEG WITH FAT LAYER EXPOSED: ICD-10-CM

## 2019-07-10 DIAGNOSIS — I73.9 PERIPHERAL VASCULAR DISEASE, UNSPECIFIED: ICD-10-CM

## 2019-07-10 DIAGNOSIS — Z91.81 HISTORY OF FALLING: ICD-10-CM

## 2019-07-10 DIAGNOSIS — Z74.1 NEED FOR ASSISTANCE WITH PERSONAL CARE: ICD-10-CM

## 2019-07-10 DIAGNOSIS — K46.9 UNSPECIFIED ABDOMINAL HERNIA WITHOUT OBSTRUCTION OR GANGRENE: Chronic | ICD-10-CM

## 2019-07-10 DIAGNOSIS — Z79.899 OTHER LONG TERM (CURRENT) DRUG THERAPY: ICD-10-CM

## 2019-07-10 DIAGNOSIS — L89.90 PRESSURE ULCER OF UNSPECIFIED SITE, UNSPECIFIED STAGE: ICD-10-CM

## 2019-07-16 DIAGNOSIS — I87.311 CHRONIC VENOUS HYPERTENSION (IDIOPATHIC) WITH ULCER OF RIGHT LOWER EXTREMITY: ICD-10-CM

## 2019-07-16 DIAGNOSIS — Z91.81 HISTORY OF FALLING: ICD-10-CM

## 2019-07-16 DIAGNOSIS — Z72.4 INAPPROPRIATE DIET AND EATING HABITS: ICD-10-CM

## 2019-07-16 DIAGNOSIS — Z74.1 NEED FOR ASSISTANCE WITH PERSONAL CARE: ICD-10-CM

## 2019-07-16 DIAGNOSIS — Z79.899 OTHER LONG TERM (CURRENT) DRUG THERAPY: ICD-10-CM

## 2019-07-16 DIAGNOSIS — D64.9 ANEMIA, UNSPECIFIED: ICD-10-CM

## 2019-07-16 DIAGNOSIS — I73.9 PERIPHERAL VASCULAR DISEASE, UNSPECIFIED: ICD-10-CM

## 2019-07-16 DIAGNOSIS — I10 ESSENTIAL (PRIMARY) HYPERTENSION: ICD-10-CM

## 2019-07-16 DIAGNOSIS — L97.812 NON-PRESSURE CHRONIC ULCER OF OTHER PART OF RIGHT LOWER LEG WITH FAT LAYER EXPOSED: ICD-10-CM

## 2019-07-16 DIAGNOSIS — R60.9 EDEMA, UNSPECIFIED: ICD-10-CM

## 2019-07-16 DIAGNOSIS — M17.12 UNILATERAL PRIMARY OSTEOARTHRITIS, LEFT KNEE: ICD-10-CM

## 2019-07-17 ENCOUNTER — OUTPATIENT (OUTPATIENT)
Dept: OUTPATIENT SERVICES | Facility: HOSPITAL | Age: 59
LOS: 1 days | Discharge: HOME | End: 2019-07-17

## 2019-07-17 DIAGNOSIS — L89.90 PRESSURE ULCER OF UNSPECIFIED SITE, UNSPECIFIED STAGE: ICD-10-CM

## 2019-07-17 DIAGNOSIS — R58 HEMORRHAGE, NOT ELSEWHERE CLASSIFIED: Chronic | ICD-10-CM

## 2019-07-17 DIAGNOSIS — Z98.89 OTHER SPECIFIED POSTPROCEDURAL STATES: Chronic | ICD-10-CM

## 2019-07-17 DIAGNOSIS — K46.9 UNSPECIFIED ABDOMINAL HERNIA WITHOUT OBSTRUCTION OR GANGRENE: Chronic | ICD-10-CM

## 2019-07-24 ENCOUNTER — OUTPATIENT (OUTPATIENT)
Dept: OUTPATIENT SERVICES | Facility: HOSPITAL | Age: 59
LOS: 1 days | Discharge: HOME | End: 2019-07-24

## 2019-07-24 DIAGNOSIS — R58 HEMORRHAGE, NOT ELSEWHERE CLASSIFIED: Chronic | ICD-10-CM

## 2019-07-24 DIAGNOSIS — Z91.81 HISTORY OF FALLING: ICD-10-CM

## 2019-07-24 DIAGNOSIS — I10 ESSENTIAL (PRIMARY) HYPERTENSION: ICD-10-CM

## 2019-07-24 DIAGNOSIS — Z79.899 OTHER LONG TERM (CURRENT) DRUG THERAPY: ICD-10-CM

## 2019-07-24 DIAGNOSIS — R60.9 EDEMA, UNSPECIFIED: ICD-10-CM

## 2019-07-24 DIAGNOSIS — I87.311 CHRONIC VENOUS HYPERTENSION (IDIOPATHIC) WITH ULCER OF RIGHT LOWER EXTREMITY: ICD-10-CM

## 2019-07-24 DIAGNOSIS — I73.9 PERIPHERAL VASCULAR DISEASE, UNSPECIFIED: ICD-10-CM

## 2019-07-24 DIAGNOSIS — D64.9 ANEMIA, UNSPECIFIED: ICD-10-CM

## 2019-07-24 DIAGNOSIS — Z98.89 OTHER SPECIFIED POSTPROCEDURAL STATES: Chronic | ICD-10-CM

## 2019-07-24 DIAGNOSIS — L97.812 NON-PRESSURE CHRONIC ULCER OF OTHER PART OF RIGHT LOWER LEG WITH FAT LAYER EXPOSED: ICD-10-CM

## 2019-07-24 DIAGNOSIS — L89.90 PRESSURE ULCER OF UNSPECIFIED SITE, UNSPECIFIED STAGE: ICD-10-CM

## 2019-07-24 DIAGNOSIS — Z74.1 NEED FOR ASSISTANCE WITH PERSONAL CARE: ICD-10-CM

## 2019-07-24 DIAGNOSIS — K46.9 UNSPECIFIED ABDOMINAL HERNIA WITHOUT OBSTRUCTION OR GANGRENE: Chronic | ICD-10-CM

## 2019-07-24 DIAGNOSIS — M17.12 UNILATERAL PRIMARY OSTEOARTHRITIS, LEFT KNEE: ICD-10-CM

## 2019-07-26 DIAGNOSIS — Z79.899 OTHER LONG TERM (CURRENT) DRUG THERAPY: ICD-10-CM

## 2019-07-26 DIAGNOSIS — I73.9 PERIPHERAL VASCULAR DISEASE, UNSPECIFIED: ICD-10-CM

## 2019-07-26 DIAGNOSIS — M17.12 UNILATERAL PRIMARY OSTEOARTHRITIS, LEFT KNEE: ICD-10-CM

## 2019-07-26 DIAGNOSIS — I87.311 CHRONIC VENOUS HYPERTENSION (IDIOPATHIC) WITH ULCER OF RIGHT LOWER EXTREMITY: ICD-10-CM

## 2019-07-26 DIAGNOSIS — Z74.1 NEED FOR ASSISTANCE WITH PERSONAL CARE: ICD-10-CM

## 2019-07-26 DIAGNOSIS — Z91.81 HISTORY OF FALLING: ICD-10-CM

## 2019-07-26 DIAGNOSIS — I10 ESSENTIAL (PRIMARY) HYPERTENSION: ICD-10-CM

## 2019-07-26 DIAGNOSIS — Z72.4 INAPPROPRIATE DIET AND EATING HABITS: ICD-10-CM

## 2019-07-26 DIAGNOSIS — R60.9 EDEMA, UNSPECIFIED: ICD-10-CM

## 2019-07-26 DIAGNOSIS — D64.9 ANEMIA, UNSPECIFIED: ICD-10-CM

## 2019-07-26 DIAGNOSIS — L97.812 NON-PRESSURE CHRONIC ULCER OF OTHER PART OF RIGHT LOWER LEG WITH FAT LAYER EXPOSED: ICD-10-CM

## 2019-07-31 ENCOUNTER — OUTPATIENT (OUTPATIENT)
Dept: OUTPATIENT SERVICES | Facility: HOSPITAL | Age: 59
LOS: 1 days | Discharge: HOME | End: 2019-07-31

## 2019-07-31 DIAGNOSIS — K46.9 UNSPECIFIED ABDOMINAL HERNIA WITHOUT OBSTRUCTION OR GANGRENE: Chronic | ICD-10-CM

## 2019-07-31 DIAGNOSIS — Z98.89 OTHER SPECIFIED POSTPROCEDURAL STATES: Chronic | ICD-10-CM

## 2019-07-31 DIAGNOSIS — L89.90 PRESSURE ULCER OF UNSPECIFIED SITE, UNSPECIFIED STAGE: ICD-10-CM

## 2019-07-31 DIAGNOSIS — D64.9 ANEMIA, UNSPECIFIED: ICD-10-CM

## 2019-07-31 DIAGNOSIS — Z91.81 HISTORY OF FALLING: ICD-10-CM

## 2019-07-31 DIAGNOSIS — M17.12 UNILATERAL PRIMARY OSTEOARTHRITIS, LEFT KNEE: ICD-10-CM

## 2019-07-31 DIAGNOSIS — Z79.899 OTHER LONG TERM (CURRENT) DRUG THERAPY: ICD-10-CM

## 2019-07-31 DIAGNOSIS — R58 HEMORRHAGE, NOT ELSEWHERE CLASSIFIED: Chronic | ICD-10-CM

## 2019-07-31 DIAGNOSIS — Z72.4 INAPPROPRIATE DIET AND EATING HABITS: ICD-10-CM

## 2019-07-31 DIAGNOSIS — Z74.1 NEED FOR ASSISTANCE WITH PERSONAL CARE: ICD-10-CM

## 2019-07-31 DIAGNOSIS — I87.311 CHRONIC VENOUS HYPERTENSION (IDIOPATHIC) WITH ULCER OF RIGHT LOWER EXTREMITY: ICD-10-CM

## 2019-07-31 DIAGNOSIS — L97.812 NON-PRESSURE CHRONIC ULCER OF OTHER PART OF RIGHT LOWER LEG WITH FAT LAYER EXPOSED: ICD-10-CM

## 2019-07-31 DIAGNOSIS — R60.9 EDEMA, UNSPECIFIED: ICD-10-CM

## 2019-07-31 DIAGNOSIS — I73.9 PERIPHERAL VASCULAR DISEASE, UNSPECIFIED: ICD-10-CM

## 2019-07-31 DIAGNOSIS — I10 ESSENTIAL (PRIMARY) HYPERTENSION: ICD-10-CM

## 2019-08-07 ENCOUNTER — OUTPATIENT (OUTPATIENT)
Dept: OUTPATIENT SERVICES | Facility: HOSPITAL | Age: 59
LOS: 1 days | Discharge: HOME | End: 2019-08-07

## 2019-08-07 DIAGNOSIS — K46.9 UNSPECIFIED ABDOMINAL HERNIA WITHOUT OBSTRUCTION OR GANGRENE: Chronic | ICD-10-CM

## 2019-08-07 DIAGNOSIS — L89.90 PRESSURE ULCER OF UNSPECIFIED SITE, UNSPECIFIED STAGE: ICD-10-CM

## 2019-08-07 DIAGNOSIS — R58 HEMORRHAGE, NOT ELSEWHERE CLASSIFIED: Chronic | ICD-10-CM

## 2019-08-07 DIAGNOSIS — Z98.89 OTHER SPECIFIED POSTPROCEDURAL STATES: Chronic | ICD-10-CM

## 2019-08-13 DIAGNOSIS — Z79.899 OTHER LONG TERM (CURRENT) DRUG THERAPY: ICD-10-CM

## 2019-08-13 DIAGNOSIS — I73.9 PERIPHERAL VASCULAR DISEASE, UNSPECIFIED: ICD-10-CM

## 2019-08-13 DIAGNOSIS — M17.12 UNILATERAL PRIMARY OSTEOARTHRITIS, LEFT KNEE: ICD-10-CM

## 2019-08-13 DIAGNOSIS — I10 ESSENTIAL (PRIMARY) HYPERTENSION: ICD-10-CM

## 2019-08-13 DIAGNOSIS — Z91.81 HISTORY OF FALLING: ICD-10-CM

## 2019-08-13 DIAGNOSIS — L97.812 NON-PRESSURE CHRONIC ULCER OF OTHER PART OF RIGHT LOWER LEG WITH FAT LAYER EXPOSED: ICD-10-CM

## 2019-08-13 DIAGNOSIS — I87.311 CHRONIC VENOUS HYPERTENSION (IDIOPATHIC) WITH ULCER OF RIGHT LOWER EXTREMITY: ICD-10-CM

## 2019-08-13 DIAGNOSIS — R60.9 EDEMA, UNSPECIFIED: ICD-10-CM

## 2019-08-13 DIAGNOSIS — Z72.4 INAPPROPRIATE DIET AND EATING HABITS: ICD-10-CM

## 2019-08-13 DIAGNOSIS — D64.9 ANEMIA, UNSPECIFIED: ICD-10-CM

## 2019-08-13 DIAGNOSIS — Z74.1 NEED FOR ASSISTANCE WITH PERSONAL CARE: ICD-10-CM

## 2019-08-14 ENCOUNTER — OUTPATIENT (OUTPATIENT)
Dept: OUTPATIENT SERVICES | Facility: HOSPITAL | Age: 59
LOS: 1 days | Discharge: HOME | End: 2019-08-14

## 2019-08-14 DIAGNOSIS — R58 HEMORRHAGE, NOT ELSEWHERE CLASSIFIED: Chronic | ICD-10-CM

## 2019-08-14 DIAGNOSIS — L89.90 PRESSURE ULCER OF UNSPECIFIED SITE, UNSPECIFIED STAGE: ICD-10-CM

## 2019-08-14 DIAGNOSIS — L97.812 NON-PRESSURE CHRONIC ULCER OF OTHER PART OF RIGHT LOWER LEG WITH FAT LAYER EXPOSED: ICD-10-CM

## 2019-08-14 DIAGNOSIS — Z79.899 OTHER LONG TERM (CURRENT) DRUG THERAPY: ICD-10-CM

## 2019-08-14 DIAGNOSIS — I87.311 CHRONIC VENOUS HYPERTENSION (IDIOPATHIC) WITH ULCER OF RIGHT LOWER EXTREMITY: ICD-10-CM

## 2019-08-14 DIAGNOSIS — K46.9 UNSPECIFIED ABDOMINAL HERNIA WITHOUT OBSTRUCTION OR GANGRENE: Chronic | ICD-10-CM

## 2019-08-14 DIAGNOSIS — Z98.89 OTHER SPECIFIED POSTPROCEDURAL STATES: Chronic | ICD-10-CM

## 2019-08-14 DIAGNOSIS — I73.9 PERIPHERAL VASCULAR DISEASE, UNSPECIFIED: ICD-10-CM

## 2019-08-14 DIAGNOSIS — M17.12 UNILATERAL PRIMARY OSTEOARTHRITIS, LEFT KNEE: ICD-10-CM

## 2019-08-14 DIAGNOSIS — Z91.81 HISTORY OF FALLING: ICD-10-CM

## 2019-08-14 DIAGNOSIS — R60.9 EDEMA, UNSPECIFIED: ICD-10-CM

## 2019-08-14 DIAGNOSIS — D64.9 ANEMIA, UNSPECIFIED: ICD-10-CM

## 2019-08-14 DIAGNOSIS — I10 ESSENTIAL (PRIMARY) HYPERTENSION: ICD-10-CM

## 2019-08-14 DIAGNOSIS — Z74.1 NEED FOR ASSISTANCE WITH PERSONAL CARE: ICD-10-CM

## 2019-08-16 LAB
CULTURE RESULTS: SIGNIFICANT CHANGE UP
SPECIMEN SOURCE: SIGNIFICANT CHANGE UP

## 2019-08-21 ENCOUNTER — OUTPATIENT (OUTPATIENT)
Dept: OUTPATIENT SERVICES | Facility: HOSPITAL | Age: 59
LOS: 1 days | Discharge: HOME | End: 2019-08-21

## 2019-08-21 DIAGNOSIS — Z98.89 OTHER SPECIFIED POSTPROCEDURAL STATES: Chronic | ICD-10-CM

## 2019-08-21 DIAGNOSIS — L89.90 PRESSURE ULCER OF UNSPECIFIED SITE, UNSPECIFIED STAGE: ICD-10-CM

## 2019-08-21 DIAGNOSIS — K46.9 UNSPECIFIED ABDOMINAL HERNIA WITHOUT OBSTRUCTION OR GANGRENE: Chronic | ICD-10-CM

## 2019-08-21 DIAGNOSIS — R58 HEMORRHAGE, NOT ELSEWHERE CLASSIFIED: Chronic | ICD-10-CM

## 2019-08-28 ENCOUNTER — OUTPATIENT (OUTPATIENT)
Dept: OUTPATIENT SERVICES | Facility: HOSPITAL | Age: 59
LOS: 1 days | Discharge: HOME | End: 2019-08-28

## 2019-08-28 DIAGNOSIS — R58 HEMORRHAGE, NOT ELSEWHERE CLASSIFIED: Chronic | ICD-10-CM

## 2019-08-28 DIAGNOSIS — Z98.89 OTHER SPECIFIED POSTPROCEDURAL STATES: Chronic | ICD-10-CM

## 2019-08-28 DIAGNOSIS — L89.90 PRESSURE ULCER OF UNSPECIFIED SITE, UNSPECIFIED STAGE: ICD-10-CM

## 2019-08-28 DIAGNOSIS — K46.9 UNSPECIFIED ABDOMINAL HERNIA WITHOUT OBSTRUCTION OR GANGRENE: Chronic | ICD-10-CM

## 2019-08-30 DIAGNOSIS — Z79.899 OTHER LONG TERM (CURRENT) DRUG THERAPY: ICD-10-CM

## 2019-08-30 DIAGNOSIS — I10 ESSENTIAL (PRIMARY) HYPERTENSION: ICD-10-CM

## 2019-08-30 DIAGNOSIS — Z74.1 NEED FOR ASSISTANCE WITH PERSONAL CARE: ICD-10-CM

## 2019-08-30 DIAGNOSIS — Z91.81 HISTORY OF FALLING: ICD-10-CM

## 2019-08-30 DIAGNOSIS — L97.812 NON-PRESSURE CHRONIC ULCER OF OTHER PART OF RIGHT LOWER LEG WITH FAT LAYER EXPOSED: ICD-10-CM

## 2019-08-30 DIAGNOSIS — Z72.4 INAPPROPRIATE DIET AND EATING HABITS: ICD-10-CM

## 2019-08-30 DIAGNOSIS — M17.12 UNILATERAL PRIMARY OSTEOARTHRITIS, LEFT KNEE: ICD-10-CM

## 2019-08-30 DIAGNOSIS — I73.9 PERIPHERAL VASCULAR DISEASE, UNSPECIFIED: ICD-10-CM

## 2019-08-30 DIAGNOSIS — I87.311 CHRONIC VENOUS HYPERTENSION (IDIOPATHIC) WITH ULCER OF RIGHT LOWER EXTREMITY: ICD-10-CM

## 2019-08-30 DIAGNOSIS — R60.9 EDEMA, UNSPECIFIED: ICD-10-CM

## 2019-08-30 DIAGNOSIS — D64.9 ANEMIA, UNSPECIFIED: ICD-10-CM

## 2019-09-04 ENCOUNTER — OUTPATIENT (OUTPATIENT)
Dept: OUTPATIENT SERVICES | Facility: HOSPITAL | Age: 59
LOS: 1 days | Discharge: HOME | End: 2019-09-04

## 2019-09-04 DIAGNOSIS — R58 HEMORRHAGE, NOT ELSEWHERE CLASSIFIED: Chronic | ICD-10-CM

## 2019-09-04 DIAGNOSIS — Z98.89 OTHER SPECIFIED POSTPROCEDURAL STATES: Chronic | ICD-10-CM

## 2019-09-04 DIAGNOSIS — K46.9 UNSPECIFIED ABDOMINAL HERNIA WITHOUT OBSTRUCTION OR GANGRENE: Chronic | ICD-10-CM

## 2019-09-04 DIAGNOSIS — L89.90 PRESSURE ULCER OF UNSPECIFIED SITE, UNSPECIFIED STAGE: ICD-10-CM

## 2019-09-06 ENCOUNTER — EMERGENCY (EMERGENCY)
Facility: HOSPITAL | Age: 59
LOS: 0 days | Discharge: HOME | End: 2019-09-06
Attending: EMERGENCY MEDICINE
Payer: MEDICAID

## 2019-09-06 VITALS
WEIGHT: 278 LBS | HEIGHT: 66 IN | OXYGEN SATURATION: 100 % | TEMPERATURE: 98 F | HEART RATE: 80 BPM | RESPIRATION RATE: 18 BRPM | DIASTOLIC BLOOD PRESSURE: 75 MMHG | SYSTOLIC BLOOD PRESSURE: 163 MMHG

## 2019-09-06 VITALS
RESPIRATION RATE: 19 BRPM | SYSTOLIC BLOOD PRESSURE: 133 MMHG | DIASTOLIC BLOOD PRESSURE: 85 MMHG | HEART RATE: 58 BPM | OXYGEN SATURATION: 99 %

## 2019-09-06 DIAGNOSIS — R58 HEMORRHAGE, NOT ELSEWHERE CLASSIFIED: Chronic | ICD-10-CM

## 2019-09-06 DIAGNOSIS — Z98.89 OTHER SPECIFIED POSTPROCEDURAL STATES: Chronic | ICD-10-CM

## 2019-09-06 DIAGNOSIS — I10 ESSENTIAL (PRIMARY) HYPERTENSION: ICD-10-CM

## 2019-09-06 DIAGNOSIS — K46.9 UNSPECIFIED ABDOMINAL HERNIA WITHOUT OBSTRUCTION OR GANGRENE: Chronic | ICD-10-CM

## 2019-09-06 DIAGNOSIS — M17.9 OSTEOARTHRITIS OF KNEE, UNSPECIFIED: ICD-10-CM

## 2019-09-06 DIAGNOSIS — E66.01 MORBID (SEVERE) OBESITY DUE TO EXCESS CALORIES: ICD-10-CM

## 2019-09-06 DIAGNOSIS — K26.9 DUODENAL ULCER, UNSPECIFIED AS ACUTE OR CHRONIC, WITHOUT HEMORRHAGE OR PERFORATION: ICD-10-CM

## 2019-09-06 PROCEDURE — 99284 EMERGENCY DEPT VISIT MOD MDM: CPT

## 2019-09-06 RX ADMIN — Medication 0.2 MILLIGRAM(S): at 19:54

## 2019-09-06 NOTE — ED ADULT NURSE NOTE - OBJECTIVE STATEMENT
as per pt " for the past couple days I have been with bilateral knee pain and right left ulcer that is painful and oozing." hx htn, pt lives in a bed bug infested place. Received in bed A. Spoke to this RN in Fijian and states that the nurse came to change his dressing for the foot and found his BP high. Triage noted states bilat knee pain and right left ulcer that is painful and oozing. Patient refused dressing to be removed by MD and states he's only here for high BP.  hx htn, pt lives in a bed bug infested place as per triage but patient denies any sort of issues. States he lives with his cousin

## 2019-09-06 NOTE — ED PROVIDER NOTE - CLINICAL SUMMARY MEDICAL DECISION MAKING FREE TEXT BOX
Patient presents to ER for asymptomatic hypertension.  Clonidine given in ER.  Based on history and complaint, do not see need for extensive labs, imaging.  Patient states that wounds are not of concern.  BP improved in ER, patient reports good follow up and is advised to see PMD for any medication adjustments for better BP control.  Discussed results and outcome of today's visit with the patient.  Patient advised to please follow up with another healthcare provider within the next 24 hours and return to the Emergency Department for worsening symptoms or any other concerns.  Patient advised that their doctor may call  to follow up on the specific results of the tests performed today in the emergency department.   Patient appears well on discharge. Patient given prescription medications for their condition and advised to take them as prescribed and check with their Primary Care Provider if any questions arise. Patient presents to ER for asymptomatic hypertension.  Clonidine given in ER.  Based on history and complaint, do not see need for extensive labs, imaging.  Patient states that wounds are not of concern.  BP improved in ER, patient reports good follow up and is advised to see PMD for any medication adjustments for better BP control.  Lasix sent to pharmacy per patient request.  Discussed results and outcome of today's visit with the patient.  Patient advised to please follow up with another healthcare provider within the next 24 hours and return to the Emergency Department for worsening symptoms or any other concerns.  Patient advised that their doctor may call  to follow up on the specific results of the tests performed today in the emergency department.   Patient appears well on discharge. Patient given prescription medications for their condition and advised to take them as prescribed and check with their Primary Care Provider if any questions arise.

## 2019-09-06 NOTE — ED PROVIDER NOTE - PATIENT PORTAL LINK FT
You can access the FollowMyHealth Patient Portal offered by [facility] by registering at the following website: [email or web address]. By joining [employer]’s FollowMyHealth portal, you will also be able to view your health information using other applications (apps) compatible with our system.

## 2019-09-06 NOTE — ED PROVIDER NOTE - OBJECTIVE STATEMENT
Pertinent PMH/PSH/FHx/SHx and Review of Systems contained within:  Patient presents to the ED for hypertension. Patient's triage note is incorrect, hastily triaged at end of shift, koch disagreement with triage note.  Patient is NOT English speaking, prefers Kiswahili, patient interviewed with nurse Betty who served as  since [employer] is not functional in this particular ER area.  Patient is NOT here for wound check or bed bugs, comes in for concern about hypertension. Reports adherence to multiple meds including carvedilol, enalapril, but says that when the wound nurse came to change the dressing on his right lower extremity for chronic wound, she told him his pressure was elevated.  Patient denies any symptoms of headache, dizziness, vision changes, chest pain, dyspnea, or increased leg swelling.  Says that he feels the same as usual.  In addition, no bed bugs are noted on patient's bed, clothing, or body, states that he lives with his cousin, and has not been having any issues with rash or itching.  Patient also does not report BL knee pain as new, has chronic knee pain due to apparent varus deformity. When attempting to remove wound nurse's dressing of right LE, patient refused stating he did not want the dressing taken down as the leg is not of concern. Patient denies EtOH/tobacco/illicit substance use.  Lastly patient requests refill of his lasix.    ROS: No fever/chills, No headache/photophobia/eye pain/changes in vision, No ear pain/sore throat/dysphagia, No chest pain/palpitations, no SOB/cough/wheeze/stridor, No abdominal pain, No N/V/D/melena, no dysuria/frequency/discharge, No neck/back pain, no rash, no changes in neurological status/function. Pertinent PMH/PSH/FHx/SHx and Review of Systems contained within:  Patient presents to the ED for hypertension. Disagree with triage note as to patient's chief concern today.  Patient is NOT English speaking, prefers Thai, patient interviewed with nurse Hernández who served as  since [employer] is not functional in this particular ER area.  Patient is NOT here for wound check or bed bugs, comes in for concern about hypertension. Reports adherence to multiple meds including carvedilol, enalapril, but says that when the wound nurse came to change the dressing on his right lower extremity for chronic wound, she told him his pressure was elevated.  Patient denies any symptoms of headache, dizziness, vision changes, chest pain, dyspnea, or increased leg swelling.  Says that he feels the same as usual.  In addition, no bed bugs are noted on patient's bed, clothing, or body, states that he lives with his cousin, and has not been having any issues with rash or itching.  Patient also does not report BL knee pain as new, has chronic knee pain due to apparent varus deformity. When attempting to remove wound nurse's dressing of right LE, patient refused stating he did not want the dressing taken down as the leg is not of concern. Patient denies EtOH/tobacco/illicit substance use.  Lastly patient requests refill of his lasix.    ROS: No fever/chills, No headache/photophobia/eye pain/changes in vision, No ear pain/sore throat/dysphagia, No chest pain/palpitations, no SOB/cough/wheeze/stridor, No abdominal pain, No N/V/D/melena, no dysuria/frequency/discharge, No neck/back pain, no rash, no changes in neurological status/function. Pertinent PMH/PSH/FHx/SHx and Review of Systems contained within:  Patient presents to the ED for hypertension. Disagree with triage note as to patient's chief concern today.  Patient is NOT English speaking, prefers French, patient interviewed with nurse Hernández who served as  since [employer] is not functional in this particular ER area.  Patient is NOT here for wound check or bed bugs, comes in for concern about hypertension. Reports adherence to multiple meds including carvedilol, enalapril, but says that when the wound nurse came to change the dressing on his right lower extremity for chronic wound, she told him his pressure was elevated, but unable to state how high.  Patient denies any symptoms of headache, dizziness, vision changes, chest pain, dyspnea, or increased leg swelling.  Says that he feels the same as usual.  In addition, no bed bugs are noted on patient's bed, clothing, or body, states that he lives with his cousin, and has not been having any issues with rash or itching.  Patient also does not report BL knee pain as new, has chronic knee pain due to apparent varus deformity. When attempting to remove wound nurse's dressing of right LE, patient refused stating he did not want the dressing taken down as the leg is not of concern. Patient denies EtOH/tobacco/illicit substance use.  Lastly patient requests refill of his lasix.    ROS: No fever/chills, No headache/photophobia/eye pain/changes in vision, No ear pain/sore throat/dysphagia, No chest pain/palpitations, no SOB/cough/wheeze/stridor, No abdominal pain, No N/V/D/melena, no dysuria/frequency/discharge, No neck/back pain, no rash, no changes in neurological status/function.

## 2019-09-06 NOTE — ED PROVIDER NOTE - PHYSICAL EXAMINATION
Gen: Alert, NAD, well appearing, obese  Head: NC, AT, PERRL, EOMI, normal lids/conjunctiva  ENT: normal hearing, patent oropharynx without erythema/exudate, uvula midline  Neck: +supple, no tenderness/meningismus/JVD, +Trachea midline  Pulm: Bilateral BS, normal resp effort, no wheeze/stridor/retractions  CV: RRR, no M/R/G, +dist pulses  Abd: soft, NT/ND, large girth, Negative Black signs, +BS, no palpable masses  Mskel: no erythema/cyanosis, right LE with below knee wound dressing, left leg without erythema, pitting edema or wounds  Skin: no rash, warm/dry, no apparent lesions, rash, bites  Neuro: AAOx3, no apparent sensory/motor deficits, coordination intact

## 2019-09-06 NOTE — ED ADULT TRIAGE NOTE - CHIEF COMPLAINT QUOTE
as per pt " for the past couple days I have been with bilateral knee pain and right left ulcer that is painful and oozing." hx htn, pt lives in a bed bug infested place.

## 2019-09-06 NOTE — ED ADULT NURSE NOTE - NSIMPLEMENTINTERV_GEN_ALL_ED
Implemented All Fall Risk Interventions:  [place] to call system. Call bell, personal items and telephone within reach. Instruct patient to call for assistance. Room bathroom lighting operational. Non-slip footwear when patient is off stretcher. Physically safe environment: no spills, clutter or unnecessary equipment. Stretcher in lowest position, wheels locked, appropriate side rails in place. Provide visual cue, wrist band, yellow gown, etc. Monitor gait and stability. Monitor for mental status changes and reorient to person, place, and time. Review medications for side effects contributing to fall risk. Reinforce activity limits and safety measures with patient and family.

## 2019-09-07 RX ORDER — FUROSEMIDE 40 MG
1 TABLET ORAL
Qty: 30 | Refills: 0
Start: 2019-09-07 | End: 2019-10-06

## 2019-09-10 ENCOUNTER — EMERGENCY (EMERGENCY)
Facility: HOSPITAL | Age: 59
LOS: 1 days | End: 2019-09-10
Attending: EMERGENCY MEDICINE
Payer: COMMERCIAL

## 2019-09-10 VITALS
OXYGEN SATURATION: 99 % | SYSTOLIC BLOOD PRESSURE: 165 MMHG | HEIGHT: 66 IN | HEART RATE: 66 BPM | RESPIRATION RATE: 18 BRPM | WEIGHT: 270.07 LBS | TEMPERATURE: 98 F | DIASTOLIC BLOOD PRESSURE: 98 MMHG

## 2019-09-10 DIAGNOSIS — R58 HEMORRHAGE, NOT ELSEWHERE CLASSIFIED: Chronic | ICD-10-CM

## 2019-09-10 DIAGNOSIS — Z98.89 OTHER SPECIFIED POSTPROCEDURAL STATES: Chronic | ICD-10-CM

## 2019-09-10 DIAGNOSIS — K46.9 UNSPECIFIED ABDOMINAL HERNIA WITHOUT OBSTRUCTION OR GANGRENE: Chronic | ICD-10-CM

## 2019-09-10 LAB
ALBUMIN SERPL ELPH-MCNC: 4 G/DL — SIGNIFICANT CHANGE UP (ref 3.3–5)
ALP SERPL-CCNC: 104 U/L — SIGNIFICANT CHANGE UP (ref 40–120)
ALT FLD-CCNC: 10 U/L — SIGNIFICANT CHANGE UP (ref 10–45)
ANION GAP SERPL CALC-SCNC: 13 MMOL/L — SIGNIFICANT CHANGE UP (ref 5–17)
APTT BLD: 29.8 SEC — SIGNIFICANT CHANGE UP (ref 27.5–36.3)
AST SERPL-CCNC: 20 U/L — SIGNIFICANT CHANGE UP (ref 10–40)
BASOPHILS # BLD AUTO: 0.1 K/UL — SIGNIFICANT CHANGE UP (ref 0–0.2)
BASOPHILS NFR BLD AUTO: 0.9 % — SIGNIFICANT CHANGE UP (ref 0–2)
BILIRUB SERPL-MCNC: 0.1 MG/DL — LOW (ref 0.2–1.2)
BUN SERPL-MCNC: 17 MG/DL — SIGNIFICANT CHANGE UP (ref 7–23)
CALCIUM SERPL-MCNC: 8.9 MG/DL — SIGNIFICANT CHANGE UP (ref 8.4–10.5)
CHLORIDE SERPL-SCNC: 100 MMOL/L — SIGNIFICANT CHANGE UP (ref 96–108)
CO2 SERPL-SCNC: 25 MMOL/L — SIGNIFICANT CHANGE UP (ref 22–31)
CREAT SERPL-MCNC: 0.85 MG/DL — SIGNIFICANT CHANGE UP (ref 0.5–1.3)
EOSINOPHIL # BLD AUTO: 0.2 K/UL — SIGNIFICANT CHANGE UP (ref 0–0.5)
EOSINOPHIL NFR BLD AUTO: 1.7 % — SIGNIFICANT CHANGE UP (ref 0–6)
GLUCOSE SERPL-MCNC: 93 MG/DL — SIGNIFICANT CHANGE UP (ref 70–99)
HCT VFR BLD CALC: 36.3 % — LOW (ref 39–50)
HGB BLD-MCNC: 11.1 G/DL — LOW (ref 13–17)
INR BLD: 1.08 RATIO — SIGNIFICANT CHANGE UP (ref 0.88–1.16)
LYMPHOCYTES # BLD AUTO: 2.3 K/UL — SIGNIFICANT CHANGE UP (ref 1–3.3)
LYMPHOCYTES # BLD AUTO: 25.1 % — SIGNIFICANT CHANGE UP (ref 13–44)
MCHC RBC-ENTMCNC: 24.1 PG — LOW (ref 27–34)
MCHC RBC-ENTMCNC: 30.6 GM/DL — LOW (ref 32–36)
MCV RBC AUTO: 78.8 FL — LOW (ref 80–100)
MONOCYTES # BLD AUTO: 0.6 K/UL — SIGNIFICANT CHANGE UP (ref 0–0.9)
MONOCYTES NFR BLD AUTO: 7 % — SIGNIFICANT CHANGE UP (ref 2–14)
NEUTROPHILS # BLD AUTO: 5.9 K/UL — SIGNIFICANT CHANGE UP (ref 1.8–7.4)
NEUTROPHILS NFR BLD AUTO: 65.2 % — SIGNIFICANT CHANGE UP (ref 43–77)
PLATELET # BLD AUTO: 275 K/UL — SIGNIFICANT CHANGE UP (ref 150–400)
POTASSIUM SERPL-MCNC: 3.5 MMOL/L — SIGNIFICANT CHANGE UP (ref 3.5–5.3)
POTASSIUM SERPL-SCNC: 3.5 MMOL/L — SIGNIFICANT CHANGE UP (ref 3.5–5.3)
PROT SERPL-MCNC: 7.5 G/DL — SIGNIFICANT CHANGE UP (ref 6–8.3)
PROTHROM AB SERPL-ACNC: 12.5 SEC — SIGNIFICANT CHANGE UP (ref 10–12.9)
RBC # BLD: 4.61 M/UL — SIGNIFICANT CHANGE UP (ref 4.2–5.8)
RBC # FLD: 16.8 % — HIGH (ref 10.3–14.5)
SODIUM SERPL-SCNC: 138 MMOL/L — SIGNIFICANT CHANGE UP (ref 135–145)
WBC # BLD: 9.1 K/UL — SIGNIFICANT CHANGE UP (ref 3.8–10.5)
WBC # FLD AUTO: 9.1 K/UL — SIGNIFICANT CHANGE UP (ref 3.8–10.5)

## 2019-09-10 PROCEDURE — 70498 CT ANGIOGRAPHY NECK: CPT | Mod: 26

## 2019-09-10 PROCEDURE — 70450 CT HEAD/BRAIN W/O DYE: CPT | Mod: 26,59

## 2019-09-10 PROCEDURE — 99284 EMERGENCY DEPT VISIT MOD MDM: CPT | Mod: 25

## 2019-09-10 PROCEDURE — 70496 CT ANGIOGRAPHY HEAD: CPT | Mod: 26

## 2019-09-10 RX ORDER — SODIUM CHLORIDE 9 MG/ML
1000 INJECTION INTRAMUSCULAR; INTRAVENOUS; SUBCUTANEOUS ONCE
Refills: 0 | Status: COMPLETED | OUTPATIENT
Start: 2019-09-10 | End: 2019-09-10

## 2019-09-10 RX ORDER — ACETAMINOPHEN 500 MG
975 TABLET ORAL ONCE
Refills: 0 | Status: COMPLETED | OUTPATIENT
Start: 2019-09-10 | End: 2019-09-10

## 2019-09-10 RX ORDER — MECLIZINE HCL 12.5 MG
25 TABLET ORAL ONCE
Refills: 0 | Status: COMPLETED | OUTPATIENT
Start: 2019-09-10 | End: 2019-09-10

## 2019-09-10 RX ADMIN — SODIUM CHLORIDE 1000 MILLILITER(S): 9 INJECTION INTRAMUSCULAR; INTRAVENOUS; SUBCUTANEOUS at 21:29

## 2019-09-10 RX ADMIN — Medication 25 MILLIGRAM(S): at 21:30

## 2019-09-10 RX ADMIN — Medication 975 MILLIGRAM(S): at 19:38

## 2019-09-10 NOTE — ED PROVIDER NOTE - ATTENDING CONTRIBUTION TO CARE
58 yo M presents with headache since yesterday. patient states that yesterday afternoon while at rest he had sudden onset severe headache, associated with lightheadedness. woke up this mornign and had persistence of headache. no vertigo, just lightheadedness, worse with standing and with ambulating. tylenol improves the headache slightly. measured his BP and found it to be elevvated, SBP ~170s, was concerned, and so came to the ER.   denies f/ch, visual changes, neck pain/stiffness, cp, sob, abd pain, N/V/D, urinary complaints, weakness/numnbess  PE well appearing. lungs CTA. full rom of neck withotu pain or stiffness. abd soft and NT. NEURO: pupils 3 mm, PERRL bl, EOMI bl, CN2-12 intact, finger to nose test nl bilat, normal heel-shin test bl, negative pronator drift bilat, speech is clear without dysarthria; 5/5 motor strength BUE and BLE; sensation intact to light touch BUE and BLE    labs nonactionable. patient given ivfs and pain control in the er. CTH/CTA was ordered. neurology was consulted. patient was signed out to Dr. Spears at this time. pending CT results, neuro consult, LP if negative to rule out SAH.

## 2019-09-10 NOTE — ED PROVIDER NOTE - OBJECTIVE STATEMENT
58 yo male with pmh of htn, cellulitis presenting with c/o headache, lightheadedness and htn today. Pt states he was fixing the television yesterday when he had a sudden onset throbbing frontal headache, nonradiating with feeling of lightheadedness and generalized weakness afterwards. He woke this morning with continued HA, took tylenol with some relief but HA returned. Pt states lightheadedness worse with ambulation and movement, VNS came to his house today to evaluate his cellulitis, BP was in 170s and called EMS as he still had HA and lightheadedness. Denies changes in vision, chest pain, shortness of breath, difficulty breathing, abdominal pain, numbness, tingling, paresthesias.

## 2019-09-10 NOTE — ED PROVIDER NOTE - PROGRESS NOTE DETAILS
neurology consulted, will see pt. -Julisa Barrientos PA-C Estuardo elliott fellow: discussed w/ the patient about LP procedure, discussed risks/benefits which included but is not limited to increased bleeding, infection, pain, permanent disability. patient agrees w/ procedure. discussed w/ neuro, requests LP w opening pressure for pseudotumor cerebri. Attempted LP x 2, unable to reach spinous process. Discussed w/ neuro about unsuccessful attempt. Will send to CDU for MRI pending.    patient w/ no active headache, visual complaints, nausea/vomiting, neck pain. well appearing. neuro intact. andrew elliott fellow: case discussed w/ neuro, requests ophthalmology for r/o papilledema. pending MRI from IR/neuro. will admit to cdu andrew elliott fellow: case discussed w opthalmology, will see this afternoon. Attending note (Regan): attempted LP (2 attempts) unable to reach spinal canal / obtain fluid (patient tolerated procedure well).  Per d/w neuro attending (dr Butts) and with radiology department; patient to go to radiology for IR / guided LP and will plan now to observe in CDU for MR brain and LP, neuro following.

## 2019-09-10 NOTE — ED PROVIDER NOTE - PHYSICAL EXAMINATION
A&Ox3, NAD. NCAT. PERRL, EOMI. Neck supple, no LAD. Lungs CTAB. +S1S2, RRR, No m/r/g. Abd soft, obese, NT/ND, +BS, no rebound or guarding. Extremities: cap refill <2, pulses in distal extremities 4+, no edema. CN II-XII intact. Strength 5/5 UE/LE. Sensations intact throughout. A&Ox3, NAD. NCAT. PERRL, EOMI. Neck supple, no LAD. Lungs CTAB. +S1S2, RRR, No m/r/g. Abd soft, obese, NT/ND, +BS, no rebound or guarding. Extremities: cap refill <2, pulses in distal extremities 4+, no edema. CN II-XII intact, no drift. Strength 5/5 UE/LE. Sensations intact throughout.

## 2019-09-10 NOTE — ED ADULT NURSE NOTE - ED STAT RN HANDOFF DETAILS 2
Handoff report provided to Bridget MERCADO. Understands pmh, medications given and plan of care for patient. Patient in stable condition, vital signs updated, has no complaints at this time and has been updated on care plan. Explained to patient that it is change of shift and new nurse is taking over, pt verbalized understanding.

## 2019-09-10 NOTE — ED ADULT NURSE NOTE - OBJECTIVE STATEMENT
Pt bib EMS for eval of new onset h/a, and dizziness, n/v which started yesterday.  No fevers, chills.  He is followed by visiting nurse for right leg ulcer which has a compression dressing in place.  He denies diabetes.  Ambulates with crutches due to osteoarthritis of knees.  Speech is clear.

## 2019-09-11 DIAGNOSIS — Z91.81 HISTORY OF FALLING: ICD-10-CM

## 2019-09-11 DIAGNOSIS — D64.9 ANEMIA, UNSPECIFIED: ICD-10-CM

## 2019-09-11 DIAGNOSIS — I87.311 CHRONIC VENOUS HYPERTENSION (IDIOPATHIC) WITH ULCER OF RIGHT LOWER EXTREMITY: ICD-10-CM

## 2019-09-11 DIAGNOSIS — R60.9 EDEMA, UNSPECIFIED: ICD-10-CM

## 2019-09-11 DIAGNOSIS — M17.12 UNILATERAL PRIMARY OSTEOARTHRITIS, LEFT KNEE: ICD-10-CM

## 2019-09-11 DIAGNOSIS — Z72.4 INAPPROPRIATE DIET AND EATING HABITS: ICD-10-CM

## 2019-09-11 DIAGNOSIS — L97.812 NON-PRESSURE CHRONIC ULCER OF OTHER PART OF RIGHT LOWER LEG WITH FAT LAYER EXPOSED: ICD-10-CM

## 2019-09-11 DIAGNOSIS — Z74.1 NEED FOR ASSISTANCE WITH PERSONAL CARE: ICD-10-CM

## 2019-09-11 DIAGNOSIS — I10 ESSENTIAL (PRIMARY) HYPERTENSION: ICD-10-CM

## 2019-09-11 DIAGNOSIS — Z79.899 OTHER LONG TERM (CURRENT) DRUG THERAPY: ICD-10-CM

## 2019-09-11 DIAGNOSIS — I73.9 PERIPHERAL VASCULAR DISEASE, UNSPECIFIED: ICD-10-CM

## 2019-09-11 LAB
APPEARANCE CSF: CLEAR — SIGNIFICANT CHANGE UP
APPEARANCE UR: CLEAR — SIGNIFICANT CHANGE UP
BILIRUB UR-MCNC: NEGATIVE — SIGNIFICANT CHANGE UP
COLOR CSF: SIGNIFICANT CHANGE UP
COLOR SPEC: SIGNIFICANT CHANGE UP
DIFF PNL FLD: NEGATIVE — SIGNIFICANT CHANGE UP
ERYTHROCYTE [SEDIMENTATION RATE] IN BLOOD: 26 MM/HR — HIGH (ref 0–20)
GLUCOSE CSF-MCNC: 64 MG/DL — SIGNIFICANT CHANGE UP (ref 40–70)
GLUCOSE UR QL: NEGATIVE — SIGNIFICANT CHANGE UP
KETONES UR-MCNC: NEGATIVE — SIGNIFICANT CHANGE UP
LEUKOCYTE ESTERASE UR-ACNC: NEGATIVE — SIGNIFICANT CHANGE UP
NEUTROPHILS # CSF: SIGNIFICANT CHANGE UP (ref 0–6)
NITRITE UR-MCNC: NEGATIVE — SIGNIFICANT CHANGE UP
NRBC NFR CSF: <1 — SIGNIFICANT CHANGE UP (ref 0–5)
PH UR: 7.5 — SIGNIFICANT CHANGE UP (ref 5–8)
PROT CSF-MCNC: 23 MG/DL — SIGNIFICANT CHANGE UP (ref 15–45)
PROT UR-MCNC: NEGATIVE — SIGNIFICANT CHANGE UP
RBC # CSF: 17 /UL — HIGH (ref 0–0)
SP GR SPEC: 1.01 — SIGNIFICANT CHANGE UP (ref 1.01–1.02)
TSH SERPL-MCNC: 1.13 UIU/ML — SIGNIFICANT CHANGE UP (ref 0.27–4.2)
TUBE TYPE: SIGNIFICANT CHANGE UP
UROBILINOGEN FLD QL: NEGATIVE — SIGNIFICANT CHANGE UP

## 2019-09-11 PROCEDURE — 62270 DX LMBR SPI PNXR: CPT

## 2019-09-11 PROCEDURE — 99243 OFF/OP CNSLTJ NEW/EST LOW 30: CPT

## 2019-09-11 PROCEDURE — 99282 EMERGENCY DEPT VISIT SF MDM: CPT

## 2019-09-11 PROCEDURE — 77003 FLUOROGUIDE FOR SPINE INJECT: CPT | Mod: 26

## 2019-09-11 PROCEDURE — 99218: CPT

## 2019-09-11 RX ORDER — LINEZOLID 600 MG/300ML
600 INJECTION, SOLUTION INTRAVENOUS EVERY 12 HOURS
Refills: 0 | Status: DISCONTINUED | OUTPATIENT
Start: 2019-09-11 | End: 2019-09-14

## 2019-09-11 RX ORDER — CARVEDILOL PHOSPHATE 80 MG/1
3.12 CAPSULE, EXTENDED RELEASE ORAL EVERY 12 HOURS
Refills: 0 | Status: DISCONTINUED | OUTPATIENT
Start: 2019-09-11 | End: 2019-09-14

## 2019-09-11 RX ORDER — FUROSEMIDE 40 MG
1 TABLET ORAL
Qty: 0 | Refills: 0 | DISCHARGE

## 2019-09-11 RX ORDER — POTASSIUM CHLORIDE 20 MEQ
40 PACKET (EA) ORAL DAILY
Refills: 0 | Status: DISCONTINUED | OUTPATIENT
Start: 2019-09-11 | End: 2019-09-14

## 2019-09-11 RX ORDER — AMLODIPINE BESYLATE 2.5 MG/1
10 TABLET ORAL DAILY
Refills: 0 | Status: DISCONTINUED | OUTPATIENT
Start: 2019-09-11 | End: 2019-09-14

## 2019-09-11 RX ORDER — FUROSEMIDE 40 MG
20 TABLET ORAL DAILY
Refills: 0 | Status: DISCONTINUED | OUTPATIENT
Start: 2019-09-11 | End: 2019-09-14

## 2019-09-11 RX ORDER — PANTOPRAZOLE SODIUM 20 MG/1
40 TABLET, DELAYED RELEASE ORAL
Refills: 0 | Status: DISCONTINUED | OUTPATIENT
Start: 2019-09-11 | End: 2019-09-14

## 2019-09-11 RX ORDER — POTASSIUM CHLORIDE 20 MEQ
1 PACKET (EA) ORAL
Qty: 0 | Refills: 0 | DISCHARGE

## 2019-09-11 RX ADMIN — LINEZOLID 600 MILLIGRAM(S): 600 INJECTION, SOLUTION INTRAVENOUS at 20:24

## 2019-09-11 RX ADMIN — CARVEDILOL PHOSPHATE 3.12 MILLIGRAM(S): 80 CAPSULE, EXTENDED RELEASE ORAL at 20:24

## 2019-09-11 RX ADMIN — Medication 40 MILLIEQUIVALENT(S): at 18:36

## 2019-09-11 RX ADMIN — Medication 975 MILLIGRAM(S): at 02:50

## 2019-09-11 RX ADMIN — SODIUM CHLORIDE 1000 MILLILITER(S): 9 INJECTION INTRAMUSCULAR; INTRAVENOUS; SUBCUTANEOUS at 02:50

## 2019-09-11 RX ADMIN — AMLODIPINE BESYLATE 10 MILLIGRAM(S): 2.5 TABLET ORAL at 18:36

## 2019-09-11 NOTE — CONSULT NOTE ADULT - ASSESSMENT
Assessment:  59 year old M with a PMH of HTN and peptic ulcer disease presents with dizziness, headache, and bilateral lower extremity weakness that started on 9/9. Vitals significant for initial blood pressure of 165/98. CTH w/o showed "Partially empty sella with enlargement of the sella turcica, and mild dilatation and tortuosity of bilateral optic nerves, which can be seen in the setting of pseudotumor cerebri"    Impression: Headache possibly hypertension related, pseudotumor cerebri appears less likely given resolution of headaches, lack of visual symptoms/no clear evidence of optic nerve head edema    Plan:  Imaging:  [x] CTH w/o  [] MRI brain without contrast    Labs:  [] Urinalysis    Other:  [] Check orthostatics    Case discussed with neurology attending, Dr. Butts. Assessment:  59 year old M with a PMH of HTN and peptic ulcer disease presents with dizziness, headache, and bilateral lower extremity weakness that started on 9/9. Vitals significant for initial blood pressure of 165/98. CTH w/o showed "Partially empty sella with enlargement of the sella turcica, and mild dilatation and tortuosity of bilateral optic nerves, which can be seen in the setting of pseudotumor cerebri"    Impression: Headache possibly hypertension related, pseudotumor cerebri appears less likely given resolution of headaches, lack of visual symptoms/no clear evidence of optic nerve head edema    Plan:    [] MRI brain without contrast  esr. tsh  opthalmology consultation to r/o papilledema  LP to measure opening pressure    Labs:  [] Urinalysis    Other:  [] Check orthostatics    Case discussed with neurology attending, Dr. Butts.

## 2019-09-11 NOTE — ED PROCEDURE NOTE - PROCEDURE ADDITIONAL DETAILS
Emergency Department Focused Ultrasound performed at patient's bedside for educational purposes. The study will have a follow up study performed or was performed in the direct supervision of an ultrasound trained attending.
unsuccessful; two attempts; limited by patient body habitus.

## 2019-09-11 NOTE — ED CDU PROVIDER INITIAL DAY NOTE - ATTENDING CONTRIBUTION TO CARE
59 M h/o obesity, htn c/o headache, lightheadedness and elevated blood pressure today. Pt states he was fixing the television 2 days ago when he had a sudden onset throbbing frontal headache, non-radiating and assocaited with feeling of lightheadedness and generalized weakness.  He woke this morning with continued HA, took tylenol with some relief but HA returned. Pt states lightheadedness worse with ambulation and movement, VNS came to his house today to evaluate his cellulitis, BP was in 170s and called EMS as he still had HA and lightheadedness. Denies changes in vision, chest pain, shortness of breath, difficulty breathing, abdominal pain, numbness, tingling, paresthesias.  Was seen last night in ED, CT obtained, concerning for pseudotumor cerebri; neuro consulted and recommended LP here in ED; LP was attempted but unabel to collect sample b/c of patient body habitus.      On Physical Exam:  General: morbidly obese, in NAD  HEENT: PERRL, MMM  Neck: no neck tenderness, no nuchal rigidity  Cardiac: normal s1, s2; RRR; no MGR  Lungs: CTABL  Abdomen: soft nontender/nondistended  Neuro: no gross neurologic deficits; CN II-XII intact; str 5/5 in all extremities; no gross sensory deficits; ambulates with cane /crutches which is baseline for him.  No gross problems with coordination.  A&Ox4 (with ).    AP: r/o pseudotumor cerebrii: will observe in CDU for IR guided lumbar puncture, continued f/u with neurology and MR-brain for further evaluation.  Disposition pending results of LP (to include measurement of opening pressure) and MR brain, neuro rec's.

## 2019-09-11 NOTE — ED CDU PROVIDER INITIAL DAY NOTE - OBJECTIVE STATEMENT
60 yo male with pmh of htn, cellulitis presenting with c/o headache, lightheadedness and htn today. Pt states he was fixing the television yesterday when he had a sudden onset throbbing frontal headache, nonradiating with feeling of lightheadedness and generalized weakness afterwards. He woke this morning with continued HA, took tylenol with some relief but HA returned. Pt states lightheadedness worse with ambulation and movement, VNS came to his house today to evaluate his cellulitis, BP was in 170s and called EMS as he still had HA and lightheadedness. Denies changes in vision, chest pain, shortness of breath, difficulty breathing, abdominal pain, numbness, tingling, paresthesias. 60 yo male with pmh of htn, cellulitis presenting with c/o headache, lightheadedness and htn today. Pt states he was fixing the television yesterday when he had a sudden onset throbbing frontal headache, nonradiating with feeling of lightheadedness and generalized weakness afterwards. He woke this morning with continued HA, took tylenol with some relief but HA returned. Pt states lightheadedness worse with ambulation and movement, VNS came to his house today to evaluate his cellulitis, BP was in 170s and called EMS as he still had HA and lightheadedness. Denies changes in vision, chest pain, shortness of breath, difficulty breathing, abdominal pain, numbness, tingling, paresthesias.  Pt is followed by the [facility] wound care department weekly and has a visiting nurse that comes every other day for dressing changes. VNS due to go to pts home tomorrow.

## 2019-09-11 NOTE — ED CDU PROVIDER DISPOSITION NOTE - NSFOLLOWUPINSTRUCTIONS_ED_ALL_ED_FT
1. Rest. Stay hydrated.   2. Continue your current home medications. Continue following with your wound care nurse for your right lower extremity.   3. Follow-up with your medical doctor in 2-3 days.  Please also follow up with Neurology, call [phone] to make an appointment. You can also follow up with opthalmology, call [phone] please call to make an appointment. Bring your results with you for follow-up.  4. Return to the ER if you have any new or worsening symptoms, change in vision, worsening headache, chest pain, difficulty breathing, fevers, chills, weakness, or any other concerns. 1. Rest. Stay hydrated.   2. Continue your current home medications. Continue following with your wound care nurse for your cellulitis. Take Tylenol 650mg every 6hrs for headache as needed.  3. Follow-up with your medical doctor in 2-3 days.  Please also follow up with Neurology, call [phone] or [phone] in 3-4 days. call to make an appointment. You can also follow up with ophthalmology, call [phone] please call to make an appointment. Bring your results with you for follow-up.  4. Return to the ER if you have any new or worsening symptoms, change in vision, worsening headache, chest pain, difficulty breathing, fevers, chills, weakness, or any other concerns.

## 2019-09-11 NOTE — CONSULT NOTE ADULT - ATTENDING COMMENTS
I have reviewed the residents note including the history, exam, assessment, and plan.  I agree with the residents assessment and plan.    Laxmi Lanza MD

## 2019-09-11 NOTE — CONSULT NOTE ADULT - ATTENDING COMMENTS
patient seen and examined. headache is better. CTA negative, ? psedotumor cerebri  plan  MRI brain without contrast  ESR, TSH  opthalmology consultation to r/o papilledema  LP to measure opening pressure

## 2019-09-11 NOTE — ED CDU PROVIDER DISPOSITION NOTE - NSFOLLOWUPCLINICS_GEN_ALL_ED_FT
[facility]  Neurology  [address] [place] [address] [address] [place] [address]  Phone: [phone]  Fax:   Follow Up Time:

## 2019-09-11 NOTE — CONSULT NOTE ADULT - SUBJECTIVE AND OBJECTIVE BOX
HPI: 59 year old M with a PMH of HTN and peptic ulcer disease presents with dizziness, headache, and bilateral lower extremity weakness that started on 9/9. The patient noticed that his blood pressure was elevated to 157 systolic at home. He had two episodes of vomiting on 9/9. He describes the headache as bifrontal and non radiating. It improves when sitting upright. He noticed bilateral leg weakness, particularly weak in the knees, and noticed that he could not walk easily. He notes that his bilateral legs have become swollen. This has never happened before. Patient does not currently have a headache or dizziness. Patient denies any visual changes, numbness, tingling, or change in hearing.       NIHSS: 0  MRS: 0    REVIEW OF SYSTEMS    A 10-system ROS was performed and is negative except for those items noted above and/or in the HPI.    PAST MEDICAL & SURGICAL HISTORY:  HTN (hypertension)  Knee osteoarthritis  Duodenal ulcer disease  Morbid obesity  Hypertension  Osteoarthritis  PUD (peptic ulcer disease)  Hypertension  Gastroduodenal artery bleed: IR embolization of gastroduodenal artery  Abdominal hernia  H/O ventral hernia repair    FAMILY HISTORY:  No pertinent family history in first degree relatives  No pertinent family history in first degree relatives    SOCIAL HISTORY:   T/E/D: denies    MEDICATIONS (HOME):  Home Medications:  Lasix 20 mg oral tablet: 1 tab(s) orally once a day (06 Sep 2019 19:24)  potassium chloride 20 mEq oral tablet, extended release: 1 tab(s) orally once a day (06 Sep 2019 19:24)  Amlodipine  Carvedilol  Enalapril  Omeprazole    MEDICATIONS  (STANDING):    MEDICATIONS  (PRN):    ALLERGIES/INTOLERANCES:  Allergies  No Known Allergies    Intolerances    VITALS & EXAMINATION:  Vital Signs Last 24 Hrs  T(C): 36.9 (11 Sep 2019 05:41), Max: 36.9 (11 Sep 2019 05:41)  T(F): 98.4 (11 Sep 2019 05:41), Max: 98.4 (11 Sep 2019 05:41)  HR: 68 (11 Sep 2019 05:41) (61 - 68)  BP: 145/94 (11 Sep 2019 05:41) (145/94 - 165/98)  BP(mean): --  RR: 16 (11 Sep 2019 05:41) (16 - 20)  SpO2: 99% (11 Sep 2019 05:41) (99% - 100%)    General:  Constitutional: Obese Male, appears stated age, in no apparent distress including pain  Extremities: bilateral lower extremity edema    Neurological (>12):  MS: Awake, alert, oriented to person, place, situation, time. Normal affect. Follows all commands.    Language: Speech is clear, fluent with good repetition & comprehension    CNs: PERRL (R = 3mm, L = 3mm). VFF. EOMI no nystagmus. V1-3 intact to LT, No facial asymmetry b/l. Hearing grossly normal (rubbing fingers) b/l. Symmetric palate elevation in midline. Gag reflex deferred. Head turning & shoulder shrug intact b/l. Tongue midline, normal movements, no atrophy.    Fundoscopic: pale w/ sharp discs margins No vascular changes.      Motor: Normal muscle bulk & tone. No noticeable tremor or seizure. No pronator drift.              Deltoid	Biceps	Triceps	Wrist	Finger ABd	   R	5	5	5	5	5		5 	  L	5	5	5	5	5		5    	H-Flex	H-Ext	H-ABd	H-ADd	K-Flex	K-Ext	D-Flex	P-Flex  R	5	5	5	5	5	5	5	5 	   L	5	5	5	5	5	5	5	5	     Sensation: Intact to LT b/l throughout.     Cortical: Extinction on DSS (neglect): none    Reflexes:              Biceps(C5)       BR(C6)     Triceps(C7)               Patellar(L4)    Achilles(S1)    Plantar Resp  R	2	          2	             2		        2		    2		Down   L	2	          2	             2		        2		    2		Down     Coordination: No dysmetria to FTN/HTS    Gait: Normal Romberg. No postural instability. Ambulates with crutches due to right leg ulcer     LABORATORY:  CBC                       11.1   9.1   )-----------( 275      ( 10 Sep 2019 20:01 )             36.3     Chem 09-10    138  |  100  |  17  ----------------------------<  93  3.5   |  25  |  0.85    Ca    8.9      10 Sep 2019 20:01    TPro  7.5  /  Alb  4.0  /  TBili  0.1<L>  /  DBili  x   /  AST  20  /  ALT  10  /  AlkPhos  104  09-10    LFTs LIVER FUNCTIONS - ( 10 Sep 2019 20:01 )  Alb: 4.0 g/dL / Pro: 7.5 g/dL / ALK PHOS: 104 U/L / ALT: 10 U/L / AST: 20 U/L / GGT: x           Coagulopathy PT/INR - ( 10 Sep 2019 20:02 )   PT: 12.5 sec;   INR: 1.08 ratio         PTT - ( 10 Sep 2019 20:02 )  PTT:29.8 sec  Lipid Panel   A1c   Cardiac enzymes     U/A   CSF  Immunological  Other    STUDIES & IMAGING:  Studies (EKG, EEG, EMG, etc):     Radiology (XR, CT, MR, U/S, TTE/NATHALIE):  < from: CT Angio Neck w/ IV Cont (09.10.19 @ 21:18) >  CT BRAIN:  There is no evidence of an acute intracranial hemorrhage, extra-axial   collection, mass effect, or midline shift. The basal cisterns are patent.   No hydrocephalus.     There is age-appropriate cerebral volume loss with proportional   prominence of the sulci and ventricles. Nonspecific white matter   hypoattenuation, likely related to chronic microvascular changes.     Partially empty sella with enlargement of the sella turcica. Mild   dilatation and tortuosity of the bilateral optic nerves. This can be seen   in the setting of pseudotumor cerebri.     The visualized paranasal sinuses and mastoid air cells are clear.    No displaced calvarial fracture.     CT ANGIOGRAPHY NECK:  Three-vessel aortic arch. No aortic dissection or hemodynamically   significant stenosis. The origins of the great vessels are unremarkable.     The common carotid arteries are normal in caliber without significant   stenosis. Tortuosity of bilateral common andinternal carotid arteries.    The carotid bifurcations are unremarkable. The internal carotid arteries   are normal in caliber without significant stenosis.     Co-dominant vertebral arteries. The vertebral arteries are normal in   caliber without significant stenosis.     Heterogeneous thyroid gland with subcentimeter hypodense left thyroid   lesions. The visualized neck and upper chest are otherwise unremarkable.    Visualized osseous structures are unremarkable.      CT ANGIOGRAPHY BRAIN:  There is no evidence for significant stenosis, major vessel occlusion, or   aneurysm about the [place].    There is no evidence for significant stenosis, major vessel occlusion, or   aneurysm involving the vertebrobasilar system. Hypoplastic left P1   segment.    No enlarged vascular lesions or clusters of abnormal vessels are noted to   suggest an arterial venous malformation.    Visualized portions of the superficial and deep venous systems are   unremarkable.      IMPRESSION:     CT brain: No evidence of acute intracranial hemorrhage, midline shift, or   hydrocephalus. Partially empty sella with enlargement of the sella   turcica, and mild dilatation and tortuosity of bilateral optic nerves,   which can be seen in the setting of pseudotumor cerebri.     CT angiography neck: No evidence of hemodynamically significant stenosis   by NASCET criteria.    CT angiography brain: No major vessel occlusion or proximal stenosis by   NASCET criteria.    < end of copied text >

## 2019-09-11 NOTE — CONSULT NOTE ADULT - SUBJECTIVE AND OBJECTIVE BOX
[facility] Ophthalmology Consult Note    HPI: HPI: 59 year old M with a PMH of HTN and peptic ulcer disease presents with dizziness, headache, and bilateral lower extremity weakness that started on 9/9. The patient noticed that his blood pressure was elevated to 157 systolic at home. He had two episodes of vomiting on 9/9. He describes the headache as bifrontal and non radiating. It improves when sitting upright. He noticed bilateral leg weakness, particularly weak in the knees, and noticed that he could not walk easily. He notes that his bilateral legs have become swollen. This has never happened before. Patient does not currently have a headache or dizziness. Patient denies any visual changes, numbness, tingling, or change in hearing.     Ophthalmology consulted for radiologic findings of IIH    PAST MEDICAL & SURGICAL HISTORY:  HTN (hypertension)  Knee osteoarthritis  Duodenal ulcer disease  Morbid obesity  Hypertension  Osteoarthritis  PUD (peptic ulcer disease)  Hypertension  Gastroduodenal artery bleed: IR embolization of gastroduodenal artery  Abdominal hernia  H/O ventral hernia repair      MEDICATIONS (HOME):  Home Medications:  Lasix 20 mg oral tablet: 1 tab(s) orally once a day (06 Sep 2019 19:24)  potassium chloride 20 mEq oral tablet, extended release: 1 tab(s) orally once a day (06 Sep 2019 19:24)  Amlodipine  Carvedilol  Enalapril  Omeprazole    POcHx (including surgeries/lasers/trauma):  None  Drops: None  FamHx: None  Social Hx: None  Allergies: NKDA    ROS:  General (neg), Vision (per HPI), Head and Neck (neg), Pulm (neg), CV (neg), GI (neg),  (neg), Musculoskeletal (neg), Skin/Integ (neg), Neuro (neg), Endocrine (neg), Heme (neg), All/Immuno (neg)    Mood and Affect Appropriate ( x ),  Oriented to Time, Place, and Person x 3 ( x )    Ophthalmology Exam    Visual acuity (sc): 20/20 OU  Pupils: PERRL OU, no APD  Ttono: 16 OU  Extraocular movements (EOMs): Full OU, no pain, no diplopia   Confrontational Visual Field (CVF):  Full OU  Color Plates: 12/12 OU    Pen Light Exam (PLE)  External:  Flat OU  Lids/Lashes/Lacrimal Ducts: Flat OU    Sclera/Conjunctiva:  W+Q OU  Cornea: Cl OU  Anterior Chamber: D+F OU  Iris:  Flat OU  Lens:  Cl OU    Fundus Exam: dilated with 1% tropicamide and 2.5% phenylephrine  Approval obtained from primary team for dilation  Patient aware that pupils can remained dilated for at least 4-6 hours  Exam performed with 20D lens    Vitreous: wnl OU  Disc, cup/disc: sharp and pink, 0.4 OU  Macula:  wnl OU  Vessels:  wnl OU  Periphery: wnl OU    Diagnostic Testing:      Assessment:      Plan:        Follow-Up:  Patient should follow up his/her ophthalmologist or in the [facility] Ophthalmology Practice within 1 week of discharge.  [address] [place] [address].  [place] [address] [phone]    S/D/W Dr Lanza (attending) [facility] Ophthalmology Consult Note    HPI: 59 year old obese M with a PMH of HTN and peptic ulcer disease presents with dizziness, headache, and bilateral lower extremity weakness that started on 9/9. The patient noticed that his blood pressure was elevated to 157 systolic at home. He had two episodes of vomiting on 9/9. He describes the headache as bifrontal and non radiating. It improves when sitting upright. He noticed bilateral leg weakness, particularly weak in the knees, and noticed that he could not walk easily. He notes that his bilateral legs have become swollen. This has never happened before. Patient does not currently have a headache or dizziness. Patient denies any visual changes, numbness, tingling, or change in hearing.     Ophthalmology consulted for radiologic findings of IIH. Denies changes in vision with positioning or ringing in ears.     PAST MEDICAL & SURGICAL HISTORY:  HTN (hypertension)  Knee osteoarthritis  Duodenal ulcer disease  Morbid obesity  Hypertension  Osteoarthritis  PUD (peptic ulcer disease)  Hypertension  Gastroduodenal artery bleed: IR embolization of gastroduodenal artery  Abdominal hernia  H/O ventral hernia repair      MEDICATIONS (HOME):  Home Medications:  Lasix 20 mg oral tablet: 1 tab(s) orally once a day (06 Sep 2019 19:24)  potassium chloride 20 mEq oral tablet, extended release: 1 tab(s) orally once a day (06 Sep 2019 19:24)  Amlodipine  Carvedilol  Enalapril  Omeprazole    POcHx (including surgeries/lasers/trauma):  None  Drops: None  FamHx: None  Social Hx: None  Allergies: NKDA    ROS:  General (neg), Vision (per HPI), Head and Neck (neg), Pulm (neg), CV (neg), GI (neg),  (neg), Musculoskeletal (neg), Skin/Integ (neg), Neuro (neg), Endocrine (neg), Heme (neg), All/Immuno (neg)    Mood and Affect Appropriate ( x ),  Oriented to Time, Place, and Person x 3 ( x )    Ophthalmology Exam    Visual acuity (sc): 20/20 OU  Pupils: PERRL OU, no APD  Ttono: 16 OU  Extraocular movements (EOMs): Full OU, no pain, no diplopia   Confrontational Visual Field (CVF):  Full OU  Color Plates: 12/12 OU    Pen Light Exam (PLE)  External:  Flat OU  Lids/Lashes/Lacrimal Ducts: Flat OU    Sclera/Conjunctiva:  W+Q OU  Cornea: Cl OU  Anterior Chamber: D+F OU  Iris:  Flat OU  Lens:  Cl OU    Fundus Exam: dilated with 1% tropicamide and 2.5% phenylephrine  Approval obtained from primary team for dilation  Patient aware that pupils can remained dilated for at least 4-6 hours  Exam performed with 20D lens    Vitreous: wnl OU  Disc, cup/disc: sharp and pink, 0.3 OU  Macula:  few drusen OU  Vessels:  wnl OU  Periphery: wnl OU    Diagnostic Testing:  < from: CT Head No Cont (09.10.19 @ 21:20) >  FINDINGS:     CT BRAIN:  There is no evidence of an acute intracranial hemorrhage, extra-axial   collection, mass effect, or midline shift. The basal cisterns are patent.   No hydrocephalus.     There is age-appropriate cerebral volume loss with proportional   prominence of the sulci and ventricles. Nonspecific white matter   hypoattenuation, likely related to chronic microvascular changes.     Partially empty sella with enlargement of the sella turcica. Mild   dilatation and tortuosity of the bilateral optic nerves. This can be seen   in the setting of pseudotumor cerebri.     The visualized paranasal sinuses and mastoid air cells are clear.    No displaced calvarial fracture.     < end of copied text >      Assessment/Plan:  59 year old obese M with a PMH of HTN and peptic ulcer disease presents with dizziness, headache, and bilateral lower extremity weakness that started on 9/9. Vitals significant for initial blood pressure of 165/98. CTH w/o showed "Partially empty sella with enlargement of the sella turcica, and mild dilatation and tortuosity of bilateral optic nerves, which can be seen in the setting of pseudotumor cerebri" however without symptoms c/w IIH and no optic disc edema on exam  - the absence of optic disc edema does not rule out elevated intracranial pressure  - rest of care per primary and neuro  - discussed findings with patient and primary team    Follow-Up:  Patient should follow up his/her ophthalmologist or in the [facility] Ophthalmology Practice within 1 week of discharge.  [address] [facility] [place] [address]  [phone] [facility] Ophthalmology Consult Note    HPI: 59 year old obese M with a PMH of HTN and peptic ulcer disease presents with dizziness, headache, and bilateral lower extremity weakness that started on 9/9. The patient noticed that his blood pressure was elevated to 157 systolic at home. He had two episodes of vomiting on 9/9. He describes the headache as bifrontal and non radiating. It improves when sitting upright. He noticed bilateral leg weakness, particularly weak in the knees, and noticed that he could not walk easily. He notes that his bilateral legs have become swollen. This has never happened before. Patient does not currently have a headache or dizziness. Patient denies any visual changes, numbness, tingling, or change in hearing.     Ophthalmology consulted for radiologic findings of IIH. Denies changes in vision with positioning or ringing in ears.     PAST MEDICAL & SURGICAL HISTORY:  HTN (hypertension)  Knee osteoarthritis  Duodenal ulcer disease  Morbid obesity  Hypertension  Osteoarthritis  PUD (peptic ulcer disease)  Hypertension  Gastroduodenal artery bleed: IR embolization of gastroduodenal artery  Abdominal hernia  H/O ventral hernia repair      MEDICATIONS (HOME):  Home Medications:  Lasix 20 mg oral tablet: 1 tab(s) orally once a day (06 Sep 2019 19:24)  potassium chloride 20 mEq oral tablet, extended release: 1 tab(s) orally once a day (06 Sep 2019 19:24)  Amlodipine  Carvedilol  Enalapril  Omeprazole    POcHx (including surgeries/lasers/trauma):  None  Drops: None  FamHx: None  Social Hx: None  Allergies: NKDA    ROS:  General (neg), Vision (per HPI), Head and Neck (neg), Pulm (neg), CV (neg), GI (neg),  (neg), Musculoskeletal (neg), Skin/Integ (neg), Neuro (neg), Endocrine (neg), Heme (neg), All/Immuno (neg)    Mood and Affect Appropriate ( x ),  Oriented to Time, Place, and Person x 3 ( x )    Ophthalmology Exam    Visual acuity (sc): 20/20 OU  Pupils: PERRL OU, no APD  Ttono: 16 OU  Extraocular movements (EOMs): Full OU, no pain, no diplopia   Confrontational Visual Field (CVF):  Full OU  Color Plates: 12/12 OU    Pen Light Exam (PLE)  External:  Flat OU  Lids/Lashes/Lacrimal Ducts: Flat OU    Sclera/Conjunctiva:  W+Q OU  Cornea: Cl OU  Anterior Chamber: D+F OU  Iris:  Flat OU  Lens:  Cl OU    Fundus Exam: dilated with 1% tropicamide and 2.5% phenylephrine  Approval obtained from primary team for dilation  Patient aware that pupils can remained dilated for at least 4-6 hours  Exam performed with 20D lens    Vitreous: wnl OU  Disc, cup/disc: sharp and pink, 0.3 OU, no DH OU  Macula:  few drusen OU  Vessels:  wnl OU  Periphery: wnl OU    Diagnostic Testing:  < from: CT Head No Cont (09.10.19 @ 21:20) >  FINDINGS:     CT BRAIN:  There is no evidence of an acute intracranial hemorrhage, extra-axial   collection, mass effect, or midline shift. The basal cisterns are patent.   No hydrocephalus.     There is age-appropriate cerebral volume loss with proportional   prominence of the sulci and ventricles. Nonspecific white matter   hypoattenuation, likely related to chronic microvascular changes.     Partially empty sella with enlargement of the sella turcica. Mild   dilatation and tortuosity of the bilateral optic nerves. This can be seen   in the setting of pseudotumor cerebri.     The visualized paranasal sinuses and mastoid air cells are clear.    No displaced calvarial fracture.     < end of copied text >      Assessment/Plan:  59 year old obese M with a PMH of HTN and peptic ulcer disease presents with dizziness, headache, and bilateral lower extremity weakness that started on 9/9. Vitals significant for initial blood pressure of 165/98. CTH w/o showed "Partially empty sella with enlargement of the sella turcica, and mild dilatation and tortuosity of bilateral optic nerves, which can be seen in the setting of pseudotumor cerebri" however without symptoms c/w IIH and no optic disc edema on exam  - the absence of optic disc edema does not rule out elevated intracranial pressure  - rest of care per primary and neuro  - discussed findings with patient and primary team    Follow-Up:  Patient should follow up his/her ophthalmologist or in the [facility] Ophthalmology Practice within 1 week of discharge.  [address] [facility] [place] [address]  [phone]

## 2019-09-11 NOTE — ED CDU PROVIDER DISPOSITION NOTE - CLINICAL COURSE
58 yo male with pmh of htn, cellulitis presenting with c/o headache, lightheadedness and htn today. Pt states he was fixing the television yesterday when he had a sudden onset throbbing frontal headache, nonradiating with feeling of lightheadedness and generalized weakness afterwards. He woke this morning with continued HA, took Tylenol with some relief but HA returned. Pt states lightheadedness worse with ambulation and movement, VNS came to his house today to evaluate his cellulitis, BP was in 170s and called EMS as he still had HA and lightheadedness. Denies changes in vision, chest pain, shortness of breath, difficulty breathing, abdominal pain, numbness, tingling, paresthesias.  Pt is followed by the [facility] wound care department weekly and has a visiting nurse that comes every other day for dressing changes. VNS due to go to pts home tomorrow.  ED course: CT head: CT brain: No evidence of acute intracranial hemorrhage, midline shift, or hydrocephalus. Partially empty sella with enlargement of the sella turcica, and mild dilatation and tortuosity of bilateral optic nerves, which can be seen in the setting of pseudotumor cerebri. MRI Planned with Neuro and Opthalmology consulted. Spinal Tap done without acute pathology findings.   MRI results: 58 yo male with pmh of htn, cellulitis presenting with c/o headache, lightheadedness and htn today. Pt states he was fixing the television yesterday when he had a sudden onset throbbing frontal headache, nonradiating with feeling of lightheadedness and generalized weakness afterwards. He woke this morning with continued HA, took Tylenol with some relief but HA returned. Pt states lightheadedness worse with ambulation and movement, VNS came to his house today to evaluate his cellulitis, BP was in 170s and called EMS as he still had HA and lightheadedness. Denies changes in vision, chest pain, shortness of breath, difficulty breathing, abdominal pain, numbness, tingling, paresthesias.  Pt is followed by the [facility] wound care department weekly and has a visiting nurse that comes every other day for dressing changes. VNS due to go to pts home tomorrow.  ED course: CT head: CT brain: No evidence of acute intracranial hemorrhage, midline shift, or hydrocephalus. Partially empty sella with enlargement of the sella turcica, and mild dilatation and tortuosity of bilateral optic nerves, which can be seen in the setting of pseudotumor cerebri. MRI Planned with Neuro and Opthalmology consulted. Spinal Tap done without acute pathology findings.   MRI results: no acute/emergent findings. partial empty sella seen. spoke to neuro, discussed results, ok to d/c home to f/up outpt.

## 2019-09-11 NOTE — ED CDU PROVIDER INITIAL DAY NOTE - PROGRESS NOTE DETAILS
Pt comfortable. No complaints. Vital signs stable. Will continue to observe and reassess. Awaiting LP and MRI. -Alejandra Engle PA-C Pt comfortable. No complaints. Vital signs stable. seen by ophtho. LP done - opening pressure 22.  Will continue to observe and reassess.  Awaiting MRI. -Alejandra Engle PA-C Patient seen at bedside. VSS. No complaints. Patient resting comfortably, no complaints.  Will reevaluate. - Tammie Smyth PA-C Patient seen at bedside. VSS. No complaints. Patient resting comfortably, no complaints. Sensation and motor equal and full. Will reevaluate. - Tammie Smyth PA-C

## 2019-09-11 NOTE — ED CDU PROVIDER DISPOSITION NOTE - ATTENDING CONTRIBUTION TO CARE
NANI: I , Dr Julisa Anderson have seen and evaluated the patient. Results of labs, tests, imaging have been reviewed. The patient reports improvement in symptoms.  The patient is stable for discharge home and will follow up with their primary physician, neuro and ophtho.

## 2019-09-11 NOTE — ED CDU PROVIDER DISPOSITION NOTE - PATIENT PORTAL LINK FT
You can access the FollowMyHealth Patient Portal offered by [facility] by registering at the following website: [email or web address]. By joining [facility]’s FollowMyHealth portal, you will also be able to view your health information using other applications (apps) compatible with our system.

## 2019-09-12 VITALS
HEART RATE: 84 BPM | TEMPERATURE: 98 F | OXYGEN SATURATION: 99 % | DIASTOLIC BLOOD PRESSURE: 90 MMHG | SYSTOLIC BLOOD PRESSURE: 148 MMHG

## 2019-09-12 DIAGNOSIS — D64.9 ANEMIA, UNSPECIFIED: ICD-10-CM

## 2019-09-12 DIAGNOSIS — M17.12 UNILATERAL PRIMARY OSTEOARTHRITIS, LEFT KNEE: ICD-10-CM

## 2019-09-12 DIAGNOSIS — I87.311 CHRONIC VENOUS HYPERTENSION (IDIOPATHIC) WITH ULCER OF RIGHT LOWER EXTREMITY: ICD-10-CM

## 2019-09-12 DIAGNOSIS — I10 ESSENTIAL (PRIMARY) HYPERTENSION: ICD-10-CM

## 2019-09-12 DIAGNOSIS — Z79.899 OTHER LONG TERM (CURRENT) DRUG THERAPY: ICD-10-CM

## 2019-09-12 DIAGNOSIS — Z72.4 INAPPROPRIATE DIET AND EATING HABITS: ICD-10-CM

## 2019-09-12 DIAGNOSIS — Z91.81 HISTORY OF FALLING: ICD-10-CM

## 2019-09-12 DIAGNOSIS — L97.812 NON-PRESSURE CHRONIC ULCER OF OTHER PART OF RIGHT LOWER LEG WITH FAT LAYER EXPOSED: ICD-10-CM

## 2019-09-12 DIAGNOSIS — Z74.1 NEED FOR ASSISTANCE WITH PERSONAL CARE: ICD-10-CM

## 2019-09-12 DIAGNOSIS — I73.9 PERIPHERAL VASCULAR DISEASE, UNSPECIFIED: ICD-10-CM

## 2019-09-12 DIAGNOSIS — R60.9 EDEMA, UNSPECIFIED: ICD-10-CM

## 2019-09-12 PROCEDURE — 85610 PROTHROMBIN TIME: CPT

## 2019-09-12 PROCEDURE — 85652 RBC SED RATE AUTOMATED: CPT

## 2019-09-12 PROCEDURE — 84443 ASSAY THYROID STIM HORMONE: CPT

## 2019-09-12 PROCEDURE — 70551 MRI BRAIN STEM W/O DYE: CPT

## 2019-09-12 PROCEDURE — 70551 MRI BRAIN STEM W/O DYE: CPT | Mod: 26

## 2019-09-12 PROCEDURE — 89051 BODY FLUID CELL COUNT: CPT

## 2019-09-12 PROCEDURE — 70498 CT ANGIOGRAPHY NECK: CPT

## 2019-09-12 PROCEDURE — 99217: CPT

## 2019-09-12 PROCEDURE — 80053 COMPREHEN METABOLIC PANEL: CPT

## 2019-09-12 PROCEDURE — 81003 URINALYSIS AUTO W/O SCOPE: CPT

## 2019-09-12 PROCEDURE — 96360 HYDRATION IV INFUSION INIT: CPT | Mod: XU

## 2019-09-12 PROCEDURE — 70450 CT HEAD/BRAIN W/O DYE: CPT

## 2019-09-12 PROCEDURE — 77003 FLUOROGUIDE FOR SPINE INJECT: CPT

## 2019-09-12 PROCEDURE — 62270 DX LMBR SPI PNXR: CPT

## 2019-09-12 PROCEDURE — 99284 EMERGENCY DEPT VISIT MOD MDM: CPT | Mod: 25

## 2019-09-12 PROCEDURE — 82945 GLUCOSE OTHER FLUID: CPT

## 2019-09-12 PROCEDURE — 85027 COMPLETE CBC AUTOMATED: CPT

## 2019-09-12 PROCEDURE — G0378: CPT

## 2019-09-12 PROCEDURE — 70496 CT ANGIOGRAPHY HEAD: CPT

## 2019-09-12 PROCEDURE — 85730 THROMBOPLASTIN TIME PARTIAL: CPT

## 2019-09-12 PROCEDURE — 84157 ASSAY OF PROTEIN OTHER: CPT

## 2019-09-12 PROCEDURE — 96361 HYDRATE IV INFUSION ADD-ON: CPT

## 2019-09-12 RX ADMIN — Medication 20 MILLIGRAM(S): at 00:39

## 2019-09-12 RX ADMIN — Medication 20 MILLIGRAM(S): at 05:27

## 2019-09-12 NOTE — ED CDU PROVIDER SUBSEQUENT DAY NOTE - HISTORY
No interval changes since initial CDU provider note. Pt feels well without complaint. NAD VSS. Patient to have MRI in AM, neuro checks q4, with Neurology following. Motor and sensation equal b/l, neuro grossly intact. - FOSTER Smyth

## 2019-09-12 NOTE — ED CDU PROVIDER SUBSEQUENT DAY NOTE - PROGRESS NOTE DETAILS
Patient seen at bedside. VSS. Patient resting comfortably, no complaints. Motor and sensation equal b/l, neuro grossly intact. Will reevaluate. - Tammie Smyth PA-C pt in MRI. CDU NOTE FOSTER Barney: pt resting comfortably, feels well without complaint. no HA. NAD recent VSS. CDU NOTE FOSTER Barney: MRI- no acute/emergent finding. spoke to Neurology- ok to d/c home to f/up outpt.   as per Dr. Anderson, pt stable for d/c home.

## 2019-09-12 NOTE — ED ADULT NURSE REASSESSMENT NOTE - NS ED NURSE REASSESS COMMENT FT1
Dr Spears using  phone to consent pt and explain lumbar puncture neuro is reccomending in patients treatment
Neuro MD consult at pt bedside for assessment at this time.
Pt neuro check intact, no deficits noted.
Pt neuro check intact, no deficits noted.
Report received from JESS Kebede. Pt is breathing unlabored on RA. VSS. See neuro paper flowsheet. Pt ambulated to bathroom with crutches, tolerated well. Educated pt on plan of care. Safety and comfort maintained. Awaiting neuro consult. MD Sharpe states he will re-page neuro MD for consult.
Report taken from Daly MERCADO. states pt have a good night with no complaints. Will continue to monitor.
awaiting neuro consult, no distress
pt ambulatory to bathroom with use of crutches right foot in open shoe with wound dressing intact pt pending cdu bed for MRI
pt denies any headche feels better c/o some knee pain pt pending cdu evaluation
pt with LP attempt by Dr Spears unsuccessful per md pt ambulated to bathroom no distress pending disposition
video interpeter used for filling out MRI form all questions pt had addressed pt wants test so he can feel better and his knees don't hurt he states
States he is feeling better, dizziness has resolved.
Pt received from JESS Ragsdale. Pt oriented to CDU & plan of care was discussed. Pt A&O x 3. Pt in CDU for MRI, neuro checks q 4, and lumbar puncture. Pt denies any lightheadedness, or headache as of now. Pt neuro check intact, no deficits noted. Pt resting bed. Safety & comfort measures maintained. Call bell in reach. Will continue to monitor.

## 2019-09-12 NOTE — ED CDU PROVIDER SUBSEQUENT DAY NOTE - ATTENDING CONTRIBUTION TO CARE
58 yo male with pmh of htn, cellulitis presenting with c/o headache, lightheadedness and htn today. throbbing frontal headache, nonradiating with feeling of lightheadedness worse with ambulation and movement, VNS came to his house today to evaluate his cellulitis, BP was in 170s and called EMS as he still had HA and lightheadedness. Denies changes in vision, chest pain, shortness of breath, difficulty breathing, abdominal pain, numbness, tingling, paresthesias. CTH w/o showed "Partially empty sella with enlargement of the sella turcica, and mild dilatation and tortuosity of bilateral optic nerves, which can be seen in the setting of pseudotumor cerebri." Seen by ophtho who noted no optic disc edema however does not r/o IIH. Also seen by neuro who felt could be IIH vs HTN related. MRI pending. Neuro intact, feeling well. No HA.

## 2019-09-12 NOTE — ED CDU PROVIDER SUBSEQUENT DAY NOTE - CPE EDP NEURO NORM
normal...
Results reported to patient--grossly benign, labs wnl, CT and US grossly benign, no reason for acute pain elucidated  Pt. reports feeling better after meds  pt. agrees to f/u with primary care outpt., will refer to gyn  pt. understands to return to ED if symptoms worsen; will d/c

## 2019-09-18 ENCOUNTER — OUTPATIENT (OUTPATIENT)
Dept: OUTPATIENT SERVICES | Facility: HOSPITAL | Age: 59
LOS: 1 days | Discharge: HOME | End: 2019-09-18

## 2019-09-18 DIAGNOSIS — Z98.89 OTHER SPECIFIED POSTPROCEDURAL STATES: Chronic | ICD-10-CM

## 2019-09-18 DIAGNOSIS — K46.9 UNSPECIFIED ABDOMINAL HERNIA WITHOUT OBSTRUCTION OR GANGRENE: Chronic | ICD-10-CM

## 2019-09-18 DIAGNOSIS — L89.90 PRESSURE ULCER OF UNSPECIFIED SITE, UNSPECIFIED STAGE: ICD-10-CM

## 2019-09-18 DIAGNOSIS — R58 HEMORRHAGE, NOT ELSEWHERE CLASSIFIED: Chronic | ICD-10-CM

## 2019-09-25 ENCOUNTER — OUTPATIENT (OUTPATIENT)
Dept: OUTPATIENT SERVICES | Facility: HOSPITAL | Age: 59
LOS: 1 days | Discharge: HOME | End: 2019-09-25

## 2019-09-25 DIAGNOSIS — L89.90 PRESSURE ULCER OF UNSPECIFIED SITE, UNSPECIFIED STAGE: ICD-10-CM

## 2019-09-25 DIAGNOSIS — K46.9 UNSPECIFIED ABDOMINAL HERNIA WITHOUT OBSTRUCTION OR GANGRENE: Chronic | ICD-10-CM

## 2019-09-25 DIAGNOSIS — R58 HEMORRHAGE, NOT ELSEWHERE CLASSIFIED: Chronic | ICD-10-CM

## 2019-09-25 DIAGNOSIS — Z98.89 OTHER SPECIFIED POSTPROCEDURAL STATES: Chronic | ICD-10-CM

## 2019-10-01 DIAGNOSIS — I73.9 PERIPHERAL VASCULAR DISEASE, UNSPECIFIED: ICD-10-CM

## 2019-10-01 DIAGNOSIS — D64.9 ANEMIA, UNSPECIFIED: ICD-10-CM

## 2019-10-01 DIAGNOSIS — I87.311 CHRONIC VENOUS HYPERTENSION (IDIOPATHIC) WITH ULCER OF RIGHT LOWER EXTREMITY: ICD-10-CM

## 2019-10-01 DIAGNOSIS — M17.12 UNILATERAL PRIMARY OSTEOARTHRITIS, LEFT KNEE: ICD-10-CM

## 2019-10-01 DIAGNOSIS — Z87.19 PERSONAL HISTORY OF OTHER DISEASES OF THE DIGESTIVE SYSTEM: ICD-10-CM

## 2019-10-01 DIAGNOSIS — L97.812 NON-PRESSURE CHRONIC ULCER OF OTHER PART OF RIGHT LOWER LEG WITH FAT LAYER EXPOSED: ICD-10-CM

## 2019-10-01 DIAGNOSIS — Z72.4 INAPPROPRIATE DIET AND EATING HABITS: ICD-10-CM

## 2019-10-01 DIAGNOSIS — R60.9 EDEMA, UNSPECIFIED: ICD-10-CM

## 2019-10-01 DIAGNOSIS — Z91.81 HISTORY OF FALLING: ICD-10-CM

## 2019-10-01 DIAGNOSIS — I10 ESSENTIAL (PRIMARY) HYPERTENSION: ICD-10-CM

## 2019-10-01 DIAGNOSIS — Z79.899 OTHER LONG TERM (CURRENT) DRUG THERAPY: ICD-10-CM

## 2019-10-01 DIAGNOSIS — Z74.1 NEED FOR ASSISTANCE WITH PERSONAL CARE: ICD-10-CM

## 2019-10-02 DIAGNOSIS — M17.12 UNILATERAL PRIMARY OSTEOARTHRITIS, LEFT KNEE: ICD-10-CM

## 2019-10-02 DIAGNOSIS — I10 ESSENTIAL (PRIMARY) HYPERTENSION: ICD-10-CM

## 2019-10-02 DIAGNOSIS — I87.311 CHRONIC VENOUS HYPERTENSION (IDIOPATHIC) WITH ULCER OF RIGHT LOWER EXTREMITY: ICD-10-CM

## 2019-10-02 DIAGNOSIS — D64.9 ANEMIA, UNSPECIFIED: ICD-10-CM

## 2019-10-02 DIAGNOSIS — I73.9 PERIPHERAL VASCULAR DISEASE, UNSPECIFIED: ICD-10-CM

## 2019-10-02 DIAGNOSIS — Z91.81 HISTORY OF FALLING: ICD-10-CM

## 2019-10-02 DIAGNOSIS — Z74.1 NEED FOR ASSISTANCE WITH PERSONAL CARE: ICD-10-CM

## 2019-10-02 DIAGNOSIS — Z79.899 OTHER LONG TERM (CURRENT) DRUG THERAPY: ICD-10-CM

## 2019-10-02 DIAGNOSIS — Z72.4 INAPPROPRIATE DIET AND EATING HABITS: ICD-10-CM

## 2019-10-02 DIAGNOSIS — R60.9 EDEMA, UNSPECIFIED: ICD-10-CM

## 2019-10-02 DIAGNOSIS — L97.812 NON-PRESSURE CHRONIC ULCER OF OTHER PART OF RIGHT LOWER LEG WITH FAT LAYER EXPOSED: ICD-10-CM

## 2019-10-03 ENCOUNTER — OUTPATIENT (OUTPATIENT)
Dept: OUTPATIENT SERVICES | Facility: HOSPITAL | Age: 59
LOS: 1 days | Discharge: HOME | End: 2019-10-03

## 2019-10-03 DIAGNOSIS — R58 HEMORRHAGE, NOT ELSEWHERE CLASSIFIED: Chronic | ICD-10-CM

## 2019-10-03 DIAGNOSIS — L89.90 PRESSURE ULCER OF UNSPECIFIED SITE, UNSPECIFIED STAGE: ICD-10-CM

## 2019-10-03 DIAGNOSIS — K46.9 UNSPECIFIED ABDOMINAL HERNIA WITHOUT OBSTRUCTION OR GANGRENE: Chronic | ICD-10-CM

## 2019-10-03 DIAGNOSIS — Z98.89 OTHER SPECIFIED POSTPROCEDURAL STATES: Chronic | ICD-10-CM

## 2019-10-08 DIAGNOSIS — M17.12 UNILATERAL PRIMARY OSTEOARTHRITIS, LEFT KNEE: ICD-10-CM

## 2019-10-08 DIAGNOSIS — I87.311 CHRONIC VENOUS HYPERTENSION (IDIOPATHIC) WITH ULCER OF RIGHT LOWER EXTREMITY: ICD-10-CM

## 2019-10-08 DIAGNOSIS — I10 ESSENTIAL (PRIMARY) HYPERTENSION: ICD-10-CM

## 2019-10-08 DIAGNOSIS — L97.812 NON-PRESSURE CHRONIC ULCER OF OTHER PART OF RIGHT LOWER LEG WITH FAT LAYER EXPOSED: ICD-10-CM

## 2019-10-08 DIAGNOSIS — Z79.899 OTHER LONG TERM (CURRENT) DRUG THERAPY: ICD-10-CM

## 2019-10-08 DIAGNOSIS — I73.9 PERIPHERAL VASCULAR DISEASE, UNSPECIFIED: ICD-10-CM

## 2019-10-08 DIAGNOSIS — R60.9 EDEMA, UNSPECIFIED: ICD-10-CM

## 2019-10-08 DIAGNOSIS — Z91.81 HISTORY OF FALLING: ICD-10-CM

## 2019-10-08 DIAGNOSIS — D64.9 ANEMIA, UNSPECIFIED: ICD-10-CM

## 2019-10-08 DIAGNOSIS — Z72.4 INAPPROPRIATE DIET AND EATING HABITS: ICD-10-CM

## 2019-10-08 DIAGNOSIS — Z74.1 NEED FOR ASSISTANCE WITH PERSONAL CARE: ICD-10-CM

## 2019-10-10 ENCOUNTER — OUTPATIENT (OUTPATIENT)
Dept: OUTPATIENT SERVICES | Facility: HOSPITAL | Age: 59
LOS: 1 days | Discharge: HOME | End: 2019-10-10

## 2019-10-10 DIAGNOSIS — Z98.89 OTHER SPECIFIED POSTPROCEDURAL STATES: Chronic | ICD-10-CM

## 2019-10-10 DIAGNOSIS — K46.9 UNSPECIFIED ABDOMINAL HERNIA WITHOUT OBSTRUCTION OR GANGRENE: Chronic | ICD-10-CM

## 2019-10-10 DIAGNOSIS — R58 HEMORRHAGE, NOT ELSEWHERE CLASSIFIED: Chronic | ICD-10-CM

## 2019-10-10 DIAGNOSIS — L89.90 PRESSURE ULCER OF UNSPECIFIED SITE, UNSPECIFIED STAGE: ICD-10-CM

## 2019-10-16 ENCOUNTER — OUTPATIENT (OUTPATIENT)
Dept: OUTPATIENT SERVICES | Facility: HOSPITAL | Age: 59
LOS: 1 days | Discharge: HOME | End: 2019-10-16

## 2019-10-16 DIAGNOSIS — K46.9 UNSPECIFIED ABDOMINAL HERNIA WITHOUT OBSTRUCTION OR GANGRENE: Chronic | ICD-10-CM

## 2019-10-16 DIAGNOSIS — L89.90 PRESSURE ULCER OF UNSPECIFIED SITE, UNSPECIFIED STAGE: ICD-10-CM

## 2019-10-16 DIAGNOSIS — R58 HEMORRHAGE, NOT ELSEWHERE CLASSIFIED: Chronic | ICD-10-CM

## 2019-10-16 DIAGNOSIS — Z98.89 OTHER SPECIFIED POSTPROCEDURAL STATES: Chronic | ICD-10-CM

## 2019-10-18 DIAGNOSIS — I10 ESSENTIAL (PRIMARY) HYPERTENSION: ICD-10-CM

## 2019-10-18 DIAGNOSIS — Z91.81 HISTORY OF FALLING: ICD-10-CM

## 2019-10-18 DIAGNOSIS — Z72.4 INAPPROPRIATE DIET AND EATING HABITS: ICD-10-CM

## 2019-10-18 DIAGNOSIS — Z74.1 NEED FOR ASSISTANCE WITH PERSONAL CARE: ICD-10-CM

## 2019-10-18 DIAGNOSIS — R60.9 EDEMA, UNSPECIFIED: ICD-10-CM

## 2019-10-18 DIAGNOSIS — M17.12 UNILATERAL PRIMARY OSTEOARTHRITIS, LEFT KNEE: ICD-10-CM

## 2019-10-18 DIAGNOSIS — I87.311 CHRONIC VENOUS HYPERTENSION (IDIOPATHIC) WITH ULCER OF RIGHT LOWER EXTREMITY: ICD-10-CM

## 2019-10-18 DIAGNOSIS — L97.812 NON-PRESSURE CHRONIC ULCER OF OTHER PART OF RIGHT LOWER LEG WITH FAT LAYER EXPOSED: ICD-10-CM

## 2019-10-18 DIAGNOSIS — Z79.899 OTHER LONG TERM (CURRENT) DRUG THERAPY: ICD-10-CM

## 2019-10-18 DIAGNOSIS — D64.9 ANEMIA, UNSPECIFIED: ICD-10-CM

## 2019-10-18 DIAGNOSIS — I73.9 PERIPHERAL VASCULAR DISEASE, UNSPECIFIED: ICD-10-CM

## 2019-10-23 DIAGNOSIS — Z79.899 OTHER LONG TERM (CURRENT) DRUG THERAPY: ICD-10-CM

## 2019-10-23 DIAGNOSIS — M17.12 UNILATERAL PRIMARY OSTEOARTHRITIS, LEFT KNEE: ICD-10-CM

## 2019-10-23 DIAGNOSIS — Z91.81 HISTORY OF FALLING: ICD-10-CM

## 2019-10-23 DIAGNOSIS — R60.9 EDEMA, UNSPECIFIED: ICD-10-CM

## 2019-10-23 DIAGNOSIS — D64.9 ANEMIA, UNSPECIFIED: ICD-10-CM

## 2019-10-23 DIAGNOSIS — Z72.4 INAPPROPRIATE DIET AND EATING HABITS: ICD-10-CM

## 2019-10-23 DIAGNOSIS — I87.311 CHRONIC VENOUS HYPERTENSION (IDIOPATHIC) WITH ULCER OF RIGHT LOWER EXTREMITY: ICD-10-CM

## 2019-10-23 DIAGNOSIS — I73.9 PERIPHERAL VASCULAR DISEASE, UNSPECIFIED: ICD-10-CM

## 2019-10-23 DIAGNOSIS — L97.812 NON-PRESSURE CHRONIC ULCER OF OTHER PART OF RIGHT LOWER LEG WITH FAT LAYER EXPOSED: ICD-10-CM

## 2019-10-23 DIAGNOSIS — Z74.1 NEED FOR ASSISTANCE WITH PERSONAL CARE: ICD-10-CM

## 2019-10-23 DIAGNOSIS — I10 ESSENTIAL (PRIMARY) HYPERTENSION: ICD-10-CM

## 2019-10-24 ENCOUNTER — OUTPATIENT (OUTPATIENT)
Dept: OUTPATIENT SERVICES | Facility: HOSPITAL | Age: 59
LOS: 1 days | Discharge: HOME | End: 2019-10-24

## 2019-10-24 DIAGNOSIS — K46.9 UNSPECIFIED ABDOMINAL HERNIA WITHOUT OBSTRUCTION OR GANGRENE: Chronic | ICD-10-CM

## 2019-10-24 DIAGNOSIS — Z98.89 OTHER SPECIFIED POSTPROCEDURAL STATES: Chronic | ICD-10-CM

## 2019-10-24 DIAGNOSIS — L89.90 PRESSURE ULCER OF UNSPECIFIED SITE, UNSPECIFIED STAGE: ICD-10-CM

## 2019-10-24 DIAGNOSIS — R58 HEMORRHAGE, NOT ELSEWHERE CLASSIFIED: Chronic | ICD-10-CM

## 2019-10-30 ENCOUNTER — OUTPATIENT (OUTPATIENT)
Dept: OUTPATIENT SERVICES | Facility: HOSPITAL | Age: 59
LOS: 1 days | Discharge: HOME | End: 2019-10-30

## 2019-10-30 DIAGNOSIS — L89.90 PRESSURE ULCER OF UNSPECIFIED SITE, UNSPECIFIED STAGE: ICD-10-CM

## 2019-10-30 DIAGNOSIS — R58 HEMORRHAGE, NOT ELSEWHERE CLASSIFIED: Chronic | ICD-10-CM

## 2019-10-30 DIAGNOSIS — K46.9 UNSPECIFIED ABDOMINAL HERNIA WITHOUT OBSTRUCTION OR GANGRENE: Chronic | ICD-10-CM

## 2019-10-30 DIAGNOSIS — Z98.89 OTHER SPECIFIED POSTPROCEDURAL STATES: Chronic | ICD-10-CM

## 2019-11-01 DIAGNOSIS — Z72.4 INAPPROPRIATE DIET AND EATING HABITS: ICD-10-CM

## 2019-11-01 DIAGNOSIS — I87.311 CHRONIC VENOUS HYPERTENSION (IDIOPATHIC) WITH ULCER OF RIGHT LOWER EXTREMITY: ICD-10-CM

## 2019-11-01 DIAGNOSIS — Z74.1 NEED FOR ASSISTANCE WITH PERSONAL CARE: ICD-10-CM

## 2019-11-01 DIAGNOSIS — M17.12 UNILATERAL PRIMARY OSTEOARTHRITIS, LEFT KNEE: ICD-10-CM

## 2019-11-01 DIAGNOSIS — I10 ESSENTIAL (PRIMARY) HYPERTENSION: ICD-10-CM

## 2019-11-01 DIAGNOSIS — Z91.81 HISTORY OF FALLING: ICD-10-CM

## 2019-11-01 DIAGNOSIS — Z79.899 OTHER LONG TERM (CURRENT) DRUG THERAPY: ICD-10-CM

## 2019-11-01 DIAGNOSIS — L97.812 NON-PRESSURE CHRONIC ULCER OF OTHER PART OF RIGHT LOWER LEG WITH FAT LAYER EXPOSED: ICD-10-CM

## 2019-11-01 DIAGNOSIS — R60.9 EDEMA, UNSPECIFIED: ICD-10-CM

## 2019-11-01 DIAGNOSIS — I73.9 PERIPHERAL VASCULAR DISEASE, UNSPECIFIED: ICD-10-CM

## 2019-11-01 DIAGNOSIS — D64.9 ANEMIA, UNSPECIFIED: ICD-10-CM

## 2019-11-06 ENCOUNTER — OUTPATIENT (OUTPATIENT)
Dept: OUTPATIENT SERVICES | Facility: HOSPITAL | Age: 59
LOS: 1 days | Discharge: HOME | End: 2019-11-06

## 2019-11-06 DIAGNOSIS — Z72.4 INAPPROPRIATE DIET AND EATING HABITS: ICD-10-CM

## 2019-11-06 DIAGNOSIS — I10 ESSENTIAL (PRIMARY) HYPERTENSION: ICD-10-CM

## 2019-11-06 DIAGNOSIS — R58 HEMORRHAGE, NOT ELSEWHERE CLASSIFIED: Chronic | ICD-10-CM

## 2019-11-06 DIAGNOSIS — Z74.1 NEED FOR ASSISTANCE WITH PERSONAL CARE: ICD-10-CM

## 2019-11-06 DIAGNOSIS — L97.812 NON-PRESSURE CHRONIC ULCER OF OTHER PART OF RIGHT LOWER LEG WITH FAT LAYER EXPOSED: ICD-10-CM

## 2019-11-06 DIAGNOSIS — L89.90 PRESSURE ULCER OF UNSPECIFIED SITE, UNSPECIFIED STAGE: ICD-10-CM

## 2019-11-06 DIAGNOSIS — Z79.899 OTHER LONG TERM (CURRENT) DRUG THERAPY: ICD-10-CM

## 2019-11-06 DIAGNOSIS — I87.311 CHRONIC VENOUS HYPERTENSION (IDIOPATHIC) WITH ULCER OF RIGHT LOWER EXTREMITY: ICD-10-CM

## 2019-11-06 DIAGNOSIS — D64.9 ANEMIA, UNSPECIFIED: ICD-10-CM

## 2019-11-06 DIAGNOSIS — R60.9 EDEMA, UNSPECIFIED: ICD-10-CM

## 2019-11-06 DIAGNOSIS — M17.12 UNILATERAL PRIMARY OSTEOARTHRITIS, LEFT KNEE: ICD-10-CM

## 2019-11-06 DIAGNOSIS — K46.9 UNSPECIFIED ABDOMINAL HERNIA WITHOUT OBSTRUCTION OR GANGRENE: Chronic | ICD-10-CM

## 2019-11-06 DIAGNOSIS — Z98.89 OTHER SPECIFIED POSTPROCEDURAL STATES: Chronic | ICD-10-CM

## 2019-11-06 DIAGNOSIS — Z91.81 HISTORY OF FALLING: ICD-10-CM

## 2019-11-06 DIAGNOSIS — I73.9 PERIPHERAL VASCULAR DISEASE, UNSPECIFIED: ICD-10-CM

## 2019-11-14 ENCOUNTER — OUTPATIENT (OUTPATIENT)
Dept: OUTPATIENT SERVICES | Facility: HOSPITAL | Age: 59
LOS: 1 days | Discharge: HOME | End: 2019-11-14

## 2019-11-14 DIAGNOSIS — K46.9 UNSPECIFIED ABDOMINAL HERNIA WITHOUT OBSTRUCTION OR GANGRENE: Chronic | ICD-10-CM

## 2019-11-14 DIAGNOSIS — Z98.89 OTHER SPECIFIED POSTPROCEDURAL STATES: Chronic | ICD-10-CM

## 2019-11-14 DIAGNOSIS — L89.90 PRESSURE ULCER OF UNSPECIFIED SITE, UNSPECIFIED STAGE: ICD-10-CM

## 2019-11-14 DIAGNOSIS — R58 HEMORRHAGE, NOT ELSEWHERE CLASSIFIED: Chronic | ICD-10-CM

## 2019-11-20 ENCOUNTER — OUTPATIENT (OUTPATIENT)
Dept: OUTPATIENT SERVICES | Facility: HOSPITAL | Age: 59
LOS: 1 days | Discharge: HOME | End: 2019-11-20

## 2019-11-20 DIAGNOSIS — D64.9 ANEMIA, UNSPECIFIED: ICD-10-CM

## 2019-11-20 DIAGNOSIS — Z74.1 NEED FOR ASSISTANCE WITH PERSONAL CARE: ICD-10-CM

## 2019-11-20 DIAGNOSIS — L89.90 PRESSURE ULCER OF UNSPECIFIED SITE, UNSPECIFIED STAGE: ICD-10-CM

## 2019-11-20 DIAGNOSIS — Z98.89 OTHER SPECIFIED POSTPROCEDURAL STATES: Chronic | ICD-10-CM

## 2019-11-20 DIAGNOSIS — K46.9 UNSPECIFIED ABDOMINAL HERNIA WITHOUT OBSTRUCTION OR GANGRENE: Chronic | ICD-10-CM

## 2019-11-20 DIAGNOSIS — I10 ESSENTIAL (PRIMARY) HYPERTENSION: ICD-10-CM

## 2019-11-20 DIAGNOSIS — I73.9 PERIPHERAL VASCULAR DISEASE, UNSPECIFIED: ICD-10-CM

## 2019-11-20 DIAGNOSIS — Z79.899 OTHER LONG TERM (CURRENT) DRUG THERAPY: ICD-10-CM

## 2019-11-20 DIAGNOSIS — R60.9 EDEMA, UNSPECIFIED: ICD-10-CM

## 2019-11-20 DIAGNOSIS — Z91.81 HISTORY OF FALLING: ICD-10-CM

## 2019-11-20 DIAGNOSIS — I87.311 CHRONIC VENOUS HYPERTENSION (IDIOPATHIC) WITH ULCER OF RIGHT LOWER EXTREMITY: ICD-10-CM

## 2019-11-20 DIAGNOSIS — R58 HEMORRHAGE, NOT ELSEWHERE CLASSIFIED: Chronic | ICD-10-CM

## 2019-11-20 DIAGNOSIS — Z72.4 INAPPROPRIATE DIET AND EATING HABITS: ICD-10-CM

## 2019-11-20 DIAGNOSIS — L97.812 NON-PRESSURE CHRONIC ULCER OF OTHER PART OF RIGHT LOWER LEG WITH FAT LAYER EXPOSED: ICD-10-CM

## 2019-11-20 DIAGNOSIS — M17.12 UNILATERAL PRIMARY OSTEOARTHRITIS, LEFT KNEE: ICD-10-CM

## 2019-11-27 ENCOUNTER — OUTPATIENT (OUTPATIENT)
Dept: OUTPATIENT SERVICES | Facility: HOSPITAL | Age: 59
LOS: 1 days | Discharge: HOME | End: 2019-11-27

## 2019-11-27 DIAGNOSIS — R58 HEMORRHAGE, NOT ELSEWHERE CLASSIFIED: Chronic | ICD-10-CM

## 2019-11-27 DIAGNOSIS — K46.9 UNSPECIFIED ABDOMINAL HERNIA WITHOUT OBSTRUCTION OR GANGRENE: Chronic | ICD-10-CM

## 2019-11-27 DIAGNOSIS — L89.90 PRESSURE ULCER OF UNSPECIFIED SITE, UNSPECIFIED STAGE: ICD-10-CM

## 2019-11-27 DIAGNOSIS — Z98.89 OTHER SPECIFIED POSTPROCEDURAL STATES: Chronic | ICD-10-CM

## 2019-11-29 DIAGNOSIS — R60.9 EDEMA, UNSPECIFIED: ICD-10-CM

## 2019-11-29 DIAGNOSIS — I73.9 PERIPHERAL VASCULAR DISEASE, UNSPECIFIED: ICD-10-CM

## 2019-11-29 DIAGNOSIS — I87.311 CHRONIC VENOUS HYPERTENSION (IDIOPATHIC) WITH ULCER OF RIGHT LOWER EXTREMITY: ICD-10-CM

## 2019-11-29 DIAGNOSIS — M17.12 UNILATERAL PRIMARY OSTEOARTHRITIS, LEFT KNEE: ICD-10-CM

## 2019-11-29 DIAGNOSIS — L97.812 NON-PRESSURE CHRONIC ULCER OF OTHER PART OF RIGHT LOWER LEG WITH FAT LAYER EXPOSED: ICD-10-CM

## 2019-11-29 DIAGNOSIS — Z79.899 OTHER LONG TERM (CURRENT) DRUG THERAPY: ICD-10-CM

## 2019-11-29 DIAGNOSIS — Z72.4 INAPPROPRIATE DIET AND EATING HABITS: ICD-10-CM

## 2019-11-29 DIAGNOSIS — Z91.81 HISTORY OF FALLING: ICD-10-CM

## 2019-11-29 DIAGNOSIS — D64.9 ANEMIA, UNSPECIFIED: ICD-10-CM

## 2019-11-29 DIAGNOSIS — Z74.1 NEED FOR ASSISTANCE WITH PERSONAL CARE: ICD-10-CM

## 2019-11-29 DIAGNOSIS — I10 ESSENTIAL (PRIMARY) HYPERTENSION: ICD-10-CM

## 2019-12-04 ENCOUNTER — OUTPATIENT (OUTPATIENT)
Dept: OUTPATIENT SERVICES | Facility: HOSPITAL | Age: 59
LOS: 1 days | Discharge: HOME | End: 2019-12-04

## 2019-12-04 DIAGNOSIS — I87.311 CHRONIC VENOUS HYPERTENSION (IDIOPATHIC) WITH ULCER OF RIGHT LOWER EXTREMITY: ICD-10-CM

## 2019-12-04 DIAGNOSIS — R60.9 EDEMA, UNSPECIFIED: ICD-10-CM

## 2019-12-04 DIAGNOSIS — L97.812 NON-PRESSURE CHRONIC ULCER OF OTHER PART OF RIGHT LOWER LEG WITH FAT LAYER EXPOSED: ICD-10-CM

## 2019-12-04 DIAGNOSIS — Z74.1 NEED FOR ASSISTANCE WITH PERSONAL CARE: ICD-10-CM

## 2019-12-04 DIAGNOSIS — Z72.4 INAPPROPRIATE DIET AND EATING HABITS: ICD-10-CM

## 2019-12-04 DIAGNOSIS — D64.9 ANEMIA, UNSPECIFIED: ICD-10-CM

## 2019-12-04 DIAGNOSIS — Z98.89 OTHER SPECIFIED POSTPROCEDURAL STATES: Chronic | ICD-10-CM

## 2019-12-04 DIAGNOSIS — Z79.899 OTHER LONG TERM (CURRENT) DRUG THERAPY: ICD-10-CM

## 2019-12-04 DIAGNOSIS — L89.90 PRESSURE ULCER OF UNSPECIFIED SITE, UNSPECIFIED STAGE: ICD-10-CM

## 2019-12-04 DIAGNOSIS — I10 ESSENTIAL (PRIMARY) HYPERTENSION: ICD-10-CM

## 2019-12-04 DIAGNOSIS — M17.12 UNILATERAL PRIMARY OSTEOARTHRITIS, LEFT KNEE: ICD-10-CM

## 2019-12-04 DIAGNOSIS — I73.9 PERIPHERAL VASCULAR DISEASE, UNSPECIFIED: ICD-10-CM

## 2019-12-04 DIAGNOSIS — Z91.81 HISTORY OF FALLING: ICD-10-CM

## 2019-12-04 DIAGNOSIS — K46.9 UNSPECIFIED ABDOMINAL HERNIA WITHOUT OBSTRUCTION OR GANGRENE: Chronic | ICD-10-CM

## 2019-12-04 DIAGNOSIS — R58 HEMORRHAGE, NOT ELSEWHERE CLASSIFIED: Chronic | ICD-10-CM

## 2019-12-11 ENCOUNTER — OUTPATIENT (OUTPATIENT)
Dept: OUTPATIENT SERVICES | Facility: HOSPITAL | Age: 59
LOS: 1 days | Discharge: HOME | End: 2019-12-11

## 2019-12-11 DIAGNOSIS — Z98.89 OTHER SPECIFIED POSTPROCEDURAL STATES: Chronic | ICD-10-CM

## 2019-12-11 DIAGNOSIS — L89.90 PRESSURE ULCER OF UNSPECIFIED SITE, UNSPECIFIED STAGE: ICD-10-CM

## 2019-12-11 DIAGNOSIS — K46.9 UNSPECIFIED ABDOMINAL HERNIA WITHOUT OBSTRUCTION OR GANGRENE: Chronic | ICD-10-CM

## 2019-12-11 DIAGNOSIS — R58 HEMORRHAGE, NOT ELSEWHERE CLASSIFIED: Chronic | ICD-10-CM

## 2019-12-18 ENCOUNTER — OUTPATIENT (OUTPATIENT)
Dept: OUTPATIENT SERVICES | Facility: HOSPITAL | Age: 59
LOS: 1 days | Discharge: HOME | End: 2019-12-18

## 2019-12-18 DIAGNOSIS — R58 HEMORRHAGE, NOT ELSEWHERE CLASSIFIED: Chronic | ICD-10-CM

## 2019-12-18 DIAGNOSIS — D64.9 ANEMIA, UNSPECIFIED: ICD-10-CM

## 2019-12-18 DIAGNOSIS — I87.311 CHRONIC VENOUS HYPERTENSION (IDIOPATHIC) WITH ULCER OF RIGHT LOWER EXTREMITY: ICD-10-CM

## 2019-12-18 DIAGNOSIS — Z98.89 OTHER SPECIFIED POSTPROCEDURAL STATES: Chronic | ICD-10-CM

## 2019-12-18 DIAGNOSIS — Z91.81 HISTORY OF FALLING: ICD-10-CM

## 2019-12-18 DIAGNOSIS — R60.9 EDEMA, UNSPECIFIED: ICD-10-CM

## 2019-12-18 DIAGNOSIS — M17.12 UNILATERAL PRIMARY OSTEOARTHRITIS, LEFT KNEE: ICD-10-CM

## 2019-12-18 DIAGNOSIS — Z74.1 NEED FOR ASSISTANCE WITH PERSONAL CARE: ICD-10-CM

## 2019-12-18 DIAGNOSIS — Z79.899 OTHER LONG TERM (CURRENT) DRUG THERAPY: ICD-10-CM

## 2019-12-18 DIAGNOSIS — L97.812 NON-PRESSURE CHRONIC ULCER OF OTHER PART OF RIGHT LOWER LEG WITH FAT LAYER EXPOSED: ICD-10-CM

## 2019-12-18 DIAGNOSIS — K46.9 UNSPECIFIED ABDOMINAL HERNIA WITHOUT OBSTRUCTION OR GANGRENE: Chronic | ICD-10-CM

## 2019-12-18 DIAGNOSIS — L89.90 PRESSURE ULCER OF UNSPECIFIED SITE, UNSPECIFIED STAGE: ICD-10-CM

## 2019-12-18 DIAGNOSIS — I73.9 PERIPHERAL VASCULAR DISEASE, UNSPECIFIED: ICD-10-CM

## 2019-12-18 DIAGNOSIS — I10 ESSENTIAL (PRIMARY) HYPERTENSION: ICD-10-CM

## 2019-12-19 DIAGNOSIS — I87.311 CHRONIC VENOUS HYPERTENSION (IDIOPATHIC) WITH ULCER OF RIGHT LOWER EXTREMITY: ICD-10-CM

## 2019-12-19 DIAGNOSIS — M17.12 UNILATERAL PRIMARY OSTEOARTHRITIS, LEFT KNEE: ICD-10-CM

## 2019-12-19 DIAGNOSIS — I73.9 PERIPHERAL VASCULAR DISEASE, UNSPECIFIED: ICD-10-CM

## 2019-12-19 DIAGNOSIS — Z91.81 HISTORY OF FALLING: ICD-10-CM

## 2019-12-19 DIAGNOSIS — I10 ESSENTIAL (PRIMARY) HYPERTENSION: ICD-10-CM

## 2019-12-19 DIAGNOSIS — R60.9 EDEMA, UNSPECIFIED: ICD-10-CM

## 2019-12-19 DIAGNOSIS — L97.812 NON-PRESSURE CHRONIC ULCER OF OTHER PART OF RIGHT LOWER LEG WITH FAT LAYER EXPOSED: ICD-10-CM

## 2019-12-19 DIAGNOSIS — Z79.899 OTHER LONG TERM (CURRENT) DRUG THERAPY: ICD-10-CM

## 2019-12-19 DIAGNOSIS — D64.9 ANEMIA, UNSPECIFIED: ICD-10-CM

## 2019-12-19 DIAGNOSIS — Z74.1 NEED FOR ASSISTANCE WITH PERSONAL CARE: ICD-10-CM

## 2020-01-08 ENCOUNTER — OUTPATIENT (OUTPATIENT)
Dept: OUTPATIENT SERVICES | Facility: HOSPITAL | Age: 60
LOS: 1 days | Discharge: HOME | End: 2020-01-08

## 2020-01-08 DIAGNOSIS — Z79.899 OTHER LONG TERM (CURRENT) DRUG THERAPY: ICD-10-CM

## 2020-01-08 DIAGNOSIS — L89.90 PRESSURE ULCER OF UNSPECIFIED SITE, UNSPECIFIED STAGE: ICD-10-CM

## 2020-01-08 DIAGNOSIS — R60.9 EDEMA, UNSPECIFIED: ICD-10-CM

## 2020-01-08 DIAGNOSIS — I87.311 CHRONIC VENOUS HYPERTENSION (IDIOPATHIC) WITH ULCER OF RIGHT LOWER EXTREMITY: ICD-10-CM

## 2020-01-08 DIAGNOSIS — I73.9 PERIPHERAL VASCULAR DISEASE, UNSPECIFIED: ICD-10-CM

## 2020-01-08 DIAGNOSIS — Z98.89 OTHER SPECIFIED POSTPROCEDURAL STATES: Chronic | ICD-10-CM

## 2020-01-08 DIAGNOSIS — L97.812 NON-PRESSURE CHRONIC ULCER OF OTHER PART OF RIGHT LOWER LEG WITH FAT LAYER EXPOSED: ICD-10-CM

## 2020-01-08 DIAGNOSIS — R58 HEMORRHAGE, NOT ELSEWHERE CLASSIFIED: Chronic | ICD-10-CM

## 2020-01-08 DIAGNOSIS — Z74.1 NEED FOR ASSISTANCE WITH PERSONAL CARE: ICD-10-CM

## 2020-01-08 DIAGNOSIS — I10 ESSENTIAL (PRIMARY) HYPERTENSION: ICD-10-CM

## 2020-01-08 DIAGNOSIS — Z91.81 HISTORY OF FALLING: ICD-10-CM

## 2020-01-08 DIAGNOSIS — M17.12 UNILATERAL PRIMARY OSTEOARTHRITIS, LEFT KNEE: ICD-10-CM

## 2020-01-08 DIAGNOSIS — D64.9 ANEMIA, UNSPECIFIED: ICD-10-CM

## 2020-01-08 DIAGNOSIS — K46.9 UNSPECIFIED ABDOMINAL HERNIA WITHOUT OBSTRUCTION OR GANGRENE: Chronic | ICD-10-CM

## 2020-01-15 ENCOUNTER — OUTPATIENT (OUTPATIENT)
Dept: OUTPATIENT SERVICES | Facility: HOSPITAL | Age: 60
LOS: 1 days | Discharge: HOME | End: 2020-01-15

## 2020-01-15 DIAGNOSIS — Z98.89 OTHER SPECIFIED POSTPROCEDURAL STATES: Chronic | ICD-10-CM

## 2020-01-15 DIAGNOSIS — K46.9 UNSPECIFIED ABDOMINAL HERNIA WITHOUT OBSTRUCTION OR GANGRENE: Chronic | ICD-10-CM

## 2020-01-15 DIAGNOSIS — L89.90 PRESSURE ULCER OF UNSPECIFIED SITE, UNSPECIFIED STAGE: ICD-10-CM

## 2020-01-15 DIAGNOSIS — R58 HEMORRHAGE, NOT ELSEWHERE CLASSIFIED: Chronic | ICD-10-CM

## 2020-01-22 ENCOUNTER — OUTPATIENT (OUTPATIENT)
Dept: OUTPATIENT SERVICES | Facility: HOSPITAL | Age: 60
LOS: 1 days | Discharge: HOME | End: 2020-01-22

## 2020-01-22 DIAGNOSIS — Z98.89 OTHER SPECIFIED POSTPROCEDURAL STATES: Chronic | ICD-10-CM

## 2020-01-22 DIAGNOSIS — K46.9 UNSPECIFIED ABDOMINAL HERNIA WITHOUT OBSTRUCTION OR GANGRENE: Chronic | ICD-10-CM

## 2020-01-22 DIAGNOSIS — R58 HEMORRHAGE, NOT ELSEWHERE CLASSIFIED: Chronic | ICD-10-CM

## 2020-01-22 DIAGNOSIS — L89.90 PRESSURE ULCER OF UNSPECIFIED SITE, UNSPECIFIED STAGE: ICD-10-CM

## 2020-01-29 ENCOUNTER — OUTPATIENT (OUTPATIENT)
Dept: OUTPATIENT SERVICES | Facility: HOSPITAL | Age: 60
LOS: 1 days | Discharge: HOME | End: 2020-01-29

## 2020-01-29 DIAGNOSIS — Z98.89 OTHER SPECIFIED POSTPROCEDURAL STATES: Chronic | ICD-10-CM

## 2020-01-29 DIAGNOSIS — L89.899 PRESSURE ULCER OF OTHER SITE, UNSPECIFIED STAGE: ICD-10-CM

## 2020-01-29 DIAGNOSIS — K46.9 UNSPECIFIED ABDOMINAL HERNIA WITHOUT OBSTRUCTION OR GANGRENE: Chronic | ICD-10-CM

## 2020-01-29 DIAGNOSIS — R58 HEMORRHAGE, NOT ELSEWHERE CLASSIFIED: Chronic | ICD-10-CM

## 2020-01-30 DIAGNOSIS — Z79.899 OTHER LONG TERM (CURRENT) DRUG THERAPY: ICD-10-CM

## 2020-01-30 DIAGNOSIS — I10 ESSENTIAL (PRIMARY) HYPERTENSION: ICD-10-CM

## 2020-01-30 DIAGNOSIS — I87.311 CHRONIC VENOUS HYPERTENSION (IDIOPATHIC) WITH ULCER OF RIGHT LOWER EXTREMITY: ICD-10-CM

## 2020-01-30 DIAGNOSIS — Z74.1 NEED FOR ASSISTANCE WITH PERSONAL CARE: ICD-10-CM

## 2020-01-30 DIAGNOSIS — R60.9 EDEMA, UNSPECIFIED: ICD-10-CM

## 2020-01-30 DIAGNOSIS — L97.812 NON-PRESSURE CHRONIC ULCER OF OTHER PART OF RIGHT LOWER LEG WITH FAT LAYER EXPOSED: ICD-10-CM

## 2020-01-30 DIAGNOSIS — I73.9 PERIPHERAL VASCULAR DISEASE, UNSPECIFIED: ICD-10-CM

## 2020-01-30 DIAGNOSIS — Z91.81 HISTORY OF FALLING: ICD-10-CM

## 2020-01-30 DIAGNOSIS — Z72.4 INAPPROPRIATE DIET AND EATING HABITS: ICD-10-CM

## 2020-01-30 DIAGNOSIS — M17.12 UNILATERAL PRIMARY OSTEOARTHRITIS, LEFT KNEE: ICD-10-CM

## 2020-02-05 ENCOUNTER — OUTPATIENT (OUTPATIENT)
Dept: OUTPATIENT SERVICES | Facility: HOSPITAL | Age: 60
LOS: 1 days | Discharge: HOME | End: 2020-02-05

## 2020-02-05 DIAGNOSIS — L89.90 PRESSURE ULCER OF UNSPECIFIED SITE, UNSPECIFIED STAGE: ICD-10-CM

## 2020-02-05 DIAGNOSIS — Z98.89 OTHER SPECIFIED POSTPROCEDURAL STATES: Chronic | ICD-10-CM

## 2020-02-05 DIAGNOSIS — M17.12 UNILATERAL PRIMARY OSTEOARTHRITIS, LEFT KNEE: ICD-10-CM

## 2020-02-05 DIAGNOSIS — R60.9 EDEMA, UNSPECIFIED: ICD-10-CM

## 2020-02-05 DIAGNOSIS — Z79.899 OTHER LONG TERM (CURRENT) DRUG THERAPY: ICD-10-CM

## 2020-02-05 DIAGNOSIS — I10 ESSENTIAL (PRIMARY) HYPERTENSION: ICD-10-CM

## 2020-02-05 DIAGNOSIS — L97.812 NON-PRESSURE CHRONIC ULCER OF OTHER PART OF RIGHT LOWER LEG WITH FAT LAYER EXPOSED: ICD-10-CM

## 2020-02-05 DIAGNOSIS — Z72.4 INAPPROPRIATE DIET AND EATING HABITS: ICD-10-CM

## 2020-02-05 DIAGNOSIS — Z74.1 NEED FOR ASSISTANCE WITH PERSONAL CARE: ICD-10-CM

## 2020-02-05 DIAGNOSIS — K46.9 UNSPECIFIED ABDOMINAL HERNIA WITHOUT OBSTRUCTION OR GANGRENE: Chronic | ICD-10-CM

## 2020-02-05 DIAGNOSIS — I73.9 PERIPHERAL VASCULAR DISEASE, UNSPECIFIED: ICD-10-CM

## 2020-02-05 DIAGNOSIS — I87.311 CHRONIC VENOUS HYPERTENSION (IDIOPATHIC) WITH ULCER OF RIGHT LOWER EXTREMITY: ICD-10-CM

## 2020-02-05 DIAGNOSIS — Z91.81 HISTORY OF FALLING: ICD-10-CM

## 2020-02-05 DIAGNOSIS — R58 HEMORRHAGE, NOT ELSEWHERE CLASSIFIED: Chronic | ICD-10-CM

## 2020-02-07 DIAGNOSIS — M17.12 UNILATERAL PRIMARY OSTEOARTHRITIS, LEFT KNEE: ICD-10-CM

## 2020-02-07 DIAGNOSIS — I87.311 CHRONIC VENOUS HYPERTENSION (IDIOPATHIC) WITH ULCER OF RIGHT LOWER EXTREMITY: ICD-10-CM

## 2020-02-07 DIAGNOSIS — Z79.899 OTHER LONG TERM (CURRENT) DRUG THERAPY: ICD-10-CM

## 2020-02-07 DIAGNOSIS — R60.9 EDEMA, UNSPECIFIED: ICD-10-CM

## 2020-02-07 DIAGNOSIS — I73.9 PERIPHERAL VASCULAR DISEASE, UNSPECIFIED: ICD-10-CM

## 2020-02-07 DIAGNOSIS — Z74.1 NEED FOR ASSISTANCE WITH PERSONAL CARE: ICD-10-CM

## 2020-02-07 DIAGNOSIS — I10 ESSENTIAL (PRIMARY) HYPERTENSION: ICD-10-CM

## 2020-02-07 DIAGNOSIS — Z91.81 HISTORY OF FALLING: ICD-10-CM

## 2020-02-07 DIAGNOSIS — L97.812 NON-PRESSURE CHRONIC ULCER OF OTHER PART OF RIGHT LOWER LEG WITH FAT LAYER EXPOSED: ICD-10-CM

## 2020-02-07 DIAGNOSIS — Z72.4 INAPPROPRIATE DIET AND EATING HABITS: ICD-10-CM

## 2020-02-12 ENCOUNTER — OUTPATIENT (OUTPATIENT)
Dept: OUTPATIENT SERVICES | Facility: HOSPITAL | Age: 60
LOS: 1 days | Discharge: HOME | End: 2020-02-12

## 2020-02-12 DIAGNOSIS — L89.90 PRESSURE ULCER OF UNSPECIFIED SITE, UNSPECIFIED STAGE: ICD-10-CM

## 2020-02-12 DIAGNOSIS — R58 HEMORRHAGE, NOT ELSEWHERE CLASSIFIED: Chronic | ICD-10-CM

## 2020-02-12 DIAGNOSIS — K46.9 UNSPECIFIED ABDOMINAL HERNIA WITHOUT OBSTRUCTION OR GANGRENE: Chronic | ICD-10-CM

## 2020-02-12 DIAGNOSIS — Z98.89 OTHER SPECIFIED POSTPROCEDURAL STATES: Chronic | ICD-10-CM

## 2020-02-19 ENCOUNTER — OUTPATIENT (OUTPATIENT)
Dept: OUTPATIENT SERVICES | Facility: HOSPITAL | Age: 60
LOS: 1 days | Discharge: HOME | End: 2020-02-19

## 2020-02-19 DIAGNOSIS — L89.90 PRESSURE ULCER OF UNSPECIFIED SITE, UNSPECIFIED STAGE: ICD-10-CM

## 2020-02-19 DIAGNOSIS — R58 HEMORRHAGE, NOT ELSEWHERE CLASSIFIED: Chronic | ICD-10-CM

## 2020-02-19 DIAGNOSIS — K46.9 UNSPECIFIED ABDOMINAL HERNIA WITHOUT OBSTRUCTION OR GANGRENE: Chronic | ICD-10-CM

## 2020-02-19 DIAGNOSIS — Z98.89 OTHER SPECIFIED POSTPROCEDURAL STATES: Chronic | ICD-10-CM

## 2020-02-24 NOTE — H&P ADULT - NSHPRISKHIVSCREEN_GEN_ALL_CORE
No complaints No complaints No complaints No complaints No complaints No complaints No complaints No complaints Offered and patient declined

## 2020-02-25 DIAGNOSIS — L97.812 NON-PRESSURE CHRONIC ULCER OF OTHER PART OF RIGHT LOWER LEG WITH FAT LAYER EXPOSED: ICD-10-CM

## 2020-02-25 DIAGNOSIS — R60.9 EDEMA, UNSPECIFIED: ICD-10-CM

## 2020-02-25 DIAGNOSIS — Z79.899 OTHER LONG TERM (CURRENT) DRUG THERAPY: ICD-10-CM

## 2020-02-25 DIAGNOSIS — I10 ESSENTIAL (PRIMARY) HYPERTENSION: ICD-10-CM

## 2020-02-25 DIAGNOSIS — M17.12 UNILATERAL PRIMARY OSTEOARTHRITIS, LEFT KNEE: ICD-10-CM

## 2020-02-25 DIAGNOSIS — Z91.81 HISTORY OF FALLING: ICD-10-CM

## 2020-02-25 DIAGNOSIS — I87.311 CHRONIC VENOUS HYPERTENSION (IDIOPATHIC) WITH ULCER OF RIGHT LOWER EXTREMITY: ICD-10-CM

## 2020-02-25 DIAGNOSIS — Z74.1 NEED FOR ASSISTANCE WITH PERSONAL CARE: ICD-10-CM

## 2020-02-25 DIAGNOSIS — I73.9 PERIPHERAL VASCULAR DISEASE, UNSPECIFIED: ICD-10-CM

## 2020-02-25 DIAGNOSIS — Z72.4 INAPPROPRIATE DIET AND EATING HABITS: ICD-10-CM

## 2020-02-26 ENCOUNTER — OUTPATIENT (OUTPATIENT)
Dept: OUTPATIENT SERVICES | Facility: HOSPITAL | Age: 60
LOS: 1 days | Discharge: HOME | End: 2020-02-26

## 2020-02-26 DIAGNOSIS — Z98.89 OTHER SPECIFIED POSTPROCEDURAL STATES: Chronic | ICD-10-CM

## 2020-02-26 DIAGNOSIS — K46.9 UNSPECIFIED ABDOMINAL HERNIA WITHOUT OBSTRUCTION OR GANGRENE: Chronic | ICD-10-CM

## 2020-02-26 DIAGNOSIS — R58 HEMORRHAGE, NOT ELSEWHERE CLASSIFIED: Chronic | ICD-10-CM

## 2020-03-02 NOTE — ED ADULT NURSE NOTE - ED STAT RN HANDOFF WHERE 2
Child's Well Visit, 4 Months: Care Instructions  Your Care Instructions    You may be seeing new sides to your baby's behavior at 4 months. He or she may have a range of emotions, including anger, yu, fear, and surprise. Your baby may be much more social and may laugh and smile at other people. At this age, your baby may be ready to roll over and hold on to toys. He or she may , smile, laugh, and squeal. By the third or fourth month, many babies can sleep up to 7 or 8 hours during the night and develop set nap times. Follow-up care is a key part of your child's treatment and safety. Be sure to make and go to all appointments, and call your doctor if your child is having problems. It's also a good idea to know your child's test results and keep a list of the medicines your child takes. How can you care for your child at home? Feeding  · Breast milk is the best food for your baby. Let your baby decide when and how long to nurse. · If you do not breastfeed, use a formula with iron. · Do not give your baby honey in the first year of life. Honey can make your baby sick. · You may begin to give solid foods to your baby when he or she is about 7 months old. Some babies may be ready for solid foods at 4 or 5 months. Ask your doctor when you can start feeding your baby solid foods. At first, give foods that are smooth, easy to digest, and part fluid, such as rice cereal.  · Use a baby spoon or a small spoon to feed your baby. Begin with one or two teaspoons of cereal mixed with breast milk or lukewarm formula. Your baby's stools will become firmer after starting solid foods. · Keep feeding your baby breast milk or formula while he or she starts eating solid foods. Parenting  · Read books to your baby daily. · If your baby is teething, it may help to gently rub his or her gums or use teething rings. · Put your baby on his or her stomach when awake to help strengthen the neck and arms.   · Give your baby brightly colored toys to hold and look at. Immunizations  · Most babies get the second dose of important vaccines at their 4-month checkup. Make sure that your baby gets the recommended childhood vaccines for illnesses, such as whooping cough and diphtheria. These vaccines will help keep your baby healthy and prevent the spread of disease. Your baby needs all doses to be protected. When should you call for help? Watch closely for changes in your child's health, and be sure to contact your doctor if:    · You are concerned that your child is not growing or developing normally.     · You are worried about your child's behavior.     · You need more information about how to care for your child, or you have questions or concerns. Where can you learn more? Go to http://annita-jon.info/. Enter  in the search box to learn more about \"Child's Well Visit, 4 Months: Care Instructions. \"  Current as of: December 12, 2018  Content Version: 12.2  © 9820-4756 [employer]. Care instructions adapted under license by [employer] (which disclaims liability or warranty for this information). If you have questions about a medical condition or this instruction, always ask your healthcare professional. [place] [address] any warranty or liability for your use of this information. Vaccine Information Statement    Your Childs First Vaccines: What you need to know    Many Vaccine Information Statements are available in [place] and other languages. See www.immunize.org/vis. Hojas de Información Sobre Vacunas están disponibles en español y en muchos otros idiomas. Visite www.immunize.org/vis    The vaccines covered on this statement are those most likely to be given during the same visits during infancy and early childhood.   Other vaccines (including measles, mumps, and rubella; varicella; rotavirus; influenza; and hepatitis A) are also routinely recommended during the first five years of life. Your child will get these vaccines today:  [  ] DTaP  [  ]  Hib  [  ] Hepatitis B  [  ] Polio        [  ] PCV13   (Provider: Check appropriate boxes)     1. Why get vaccinated? Vaccine-preventable diseases are much less common than they used to be, thanks to vaccination. But they have not gone away. Outbreaks of some of these diseases still occur across the [place]. When fewer babies get vaccinated, more babies get sick. 7 childhood diseases that can be prevented by vaccines:     1. Diphtheria (the D in DTaP vaccine)   Signs and symptoms include a thick coating in the back of the throat that can make it hard to breathe.  Diphtheria can lead to breathing problems, paralysis and heart failure. - About 15,000 people  each year in the [place] from diphtheria before there was a vaccine. 2. Tetanus (the T in DTaP vaccine; also known as Lockjaw)   Signs and symptoms include painful tightening of the muscles, usually all over the body.  Tetanus can lead to stiffness of the jaw that can make it difficult to open the mouth or swallow. - Tetanus kills about 1 person out of every 10 who get it. 3. Pertussis (the P in DTaP vaccine, also known as Whooping Cough)   Signs and symptoms include violent coughing spells that can make it hard for a baby to eat, drink, or breathe. These spells can last for several weeks.  Pertussis can lead to pneumonia, seizures, brain damage, or death. Pertussis can be very dangerous in infants. - Most pertussis deaths are in babies younger than 1months of age. 4. Hib (Haemophilus influenzae type b)   Signs and symptoms can include fever, headache, stiff neck, cough, and shortness of breath. There might not be any signs or symptoms in mild cases.    Hib can lead to meningitis (infection of the brain and spinal cord coverings); pneumonia; infections of the ears, sinuses, blood, joints, bones, and covering of the heart; brain damage; severe swelling of the throat, making it hard to breathe; and deafness.  - Children younger than 11years of age are at greatest risk for Hib disease. 5. Hepatitis B   Signs and symptoms include tiredness, diarrhea and vomiting, jaundice (yellow skin or eyes), and pain in muscles, joints and stomach. But usually there are no signs or symptoms at all.  Hepatitis B can lead to liver damage, and liver cancer. Some people develop chronic (long term) hepatitis B infection. These people might not look or feel sick, but they can infect others.   - Hepatitis B can cause liver damage and cancer in 1 child out of 4 who are chronically infected. 6. Polio   Signs and symptoms can include flu-like illness, or there may be no signs or symptoms at all.  Polio can lead to permanent paralysis (cant move an arm or leg, or sometimes cant breathe) and death. - In the 1950s, polio paralyzed more than 15,000 people every year in the [place]     7. Pneumococcal Disease    Signs and symptoms include fever, chills, cough, and chest pain. In infants, symptoms can also include meningitis, seizures, and sometimes rash.  Pneumococcal disease can lead to meningitis (infection of the brain and spinal cord coverings); infections of the ears, sinuses and blood; pneumonia; deafness; and brain damage.  - About 1 out of 15 children who get pneumococcal meningitis will die from the infection. Children usually catch these diseases from other children or adults, who might not even know they are infected. A mother infected with hepatitis B can infect her baby at birth. Tetanus enters the body through a cut or wound; it is not spread from person to person.     Vaccines that protect your baby from these seven diseases:    Vaccine Number of Doses Recommended Ages Other Information   DTaP (Diphtheria, Tetanus, Pertussis) 5 2 months, 4 months,   6 months, 15-18 months,   4-6 years Some children get a vaccine called DT (diphtheria & tetanus) instead of DTaP. Hepatitis B 3 Birth, 1-2 months,   6-18 months    Polio 4 2 months, 4 months,  6-18 months, 4-6 years An additional dose of polio vaccine may be recommended for travel to certain countries. Hib (Haemophilus influenzae type b) 3 or 4 2 months, 4 months,   (6 months),  12-15 months There are several Hib vaccines. With one of them the 6-month dose is not needed. Pneumococcal (PCV13) 4 2 months, 4 months,   6 months,  12-15 months Older children with certain health conditions also need this vaccine. Your healthcare provider might offer some of these vaccines as combination vaccines - several vaccines given in the same shot. Combination vaccines are as safe and effective as the individual vaccines, and can mean fewer shots for your baby. 2. Some children should not get certain vaccines    Most children can safely get all of these vaccines. But there are some exceptions:     A child who has a mild cold or other illness on the day vaccinations are scheduled may be vaccinated. A child who is moderately or severely ill on the day of vaccinations might be asked to come back for them at a later date.  Any child who had a life-threatening allergic reaction after getting a vaccine should not get another dose of that vaccine. Tell the person giving the vaccines if your child has ever had a severe reaction after any vaccination.  A child who has a severe (life-threatening) allergy to a substance should not get a vaccine that contains that substance. Tell the person giving your child the vaccines if your child has any severe allergies that you are aware of.     Talk to your doctor before your child gets:     DTaP vaccine, if your child ever had any of these reactions after a previous dose of DTaP:  - A brain or nervous system disease within 7 days,  - Non-stop crying for 3 hours or more,  - A seizure or collapse,  - A fever of over 105°F.     PCV13 vaccine, if your child ever had a severe reaction after a dose of DTaP (or other vaccine containing diphtheria toxoid), or after a dose of PCV7, an earlier pneumococcal vaccine. 3. Risks of a Vaccine Reaction  With any medicine, including vaccines, there is a chance of side effects. These are usually mild and go away on their own. Most vaccine reactions are not serious: tenderness, redness, or swelling where the shot was given; or a mild fever. These happen soon after the shot is given and go away within a day or two. They happen with up to about half of vaccinations, depending on the vaccine. Serious reactions are also possible but are rare. Polio, Hepatitis B and Hib Vaccines have been associated only with mild reactions. DTaP and Pneumococcal vaccines have also been associated with other problems:    DTaP Vaccine   Mild Problems: Fussiness (up to 1 child in 3); tiredness or loss of appetite (up to 1 child in 10); vomiting (up to 1 child in 50); swelling of the entire arm or leg for 1-7 days (up to 1 child in 30) - usually after the 4th or 5th dose.  Moderate Problems: Seizure (1 child in 14,000); non-stop crying for 3 hours or longer (up to 1 child in 1,000); fever over 105°F (1 child in 16,000).  Serious problems: Long term seizures, coma, lowered consciousness, and permanent brain damage have been reported following DTaP vaccination. These reports are extremely rare. Pneumococcal Vaccine   Mild Problems: Drowsiness or temporary loss of appetite (about 1 child in 2 or 3); fussiness (about 8 children in 10).  Moderate Problems: Fever over 102.2°F (about 1 child in 21). After any vaccine: Any medication can cause a severe allergic reaction. Such reactions from a vaccine are very rare, estimated at about 1 in a million doses, and would happen within a few minutes to a few hours after the vaccination. As with any medicine, there is a very remote chance of a vaccine causing a serious injury or death.     The safety of vaccines is always being monitored. For more information, visit: www.cdc.gov/vaccinesafety/    4. What if there is a serious reaction? What should I look for?  Look for anything that concerns you, such as signs of a severe allergic reaction, very high fever, or unusual behavior. Signs of a severe allergic reaction can include hives, swelling of the face and throat, and difficulty breathing. In infants, signs of an allergic reaction might also include fever, sleepiness, and disinterest in eating. In older children signs might include a fast heartbeat, dizziness, and weakness. These would usually start a few minutes to a few hours after the vaccination. What should I do?  If you think it is a severe allergic reaction or other emergency that cant wait, call 9-1-1 or get the person to the nearest hospital. Otherwise, call your doctor. Afterward, the reaction should be reported to the Vaccine Adverse Event Reporting System (VAERS). Your doctor should file this report, or you can do it yourself through the VAERS web site at www.vaers. [facility].gov, or by calling 0-101.486.8417. VAERS does not give medical advice. 5. The National Vaccine Injury Compensation Program  The National Vaccine Injury Compensation Program (VICP) is a federal program that was created to compensate people who may have been injured by certain vaccines. Persons who believe they may have been injured by a vaccine can learn about the program and about filing a claim by calling 2-482.873.9071 or visiting the [employer] website at www.[facility].gov/vaccinecompensation. There is a time limit to file a claim for compensation. 6. How can I learn more?  Ask your healthcare provider. He or she can give you the vaccine package insert or suggest other sources of information.  Call your local or state health department.    Contact the Centers for Disease Control and Prevention (CDC):  - Call 1-232.465.6573 (5-890-YUR-INFO)  - Visit [facility]s website at www.cdc.gov/vaccines or www.cdc.gov/hepatitis     Vaccine Information Statement   Multi Pediatric Vaccines  11/05/2015   [address]issa Mazariegos [id number]    Department of Health and Human Services  Centers for Disease Control and Prevention    Office Use Only      Vaccine Information Statement    Rotavirus Vaccine: What You Need to Know    Many Vaccine Information Statements are available in Portuguese and other languages. See www.immunize.org/vis  Hojas de información sobre vacunas están disponibles en español y en muchos otros idiomas. Visite www.immunize.org/vis    1. Why get vaccinated? Rotavirus vaccine can prevent rotavirus disease. Rotavirus causes diarrhea, mostly in babies and young children. The diarrhea can be severe, and lead to dehydration. Vomiting and fever are also common in babies with rotavirus. 2. Rotavirus vaccine     Rotavirus vaccine is administered by putting drops in the childs mouth. Babies should get 2 or 3 doses of rotavirus vaccine, depending on the brand of vaccine used.  The first dose must be administered before 13weeks of age.  The last dose must be administered by 6months of age. Almost all babies who get rotavirus vaccine will be protected from severe rotavirus diarrhea. Another virus called porcine circovirus (or parts of it) can be found in rotavirus vaccine. This virus does not infect people, and there is no known safety risk. For more information, see . Rotavirus vaccine may be given at the same time as other vaccines. 3. Talk with your health care provider    Tell your vaccine provider if the person getting the vaccine:   Has had an allergic reaction after a previous dose of rotavirus vaccine, or has any severe, life-threatening allergies.  Has a weakened immune system.  Has severe combined immunodeficiency (SCID).  Has had a type of bowel blockage called intussusception.     In some cases, your childs health care provider may decide to postpone rotavirus vaccination to a future visit. Infants with minor illnesses, such as a cold, may be vaccinated. Infants who are moderately or severely ill should usually wait until they recover before getting rotavirus vaccine. Your childs health care provider can give you more information. 4. Risks of a vaccine reaction     Irritability or mild, temporary diarrhea or vomiting can happen after rotavirus vaccine. Intussusception is a type of bowel blockage that is treated in a hospital and could require surgery. It happens naturally in some infants every year in the [place], and usually there is no known reason for it. There is also a small risk of intussusception from rotavirus vaccination, usually within a week after the first or second vaccine dose. This additional risk is estimated to range from about 1 in 20,000 [place] infants to 1 in 100,000 [place] infants who get rotavirus vaccine. Your health care provider can give you more information. As with any medicine, there is a very remote chance of a vaccine causing a severe allergic reaction, other serious injury, or death. 5. What if there is a serious problem? For intussusception, look for signs of stomach pain along with severe crying. Early on, these episodes could last just a few minutes and come and go several times in an hour. Babies might pull their legs up to their chest. Your baby might also vomit several times or have blood in the stool, or could appear weak or very irritable. These signs would usually happen during the first week after the first or second dose of rotavirus vaccine, but look for them any time after vaccination. If you think your baby has intussusception, contact a health care provider right away. If you cant reach your health care provider, take your baby to a hospital. Tell them when your baby got rotavirus vaccine. An allergic reaction could occur after the vaccinated person leaves the clinic.  If you see signs of a severe allergic reaction (hives, swelling of the face and throat, difficulty breathing, a fast heartbeat, dizziness, or weakness), call 9-1-1 and get the person to the nearest hospital.    For other signs that concern you, call your health care provider. Adverse reactions should be reported to the Vaccine Adverse Event Reporting System (VAERS). Your health care provider will usually file this report, or you can do it yourself. Visit the VAERS website at www.vaers. hhs.gov or call 8-690.621.7567. VAERS is only for reporting reactions, and VAERS staff do not give medical advice. 6. The National Vaccine Injury Compensation Program    The [facility] Vaccine Injury Compensation Program (VICP) is a federal program that was created to compensate people who may have been injured by certain vaccines. Visit the VICP website at www.hrsa.gov/vaccinecompensation or call 0-654.746.6054 to learn about the program and about filing a claim. There is a time limit to file a claim for compensation. 7. How can I learn more?  Ask your health care provider.  Call your local or state health department.  Contact the Centers for Disease Control and Prevention (CDC):  - Call 2-632.650.5950 (1-800-CDC-INFO) or  - Visit CDCs website at www.cdc.gov/vaccines    Vaccine Information Statement (Interim)  Rotavirus Vaccine   2019  44 JEAN CARLOS [address] [id number]   Department of Health and Human Services  Centers for Disease Control and Prevention    Office Use Only bed 12

## 2020-03-03 DIAGNOSIS — I10 ESSENTIAL (PRIMARY) HYPERTENSION: ICD-10-CM

## 2020-03-03 DIAGNOSIS — I87.311 CHRONIC VENOUS HYPERTENSION (IDIOPATHIC) WITH ULCER OF RIGHT LOWER EXTREMITY: ICD-10-CM

## 2020-03-03 DIAGNOSIS — Z79.899 OTHER LONG TERM (CURRENT) DRUG THERAPY: ICD-10-CM

## 2020-03-03 DIAGNOSIS — L97.812 NON-PRESSURE CHRONIC ULCER OF OTHER PART OF RIGHT LOWER LEG WITH FAT LAYER EXPOSED: ICD-10-CM

## 2020-03-03 DIAGNOSIS — Z91.81 HISTORY OF FALLING: ICD-10-CM

## 2020-03-03 DIAGNOSIS — R60.9 EDEMA, UNSPECIFIED: ICD-10-CM

## 2020-03-03 DIAGNOSIS — M17.12 UNILATERAL PRIMARY OSTEOARTHRITIS, LEFT KNEE: ICD-10-CM

## 2020-03-03 DIAGNOSIS — I73.9 PERIPHERAL VASCULAR DISEASE, UNSPECIFIED: ICD-10-CM

## 2020-03-03 DIAGNOSIS — Z72.4 INAPPROPRIATE DIET AND EATING HABITS: ICD-10-CM

## 2020-03-03 DIAGNOSIS — Z74.1 NEED FOR ASSISTANCE WITH PERSONAL CARE: ICD-10-CM

## 2020-03-04 ENCOUNTER — OUTPATIENT (OUTPATIENT)
Dept: OUTPATIENT SERVICES | Facility: HOSPITAL | Age: 60
LOS: 1 days | Discharge: HOME | End: 2020-03-04

## 2020-03-04 DIAGNOSIS — Z79.899 OTHER LONG TERM (CURRENT) DRUG THERAPY: ICD-10-CM

## 2020-03-04 DIAGNOSIS — Z91.81 HISTORY OF FALLING: ICD-10-CM

## 2020-03-04 DIAGNOSIS — I87.311 CHRONIC VENOUS HYPERTENSION (IDIOPATHIC) WITH ULCER OF RIGHT LOWER EXTREMITY: ICD-10-CM

## 2020-03-04 DIAGNOSIS — R58 HEMORRHAGE, NOT ELSEWHERE CLASSIFIED: Chronic | ICD-10-CM

## 2020-03-04 DIAGNOSIS — I10 ESSENTIAL (PRIMARY) HYPERTENSION: ICD-10-CM

## 2020-03-04 DIAGNOSIS — M17.12 UNILATERAL PRIMARY OSTEOARTHRITIS, LEFT KNEE: ICD-10-CM

## 2020-03-04 DIAGNOSIS — Z74.1 NEED FOR ASSISTANCE WITH PERSONAL CARE: ICD-10-CM

## 2020-03-04 DIAGNOSIS — L89.90 PRESSURE ULCER OF UNSPECIFIED SITE, UNSPECIFIED STAGE: ICD-10-CM

## 2020-03-04 DIAGNOSIS — Z98.89 OTHER SPECIFIED POSTPROCEDURAL STATES: Chronic | ICD-10-CM

## 2020-03-04 DIAGNOSIS — K46.9 UNSPECIFIED ABDOMINAL HERNIA WITHOUT OBSTRUCTION OR GANGRENE: Chronic | ICD-10-CM

## 2020-03-04 DIAGNOSIS — L97.812 NON-PRESSURE CHRONIC ULCER OF OTHER PART OF RIGHT LOWER LEG WITH FAT LAYER EXPOSED: ICD-10-CM

## 2020-03-04 DIAGNOSIS — Z72.4 INAPPROPRIATE DIET AND EATING HABITS: ICD-10-CM

## 2020-03-04 DIAGNOSIS — R60.9 EDEMA, UNSPECIFIED: ICD-10-CM

## 2020-03-04 DIAGNOSIS — I73.9 PERIPHERAL VASCULAR DISEASE, UNSPECIFIED: ICD-10-CM

## 2020-03-09 DIAGNOSIS — I10 ESSENTIAL (PRIMARY) HYPERTENSION: ICD-10-CM

## 2020-03-09 DIAGNOSIS — Z72.4 INAPPROPRIATE DIET AND EATING HABITS: ICD-10-CM

## 2020-03-09 DIAGNOSIS — L89.90 PRESSURE ULCER OF UNSPECIFIED SITE, UNSPECIFIED STAGE: ICD-10-CM

## 2020-03-09 DIAGNOSIS — Z91.81 HISTORY OF FALLING: ICD-10-CM

## 2020-03-09 DIAGNOSIS — Z74.1 NEED FOR ASSISTANCE WITH PERSONAL CARE: ICD-10-CM

## 2020-03-09 DIAGNOSIS — I73.9 PERIPHERAL VASCULAR DISEASE, UNSPECIFIED: ICD-10-CM

## 2020-03-09 DIAGNOSIS — I87.311 CHRONIC VENOUS HYPERTENSION (IDIOPATHIC) WITH ULCER OF RIGHT LOWER EXTREMITY: ICD-10-CM

## 2020-03-09 DIAGNOSIS — M17.12 UNILATERAL PRIMARY OSTEOARTHRITIS, LEFT KNEE: ICD-10-CM

## 2020-03-09 DIAGNOSIS — Z79.899 OTHER LONG TERM (CURRENT) DRUG THERAPY: ICD-10-CM

## 2020-03-09 DIAGNOSIS — L97.812 NON-PRESSURE CHRONIC ULCER OF OTHER PART OF RIGHT LOWER LEG WITH FAT LAYER EXPOSED: ICD-10-CM

## 2020-03-09 DIAGNOSIS — R60.9 EDEMA, UNSPECIFIED: ICD-10-CM

## 2020-03-10 ENCOUNTER — OUTPATIENT (OUTPATIENT)
Dept: OUTPATIENT SERVICES | Facility: HOSPITAL | Age: 60
LOS: 1 days | Discharge: HOME | End: 2020-03-10

## 2020-03-10 DIAGNOSIS — I87.311 CHRONIC VENOUS HYPERTENSION (IDIOPATHIC) WITH ULCER OF RIGHT LOWER EXTREMITY: ICD-10-CM

## 2020-03-10 DIAGNOSIS — Z79.899 OTHER LONG TERM (CURRENT) DRUG THERAPY: ICD-10-CM

## 2020-03-10 DIAGNOSIS — R58 HEMORRHAGE, NOT ELSEWHERE CLASSIFIED: Chronic | ICD-10-CM

## 2020-03-10 DIAGNOSIS — L97.812 NON-PRESSURE CHRONIC ULCER OF OTHER PART OF RIGHT LOWER LEG WITH FAT LAYER EXPOSED: ICD-10-CM

## 2020-03-10 DIAGNOSIS — Z91.81 HISTORY OF FALLING: ICD-10-CM

## 2020-03-10 DIAGNOSIS — I10 ESSENTIAL (PRIMARY) HYPERTENSION: ICD-10-CM

## 2020-03-10 DIAGNOSIS — Z74.1 NEED FOR ASSISTANCE WITH PERSONAL CARE: ICD-10-CM

## 2020-03-10 DIAGNOSIS — M17.12 UNILATERAL PRIMARY OSTEOARTHRITIS, LEFT KNEE: ICD-10-CM

## 2020-03-10 DIAGNOSIS — Z72.4 INAPPROPRIATE DIET AND EATING HABITS: ICD-10-CM

## 2020-03-10 DIAGNOSIS — L89.90 PRESSURE ULCER OF UNSPECIFIED SITE, UNSPECIFIED STAGE: ICD-10-CM

## 2020-03-10 DIAGNOSIS — Z98.89 OTHER SPECIFIED POSTPROCEDURAL STATES: Chronic | ICD-10-CM

## 2020-03-10 DIAGNOSIS — R60.9 EDEMA, UNSPECIFIED: ICD-10-CM

## 2020-03-10 DIAGNOSIS — K46.9 UNSPECIFIED ABDOMINAL HERNIA WITHOUT OBSTRUCTION OR GANGRENE: Chronic | ICD-10-CM

## 2020-03-10 DIAGNOSIS — I73.9 PERIPHERAL VASCULAR DISEASE, UNSPECIFIED: ICD-10-CM

## 2020-08-13 ENCOUNTER — OUTPATIENT (OUTPATIENT)
Dept: OUTPATIENT SERVICES | Facility: HOSPITAL | Age: 60
LOS: 1 days | Discharge: HOME | End: 2020-08-13

## 2020-08-13 DIAGNOSIS — Z98.89 OTHER SPECIFIED POSTPROCEDURAL STATES: Chronic | ICD-10-CM

## 2020-08-13 DIAGNOSIS — R58 HEMORRHAGE, NOT ELSEWHERE CLASSIFIED: Chronic | ICD-10-CM

## 2020-08-13 DIAGNOSIS — K46.9 UNSPECIFIED ABDOMINAL HERNIA WITHOUT OBSTRUCTION OR GANGRENE: Chronic | ICD-10-CM

## 2020-08-13 DIAGNOSIS — L89.90 PRESSURE ULCER OF UNSPECIFIED SITE, UNSPECIFIED STAGE: ICD-10-CM

## 2020-08-17 DIAGNOSIS — Z74.1 NEED FOR ASSISTANCE WITH PERSONAL CARE: ICD-10-CM

## 2020-08-17 DIAGNOSIS — I10 ESSENTIAL (PRIMARY) HYPERTENSION: ICD-10-CM

## 2020-08-17 DIAGNOSIS — R26.81 UNSTEADINESS ON FEET: ICD-10-CM

## 2020-08-17 DIAGNOSIS — Z87.11 PERSONAL HISTORY OF PEPTIC ULCER DISEASE: ICD-10-CM

## 2020-08-17 DIAGNOSIS — Z79.899 OTHER LONG TERM (CURRENT) DRUG THERAPY: ICD-10-CM

## 2020-08-17 DIAGNOSIS — L97.818 NON-PRESSURE CHRONIC ULCER OF OTHER PART OF RIGHT LOWER LEG WITH OTHER SPECIFIED SEVERITY: ICD-10-CM

## 2020-08-17 DIAGNOSIS — I87.311 CHRONIC VENOUS HYPERTENSION (IDIOPATHIC) WITH ULCER OF RIGHT LOWER EXTREMITY: ICD-10-CM

## 2020-08-17 DIAGNOSIS — I87.2 VENOUS INSUFFICIENCY (CHRONIC) (PERIPHERAL): ICD-10-CM

## 2020-08-17 DIAGNOSIS — D64.9 ANEMIA, UNSPECIFIED: ICD-10-CM

## 2020-08-17 DIAGNOSIS — R60.9 EDEMA, UNSPECIFIED: ICD-10-CM

## 2020-08-17 DIAGNOSIS — Z91.81 HISTORY OF FALLING: ICD-10-CM

## 2020-08-17 DIAGNOSIS — M17.12 UNILATERAL PRIMARY OSTEOARTHRITIS, LEFT KNEE: ICD-10-CM

## 2020-08-20 ENCOUNTER — OUTPATIENT (OUTPATIENT)
Dept: OUTPATIENT SERVICES | Facility: HOSPITAL | Age: 60
LOS: 1 days | Discharge: HOME | End: 2020-08-20

## 2020-08-20 DIAGNOSIS — R58 HEMORRHAGE, NOT ELSEWHERE CLASSIFIED: Chronic | ICD-10-CM

## 2020-08-20 DIAGNOSIS — Z98.89 OTHER SPECIFIED POSTPROCEDURAL STATES: Chronic | ICD-10-CM

## 2020-08-20 DIAGNOSIS — L89.90 PRESSURE ULCER OF UNSPECIFIED SITE, UNSPECIFIED STAGE: ICD-10-CM

## 2020-08-20 DIAGNOSIS — K46.9 UNSPECIFIED ABDOMINAL HERNIA WITHOUT OBSTRUCTION OR GANGRENE: Chronic | ICD-10-CM

## 2020-08-23 LAB
-  AMPICILLIN/SULBACTAM: SIGNIFICANT CHANGE UP
-  CEFAZOLIN: SIGNIFICANT CHANGE UP
-  CLINDAMYCIN: SIGNIFICANT CHANGE UP
-  ERYTHROMYCIN: SIGNIFICANT CHANGE UP
-  GENTAMICIN: SIGNIFICANT CHANGE UP
-  OXACILLIN: SIGNIFICANT CHANGE UP
-  PENICILLIN: SIGNIFICANT CHANGE UP
-  RIFAMPIN: SIGNIFICANT CHANGE UP
-  TETRACYCLINE: SIGNIFICANT CHANGE UP
-  TRIMETHOPRIM/SULFAMETHOXAZOLE: SIGNIFICANT CHANGE UP
-  VANCOMYCIN: SIGNIFICANT CHANGE UP
METHOD TYPE: SIGNIFICANT CHANGE UP

## 2020-08-25 DIAGNOSIS — Z79.899 OTHER LONG TERM (CURRENT) DRUG THERAPY: ICD-10-CM

## 2020-08-25 DIAGNOSIS — L97.818 NON-PRESSURE CHRONIC ULCER OF OTHER PART OF RIGHT LOWER LEG WITH OTHER SPECIFIED SEVERITY: ICD-10-CM

## 2020-08-25 DIAGNOSIS — I87.311 CHRONIC VENOUS HYPERTENSION (IDIOPATHIC) WITH ULCER OF RIGHT LOWER EXTREMITY: ICD-10-CM

## 2020-08-25 DIAGNOSIS — Z74.1 NEED FOR ASSISTANCE WITH PERSONAL CARE: ICD-10-CM

## 2020-08-25 DIAGNOSIS — D64.9 ANEMIA, UNSPECIFIED: ICD-10-CM

## 2020-08-25 DIAGNOSIS — M17.12 UNILATERAL PRIMARY OSTEOARTHRITIS, LEFT KNEE: ICD-10-CM

## 2020-08-25 DIAGNOSIS — Z91.81 HISTORY OF FALLING: ICD-10-CM

## 2020-08-25 DIAGNOSIS — I10 ESSENTIAL (PRIMARY) HYPERTENSION: ICD-10-CM

## 2020-08-25 DIAGNOSIS — I87.2 VENOUS INSUFFICIENCY (CHRONIC) (PERIPHERAL): ICD-10-CM

## 2020-08-25 DIAGNOSIS — R60.9 EDEMA, UNSPECIFIED: ICD-10-CM

## 2020-08-25 LAB
-  AMIKACIN: SIGNIFICANT CHANGE UP
-  AZTREONAM: SIGNIFICANT CHANGE UP
-  CEFEPIME: SIGNIFICANT CHANGE UP
-  CEFTAZIDIME: SIGNIFICANT CHANGE UP
-  CIPROFLOXACIN: SIGNIFICANT CHANGE UP
-  GENTAMICIN: SIGNIFICANT CHANGE UP
-  IMIPENEM: SIGNIFICANT CHANGE UP
-  LEVOFLOXACIN: SIGNIFICANT CHANGE UP
-  MEROPENEM: SIGNIFICANT CHANGE UP
-  PIPERACILLIN/TAZOBACTAM: SIGNIFICANT CHANGE UP
-  TOBRAMYCIN: SIGNIFICANT CHANGE UP
CULTURE RESULTS: SIGNIFICANT CHANGE UP
METHOD TYPE: SIGNIFICANT CHANGE UP
ORGANISM # SPEC MICROSCOPIC CNT: SIGNIFICANT CHANGE UP
SPECIMEN SOURCE: SIGNIFICANT CHANGE UP

## 2020-08-27 ENCOUNTER — OUTPATIENT (OUTPATIENT)
Dept: OUTPATIENT SERVICES | Facility: HOSPITAL | Age: 60
LOS: 1 days | Discharge: HOME | End: 2020-08-27

## 2020-08-27 DIAGNOSIS — K46.9 UNSPECIFIED ABDOMINAL HERNIA WITHOUT OBSTRUCTION OR GANGRENE: Chronic | ICD-10-CM

## 2020-08-27 DIAGNOSIS — L89.90 PRESSURE ULCER OF UNSPECIFIED SITE, UNSPECIFIED STAGE: ICD-10-CM

## 2020-08-27 DIAGNOSIS — Z98.89 OTHER SPECIFIED POSTPROCEDURAL STATES: Chronic | ICD-10-CM

## 2020-08-27 DIAGNOSIS — R58 HEMORRHAGE, NOT ELSEWHERE CLASSIFIED: Chronic | ICD-10-CM

## 2020-09-01 DIAGNOSIS — Z91.81 HISTORY OF FALLING: ICD-10-CM

## 2020-09-01 DIAGNOSIS — I87.2 VENOUS INSUFFICIENCY (CHRONIC) (PERIPHERAL): ICD-10-CM

## 2020-09-01 DIAGNOSIS — Z74.1 NEED FOR ASSISTANCE WITH PERSONAL CARE: ICD-10-CM

## 2020-09-01 DIAGNOSIS — I87.311 CHRONIC VENOUS HYPERTENSION (IDIOPATHIC) WITH ULCER OF RIGHT LOWER EXTREMITY: ICD-10-CM

## 2020-09-01 DIAGNOSIS — M17.12 UNILATERAL PRIMARY OSTEOARTHRITIS, LEFT KNEE: ICD-10-CM

## 2020-09-01 DIAGNOSIS — L97.818 NON-PRESSURE CHRONIC ULCER OF OTHER PART OF RIGHT LOWER LEG WITH OTHER SPECIFIED SEVERITY: ICD-10-CM

## 2020-09-01 DIAGNOSIS — D64.9 ANEMIA, UNSPECIFIED: ICD-10-CM

## 2020-09-01 DIAGNOSIS — I10 ESSENTIAL (PRIMARY) HYPERTENSION: ICD-10-CM

## 2020-09-01 DIAGNOSIS — Z79.899 OTHER LONG TERM (CURRENT) DRUG THERAPY: ICD-10-CM

## 2020-09-01 DIAGNOSIS — R60.9 EDEMA, UNSPECIFIED: ICD-10-CM

## 2020-09-03 ENCOUNTER — OUTPATIENT (OUTPATIENT)
Dept: OUTPATIENT SERVICES | Facility: HOSPITAL | Age: 60
LOS: 1 days | Discharge: HOME | End: 2020-09-03

## 2020-09-03 DIAGNOSIS — Z74.1 NEED FOR ASSISTANCE WITH PERSONAL CARE: ICD-10-CM

## 2020-09-03 DIAGNOSIS — L89.90 PRESSURE ULCER OF UNSPECIFIED SITE, UNSPECIFIED STAGE: ICD-10-CM

## 2020-09-03 DIAGNOSIS — I87.311 CHRONIC VENOUS HYPERTENSION (IDIOPATHIC) WITH ULCER OF RIGHT LOWER EXTREMITY: ICD-10-CM

## 2020-09-03 DIAGNOSIS — L97.818 NON-PRESSURE CHRONIC ULCER OF OTHER PART OF RIGHT LOWER LEG WITH OTHER SPECIFIED SEVERITY: ICD-10-CM

## 2020-09-03 DIAGNOSIS — Z79.899 OTHER LONG TERM (CURRENT) DRUG THERAPY: ICD-10-CM

## 2020-09-03 DIAGNOSIS — R60.9 EDEMA, UNSPECIFIED: ICD-10-CM

## 2020-09-03 DIAGNOSIS — I87.2 VENOUS INSUFFICIENCY (CHRONIC) (PERIPHERAL): ICD-10-CM

## 2020-09-03 DIAGNOSIS — Z91.81 HISTORY OF FALLING: ICD-10-CM

## 2020-09-03 DIAGNOSIS — I10 ESSENTIAL (PRIMARY) HYPERTENSION: ICD-10-CM

## 2020-09-03 DIAGNOSIS — D64.9 ANEMIA, UNSPECIFIED: ICD-10-CM

## 2020-09-03 DIAGNOSIS — M17.12 UNILATERAL PRIMARY OSTEOARTHRITIS, LEFT KNEE: ICD-10-CM

## 2020-09-03 DIAGNOSIS — Z98.89 OTHER SPECIFIED POSTPROCEDURAL STATES: Chronic | ICD-10-CM

## 2020-09-03 DIAGNOSIS — K46.9 UNSPECIFIED ABDOMINAL HERNIA WITHOUT OBSTRUCTION OR GANGRENE: Chronic | ICD-10-CM

## 2020-09-03 DIAGNOSIS — R58 HEMORRHAGE, NOT ELSEWHERE CLASSIFIED: Chronic | ICD-10-CM

## 2020-09-10 ENCOUNTER — OUTPATIENT (OUTPATIENT)
Dept: OUTPATIENT SERVICES | Facility: HOSPITAL | Age: 60
LOS: 1 days | Discharge: HOME | End: 2020-09-10

## 2020-09-10 DIAGNOSIS — L89.90 PRESSURE ULCER OF UNSPECIFIED SITE, UNSPECIFIED STAGE: ICD-10-CM

## 2020-09-10 DIAGNOSIS — Z98.89 OTHER SPECIFIED POSTPROCEDURAL STATES: Chronic | ICD-10-CM

## 2020-09-10 DIAGNOSIS — R58 HEMORRHAGE, NOT ELSEWHERE CLASSIFIED: Chronic | ICD-10-CM

## 2020-09-10 DIAGNOSIS — K46.9 UNSPECIFIED ABDOMINAL HERNIA WITHOUT OBSTRUCTION OR GANGRENE: Chronic | ICD-10-CM

## 2020-09-17 ENCOUNTER — OUTPATIENT (OUTPATIENT)
Dept: OUTPATIENT SERVICES | Facility: HOSPITAL | Age: 60
LOS: 1 days | Discharge: HOME | End: 2020-09-17

## 2020-09-17 DIAGNOSIS — K46.9 UNSPECIFIED ABDOMINAL HERNIA WITHOUT OBSTRUCTION OR GANGRENE: Chronic | ICD-10-CM

## 2020-09-17 DIAGNOSIS — L89.90 PRESSURE ULCER OF UNSPECIFIED SITE, UNSPECIFIED STAGE: ICD-10-CM

## 2020-09-17 DIAGNOSIS — Z98.89 OTHER SPECIFIED POSTPROCEDURAL STATES: Chronic | ICD-10-CM

## 2020-09-17 DIAGNOSIS — R58 HEMORRHAGE, NOT ELSEWHERE CLASSIFIED: Chronic | ICD-10-CM

## 2020-09-21 ENCOUNTER — INPATIENT (INPATIENT)
Facility: HOSPITAL | Age: 60
LOS: 14 days | Discharge: HOME | End: 2020-10-06
Attending: FAMILY MEDICINE | Admitting: FAMILY MEDICINE
Payer: MEDICAID

## 2020-09-21 VITALS
HEART RATE: 75 BPM | OXYGEN SATURATION: 98 % | RESPIRATION RATE: 16 BRPM | WEIGHT: 274.92 LBS | DIASTOLIC BLOOD PRESSURE: 115 MMHG | SYSTOLIC BLOOD PRESSURE: 161 MMHG | HEIGHT: 66 IN | TEMPERATURE: 98 F

## 2020-09-21 DIAGNOSIS — K46.9 UNSPECIFIED ABDOMINAL HERNIA WITHOUT OBSTRUCTION OR GANGRENE: Chronic | ICD-10-CM

## 2020-09-21 DIAGNOSIS — R58 HEMORRHAGE, NOT ELSEWHERE CLASSIFIED: Chronic | ICD-10-CM

## 2020-09-21 DIAGNOSIS — Z98.89 OTHER SPECIFIED POSTPROCEDURAL STATES: Chronic | ICD-10-CM

## 2020-09-21 LAB
ANION GAP SERPL CALC-SCNC: 5 MMOL/L — SIGNIFICANT CHANGE UP (ref 5–17)
APPEARANCE UR: CLEAR — SIGNIFICANT CHANGE UP
APTT BLD: 32.2 SEC — SIGNIFICANT CHANGE UP (ref 27.5–35.5)
BACTERIA # UR AUTO: NEGATIVE — SIGNIFICANT CHANGE UP
BILIRUB UR-MCNC: NEGATIVE — SIGNIFICANT CHANGE UP
BUN SERPL-MCNC: 15 MG/DL — SIGNIFICANT CHANGE UP (ref 7–23)
CALCIUM SERPL-MCNC: 8.5 MG/DL — SIGNIFICANT CHANGE UP (ref 8.5–10.1)
CHLORIDE SERPL-SCNC: 103 MMOL/L — SIGNIFICANT CHANGE UP (ref 96–108)
CO2 SERPL-SCNC: 31 MMOL/L — SIGNIFICANT CHANGE UP (ref 22–31)
COLOR SPEC: YELLOW — SIGNIFICANT CHANGE UP
CREAT SERPL-MCNC: 0.62 MG/DL — SIGNIFICANT CHANGE UP (ref 0.5–1.3)
DIFF PNL FLD: ABNORMAL
EPI CELLS # UR: NEGATIVE — SIGNIFICANT CHANGE UP
ERYTHROCYTE [SEDIMENTATION RATE] IN BLOOD: 43 MM/HR — HIGH (ref 0–20)
GLUCOSE SERPL-MCNC: 84 MG/DL — SIGNIFICANT CHANGE UP (ref 70–99)
GLUCOSE UR QL: NEGATIVE MG/DL — SIGNIFICANT CHANGE UP
HCT VFR BLD CALC: 29.7 % — LOW (ref 39–50)
HGB BLD-MCNC: 9 G/DL — LOW (ref 13–17)
INR BLD: 1.21 RATIO — HIGH (ref 0.88–1.16)
KETONES UR-MCNC: NEGATIVE — SIGNIFICANT CHANGE UP
LEUKOCYTE ESTERASE UR-ACNC: NEGATIVE — SIGNIFICANT CHANGE UP
MCHC RBC-ENTMCNC: 23.1 PG — LOW (ref 27–34)
MCHC RBC-ENTMCNC: 30.3 GM/DL — LOW (ref 32–36)
MCV RBC AUTO: 76.3 FL — LOW (ref 80–100)
NITRITE UR-MCNC: NEGATIVE — SIGNIFICANT CHANGE UP
NRBC # BLD: 0 /100 WBCS — SIGNIFICANT CHANGE UP (ref 0–0)
PH UR: 7 — SIGNIFICANT CHANGE UP (ref 5–8)
PLATELET # BLD AUTO: 480 K/UL — HIGH (ref 150–400)
POTASSIUM SERPL-MCNC: 3.4 MMOL/L — LOW (ref 3.5–5.3)
POTASSIUM SERPL-SCNC: 3.4 MMOL/L — LOW (ref 3.5–5.3)
PROT UR-MCNC: 30 MG/DL
PROTHROM AB SERPL-ACNC: 13.9 SEC — HIGH (ref 10.6–13.6)
RBC # BLD: 3.89 M/UL — LOW (ref 4.2–5.8)
RBC # FLD: 17.2 % — HIGH (ref 10.3–14.5)
RBC CASTS # UR COMP ASSIST: >50 /HPF (ref 0–4)
SARS-COV-2 RNA SPEC QL NAA+PROBE: SIGNIFICANT CHANGE UP
SODIUM SERPL-SCNC: 139 MMOL/L — SIGNIFICANT CHANGE UP (ref 135–145)
SP GR SPEC: 1.01 — SIGNIFICANT CHANGE UP (ref 1.01–1.02)
UROBILINOGEN FLD QL: NEGATIVE MG/DL — SIGNIFICANT CHANGE UP
WBC # BLD: 9.55 K/UL — SIGNIFICANT CHANGE UP (ref 3.8–10.5)
WBC # FLD AUTO: 9.55 K/UL — SIGNIFICANT CHANGE UP (ref 3.8–10.5)

## 2020-09-21 PROCEDURE — 93970 EXTREMITY STUDY: CPT | Mod: 26

## 2020-09-21 PROCEDURE — 93010 ELECTROCARDIOGRAM REPORT: CPT

## 2020-09-21 PROCEDURE — 99285 EMERGENCY DEPT VISIT HI MDM: CPT

## 2020-09-21 RX ORDER — CARVEDILOL PHOSPHATE 80 MG/1
3.12 CAPSULE, EXTENDED RELEASE ORAL
Refills: 0 | Status: DISCONTINUED | OUTPATIENT
Start: 2020-09-21 | End: 2020-10-06

## 2020-09-21 RX ORDER — LACTOBACILLUS ACIDOPHILUS 100MM CELL
1 CAPSULE ORAL
Refills: 0 | Status: DISCONTINUED | OUTPATIENT
Start: 2020-09-21 | End: 2020-10-06

## 2020-09-21 RX ORDER — CEFTRIAXONE 500 MG/1
1 INJECTION, POWDER, FOR SOLUTION INTRAMUSCULAR; INTRAVENOUS EVERY 24 HOURS
Refills: 0 | Status: DISCONTINUED | OUTPATIENT
Start: 2020-09-21 | End: 2020-09-21

## 2020-09-21 RX ORDER — CEFTRIAXONE 500 MG/1
1000 INJECTION, POWDER, FOR SOLUTION INTRAMUSCULAR; INTRAVENOUS ONCE
Refills: 0 | Status: COMPLETED | OUTPATIENT
Start: 2020-09-21 | End: 2020-09-21

## 2020-09-21 RX ORDER — LABETALOL HCL 100 MG
10 TABLET ORAL ONCE
Refills: 0 | Status: COMPLETED | OUTPATIENT
Start: 2020-09-21 | End: 2020-09-21

## 2020-09-21 RX ORDER — AMLODIPINE BESYLATE 2.5 MG/1
10 TABLET ORAL DAILY
Refills: 0 | Status: DISCONTINUED | OUTPATIENT
Start: 2020-09-21 | End: 2020-10-06

## 2020-09-21 RX ORDER — CEFEPIME 1 G/1
2000 INJECTION, POWDER, FOR SOLUTION INTRAMUSCULAR; INTRAVENOUS EVERY 8 HOURS
Refills: 0 | Status: DISCONTINUED | OUTPATIENT
Start: 2020-09-22 | End: 2020-10-05

## 2020-09-21 RX ORDER — FUROSEMIDE 40 MG
20 TABLET ORAL DAILY
Refills: 0 | Status: DISCONTINUED | OUTPATIENT
Start: 2020-09-21 | End: 2020-10-06

## 2020-09-21 RX ORDER — CEFEPIME 1 G/1
INJECTION, POWDER, FOR SOLUTION INTRAMUSCULAR; INTRAVENOUS
Refills: 0 | Status: DISCONTINUED | OUTPATIENT
Start: 2020-09-21 | End: 2020-10-05

## 2020-09-21 RX ORDER — CEFEPIME 1 G/1
2000 INJECTION, POWDER, FOR SOLUTION INTRAMUSCULAR; INTRAVENOUS ONCE
Refills: 0 | Status: COMPLETED | OUTPATIENT
Start: 2020-09-21 | End: 2020-09-21

## 2020-09-21 RX ORDER — ENOXAPARIN SODIUM 100 MG/ML
40 INJECTION SUBCUTANEOUS DAILY
Refills: 0 | Status: DISCONTINUED | OUTPATIENT
Start: 2020-09-21 | End: 2020-10-06

## 2020-09-21 RX ORDER — PANTOPRAZOLE SODIUM 20 MG/1
40 TABLET, DELAYED RELEASE ORAL
Refills: 0 | Status: DISCONTINUED | OUTPATIENT
Start: 2020-09-21 | End: 2020-09-27

## 2020-09-21 RX ORDER — POTASSIUM CHLORIDE 20 MEQ
40 PACKET (EA) ORAL DAILY
Refills: 0 | Status: COMPLETED | OUTPATIENT
Start: 2020-09-21 | End: 2020-09-22

## 2020-09-21 RX ORDER — INFLUENZA VIRUS VACCINE 15; 15; 15; 15 UG/.5ML; UG/.5ML; UG/.5ML; UG/.5ML
0.5 SUSPENSION INTRAMUSCULAR ONCE
Refills: 0 | Status: COMPLETED | OUTPATIENT
Start: 2020-09-21 | End: 2020-10-06

## 2020-09-21 RX ADMIN — CARVEDILOL PHOSPHATE 3.12 MILLIGRAM(S): 80 CAPSULE, EXTENDED RELEASE ORAL at 18:22

## 2020-09-21 RX ADMIN — CEFEPIME 100 MILLIGRAM(S): 1 INJECTION, POWDER, FOR SOLUTION INTRAMUSCULAR; INTRAVENOUS at 19:22

## 2020-09-21 RX ADMIN — Medication 40 MILLIEQUIVALENT(S): at 18:22

## 2020-09-21 RX ADMIN — Medication 110 MILLIGRAM(S): at 15:43

## 2020-09-21 RX ADMIN — CEFTRIAXONE 100 MILLIGRAM(S): 500 INJECTION, POWDER, FOR SOLUTION INTRAMUSCULAR; INTRAVENOUS at 15:09

## 2020-09-21 RX ADMIN — Medication 10 MILLIGRAM(S): at 22:51

## 2020-09-21 RX ADMIN — ENOXAPARIN SODIUM 40 MILLIGRAM(S): 100 INJECTION SUBCUTANEOUS at 22:50

## 2020-09-21 NOTE — H&P ADULT - NSHPLABSRESULTS_GEN_ALL_CORE
Arterial doppler - no occlusion  Venous DOppler - no DVT  ESR 50 CRP >4  WBC normal  Microcytic anemia  UA - hematuria

## 2020-09-21 NOTE — ED ADULT TRIAGE NOTE - CHIEF COMPLAINT QUOTE
Right lower leg wound for years, Sent by Dr Moore to ED for evaluation, wound care patient, BP elevated @ triage 161/115

## 2020-09-21 NOTE — H&P ADULT - NSICDXPASTMEDICALHX_GEN_ALL_CORE_FT
PAST MEDICAL HISTORY:  Duodenal ulcer disease     HTN (hypertension)     Hypertension     Hypertension     Knee osteoarthritis     Morbid obesity     Osteoarthritis     PUD (peptic ulcer disease)

## 2020-09-21 NOTE — H&P ADULT - NSICDXPASTSURGICALHX_GEN_ALL_CORE_FT
PAST SURGICAL HISTORY:  Abdominal hernia     Gastroduodenal artery bleed IR embolization of gastroduodenal artery    H/O ventral hernia repair

## 2020-09-21 NOTE — H&P ADULT - NSHPSOCIALHISTORY_GEN_ALL_CORE
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

## 2020-09-21 NOTE — H&P ADULT - ASSESSMENT
R leg lymphedema, cellulitis , unhealing large infected ulcerations  MOrbid obesity  Constipation  BPH, hematuria, frequency  P  IV AB as per ID  LAxatives  Renal and bladder u/s  PSA  Flomax 0.4mg po q hs

## 2020-09-21 NOTE — CONSULT NOTE ADULT - SUBJECTIVE AND OBJECTIVE BOX
HPI:  59 y/o  male, born in [place], [place] the [place] x [place]rs[place] with hx of HTN, Morbid Obesity, PUD with prior GI Bleed several yrs. ago, Osteoarthritis, s/p Ventral hernia Repair several yrs. ago, hx of chronic/ recurring RLE ulcers with associated Cellulitis, admitted today via the ER with worsening of his RLE wounds and worsening drainage from the wounds.  Patient has been followed by the [facility] and had previously had Visiting Nurse Services for Wound Care/ dressing changes at home, but during the COVID-19 Pandemic the Visiting Nurse was unable to come to the house and some friends were trying to assist him with the dressings but he subsequently developed increased swelling of the RLE along with blistering lesions which opened and drained yellow liquid.  Patient c/o pain in the RLE but no c/o fevers or chills at home.  He was rx'ed with a po ab at home from the [facility] but the leg wounds have not improved and admission was recommended by Dr. Chavarria for further care.  No c/o cp, SOB, cough, N/V/D and no c/o difficulty voiding.  Patient ambulates with crutches.  No hx of DM, Heart Disease, or Pulmonary Disease.  No recent trauma to the RLE.  in the ER patient was rx'ed with po Doxy x 1 dose and Rocephin 1gm x 1dose.   No Cx's have been obtained as yet but noted that prior Wound Cx's have grown Staph Aureus and Pseudomonas Aeruginosa.        Allergies:    No Known Allergies    Intolerances - none        Social History:  Tobacco: negative   Alcohol: negative  Recent Travel: none  Born in [place] - in the [place] x [place]yrs.  Lives alone  No Pets      MEDICATIONS  (STANDING):  amLODIPine   Tablet 10 milliGRAM(s) Oral daily  carvedilol Oral Tab/Cap - Peds 3.125 milliGRAM(s) Oral two times a day  cefTRIAXone   IVPB 1 milliGRAM(s) IV Intermittent every 24 hours  enalapril 20 milliGRAM(s) Oral daily  enoxaparin Injectable 40 milliGRAM(s) SubCutaneous daily  furosemide    Tablet 20 milliGRAM(s) Oral daily  pantoprazole    Tablet 40 milliGRAM(s) Oral before breakfast  potassium chloride    Tablet ER 40 milliEquivalent(s) Oral daily    MEDICATIONS  (PRN):        LABS:  CBC Full  -  ( 21 Sep 2020 13:38 )  WBC Count : 9.55 K/uL  RBC Count : 3.89 M/uL  Hemoglobin : 9.0 g/dL  Hematocrit : 29.7 %  Platelet Count - Automated : 480 K/uL  Mean Cell Volume : 76.3 fl  Mean Cell Hemoglobin : 23.1 pg  Mean Cell Hemoglobin Concentration : 30.3 gm/dL  Auto Neutrophil # : x  Auto Lymphocyte # : x  Auto Monocyte # : x  Auto Eosinophil # : x  Auto Basophil # : x  Auto Neutrophil % : x  Auto Lymphocyte % : x  Auto Monocyte % : x  Auto Eosinophil % : x  Auto Basophil % : x    09-21    139  |  103  |  15  ----------------------------<  84  3.4<L>   |  31  |  0.62    Ca    8.5      21 Sep 2020 13:38      PT/INR - ( 21 Sep 2020 13:38 )   PT: 13.9 sec;   INR: 1.21 ratio       PTT - ( 21 Sep 2020 13:38 )  PTT:32.2 sec      Sedimentation Rate, Erythrocyte: 43 mm/hr (09-21 @ 13:38)          MICROBIOLOGY:      COVID-19 PCR . (09.21.20 @ 14:45)    COVID-19 PCR: NotDetec: This test has been validated by [facility] to be accurate;      Culture - Other (08.20.20 @ 18:08)    -  Amikacin: S <=16    -  Ampicillin/Sulbactam: S <=8/4    -  Aztreonam: S 8    -  Cefazolin: S <=4    -  Cefepime: S 8    -  Ciprofloxacin: S <=0.25    -  Ceftazidime: S 4    -  Clindamycin: S <=0.25    -  Erythromycin: S <=0.25    -  Gentamicin: S 4    -  Gentamicin: R >8 Should not be used as monotherapy    -  Imipenem: S 2    -  Meropenem: S <=1    -  Levofloxacin: S <=0.5    -  Oxacillin: S 0.5    -  Penicillin: R >8    -  Piperacillin/Tazobactam: S <=8    -  RIF- Rifampin: S <=1 Should not be used as monotherapy    -  Tetra/Doxy: R >8    -  Tobramycin: S <=2    -  Trimethoprim/Sulfamethoxazole: S <=0.5/9.5    -  Vancomycin: S 1    Specimen Source: .Other RIGHT LOWER LEG ULCER    Culture Results:   Culture yields growth of greater than 3 colony types of  bacteria,  which may indicate contamination and normal maureen  Call client services within 7 days if further workup is clinically  indicated. Culture includes  Few Staphylococcus aureus  Moderate Pseudomonas aeruginosa    Organism Identification: Staphylococcus aureus  Pseudomonas aeruginosa    Organism: Staphylococcus aureus    Organism: Pseudomonas aeruginosa    Method Type: OTONIEL    Method Type: OTONIEL          Radiology:      < from: US Duplex Venous Lower Ext Complete, Bilateral (09.21.20 @ 17:21) >    EXAM:  US DPLX LWR EXT VEINS COMPL BI                          PROCEDURE DATE:  09/21/2020      INTERPRETATION:  CLINICAL INFORMATION: Bilateral leg ulcers and edema.    COMPARISON: Doppler venous sonogram 3/27/2019.    TECHNIQUE: Duplex sonography of the BILATERAL LOWER extremity veins with color and spectral Doppler, with and without compression.    FINDINGS:    There is normal compressibility of the bilateral common femoral, femoral and popliteal veins.  Doppler examination shows normal spontaneous and phasic flow.    No calf vein thrombosis is detected.    IMPRESSION:    No evidence of deep venous thrombosis in either lower extremity.          Vital Signs Last 24 Hrs  T(C): 36.3 (21 Sep 2020 15:45), Max: 36.7 (21 Sep 2020 12:29)  T(F): 97.3 (21 Sep 2020 15:45), Max: 98 (21 Sep 2020 12:29)  HR: 71 (21 Sep 2020 15:45) (71 - 75)  BP: 157/106 (21 Sep 2020 15:45) (157/106 - 161/115)  BP(mean): --  RR: 17 (21 Sep 2020 15:45) (16 - 17)  SpO2: 98% (21 Sep 2020 15:45) (98% - 98%)  Supplemental O2: on RA       PHYSICAL EXAM    General: 59 y/o  obese  male, awake, alert, pleasant and cooperative, c/o pain in the RLE but in NAD  HEENT: conj pink, sclerae anicteric, PERRLA, no oral lesions noted  Neck: supple, no nodes noted  Heart: RR  Lungs: clear bilaterally  Abdomen: obese, BS+, soft, nontender to palpation, no masses or HS-megaly detected, well-healed surgical scars near the umilicus  Back: no CVA or Spinal tenderness noted  Extremities: LLE with no edema                     RLE with marked swelling and with warmth and erythema to below the Knee and with marked ulcerations of the lower leg with foul odor noted and yellowish- green drainage on partial                       dressings that remain in place - very tender to palpation  Skin: warm, dry, no rash noted        I&O's Summary      Impression:  59 y/o  male with hx of HTN,  Morbid Obesity, OA, PUD, Chronic/ Recurring Ulcers of the RLE with Cellulitis, s/p Ventral Hernia Repair, know patient in the [facility], now admitted with worsening of his RLE Ulcerations with drainage and associated Cellulitis despite reported po ab rx.  Noted WBC's are WNL and ESR is 43.    Suggestions:  Will therefore repeat Cx's now and empirically rx with Cefepime to cover prior known isolates of Staph Aureus and Pseudomonas Aeruginosa pending further Cx results.  Follow-up temps and labs.  Local Wound Care as per Surgery/ Wound Care.  Vascular w/u in progress. Discussed in detail with patient at bedside.  Thanks, will follow with you.

## 2020-09-21 NOTE — H&P ADULT - HISTORY OF PRESENT ILLNESS
· Chief Complaint: The patient is a 60y Male complaining of wound check.  · HPI Objective Statement: 60M PMHx htn with chronic RLE wound. patient had home VNS for wound care for a long time but sionce covid has lost home nursing. wound has been getting worse- it developed a blistered and then popped. no fevers. no traumna. saw his wound care Dr - Dr. Chavarria, who sent hi to ED for eval.    HIV:   HIV Test Questions:  · In accordance with [place] State law, we offer every patient who comes to our ED an HIV test. Would you like to be tested today?	Opt out    PAST MEDICAL/SURGICAL/FAMILY/SOCIAL HISTORY:   Past Medical History:  Duodenal ulcer disease    HTN (hypertension)    Hypertension    Hypertension    Knee osteoarthritis    Morbid obesity    Osteoarthritis    PUD (peptic ulcer disease).     · Chief Complaint: The patient is a 60y Male complaining of wound check.  · HPI Objective Statement: 60M PMHx htn with chronic RLE wound. patient had home VNS for wound care for a long time but sionce covid has lost home nursing. wound has been getting worse- it developed a blistered and then popped. no fevers. no traumna. saw his wound care Dr - Dr. Chavarria, who sent hi to ED for eval.  Pt state that problem exists 2.5 years , it almost healed but later he had R leg trauma , about 6 months ago and problem started again, Rx c Dr Chavarria. Ulceration and cellulitis got worse  R leg edema stable    HIV:   HIV Test Questions:  · In accordance with [place] State law, we offer every patient who comes to our ED an HIV test. Would you like to be tested today?	Opt out    PAST MEDICAL/SURGICAL/FAMILY/SOCIAL HISTORY:   Past Medical History:  Duodenal ulcer disease    HTN (hypertension)    Hypertension    Hypertension    Knee osteoarthritis    Morbid obesity    Osteoarthritis    PUD (peptic ulcer disease).  AAW hernia repair

## 2020-09-21 NOTE — ED PROVIDER NOTE - CLINICAL SUMMARY MEDICAL DECISION MAKING FREE TEXT BOX
worsening chronic cellulitis possibly due to lapse in home care. will require admission forw ound care management and  possibly. no clinical signs of deep space infection. nontender

## 2020-09-21 NOTE — H&P ADULT - NSICDXFAMILYHX_GEN_ALL_CORE_FT
FAMILY HISTORY:  No pertinent family history in first degree relatives  No pertinent family history in first degree relatives

## 2020-09-21 NOTE — ED ADULT NURSE NOTE - OBJECTIVE STATEMENT
received er bed 7 sent by wound care md for eval r lower leg wound pt states x several months pain only upon dressing changes per pt no pain at present denies fever/chills wound is moist no foul odor noted no bleeding at site states wound was nearly healed then got worse r toes warm pink and mobile with good capillary refill noted + sensation states wound care weekly last treatment last thurs r lower leg with peripheral ulceration circumferential no swelling redness or temperature change noted

## 2020-09-21 NOTE — ED ADULT NURSE NOTE - NSIMPLEMENTINTERV_GEN_ALL_ED
Implemented All Fall with Harm Risk Interventions:  [place] to call system. Call bell, personal items and telephone within reach. Instruct patient to call for assistance. Room bathroom lighting operational. Non-slip footwear when patient is off stretcher. Physically safe environment: no spills, clutter or unnecessary equipment. Stretcher in lowest position, wheels locked, appropriate side rails in place. Provide visual cue, wrist band, yellow gown, etc. Monitor gait and stability. Monitor for mental status changes and reorient to person, place, and time. Review medications for side effects contributing to fall risk. Reinforce activity limits and safety measures with patient and family. Provide visual clues: red socks.

## 2020-09-21 NOTE — H&P ADULT - NSHPREVIEWOFSYSTEMS_GEN_ALL_CORE
no CP no SOB   never ST  NOt snoring  Constipated  Colonoscopy 2 years ago  Frequency and very weak urinae stream

## 2020-09-21 NOTE — ED PROVIDER NOTE - MUSCULOSKELETAL, MLM
Spine appears normal, range of motion is not limited, no muscle or joint tenderness. circomferential ulcerative lesion on right lower extremity with ganulation tissue. non ttp.

## 2020-09-21 NOTE — H&P ADULT - NSHPPHYSICALEXAM_GEN_ALL_CORE
HEENT nl  Lungs clear b/l  S1S2 rrr  Abd soft no organomegaly, + BS  Extr R leg lymphedema, ulceration and fibrin in the middle 2/3 of the R leg  L leg intact

## 2020-09-21 NOTE — ED PROVIDER NOTE - OBJECTIVE STATEMENT
60M PMHx htn with chronic RLE wound. patient had home VNS for wound care for a long time but sionce covid has lost home nursing. wound has been getting worse- it developed a blistered and then popped. no fevers. no traumna. saw his wound care Dr - Dr. Chavarria, who sent hi to ED for eval.

## 2020-09-22 DIAGNOSIS — R60.9 EDEMA, UNSPECIFIED: ICD-10-CM

## 2020-09-22 DIAGNOSIS — Z79.899 OTHER LONG TERM (CURRENT) DRUG THERAPY: ICD-10-CM

## 2020-09-22 DIAGNOSIS — L97.818 NON-PRESSURE CHRONIC ULCER OF OTHER PART OF RIGHT LOWER LEG WITH OTHER SPECIFIED SEVERITY: ICD-10-CM

## 2020-09-22 DIAGNOSIS — I87.311 CHRONIC VENOUS HYPERTENSION (IDIOPATHIC) WITH ULCER OF RIGHT LOWER EXTREMITY: ICD-10-CM

## 2020-09-22 DIAGNOSIS — I10 ESSENTIAL (PRIMARY) HYPERTENSION: ICD-10-CM

## 2020-09-22 DIAGNOSIS — I87.2 VENOUS INSUFFICIENCY (CHRONIC) (PERIPHERAL): ICD-10-CM

## 2020-09-22 DIAGNOSIS — Z74.1 NEED FOR ASSISTANCE WITH PERSONAL CARE: ICD-10-CM

## 2020-09-22 DIAGNOSIS — Z91.81 HISTORY OF FALLING: ICD-10-CM

## 2020-09-22 DIAGNOSIS — D64.9 ANEMIA, UNSPECIFIED: ICD-10-CM

## 2020-09-22 DIAGNOSIS — M17.12 UNILATERAL PRIMARY OSTEOARTHRITIS, LEFT KNEE: ICD-10-CM

## 2020-09-22 LAB
ALBUMIN SERPL ELPH-MCNC: 2.5 G/DL — LOW (ref 3.3–5)
ALP SERPL-CCNC: 102 U/L — SIGNIFICANT CHANGE UP (ref 40–120)
ALT FLD-CCNC: 14 U/L — SIGNIFICANT CHANGE UP (ref 12–78)
ANION GAP SERPL CALC-SCNC: 5 MMOL/L — SIGNIFICANT CHANGE UP (ref 5–17)
AST SERPL-CCNC: 19 U/L — SIGNIFICANT CHANGE UP (ref 15–37)
BASOPHILS # BLD AUTO: 0.08 K/UL — SIGNIFICANT CHANGE UP (ref 0–0.2)
BASOPHILS NFR BLD AUTO: 0.8 % — SIGNIFICANT CHANGE UP (ref 0–2)
BILIRUB SERPL-MCNC: 0.4 MG/DL — SIGNIFICANT CHANGE UP (ref 0.2–1.2)
BUN SERPL-MCNC: 12 MG/DL — SIGNIFICANT CHANGE UP (ref 7–23)
CALCIUM SERPL-MCNC: 8.4 MG/DL — LOW (ref 8.5–10.1)
CHLORIDE SERPL-SCNC: 101 MMOL/L — SIGNIFICANT CHANGE UP (ref 96–108)
CO2 SERPL-SCNC: 32 MMOL/L — HIGH (ref 22–31)
CREAT SERPL-MCNC: 0.63 MG/DL — SIGNIFICANT CHANGE UP (ref 0.5–1.3)
CRP SERPL-MCNC: 4.17 MG/DL — HIGH (ref 0–0.4)
EOSINOPHIL # BLD AUTO: 0.13 K/UL — SIGNIFICANT CHANGE UP (ref 0–0.5)
EOSINOPHIL NFR BLD AUTO: 1.3 % — SIGNIFICANT CHANGE UP (ref 0–6)
ERYTHROCYTE [SEDIMENTATION RATE] IN BLOOD: 50 MM/HR — HIGH (ref 0–20)
GLUCOSE SERPL-MCNC: 93 MG/DL — SIGNIFICANT CHANGE UP (ref 70–99)
HCT VFR BLD CALC: 32.4 % — LOW (ref 39–50)
HGB BLD-MCNC: 9.5 G/DL — LOW (ref 13–17)
IMM GRANULOCYTES NFR BLD AUTO: 0.7 % — SIGNIFICANT CHANGE UP (ref 0–1.5)
LYMPHOCYTES # BLD AUTO: 1.66 K/UL — SIGNIFICANT CHANGE UP (ref 1–3.3)
LYMPHOCYTES # BLD AUTO: 16.4 % — SIGNIFICANT CHANGE UP (ref 13–44)
MCHC RBC-ENTMCNC: 22.8 PG — LOW (ref 27–34)
MCHC RBC-ENTMCNC: 29.3 GM/DL — LOW (ref 32–36)
MCV RBC AUTO: 77.9 FL — LOW (ref 80–100)
MONOCYTES # BLD AUTO: 0.83 K/UL — SIGNIFICANT CHANGE UP (ref 0–0.9)
MONOCYTES NFR BLD AUTO: 8.2 % — SIGNIFICANT CHANGE UP (ref 2–14)
NEUTROPHILS # BLD AUTO: 7.35 K/UL — SIGNIFICANT CHANGE UP (ref 1.8–7.4)
NEUTROPHILS NFR BLD AUTO: 72.6 % — SIGNIFICANT CHANGE UP (ref 43–77)
NRBC # BLD: 0 /100 WBCS — SIGNIFICANT CHANGE UP (ref 0–0)
PLATELET # BLD AUTO: 513 K/UL — HIGH (ref 150–400)
POTASSIUM SERPL-MCNC: 3.6 MMOL/L — SIGNIFICANT CHANGE UP (ref 3.5–5.3)
POTASSIUM SERPL-SCNC: 3.6 MMOL/L — SIGNIFICANT CHANGE UP (ref 3.5–5.3)
PROT SERPL-MCNC: 7.9 GM/DL — SIGNIFICANT CHANGE UP (ref 6–8.3)
RBC # BLD: 4.16 M/UL — LOW (ref 4.2–5.8)
RBC # FLD: 17.5 % — HIGH (ref 10.3–14.5)
SARS-COV-2 IGG SERPL QL IA: NEGATIVE — SIGNIFICANT CHANGE UP
SARS-COV-2 IGM SERPL IA-ACNC: <0.1 INDEX — SIGNIFICANT CHANGE UP
SODIUM SERPL-SCNC: 138 MMOL/L — SIGNIFICANT CHANGE UP (ref 135–145)
WBC # BLD: 10.12 K/UL — SIGNIFICANT CHANGE UP (ref 3.8–10.5)
WBC # FLD AUTO: 10.12 K/UL — SIGNIFICANT CHANGE UP (ref 3.8–10.5)

## 2020-09-22 PROCEDURE — 93925 LOWER EXTREMITY STUDY: CPT | Mod: 26

## 2020-09-22 RX ORDER — MORPHINE SULFATE 50 MG/1
2 CAPSULE, EXTENDED RELEASE ORAL EVERY 4 HOURS
Refills: 0 | Status: DISCONTINUED | OUTPATIENT
Start: 2020-09-22 | End: 2020-09-29

## 2020-09-22 RX ORDER — MORPHINE SULFATE 50 MG/1
4 CAPSULE, EXTENDED RELEASE ORAL
Refills: 0 | Status: DISCONTINUED | OUTPATIENT
Start: 2020-09-22 | End: 2020-09-22

## 2020-09-22 RX ORDER — FERROUS SULFATE 325(65) MG
325 TABLET ORAL DAILY
Refills: 0 | Status: DISCONTINUED | OUTPATIENT
Start: 2020-09-22 | End: 2020-10-06

## 2020-09-22 RX ORDER — POLYETHYLENE GLYCOL 3350 17 G/17G
17 POWDER, FOR SOLUTION ORAL
Refills: 0 | Status: DISCONTINUED | OUTPATIENT
Start: 2020-09-22 | End: 2020-10-06

## 2020-09-22 RX ORDER — TAMSULOSIN HYDROCHLORIDE 0.4 MG/1
0.4 CAPSULE ORAL AT BEDTIME
Refills: 0 | Status: DISCONTINUED | OUTPATIENT
Start: 2020-09-22 | End: 2020-10-06

## 2020-09-22 RX ORDER — SENNA PLUS 8.6 MG/1
2 TABLET ORAL AT BEDTIME
Refills: 0 | Status: DISCONTINUED | OUTPATIENT
Start: 2020-09-22 | End: 2020-10-06

## 2020-09-22 RX ADMIN — CARVEDILOL PHOSPHATE 3.12 MILLIGRAM(S): 80 CAPSULE, EXTENDED RELEASE ORAL at 17:25

## 2020-09-22 RX ADMIN — AMLODIPINE BESYLATE 10 MILLIGRAM(S): 2.5 TABLET ORAL at 05:21

## 2020-09-22 RX ADMIN — MORPHINE SULFATE 2 MILLIGRAM(S): 50 CAPSULE, EXTENDED RELEASE ORAL at 11:15

## 2020-09-22 RX ADMIN — CEFEPIME 100 MILLIGRAM(S): 1 INJECTION, POWDER, FOR SOLUTION INTRAMUSCULAR; INTRAVENOUS at 14:30

## 2020-09-22 RX ADMIN — MORPHINE SULFATE 2 MILLIGRAM(S): 50 CAPSULE, EXTENDED RELEASE ORAL at 11:30

## 2020-09-22 RX ADMIN — ENOXAPARIN SODIUM 40 MILLIGRAM(S): 100 INJECTION SUBCUTANEOUS at 11:15

## 2020-09-22 RX ADMIN — CEFEPIME 100 MILLIGRAM(S): 1 INJECTION, POWDER, FOR SOLUTION INTRAMUSCULAR; INTRAVENOUS at 05:20

## 2020-09-22 RX ADMIN — Medication 1 TABLET(S): at 05:21

## 2020-09-22 RX ADMIN — CEFEPIME 100 MILLIGRAM(S): 1 INJECTION, POWDER, FOR SOLUTION INTRAMUSCULAR; INTRAVENOUS at 21:49

## 2020-09-22 RX ADMIN — SENNA PLUS 2 TABLET(S): 8.6 TABLET ORAL at 21:48

## 2020-09-22 RX ADMIN — PANTOPRAZOLE SODIUM 40 MILLIGRAM(S): 20 TABLET, DELAYED RELEASE ORAL at 07:48

## 2020-09-22 RX ADMIN — Medication 20 MILLIGRAM(S): at 05:21

## 2020-09-22 RX ADMIN — POLYETHYLENE GLYCOL 3350 17 GRAM(S): 17 POWDER, FOR SOLUTION ORAL at 21:48

## 2020-09-22 RX ADMIN — Medication 40 MILLIEQUIVALENT(S): at 11:20

## 2020-09-22 RX ADMIN — TAMSULOSIN HYDROCHLORIDE 0.4 MILLIGRAM(S): 0.4 CAPSULE ORAL at 21:49

## 2020-09-22 RX ADMIN — Medication 325 MILLIGRAM(S): at 21:49

## 2020-09-22 RX ADMIN — Medication 1 TABLET(S): at 17:25

## 2020-09-22 RX ADMIN — CARVEDILOL PHOSPHATE 3.12 MILLIGRAM(S): 80 CAPSULE, EXTENDED RELEASE ORAL at 05:21

## 2020-09-22 NOTE — PROGRESS NOTE ADULT - SUBJECTIVE AND OBJECTIVE BOX
Patient is a 60y old  Male who presents with a chief complaint of leg ulcer (22 Sep 2020 14:20)      INTERVAL HPI/OVERNIGHT EVENTS: c/o R leg pain  c/o constipation  c/o frequency    MEDICATIONS  (STANDING):  amLODIPine   Tablet 10 milliGRAM(s) Oral daily  carvedilol Oral Tab/Cap - Peds 3.125 milliGRAM(s) Oral two times a day  cefepime   IVPB 2000 milliGRAM(s) IV Intermittent every 8 hours  cefepime   IVPB      enalapril 20 milliGRAM(s) Oral daily  enoxaparin Injectable 40 milliGRAM(s) SubCutaneous daily  ferrous    sulfate 325 milliGRAM(s) Oral daily  furosemide    Tablet 20 milliGRAM(s) Oral daily  influenza   Vaccine 0.5 milliLiter(s) IntraMuscular once  lactobacillus acidophilus 1 Tablet(s) Oral two times a day  pantoprazole    Tablet 40 milliGRAM(s) Oral before breakfast  polyethylene glycol 3350 17 Gram(s) Oral two times a day  senna 2 Tablet(s) Oral at bedtime  tamsulosin 0.4 milliGRAM(s) Oral at bedtime    MEDICATIONS  (PRN):  bisacodyl Suppository 10 milliGRAM(s) Rectal daily PRN Constipation  morphine  - Injectable 2 milliGRAM(s) IV Push every 4 hours PRN Moderate Pain (4 - 6)  morphine  - Injectable 4 milliGRAM(s) IV Push four times a day PRN Severe Pain (7 - 10)      Allergies    No Known Allergies    Intolerances        REVIEW OF SYSTEMS:        HEENT - wnl  RESPIRATORY: No cough, wheezing, chills or hemoptysis; No shortness of breath  CARDIOVASCULAR: No chest pain, palpitations, dizziness, or leg swelling  GASTROINTESTINAL: No abdominal or epigastric pain. No nausea, vomiting, constipated.  GENITOURINARY: frequency 4x nioght, weak streame  SKIN: No itching, burning, rashes, or lesions   MUSCULOSKELETAL: No joint pain or swelling; No muscle, back, or extremity pain  PSYCHIATRIC: No depression, anxiety, mood swings, or difficulty sleeping        Vital Signs Last 24 Hrs  T(C): 36.5 (22 Sep 2020 16:57), Max: 37.1 (21 Sep 2020 19:30)  T(F): 97.7 (22 Sep 2020 16:57), Max: 98.7 (21 Sep 2020 19:30)  HR: 62 (22 Sep 2020 16:57) (62 - 79)  BP: 150/87 (22 Sep 2020 16:57) (150/87 - 184/114)  BP(mean): --  RR: 18 (22 Sep 2020 16:57) (18 - 18)  SpO2: 100% (22 Sep 2020 16:57) (96% - 100%)    PHYSICAL EXAM:  general       HEENT wnl  CHEST/LUNG: Clear to percussion bilaterally; No rales, rhonchi, wheezing, or rubs  HEART: Regular rate and rhythm; No murmurs, rubs, or gallops  ABDOMEN: Soft, Nontender, Nondistended; Bowel sounds present  EXTREMITIES:  R leg edema, ulceration  LYMPH: No lymphadenopathy noted  SKIN: No rashes or lesions    LABS:                        9.5    10.12 )-----------( 513      ( 22 Sep 2020 07:41 )             32.4     -    138  |  101  |  12  ----------------------------<  93  3.6   |  32<H>  |  0.63    Ca    8.4<L>      22 Sep 2020 07:41    TPro  7.9  /  Alb  2.5<L>  /  TBili  0.4  /  DBili  x   /  AST  19  /  ALT  14  /  AlkPhos  102  -22    PT/INR - ( 21 Sep 2020 13:38 )   PT: 13.9 sec;   INR: 1.21 ratio         PTT - ( 21 Sep 2020 13:38 )  PTT:32.2 sec  Urinalysis Basic - ( 21 Sep 2020 21:08 )    Color: Yellow / Appearance: Clear / S.010 / pH: x  Gluc: x / Ketone: Negative  / Bili: Negative / Urobili: Negative mg/dL   Blood: x / Protein: 30 mg/dL / Nitrite: Negative   Leuk Esterase: Negative / RBC: >50 /HPF / WBC x   Sq Epi: x / Non Sq Epi: Negative / Bacteria: Negative      CAPILLARY BLOOD GLUCOSE                    RADIOLOGY & ADDITIONAL TESTS:    Imaging Personally Reviewed:  [y ] YES  [ ] NO    Consultant(s) Notes Reviewed:  [y ] YES  [ ] NO    Care Discussed with Consultants/Other Providers [ ] YES  [ ] NO    PROBLEMS:  CELLULITIS    PAIN RT LEG    Cellulitis  R leg lymphedema, ulcer infeted  Morbid obesity  Constipation  Hematuria  Microcytic anemia  P:  REnal bladder u/s  PSA  Flomax 0.4 mg q hs  Laxatives  Occult blood  Feso4 325 mg qd         Care discussed with family,         [  ]   yes  [  ]  No    imp:    stable[ ]    unstable[  ]     improving [   ]       unchanged  [  ]                Plans:  Continue present plans  [  ]

## 2020-09-22 NOTE — PHYSICAL THERAPY INITIAL EVALUATION ADULT - ADDITIONAL COMMENTS
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 Prior to admission pt was independent with all functional mobility including community ambulation with B/L axillary crutches. Pt performs ADL's independently. Nurse assists with dressing change, however unable to due to COVID. 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

## 2020-09-22 NOTE — PHYSICAL THERAPY INITIAL EVALUATION ADULT - MODALITIES TREATMENT COMMENTS
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 Pt presents with grossly infected venous ulcer over RLE which measures 39.0cmx28.0cmx0.1cm depth. circumferential Pt has hemosiderin staining & edema over RLE. + trophic changes noted at toes B/L. RLE diminished DP/PT decreased circulation. Wound covered with combination of biofilm & non-viable tissue (tan slough). Large amounts of purulent/fibronous drainage noted (tan) with no odor. Periwound noted to be red distally by ankle. No induration, fluctuance, or erythema noted. No increased warmth noted over RLE upon palpation. + tenderness to palpation reported by pt.

## 2020-09-22 NOTE — PHYSICAL THERAPY INITIAL EVALUATION ADULT - PERTINENT HX OF CURRENT PROBLEM, REHAB EVAL
Pt is a 59yo M who was admitted due to RLE cellulitis & non-healing wound. Pt's home care wound care nurse has been unable to come in due to COVID and now with worsening infected wounds

## 2020-09-22 NOTE — PROGRESS NOTE ADULT - SUBJECTIVE AND OBJECTIVE BOX
TMAX - 98.7    On day # 1 Cefepime     Vital Signs Last 24 Hrs  T(C): 36.8 (22 Sep 2020 11:18), Max: 37.1 (21 Sep 2020 19:30)  T(F): 98.2 (22 Sep 2020 11:18), Max: 98.7 (21 Sep 2020 19:30)  HR: 74 (22 Sep 2020 11:18) (64 - 82)  BP: 152/101 (22 Sep 2020 11:18) (150/98 - 184/114)  BP(mean): --  RR: 18 (22 Sep 2020 11:18) (16 - 18)  SpO2: 97% (22 Sep 2020 11:18) (96% - 99%)  Supplemental O2:  on RA       Awake, alert, feeling a little better today.  Still with pain in the RLE but no c/o cp, SOB, abdominal pain, cough, or GI upset.  Seen by PT/ Wound Care with dressing changed earlier today.  Appetite is good,  C/o some Lt Knee pain ? secondary to OA which has been increasing recently making it increasingly difficult to ambulate with his crutches and informed me today that he had fallen last week at home.      PHYSICAL EXAM  General:  awake, alert, sitting up on the edge of the bed now,  very pleasant and cooperative, in NAD  HEENT:  conj pink, sclerae anicteric, PERRLA, no oral lesions noted  Neck:  supple, no nodes noted  Heart:  RR  Lungs:  clear bilaterally  Abdomen:  soft, obese, BS+, nontender  Back: no CVA or Spinal tenderness noted  Extremities:  LLE with no edema                      RLE with dressing now in place over the leg wounds and with surrounding swelling up to the Rt. Knee but improved from yesterday and with warmth and mild erythema -  to                          palpation but decreased now  Skin:  warm, dry, no rash noted        I&O's Summary :    21 Sep 2020 07:  -  22 Sep 2020 07:00  --------------------------------------------------------  IN: 100 mL / OUT: 0 mL / NET: 100 mL    22 Sep 2020 07:  -  22 Sep 2020 14:20  --------------------------------------------------------  IN: 980 mL / OUT: 500 mL / NET: 480 mL      LABS:  CBC Full  -  ( 22 Sep 2020 07:41 )  WBC Count : 10.12 K/uL  RBC Count : 4.16 M/uL  Hemoglobin : 9.5 g/dL  Hematocrit : 32.4 %  Platelet Count - Automated : 513 K/uL  Mean Cell Volume : 77.9 fl  Mean Cell Hemoglobin : 22.8 pg  Mean Cell Hemoglobin Concentration : 29.3 gm/dL  Auto Neutrophil # : 7.35 K/uL  Auto Lymphocyte # : 1.66 K/uL  Auto Monocyte # : 0.83 K/uL  Auto Eosinophil # : 0.13 K/uL  Auto Basophil # : 0.08 K/uL  Auto Neutrophil % : 72.6 %  Auto Lymphocyte % : 16.4 %  Auto Monocyte % : 8.2 %  Auto Eosinophil % : 1.3 %  Auto Basophil % : 0.8 %        138  |  101  |  12  ----------------------------<  93  3.6   |  32<H>  |  0.63    Ca    8.4<L>      22 Sep 2020 07:41    TPro  7.9  /  Alb  2.5<L>  /  TBili  0.4  /  DBili  x   /  AST  19  /  ALT  14  /  AlkPhos  102  22    LIVER FUNCTIONS - ( 22 Sep 2020 07:41 )  Alb: 2.5 g/dL / Pro: 7.9 gm/dL / ALK PHOS: 102 U/L / ALT: 14 U/L / AST: 19 U/L / GGT: x             PT/INR - ( 21 Sep 2020 13:38 )   PT: 13.9 sec;   INR: 1.21 ratio       PTT - ( 21 Sep 2020 13:38 )  PTT:32.2 sec    Urinalysis Basic - ( 21 Sep 2020 21:08 )  Color: Yellow / Appearance: Clear / S.010 / pH: x  Gluc: x / Ketone: Negative  / Bili: Negative / Urobili: Negative mg/dL   Blood: x / Protein: 30 mg/dL / Nitrite: Negative   Leuk Esterase: Negative / RBC: >50 /HPF / WBC x   Sq Epi: x / Non Sq Epi: Negative / Bacteria: Negative      Sedimentation Rate, Erythrocyte: 50 mm/hr ( @ 07:41)    Sedimentation Rate, Erythrocyte: 43 mm/hr ( @ 13:38)    C-Reactive Protein, Serum (20 @ 11:26)    C-Reactive Protein, Serum: 4.17 mg/dL        MICROBIOLOGY:    COVID-19  Antibody - for prior infection screening (20 @ 02:10)    COVID-19 IgG Antibody Index: <0.10: Abbott CMIA  Negative Result    <= 1.39 Index  Positive Result      >= 1.40 Index Index    COVID-19 IgG Antibody Interpretation: Negative: This test has not been reviewed by the FDA by the standard review  process.       COVID-19 PCR . (20 @ 14:45)    COVID-19 PCR: NotDetec: This test has been validated by [employer] to be accurate;      Culture - Other (20 @ 18:08)    -  Trimethoprim/Sulfamethoxazole: S <=0.5/9.5    -  Vancomycin: S 1    -  Amikacin: S <=16    -  Aztreonam: S 8    -  Ampicillin/Sulbactam: S <=8/4    -  Cefazolin: S <=4    -  Cefepime: S 8    -  Ceftazidime: S 4    -  Ciprofloxacin: S <=0.25    -  Clindamycin: S <=0.25    -  Erythromycin: S <=0.25    -  Gentamicin: R >8 Should not be used as monotherapy    -  Gentamicin: S 4    -  Imipenem: S 2    -  Levofloxacin: S <=0.5    -  Meropenem: S <=1    -  Oxacillin: S 0.5    -  Penicillin: R >8    -  Piperacillin/Tazobactam: S <=8    -  RIF- Rifampin: S <=1 Should not be used as monotherapy    -  Tetra/Doxy: R >8    -  Tobramycin: S <=2    Specimen Source: .Other RIGHT LOWER LEG ULCER    Culture Results:   Culture yields growth of greater than 3 colony types of  bacteria,  which may indicate contamination and normal maureen  Call client services within 7 days if further workup is clinically  indicated. Culture includes  Few Staphylococcus aureus  Moderate Pseudomonas aeruginosa    Organism Identification: Staphylococcus aureus  Pseudomonas aeruginosa    Organism: Staphylococcus aureus    Organism: Pseudomonas aeruginosa    Method Type: OTONIEL    Method Type: OTONIEL          RADIOLOGY:    < from: US Duplex Arterial Lower Ext Compl, Bilateral (20 @ 10:30) >    EXAM:  US DPLX LWR EXT ARTS COMPL BI                          PROCEDURE DATE:  2020      INTERPRETATION:  HISTORY: Peripheral arterial disease, leg ulcer.    Bilateral lower extremity arterial Doppler was performed using grayscale, color and spectral Doppler. There are no prior studies available for comparison.    Findings:    RIGHT: There is triphasic flow within the right external iliac, common femoral, deep femoral, superficial femoral and popliteal arteries. Abnormal low resistance flow within the posterior tibial artery. Peroneal and anterior tibial arteries could not be visualized due to swelling.  Peak systolic velocities are as follows (in CM/sec):  EIA: 127  CFA: 139  DFA: 77  SFA: (Proximal, mid, distal): 135, 122, 94  Popliteal: 73  PTA: 52, 94, 110  Peroneal: Not visualized  AAMIR: Not visualized  DPA: 58      LEFT: There is triphasic flow throughout the left lower extremity.  Peak systolic velocities are as follows (in CM/sec):  EIA: 98  CFA: 89  DFA: 71  SFA: (Proximal, mid, distal): 78, 75, 83  Popliteal: 58  PTA: 50, 62, 76  Peroneal: 63, 56, 45  AAMIR: 57, 40, 59  DPA: 74    IMPRESSION:    Limited visualization of the right calf arteries due to swelling. Abnormal low resistance flow within the posterior tibial artery. No visualized occlusion.    Normal velocities and waveforms within the left leg.                < from: US Duplex Venous Lower Ext Complete, Bilateral (20 @ 17:21) >  EXAM:  US DPLX LWR EXT VEINS COMPL BI                          PROCEDURE DATE:  2020      INTERPRETATION:  CLINICAL INFORMATION: Bilateral leg ulcers and edema.    COMPARISON: Doppler venous sonogram 3/27/2019.    TECHNIQUE: Duplex sonography of the BILATERAL LOWER extremity veins with color and spectral Doppler, with and without compression.    FINDINGS:    There is normal compressibility of the bilateral common femoral, femoral and popliteal veins.  Doppler examination shows normal spontaneous and phasic flow.    No calf vein thrombosis is detected.    IMPRESSION:    No evidence of deep venous thrombosis in either lower extremity.          IMPRESSION:   59 y/o  male with hx of HTN,  Morbid Obesity, OA, PUD, Chronic/ Recurring Ulcers of the RLE with Cellulitis, s/p Ventral Hernia Repair, know patient in the [facility], now admitted with worsening of his RLE Ulcerations with drainage and associated Cellulitis despite reported po ab rx.   WBC's are WNL and noted ESR is 50 with CRP of 4.17 today.  Remains afebrile on current ab rx with Cefepime.   Note Venous and Arterial Doppler studies with no evidence of DVT or Arterial occlusion seen.  Noted u/a though with some microscopic hematuria.      Suggestions:  Will continue current ab rx with Cefepime and follow-up new Blood and Wound Cx's which are pending.  Continue local wound care as per Surgery/ Wound Care.  Follow temps and labs.  Discussed with patient in detail at the bedside.

## 2020-09-23 LAB
ANION GAP SERPL CALC-SCNC: 6 MMOL/L — SIGNIFICANT CHANGE UP (ref 5–17)
BASOPHILS # BLD AUTO: 0.05 K/UL — SIGNIFICANT CHANGE UP (ref 0–0.2)
BASOPHILS NFR BLD AUTO: 0.7 % — SIGNIFICANT CHANGE UP (ref 0–2)
BUN SERPL-MCNC: 14 MG/DL — SIGNIFICANT CHANGE UP (ref 7–23)
CALCIUM SERPL-MCNC: 8.6 MG/DL — SIGNIFICANT CHANGE UP (ref 8.5–10.1)
CHLORIDE SERPL-SCNC: 104 MMOL/L — SIGNIFICANT CHANGE UP (ref 96–108)
CO2 SERPL-SCNC: 27 MMOL/L — SIGNIFICANT CHANGE UP (ref 22–31)
CREAT SERPL-MCNC: 0.64 MG/DL — SIGNIFICANT CHANGE UP (ref 0.5–1.3)
EOSINOPHIL # BLD AUTO: 0.14 K/UL — SIGNIFICANT CHANGE UP (ref 0–0.5)
EOSINOPHIL NFR BLD AUTO: 1.8 % — SIGNIFICANT CHANGE UP (ref 0–6)
GLUCOSE SERPL-MCNC: 90 MG/DL — SIGNIFICANT CHANGE UP (ref 70–99)
HCT VFR BLD CALC: 32.1 % — LOW (ref 39–50)
HGB BLD-MCNC: 9.2 G/DL — LOW (ref 13–17)
IMM GRANULOCYTES NFR BLD AUTO: 0.8 % — SIGNIFICANT CHANGE UP (ref 0–1.5)
LYMPHOCYTES # BLD AUTO: 2.1 K/UL — SIGNIFICANT CHANGE UP (ref 1–3.3)
LYMPHOCYTES # BLD AUTO: 27.6 % — SIGNIFICANT CHANGE UP (ref 13–44)
MCHC RBC-ENTMCNC: 22.5 PG — LOW (ref 27–34)
MCHC RBC-ENTMCNC: 28.7 GM/DL — LOW (ref 32–36)
MCV RBC AUTO: 78.5 FL — LOW (ref 80–100)
MONOCYTES # BLD AUTO: 0.65 K/UL — SIGNIFICANT CHANGE UP (ref 0–0.9)
MONOCYTES NFR BLD AUTO: 8.6 % — SIGNIFICANT CHANGE UP (ref 2–14)
NEUTROPHILS # BLD AUTO: 4.6 K/UL — SIGNIFICANT CHANGE UP (ref 1.8–7.4)
NEUTROPHILS NFR BLD AUTO: 60.5 % — SIGNIFICANT CHANGE UP (ref 43–77)
NRBC # BLD: 0 /100 WBCS — SIGNIFICANT CHANGE UP (ref 0–0)
OB PNL STL: NEGATIVE — SIGNIFICANT CHANGE UP
PLATELET # BLD AUTO: 480 K/UL — HIGH (ref 150–400)
POTASSIUM SERPL-MCNC: 4.1 MMOL/L — SIGNIFICANT CHANGE UP (ref 3.5–5.3)
POTASSIUM SERPL-SCNC: 4.1 MMOL/L — SIGNIFICANT CHANGE UP (ref 3.5–5.3)
PSA FLD-MCNC: 0.66 NG/ML — SIGNIFICANT CHANGE UP (ref 0–4)
RBC # BLD: 4.09 M/UL — LOW (ref 4.2–5.8)
RBC # FLD: 17.5 % — HIGH (ref 10.3–14.5)
SODIUM SERPL-SCNC: 137 MMOL/L — SIGNIFICANT CHANGE UP (ref 135–145)
WBC # BLD: 7.6 K/UL — SIGNIFICANT CHANGE UP (ref 3.8–10.5)
WBC # FLD AUTO: 7.6 K/UL — SIGNIFICANT CHANGE UP (ref 3.8–10.5)

## 2020-09-23 PROCEDURE — 76770 US EXAM ABDO BACK WALL COMP: CPT | Mod: 26

## 2020-09-23 RX ADMIN — ENOXAPARIN SODIUM 40 MILLIGRAM(S): 100 INJECTION SUBCUTANEOUS at 12:18

## 2020-09-23 RX ADMIN — SENNA PLUS 2 TABLET(S): 8.6 TABLET ORAL at 21:47

## 2020-09-23 RX ADMIN — TAMSULOSIN HYDROCHLORIDE 0.4 MILLIGRAM(S): 0.4 CAPSULE ORAL at 21:47

## 2020-09-23 RX ADMIN — Medication 1 TABLET(S): at 18:19

## 2020-09-23 RX ADMIN — CEFEPIME 100 MILLIGRAM(S): 1 INJECTION, POWDER, FOR SOLUTION INTRAMUSCULAR; INTRAVENOUS at 13:57

## 2020-09-23 RX ADMIN — PANTOPRAZOLE SODIUM 40 MILLIGRAM(S): 20 TABLET, DELAYED RELEASE ORAL at 08:05

## 2020-09-23 RX ADMIN — CARVEDILOL PHOSPHATE 3.12 MILLIGRAM(S): 80 CAPSULE, EXTENDED RELEASE ORAL at 18:20

## 2020-09-23 RX ADMIN — CEFEPIME 100 MILLIGRAM(S): 1 INJECTION, POWDER, FOR SOLUTION INTRAMUSCULAR; INTRAVENOUS at 21:47

## 2020-09-23 RX ADMIN — POLYETHYLENE GLYCOL 3350 17 GRAM(S): 17 POWDER, FOR SOLUTION ORAL at 06:41

## 2020-09-23 RX ADMIN — Medication 20 MILLIGRAM(S): at 06:41

## 2020-09-23 RX ADMIN — CEFEPIME 100 MILLIGRAM(S): 1 INJECTION, POWDER, FOR SOLUTION INTRAMUSCULAR; INTRAVENOUS at 06:40

## 2020-09-23 RX ADMIN — Medication 325 MILLIGRAM(S): at 12:18

## 2020-09-23 RX ADMIN — Medication 1 TABLET(S): at 06:41

## 2020-09-23 RX ADMIN — AMLODIPINE BESYLATE 10 MILLIGRAM(S): 2.5 TABLET ORAL at 06:42

## 2020-09-23 RX ADMIN — CARVEDILOL PHOSPHATE 3.12 MILLIGRAM(S): 80 CAPSULE, EXTENDED RELEASE ORAL at 06:41

## 2020-09-23 RX ADMIN — POLYETHYLENE GLYCOL 3350 17 GRAM(S): 17 POWDER, FOR SOLUTION ORAL at 18:19

## 2020-09-23 NOTE — PROGRESS NOTE ADULT - SUBJECTIVE AND OBJECTIVE BOX
TMAX - 98.3    On day # 2 Cefepime    Vital Signs Last 24 Hrs  T(C): 36.4 (23 Sep 2020 12:22), Max: 36.7 (23 Sep 2020 00:33)  T(F): 97.6 (23 Sep 2020 12:22), Max: 98.1 (23 Sep 2020 00:33)  HR: 85 (23 Sep 2020 12:) (62 - 85)  BP: 118/82 (23 Sep 2020 12:) (118/82 - 155/99)  BP(mean): --  RR: 18 (23 Sep 2020 12:) (18 - 18)  SpO2: 99% (23 Sep 2020 12:) (99% - 100%)  Supplemental O2:  on RA       Awake, alert, feeling better but still with some discomfort in the RLE.  No c/o SOB, cp, abdominal pain, or cough and no difficulty voiding now.      PHYSICAL EXAM  General:  awake, alert, lying in bed and resting now, pleasant and cooperative, in NAD  HEENT:  conj pink, sclerae anicteric, PERRLA, no oral lesions noted  Neck:  supple, no nodes noted  Heart:  RR  Lungs:  clear bilaterally  Abdomen:  soft, obese, BS+, nontender  Back: no CVA or Spinal tenderness noted  Extremities:  LLE with no edema                      RLE with dressing now in place over the leg wounds and noted to have drainage oozing from the posterior aspect of the dressing over the calf area  - decreasing swelling and erythema surrounding and decreased                          tenderness to palpation                      able to flex the Rt. ankle with less difficulty now  Skin:  warm, dry, no rash noted        I&O's Summary :    22 Sep 2020 07:  -  23 Sep 2020 07:00  --------------------------------------------------------  IN: 1230 mL / OUT: 1400 mL / NET: -170 mL    23 Sep 2020 07:  -  23 Sep 2020 16:19  --------------------------------------------------------  IN: 50 mL / OUT: 0 mL / NET: 50 mL        LABS:  CBC Full  -  ( 23 Sep 2020 07:00 )  WBC Count : 7.60 K/uL  RBC Count : 4.09 M/uL  Hemoglobin : 9.2 g/dL  Hematocrit : 32.1 %  Platelet Count - Automated : 480 K/uL  Mean Cell Volume : 78.5 fl  Mean Cell Hemoglobin : 22.5 pg  Mean Cell Hemoglobin Concentration : 28.7 gm/dL  Auto Neutrophil # : 4.60 K/uL  Auto Lymphocyte # : 2.10 K/uL  Auto Monocyte # : 0.65 K/uL  Auto Eosinophil # : 0.14 K/uL  Auto Basophil # : 0.05 K/uL  Auto Neutrophil % : 60.5 %  Auto Lymphocyte % : 27.6 %  Auto Monocyte % : 8.6 %  Auto Eosinophil % : 1.8 %  Auto Basophil % : 0.7 %        137  |  104  |  14  ----------------------------<  90  4.1   |  27  |  0.64    Ca    8.6      23 Sep 2020 07:00    TPro  7.9  /  Alb  2.5<L>  /  TBili  0.4  /  DBili  x   /  AST  19  /  ALT  14  /  AlkPhos  102      LIVER FUNCTIONS - ( 22 Sep 2020 07:41 )  Alb: 2.5 g/dL / Pro: 7.9 gm/dL / ALK PHOS: 102 U/L / ALT: 14 U/L / AST: 19 U/L / GGT: x             Urinalysis Basic - ( 21 Sep 2020 21:08 )  Color: Yellow / Appearance: Clear / S.010 / pH: x  Gluc: x / Ketone: Negative  / Bili: Negative / Urobili: Negative mg/dL   Blood: x / Protein: 30 mg/dL / Nitrite: Negative   Leuk Esterase: Negative / RBC: >50 /HPF / WBC x   Sq Epi: x / Non Sq Epi: Negative / Bacteria: Negative      Sedimentation Rate, Erythrocyte: 50 mm/hr ( @ 07:41)    Sedimentation Rate, Erythrocyte: 43 mm/hr ( @ 13:38)    C-Reactive Protein, Serum (20 @ 11:26)    C-Reactive Protein, Serum: 4.17 mg/dL      Prostate Ca Screen, PSA Total (20 @ 01:59)    Prostate Ca Screen, PSA Total: 0.66: Method: Roche Electrochemiluminescence Immuno Assay            MICROBIOLOGY:  Specimen Source: .Blood Blood ( 23:02)  Culture Results:   No growth to date. (:02)    Specimen Source: .Blood Blood ( @ :02)  Culture Results:   No growth to date. ( @ 23:02)    COVID-19  Antibody - for prior infection screening (20 @ 02:10)    COVID-19 IgG Antibody Index: <0.10: Abbott CMIA  Negative Result    <= 1.39 Index  Positive Result      >= 1.40 Index Index    COVID-19 IgG Antibody Interpretation: Negative: This test has not been reviewed by the FDA by the standard review  process.       COVID-19 PCR . (20 @ 14:45)    COVID-19 PCR: NotDetec: This test has been validated by [employer] to be accurate;      Culture - Other (20 @ 18:08)    -  Trimethoprim/Sulfamethoxazole: S <=0.5/9.5    -  Vancomycin: S 1    -  Amikacin: S <=16    -  Aztreonam: S 8    -  Ampicillin/Sulbactam: S <=8/4    -  Cefazolin: S <=4    -  Cefepime: S 8    -  Ceftazidime: S 4    -  Ciprofloxacin: S <=0.25    -  Clindamycin: S <=0.25    -  Erythromycin: S <=0.25    -  Gentamicin: R >8 Should not be used as monotherapy    -  Gentamicin: S 4    -  Imipenem: S 2    -  Levofloxacin: S <=0.5    -  Meropenem: S <=1    -  Oxacillin: S 0.5    -  Penicillin: R >8    -  Piperacillin/Tazobactam: S <=8    -  RIF- Rifampin: S <=1 Should not be used as monotherapy    -  Tetra/Doxy: R >8    -  Tobramycin: S <=2    Specimen Source: .Other RIGHT LOWER LEG ULCER    Culture Results:   Culture yields growth of greater than 3 colony types of  bacteria,  which may indicate contamination and normal maureen  Call client services within 7 days if further workup is clinically  indicated. Culture includes  Few Staphylococcus aureus  Moderate Pseudomonas aeruginosa    Organism Identification: Staphylococcus aureus  Pseudomonas aeruginosa    Organism: Staphylococcus aureus    Organism: Pseudomonas aeruginosa    Method Type: OTONIEL    Method Type: OTONIEL            RADIOLOGY:    < from: US Renal (20 @ 10:49) >  EXAM:  US PELVIC LIMITED OR FOLLOW UP                          EXAM:  US KIDNEY(S)                          PROCEDURE DATE:  2020      INTERPRETATION:  CLINICAL INFORMATION: Hematuria, urinary frequency    COMPARISON: CT abdomen pelvis 2019    TECHNIQUE: Sonography of the kidneys and bladder. Transabdominal ultrasound the pelvis was also performed.    FINDINGS:    Right kidney: 11 cm. No renal mass, hydronephrosis or calculi.    Left kidney: 11 cm. No renal mass, hydronephrosisor calculi.    Urinary bladder: Bladder was mildly distended with prevoid volume of 228 cc. Mild circumferential thickening of the bladder wall versus secondary to underdistention. Bilateral ureteral jets are identified. No bladder mass. No significant post void residual.    Prostate measures 4.0 x 2.9 x 2.9 cm.    Incidental note of hepatic steatosis.    IMPRESSION:    No hydronephrosis. No significant post void residual.          < from: US Duplex Arterial Lower Ext Compl, Bilateral (20 @ 10:30) >    EXAM:  US DPLX LWR EXT ARTS COMPL BI                          PROCEDURE DATE:  2020      INTERPRETATION:  HISTORY: Peripheral arterial disease, leg ulcer.    Bilateral lower extremity arterial Doppler was performed using grayscale, color and spectral Doppler. There are no prior studies available for comparison.    Findings:    RIGHT: There is triphasic flow within the right external iliac, common femoral, deep femoral, superficial femoral and popliteal arteries. Abnormal low resistance flow within the posterior tibial artery. Peroneal and anterior tibial arteries could not be visualized due to swelling.  Peak systolic velocities are as follows (in CM/sec):  EIA: 127  CFA: 139  DFA: 77  SFA: (Proximal, mid, distal): 135, 122, 94  Popliteal: 73  PTA: 52, 94, 110  Peroneal: Not visualized  AAMIR: Not visualized  DPA: 58      LEFT: There is triphasic flow throughout the left lower extremity.  Peak systolic velocities are as follows (in CM/sec):  EIA: 98  CFA: 89  DFA: 71  SFA: (Proximal, mid, distal): 78, 75, 83  Popliteal: 58  PTA: 50, 62, 76  Peroneal: 63, 56, 45  AAMIR: 57, 40, 59  DPA: 74    IMPRESSION:    Limited visualization of the right calf arteries due to swelling. Abnormal low resistance flow within the posterior tibial artery. No visualized occlusion.    Normal velocities and waveforms within the left leg.                < from: US Duplex Venous Lower Ext Complete, Bilateral (20 @ 17:21) >  EXAM:  US DPLX LWR EXT VEINS COMPL BI                          PROCEDURE DATE:  2020      INTERPRETATION:  CLINICAL INFORMATION: Bilateral leg ulcers and edema.    COMPARISON: Doppler venous sonogram 3/27/2019.    TECHNIQUE: Duplex sonography of the BILATERAL LOWER extremity veins with color and spectral Doppler, with and without compression.    FINDINGS:    There is normal compressibility of the bilateral common femoral, femoral and popliteal veins.  Doppler examination shows normal spontaneous and phasic flow.    No calf vein thrombosis is detected.    IMPRESSION:    No evidence of deep venous thrombosis in either lower extremity.          IMPRESSION:   61 y/o  male with hx of HTN,  Morbid Obesity, OA, PUD, Chronic/ Recurring Ulcers of the RLE with Cellulitis, s/p Ventral Hernia Repair, know patient in the [facility], now admitted with worsening of his RLE Ulcerations with drainage and associated Cellulitis despite reported po ab rx.   WBC's are WNL and ESR is 50 with CRP of 4.17.  Remains afebrile on current ab rx with Cefepime.   Renal/ Pelvic Sono results noted with no Hydro and no significant Post-Void residual.  PSA is WNL.  Blood Cx's are negative to date but Wound Cx is still pending.      Suggestions:  Will continue current ab rx with Cefepime and follow-up Cx's.  Continue local wound care as per Surgery/ Wound Care.  Follow temps and labs.  Discussed with patient in detail at the bedside.

## 2020-09-23 NOTE — PROGRESS NOTE ADULT - SUBJECTIVE AND OBJECTIVE BOX
Patient is a 60y old  Male who presents with a chief complaint of leg ulcer (23 Sep 2020 16:19)      INTERVAL HPI/OVERNIGHT EVENTS: improving , < pain    MEDICATIONS  (STANDING):  amLODIPine   Tablet 10 milliGRAM(s) Oral daily  carvedilol Oral Tab/Cap - Peds 3.125 milliGRAM(s) Oral two times a day  cefepime   IVPB 2000 milliGRAM(s) IV Intermittent every 8 hours  cefepime   IVPB      enalapril 20 milliGRAM(s) Oral daily  enoxaparin Injectable 40 milliGRAM(s) SubCutaneous daily  ferrous    sulfate 325 milliGRAM(s) Oral daily  furosemide    Tablet 20 milliGRAM(s) Oral daily  influenza   Vaccine 0.5 milliLiter(s) IntraMuscular once  lactobacillus acidophilus 1 Tablet(s) Oral two times a day  pantoprazole    Tablet 40 milliGRAM(s) Oral before breakfast  polyethylene glycol 3350 17 Gram(s) Oral two times a day  senna 2 Tablet(s) Oral at bedtime  tamsulosin 0.4 milliGRAM(s) Oral at bedtime    MEDICATIONS  (PRN):  bisacodyl Suppository 10 milliGRAM(s) Rectal daily PRN Constipation  morphine  - Injectable 2 milliGRAM(s) IV Push every 4 hours PRN Moderate Pain (4 - 6)  morphine  - Injectable 4 milliGRAM(s) IV Push four times a day PRN Severe Pain (7 - 10)      Allergies    No Known Allergies    Intolerances        REVIEW OF SYSTEMS:        HEENT - wnl  RESPIRATORY: No cough, wheezing, chills or hemoptysis; No shortness of breath  CARDIOVASCULAR: No chest pain, palpitations, dizziness, or leg swelling  GASTROINTESTINAL: No abdominal or epigastric pain. No nausea, vomiting, or hematemesis; No diarrhea or constipation. No melena or hematochezia.  GENITOURINARY: frequency, incontinencee  SKIN: No itching, burning, rashes, or lesions   MUSCULOSKELETAL: No joint pain or swelling; No muscle, back, or extremity pain  PSYCHIATRIC: No depression, anxiety, mood swings, or difficulty sleeping        Vital Signs Last 24 Hrs  T(C): 36.7 (23 Sep 2020 17:34), Max: 36.7 (23 Sep 2020 00:33)  T(F): 98 (23 Sep 2020 17:34), Max: 98.1 (23 Sep 2020 00:33)  HR: 67 (23 Sep 2020 17:34) (67 - 85)  BP: 153/94 (23 Sep 2020 17:34) (118/82 - 155/99)  BP(mean): --  RR: 18 (23 Sep 2020 17:34) (18 - 18)  SpO2: 99% (23 Sep 2020 12:22) (99% - 100%)    PHYSICAL EXAM:  general       HEENT wnl  CHEST/LUNG: Clear to percussion bilaterally; No rales, rhonchi, wheezing, or rubs  HEART: Regular rate and rhythm; No murmurs, rubs, or gallops  ABDOMEN: Soft, Nontender, Nondistended; Bowel sounds present  EXTREMITIES:  r leg lymphedema, dressing wet  LYMPH: No lymphadenopathy noted  SKIN: No rashes or lesions    LABS:                        9.2    7.60  )-----------( 480      ( 23 Sep 2020 07:00 )             32.1     09-    137  |  104  |  14  ----------------------------<  90  4.1   |  27  |  0.64    Ca    8.6      23 Sep 2020 07:00    TPro  7.9  /  Alb  2.5<L>  /  TBili  0.4  /  DBili  x   /  AST  19  /  ALT  14  /  AlkPhos  102  09-22      Urinalysis Basic - ( 21 Sep 2020 21:08 )    Color: Yellow / Appearance: Clear / S.010 / pH: x  Gluc: x / Ketone: Negative  / Bili: Negative / Urobili: Negative mg/dL   Blood: x / Protein: 30 mg/dL / Nitrite: Negative   Leuk Esterase: Negative / RBC: >50 /HPF / WBC x   Sq Epi: x / Non Sq Epi: Negative / Bacteria: Negative      CAPILLARY BLOOD GLUCOSE    PSA nl  renal us nl  no RV                    RADIOLOGY & ADDITIONAL TESTS:    Imaging Personally Reviewed:  [y ] YES  [ ] NO    Consultant(s) Notes Reviewed:  [y ] YES  [ ] NO    Care Discussed with Consultants/Other Providers [ ] YES  [ ] NO    PROBLEMS:  CELLULITIS  R leg multiple ulcers BPH  PMH ETOH abuse    PAIN RT LEG    Cellulitis        Care discussed with family,         [  ]   yes  [  ]  No    imp:    stable[ ]    unstable[  ]     improving [  x ]       unchanged  [  ]                Plans:  Continue present IV antibiotics

## 2020-09-24 LAB
-  AMPICILLIN/SULBACTAM: SIGNIFICANT CHANGE UP
-  CEFAZOLIN: SIGNIFICANT CHANGE UP
-  CLINDAMYCIN: SIGNIFICANT CHANGE UP
-  ERYTHROMYCIN: SIGNIFICANT CHANGE UP
-  GENTAMICIN: SIGNIFICANT CHANGE UP
-  OXACILLIN: SIGNIFICANT CHANGE UP
-  PENICILLIN: SIGNIFICANT CHANGE UP
-  RIFAMPIN: SIGNIFICANT CHANGE UP
-  TETRACYCLINE: SIGNIFICANT CHANGE UP
-  TRIMETHOPRIM/SULFAMETHOXAZOLE: SIGNIFICANT CHANGE UP
-  VANCOMYCIN: SIGNIFICANT CHANGE UP
ANION GAP SERPL CALC-SCNC: 6 MMOL/L — SIGNIFICANT CHANGE UP (ref 5–17)
BASOPHILS # BLD AUTO: 0.06 K/UL — SIGNIFICANT CHANGE UP (ref 0–0.2)
BASOPHILS NFR BLD AUTO: 0.9 % — SIGNIFICANT CHANGE UP (ref 0–2)
BUN SERPL-MCNC: 19 MG/DL — SIGNIFICANT CHANGE UP (ref 7–23)
CALCIUM SERPL-MCNC: 8.5 MG/DL — SIGNIFICANT CHANGE UP (ref 8.5–10.1)
CHLORIDE SERPL-SCNC: 103 MMOL/L — SIGNIFICANT CHANGE UP (ref 96–108)
CO2 SERPL-SCNC: 29 MMOL/L — SIGNIFICANT CHANGE UP (ref 22–31)
CREAT SERPL-MCNC: 0.69 MG/DL — SIGNIFICANT CHANGE UP (ref 0.5–1.3)
CRP SERPL-MCNC: 2.38 MG/DL — HIGH (ref 0–0.4)
CULTURE RESULTS: SIGNIFICANT CHANGE UP
EOSINOPHIL # BLD AUTO: 0.17 K/UL — SIGNIFICANT CHANGE UP (ref 0–0.5)
EOSINOPHIL NFR BLD AUTO: 2.5 % — SIGNIFICANT CHANGE UP (ref 0–6)
ERYTHROCYTE [SEDIMENTATION RATE] IN BLOOD: 39 MM/HR — HIGH (ref 0–20)
GLUCOSE SERPL-MCNC: 90 MG/DL — SIGNIFICANT CHANGE UP (ref 70–99)
HCT VFR BLD CALC: 30.4 % — LOW (ref 39–50)
HGB BLD-MCNC: 9.1 G/DL — LOW (ref 13–17)
IMM GRANULOCYTES NFR BLD AUTO: 0.7 % — SIGNIFICANT CHANGE UP (ref 0–1.5)
LYMPHOCYTES # BLD AUTO: 1.83 K/UL — SIGNIFICANT CHANGE UP (ref 1–3.3)
LYMPHOCYTES # BLD AUTO: 27.3 % — SIGNIFICANT CHANGE UP (ref 13–44)
MCHC RBC-ENTMCNC: 23 PG — LOW (ref 27–34)
MCHC RBC-ENTMCNC: 29.9 GM/DL — LOW (ref 32–36)
MCV RBC AUTO: 76.8 FL — LOW (ref 80–100)
METHOD TYPE: SIGNIFICANT CHANGE UP
MONOCYTES # BLD AUTO: 0.53 K/UL — SIGNIFICANT CHANGE UP (ref 0–0.9)
MONOCYTES NFR BLD AUTO: 7.9 % — SIGNIFICANT CHANGE UP (ref 2–14)
NEUTROPHILS # BLD AUTO: 4.06 K/UL — SIGNIFICANT CHANGE UP (ref 1.8–7.4)
NEUTROPHILS NFR BLD AUTO: 60.7 % — SIGNIFICANT CHANGE UP (ref 43–77)
NRBC # BLD: 0 /100 WBCS — SIGNIFICANT CHANGE UP (ref 0–0)
ORGANISM # SPEC MICROSCOPIC CNT: SIGNIFICANT CHANGE UP
ORGANISM # SPEC MICROSCOPIC CNT: SIGNIFICANT CHANGE UP
PLATELET # BLD AUTO: 464 K/UL — HIGH (ref 150–400)
POTASSIUM SERPL-MCNC: 4.2 MMOL/L — SIGNIFICANT CHANGE UP (ref 3.5–5.3)
POTASSIUM SERPL-SCNC: 4.2 MMOL/L — SIGNIFICANT CHANGE UP (ref 3.5–5.3)
RBC # BLD: 3.96 M/UL — LOW (ref 4.2–5.8)
RBC # FLD: 17.7 % — HIGH (ref 10.3–14.5)
SODIUM SERPL-SCNC: 138 MMOL/L — SIGNIFICANT CHANGE UP (ref 135–145)
SPECIMEN SOURCE: SIGNIFICANT CHANGE UP
WBC # BLD: 6.7 K/UL — SIGNIFICANT CHANGE UP (ref 3.8–10.5)
WBC # FLD AUTO: 6.7 K/UL — SIGNIFICANT CHANGE UP (ref 3.8–10.5)

## 2020-09-24 RX ADMIN — CEFEPIME 100 MILLIGRAM(S): 1 INJECTION, POWDER, FOR SOLUTION INTRAMUSCULAR; INTRAVENOUS at 13:59

## 2020-09-24 RX ADMIN — Medication 325 MILLIGRAM(S): at 11:13

## 2020-09-24 RX ADMIN — Medication 1 TABLET(S): at 17:11

## 2020-09-24 RX ADMIN — PANTOPRAZOLE SODIUM 40 MILLIGRAM(S): 20 TABLET, DELAYED RELEASE ORAL at 07:52

## 2020-09-24 RX ADMIN — CARVEDILOL PHOSPHATE 3.12 MILLIGRAM(S): 80 CAPSULE, EXTENDED RELEASE ORAL at 06:30

## 2020-09-24 RX ADMIN — TAMSULOSIN HYDROCHLORIDE 0.4 MILLIGRAM(S): 0.4 CAPSULE ORAL at 21:11

## 2020-09-24 RX ADMIN — AMLODIPINE BESYLATE 10 MILLIGRAM(S): 2.5 TABLET ORAL at 06:30

## 2020-09-24 RX ADMIN — Medication 1 TABLET(S): at 06:30

## 2020-09-24 RX ADMIN — MORPHINE SULFATE 2 MILLIGRAM(S): 50 CAPSULE, EXTENDED RELEASE ORAL at 17:10

## 2020-09-24 RX ADMIN — ENOXAPARIN SODIUM 40 MILLIGRAM(S): 100 INJECTION SUBCUTANEOUS at 11:13

## 2020-09-24 RX ADMIN — MORPHINE SULFATE 2 MILLIGRAM(S): 50 CAPSULE, EXTENDED RELEASE ORAL at 16:10

## 2020-09-24 RX ADMIN — CARVEDILOL PHOSPHATE 3.12 MILLIGRAM(S): 80 CAPSULE, EXTENDED RELEASE ORAL at 17:11

## 2020-09-24 RX ADMIN — POLYETHYLENE GLYCOL 3350 17 GRAM(S): 17 POWDER, FOR SOLUTION ORAL at 06:30

## 2020-09-24 RX ADMIN — Medication 20 MILLIGRAM(S): at 06:30

## 2020-09-24 RX ADMIN — CEFEPIME 100 MILLIGRAM(S): 1 INJECTION, POWDER, FOR SOLUTION INTRAMUSCULAR; INTRAVENOUS at 21:11

## 2020-09-24 RX ADMIN — CEFEPIME 100 MILLIGRAM(S): 1 INJECTION, POWDER, FOR SOLUTION INTRAMUSCULAR; INTRAVENOUS at 06:31

## 2020-09-24 RX ADMIN — POLYETHYLENE GLYCOL 3350 17 GRAM(S): 17 POWDER, FOR SOLUTION ORAL at 17:11

## 2020-09-24 RX ADMIN — SENNA PLUS 2 TABLET(S): 8.6 TABLET ORAL at 21:11

## 2020-09-24 NOTE — DIETITIAN INITIAL EVALUATION ADULT. - PERTINENT MEDS FT
MEDICATIONS  (STANDING):  amLODIPine   Tablet 10 milliGRAM(s) Oral daily  carvedilol Oral Tab/Cap - Peds 3.125 milliGRAM(s) Oral two times a day  cefepime   IVPB      cefepime   IVPB 2000 milliGRAM(s) IV Intermittent every 8 hours  enalapril 20 milliGRAM(s) Oral daily  enoxaparin Injectable 40 milliGRAM(s) SubCutaneous daily  ferrous    sulfate 325 milliGRAM(s) Oral daily  furosemide    Tablet 20 milliGRAM(s) Oral daily  influenza   Vaccine 0.5 milliLiter(s) IntraMuscular once  lactobacillus acidophilus 1 Tablet(s) Oral two times a day  pantoprazole    Tablet 40 milliGRAM(s) Oral before breakfast  polyethylene glycol 3350 17 Gram(s) Oral two times a day  senna 2 Tablet(s) Oral at bedtime  tamsulosin 0.4 milliGRAM(s) Oral at bedtime    MEDICATIONS  (PRN):  bisacodyl Suppository 10 milliGRAM(s) Rectal daily PRN Constipation  morphine  - Injectable 2 milliGRAM(s) IV Push every 4 hours PRN Moderate Pain (4 - 6)  morphine  - Injectable 4 milliGRAM(s) IV Push four times a day PRN Severe Pain (7 - 10)

## 2020-09-24 NOTE — DIETITIAN INITIAL EVALUATION ADULT. - OTHER INFO
Pt primarily Thai speaking, however declined  phone. Was able to answer questions in English at time of visit.   Pt presented to ED for a wound check, found with right leg lymphedema, and cellulitis.  Pt reports good appetite and PO intake PTA. States he eats 2 meals a day, usually takeout, but was avoiding fried foods and red meats PTA. Denies difficulty chewing/swallowing. States no N/V/C/D; last BM today.   Per flow sheets pt consuming 100% documented meals during hospital stay. Pt states no recent weight changes. States he is unsure of his UBW. Per previous RD note (03/01/2019) weight was 113.4kg/ 250 pounds. Dosing weight this admission is 127.7kg/ 280.9 pounds indicating a weight gain of 14.3kg/30.9 pounds.   Pt given Eating for A Healthy Weight nutrition education (in Thai) with handout. Spoke about portion sizes, lean meats, and adding fruits and vegetables to every meal.

## 2020-09-24 NOTE — PROGRESS NOTE ADULT - SUBJECTIVE AND OBJECTIVE BOX
afebrile  VSS  c/o frequency, night time 6x  PE stable  < pain R leg  On Cefepime IVPB  Urology consult  no RV  Renala us nl  PT

## 2020-09-24 NOTE — DIETITIAN INITIAL EVALUATION ADULT. - ENERGY NEEDS
Height (cm): 167.6 (09-21)  Weight (kg): 127.7 (09-21)  BMI (kg/m2): 45.5 (09-21)  IBW: 64.5kg +/- 10%    % IBW:    198%         UBW:   113.4kg     %UBW:     112.6%

## 2020-09-24 NOTE — PROGRESS NOTE ADULT - SUBJECTIVE AND OBJECTIVE BOX
TMAX - 98.1    On day # 3 Cefepime    Vital Signs Last 24 Hrs  T(C): 36.4 (24 Sep 2020 11:12), Max: 36.7 (23 Sep 2020 17:34)  T(F): 97.6 (24 Sep 2020 11:12), Max: 98.1 (24 Sep 2020 05:00)  HR: 72 (24 Sep 2020 11:12) (62 - 72)  BP: 120/83 (24 Sep 2020 11:12) (120/83 - 153/94)  BP(mean): --  RR: 17 (24 Sep 2020 11:12) (17 - 18)  SpO2: 97% (24 Sep 2020 11:12) (97% - 100%)  Supplemental O2:  on RA       Awake, alert, feeling better with decreasing pain in the RLE.  No c/o SOB or cp and no cough or abdominal pain.  Claims he is voiding about 6 times at night but no dysuria or difficulty voiding.         PHYSICAL EXAM    General:  awake, alert, sitting up in the chair now, pleasant and cooperative, in NAD  HEENT:  conj pink, sclerae anicteric, PERRLA, no oral lesions noted  Neck:  supple, no nodes noted  Heart:  RR  Lungs:  clear bilaterally  Abdomen:  soft, obese, BS+, nontender  Back: no CVA or Spinal tenderness noted  Extremities:  LLE with no edema                      RLE with dressing dry and intact over the leg wounds and with  decreasing swelling and erythema surrounding                       - dressing reportedly changed last evening                      able to flex the Rt. ankle with no difficulty   Skin:  warm, dry, no rash noted         I&O's Summary :    23 Sep 2020 07:01  -  24 Sep 2020 07:00  --------------------------------------------------------  IN: 250 mL / OUT: 400 mL / NET: -150 mL        LABS:  CBC Full  -  ( 24 Sep 2020 06:30 )  WBC Count : 6.70 K/uL  RBC Count : 3.96 M/uL  Hemoglobin : 9.1 g/dL  Hematocrit : 30.4 %  Platelet Count - Automated : 464 K/uL  Mean Cell Volume : 76.8 fl  Mean Cell Hemoglobin : 23.0 pg  Mean Cell Hemoglobin Concentration : 29.9 gm/dL  Auto Neutrophil # : 4.06 K/uL  Auto Lymphocyte # : 1.83 K/uL  Auto Monocyte # : 0.53 K/uL  Auto Eosinophil # : 0.17 K/uL  Aut Basophil # : 0.06 K/uL  Auto Neutrophil % : 60.7 %  Auto Lymphocyte % : 27.3 %  Auto Monocyte % : 7.9 %  Auto Eosinophil % : 2.5 %  Auto Basophil % : 0.9 %    09-24    138  |  103  |  19  ----------------------------<  90  4.2   |  29  |  0.69    Ca    8.5      24 Sep 2020 06:30      Sedimentation Rate, Erythrocyte: 39 mm/hr (09-24 @ 06:30)    Sedimentation Rate, Erythrocyte: 50 mm/hr (09-22 @ 07:41)    Sedimentation Rate, Erythrocyte: 43 mm/hr (09-21 @ 13:38)    C-Reactive Protein, Serum (09.24.20 @ 09:05)    C-Reactive Protein, Serum: 2.38 mg/dL      C-Reactive Protein, Serum (09.22.20 @ 11:26)    C-Reactive Protein, Serum: 4.17 mg/dL      Prostate Ca Screen, PSA Total (09.23.20 @ 01:59)    Prostate Ca Screen, PSA Total: 0.66: Method: Roche Electrochemiluminescence Immuno Assay          MICROBIOLOGY:    Specimen Source: Skin RLE Ulcers (09-22 @ 15:12)  Culture Results:   Moderate Staphylococcus aureus (09-22 @ 15:12)  Culture - Other (09.22.20 @ 15:12)    -  Tetra/Doxy: R >8    -  Trimethoprim/Sulfamethoxazole: S 1/19    -  Vancomycin: S 2    -  Ampicillin/Sulbactam: S <=8/4    -  Cefazolin: S <=4    -  Clindamycin: R >4    -  Erythromycin: R >4    -  Gentamicin: R >8 Should not be used as monotherapy    -  Oxacillin: S <=0.25    -  Penicillin: R >8    -  RIF- Rifampin: S <=1 Should not be used as monotherapy    Specimen Source: Skin RLE Ulcers    Culture Results:   Moderate Staphylococcus aureus    Organism Identification: Staphylococcus aureus    Organism: Staphylococcus aureus    Method Type: OTONIEL      Specimen Source: .Blood Blood (09-21 @ 23:02)  Culture Results:   No growth to date. (09-21 @ 23:02)    Specimen Source: .Blood Blood (09-21 @ 23:02)  Culture Results:   No growth to date. (09-21 @ 23:02)        COVID-19  Antibody - for prior infection screening (09.22.20 @ 02:10)    COVID-19 IgG Antibody Index: <0.10: Abbott CMIA  Negative Result    <= 1.39 Index  Positive Result      >= 1.40 Index Index    COVID-19 IgG Antibody Interpretation: Negative: This test has not been reviewed by the FDA by the standard review  process.       COVID-19 PCR . (09.21.20 @ 14:45)    COVID-19 PCR: NotDetec: This test has been validated by [facility] to be accurate;      Culture - Other (08.20.20 @ 18:08)    -  Trimethoprim/Sulfamethoxazole: S <=0.5/9.5    -  Vancomycin: S 1    -  Amikacin: S <=16    -  Aztreonam: S 8    -  Ampicillin/Sulbactam: S <=8/4    -  Cefazolin: S <=4    -  Cefepime: S 8    -  Ceftazidime: S 4    -  Ciprofloxacin: S <=0.25    -  Clindamycin: S <=0.25    -  Erythromycin: S <=0.25    -  Gentamicin: R >8 Should not be used as monotherapy    -  Gentamicin: S 4    -  Imipenem: S 2    -  Levofloxacin: S <=0.5    -  Meropenem: S <=1    -  Oxacillin: S 0.5    -  Penicillin: R >8    -  Piperacillin/Tazobactam: S <=8    -  RIF- Rifampin: S <=1 Should not be used as monotherapy    -  Tetra/Doxy: R >8    -  Tobramycin: S <=2    Specimen Source: .Other RIGHT LOWER LEG ULCER    Culture Results:   Culture yields growth of greater than 3 colony types of  bacteria,  which may indicate contamination and normal maureen  Call client services within 7 days if further workup is clinically  indicated. Culture includes  Few Staphylococcus aureus  Moderate Pseudomonas aeruginosa    Organism Identification: Staphylococcus aureus  Pseudomonas aeruginosa    Organism: Staphylococcus aureus    Organism: Pseudomonas aeruginosa    Method Type: OTONIEL    Method Type: OTONIEL            RADIOLOGY:    < from: US Renal (09.23.20 @ 10:49) >  EXAM:  US PELVIC LIMITED OR FOLLOW UP                          EXAM:  US KIDNEY(S)                          PROCEDURE DATE:  09/23/2020      INTERPRETATION:  CLINICAL INFORMATION: Hematuria, urinary frequency    COMPARISON: CT abdomen pelvis 2/26/2019    TECHNIQUE: Sonography of the kidneys and bladder. Transabdominal ultrasound the pelvis was also performed.    FINDINGS:    Right kidney: 11 cm. No renal mass, hydronephrosis or calculi.    Left kidney: 11 cm. No renal mass, hydronephrosisor calculi.    Urinary bladder: Bladder was mildly distended with prevoid volume of 228 cc. Mild circumferential thickening of the bladder wall versus secondary to underdistention. Bilateral ureteral jets are identified. No bladder mass. No significant post void residual.    Prostate measures 4.0 x 2.9 x 2.9 cm.    Incidental note of hepatic steatosis.    IMPRESSION:    No hydronephrosis. No significant post void residual.          < from: US Duplex Arterial Lower Ext Compl, Bilateral (09.22.20 @ 10:30) >    EXAM:  US DPLX LWR EXT ARTS COMPL BI                          PROCEDURE DATE:  09/22/2020      INTERPRETATION:  HISTORY: Peripheral arterial disease, leg ulcer.    Bilateral lower extremity arterial Doppler was performed using grayscale, color and spectral Doppler. There are no prior studies available for comparison.    Findings:    RIGHT: There is triphasic flow within the right external iliac, common femoral, deep femoral, superficial femoral and popliteal arteries. Abnormal low resistance flow within the posterior tibial artery. Peroneal and anterior tibial arteries could not be visualized due to swelling.  Peak systolic velocities are as follows (in CM/sec):  EIA: 127  CFA: 139  DFA: 77  SFA: (Proximal, mid, distal): 135, 122, 94  Popliteal: 73  PTA: 52, 94, 110  Peroneal: Not visualized  AAMIR: Not visualized  DPA: 58      LEFT: There is triphasic flow throughout the left lower extremity.  Peak systolic velocities are as follows (in CM/sec):  EIA: 98  CFA: 89  DFA: 71  SFA: (Proximal, mid, distal): 78, 75, 83  Popliteal: 58  PTA: 50, 62, 76  Peroneal: 63, 56, 45  AAMIR: 57, 40, 59  DPA: 74    IMPRESSION:    Limited visualization of the right calf arteries due to swelling. Abnormal low resistance flow within the posterior tibial artery. No visualized occlusion.    Normal velocities and waveforms within the left leg.                < from: US Duplex Venous Lower Ext Complete, Bilateral (09.21.20 @ 17:21) >  EXAM:  US DPLX LWR EXT VEINS COMPL BI                          PROCEDURE DATE:  09/21/2020      INTERPRETATION:  CLINICAL INFORMATION: Bilateral leg ulcers and edema.    COMPARISON: Doppler venous sonogram 3/27/2019.    TECHNIQUE: Duplex sonography of the BILATERAL LOWER extremity veins with color and spectral Doppler, with and without compression.    FINDINGS:    There is normal compressibility of the bilateral common femoral, femoral and popliteal veins.  Doppler examination shows normal spontaneous and phasic flow.    No calf vein thrombosis is detected.    IMPRESSION:    No evidence of deep venous thrombosis in either lower extremity.          IMPRESSION:   59 y/o  male with hx of HTN,  Morbid Obesity, OA, PUD, Chronic/ Recurring Ulcers of the RLE with Cellulitis, s/p Ventral Hernia Repair, known [facility], admitted with worsening of his RLE Ulcerations with drainage and associated Cellulitis despite reported po ab rx.   WBC's are WNL and ESR is 39 with CRP of 2.38 today. Remains afebrile on current ab rx with Cefepime.   Renal/ Pelvic Sono results noted with no Hydro and no significant Post-Void residual.  PSA is WNL.  Blood Cx's remain negative and now Wound Cx reported with Staph Aureus - Oxacillin -sensitive.      Suggestions:  Will continue current ab rx with Cefepime and continue local wound care as per Surgery/ Wound Care.  Follow temps and labs.  Discussed with patient in detail at the bedside.

## 2020-09-25 LAB
HCT VFR BLD CALC: 31.2 % — LOW (ref 39–50)
HGB BLD-MCNC: 9 G/DL — LOW (ref 13–17)
MCHC RBC-ENTMCNC: 23 PG — LOW (ref 27–34)
MCHC RBC-ENTMCNC: 28.8 GM/DL — LOW (ref 32–36)
MCV RBC AUTO: 79.6 FL — LOW (ref 80–100)
NRBC # BLD: 0 /100 WBCS — SIGNIFICANT CHANGE UP (ref 0–0)
PLATELET # BLD AUTO: 468 K/UL — HIGH (ref 150–400)
RBC # BLD: 3.92 M/UL — LOW (ref 4.2–5.8)
RBC # FLD: 17.6 % — HIGH (ref 10.3–14.5)
WBC # BLD: 7.74 K/UL — SIGNIFICANT CHANGE UP (ref 3.8–10.5)
WBC # FLD AUTO: 7.74 K/UL — SIGNIFICANT CHANGE UP (ref 3.8–10.5)

## 2020-09-25 RX ORDER — FINASTERIDE 5 MG/1
5 TABLET, FILM COATED ORAL DAILY
Refills: 0 | Status: DISCONTINUED | OUTPATIENT
Start: 2020-09-25 | End: 2020-10-06

## 2020-09-25 RX ADMIN — MORPHINE SULFATE 2 MILLIGRAM(S): 50 CAPSULE, EXTENDED RELEASE ORAL at 05:22

## 2020-09-25 RX ADMIN — PANTOPRAZOLE SODIUM 40 MILLIGRAM(S): 20 TABLET, DELAYED RELEASE ORAL at 07:28

## 2020-09-25 RX ADMIN — CARVEDILOL PHOSPHATE 3.12 MILLIGRAM(S): 80 CAPSULE, EXTENDED RELEASE ORAL at 17:05

## 2020-09-25 RX ADMIN — SENNA PLUS 2 TABLET(S): 8.6 TABLET ORAL at 21:12

## 2020-09-25 RX ADMIN — Medication 325 MILLIGRAM(S): at 11:09

## 2020-09-25 RX ADMIN — Medication 20 MILLIGRAM(S): at 05:18

## 2020-09-25 RX ADMIN — CEFEPIME 100 MILLIGRAM(S): 1 INJECTION, POWDER, FOR SOLUTION INTRAMUSCULAR; INTRAVENOUS at 05:17

## 2020-09-25 RX ADMIN — Medication 1 TABLET(S): at 05:18

## 2020-09-25 RX ADMIN — TAMSULOSIN HYDROCHLORIDE 0.4 MILLIGRAM(S): 0.4 CAPSULE ORAL at 21:12

## 2020-09-25 RX ADMIN — MORPHINE SULFATE 2 MILLIGRAM(S): 50 CAPSULE, EXTENDED RELEASE ORAL at 05:37

## 2020-09-25 RX ADMIN — FINASTERIDE 5 MILLIGRAM(S): 5 TABLET, FILM COATED ORAL at 11:09

## 2020-09-25 RX ADMIN — POLYETHYLENE GLYCOL 3350 17 GRAM(S): 17 POWDER, FOR SOLUTION ORAL at 17:05

## 2020-09-25 RX ADMIN — AMLODIPINE BESYLATE 10 MILLIGRAM(S): 2.5 TABLET ORAL at 05:18

## 2020-09-25 RX ADMIN — ENOXAPARIN SODIUM 40 MILLIGRAM(S): 100 INJECTION SUBCUTANEOUS at 11:09

## 2020-09-25 RX ADMIN — CEFEPIME 100 MILLIGRAM(S): 1 INJECTION, POWDER, FOR SOLUTION INTRAMUSCULAR; INTRAVENOUS at 13:37

## 2020-09-25 RX ADMIN — POLYETHYLENE GLYCOL 3350 17 GRAM(S): 17 POWDER, FOR SOLUTION ORAL at 05:19

## 2020-09-25 RX ADMIN — CEFEPIME 100 MILLIGRAM(S): 1 INJECTION, POWDER, FOR SOLUTION INTRAMUSCULAR; INTRAVENOUS at 21:12

## 2020-09-25 RX ADMIN — Medication 1 TABLET(S): at 17:05

## 2020-09-25 RX ADMIN — CARVEDILOL PHOSPHATE 3.12 MILLIGRAM(S): 80 CAPSULE, EXTENDED RELEASE ORAL at 05:18

## 2020-09-25 NOTE — PROGRESS NOTE ADULT - SUBJECTIVE AND OBJECTIVE BOX
TMAX - 98.1    On day # 4 Cefepime     Vital Signs Last 24 Hrs  T(C): 36.4 (25 Sep 2020 11:36), Max: 36.6 (24 Sep 2020 23:20)  T(F): 97.5 (25 Sep 2020 11:36), Max: 97.9 (24 Sep 2020 23:20)  HR: 68 (25 Sep 2020 11:36) (64 - 70)  BP: 137/72 (25 Sep 2020 11:36) (137/72 - 153/99)  BP(mean): --  RR: 17 (25 Sep 2020 11:36) (17 - 18)  SpO2: 100% (25 Sep 2020 11:36) (97% - 100%)  Supplemental O2:  on RA       Awake, alert, still with some pain in the RLE especially when the dressing is changed but otherwise feeling better.  C/o pain in his knees secondary to arthritis.  Also c/o constipation today.  Appetite is good and voiding frequently especially at night.      PHYSICAL EXAM     General:  awake, alert, sitting up in the chair now, pleasant and cooperative, in NAD  HEENT:  conj pink, sclerae anicteric, PERRLA, no oral lesions noted  Neck:  supple, no nodes noted  Heart:  RR  Lungs:  clear bilaterally  Abdomen:  soft, obese, BS+, nontender  Back: no CVA or Spinal tenderness noted  Extremities:  LLE with no edema                       RLE with decreasing erythema and edema - ulcerated area circumferentially of the RLE with skin completely denuded  now and no odor noted - dressing with some yellowish-green drainage noted but no necrotic                         necrotic tissue seen and the base of the ulcerated areas appear clean now - very tender to palpation.                       Onychomycoses of the Toenails with some overgrowth and jagged edges noted  Skin:  warm, dry, no rash noted          I&O's Summary :    24 Sep 2020 07:01  -  25 Sep 2020 07:00  --------------------------------------------------------  IN: 100 mL / OUT: 1100 mL / NET: -1000 mL    25 Sep 2020 07:01  -  25 Sep 2020 16:16  --------------------------------------------------------  IN: 320 mL / OUT: 1000 mL / NET: -680 mL        LABS:  CBC Full  -  ( 25 Sep 2020 07:39 )  WBC Count : 7.74 K/uL  RBC Count : 3.92 M/uL  Hemoglobin : 9.0 g/dL  Hematocrit : 31.2 %  Platelet Count - Automated : 468 K/uL  Mean Cell Volume : 79.6 fl  Mean Cell Hemoglobin : 23.0 pg  Mean Cell Hemoglobin Concentration : 28.8 gm/dL  Auto Neutrophil # : x  Auto Lymphocyte # : x  Auto Monocyte # : x  Auto Eosinophil # : x  Auto Basophil # : x  Auto Neutrophil % : x  Auto Lymphocyte % : x  Auto Monocyte % : x  Auto Eosinophil % : x  Auto Basophil % : x    09-24    138  |  103  |  19  ----------------------------<  90  4.2   |  29  |  0.69    Ca    8.5      24 Sep 2020 06:30    Sedimentation Rate, Erythrocyte: 39 mm/hr (09-24 @ 06:30)    Sedimentation Rate, Erythrocyte: 50 mm/hr (09-22 @ 07:41)    Sedimentation Rate, Erythrocyte: 43 mm/hr (09-21 @ 13:38)      C-Reactive Protein, Serum (09.24.20 @ 09:05)    C-Reactive Protein, Serum: 2.38 mg/dL      C-Reactive Protein, Serum (09.22.20 @ 11:26)    C-Reactive Protein, Serum: 4.17 mg/dL      Prostate Ca Screen, PSA Total (09.23.20 @ 01:59)    Prostate Ca Screen, PSA Total: 0.66: Method: Roche Electrochemiluminescence Immuno Assay          MICROBIOLOGY:    Specimen Source: Skin RLE Ulcers (09-22 @ 15:12)  Culture Results:   Moderate Staphylococcus aureus (09-22 @ 15:12)  Culture - Other (09.22.20 @ 15:12)    -  Tetra/Doxy: R >8    -  Trimethoprim/Sulfamethoxazole: S 1/19    -  Vancomycin: S 2    -  Ampicillin/Sulbactam: S <=8/4    -  Cefazolin: S <=4    -  Clindamycin: R >4    -  Erythromycin: R >4    -  Gentamicin: R >8 Should not be used as monotherapy    -  Oxacillin: S <=0.25    -  Penicillin: R >8    -  RIF- Rifampin: S <=1 Should not be used as monotherapy    Specimen Source: Skin RLE Ulcers    Culture Results:   Moderate Staphylococcus aureus    Organism Identification: Staphylococcus aureus    Organism: Staphylococcus aureus    Method Type: OTONIEL    Specimen Source: .Blood Blood (09-21 @ 23:02)  Culture Results:   No growth to date. (09-21 @ 23:02)    Specimen Source: .Blood Blood (09-21 @ 23:02)  Culture Results:   No growth to date. (09-21 @ 23:02)        COVID-19  Antibody - for prior infection screening (09.22.20 @ 02:10)    COVID-19 IgG Antibody Index: <0.10: Abbott CMIA  Negative Result    <= 1.39 Index  Positive Result      >= 1.40 Index Index    COVID-19 IgG Antibody Interpretation: Negative: This test has not been reviewed by the FDA by the standard review  process.       COVID-19 PCR . (09.21.20 @ 14:45)    COVID-19 PCR: NotDetec: This test has been validated by [employer] to be accurate;      Culture - Other (08.20.20 @ 18:08)    -  Trimethoprim/Sulfamethoxazole: S <=0.5/9.5    -  Vancomycin: S 1    -  Amikacin: S <=16    -  Aztreonam: S 8    -  Ampicillin/Sulbactam: S <=8/4    -  Cefazolin: S <=4    -  Cefepime: S 8    -  Ceftazidime: S 4    -  Ciprofloxacin: S <=0.25    -  Clindamycin: S <=0.25    -  Erythromycin: S <=0.25    -  Gentamicin: R >8 Should not be used as monotherapy    -  Gentamicin: S 4    -  Imipenem: S 2    -  Levofloxacin: S <=0.5    -  Meropenem: S <=1    -  Oxacillin: S 0.5    -  Penicillin: R >8    -  Piperacillin/Tazobactam: S <=8    -  RIF- Rifampin: S <=1 Should not be used as monotherapy    -  Tetra/Doxy: R >8    -  Tobramycin: S <=2    Specimen Source: .Other RIGHT LOWER LEG ULCER    Culture Results:   Culture yields growth of greater than 3 colony types of  bacteria,  which may indicate contamination and normal maureen  Call client services within 7 days if further workup is clinically  indicated. Culture includes  Few Staphylococcus aureus  Moderate Pseudomonas aeruginosa    Organism Identification: Staphylococcus aureus  Pseudomonas aeruginosa    Organism: Staphylococcus aureus    Organism: Pseudomonas aeruginosa    Method Type: OTONIEL    Method Type: OTONIEL            RADIOLOGY:    < from: US Renal (09.23.20 @ 10:49) >  EXAM:  US PELVIC LIMITED OR FOLLOW UP                          EXAM:  US KIDNEY(S)                          PROCEDURE DATE:  09/23/2020      INTERPRETATION:  CLINICAL INFORMATION: Hematuria, urinary frequency    COMPARISON: CT abdomen pelvis 2/26/2019    TECHNIQUE: Sonography of the kidneys and bladder. Transabdominal ultrasound the pelvis was also performed.    FINDINGS:    Right kidney: 11 cm. No renal mass, hydronephrosis or calculi.    Left kidney: 11 cm. No renal mass, hydronephrosisor calculi.    Urinary bladder: Bladder was mildly distended with prevoid volume of 228 cc. Mild circumferential thickening of the bladder wall versus secondary to underdistention. Bilateral ureteral jets are identified. No bladder mass. No significant post void residual.    Prostate measures 4.0 x 2.9 x 2.9 cm.    Incidental note of hepatic steatosis.    IMPRESSION:    No hydronephrosis. No significant post void residual.          < from: US Duplex Arterial Lower Ext Compl, Bilateral (09.22.20 @ 10:30) >    EXAM:  US DPLX LWR EXT ARTS COMPL BI                          PROCEDURE DATE:  09/22/2020      INTERPRETATION:  HISTORY: Peripheral arterial disease, leg ulcer.    Bilateral lower extremity arterial Doppler was performed using grayscale, color and spectral Doppler. There are no prior studies available for comparison.    Findings:    RIGHT: There is triphasic flow within the right external iliac, common femoral, deep femoral, superficial femoral and popliteal arteries. Abnormal low resistance flow within the posterior tibial artery. Peroneal and anterior tibial arteries could not be visualized due to swelling.  Peak systolic velocities are as follows (in CM/sec):  EIA: 127  CFA: 139  DFA: 77  SFA: (Proximal, mid, distal): 135, 122, 94  Popliteal: 73  PTA: 52, 94, 110  Peroneal: Not visualized  AAMIR: Not visualized  DPA: 58      LEFT: There is triphasic flow throughout the left lower extremity.  Peak systolic velocities are as follows (in CM/sec):  EIA: 98  CFA: 89  DFA: 71  SFA: (Proximal, mid, distal): 78, 75, 83  Popliteal: 58  PTA: 50, 62, 76  Peroneal: 63, 56, 45  AAMIR: 57, 40, 59  DPA: 74    IMPRESSION:    Limited visualization of the right calf arteries due to swelling. Abnormal low resistance flow within the posterior tibial artery. No visualized occlusion.    Normal velocities and waveforms within the left leg.                < from: US Duplex Venous Lower Ext Complete, Bilateral (09.21.20 @ 17:21) >  EXAM:  US DPLX LWR EXT VEINS COMPL BI                          PROCEDURE DATE:  09/21/2020      INTERPRETATION:  CLINICAL INFORMATION: Bilateral leg ulcers and edema.    COMPARISON: Doppler venous sonogram 3/27/2019.    TECHNIQUE: Duplex sonography of the BILATERAL LOWER extremity veins with color and spectral Doppler, with and without compression.    FINDINGS:    There is normal compressibility of the bilateral common femoral, femoral and popliteal veins.  Doppler examination shows normal spontaneous and phasic flow.    No calf vein thrombosis is detected.    IMPRESSION:    No evidence of deep venous thrombosis in either lower extremity.          IMPRESSION:   61 y/o  male with hx of HTN,  Morbid Obesity, OA, PUD, Chronic/ Recurring Ulcers of the RLE with Cellulitis, s/p Ventral Hernia Repair, known patient in the [facility], admitted with worsening of his RLE Ulcerations with drainage and associated Cellulitis despite reported po ab rx.  Remains afebrile on current ab rx with Cefepime.  Blood Cx's remain negative and Wound Cx reported with Staph Aureus - Oxacillin -sensitive.  Clinically with some slow improvement noted.      Suggestions:  Will continue current ab rx with Cefepime and continue local wound care as per Surgery/ Wound Care.  Follow temps and labs.  Consider Podiatry evaluation  for Toenail Care.  Discussed with patient in detail at the bedside.

## 2020-09-26 RX ORDER — ACETAMINOPHEN 500 MG
650 TABLET ORAL EVERY 6 HOURS
Refills: 0 | Status: DISCONTINUED | OUTPATIENT
Start: 2020-09-26 | End: 2020-10-06

## 2020-09-26 RX ADMIN — POLYETHYLENE GLYCOL 3350 17 GRAM(S): 17 POWDER, FOR SOLUTION ORAL at 05:04

## 2020-09-26 RX ADMIN — Medication 1 TABLET(S): at 05:02

## 2020-09-26 RX ADMIN — CEFEPIME 100 MILLIGRAM(S): 1 INJECTION, POWDER, FOR SOLUTION INTRAMUSCULAR; INTRAVENOUS at 21:17

## 2020-09-26 RX ADMIN — TAMSULOSIN HYDROCHLORIDE 0.4 MILLIGRAM(S): 0.4 CAPSULE ORAL at 21:20

## 2020-09-26 RX ADMIN — CEFEPIME 100 MILLIGRAM(S): 1 INJECTION, POWDER, FOR SOLUTION INTRAMUSCULAR; INTRAVENOUS at 14:11

## 2020-09-26 RX ADMIN — Medication 20 MILLIGRAM(S): at 05:02

## 2020-09-26 RX ADMIN — PANTOPRAZOLE SODIUM 40 MILLIGRAM(S): 20 TABLET, DELAYED RELEASE ORAL at 07:57

## 2020-09-26 RX ADMIN — POLYETHYLENE GLYCOL 3350 17 GRAM(S): 17 POWDER, FOR SOLUTION ORAL at 17:08

## 2020-09-26 RX ADMIN — MORPHINE SULFATE 2 MILLIGRAM(S): 50 CAPSULE, EXTENDED RELEASE ORAL at 11:41

## 2020-09-26 RX ADMIN — MORPHINE SULFATE 2 MILLIGRAM(S): 50 CAPSULE, EXTENDED RELEASE ORAL at 11:26

## 2020-09-26 RX ADMIN — SENNA PLUS 2 TABLET(S): 8.6 TABLET ORAL at 21:20

## 2020-09-26 RX ADMIN — CARVEDILOL PHOSPHATE 3.12 MILLIGRAM(S): 80 CAPSULE, EXTENDED RELEASE ORAL at 05:02

## 2020-09-26 RX ADMIN — CARVEDILOL PHOSPHATE 3.12 MILLIGRAM(S): 80 CAPSULE, EXTENDED RELEASE ORAL at 17:09

## 2020-09-26 RX ADMIN — Medication 325 MILLIGRAM(S): at 11:26

## 2020-09-26 RX ADMIN — ENOXAPARIN SODIUM 40 MILLIGRAM(S): 100 INJECTION SUBCUTANEOUS at 11:26

## 2020-09-26 RX ADMIN — CEFEPIME 100 MILLIGRAM(S): 1 INJECTION, POWDER, FOR SOLUTION INTRAMUSCULAR; INTRAVENOUS at 05:02

## 2020-09-26 RX ADMIN — FINASTERIDE 5 MILLIGRAM(S): 5 TABLET, FILM COATED ORAL at 11:26

## 2020-09-26 RX ADMIN — AMLODIPINE BESYLATE 10 MILLIGRAM(S): 2.5 TABLET ORAL at 05:02

## 2020-09-26 RX ADMIN — Medication 1 TABLET(S): at 17:09

## 2020-09-26 NOTE — PROGRESS NOTE ADULT - SUBJECTIVE AND OBJECTIVE BOX
VSS  no c/o  Improvement  ID eval appreciated  Blood c&S neg  Woudnd C&S Staph aureus  Continue Cefepime

## 2020-09-27 LAB
CULTURE RESULTS: SIGNIFICANT CHANGE UP
CULTURE RESULTS: SIGNIFICANT CHANGE UP
HCT VFR BLD CALC: 33.7 % — LOW (ref 39–50)
HGB BLD-MCNC: 9.6 G/DL — LOW (ref 13–17)
MCHC RBC-ENTMCNC: 22.9 PG — LOW (ref 27–34)
MCHC RBC-ENTMCNC: 28.5 GM/DL — LOW (ref 32–36)
MCV RBC AUTO: 80.2 FL — SIGNIFICANT CHANGE UP (ref 80–100)
NRBC # BLD: 0 /100 WBCS — SIGNIFICANT CHANGE UP (ref 0–0)
PLATELET # BLD AUTO: 436 K/UL — HIGH (ref 150–400)
RBC # BLD: 4.2 M/UL — SIGNIFICANT CHANGE UP (ref 4.2–5.8)
RBC # FLD: 17.6 % — HIGH (ref 10.3–14.5)
SPECIMEN SOURCE: SIGNIFICANT CHANGE UP
SPECIMEN SOURCE: SIGNIFICANT CHANGE UP
WBC # BLD: 8.66 K/UL — SIGNIFICANT CHANGE UP (ref 3.8–10.5)
WBC # FLD AUTO: 8.66 K/UL — SIGNIFICANT CHANGE UP (ref 3.8–10.5)

## 2020-09-27 RX ORDER — PANTOPRAZOLE SODIUM 20 MG/1
40 TABLET, DELAYED RELEASE ORAL
Refills: 0 | Status: DISCONTINUED | OUTPATIENT
Start: 2020-09-27 | End: 2020-10-06

## 2020-09-27 RX ADMIN — MORPHINE SULFATE 2 MILLIGRAM(S): 50 CAPSULE, EXTENDED RELEASE ORAL at 08:05

## 2020-09-27 RX ADMIN — Medication 1 TABLET(S): at 05:34

## 2020-09-27 RX ADMIN — FINASTERIDE 5 MILLIGRAM(S): 5 TABLET, FILM COATED ORAL at 11:18

## 2020-09-27 RX ADMIN — CEFEPIME 100 MILLIGRAM(S): 1 INJECTION, POWDER, FOR SOLUTION INTRAMUSCULAR; INTRAVENOUS at 14:58

## 2020-09-27 RX ADMIN — POLYETHYLENE GLYCOL 3350 17 GRAM(S): 17 POWDER, FOR SOLUTION ORAL at 17:01

## 2020-09-27 RX ADMIN — Medication 1 TABLET(S): at 17:01

## 2020-09-27 RX ADMIN — CEFEPIME 100 MILLIGRAM(S): 1 INJECTION, POWDER, FOR SOLUTION INTRAMUSCULAR; INTRAVENOUS at 21:33

## 2020-09-27 RX ADMIN — AMLODIPINE BESYLATE 10 MILLIGRAM(S): 2.5 TABLET ORAL at 05:34

## 2020-09-27 RX ADMIN — CARVEDILOL PHOSPHATE 3.12 MILLIGRAM(S): 80 CAPSULE, EXTENDED RELEASE ORAL at 05:34

## 2020-09-27 RX ADMIN — PANTOPRAZOLE SODIUM 40 MILLIGRAM(S): 20 TABLET, DELAYED RELEASE ORAL at 07:42

## 2020-09-27 RX ADMIN — ENOXAPARIN SODIUM 40 MILLIGRAM(S): 100 INJECTION SUBCUTANEOUS at 11:18

## 2020-09-27 RX ADMIN — PANTOPRAZOLE SODIUM 40 MILLIGRAM(S): 20 TABLET, DELAYED RELEASE ORAL at 17:02

## 2020-09-27 RX ADMIN — TAMSULOSIN HYDROCHLORIDE 0.4 MILLIGRAM(S): 0.4 CAPSULE ORAL at 21:33

## 2020-09-27 RX ADMIN — SENNA PLUS 2 TABLET(S): 8.6 TABLET ORAL at 21:33

## 2020-09-27 RX ADMIN — Medication 20 MILLIGRAM(S): at 05:34

## 2020-09-27 RX ADMIN — Medication 325 MILLIGRAM(S): at 11:18

## 2020-09-27 RX ADMIN — CEFEPIME 100 MILLIGRAM(S): 1 INJECTION, POWDER, FOR SOLUTION INTRAMUSCULAR; INTRAVENOUS at 05:35

## 2020-09-27 RX ADMIN — CARVEDILOL PHOSPHATE 3.12 MILLIGRAM(S): 80 CAPSULE, EXTENDED RELEASE ORAL at 17:01

## 2020-09-27 RX ADMIN — MORPHINE SULFATE 2 MILLIGRAM(S): 50 CAPSULE, EXTENDED RELEASE ORAL at 07:50

## 2020-09-27 NOTE — PROGRESS NOTE ADULT - SUBJECTIVE AND OBJECTIVE BOX
Patient is a 60y old  Male who presents with a chief complaint of leg ulcer (26 Sep 2020 09:31)      INTERVAL HPI/OVERNIGHT EVENTS: c/o black stool    MEDICATIONS  (STANDING):  amLODIPine   Tablet 10 milliGRAM(s) Oral daily  carvedilol Oral Tab/Cap - Peds 3.125 milliGRAM(s) Oral two times a day  cefepime   IVPB 2000 milliGRAM(s) IV Intermittent every 8 hours  cefepime   IVPB      enalapril 20 milliGRAM(s) Oral daily  enoxaparin Injectable 40 milliGRAM(s) SubCutaneous daily  ferrous    sulfate 325 milliGRAM(s) Oral daily  finasteride 5 milliGRAM(s) Oral daily  furosemide    Tablet 20 milliGRAM(s) Oral daily  influenza   Vaccine 0.5 milliLiter(s) IntraMuscular once  lactobacillus acidophilus 1 Tablet(s) Oral two times a day  pantoprazole    Tablet 40 milliGRAM(s) Oral before breakfast  polyethylene glycol 3350 17 Gram(s) Oral two times a day  senna 2 Tablet(s) Oral at bedtime  tamsulosin 0.4 milliGRAM(s) Oral at bedtime    MEDICATIONS  (PRN):  acetaminophen   Tablet .. 650 milliGRAM(s) Oral every 6 hours PRN Temp greater or equal to 38C (100.4F), Mild Pain (1 - 3)  bisacodyl Suppository 10 milliGRAM(s) Rectal daily PRN Constipation  morphine  - Injectable 2 milliGRAM(s) IV Push every 4 hours PRN Moderate Pain (4 - 6)  morphine  - Injectable 4 milliGRAM(s) IV Push four times a day PRN Severe Pain (7 - 10)      Allergies    No Known Allergies    Intolerances        REVIEW OF SYSTEMS:        HEENT - wnl  RESPIRATORY: No cough, wheezing, chills or hemoptysis; No shortness of breath  CARDIOVASCULAR: No chest pain, palpitations, dizziness, or leg swelling  GASTROINTESTINAL: No abdominal or epigastric pain. No nausea, vomiting, or hematemesis; BLACK STOOL  GENITOURINARY: No dysuria, frequency, hematuria, or incontinence  SKIN: No itching, burning, rashes, or lesions   MUSCULOSKELETAL: r LEG PAIN  PSYCHIATRIC: No depression, anxiety, mood swings, or difficulty sleeping        Vital Signs Last 24 Hrs  T(C): 36.4 (27 Sep 2020 05:12), Max: 36.9 (26 Sep 2020 11:31)  T(F): 97.6 (27 Sep 2020 05:12), Max: 98.5 (26 Sep 2020 11:31)  HR: 67 (27 Sep 2020 05:12) (67 - 71)  BP: 134/89 (27 Sep 2020 05:12) (134/89 - 153/81)  BP(mean): --  RR: 18 (27 Sep 2020 05:12) (18 - 18)  SpO2: 100% (27 Sep 2020 05:12) (98% - 100%)    PHYSICAL EXAM:  general       HEENT wnl  CHEST/LUNG: Clear to percussion bilaterally; No rales, rhonchi, wheezing, or rubs  HEART: Regular rate and rhythm; No murmurs, rubs, or gallops  ABDOMEN: Soft, Nontender, Nondistended; Bowel sounds present  EXTREMITIES:3+ EDEMA RLE dressing dry  SKIN: No rashes or lesions    LABS:              CAPILLARY BLOOD GLUCOSE                    RADIOLOGY & ADDITIONAL TESTS:    Imaging Personally Reviewed:  [y ] YES  [ ] NO    Consultant(s) Notes Reviewed:  [y ] YES  [ ] NO    Care Discussed with Consultants/Other Providers [ ] YES  [ ] NO    PROBLEMS:  CELLULITIS    PAIN RT LEG    Cellulitis r leg  r/o GI bleeding        Care discussed with family,         [x  ]   yes  [  ]  No    imp:    stable[ ]    unstable[ x ]     improving [   ]       unchanged  [  ]                Plans:  CBC now and AM  stool OB x 3  Protonix 40 mg po bid

## 2020-09-28 LAB
ANION GAP SERPL CALC-SCNC: 6 MMOL/L — SIGNIFICANT CHANGE UP (ref 5–17)
BASOPHILS # BLD AUTO: 0.06 K/UL — SIGNIFICANT CHANGE UP (ref 0–0.2)
BASOPHILS NFR BLD AUTO: 0.8 % — SIGNIFICANT CHANGE UP (ref 0–2)
BUN SERPL-MCNC: 22 MG/DL — SIGNIFICANT CHANGE UP (ref 7–23)
CALCIUM SERPL-MCNC: 8.6 MG/DL — SIGNIFICANT CHANGE UP (ref 8.5–10.1)
CHLORIDE SERPL-SCNC: 104 MMOL/L — SIGNIFICANT CHANGE UP (ref 96–108)
CO2 SERPL-SCNC: 29 MMOL/L — SIGNIFICANT CHANGE UP (ref 22–31)
CREAT SERPL-MCNC: 0.69 MG/DL — SIGNIFICANT CHANGE UP (ref 0.5–1.3)
CRP SERPL-MCNC: 1.41 MG/DL — HIGH (ref 0–0.4)
EOSINOPHIL # BLD AUTO: 0.23 K/UL — SIGNIFICANT CHANGE UP (ref 0–0.5)
EOSINOPHIL NFR BLD AUTO: 3 % — SIGNIFICANT CHANGE UP (ref 0–6)
ERYTHROCYTE [SEDIMENTATION RATE] IN BLOOD: 34 MM/HR — HIGH (ref 0–20)
GLUCOSE SERPL-MCNC: 84 MG/DL — SIGNIFICANT CHANGE UP (ref 70–99)
HCT VFR BLD CALC: 31.9 % — LOW (ref 39–50)
HGB BLD-MCNC: 9.2 G/DL — LOW (ref 13–17)
IMM GRANULOCYTES NFR BLD AUTO: 0.6 % — SIGNIFICANT CHANGE UP (ref 0–1.5)
LYMPHOCYTES # BLD AUTO: 2.06 K/UL — SIGNIFICANT CHANGE UP (ref 1–3.3)
LYMPHOCYTES # BLD AUTO: 26.5 % — SIGNIFICANT CHANGE UP (ref 13–44)
MCHC RBC-ENTMCNC: 22.8 PG — LOW (ref 27–34)
MCHC RBC-ENTMCNC: 28.8 GM/DL — LOW (ref 32–36)
MCV RBC AUTO: 79 FL — LOW (ref 80–100)
MONOCYTES # BLD AUTO: 0.63 K/UL — SIGNIFICANT CHANGE UP (ref 0–0.9)
MONOCYTES NFR BLD AUTO: 8.1 % — SIGNIFICANT CHANGE UP (ref 2–14)
NEUTROPHILS # BLD AUTO: 4.73 K/UL — SIGNIFICANT CHANGE UP (ref 1.8–7.4)
NEUTROPHILS NFR BLD AUTO: 61 % — SIGNIFICANT CHANGE UP (ref 43–77)
NRBC # BLD: 0 /100 WBCS — SIGNIFICANT CHANGE UP (ref 0–0)
OB PNL STL: NEGATIVE — SIGNIFICANT CHANGE UP
PLATELET # BLD AUTO: 412 K/UL — HIGH (ref 150–400)
POTASSIUM SERPL-MCNC: 4.2 MMOL/L — SIGNIFICANT CHANGE UP (ref 3.5–5.3)
POTASSIUM SERPL-SCNC: 4.2 MMOL/L — SIGNIFICANT CHANGE UP (ref 3.5–5.3)
RBC # BLD: 4.04 M/UL — LOW (ref 4.2–5.8)
RBC # FLD: 17.6 % — HIGH (ref 10.3–14.5)
SODIUM SERPL-SCNC: 139 MMOL/L — SIGNIFICANT CHANGE UP (ref 135–145)
WBC # BLD: 7.76 K/UL — SIGNIFICANT CHANGE UP (ref 3.8–10.5)
WBC # FLD AUTO: 7.76 K/UL — SIGNIFICANT CHANGE UP (ref 3.8–10.5)

## 2020-09-28 RX ADMIN — Medication 20 MILLIGRAM(S): at 05:41

## 2020-09-28 RX ADMIN — PANTOPRAZOLE SODIUM 40 MILLIGRAM(S): 20 TABLET, DELAYED RELEASE ORAL at 17:25

## 2020-09-28 RX ADMIN — CARVEDILOL PHOSPHATE 3.12 MILLIGRAM(S): 80 CAPSULE, EXTENDED RELEASE ORAL at 05:41

## 2020-09-28 RX ADMIN — TAMSULOSIN HYDROCHLORIDE 0.4 MILLIGRAM(S): 0.4 CAPSULE ORAL at 22:37

## 2020-09-28 RX ADMIN — Medication 325 MILLIGRAM(S): at 11:16

## 2020-09-28 RX ADMIN — PANTOPRAZOLE SODIUM 40 MILLIGRAM(S): 20 TABLET, DELAYED RELEASE ORAL at 05:41

## 2020-09-28 RX ADMIN — CEFEPIME 100 MILLIGRAM(S): 1 INJECTION, POWDER, FOR SOLUTION INTRAMUSCULAR; INTRAVENOUS at 22:37

## 2020-09-28 RX ADMIN — CARVEDILOL PHOSPHATE 3.12 MILLIGRAM(S): 80 CAPSULE, EXTENDED RELEASE ORAL at 17:25

## 2020-09-28 RX ADMIN — Medication 1 TABLET(S): at 05:41

## 2020-09-28 RX ADMIN — FINASTERIDE 5 MILLIGRAM(S): 5 TABLET, FILM COATED ORAL at 11:16

## 2020-09-28 RX ADMIN — MORPHINE SULFATE 2 MILLIGRAM(S): 50 CAPSULE, EXTENDED RELEASE ORAL at 12:15

## 2020-09-28 RX ADMIN — ENOXAPARIN SODIUM 40 MILLIGRAM(S): 100 INJECTION SUBCUTANEOUS at 11:16

## 2020-09-28 RX ADMIN — CEFEPIME 100 MILLIGRAM(S): 1 INJECTION, POWDER, FOR SOLUTION INTRAMUSCULAR; INTRAVENOUS at 14:22

## 2020-09-28 RX ADMIN — CEFEPIME 100 MILLIGRAM(S): 1 INJECTION, POWDER, FOR SOLUTION INTRAMUSCULAR; INTRAVENOUS at 05:41

## 2020-09-28 RX ADMIN — MORPHINE SULFATE 2 MILLIGRAM(S): 50 CAPSULE, EXTENDED RELEASE ORAL at 12:01

## 2020-09-28 RX ADMIN — SENNA PLUS 2 TABLET(S): 8.6 TABLET ORAL at 22:37

## 2020-09-28 RX ADMIN — Medication 1 TABLET(S): at 17:25

## 2020-09-28 RX ADMIN — AMLODIPINE BESYLATE 10 MILLIGRAM(S): 2.5 TABLET ORAL at 05:41

## 2020-09-28 RX ADMIN — POLYETHYLENE GLYCOL 3350 17 GRAM(S): 17 POWDER, FOR SOLUTION ORAL at 17:25

## 2020-09-28 NOTE — PROGRESS NOTE ADULT - SUBJECTIVE AND OBJECTIVE BOX
Patient is a 60y old  Male who presents with a chief complaint of leg ulcer (27 Sep 2020 11:11)  has lymphedema of the legs.    INTERVAL HPI/OVERNIGHT EVENTS:  PAST MEDICAL & SURGICAL HISTORY:  HTN (hypertension)    Knee osteoarthritis    Duodenal ulcer disease    Morbid obesity    Hypertension    Osteoarthritis    PUD (peptic ulcer disease)    Hypertension    Gastroduodenal artery bleed  IR embolization of gastroduodenal artery    Abdominal hernia    H/O ventral hernia repair        MEDICATIONS  (STANDING):  amLODIPine   Tablet 10 milliGRAM(s) Oral daily  carvedilol Oral Tab/Cap - Peds 3.125 milliGRAM(s) Oral two times a day  cefepime   IVPB      cefepime   IVPB 2000 milliGRAM(s) IV Intermittent every 8 hours  enalapril 20 milliGRAM(s) Oral daily  enoxaparin Injectable 40 milliGRAM(s) SubCutaneous daily  ferrous    sulfate 325 milliGRAM(s) Oral daily  finasteride 5 milliGRAM(s) Oral daily  furosemide    Tablet 20 milliGRAM(s) Oral daily  influenza   Vaccine 0.5 milliLiter(s) IntraMuscular once  lactobacillus acidophilus 1 Tablet(s) Oral two times a day  pantoprazole    Tablet 40 milliGRAM(s) Oral two times a day  polyethylene glycol 3350 17 Gram(s) Oral two times a day  senna 2 Tablet(s) Oral at bedtime  tamsulosin 0.4 milliGRAM(s) Oral at bedtime    MEDICATIONS  (PRN):  acetaminophen   Tablet .. 650 milliGRAM(s) Oral every 6 hours PRN Temp greater or equal to 38C (100.4F), Mild Pain (1 - 3)  bisacodyl Suppository 10 milliGRAM(s) Rectal daily PRN Constipation  morphine  - Injectable 2 milliGRAM(s) IV Push every 4 hours PRN Moderate Pain (4 - 6)  morphine  - Injectable 4 milliGRAM(s) IV Push four times a day PRN Severe Pain (7 - 10)      Allergies    No Known Allergies    Intolerances        REVIEW OF SYSTEMS:  CONSTITUTIONAL: No fever, weight loss, or fatigue  EYES: No eye pain, visual disturbances, or discharge  ENMT:  No difficulty hearing, tinnitus, vertigo; No sinus or throat pain  NECK: No pain or stiffness  BREASTS: No pain, masses, or nipple discharge  RESPIRATORY: No cough, wheezing, chills or hemoptysis; No shortness of breath  CARDIOVASCULAR: No chest pain, palpitations, dizziness, or leg swelling  GASTROINTESTINAL: No abdominal or epigastric pain. No nausea, vomiting, or hematemesis; No diarrhea or constipation. No melena or hematochezia.  GENITOURINARY: No dysuria, frequency, hematuria, or incontinence  NEUROLOGICAL: No headaches, memory loss, loss of strength, numbness, or tremors  SKIN: No itching, burning, rashes, or lesions   LYMPH NODES: No enlarged glands  ENDOCRINE: No heat or cold intolerance; No hair loss  MUSCULOSKELETAL: No joint pain or swelling; No muscle, back, or extremity pain  PSYCHIATRIC: No depression, anxiety, mood swings, or difficulty sleeping  HEME/LYMPH: No easy bruising, or bleeding gums  ALLERY AND IMMUNOLOGIC: No hives or eczema    Vital Signs Last 24 Hrs  T(C): 36.4 (28 Sep 2020 05:22), Max: 36.7 (27 Sep 2020 11:27)  T(F): 97.6 (28 Sep 2020 05:22), Max: 98.1 (27 Sep 2020 11:27)  HR: 68 (28 Sep 2020 05:22) (65 - 68)  BP: 122/77 (28 Sep 2020 05:22) (117/82 - 141/91)  BP(mean): --  RR: 18 (28 Sep 2020 05:22) (18 - 18)  SpO2: 99% (28 Sep 2020 05:22) (98% - 99%)    PHYSICAL EXAM:  GENERAL: NAD, well-groomed, well-developed  HEAD:  Atraumatic, Normocephalic  EYES: EOMI, PERRLA, conjunctiva and sclera clear  ENMT: No tonsillar erythema, exudates, or enlargement; Moist mucous membranes, Good dentition, No lesions  NECK: Supple, No JVD, Normal thyroid  NERVOUS SYSTEM:  Alert & Oriented X3, Good concentration; Motor Strength 5/5 B/L upper and lower extremities; DTRs 2+ intact and symmetric  CHEST/LUNG: Clear to percussion bilaterally; No rales, rhonchi, wheezing, or rubs  HEART: Regular rate and rhythm; No murmurs, rubs, or gallops  ABDOMEN: Soft, Nontender, Nondistended; Bowel sounds present  EXTREMITIES:  2+ Peripheral Pulses, No clubbing, cyanosis, or edema. cellulitis both legs.  LYMPH: No lymphadenopathy noted  SKIN: No rashes or lesions    LABS:                        9.2    7.76  )-----------( 412      ( 28 Sep 2020 06:25 )             31.9     09-28    139  |  104  |  22  ----------------------------<  84  4.2   |  29  |  0.69    Ca    8.6      28 Sep 2020 06:25            CAPILLARY BLOOD GLUCOSE                    RADIOLOGY & ADDITIONAL TESTS:    Imaging Personally Reviewed:  [ ] YES  [ ] NO    Consultant(s) Notes Reviewed:  [ ] YES  [ ] NO    Care Discussed with Consultants/Other Providers [ ] YES  [ ] NO    Care discussed with family,         [  ]   yes  [  ]  No    imp:    stable[x ]    unstable[  ]     improving [   ]       unchanged  [  ]                Plans:  Continue present plans  [x  ]               New consult [  ]   specialty  .......               order test[  ]    test name.                  Discharge Planning  [  ]

## 2020-09-29 RX ADMIN — Medication 325 MILLIGRAM(S): at 11:35

## 2020-09-29 RX ADMIN — SENNA PLUS 2 TABLET(S): 8.6 TABLET ORAL at 21:37

## 2020-09-29 RX ADMIN — Medication 20 MILLIGRAM(S): at 05:42

## 2020-09-29 RX ADMIN — Medication 1 TABLET(S): at 17:33

## 2020-09-29 RX ADMIN — CEFEPIME 100 MILLIGRAM(S): 1 INJECTION, POWDER, FOR SOLUTION INTRAMUSCULAR; INTRAVENOUS at 21:37

## 2020-09-29 RX ADMIN — PANTOPRAZOLE SODIUM 40 MILLIGRAM(S): 20 TABLET, DELAYED RELEASE ORAL at 05:42

## 2020-09-29 RX ADMIN — CARVEDILOL PHOSPHATE 3.12 MILLIGRAM(S): 80 CAPSULE, EXTENDED RELEASE ORAL at 05:42

## 2020-09-29 RX ADMIN — MORPHINE SULFATE 2 MILLIGRAM(S): 50 CAPSULE, EXTENDED RELEASE ORAL at 08:50

## 2020-09-29 RX ADMIN — Medication 1 TABLET(S): at 05:42

## 2020-09-29 RX ADMIN — ENOXAPARIN SODIUM 40 MILLIGRAM(S): 100 INJECTION SUBCUTANEOUS at 11:35

## 2020-09-29 RX ADMIN — CEFEPIME 100 MILLIGRAM(S): 1 INJECTION, POWDER, FOR SOLUTION INTRAMUSCULAR; INTRAVENOUS at 14:03

## 2020-09-29 RX ADMIN — AMLODIPINE BESYLATE 10 MILLIGRAM(S): 2.5 TABLET ORAL at 05:42

## 2020-09-29 RX ADMIN — PANTOPRAZOLE SODIUM 40 MILLIGRAM(S): 20 TABLET, DELAYED RELEASE ORAL at 17:33

## 2020-09-29 RX ADMIN — CEFEPIME 100 MILLIGRAM(S): 1 INJECTION, POWDER, FOR SOLUTION INTRAMUSCULAR; INTRAVENOUS at 05:42

## 2020-09-29 RX ADMIN — MORPHINE SULFATE 2 MILLIGRAM(S): 50 CAPSULE, EXTENDED RELEASE ORAL at 09:00

## 2020-09-29 RX ADMIN — FINASTERIDE 5 MILLIGRAM(S): 5 TABLET, FILM COATED ORAL at 11:35

## 2020-09-29 RX ADMIN — TAMSULOSIN HYDROCHLORIDE 0.4 MILLIGRAM(S): 0.4 CAPSULE ORAL at 21:37

## 2020-09-29 RX ADMIN — CARVEDILOL PHOSPHATE 3.12 MILLIGRAM(S): 80 CAPSULE, EXTENDED RELEASE ORAL at 17:33

## 2020-09-29 RX ADMIN — POLYETHYLENE GLYCOL 3350 17 GRAM(S): 17 POWDER, FOR SOLUTION ORAL at 05:43

## 2020-09-29 NOTE — PROGRESS NOTE ADULT - SUBJECTIVE AND OBJECTIVE BOX
Patient is a 60y old  Male who presents with a chief complaint of leg ulcer (28 Sep 2020 10:50)      INTERVAL HPI/OVERNIGHT EVENTS: comfortable   < pain, still frequency    MEDICATIONS  (STANDING):  amLODIPine   Tablet 10 milliGRAM(s) Oral daily  carvedilol Oral Tab/Cap - Peds 3.125 milliGRAM(s) Oral two times a day  cefepime   IVPB      cefepime   IVPB 2000 milliGRAM(s) IV Intermittent every 8 hours  enalapril 20 milliGRAM(s) Oral daily  enoxaparin Injectable 40 milliGRAM(s) SubCutaneous daily  ferrous    sulfate 325 milliGRAM(s) Oral daily  finasteride 5 milliGRAM(s) Oral daily  furosemide    Tablet 20 milliGRAM(s) Oral daily  influenza   Vaccine 0.5 milliLiter(s) IntraMuscular once  lactobacillus acidophilus 1 Tablet(s) Oral two times a day  pantoprazole    Tablet 40 milliGRAM(s) Oral two times a day  polyethylene glycol 3350 17 Gram(s) Oral two times a day  senna 2 Tablet(s) Oral at bedtime  tamsulosin 0.4 milliGRAM(s) Oral at bedtime    MEDICATIONS  (PRN):  acetaminophen   Tablet .. 650 milliGRAM(s) Oral every 6 hours PRN Temp greater or equal to 38C (100.4F), Mild Pain (1 - 3)  bisacodyl Suppository 10 milliGRAM(s) Rectal daily PRN Constipation  morphine  - Injectable 2 milliGRAM(s) IV Push every 4 hours PRN Moderate Pain (4 - 6)  morphine  - Injectable 4 milliGRAM(s) IV Push four times a day PRN Severe Pain (7 - 10)      Allergies    No Known Allergies    Intolerances        REVIEW OF SYSTEMS:        HEENT - wnl  RESPIRATORY: No cough, wheezing, chills or hemoptysis; No shortness of breath  CARDIOVASCULAR: No chest pain, palpitations, dizziness, or leg swelling  GASTROINTESTINAL: No abdominal or epigastric pain. No nausea, vomiting, or hematemesis; No diarrhea or constipation. No melena or hematochezia.  GENITOURINARY: No dysuria, frequency, hematuria, or incontinence  SKIN: No itching, burning, rashes, or lesions   MUSCULOSKELETAL: R leg pain edeam  PSYCHIATRIC: No depression, anxiety, mood swings, or difficulty sleeping        Vital Signs Last 24 Hrs  T(C): 36.3 (29 Sep 2020 05:21), Max: 36.7 (28 Sep 2020 23:54)  T(F): 97.3 (29 Sep 2020 05:21), Max: 98 (28 Sep 2020 23:54)  HR: 64 (29 Sep 2020 05:21) (64 - 74)  BP: 132/96 (29 Sep 2020 05:21) (131/82 - 139/91)  BP(mean): --  RR: 18 (29 Sep 2020 05:21) (17 - 18)  SpO2: 99% (29 Sep 2020 05:21) (98% - 99%)    PHYSICAL EXAM:  general       HEENT wnl  CHEST/LUNG: Clear to percussion bilaterally; No rales, rhonchi, wheezing, or rubs  HEART: Regular rate and rhythm; No murmurs, rubs, or gallops  ABDOMEN: Soft, Nontender, Nondistended; Bowel sounds present  EXTREMITIES:  2+ edema, healing large ulcer, cellulitis  LYMPH: No lymphadenopathy noted  SKIN: No rashes or lesions    LABS:                        9.2    7.76  )-----------( 412      ( 28 Sep 2020 06:25 )             31.9     09-28    139  |  104  |  22  ----------------------------<  84  4.2   |  29  |  0.69    Ca    8.6      28 Sep 2020 06:25          CAPILLARY BLOOD GLUCOSE    ESR <  CRP <<                  RADIOLOGY & ADDITIONAL TESTS:    Imaging Personally Reviewed:  [y ] YES  [ ] NO    Consultant(s) Notes Reviewed:  [y ] YES  [ ] NO    Care Discussed with Consultants/Other Providers [ ] YES  [ ] NO    PROBLEMS:  CELLULITIS    PAIN RT LEG    Cellulitis    R leg chronic lymphedema, chromic ulcers        Care discussed with family,         [  ]   yes  [  ]  No    imp:    stable[ ]    unstable[  ]     improving [ x  ]       unchanged  [  ]                Plans:  Continue present plans  [  x]

## 2020-09-29 NOTE — CONSULT NOTE ADULT - ASSESSMENT
A/P: 61 y/o  male with hx of HTN, Morbid Obesity, OA, PUD, Chronic/ Recurring Ulcers of the RLE with Cellulitis, s/p Ventral Hernia Repair, known [facility], admitted with worsening of his RLE wound with drainage and associated Cellulitis. Now clinically improving on IV ABX.     As per Dr. Chavarria, continue abx per ID recommendations and local wound care. F/u OP in wound care office next week.   Continue medical management and supportive care

## 2020-09-29 NOTE — PROGRESS NOTE ADULT - SUBJECTIVE AND OBJECTIVE BOX
TMAX - 98.1    On day # 8 Cefepime    Vital Signs Last 24 Hrs  T(C): 36.7 (29 Sep 2020 12:46), Max: 36.7 (28 Sep 2020 23:54)  T(F): 98.1 (29 Sep 2020 12:46), Max: 98.1 (29 Sep 2020 12:46)  HR: 69 (29 Sep 2020 12:46) (64 - 74)  BP: 136/86 (29 Sep 2020 12:46) (131/82 - 139/85)  BP(mean): --  RR: 17 (29 Sep 2020 12:46) (17 - 18)  SpO2: 97% (29 Sep 2020 12:46) (97% - 99%)  Supplemental O2:  on RA      Awake, alert, feeling better with some decreased pain in the RLE today.  No GI upset and voiding without difficulty.  Appetite is good.  Still c/o some arthritic pains in the knees - Lt >> Rt.      PHYSICAL EXAM    General:  awake, alert, sitting up in the chair now, pleasant and cooperative, in NAD  HEENT:  conj pink, sclerae anicteric, PERRLA, no oral lesions noted  Neck:  supple, no nodes noted  Heart:  RR  Lungs:  clear bilaterally  Abdomen:  soft, obese, BS+, nontender  Back: no CVA or Spinal tenderness noted  Extremities:  LLE with no edema                       RLE with decreasing erythema and edema and mild warmth to touch - dressing dry and intact over the RLE ulcerated areas                       Onychomycoses of the Toenails with some overgrowth and jagged edges noted  Skin:  warm, dry, no rash noted        I&O's Summary :    28 Sep 2020 07:01  -  29 Sep 2020 07:00  --------------------------------------------------------  IN: 200 mL / OUT: 3850 mL / NET: -3650 mL        LABS:  CBC Full  -  ( 28 Sep 2020 06:25 )  WBC Count : 7.76 K/uL  RBC Count : 4.04 M/uL  Hemoglobin : 9.2 g/dL  Hematocrit : 31.9 %  Platelet Count - Automated : 412 K/uL  Mean Cell Volume : 79.0 fl  Mean Cell Hemoglobin : 22.8 pg  Mean Cell Hemoglobin Concentration : 28.8 gm/dL  Auto Neutrophil # : 4.73 K/uL  Auto Lymphocyte # : 2.06 K/uL  Auto Monocyte # : 0.63 K/uL  Auto Eosinophil # : 0.23 K/uL  Auto Basophil # : 0.06 K/uL  Auto Neutrophil % : 61.0 %  Auto Lymphocyte % : 26.5 %  Auto Monocyte % : 8.1 %  Auto Eosinophil % : 3.0 %  Auto Basophil % : 0.8 %    09-28    139  |  104  |  22  ----------------------------<  84  4.2   |  29  |  0.69    Ca    8.6      28 Sep 2020 06:25      Sedimentation Rate, Erythrocyte: 34 mm/hr (09-28 @ 06:25)    Sedimentation Rate, Erythrocyte: 39 mm/hr (09-24 @ 06:30)    Sedimentation Rate, Erythrocyte: 50 mm/hr (09-22 @ 07:41)    Sedimentation Rate, Erythrocyte: 43 mm/hr (09-21 @ 13:38)      C-Reactive Protein, Serum (09.28.20 @ 09:14)    C-Reactive Protein, Serum: 1.41 mg/dL      C-Reactive Protein, Serum (09.24.20 @ 09:05)    C-Reactive Protein, Serum: 2.38 mg/dL      C-Reactive Protein, Serum (09.22.20 @ 11:26)    C-Reactive Protein, Serum: 4.17 mg/dL      Prostate Ca Screen, PSA Total (09.23.20 @ 01:59)    Prostate Ca Screen, PSA Total: 0.66: Method: Roche Electrochemiluminescence Immuno Assay          MICROBIOLOGY:    Specimen Source: Skin RLE Ulcers (09-22 @ 15:12)  Culture Results:   Moderate Staphylococcus aureus (09-22 @ 15:12)  Culture - Other (09.22.20 @ 15:12)    -  Tetra/Doxy: R >8    -  Trimethoprim/Sulfamethoxazole: S 1/19    -  Vancomycin: S 2    -  Ampicillin/Sulbactam: S <=8/4    -  Cefazolin: S <=4    -  Clindamycin: R >4    -  Erythromycin: R >4    -  Gentamicin: R >8 Should not be used as monotherapy    -  Oxacillin: S <=0.25    -  Penicillin: R >8    -  RIF- Rifampin: S <=1 Should not be used as monotherapy    Specimen Source: Skin RLE Ulcers    Culture Results:   Moderate Staphylococcus aureus    Organism Identification: Staphylococcus aureus    Organism: Staphylococcus aureus    Method Type: OTONIEL    Specimen Source: .Blood Blood (09-21 @ 23:02)  Culture Results:   No growth to date. (09-21 @ 23:02)    Specimen Source: .Blood Blood (09-21 @ 23:02)  Culture Results:   No growth to date. (09-21 @ 23:02)        COVID-19  Antibody - for prior infection screening (09.22.20 @ 02:10)    COVID-19 IgG Antibody Index: <0.10: Abbott CMIA  Negative Result    <= 1.39 Index  Positive Result      >= 1.40 Index Index    COVID-19 IgG Antibody Interpretation: Negative: This test has not been reviewed by the FDA by the standard review  process.       COVID-19 PCR . (09.21.20 @ 14:45)    COVID-19 PCR: NotDetec: This test has been validated by [employer] to be accurate;      Culture - Other (08.20.20 @ 18:08)    -  Trimethoprim/Sulfamethoxazole: S <=0.5/9.5    -  Vancomycin: S 1    -  Amikacin: S <=16    -  Aztreonam: S 8    -  Ampicillin/Sulbactam: S <=8/4    -  Cefazolin: S <=4    -  Cefepime: S 8    -  Ceftazidime: S 4    -  Ciprofloxacin: S <=0.25    -  Clindamycin: S <=0.25    -  Erythromycin: S <=0.25    -  Gentamicin: R >8 Should not be used as monotherapy    -  Gentamicin: S 4    -  Imipenem: S 2    -  Levofloxacin: S <=0.5    -  Meropenem: S <=1    -  Oxacillin: S 0.5    -  Penicillin: R >8    -  Piperacillin/Tazobactam: S <=8    -  RIF- Rifampin: S <=1 Should not be used as monotherapy    -  Tetra/Doxy: R >8    -  Tobramycin: S <=2    Specimen Source: .Other RIGHT LOWER LEG ULCER    Culture Results:   Culture yields growth of greater than 3 colony types of  bacteria,  which may indicate contamination and normal maureen  Call client services within 7 days if further workup is clinically  indicated. Culture includes  Few Staphylococcus aureus  Moderate Pseudomonas aeruginosa    Organism Identification: Staphylococcus aureus  Pseudomonas aeruginosa    Organism: Staphylococcus aureus    Organism: Pseudomonas aeruginosa    Method Type: OTONIEL    Method Type: OTONIEL            RADIOLOGY:    < from: US Renal (09.23.20 @ 10:49) >  EXAM:  US PELVIC LIMITED OR FOLLOW UP                          EXAM:  US KIDNEY(S)                          PROCEDURE DATE:  09/23/2020      INTERPRETATION:  CLINICAL INFORMATION: Hematuria, urinary frequency    COMPARISON: CT abdomen pelvis 2/26/2019    TECHNIQUE: Sonography of the kidneys and bladder. Transabdominal ultrasound the pelvis was also performed.    FINDINGS:    Right kidney: 11 cm. No renal mass, hydronephrosis or calculi.    Left kidney: 11 cm. No renal mass, hydronephrosisor calculi.    Urinary bladder: Bladder was mildly distended with prevoid volume of 228 cc. Mild circumferential thickening of the bladder wall versus secondary to underdistention. Bilateral ureteral jets are identified. No bladder mass. No significant post void residual.    Prostate measures 4.0 x 2.9 x 2.9 cm.    Incidental note of hepatic steatosis.    IMPRESSION:    No hydronephrosis. No significant post void residual.          < from: US Duplex Arterial Lower Ext Compl, Bilateral (09.22.20 @ 10:30) >    EXAM:  US DPLX LWR EXT ARTS COMPL BI                          PROCEDURE DATE:  09/22/2020      INTERPRETATION:  HISTORY: Peripheral arterial disease, leg ulcer.    Bilateral lower extremity arterial Doppler was performed using grayscale, color and spectral Doppler. There are no prior studies available for comparison.    Findings:    RIGHT: There is triphasic flow within the right external iliac, common femoral, deep femoral, superficial femoral and popliteal arteries. Abnormal low resistance flow within the posterior tibial artery. Peroneal and anterior tibial arteries could not be visualized due to swelling.  Peak systolic velocities are as follows (in CM/sec):  EIA: 127  CFA: 139  DFA: 77  SFA: (Proximal, mid, distal): 135, 122, 94  Popliteal: 73  PTA: 52, 94, 110  Peroneal: Not visualized  AAMIR: Not visualized  DPA: 58      LEFT: There is triphasic flow throughout the left lower extremity.  Peak systolic velocities are as follows (in CM/sec):  EIA: 98  CFA: 89  DFA: 71  SFA: (Proximal, mid, distal): 78, 75, 83  Popliteal: 58  PTA: 50, 62, 76  Peroneal: 63, 56, 45  AAMIR: 57, 40, 59  DPA: 74    IMPRESSION:    Limited visualization of the right calf arteries due to swelling. Abnormal low resistance flow within the posterior tibial artery. No visualized occlusion.    Normal velocities and waveforms within the left leg.                < from: US Duplex Venous Lower Ext Complete, Bilateral (09.21.20 @ 17:21) >  EXAM:  US DPLX LWR EXT VEINS COMPL BI                          PROCEDURE DATE:  09/21/2020      INTERPRETATION:  CLINICAL INFORMATION: Bilateral leg ulcers and edema.    COMPARISON: Doppler venous sonogram 3/27/2019.    TECHNIQUE: Duplex sonography of the BILATERAL LOWER extremity veins with color and spectral Doppler, with and without compression.    FINDINGS:    There is normal compressibility of the bilateral common femoral, femoral and popliteal veins.  Doppler examination shows normal spontaneous and phasic flow.    No calf vein thrombosis is detected.    IMPRESSION:    No evidence of deep venous thrombosis in either lower extremity.          IMPRESSION:   61 y/o  male with hx of HTN,  Morbid Obesity, OA, PUD, Chronic/ Recurring Ulcers of the RLE with Cellulitis, s/p Ventral Hernia Repair, known patient in the [facility], admitted with worsening of his RLE Ulcerations with drainage and associated Cellulitis despite reported po ab rx.  Remains afebrile on current ab rx with Cefepime.  ESR and CRP are trending downwards now and WBC's remain WNL.    Clinically with continued  slow improvement.      Suggestions:  Will continue current ab rx with Cefepime and continue local wound care as per Surgery/ Wound Care.  Follow temps and labs.  Consider Podiatry evaluation  for Toenail Care.  Discussed with patient in detail at the bedside.

## 2020-09-29 NOTE — CONSULT NOTE ADULT - SUBJECTIVE AND OBJECTIVE BOX
GENERAL SURGERY CONSULT NOTE    Patient is a 60y old  Male who presents with a chief complaint of leg ulcer (29 Sep 2020 08:45)    HPI:  · Chief Complaint: The patient is a 60y Male complaining of wound check.  · HPI Objective Statement: 60M PMHx htn with chronic RLE wound. patient had home VNS for wound care for a long time but sionce covid has lost home nursing. wound has been getting worse- it developed a blistered and then popped. no fevers. no traumna. saw his wound care Dr - Dr. Chavarria, who sent hi to ED for eval.  Pt state that problem exists 2.5 years , it almost healed but later he had R leg trauma , about 6 months ago and problem started again, Rx c Dr Chavarria. Ulceration and cellulitis got worse  R leg edema stable    HIV:   HIV Test Questions:  · In accordance with [place] State law, we offer every patient who comes to our ED an HIV test. Would you like to be tested today?	Opt out    PAST MEDICAL/SURGICAL/FAMILY/SOCIAL HISTORY:   Past Medical History:  Duodenal ulcer disease    HTN (hypertension)    Hypertension    Hypertension    Knee osteoarthritis    Morbid obesity    Osteoarthritis    PUD (peptic ulcer disease).  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    Abdominal hernia    H/O ventral hernia repair    Review of Systems:  Contained within HPI    MEDICATIONS  (STANDING):  amLODIPine   Tablet 10 milliGRAM(s) Oral daily  carvedilol Oral Tab/Cap - Peds 3.125 milliGRAM(s) Oral two times a day  cefepime   IVPB      cefepime   IVPB 2000 milliGRAM(s) IV Intermittent every 8 hours  enalapril 20 milliGRAM(s) Oral daily  enoxaparin Injectable 40 milliGRAM(s) SubCutaneous daily  ferrous    sulfate 325 milliGRAM(s) Oral daily  finasteride 5 milliGRAM(s) Oral daily  furosemide    Tablet 20 milliGRAM(s) Oral daily  influenza   Vaccine 0.5 milliLiter(s) IntraMuscular once  lactobacillus acidophilus 1 Tablet(s) Oral two times a day  pantoprazole    Tablet 40 milliGRAM(s) Oral two times a day  polyethylene glycol 3350 17 Gram(s) Oral two times a day  senna 2 Tablet(s) Oral at bedtime  tamsulosin 0.4 milliGRAM(s) Oral at bedtime    MEDICATIONS  (PRN):  acetaminophen   Tablet .. 650 milliGRAM(s) Oral every 6 hours PRN Temp greater or equal to 38C (100.4F), Mild Pain (1 - 3)  bisacodyl Suppository 10 milliGRAM(s) Rectal daily PRN Constipation  morphine  - Injectable 2 milliGRAM(s) IV Push every 4 hours PRN Moderate Pain (4 - 6)  morphine  - Injectable 4 milliGRAM(s) IV Push four times a day PRN Severe Pain (7 - 10)      Allergies    No Known Allergies    Intolerances    SOCIAL HISTORY   Denies tobacco use. Lives alone  Works c horses at [employer]  Hx of ETOH abuse, stopped 2x years completely    FAMILY HISTORY:  No pertinent family history in first degree relatives    No pertinent family history in first degree relatives      Vital Signs Last 24 Hrs  T(C): 36.7 (29 Sep 2020 12:46), Max: 36.7 (28 Sep 2020 23:54)  T(F): 98.1 (29 Sep 2020 12:46), Max: 98.1 (29 Sep 2020 12:46)  HR: 69 (29 Sep 2020 12:46) (64 - 74)  BP: 136/86 (29 Sep 2020 12:46) (131/82 - 139/85)  RR: 17 (29 Sep 2020 12:46) (17 - 18)  SpO2: 97% (29 Sep 2020 12:46) (97% - 99%)    Physical Exam:    General:  NAD  Eyes : RAMOS  HENT:  WNL, no JVD  Chest:  clear breath sounds  Cardiovascular: S1S2, Regular rate & rhythm  Abdomen:  Soft, NTND  Extremities: RLE dressing CDI.  2+ edema, healing large ulcer, cellulitis  Skin:  No rash  Musculoskeletal:  normal strength  Neuro/Psych:  Alert, oriented to time, place and person       LABS:                        9.2    7.76  )-----------( 412      ( 28 Sep 2020 06:25 )             31.9     09-28    139  |  104  |  22  ----------------------------<  84  4.2   |  29  |  0.69    Ca    8.6      28 Sep 2020 06:25      RADIOLOGY & ADDITIONAL STUDIES:  < from: US Duplex Arterial Lower Ext Compl, Bilateral (09.22.20 @ 10:30) >    IMPRESSION:    Limited visualization of the right calf arteries due to swelling. Abnormal low resistance flow within the posterior tibial artery. No visualized occlusion.    Normal velocities and waveforms within the left leg.      SABAS ROBERSON M.D., ATTENDING RADIOLOGIST  This document has been electronically signed. Sep 22 2020 11:00AM    < end of copied text >    < from: US Duplex Venous Lower Ext Complete, Bilateral (09.21.20 @ 17:21) >    EXAM:  US DPLX LWR EXT VEINS COMPL BI                            PROCEDURE DATE:  09/21/2020          INTERPRETATION:  CLINICAL INFORMATION: Bilateral leg ulcers and edema.    COMPARISON: Doppler venous sonogram 3/27/2019.    TECHNIQUE: Duplex sonography of the BILATERAL LOWER extremity veins with color and spectral Doppler, with and without compression.    FINDINGS:    There is normal compressibility of the bilateral common femoral, femoral and popliteal veins.  Doppler examination shows normal spontaneous and phasic flow.    No calf vein thrombosis is detected.    IMPRESSION:    No evidence of deep venous thrombosis in either lower extremity.      ROBINSON RODRIGUEZ M.D., ATTENDING RADIOLOGIST  This document has been electronically signed. Sep 21 2020  5:23PM    < end of copied text >

## 2020-09-30 RX ADMIN — Medication 650 MILLIGRAM(S): at 22:34

## 2020-09-30 RX ADMIN — PANTOPRAZOLE SODIUM 40 MILLIGRAM(S): 20 TABLET, DELAYED RELEASE ORAL at 05:52

## 2020-09-30 RX ADMIN — Medication 1 TABLET(S): at 05:52

## 2020-09-30 RX ADMIN — SENNA PLUS 2 TABLET(S): 8.6 TABLET ORAL at 21:29

## 2020-09-30 RX ADMIN — POLYETHYLENE GLYCOL 3350 17 GRAM(S): 17 POWDER, FOR SOLUTION ORAL at 05:53

## 2020-09-30 RX ADMIN — CEFEPIME 100 MILLIGRAM(S): 1 INJECTION, POWDER, FOR SOLUTION INTRAMUSCULAR; INTRAVENOUS at 14:14

## 2020-09-30 RX ADMIN — FINASTERIDE 5 MILLIGRAM(S): 5 TABLET, FILM COATED ORAL at 12:27

## 2020-09-30 RX ADMIN — CEFEPIME 100 MILLIGRAM(S): 1 INJECTION, POWDER, FOR SOLUTION INTRAMUSCULAR; INTRAVENOUS at 21:29

## 2020-09-30 RX ADMIN — CEFEPIME 100 MILLIGRAM(S): 1 INJECTION, POWDER, FOR SOLUTION INTRAMUSCULAR; INTRAVENOUS at 05:53

## 2020-09-30 RX ADMIN — Medication 20 MILLIGRAM(S): at 05:52

## 2020-09-30 RX ADMIN — Medication 20 MILLIGRAM(S): at 05:53

## 2020-09-30 RX ADMIN — ENOXAPARIN SODIUM 40 MILLIGRAM(S): 100 INJECTION SUBCUTANEOUS at 12:27

## 2020-09-30 RX ADMIN — Medication 325 MILLIGRAM(S): at 12:27

## 2020-09-30 RX ADMIN — PANTOPRAZOLE SODIUM 40 MILLIGRAM(S): 20 TABLET, DELAYED RELEASE ORAL at 17:47

## 2020-09-30 RX ADMIN — CARVEDILOL PHOSPHATE 3.12 MILLIGRAM(S): 80 CAPSULE, EXTENDED RELEASE ORAL at 17:47

## 2020-09-30 RX ADMIN — AMLODIPINE BESYLATE 10 MILLIGRAM(S): 2.5 TABLET ORAL at 05:53

## 2020-09-30 RX ADMIN — POLYETHYLENE GLYCOL 3350 17 GRAM(S): 17 POWDER, FOR SOLUTION ORAL at 17:47

## 2020-09-30 RX ADMIN — TAMSULOSIN HYDROCHLORIDE 0.4 MILLIGRAM(S): 0.4 CAPSULE ORAL at 21:29

## 2020-09-30 RX ADMIN — Medication 650 MILLIGRAM(S): at 21:29

## 2020-09-30 RX ADMIN — Medication 650 MILLIGRAM(S): at 15:10

## 2020-09-30 RX ADMIN — CARVEDILOL PHOSPHATE 3.12 MILLIGRAM(S): 80 CAPSULE, EXTENDED RELEASE ORAL at 05:52

## 2020-09-30 RX ADMIN — Medication 650 MILLIGRAM(S): at 14:37

## 2020-09-30 RX ADMIN — Medication 1 TABLET(S): at 17:47

## 2020-09-30 NOTE — PROGRESS NOTE ADULT - SUBJECTIVE AND OBJECTIVE BOX
VSS  much improvement   < ESR, < CRP   < edema  < pain  < freuency  3x per night   ID eval noted - continue IV AB  Podiatry consult  discussed pito HUTCHISON

## 2020-09-30 NOTE — PROGRESS NOTE ADULT - NUTRITIONAL ASSESSMENT
This patient has been assessed with a concern for Malnutrition and has been determined to have a diagnosis/diagnoses of Morbid obesity/BMI > 40.    This patient is being managed with:   Diet DASH/TLC-  Sodium & Cholesterol Restricted  Entered: Sep 22 2020  6:45AM    

## 2020-10-01 LAB
ANION GAP SERPL CALC-SCNC: 4 MMOL/L — LOW (ref 5–17)
BASOPHILS # BLD AUTO: 0.06 K/UL — SIGNIFICANT CHANGE UP (ref 0–0.2)
BASOPHILS NFR BLD AUTO: 0.9 % — SIGNIFICANT CHANGE UP (ref 0–2)
BUN SERPL-MCNC: 17 MG/DL — SIGNIFICANT CHANGE UP (ref 7–23)
CALCIUM SERPL-MCNC: 8.7 MG/DL — SIGNIFICANT CHANGE UP (ref 8.5–10.1)
CHLORIDE SERPL-SCNC: 105 MMOL/L — SIGNIFICANT CHANGE UP (ref 96–108)
CO2 SERPL-SCNC: 29 MMOL/L — SIGNIFICANT CHANGE UP (ref 22–31)
CREAT SERPL-MCNC: 0.61 MG/DL — SIGNIFICANT CHANGE UP (ref 0.5–1.3)
CRP SERPL-MCNC: 1.37 MG/DL — HIGH (ref 0–0.4)
EOSINOPHIL # BLD AUTO: 0.11 K/UL — SIGNIFICANT CHANGE UP (ref 0–0.5)
EOSINOPHIL NFR BLD AUTO: 1.6 % — SIGNIFICANT CHANGE UP (ref 0–6)
ERYTHROCYTE [SEDIMENTATION RATE] IN BLOOD: 38 MM/HR — HIGH (ref 0–20)
GLUCOSE SERPL-MCNC: 94 MG/DL — SIGNIFICANT CHANGE UP (ref 70–99)
HCT VFR BLD CALC: 30.6 % — LOW (ref 39–50)
HGB BLD-MCNC: 9.3 G/DL — LOW (ref 13–17)
IMM GRANULOCYTES NFR BLD AUTO: 0.4 % — SIGNIFICANT CHANGE UP (ref 0–1.5)
LYMPHOCYTES # BLD AUTO: 1.71 K/UL — SIGNIFICANT CHANGE UP (ref 1–3.3)
LYMPHOCYTES # BLD AUTO: 25.4 % — SIGNIFICANT CHANGE UP (ref 13–44)
MCHC RBC-ENTMCNC: 23.3 PG — LOW (ref 27–34)
MCHC RBC-ENTMCNC: 30.4 GM/DL — LOW (ref 32–36)
MCV RBC AUTO: 76.7 FL — LOW (ref 80–100)
MONOCYTES # BLD AUTO: 0.52 K/UL — SIGNIFICANT CHANGE UP (ref 0–0.9)
MONOCYTES NFR BLD AUTO: 7.7 % — SIGNIFICANT CHANGE UP (ref 2–14)
NEUTROPHILS # BLD AUTO: 4.31 K/UL — SIGNIFICANT CHANGE UP (ref 1.8–7.4)
NEUTROPHILS NFR BLD AUTO: 64 % — SIGNIFICANT CHANGE UP (ref 43–77)
NRBC # BLD: 0 /100 WBCS — SIGNIFICANT CHANGE UP (ref 0–0)
PLATELET # BLD AUTO: 352 K/UL — SIGNIFICANT CHANGE UP (ref 150–400)
POTASSIUM SERPL-MCNC: 3.5 MMOL/L — SIGNIFICANT CHANGE UP (ref 3.5–5.3)
POTASSIUM SERPL-SCNC: 3.5 MMOL/L — SIGNIFICANT CHANGE UP (ref 3.5–5.3)
RBC # BLD: 3.99 M/UL — LOW (ref 4.2–5.8)
RBC # FLD: 17.8 % — HIGH (ref 10.3–14.5)
SODIUM SERPL-SCNC: 138 MMOL/L — SIGNIFICANT CHANGE UP (ref 135–145)
WBC # BLD: 6.74 K/UL — SIGNIFICANT CHANGE UP (ref 3.8–10.5)
WBC # FLD AUTO: 6.74 K/UL — SIGNIFICANT CHANGE UP (ref 3.8–10.5)

## 2020-10-01 RX ADMIN — PANTOPRAZOLE SODIUM 40 MILLIGRAM(S): 20 TABLET, DELAYED RELEASE ORAL at 17:18

## 2020-10-01 RX ADMIN — CEFEPIME 100 MILLIGRAM(S): 1 INJECTION, POWDER, FOR SOLUTION INTRAMUSCULAR; INTRAVENOUS at 13:26

## 2020-10-01 RX ADMIN — Medication 20 MILLIGRAM(S): at 05:13

## 2020-10-01 RX ADMIN — SENNA PLUS 2 TABLET(S): 8.6 TABLET ORAL at 21:37

## 2020-10-01 RX ADMIN — Medication 650 MILLIGRAM(S): at 05:16

## 2020-10-01 RX ADMIN — Medication 325 MILLIGRAM(S): at 11:42

## 2020-10-01 RX ADMIN — Medication 1 TABLET(S): at 17:18

## 2020-10-01 RX ADMIN — CARVEDILOL PHOSPHATE 3.12 MILLIGRAM(S): 80 CAPSULE, EXTENDED RELEASE ORAL at 17:18

## 2020-10-01 RX ADMIN — POLYETHYLENE GLYCOL 3350 17 GRAM(S): 17 POWDER, FOR SOLUTION ORAL at 17:18

## 2020-10-01 RX ADMIN — AMLODIPINE BESYLATE 10 MILLIGRAM(S): 2.5 TABLET ORAL at 05:13

## 2020-10-01 RX ADMIN — Medication 650 MILLIGRAM(S): at 12:07

## 2020-10-01 RX ADMIN — CEFEPIME 100 MILLIGRAM(S): 1 INJECTION, POWDER, FOR SOLUTION INTRAMUSCULAR; INTRAVENOUS at 05:13

## 2020-10-01 RX ADMIN — CEFEPIME 100 MILLIGRAM(S): 1 INJECTION, POWDER, FOR SOLUTION INTRAMUSCULAR; INTRAVENOUS at 21:37

## 2020-10-01 RX ADMIN — Medication 650 MILLIGRAM(S): at 13:07

## 2020-10-01 RX ADMIN — Medication 1 TABLET(S): at 05:13

## 2020-10-01 RX ADMIN — TAMSULOSIN HYDROCHLORIDE 0.4 MILLIGRAM(S): 0.4 CAPSULE ORAL at 21:37

## 2020-10-01 RX ADMIN — PANTOPRAZOLE SODIUM 40 MILLIGRAM(S): 20 TABLET, DELAYED RELEASE ORAL at 05:13

## 2020-10-01 RX ADMIN — CARVEDILOL PHOSPHATE 3.12 MILLIGRAM(S): 80 CAPSULE, EXTENDED RELEASE ORAL at 05:13

## 2020-10-01 RX ADMIN — FINASTERIDE 5 MILLIGRAM(S): 5 TABLET, FILM COATED ORAL at 11:43

## 2020-10-01 RX ADMIN — Medication 650 MILLIGRAM(S): at 06:34

## 2020-10-01 RX ADMIN — ENOXAPARIN SODIUM 40 MILLIGRAM(S): 100 INJECTION SUBCUTANEOUS at 11:42

## 2020-10-01 NOTE — CHART NOTE - NSCHARTNOTEFT_GEN_A_CORE
Pt w/ RLE wound ulcer w/ pmhx HTN ; morbid obesity ; OA ; chronic recurring ulcers of the RLE w/ cellulitis ; s/p ventral hernia repair.     Factors impacting intake: [x ] none [ ] nausea  [ ] vomiting [ ] diarrhea [ ] constipation  [ ]chewing problems [ ] swallowing issues  [ ] other:     Diet Prescription: Diet, DASH/TLC:   Sodium & Cholesterol Restricted (09-22-20 @ 06:45)    Intake: 100 % meals. Pt without c/o at this time.     Current Weight: 10/1 - 286.6 (130 kg) Edema - 3+ (R) foot, ankle ; 9/21 - 281.5 (127.7 kg) no edema noted  % Weight Change - 1.7 % / 2.3 kg gain w/ noted 3+ edema (R) foot , ankle    Physical Appearance - WDWN      Pertinent Medications: MEDICATIONS  (STANDING):  amLODIPine   Tablet 10 milliGRAM(s) Oral daily  carvedilol Oral Tab/Cap - Peds 3.125 milliGRAM(s) Oral two times a day  cefepime   IVPB 2000 milliGRAM(s) IV Intermittent every 8 hours  cefepime   IVPB      enalapril 20 milliGRAM(s) Oral daily  enoxaparin Injectable 40 milliGRAM(s) SubCutaneous daily  ferrous    sulfate 325 milliGRAM(s) Oral daily  finasteride 5 milliGRAM(s) Oral daily  furosemide    Tablet 20 milliGRAM(s) Oral daily  influenza   Vaccine 0.5 milliLiter(s) IntraMuscular once  lactobacillus acidophilus 1 Tablet(s) Oral two times a day  pantoprazole    Tablet 40 milliGRAM(s) Oral two times a day  polyethylene glycol 3350 17 Gram(s) Oral two times a day  senna 2 Tablet(s) Oral at bedtime  tamsulosin 0.4 milliGRAM(s) Oral at bedtime    MEDICATIONS  (PRN):  acetaminophen   Tablet .. 650 milliGRAM(s) Oral every 6 hours PRN Temp greater or equal to 38C (100.4F), Mild Pain (1 - 3)  bisacodyl Suppository 10 milliGRAM(s) Rectal daily PRN Constipation    Pertinent Labs: 10-01 Na138 mmol/L Glu 94 mg/dL K+ 3.5 mmol/L Cr  0.61 mg/dL BUN 17 mg/dL     CAPILLARY BLOOD GLUCOSE        Skin: wound - RLE venous ulcer    Estimated Needs:   [x ] no change since previous assessment (9/24/20)  [ ] recalculated:     Nutrition Diagnosis:    Nutrition Diagnosis:  Nutrition diagnosis yes...     Nutrition Diagnostic Terminology #1 Overweight/Obesity.     Etiology Excessive energy intake.     Signs/Symptoms BMI=45.5.     Goal/Expected Outcome Pt to gradually lose weight 1-2 pounds per week to reach BMI<40.      Nutrition Diagnosis is [x ] ongoing  [ ] resolved [ ] not applicable     New Nutrition Diagnosis: [x ] not applicable       Interventions:   Recommend  [ x ] Continue current diet as Rx'd  [ ] Change Diet To:  [ ] Nutrition Supplement  [ ] Nutrition Support  [ ] Other:     Monitoring and Evaluation:   [x ] PO intake [ x ] Tolerance to diet prescription [ x ] weights [ x ] labs[ x ] follow up per protocol  [ ] other:

## 2020-10-01 NOTE — PROGRESS NOTE ADULT - SUBJECTIVE AND OBJECTIVE BOX
TMAX - 98.7    On day # 10 Cefepime now    Vital Signs Last 24 Hrs  T(C): 36.8 (01 Oct 2020 11:03), Max: 37.1 (30 Sep 2020 23:23)  T(F): 98.3 (01 Oct 2020 11:03), Max: 98.7 (30 Sep 2020 23:23)  HR: 71 (01 Oct 2020 11:03) (66 - 71)  BP: 113/77 (01 Oct 2020 11:03) (113/77 - 156/94)  BP(mean): --  RR: 17 (01 Oct 2020 11:03) (17 - 18)  SpO2: 98% (01 Oct 2020 11:03) (98% - 100%)  Supplemental O2:  on RA       Awake, alert, still with some pain in the LLE but decreasing. No c/o SOB, cough, cp, or abdominal pain.  Appetite is good.      PHYSICAL EXAM    General:  awake, alert, sitting up in the chair now, pleasant and cooperative, in NAD  HEENT:  conj pink, sclerae anicteric, PERRLA, no oral lesions noted  Neck:  supple, no nodes noted  Heart:  RR  Lungs:  clear bilaterally  Abdomen:  soft, obese, BS+, nontender  Back: no CVA or Spinal tenderness noted  Extremities:  LLE with no edema                       RLE with decreasing erythema and edema and mild warmth to touch - dressing dry and intact over the RLE ulcerated areas                       Onychomycoses of the Toenails with some overgrowth and jagged edges noted  Skin:  warm, dry, no rash noted         I&O's Summary :    30 Sep 2020 07:01  -  01 Oct 2020 07:00  --------------------------------------------------------  IN: 1180 mL / OUT: 1900 mL / NET: -720 mL        LABS:  CBC Full  -  ( 01 Oct 2020 08:46 )  WBC Count : 6.74 K/uL  RBC Count : 3.99 M/uL  Hemoglobin : 9.3 g/dL  Hematocrit : 30.6 %  Platelet Count - Automated : 352 K/uL  Mean Cell Volume : 76.7 fl  Mean Cell Hemoglobin : 23.3 pg  Mean Cell Hemoglobin Concentration : 30.4 gm/dL  Auto Neutrophil # : 4.31 K/uL  Auto Lymphocyte # : 1.71 K/uL  Auto Monocyte # : 0.52 K/uL  Auto Eosinophil # : 0.11 K/uL  Auto Basophil # : 0.06 K/uL  Auto Neutrophil % : 64.0 %  Auto Lymphocyte % : 25.4 %  Auto Monocyte % : 7.7 %  Auto Eosinophil % : 1.6 %  Auto Basophil % : 0.9 %    10-01    138  |  105  |  17  ----------------------------<  94  3.5   |  29  |  0.61    Ca    8.7      01 Oct 2020 08:46    Sedimentation Rate, Erythrocyte: 38 mm/hr (10-01 @ 08:46)    Sedimentation Rate, Erythrocyte: 34 mm/hr (09-28 @ 06:25)    Sedimentation Rate, Erythrocyte: 39 mm/hr (09-24 @ 06:30)    Sedimentation Rate, Erythrocyte: 50 mm/hr (09-22 @ 07:41)    Sedimentation Rate, Erythrocyte: 43 mm/hr (09-21 @ 13:38)      C-Reactive Protein, Serum (10.01.20 @ 11:19)    C-Reactive Protein, Serum: 1.37 mg/dL      C-Reactive Protein, Serum (09.28.20 @ 09:14)    C-Reactive Protein, Serum: 1.41 mg/dL      C-Reactive Protein, Serum (09.24.20 @ 09:05)    C-Reactive Protein, Serum: 2.38 mg/dL      C-Reactive Protein, Serum (09.22.20 @ 11:26)    C-Reactive Protein, Serum: 4.17 mg/dL      Prostate Ca Screen, PSA Total (09.23.20 @ 01:59)    Prostate Ca Screen, PSA Total: 0.66: Method: Roche Electrochemiluminescence Immuno Assay          MICROBIOLOGY:    Specimen Source: Skin RLE Ulcers (09-22 @ 15:12)  Culture Results:   Moderate Staphylococcus aureus (09-22 @ 15:12)  Culture - Other (09.22.20 @ 15:12)    -  Tetra/Doxy: R >8    -  Trimethoprim/Sulfamethoxazole: S 1/19    -  Vancomycin: S 2    -  Ampicillin/Sulbactam: S <=8/4    -  Cefazolin: S <=4    -  Clindamycin: R >4    -  Erythromycin: R >4    -  Gentamicin: R >8 Should not be used as monotherapy    -  Oxacillin: S <=0.25    -  Penicillin: R >8    -  RIF- Rifampin: S <=1 Should not be used as monotherapy    Specimen Source: Skin RLE Ulcers    Culture Results:   Moderate Staphylococcus aureus    Organism Identification: Staphylococcus aureus    Organism: Staphylococcus aureus    Method Type: OTONIEL    Specimen Source: .Blood Blood (09-21 @ 23:02)  Culture Results:   No growth to date. (09-21 @ 23:02)    Specimen Source: .Blood Blood (09-21 @ 23:02)  Culture Results:   No growth to date. (09-21 @ 23:02)        COVID-19  Antibody - for prior infection screening (09.22.20 @ 02:10)    COVID-19 IgG Antibody Index: <0.10: Abbott CMIA  Negative Result    <= 1.39 Index  Positive Result      >= 1.40 Index Index    COVID-19 IgG Antibody Interpretation: Negative: This test has not been reviewed by the FDA by the standard review  process.       COVID-19 PCR . (09.21.20 @ 14:45)    COVID-19 PCR: NotDetec: This test has been validated by [employer] to be accurate;      Culture - Other (08.20.20 @ 18:08)    -  Trimethoprim/Sulfamethoxazole: S <=0.5/9.5    -  Vancomycin: S 1    -  Amikacin: S <=16    -  Aztreonam: S 8    -  Ampicillin/Sulbactam: S <=8/4    -  Cefazolin: S <=4    -  Cefepime: S 8    -  Ceftazidime: S 4    -  Ciprofloxacin: S <=0.25    -  Clindamycin: S <=0.25    -  Erythromycin: S <=0.25    -  Gentamicin: R >8 Should not be used as monotherapy    -  Gentamicin: S 4    -  Imipenem: S 2    -  Levofloxacin: S <=0.5    -  Meropenem: S <=1    -  Oxacillin: S 0.5    -  Penicillin: R >8    -  Piperacillin/Tazobactam: S <=8    -  RIF- Rifampin: S <=1 Should not be used as monotherapy    -  Tetra/Doxy: R >8    -  Tobramycin: S <=2    Specimen Source: .Other RIGHT LOWER LEG ULCER    Culture Results:   Culture yields growth of greater than 3 colony types of  bacteria,  which may indicate contamination and normal maureen  Call client services within 7 days if further workup is clinically  indicated. Culture includes  Few Staphylococcus aureus  Moderate Pseudomonas aeruginosa    Organism Identification: Staphylococcus aureus  Pseudomonas aeruginosa    Organism: Staphylococcus aureus    Organism: Pseudomonas aeruginosa    Method Type: OTONIEL    Method Type: OTONIEL            RADIOLOGY:    < from: US Renal (09.23.20 @ 10:49) >  EXAM:  US PELVIC LIMITED OR FOLLOW UP                          EXAM:  US KIDNEY(S)                          PROCEDURE DATE:  09/23/2020      INTERPRETATION:  CLINICAL INFORMATION: Hematuria, urinary frequency    COMPARISON: CT abdomen pelvis 2/26/2019    TECHNIQUE: Sonography of the kidneys and bladder. Transabdominal ultrasound the pelvis was also performed.    FINDINGS:    Right kidney: 11 cm. No renal mass, hydronephrosis or calculi.    Left kidney: 11 cm. No renal mass, hydronephrosisor calculi.    Urinary bladder: Bladder was mildly distended with prevoid volume of 228 cc. Mild circumferential thickening of the bladder wall versus secondary to underdistention. Bilateral ureteral jets are identified. No bladder mass. No significant post void residual.    Prostate measures 4.0 x 2.9 x 2.9 cm.    Incidental note of hepatic steatosis.    IMPRESSION:    No hydronephrosis. No significant post void residual.          < from: US Duplex Arterial Lower Ext Compl, Bilateral (09.22.20 @ 10:30) >    EXAM:  US DPLX LWR EXT ARTS COMPL BI                          PROCEDURE DATE:  09/22/2020      INTERPRETATION:  HISTORY: Peripheral arterial disease, leg ulcer.    Bilateral lower extremity arterial Doppler was performed using grayscale, color and spectral Doppler. There are no prior studies available for comparison.    Findings:    RIGHT: There is triphasic flow within the right external iliac, common femoral, deep femoral, superficial femoral and popliteal arteries. Abnormal low resistance flow within the posterior tibial artery. Peroneal and anterior tibial arteries could not be visualized due to swelling.  Peak systolic velocities are as follows (in CM/sec):  EIA: 127  CFA: 139  DFA: 77  SFA: (Proximal, mid, distal): 135, 122, 94  Popliteal: 73  PTA: 52, 94, 110  Peroneal: Not visualized  AAMIR: Not visualized  DPA: 58      LEFT: There is triphasic flow throughout the left lower extremity.  Peak systolic velocities are as follows (in CM/sec):  EIA: 98  CFA: 89  DFA: 71  SFA: (Proximal, mid, distal): 78, 75, 83  Popliteal: 58  PTA: 50, 62, 76  Peroneal: 63, 56, 45  AAMIR: 57, 40, 59  DPA: 74    IMPRESSION:    Limited visualization of the right calf arteries due to swelling. Abnormal low resistance flow within the posterior tibial artery. No visualized occlusion.    Normal velocities and waveforms within the left leg.                < from: US Duplex Venous Lower Ext Complete, Bilateral (09.21.20 @ 17:21) >  EXAM:  US DPLX LWR EXT VEINS COMPL BI                          PROCEDURE DATE:  09/21/2020      INTERPRETATION:  CLINICAL INFORMATION: Bilateral leg ulcers and edema.    COMPARISON: Doppler venous sonogram 3/27/2019.    TECHNIQUE: Duplex sonography of the BILATERAL LOWER extremity veins with color and spectral Doppler, with and without compression.    FINDINGS:    There is normal compressibility of the bilateral common femoral, femoral and popliteal veins.  Doppler examination shows normal spontaneous and phasic flow.    No calf vein thrombosis is detected.    IMPRESSION:    No evidence of deep venous thrombosis in either lower extremity.          IMPRESSION:   61 y/o  male with hx of HTN,  Morbid Obesity, OA, PUD, Chronic/ Recurring Ulcers of the RLE with Cellulitis, s/p Ventral Hernia Repair, [facility], admitted with worsening of his RLE Ulcerations with drainage and associated Cellulitis despite reported po ab rx.  Remains afebrile on current ab rx with Cefepime.  ESR and CRP are trending downwards and WBC's remain WNL.    Clinically with continued  slow improvement.      Suggestions:  Will continue current ab rx with Cefepime and plan to complete 14 days of rx if patient continues to improve.  Continue local wound care as per Surgery/ Wound Care.  Follow temps and labs.  Await Podiatry evaluation  for Toenail Care.  Discussed with patient in detail at the bedside.

## 2020-10-02 RX ADMIN — CARVEDILOL PHOSPHATE 3.12 MILLIGRAM(S): 80 CAPSULE, EXTENDED RELEASE ORAL at 17:37

## 2020-10-02 RX ADMIN — Medication 20 MILLIGRAM(S): at 05:54

## 2020-10-02 RX ADMIN — Medication 325 MILLIGRAM(S): at 11:01

## 2020-10-02 RX ADMIN — AMLODIPINE BESYLATE 10 MILLIGRAM(S): 2.5 TABLET ORAL at 05:55

## 2020-10-02 RX ADMIN — ENOXAPARIN SODIUM 40 MILLIGRAM(S): 100 INJECTION SUBCUTANEOUS at 11:01

## 2020-10-02 RX ADMIN — PANTOPRAZOLE SODIUM 40 MILLIGRAM(S): 20 TABLET, DELAYED RELEASE ORAL at 17:36

## 2020-10-02 RX ADMIN — FINASTERIDE 5 MILLIGRAM(S): 5 TABLET, FILM COATED ORAL at 11:01

## 2020-10-02 RX ADMIN — Medication 650 MILLIGRAM(S): at 18:44

## 2020-10-02 RX ADMIN — Medication 1 TABLET(S): at 17:36

## 2020-10-02 RX ADMIN — Medication 20 MILLIGRAM(S): at 05:55

## 2020-10-02 RX ADMIN — CEFEPIME 100 MILLIGRAM(S): 1 INJECTION, POWDER, FOR SOLUTION INTRAMUSCULAR; INTRAVENOUS at 05:55

## 2020-10-02 RX ADMIN — Medication 650 MILLIGRAM(S): at 11:19

## 2020-10-02 RX ADMIN — Medication 650 MILLIGRAM(S): at 06:00

## 2020-10-02 RX ADMIN — POLYETHYLENE GLYCOL 3350 17 GRAM(S): 17 POWDER, FOR SOLUTION ORAL at 05:55

## 2020-10-02 RX ADMIN — SENNA PLUS 2 TABLET(S): 8.6 TABLET ORAL at 22:09

## 2020-10-02 RX ADMIN — CEFEPIME 100 MILLIGRAM(S): 1 INJECTION, POWDER, FOR SOLUTION INTRAMUSCULAR; INTRAVENOUS at 22:09

## 2020-10-02 RX ADMIN — PANTOPRAZOLE SODIUM 40 MILLIGRAM(S): 20 TABLET, DELAYED RELEASE ORAL at 05:54

## 2020-10-02 RX ADMIN — Medication 650 MILLIGRAM(S): at 07:13

## 2020-10-02 RX ADMIN — Medication 650 MILLIGRAM(S): at 11:35

## 2020-10-02 RX ADMIN — POLYETHYLENE GLYCOL 3350 17 GRAM(S): 17 POWDER, FOR SOLUTION ORAL at 17:36

## 2020-10-02 RX ADMIN — Medication 650 MILLIGRAM(S): at 17:44

## 2020-10-02 RX ADMIN — Medication 1 TABLET(S): at 05:55

## 2020-10-02 RX ADMIN — CARVEDILOL PHOSPHATE 3.12 MILLIGRAM(S): 80 CAPSULE, EXTENDED RELEASE ORAL at 05:55

## 2020-10-02 RX ADMIN — TAMSULOSIN HYDROCHLORIDE 0.4 MILLIGRAM(S): 0.4 CAPSULE ORAL at 22:09

## 2020-10-02 RX ADMIN — CEFEPIME 100 MILLIGRAM(S): 1 INJECTION, POWDER, FOR SOLUTION INTRAMUSCULAR; INTRAVENOUS at 13:25

## 2020-10-02 NOTE — PROGRESS NOTE ADULT - SUBJECTIVE AND OBJECTIVE BOX
AAO x3 no distress  < pain R leg  Much improved urination c Rx  On IV AB as per Dr Real  Wound care noted

## 2020-10-02 NOTE — PROGRESS NOTE ADULT - SUBJECTIVE AND OBJECTIVE BOX
TMAX - 98.3     On day # 11 Cefepime    Vital Signs Last 24 Hrs  T(C): 36.5 (02 Oct 2020 11:44), Max: 36.8 (02 Oct 2020 07:39)  T(F): 97.7 (02 Oct 2020 11:44), Max: 98.3 (02 Oct 2020 07:39)  HR: 75 (02 Oct 2020 11:44) (68 - 75)  BP: 115/88 (02 Oct 2020 11:44) (115/88 - 139/93)  BP(mean): --  RR: 18 (02 Oct 2020 11:44) (18 - 18)  SpO2: 100% (02 Oct 2020 11:44) (98% - 100%)  Supplemental O2:  on RA       Awake, alert, feeling better but still with some pain in the LLE especially with dressing changes.  No SOB, cp, abdominal pain, or cough.  Appetite is good.      PHYSICAL EXAM    General:  awake, alert, sitting up in the chair now, pleasant and cooperative, in NAD  HEENT:  conj pink, sclerae anicteric, PERRLA, no oral lesions noted  Neck:  supple, no nodes noted  Heart:  RR  Lungs:  clear bilaterally  Abdomen:  soft, obese, BS+, nontender  Back: no CVA or Spinal tenderness noted  Extremities:  LLE with no edema                       RLE with decreasing erythema and edema and mild warmth to touch - still with area of firm swelling noted at the posterior aspect of the upper calf - dressing dry and intact over the RLE ulcerated areas                       Onychomycoses of the Toenails with some overgrowth and jagged edges noted  Skin:  warm, dry, no rash noted         I&O's Summary :    01 Oct 2020 07:01  -  02 Oct 2020 07:00  --------------------------------------------------------  IN: 300 mL / OUT: 300 mL / NET: 0 mL    02 Oct 2020 07:01  -  02 Oct 2020 16:30  --------------------------------------------------------  IN: 1010 mL / OUT: 950 mL / NET: 60 mL        LABS:  CBC Full  -  ( 01 Oct 2020 08:46 )  WBC Count : 6.74 K/uL  RBC Count : 3.99 M/uL  Hemoglobin : 9.3 g/dL  Hematocrit : 30.6 %  Platelet Count - Automated : 352 K/uL  Mean Cell Volume : 76.7 fl  Mean Cell Hemoglobin : 23.3 pg  Mean Cell Hemoglobin Concentration : 30.4 gm/dL  Auto Neutrophil # : 4.31 K/uL  Auto Lymphocyte # : 1.71 K/uL  Auto Monocyte # : 0.52 K/uL  Auto Eosinophil # : 0.11 K/uL  Auto Basophil # : 0.06 K/uL  Auto Neutrophil % : 64.0 %  Auto Lymphocyte % : 25.4 %  Auto Monocyte % : 7.7 %  Auto Eosinophil % : 1.6 %  Auto Basophil % : 0.9 %    10-01    138  |  105  |  17  ----------------------------<  94  3.5   |  29  |  0.61    Ca    8.7      01 Oct 2020 08:46    Sedimentation Rate, Erythrocyte: 38 mm/hr (10-01 @ 08:46)    Sedimentation Rate, Erythrocyte: 34 mm/hr (09-28 @ 06:25)    Sedimentation Rate, Erythrocyte: 39 mm/hr (09-24 @ 06:30)    Sedimentation Rate, Erythrocyte: 50 mm/hr (09-22 @ 07:41)    Sedimentation Rate, Erythrocyte: 43 mm/hr (09-21 @ 13:38)      C-Reactive Protein, Serum (10.01.20 @ 11:19)    C-Reactive Protein, Serum: 1.37 mg/dL      C-Reactive Protein, Serum (09.28.20 @ 09:14)    C-Reactive Protein, Serum: 1.41 mg/dL      C-Reactive Protein, Serum (09.24.20 @ 09:05)    C-Reactive Protein, Serum: 2.38 mg/dL      C-Reactive Protein, Serum (09.22.20 @ 11:26)    C-Reactive Protein, Serum: 4.17 mg/dL      Prostate Ca Screen, PSA Total (09.23.20 @ 01:59)    Prostate Ca Screen, PSA Total: 0.66: Method: Roche Electrochemiluminescence Immuno Assay          MICROBIOLOGY:    Specimen Source: Skin RLE Ulcers (09-22 @ 15:12)  Culture Results:   Moderate Staphylococcus aureus (09-22 @ 15:12)  Culture - Other (09.22.20 @ 15:12)    -  Tetra/Doxy: R >8    -  Trimethoprim/Sulfamethoxazole: S 1/19    -  Vancomycin: S 2    -  Ampicillin/Sulbactam: S <=8/4    -  Cefazolin: S <=4    -  Clindamycin: R >4    -  Erythromycin: R >4    -  Gentamicin: R >8 Should not be used as monotherapy    -  Oxacillin: S <=0.25    -  Penicillin: R >8    -  RIF- Rifampin: S <=1 Should not be used as monotherapy    Specimen Source: Skin RLE Ulcers    Culture Results:   Moderate Staphylococcus aureus    Organism Identification: Staphylococcus aureus    Organism: Staphylococcus aureus    Method Type: OTONIEL    Specimen Source: .Blood Blood (09-21 @ 23:02)  Culture Results:   No growth to date. (09-21 @ 23:02)    Specimen Source: .Blood Blood (09-21 @ 23:02)  Culture Results:   No growth to date. (09-21 @ 23:02)        COVID-19  Antibody - for prior infection screening (09.22.20 @ 02:10)    COVID-19 IgG Antibody Index: <0.10: Abbott CMIA  Negative Result    <= 1.39 Index  Positive Result      >= 1.40 Index Index    COVID-19 IgG Antibody Interpretation: Negative: This test has not been reviewed by the FDA by the standard review  process.       COVID-19 PCR . (09.21.20 @ 14:45)    COVID-19 PCR: NotDetec: This test has been validated by [employer] to be accurate;      Culture - Other (08.20.20 @ 18:08)    -  Trimethoprim/Sulfamethoxazole: S <=0.5/9.5    -  Vancomycin: S 1    -  Amikacin: S <=16    -  Aztreonam: S 8    -  Ampicillin/Sulbactam: S <=8/4    -  Cefazolin: S <=4    -  Cefepime: S 8    -  Ceftazidime: S 4    -  Ciprofloxacin: S <=0.25    -  Clindamycin: S <=0.25    -  Erythromycin: S <=0.25    -  Gentamicin: R >8 Should not be used as monotherapy    -  Gentamicin: S 4    -  Imipenem: S 2    -  Levofloxacin: S <=0.5    -  Meropenem: S <=1    -  Oxacillin: S 0.5    -  Penicillin: R >8    -  Piperacillin/Tazobactam: S <=8    -  RIF- Rifampin: S <=1 Should not be used as monotherapy    -  Tetra/Doxy: R >8    -  Tobramycin: S <=2    Specimen Source: .Other RIGHT LOWER LEG ULCER    Culture Results:   Culture yields growth of greater than 3 colony types of  bacteria,  which may indicate contamination and normal maureen  Call client services within 7 days if further workup is clinically  indicated. Culture includes  Few Staphylococcus aureus  Moderate Pseudomonas aeruginosa    Organism Identification: Staphylococcus aureus  Pseudomonas aeruginosa    Organism: Staphylococcus aureus    Organism: Pseudomonas aeruginosa    Method Type: OTONIEL    Method Type: OTONIEL            RADIOLOGY:    < from: US Renal (09.23.20 @ 10:49) >  EXAM:  US PELVIC LIMITED OR FOLLOW UP                          EXAM:  US KIDNEY(S)                          PROCEDURE DATE:  09/23/2020      INTERPRETATION:  CLINICAL INFORMATION: Hematuria, urinary frequency    COMPARISON: CT abdomen pelvis 2/26/2019    TECHNIQUE: Sonography of the kidneys and bladder. Transabdominal ultrasound the pelvis was also performed.    FINDINGS:    Right kidney: 11 cm. No renal mass, hydronephrosis or calculi.    Left kidney: 11 cm. No renal mass, hydronephrosisor calculi.    Urinary bladder: Bladder was mildly distended with prevoid volume of 228 cc. Mild circumferential thickening of the bladder wall versus secondary to underdistention. Bilateral ureteral jets are identified. No bladder mass. No significant post void residual.    Prostate measures 4.0 x 2.9 x 2.9 cm.    Incidental note of hepatic steatosis.    IMPRESSION:    No hydronephrosis. No significant post void residual.          < from: US Duplex Arterial Lower Ext Compl, Bilateral (09.22.20 @ 10:30) >    EXAM:  US DPLX LWR EXT ARTS COMPL BI                          PROCEDURE DATE:  09/22/2020      INTERPRETATION:  HISTORY: Peripheral arterial disease, leg ulcer.    Bilateral lower extremity arterial Doppler was performed using grayscale, color and spectral Doppler. There are no prior studies available for comparison.    Findings:    RIGHT: There is triphasic flow within the right external iliac, common femoral, deep femoral, superficial femoral and popliteal arteries. Abnormal low resistance flow within the posterior tibial artery. Peroneal and anterior tibial arteries could not be visualized due to swelling.  Peak systolic velocities are as follows (in CM/sec):  EIA: 127  CFA: 139  DFA: 77  SFA: (Proximal, mid, distal): 135, 122, 94  Popliteal: 73  PTA: 52, 94, 110  Peroneal: Not visualized  AAMIR: Not visualized  DPA: 58      LEFT: There is triphasic flow throughout the left lower extremity.  Peak systolic velocities are as follows (in CM/sec):  EIA: 98  CFA: 89  DFA: 71  SFA: (Proximal, mid, distal): 78, 75, 83  Popliteal: 58  PTA: 50, 62, 76  Peroneal: 63, 56, 45  AAMIR: 57, 40, 59  DPA: 74    IMPRESSION:    Limited visualization of the right calf arteries due to swelling. Abnormal low resistance flow within the posterior tibial artery. No visualized occlusion.    Normal velocities and waveforms within the left leg.                < from: US Duplex Venous Lower Ext Complete, Bilateral (09.21.20 @ 17:21) >  EXAM:  US DPLX LWR EXT VEINS COMPL BI                          PROCEDURE DATE:  09/21/2020      INTERPRETATION:  CLINICAL INFORMATION: Bilateral leg ulcers and edema.    COMPARISON: Doppler venous sonogram 3/27/2019.    TECHNIQUE: Duplex sonography of the BILATERAL LOWER extremity veins with color and spectral Doppler, with and without compression.    FINDINGS:    There is normal compressibility of the bilateral common femoral, femoral and popliteal veins.  Doppler examination shows normal spontaneous and phasic flow.    No calf vein thrombosis is detected.    IMPRESSION:    No evidence of deep venous thrombosis in either lower extremity.          IMPRESSION:   61 y/o  male with hx of HTN,  Morbid Obesity, OA, PUD, Chronic/ Recurring Ulcers of the RLE with Cellulitis, s/p Ventral Hernia Repair, known [facility] in the [facility], admitted with worsening of his RLE Ulcerations with drainage and associated Cellulitis despite reported po ab rx.  Remains afebrile on current ab rx with Cefepime.  ESR and CRP are trending downwards and WBC's remain WNL.    Clinically with continued  slow improvement.      Suggestions:  Will continue current ab rx with Cefepime and plan to complete 14 days of rx if patient continues to improve.  Continue local wound care as per Surgery/ Wound Care.  Follow temps and labs.  Await Podiatry evaluation  for Toenail Care.  Discussed with patient in detail at the bedside.

## 2020-10-03 RX ADMIN — Medication 325 MILLIGRAM(S): at 11:11

## 2020-10-03 RX ADMIN — CEFEPIME 100 MILLIGRAM(S): 1 INJECTION, POWDER, FOR SOLUTION INTRAMUSCULAR; INTRAVENOUS at 14:42

## 2020-10-03 RX ADMIN — SENNA PLUS 2 TABLET(S): 8.6 TABLET ORAL at 22:04

## 2020-10-03 RX ADMIN — CARVEDILOL PHOSPHATE 3.12 MILLIGRAM(S): 80 CAPSULE, EXTENDED RELEASE ORAL at 17:17

## 2020-10-03 RX ADMIN — AMLODIPINE BESYLATE 10 MILLIGRAM(S): 2.5 TABLET ORAL at 05:28

## 2020-10-03 RX ADMIN — CEFEPIME 100 MILLIGRAM(S): 1 INJECTION, POWDER, FOR SOLUTION INTRAMUSCULAR; INTRAVENOUS at 22:04

## 2020-10-03 RX ADMIN — Medication 20 MILLIGRAM(S): at 05:28

## 2020-10-03 RX ADMIN — TAMSULOSIN HYDROCHLORIDE 0.4 MILLIGRAM(S): 0.4 CAPSULE ORAL at 22:04

## 2020-10-03 RX ADMIN — CARVEDILOL PHOSPHATE 3.12 MILLIGRAM(S): 80 CAPSULE, EXTENDED RELEASE ORAL at 05:28

## 2020-10-03 RX ADMIN — CEFEPIME 100 MILLIGRAM(S): 1 INJECTION, POWDER, FOR SOLUTION INTRAMUSCULAR; INTRAVENOUS at 05:28

## 2020-10-03 RX ADMIN — Medication 650 MILLIGRAM(S): at 11:50

## 2020-10-03 RX ADMIN — POLYETHYLENE GLYCOL 3350 17 GRAM(S): 17 POWDER, FOR SOLUTION ORAL at 05:28

## 2020-10-03 RX ADMIN — PANTOPRAZOLE SODIUM 40 MILLIGRAM(S): 20 TABLET, DELAYED RELEASE ORAL at 05:28

## 2020-10-03 RX ADMIN — FINASTERIDE 5 MILLIGRAM(S): 5 TABLET, FILM COATED ORAL at 11:11

## 2020-10-03 RX ADMIN — ENOXAPARIN SODIUM 40 MILLIGRAM(S): 100 INJECTION SUBCUTANEOUS at 11:11

## 2020-10-03 RX ADMIN — PANTOPRAZOLE SODIUM 40 MILLIGRAM(S): 20 TABLET, DELAYED RELEASE ORAL at 17:17

## 2020-10-03 RX ADMIN — Medication 650 MILLIGRAM(S): at 11:12

## 2020-10-03 RX ADMIN — Medication 650 MILLIGRAM(S): at 17:17

## 2020-10-03 RX ADMIN — Medication 1 TABLET(S): at 17:17

## 2020-10-03 RX ADMIN — Medication 1 TABLET(S): at 05:28

## 2020-10-03 RX ADMIN — Medication 650 MILLIGRAM(S): at 17:45

## 2020-10-03 NOTE — PROGRESS NOTE ADULT - SUBJECTIVE AND OBJECTIVE BOX
Afeb VSS  c/o L shoulder pain, b/l knees pain  Pt can't take NSAID hx PUD bleeding  R leg pain <   Both knees Oa deformities tender  L shoulder tender < ROM  frequency improved  night time3x  Continue IV AB  f/u CBC BMP ESR CRP on Mon  X-ray boith knees  L shoulder X-ray  BenGay patch for pain bid

## 2020-10-03 NOTE — PROGRESS NOTE ADULT - SUBJECTIVE AND OBJECTIVE BOX
TMAX - 99.2    On day # 12 Cefepime    Vital Signs Last 24 Hrs  T(C): 36.7 (03 Oct 2020 17:11), Max: 37.3 (02 Oct 2020 23:26)  T(F): 98.1 (03 Oct 2020 17:11), Max: 99.2 (02 Oct 2020 23:26)  HR: 74 (03 Oct 2020 17:11) (69 - 78)  BP: 137/83 (03 Oct 2020 17:11) (121/77 - 139/72)  BP(mean): --  RR: 18 (03 Oct 2020 17:11) (17 - 18)  SpO2: 97% (03 Oct 2020 17:11) (96% - 100%)  Supplemental O2:  on RA       Awake, alert, c/o some pain in the RLE now and pain secondary to his arthritis.  No SOB, cough, cp, or abdominal pain.   Voiding without difficulty.  Dressing to the LLE changed earlier today and reportedly with greenish-tinged serosanguineous drainage mostly on the medial aspect of the RLE ulcerated areas but with no erythema noted ( as per the RN).      PHYSICAL EXAM    General:  awake, alert, sitting up in the chair now, pleasant and cooperative, in NAD  HEENT:  conj pink, sclerae anicteric, PERRLA, no oral lesions noted  Neck:  supple, no nodes noted  Heart:  RR  Lungs:  clear bilaterally  Abdomen:  soft, obese, BS+, nontender  Back: no CVA or Spinal tenderness noted  Extremities:  LLE with no edema                       RLE with decreased erythema and edema and minimal warmth to touch - still with area of firm swelling noted at the posterior aspect of the upper calf but slightly softer than yesterday  - dressing dry and intact ove                       Onychomycoses of the Toenails with some overgrowth and jagged edges noted  Skin:  warm, dry, no rash noted         I&O's Summary :    02 Oct 2020 07:01  -  03 Oct 2020 07:00  --------------------------------------------------------  IN: 1230 mL / OUT: 1550 mL / NET: -320 mL        LABS:    CBC Full  -  ( 01 Oct 2020 08:46 )  WBC Count : 6.74 K/uL  RBC Count : 3.99 M/uL  Hemoglobin : 9.3 g/dL  Hematocrit : 30.6 %  Platelet Count - Automated : 352 K/uL  Mean Cell Volume : 76.7 fl  Mean Cell Hemoglobin : 23.3 pg  Mean Cell Hemoglobin Concentration : 30.4 gm/dL  Auto Neutrophil # : 4.31 K/uL  Auto Lymphocyte # : 1.71 K/uL  Auto Monocyte # : 0.52 K/uL  Auto Eosinophil # : 0.11 K/uL  Auto Basophil # : 0.06 K/uL  Auto Neutrophil % : 64.0 %  Auto Lymphocyte % : 25.4 %  Auto Monocyte % : 7.7 %  Auto Eosinophil % : 1.6 %  Auto Basophil % : 0.9 %    10-01    138  |  105  |  17  ----------------------------<  94  3.5   |  29  |  0.61    Ca    8.7      01 Oct 2020 08:46    Sedimentation Rate, Erythrocyte: 38 mm/hr (10-01 @ 08:46)    Sedimentation Rate, Erythrocyte: 34 mm/hr (09-28 @ 06:25)    Sedimentation Rate, Erythrocyte: 39 mm/hr (09-24 @ 06:30)    Sedimentation Rate, Erythrocyte: 50 mm/hr (09-22 @ 07:41)    Sedimentation Rate, Erythrocyte: 43 mm/hr (09-21 @ 13:38)      C-Reactive Protein, Serum (10.01.20 @ 11:19)    C-Reactive Protein, Serum: 1.37 mg/dL      C-Reactive Protein, Serum (09.28.20 @ 09:14)    C-Reactive Protein, Serum: 1.41 mg/dL      C-Reactive Protein, Serum (09.24.20 @ 09:05)    C-Reactive Protein, Serum: 2.38 mg/dL      C-Reactive Protein, Serum (09.22.20 @ 11:26)    C-Reactive Protein, Serum: 4.17 mg/dL      Prostate Ca Screen, PSA Total (09.23.20 @ 01:59)    Prostate Ca Screen, PSA Total: 0.66: Method: Roche Electrochemiluminescence Immuno Assay        MICROBIOLOGY:    Specimen Source: Skin RLE Ulcers (09-22 @ 15:12)  Culture Results:   Moderate Staphylococcus aureus (09-22 @ 15:12)  Culture - Other (09.22.20 @ 15:12)    -  Tetra/Doxy: R >8    -  Trimethoprim/Sulfamethoxazole: S 1/19    -  Vancomycin: S 2    -  Ampicillin/Sulbactam: S <=8/4    -  Cefazolin: S <=4    -  Clindamycin: R >4    -  Erythromycin: R >4    -  Gentamicin: R >8 Should not be used as monotherapy    -  Oxacillin: S <=0.25    -  Penicillin: R >8    -  RIF- Rifampin: S <=1 Should not be used as monotherapy    Specimen Source: Skin RLE Ulcers    Culture Results:   Moderate Staphylococcus aureus    Organism Identification: Staphylococcus aureus    Organism: Staphylococcus aureus    Method Type: OTONIEL    Specimen Source: .Blood Blood (09-21 @ 23:02)  Culture Results:   No growth to date. (09-21 @ 23:02)    Specimen Source: .Blood Blood (09-21 @ 23:02)  Culture Results:   No growth to date. (09-21 @ 23:02)        COVID-19  Antibody - for prior infection screening (09.22.20 @ 02:10)    COVID-19 IgG Antibody Index: <0.10: Abbott CMIA  Negative Result    <= 1.39 Index  Positive Result      >= 1.40 Index Index    COVID-19 IgG Antibody Interpretation: Negative: This test has not been reviewed by the FDA by the standard review  process.       COVID-19 PCR . (09.21.20 @ 14:45)    COVID-19 PCR: NotDetec: This test has been validated by [employer] to be accurate;      Culture - Other (08.20.20 @ 18:08)    -  Trimethoprim/Sulfamethoxazole: S <=0.5/9.5    -  Vancomycin: S 1    -  Amikacin: S <=16    -  Aztreonam: S 8    -  Ampicillin/Sulbactam: S <=8/4    -  Cefazolin: S <=4    -  Cefepime: S 8    -  Ceftazidime: S 4    -  Ciprofloxacin: S <=0.25    -  Clindamycin: S <=0.25    -  Erythromycin: S <=0.25    -  Gentamicin: R >8 Should not be used as monotherapy    -  Gentamicin: S 4    -  Imipenem: S 2    -  Levofloxacin: S <=0.5    -  Meropenem: S <=1    -  Oxacillin: S 0.5    -  Penicillin: R >8    -  Piperacillin/Tazobactam: S <=8    -  RIF- Rifampin: S <=1 Should not be used as monotherapy    -  Tetra/Doxy: R >8    -  Tobramycin: S <=2    Specimen Source: .Other RIGHT LOWER LEG ULCER    Culture Results:   Culture yields growth of greater than 3 colony types of  bacteria,  which may indicate contamination and normal maureen  Call client services within 7 days if further workup is clinically  indicated. Culture includes  Few Staphylococcus aureus  Moderate Pseudomonas aeruginosa    Organism Identification: Staphylococcus aureus  Pseudomonas aeruginosa    Organism: Staphylococcus aureus    Organism: Pseudomonas aeruginosa    Method Type: OTONIEL    Method Type: OTONIEL            RADIOLOGY:    < from: US Renal (09.23.20 @ 10:49) >  EXAM:  US PELVIC LIMITED OR FOLLOW UP                          EXAM:  US KIDNEY(S)                          PROCEDURE DATE:  09/23/2020      INTERPRETATION:  CLINICAL INFORMATION: Hematuria, urinary frequency    COMPARISON: CT abdomen pelvis 2/26/2019    TECHNIQUE: Sonography of the kidneys and bladder. Transabdominal ultrasound the pelvis was also performed.    FINDINGS:    Right kidney: 11 cm. No renal mass, hydronephrosis or calculi.    Left kidney: 11 cm. No renal mass, hydronephrosisor calculi.    Urinary bladder: Bladder was mildly distended with prevoid volume of 228 cc. Mild circumferential thickening of the bladder wall versus secondary to underdistention. Bilateral ureteral jets are identified. No bladder mass. No significant post void residual.    Prostate measures 4.0 x 2.9 x 2.9 cm.    Incidental note of hepatic steatosis.    IMPRESSION:    No hydronephrosis. No significant post void residual.          < from: US Duplex Arterial Lower Ext Compl, Bilateral (09.22.20 @ 10:30) >    EXAM:  US DPLX LWR EXT ARTS COMPL BI                          PROCEDURE DATE:  09/22/2020      INTERPRETATION:  HISTORY: Peripheral arterial disease, leg ulcer.    Bilateral lower extremity arterial Doppler was performed using grayscale, color and spectral Doppler. There are no prior studies available for comparison.    Findings:    RIGHT: There is triphasic flow within the right external iliac, common femoral, deep femoral, superficial femoral and popliteal arteries. Abnormal low resistance flow within the posterior tibial artery. Peroneal and anterior tibial arteries could not be visualized due to swelling.  Peak systolic velocities are as follows (in CM/sec):  EIA: 127  CFA: 139  DFA: 77  SFA: (Proximal, mid, distal): 135, 122, 94  Popliteal: 73  PTA: 52, 94, 110  Peroneal: Not visualized  AAMIR: Not visualized  DPA: 58      LEFT: There is triphasic flow throughout the left lower extremity.  Peak systolic velocities are as follows (in CM/sec):  EIA: 98  CFA: 89  DFA: 71  SFA: (Proximal, mid, distal): 78, 75, 83  Popliteal: 58  PTA: 50, 62, 76  Peroneal: 63, 56, 45  AAMIR: 57, 40, 59  DPA: 74    IMPRESSION:    Limited visualization of the right calf arteries due to swelling. Abnormal low resistance flow within the posterior tibial artery. No visualized occlusion.    Normal velocities and waveforms within the left leg.                < from: US Duplex Venous Lower Ext Complete, Bilateral (09.21.20 @ 17:21) >  EXAM:  US DPLX LWR EXT VEINS COMPL BI                          PROCEDURE DATE:  09/21/2020      INTERPRETATION:  CLINICAL INFORMATION: Bilateral leg ulcers and edema.    COMPARISON: Doppler venous sonogram 3/27/2019.    TECHNIQUE: Duplex sonography of the BILATERAL LOWER extremity veins with color and spectral Doppler, with and without compression.    FINDINGS:    There is normal compressibility of the bilateral common femoral, femoral and popliteal veins.  Doppler examination shows normal spontaneous and phasic flow.    No calf vein thrombosis is detected.    IMPRESSION:    No evidence of deep venous thrombosis in either lower extremity.          IMPRESSION:   59 y/o  male with hx of HTN,  Morbid Obesity, OA, PUD, Chronic/ Recurring Ulcers of the RLE with Cellulitis, s/p Ventral Hernia Repair, known patient in [facility], admitted with worsening of his RLE Ulcerations with drainage and associated Cellulitis despite reported po ab rx.  Remains afebrile on current ab rx with Cefepime.  ESR and CRP are trending downwards and WBC's remain WNL.    Clinically with continued  slow improvement.      Suggestions:  Will continue current ab rx with Cefepime and plan to complete 14 days of rx if patient continues to improve.  Continue local wound care as per Surgery/ Wound Care.  Follow temps and labs.  Await Podiatry evaluation  for Toenail Care.  Discussed with patient in detail at the bedside.

## 2020-10-04 PROCEDURE — 73562 X-RAY EXAM OF KNEE 3: CPT | Mod: 26,50

## 2020-10-04 PROCEDURE — 73030 X-RAY EXAM OF SHOULDER: CPT | Mod: 26,LT

## 2020-10-04 RX ADMIN — Medication 1 TABLET(S): at 05:56

## 2020-10-04 RX ADMIN — Medication 1 TABLET(S): at 17:09

## 2020-10-04 RX ADMIN — Medication 325 MILLIGRAM(S): at 11:19

## 2020-10-04 RX ADMIN — ENOXAPARIN SODIUM 40 MILLIGRAM(S): 100 INJECTION SUBCUTANEOUS at 11:19

## 2020-10-04 RX ADMIN — CARVEDILOL PHOSPHATE 3.12 MILLIGRAM(S): 80 CAPSULE, EXTENDED RELEASE ORAL at 05:56

## 2020-10-04 RX ADMIN — Medication 20 MILLIGRAM(S): at 05:56

## 2020-10-04 RX ADMIN — CEFEPIME 100 MILLIGRAM(S): 1 INJECTION, POWDER, FOR SOLUTION INTRAMUSCULAR; INTRAVENOUS at 14:16

## 2020-10-04 RX ADMIN — POLYETHYLENE GLYCOL 3350 17 GRAM(S): 17 POWDER, FOR SOLUTION ORAL at 05:57

## 2020-10-04 RX ADMIN — SENNA PLUS 2 TABLET(S): 8.6 TABLET ORAL at 21:58

## 2020-10-04 RX ADMIN — PANTOPRAZOLE SODIUM 40 MILLIGRAM(S): 20 TABLET, DELAYED RELEASE ORAL at 05:56

## 2020-10-04 RX ADMIN — TAMSULOSIN HYDROCHLORIDE 0.4 MILLIGRAM(S): 0.4 CAPSULE ORAL at 21:58

## 2020-10-04 RX ADMIN — CARVEDILOL PHOSPHATE 3.12 MILLIGRAM(S): 80 CAPSULE, EXTENDED RELEASE ORAL at 17:09

## 2020-10-04 RX ADMIN — POLYETHYLENE GLYCOL 3350 17 GRAM(S): 17 POWDER, FOR SOLUTION ORAL at 17:09

## 2020-10-04 RX ADMIN — CEFEPIME 100 MILLIGRAM(S): 1 INJECTION, POWDER, FOR SOLUTION INTRAMUSCULAR; INTRAVENOUS at 05:56

## 2020-10-04 RX ADMIN — CEFEPIME 100 MILLIGRAM(S): 1 INJECTION, POWDER, FOR SOLUTION INTRAMUSCULAR; INTRAVENOUS at 21:58

## 2020-10-04 RX ADMIN — PANTOPRAZOLE SODIUM 40 MILLIGRAM(S): 20 TABLET, DELAYED RELEASE ORAL at 17:09

## 2020-10-04 RX ADMIN — AMLODIPINE BESYLATE 10 MILLIGRAM(S): 2.5 TABLET ORAL at 05:56

## 2020-10-04 RX ADMIN — FINASTERIDE 5 MILLIGRAM(S): 5 TABLET, FILM COATED ORAL at 11:19

## 2020-10-04 NOTE — PROGRESS NOTE ADULT - SUBJECTIVE AND OBJECTIVE BOX
VSS Afebrile   13th days of AB Rx    b/l x-ray of the knees revealed severe OA and b/l tibial plateau chronic FX  Discussed with orthopedic resident   PT will be evalauted

## 2020-10-05 DIAGNOSIS — M21.611 BUNION OF RIGHT FOOT: ICD-10-CM

## 2020-10-05 DIAGNOSIS — B35.1 TINEA UNGUIUM: ICD-10-CM

## 2020-10-05 DIAGNOSIS — M21.612 BUNION OF LEFT FOOT: ICD-10-CM

## 2020-10-05 LAB
ANION GAP SERPL CALC-SCNC: 7 MMOL/L — SIGNIFICANT CHANGE UP (ref 5–17)
BASOPHILS # BLD AUTO: 0.06 K/UL — SIGNIFICANT CHANGE UP (ref 0–0.2)
BASOPHILS NFR BLD AUTO: 0.8 % — SIGNIFICANT CHANGE UP (ref 0–2)
BUN SERPL-MCNC: 17 MG/DL — SIGNIFICANT CHANGE UP (ref 7–23)
CALCIUM SERPL-MCNC: 8.6 MG/DL — SIGNIFICANT CHANGE UP (ref 8.5–10.1)
CHLORIDE SERPL-SCNC: 104 MMOL/L — SIGNIFICANT CHANGE UP (ref 96–108)
CO2 SERPL-SCNC: 27 MMOL/L — SIGNIFICANT CHANGE UP (ref 22–31)
CREAT SERPL-MCNC: 0.75 MG/DL — SIGNIFICANT CHANGE UP (ref 0.5–1.3)
CRP SERPL-MCNC: 1.81 MG/DL — HIGH (ref 0–0.4)
EOSINOPHIL # BLD AUTO: 0.19 K/UL — SIGNIFICANT CHANGE UP (ref 0–0.5)
EOSINOPHIL NFR BLD AUTO: 2.7 % — SIGNIFICANT CHANGE UP (ref 0–6)
ERYTHROCYTE [SEDIMENTATION RATE] IN BLOOD: 33 MM/HR — HIGH (ref 0–20)
GLUCOSE SERPL-MCNC: 84 MG/DL — SIGNIFICANT CHANGE UP (ref 70–99)
HCT VFR BLD CALC: 32.7 % — LOW (ref 39–50)
HGB BLD-MCNC: 9.7 G/DL — LOW (ref 13–17)
IMM GRANULOCYTES NFR BLD AUTO: 0.3 % — SIGNIFICANT CHANGE UP (ref 0–1.5)
LYMPHOCYTES # BLD AUTO: 1.92 K/UL — SIGNIFICANT CHANGE UP (ref 1–3.3)
LYMPHOCYTES # BLD AUTO: 26.8 % — SIGNIFICANT CHANGE UP (ref 13–44)
MCHC RBC-ENTMCNC: 23 PG — LOW (ref 27–34)
MCHC RBC-ENTMCNC: 29.7 GM/DL — LOW (ref 32–36)
MCV RBC AUTO: 77.7 FL — LOW (ref 80–100)
MONOCYTES # BLD AUTO: 0.58 K/UL — SIGNIFICANT CHANGE UP (ref 0–0.9)
MONOCYTES NFR BLD AUTO: 8.1 % — SIGNIFICANT CHANGE UP (ref 2–14)
NEUTROPHILS # BLD AUTO: 4.39 K/UL — SIGNIFICANT CHANGE UP (ref 1.8–7.4)
NEUTROPHILS NFR BLD AUTO: 61.3 % — SIGNIFICANT CHANGE UP (ref 43–77)
NRBC # BLD: 0 /100 WBCS — SIGNIFICANT CHANGE UP (ref 0–0)
PLATELET # BLD AUTO: 333 K/UL — SIGNIFICANT CHANGE UP (ref 150–400)
POTASSIUM SERPL-MCNC: 4 MMOL/L — SIGNIFICANT CHANGE UP (ref 3.5–5.3)
POTASSIUM SERPL-SCNC: 4 MMOL/L — SIGNIFICANT CHANGE UP (ref 3.5–5.3)
RBC # BLD: 4.21 M/UL — SIGNIFICANT CHANGE UP (ref 4.2–5.8)
RBC # FLD: 18.8 % — HIGH (ref 10.3–14.5)
SODIUM SERPL-SCNC: 138 MMOL/L — SIGNIFICANT CHANGE UP (ref 135–145)
WBC # BLD: 7.16 K/UL — SIGNIFICANT CHANGE UP (ref 3.8–10.5)
WBC # FLD AUTO: 7.16 K/UL — SIGNIFICANT CHANGE UP (ref 3.8–10.5)

## 2020-10-05 RX ORDER — CEFEPIME 1 G/1
2000 INJECTION, POWDER, FOR SOLUTION INTRAMUSCULAR; INTRAVENOUS ONCE
Refills: 0 | Status: COMPLETED | OUTPATIENT
Start: 2020-10-05 | End: 2020-10-05

## 2020-10-05 RX ADMIN — Medication 650 MILLIGRAM(S): at 15:33

## 2020-10-05 RX ADMIN — FINASTERIDE 5 MILLIGRAM(S): 5 TABLET, FILM COATED ORAL at 11:55

## 2020-10-05 RX ADMIN — Medication 20 MILLIGRAM(S): at 05:43

## 2020-10-05 RX ADMIN — POLYETHYLENE GLYCOL 3350 17 GRAM(S): 17 POWDER, FOR SOLUTION ORAL at 17:12

## 2020-10-05 RX ADMIN — CEFEPIME 100 MILLIGRAM(S): 1 INJECTION, POWDER, FOR SOLUTION INTRAMUSCULAR; INTRAVENOUS at 13:23

## 2020-10-05 RX ADMIN — Medication 650 MILLIGRAM(S): at 22:02

## 2020-10-05 RX ADMIN — CARVEDILOL PHOSPHATE 3.12 MILLIGRAM(S): 80 CAPSULE, EXTENDED RELEASE ORAL at 17:12

## 2020-10-05 RX ADMIN — CEFEPIME 100 MILLIGRAM(S): 1 INJECTION, POWDER, FOR SOLUTION INTRAMUSCULAR; INTRAVENOUS at 22:00

## 2020-10-05 RX ADMIN — SENNA PLUS 2 TABLET(S): 8.6 TABLET ORAL at 22:02

## 2020-10-05 RX ADMIN — Medication 650 MILLIGRAM(S): at 14:33

## 2020-10-05 RX ADMIN — Medication 325 MILLIGRAM(S): at 11:55

## 2020-10-05 RX ADMIN — Medication 1 TABLET(S): at 05:43

## 2020-10-05 RX ADMIN — CEFEPIME 100 MILLIGRAM(S): 1 INJECTION, POWDER, FOR SOLUTION INTRAMUSCULAR; INTRAVENOUS at 05:44

## 2020-10-05 RX ADMIN — Medication 650 MILLIGRAM(S): at 23:02

## 2020-10-05 RX ADMIN — POLYETHYLENE GLYCOL 3350 17 GRAM(S): 17 POWDER, FOR SOLUTION ORAL at 05:43

## 2020-10-05 RX ADMIN — CARVEDILOL PHOSPHATE 3.12 MILLIGRAM(S): 80 CAPSULE, EXTENDED RELEASE ORAL at 05:43

## 2020-10-05 RX ADMIN — ENOXAPARIN SODIUM 40 MILLIGRAM(S): 100 INJECTION SUBCUTANEOUS at 11:55

## 2020-10-05 RX ADMIN — PANTOPRAZOLE SODIUM 40 MILLIGRAM(S): 20 TABLET, DELAYED RELEASE ORAL at 05:43

## 2020-10-05 RX ADMIN — PANTOPRAZOLE SODIUM 40 MILLIGRAM(S): 20 TABLET, DELAYED RELEASE ORAL at 17:12

## 2020-10-05 RX ADMIN — TAMSULOSIN HYDROCHLORIDE 0.4 MILLIGRAM(S): 0.4 CAPSULE ORAL at 22:02

## 2020-10-05 RX ADMIN — AMLODIPINE BESYLATE 10 MILLIGRAM(S): 2.5 TABLET ORAL at 05:43

## 2020-10-05 RX ADMIN — Medication 1 TABLET(S): at 17:12

## 2020-10-05 NOTE — CONSULT NOTE ADULT - SUBJECTIVE AND OBJECTIVE BOX
60y Male with history of bilateral knee pain for many years, states over 3 years of chronic knee pain. Patient denies any trauma or falls. Pt states he has been an ambulator with crutches for about 1 year due to his knee pain. He states he has always been morbidly obese, with some recent weight loss. Denies numbness/tingling in bilateral knees. Denies fever/chills. Denies pain/injury elsewhere.   Pt states he is able to ambulate with weight bearing on both knees with crutches. He was admitted for  RLE chronic wound on his R shin that is being managed by wound care.       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    Abdominal hernia    H/O ventral hernia repair      MEDICATIONS  (STANDING):  amLODIPine   Tablet 10 milliGRAM(s) Oral daily  carvedilol Oral Tab/Cap - Peds 3.125 milliGRAM(s) Oral two times a day  cefepime   IVPB 2000 milliGRAM(s) IV Intermittent every 8 hours  cefepime   IVPB      enalapril 20 milliGRAM(s) Oral daily  enoxaparin Injectable 40 milliGRAM(s) SubCutaneous daily  ferrous    sulfate 325 milliGRAM(s) Oral daily  finasteride 5 milliGRAM(s) Oral daily  furosemide    Tablet 20 milliGRAM(s) Oral daily  influenza   Vaccine 0.5 milliLiter(s) IntraMuscular once  lactobacillus acidophilus 1 Tablet(s) Oral two times a day  pantoprazole    Tablet 40 milliGRAM(s) Oral two times a day  polyethylene glycol 3350 17 Gram(s) Oral two times a day  senna 2 Tablet(s) Oral at bedtime  tamsulosin 0.4 milliGRAM(s) Oral at bedtime    Allergies    No Known Allergies    Intolerances                Vital Signs Last 24 Hrs  T(C): 36.7 (10-04-20 @ 23:19), Max: 36.8 (10-04-20 @ 04:51)  T(F): 98.1 (10-04-20 @ 23:19), Max: 98.3 (10-04-20 @ 04:51)  HR: 66 (10-04-20 @ 23:19) (65 - 73)  BP: 139/90 (10-04-20 @ 23:19) (101/65 - 139/90)  BP(mean): --  RR: 17 (10-04-20 @ 23:19) (17 - 18)  SpO2: 100% (10-04-20 @ 23:19) (99% - 100%)    Imaging: XR demonstrates Right or Left** Knee OA    Physical Exam  Gen: NAD  Right LE: skin intact over knee, wound over R shin. No erythema involving R knee. Knee ROM limited and painful. flexion contracture, unable to full extend. Crepitus with ROM. Varus deformity noted. positive Effusion. Able to SLR, Neg log roll, Negative Heel strike, no ttp elsewhere, +EHL/FHL/TA/GS function, DP/PT pulses palpable, compartments soft. Calf soft, nontender.     Left LE: skin intact over knee. No erythema involving L knee. Knee ROM limited and painful. flexion contracture, unable to full extend. Crepitus with ROM. Varus deformity noted. positive Effusion. Able to SLR, Neg log roll, Negative Heel strike, no ttp elsewhere, +EHL/FHL/TA/GS function, DP/PT pulses palpable, compartments soft. Calf soft, nontender.     Patient able to ambulate with crutches on examination, no pain noted with weight bearing    XR B/l Knees:  Severe tricompartmental osteoarthritis. Presence of bilateral chronic medial plateau fxs, area of well corticated medial plateaus, healed    A/P: 60y Male with bilateral knee osteoarthritis with chronic medial plateau fractures. Patients bilateral knee pain and deformities are related to his severe osteoarthritis. Patient currently able to bear weight on both knees with ambulation with assistive devices. Chronic nature of medial tibial plateau fractures that are now radiographically healed and patient able to bear weight without any trauma not concerning for acute fracture. Pt would likely benefit from surgical intervention in the future for his severe osteoarthritis. However discussed with patient due to presence of current active infection involving his R lower extremity and multiple patient comorbities patient is not  a good surgical candidate at this time. Pt demonstrated understanding     Analgesia  Antiinflammatories as tolerated  WBAT, rolling walker, PT, assistive devices with ambulation as needed  ICE/Cryotherapy  Recommend weight loss program  DVT PE ppx per primary team  ABx for active wound treatment  Medical treatment per primary team  Wound care  Outpt orthopedist follow   No acute surgical intervention at this time  Orthopedically stable for DC  Will discuss with Dr. Newman and advise further if plan changes

## 2020-10-05 NOTE — CONSULT NOTE ADULT - PROBLEM SELECTOR RECOMMENDATION 9
Nails debrided 1-10 with application of betadine solution  Needs podiatric care every 2 months normal

## 2020-10-05 NOTE — CONSULT NOTE ADULT - SUBJECTIVE AND OBJECTIVE BOX
HPI:  · Chief Complaint: The patient is a 60y Male complaining of wound check.  · HPI Objective Statement: 60M PMHx htn with chronic RLE wound. patient had home [facility] for wound care for a long time but sionce covid has lost home nursing. wound has been getting worse- it developed a blistered and then popped. no fevers. no traumna. saw his wound care Dr - Dr. Chavarria, who sent hi to ED for eval.  Pt state that problem exists 2.5 years , it almost healed but later he had R leg trauma , about 6 months ago and problem started again, Rx c Dr Chavarria. Ulceration and cellulitis got worse  R leg edema stable    HIV:   HIV Test Questions:  · In accordance with [place] State law, we offer every patient who comes to our ED an HIV test. Would you like to be tested today?	Opt out  Complaining of painful neglected fungal nails  PAST MEDICAL/SURGICAL/FAMILY/SOCIAL HISTORY:   Past Medical History:  Duodenal ulcer disease    HTN (hypertension)    Hypertension    Hypertension    Knee osteoarthritis    Morbid obesity    Osteoarthritis    PUD (peptic ulcer disease).  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    Abdominal hernia    H/O ventral hernia repair          MEDICATIONS  (STANDING):  amLODIPine   Tablet 10 milliGRAM(s) Oral daily  carvedilol Oral Tab/Cap - Peds 3.125 milliGRAM(s) Oral two times a day  cefepime   IVPB 2000 milliGRAM(s) IV Intermittent every 8 hours  cefepime   IVPB      enalapril 20 milliGRAM(s) Oral daily  enoxaparin Injectable 40 milliGRAM(s) SubCutaneous daily  ferrous    sulfate 325 milliGRAM(s) Oral daily  finasteride 5 milliGRAM(s) Oral daily  furosemide    Tablet 20 milliGRAM(s) Oral daily  influenza   Vaccine 0.5 milliLiter(s) IntraMuscular once  lactobacillus acidophilus 1 Tablet(s) Oral two times a day  pantoprazole    Tablet 40 milliGRAM(s) Oral two times a day  polyethylene glycol 3350 17 Gram(s) Oral two times a day  senna 2 Tablet(s) Oral at bedtime  tamsulosin 0.4 milliGRAM(s) Oral at bedtime    MEDICATIONS  (PRN):  acetaminophen   Tablet .. 650 milliGRAM(s) Oral every 6 hours PRN Temp greater or equal to 38C (100.4F), Mild Pain (1 - 3)  bisacodyl Suppository 10 milliGRAM(s) Rectal daily PRN Constipation      Allergies    No Known Allergies    Intolerances        SOCIAL HISTORY:Smoking history  Alcohol history  Drug history    FAMILY HISTORY:  No pertinent family history in first degree relatives    No pertinent family history in first degree relatives        Vital Signs Last 24 Hrs  T(C): 36.7 (05 Oct 2020 13:06), Max: 36.8 (04 Oct 2020 17:30)  T(F): 98.1 (05 Oct 2020 13:06), Max: 98.3 (04 Oct 2020 17:30)  HR: 73 (05 Oct 2020 13:06) (65 - 73)  BP: 116/72 (05 Oct 2020 13:06) (116/72 - 139/90)  BP(mean): --  RR: 18 (05 Oct 2020 13:06) (17 - 18)  SpO2: 98% (05 Oct 2020 13:06) (98% - 100%)    PHYSICAL EXAM:    GENERAL:Obese orientated  NERVOUS SYSTEM:  Alert & Oriented X3, Good concentration;  Motor Strength 3/5   B/L upper and lower extremities;   DTRs 2+ intact and symmetric   Sensorium absent  EXTREMITIES:  2+ Peripheral Pulses,   No clubbing, cyanosis,    edema   Vascularity   Skin appearance hair absent   LYMPH:  lymphadenopathy noted  ORTHOPEDIC: foot deformities: bunions  ULCER site anterior right leg.(being treated by Dr Chavarria)  Area of CC:   Nails long thick with subungual debris                         9.7    7.16  )-----------( 333      ( 05 Oct 2020 08:02 )             32.7     10-05    138  |  104  |  17  ----------------------------<  84  4.0   |  27  |  0.75    Ca    8.6      05 Oct 2020 08:02                RADIOLOGY & ADDITIONAL STUDIES:

## 2020-10-05 NOTE — PROGRESS NOTE ADULT - SUBJECTIVE AND OBJECTIVE BOX
14th day of IV AB RX  X-ray knees b/l severe OA, b/l tibial plateau chronic Fx, healed  discussed c ortho  L shoulder - severe OA , will need PT  ESr <33  WBC nl  Clinically improving  ? d/c oral AB Rx

## 2020-10-05 NOTE — PROGRESS NOTE ADULT - SUBJECTIVE AND OBJECTIVE BOX
TMAX - 98.3    On day # 14 Cefepime    Vital Signs Last 24 Hrs  T(C): 36.7 (05 Oct 2020 13:06), Max: 36.8 (04 Oct 2020 17:30)  T(F): 98.1 (05 Oct 2020 13:06), Max: 98.3 (04 Oct 2020 17:30)  HR: 73 (05 Oct 2020 13:06) (65 - 73)  BP: 116/72 (05 Oct 2020 13:06) (116/72 - 139/90)  BP(mean): --  RR: 18 (05 Oct 2020 13:06) (17 - 18)  SpO2: 98% (05 Oct 2020 13:06) (98% - 100%)  Supplemental O2:  on RA       Awake, alert, still with some pain in the RLE as well as arthritic pains in both knees.  Seen by Podiatry and toenail care done today and evaluated by Ortho for his knee pain - XRays of the Knees and Lt. Shoulder done and results noted.  No c/o SOB, cp, abdominal pain or N/V/D.        PHYSICAL EXAM    General:  awake, alert, sitting up in the chair now, pleasant and cooperative, in NAD  HEENT:  conj pink, sclerae anicteric, PERRLA, no oral lesions noted  Neck:  supple, no nodes noted  Heart:  RR  Lungs:  clear bilaterally  Abdomen:  soft, obese, BS+, nontender  Back: no CVA or Spinal tenderness noted  Extremities:  LLE with no edema                       RLE with no erythema noted now and no warmth to touch - area of firm swelling noted at the posterior aspect of the upper calf slowly improving  - dressing dry and intact over the RLE ulcerated area                      S/p Toenail care today  Skin:  warm, dry, no rash noted         I&O's Summary:        LABS:  CBC Full  -  ( 05 Oct 2020 08:02 )  WBC Count : 7.16 K/uL  RBC Count : 4.21 M/uL  Hemoglobin : 9.7 g/dL  Hematocrit : 32.7 %  Platelet Count - Automated : 333 K/uL  Mean Cell Volume : 77.7 fl  Mean Cell Hemoglobin : 23.0 pg  Mean Cell Hemoglobin Concentration : 29.7 gm/dL  Auto Neutrophil # : 4.39 K/uL  Auto Lymphocyte # : 1.92 K/uL  Auto Monocyte # : 0.58 K/uL  Auto Eosinophil # : 0.19 K/uL  Auto Basophil # : 0.06 K/uL  Auto Neutrophil % : 61.3 %  Auto Lymphocyte % : 26.8 %  Auto Monocyte % : 8.1 %  Auto Eosinophil % : 2.7 %  Auto Basophil % : 0.8 %    10-05    138  |  104  |  17  ----------------------------<  84  4.0   |  27  |  0.75    Ca    8.6      05 Oct 2020 08:02    Sedimentation Rate, Erythrocyte: 33 mm/hr (10-05 @ 08:02)    Sedimentation Rate, Erythrocyte: 38 mm/hr (10-01 @ 08:46)    Sedimentation Rate, Erythrocyte: 34 mm/hr (09-28 @ 06:25)    Sedimentation Rate, Erythrocyte: 39 mm/hr (09-24 @ 06:30)    Sedimentation Rate, Erythrocyte: 50 mm/hr (09-22 @ 07:41)    Sedimentation Rate, Erythrocyte: 43 mm/hr (09-21 @ 13:38)      C-Reactive Protein, Serum (10.05.20 @ 10:37)    C-Reactive Protein, Serum: 1.81 mg/dL      C-Reactive Protein, Serum (10.01.20 @ 11:19)    C-Reactive Protein, Serum: 1.37 mg/dL      C-Reactive Protein, Serum (09.28.20 @ 09:14)    C-Reactive Protein, Serum: 1.41 mg/dL      C-Reactive Protein, Serum (09.24.20 @ 09:05)    C-Reactive Protein, Serum: 2.38 mg/dL      C-Reactive Protein, Serum (09.22.20 @ 11:26)    C-Reactive Protein, Serum: 4.17 mg/dL      Prostate Ca Screen, PSA Total (09.23.20 @ 01:59)    Prostate Ca Screen, PSA Total: 0.66: Method: Roche Electrochemiluminescence Immuno Assay        MICROBIOLOGY:    Specimen Source: Skin RLE Ulcers (09-22 @ 15:12)  Culture Results:   Moderate Staphylococcus aureus (09-22 @ 15:12)  Culture - Other (09.22.20 @ 15:12)    -  Tetra/Doxy: R >8    -  Trimethoprim/Sulfamethoxazole: S 1/19    -  Vancomycin: S 2    -  Ampicillin/Sulbactam: S <=8/4    -  Cefazolin: S <=4    -  Clindamycin: R >4    -  Erythromycin: R >4    -  Gentamicin: R >8 Should not be used as monotherapy    -  Oxacillin: S <=0.25    -  Penicillin: R >8    -  RIF- Rifampin: S <=1 Should not be used as monotherapy    Specimen Source: Skin RLE Ulcers    Culture Results:   Moderate Staphylococcus aureus    Organism Identification: Staphylococcus aureus    Organism: Staphylococcus aureus    Method Type: OTONIEL    Specimen Source: .Blood Blood (09-21 @ 23:02)  Culture Results:   No growth to date. (09-21 @ 23:02)    Specimen Source: .Blood Blood (09-21 @ 23:02)  Culture Results:   No growth to date. (09-21 @ 23:02)        COVID-19  Antibody - for prior infection screening (09.22.20 @ 02:10)    COVID-19 IgG Antibody Index: <0.10: Abbott CMIA  Negative Result    <= 1.39 Index  Positive Result      >= 1.40 Index Index    COVID-19 IgG Antibody Interpretation: Negative: This test has not been reviewed by the FDA by the standard review  process.       COVID-19 PCR . (09.21.20 @ 14:45)    COVID-19 PCR: NotDetec: This test has been validated by [employer] to be accurate;      Culture - Other (08.20.20 @ 18:08)    -  Trimethoprim/Sulfamethoxazole: S <=0.5/9.5    -  Vancomycin: S 1    -  Amikacin: S <=16    -  Aztreonam: S 8    -  Ampicillin/Sulbactam: S <=8/4    -  Cefazolin: S <=4    -  Cefepime: S 8    -  Ceftazidime: S 4    -  Ciprofloxacin: S <=0.25    -  Clindamycin: S <=0.25    -  Erythromycin: S <=0.25    -  Gentamicin: R >8 Should not be used as monotherapy    -  Gentamicin: S 4    -  Imipenem: S 2    -  Levofloxacin: S <=0.5    -  Meropenem: S <=1    -  Oxacillin: S 0.5    -  Penicillin: R >8    -  Piperacillin/Tazobactam: S <=8    -  RIF- Rifampin: S <=1 Should not be used as monotherapy    -  Tetra/Doxy: R >8    -  Tobramycin: S <=2    Specimen Source: .Other RIGHT LOWER LEG ULCER    Culture Results:   Culture yields growth of greater than 3 colony types of  bacteria,  which may indicate contamination and normal maureen  Call client services within 7 days if further workup is clinically  indicated. Culture includes  Few Staphylococcus aureus  Moderate Pseudomonas aeruginosa    Organism Identification: Staphylococcus aureus  Pseudomonas aeruginosa    Organism: Staphylococcus aureus    Organism: Pseudomonas aeruginosa    Method Type: OTONIEL    Method Type: OTONIEL            RADIOLOGY:             < from: Xray Knee 3 Views, Bilateral (10.04.20 @ 08:58) >    EXAM:  KNEE 3 VIEWS BI                          PROCEDURE DATE:  10/04/2020      INTERPRETATION:  Clinical Information: Bilateral knee pain.    Technique: 3 views right knee. 3 views left knee.    Comparison: 2017    Findings/  Impression: Severe tricompartmental osteoarthritic changes bilaterally, significant worsened compared to prior. There is evidence of bilateral chronic medial tibial plateau fractur                                 < from: Xray Shoulder 2 Views, Left (10.04.20 @ 08:56) >    EXAM:  SHOULDER COMP MIN 2VW LT                          PROCEDURE DATE:  10/04/2020      INTERPRETATION:  Left shoulder. 2 views. Patient has local pain and cellulitis.    There is advanced shoulder degeneration at the glenohumeral joint andmild spurring around the greater tuberosity.    There is an old fracture deformity of the distal two thirds shaft of the left clavicle.    No acute fractures are seen.    IMPRESSION: No acute fracture. Advanced shoulder degeneration and old fracture deformity left clavicle noted.        < from: US Renal (09.23.20 @ 10:49) >  EXAM:  US PELVIC LIMITED OR FOLLOW UP                          EXAM:  US KIDNEY(S)                          PROCEDURE DATE:  09/23/2020      INTERPRETATION:  CLINICAL INFORMATION: Hematuria, urinary frequency    COMPARISON: CT abdomen pelvis 2/26/2019    TECHNIQUE: Sonography of the kidneys and bladder. Transabdominal ultrasound the pelvis was also performed.    FINDINGS:    Right kidney: 11 cm. No renal mass, hydronephrosis or calculi.    Left kidney: 11 cm. No renal mass, hydronephrosisor calculi.    Urinary bladder: Bladder was mildly distended with prevoid volume of 228 cc. Mild circumferential thickening of the bladder wall versus secondary to underdistention. Bilateral ureteral jets are identified. No bladder mass. No significant post void residual.    Prostate measures 4.0 x 2.9 x 2.9 cm.    Incidental note of hepatic steatosis.    IMPRESSION:    No hydronephrosis. No significant post void residual.          < from: US Duplex Arterial Lower Ext Compl, Bilateral (09.22.20 @ 10:30) >    EXAM:  US DPLX LWR EXT ARTS COMPL BI                          PROCEDURE DATE:  09/22/2020      INTERPRETATION:  HISTORY: Peripheral arterial disease, leg ulcer.    Bilateral lower extremity arterial Doppler was performed using grayscale, color and spectral Doppler. There are no prior studies available for comparison.    Findings:    RIGHT: There is triphasic flow within the right external iliac, common femoral, deep femoral, superficial femoral and popliteal arteries. Abnormal low resistance flow within the posterior tibial artery. Peroneal and anterior tibial arteries could not be visualized due to swelling.  Peak systolic velocities are as follows (in CM/sec):  EIA: 127  CFA: 139  DFA: 77  SFA: (Proximal, mid, distal): 135, 122, 94  Popliteal: 73  PTA: 52, 94, 110  Peroneal: Not visualized  AAMIR: Not visualized  DPA: 58      LEFT: There is triphasic flow throughout the left lower extremity.  Peak systolic velocities are as follows (in CM/sec):  EIA: 98  CFA: 89  DFA: 71  SFA: (Proximal, mid, distal): 78, 75, 83  Popliteal: 58  PTA: 50, 62, 76  Peroneal: 63, 56, 45  AAMIR: 57, 40, 59  DPA: 74    IMPRESSION:    Limited visualization of the right calf arteries due to swelling. Abnormal low resistance flow within the posterior tibial artery. No visualized occlusion.    Normal velocities and waveforms within the left leg.                < from: US Duplex Venous Lower Ext Complete, Bilateral (09.21.20 @ 17:21) >  EXAM:  US DPLX LWR EXT VEINS COMPL BI                          PROCEDURE DATE:  09/21/2020      INTERPRETATION:  CLINICAL INFORMATION: Bilateral leg ulcers and edema.    COMPARISON: Doppler venous sonogram 3/27/2019.    TECHNIQUE: Duplex sonography of the BILATERAL LOWER extremity veins with color and spectral Doppler, with and without compression.    FINDINGS:    There is normal compressibility of the bilateral common femoral, femoral and popliteal veins.  Doppler examination shows normal spontaneous and phasic flow.    No calf vein thrombosis is detected.    IMPRESSION:    No evidence of deep venous thrombosis in either lower extremity.          IMPRESSION:   61 y/o  male with hx of HTN,  Morbid Obesity, OA, PUD, Chronic/ Recurring Ulcers of the RLE with Cellulitis, s/p Ventral Hernia Repair, known patient in the [facility], admitted with worsening of his RLE Ulcerations with drainage and associated Cellulitis despite reported po ab rx.  Remains afebrile on current ab rx with Cefepime.  ESR slightly lower and CRP slightly increased today, but  WBC's remain WNL.  Clinically with continued  slow improvement.        Suggestions:  Will continue current ab rx with Cefepime and plan to complete 14 days of rx after today's doses are completed.  Continue local wound care as per Surgery/ Wound Care.  Follow temps and labs.  Discussed with patient in detail at the bedside.

## 2020-10-06 ENCOUNTER — TRANSCRIPTION ENCOUNTER (OUTPATIENT)
Age: 60
End: 2020-10-06

## 2020-10-06 VITALS
OXYGEN SATURATION: 98 % | RESPIRATION RATE: 18 BRPM | HEART RATE: 76 BPM | TEMPERATURE: 98 F | SYSTOLIC BLOOD PRESSURE: 133 MMHG | DIASTOLIC BLOOD PRESSURE: 80 MMHG

## 2020-10-06 RX ORDER — ACETAMINOPHEN 500 MG
2 TABLET ORAL
Qty: 0 | Refills: 0 | DISCHARGE
Start: 2020-10-06

## 2020-10-06 RX ORDER — POTASSIUM CHLORIDE 20 MEQ
40 PACKET (EA) ORAL
Qty: 0 | Refills: 0 | DISCHARGE

## 2020-10-06 RX ORDER — POTASSIUM CHLORIDE 20 MEQ
40 PACKET (EA) ORAL
Qty: 0 | Refills: 0 | DISCHARGE
Start: 2020-10-06

## 2020-10-06 RX ADMIN — FINASTERIDE 5 MILLIGRAM(S): 5 TABLET, FILM COATED ORAL at 11:07

## 2020-10-06 RX ADMIN — Medication 325 MILLIGRAM(S): at 11:05

## 2020-10-06 RX ADMIN — INFLUENZA VIRUS VACCINE 0.5 MILLILITER(S): 15; 15; 15; 15 SUSPENSION INTRAMUSCULAR at 13:25

## 2020-10-06 RX ADMIN — POLYETHYLENE GLYCOL 3350 17 GRAM(S): 17 POWDER, FOR SOLUTION ORAL at 05:49

## 2020-10-06 RX ADMIN — Medication 1 TABLET(S): at 17:10

## 2020-10-06 RX ADMIN — Medication 20 MILLIGRAM(S): at 05:49

## 2020-10-06 RX ADMIN — POLYETHYLENE GLYCOL 3350 17 GRAM(S): 17 POWDER, FOR SOLUTION ORAL at 17:10

## 2020-10-06 RX ADMIN — ENOXAPARIN SODIUM 40 MILLIGRAM(S): 100 INJECTION SUBCUTANEOUS at 11:06

## 2020-10-06 RX ADMIN — PANTOPRAZOLE SODIUM 40 MILLIGRAM(S): 20 TABLET, DELAYED RELEASE ORAL at 17:09

## 2020-10-06 RX ADMIN — PANTOPRAZOLE SODIUM 40 MILLIGRAM(S): 20 TABLET, DELAYED RELEASE ORAL at 05:49

## 2020-10-06 RX ADMIN — CARVEDILOL PHOSPHATE 3.12 MILLIGRAM(S): 80 CAPSULE, EXTENDED RELEASE ORAL at 05:50

## 2020-10-06 RX ADMIN — Medication 650 MILLIGRAM(S): at 05:54

## 2020-10-06 RX ADMIN — Medication 1 TABLET(S): at 05:49

## 2020-10-06 RX ADMIN — AMLODIPINE BESYLATE 10 MILLIGRAM(S): 2.5 TABLET ORAL at 05:49

## 2020-10-06 NOTE — PROGRESS NOTE ADULT - SUBJECTIVE AND OBJECTIVE BOX
TMAX - 98.5    Cefepime rx completed last PM    Vital Signs Last 24 Hrs  T(C): 36.6 (06 Oct 2020 11:54), Max: 36.9 (05 Oct 2020 16:21)  T(F): 97.8 (06 Oct 2020 11:54), Max: 98.5 (06 Oct 2020 04:30)  HR: 82 (06 Oct 2020 11:54) (66 - 82)  BP: 120/72 (06 Oct 2020 11:54) (120/72 - 151/99)  BP(mean): --  RR: 18 (06 Oct 2020 11:54) (18 - 18)  SpO2: 99% (06 Oct 2020 11:54) (97% - 99%)  Supplemental O2:  on RA       Awake, alert, still with pain in his knees but otherwise feeling better.  No SOB, cp, abdominal pain, or cough reported.      PHYSICAL EXAM    General:  awake, alert, sitting up in the chair now, pleasant and cooperative, in NAD  HEENT:  conj pink, sclerae anicteric, PERRLA, no oral lesions noted  Neck:  supple, no nodes noted  Heart:  RR  Lungs:  clear bilaterally  Abdomen:  soft, obese, BS+, nontender  Back: no CVA or Spinal tenderness noted  Extremities:  LLE with no edema                       RLE with no erythema noted now and no warmth to touch - area of firm swelling noted at the posterior aspect of the upper calf persists-- dressing dry and intact over the RLE ulcerated area  Skin:  warm, dry, no rash noted         I&O's Summary :    05 Oct 2020 07:01  -  06 Oct 2020 07:00  --------------------------------------------------------  IN: 170 mL / OUT: 0 mL / NET: 170 mL        LABS:  CBC Full  -  ( 05 Oct 2020 08:02 )  WBC Count : 7.16 K/uL  RBC Count : 4.21 M/uL  Hemoglobin : 9.7 g/dL  Hematocrit : 32.7 %  Platelet Count - Automated : 333 K/uL  Mean Cell Volume : 77.7 fl  Mean Cell Hemoglobin : 23.0 pg  Mean Cell Hemoglobin Concentration : 29.7 gm/dL  Auto Neutrophil # : 4.39 K/uL  Auto Lymphocyte # : 1.92 K/uL  Auto Monocyte # : 0.58 K/uL  Auto Eosinophil # : 0.19 K/uL  Auto Basophil # : 0.06 K/uL  Auto Neutrophil % : 61.3 %  Auto Lymphocyte % : 26.8 %  Auto Monocyte % : 8.1 %  Auto Eosinophil % : 2.7 %  Auto Basophil % : 0.8 %    10-05    138  |  104  |  17  ----------------------------<  84  4.0   |  27  |  0.75    Ca    8.6      05 Oct 2020 08:02      Sedimentation Rate, Erythrocyte: 33 mm/hr (10-05 @ 08:02)    Sedimentation Rate, Erythrocyte: 38 mm/hr (10-01 @ 08:46)    Sedimentation Rate, Erythrocyte: 34 mm/hr (09-28 @ 06:25)    Sedimentation Rate, Erythrocyte: 39 mm/hr (09-24 @ 06:30)    Sedimentation Rate, Erythrocyte: 50 mm/hr (09-22 @ 07:41)    Sedimentation Rate, Erythrocyte: 43 mm/hr (09-21 @ 13:38)      C-Reactive Protein, Serum (10.05.20 @ 10:37)    C-Reactive Protein, Serum: 1.81 mg/dL      C-Reactive Protein, Serum (10.01.20 @ 11:19)    C-Reactive Protein, Serum: 1.37 mg/dL      C-Reactive Protein, Serum (09.28.20 @ 09:14)    C-Reactive Protein, Serum: 1.41 mg/dL      C-Reactive Protein, Serum (09.24.20 @ 09:05)    C-Reactive Protein, Serum: 2.38 mg/dL      C-Reactive Protein, Serum (09.22.20 @ 11:26)    C-Reactive Protein, Serum: 4.17 mg/dL      Prostate Ca Screen, PSA Total (09.23.20 @ 01:59)    Prostate Ca Screen, PSA Total: 0.66: Method: Roche Electrochemiluminescence Immuno Assay        MICROBIOLOGY:    Specimen Source: Skin RLE Ulcers (09-22 @ 15:12)  Culture Results:   Moderate Staphylococcus aureus (09-22 @ 15:12)  Culture - Other (09.22.20 @ 15:12)    -  Tetra/Doxy: R >8    -  Trimethoprim/Sulfamethoxazole: S 1/19    -  Vancomycin: S 2    -  Ampicillin/Sulbactam: S <=8/4    -  Cefazolin: S <=4    -  Clindamycin: R >4    -  Erythromycin: R >4    -  Gentamicin: R >8 Should not be used as monotherapy    -  Oxacillin: S <=0.25    -  Penicillin: R >8    -  RIF- Rifampin: S <=1 Should not be used as monotherapy    Specimen Source: Skin RLE Ulcers    Culture Results:   Moderate Staphylococcus aureus    Organism Identification: Staphylococcus aureus    Organism: Staphylococcus aureus    Method Type: OTONIEL    Specimen Source: .Blood Blood (09-21 @ 23:02)  Culture Results:   No growth to date. (09-21 @ 23:02)    Specimen Source: .Blood Blood (09-21 @ 23:02)  Culture Results:   No growth to date. (09-21 @ 23:02)        COVID-19  Antibody - for prior infection screening (09.22.20 @ 02:10)    COVID-19 IgG Antibody Index: <0.10: Abbott CMIA  Negative Result    <= 1.39 Index  Positive Result      >= 1.40 Index Index    COVID-19 IgG Antibody Interpretation: Negative: This test has not been reviewed by the FDA by the standard review  process.       COVID-19 PCR . (09.21.20 @ 14:45)    COVID-19 PCR: NotDetec: This test has been validated by [employer] to be accurate;      Culture - Other (08.20.20 @ 18:08)    -  Trimethoprim/Sulfamethoxazole: S <=0.5/9.5    -  Vancomycin: S 1    -  Amikacin: S <=16    -  Aztreonam: S 8    -  Ampicillin/Sulbactam: S <=8/4    -  Cefazolin: S <=4    -  Cefepime: S 8    -  Ceftazidime: S 4    -  Ciprofloxacin: S <=0.25    -  Clindamycin: S <=0.25    -  Erythromycin: S <=0.25    -  Gentamicin: R >8 Should not be used as monotherapy    -  Gentamicin: S 4    -  Imipenem: S 2    -  Levofloxacin: S <=0.5    -  Meropenem: S <=1    -  Oxacillin: S 0.5    -  Penicillin: R >8    -  Piperacillin/Tazobactam: S <=8    -  RIF- Rifampin: S <=1 Should not be used as monotherapy    -  Tetra/Doxy: R >8    -  Tobramycin: S <=2    Specimen Source: .Other RIGHT LOWER LEG ULCER    Culture Results:   Culture yields growth of greater than 3 colony types of  bacteria,  which may indicate contamination and normal maureen  Call client services within 7 days if further workup is clinically  indicated. Culture includes  Few Staphylococcus aureus  Moderate Pseudomonas aeruginosa    Organism Identification: Staphylococcus aureus  Pseudomonas aeruginosa    Organism: Staphylococcus aureus    Organism: Pseudomonas aeruginosa    Method Type: OTONIEL    Method Type: OTONIEL      RADIOLOGY::             < from: Xray Knee 3 Views, Bilateral (10.04.20 @ 08:58) >    EXAM:  KNEE 3 VIEWS BI                          PROCEDURE DATE:  10/04/2020      INTERPRETATION:  Clinical Information: Bilateral knee pain.    Technique: 3 views right knee. 3 views left knee.    Comparison: 2017    Findings/  Impression: Severe tricompartmental osteoarthritic changes bilaterally, significant worsened compared to prior. There is evidence of bilateral chronic medial tibial plateau fractur                                 < from: Xray Shoulder 2 Views, Left (10.04.20 @ 08:56) >    EXAM:  SHOULDER COMP MIN 2VW LT                          PROCEDURE DATE:  10/04/2020      INTERPRETATION:  Left shoulder. 2 views. Patient has local pain and cellulitis.    There is advanced shoulder degeneration at the glenohumeral joint andmild spurring around the greater tuberosity.    There is an old fracture deformity of the distal two thirds shaft of the left clavicle.    No acute fractures are seen.    IMPRESSION: No acute fracture. Advanced shoulder degeneration and old fracture deformity left clavicle noted.        < from: US Renal (09.23.20 @ 10:49) >  EXAM:  US PELVIC LIMITED OR FOLLOW UP                          EXAM:  US KIDNEY(S)                          PROCEDURE DATE:  09/23/2020      INTERPRETATION:  CLINICAL INFORMATION: Hematuria, urinary frequency    COMPARISON: CT abdomen pelvis 2/26/2019    TECHNIQUE: Sonography of the kidneys and bladder. Transabdominal ultrasound the pelvis was also performed.    FINDINGS:    Right kidney: 11 cm. No renal mass, hydronephrosis or calculi.    Left kidney: 11 cm. No renal mass, hydronephrosisor calculi.    Urinary bladder: Bladder was mildly distended with prevoid volume of 228 cc. Mild circumferential thickening of the bladder wall versus secondary to underdistention. Bilateral ureteral jets are identified. No bladder mass. No significant post void residual.    Prostate measures 4.0 x 2.9 x 2.9 cm.    Incidental note of hepatic steatosis.    IMPRESSION:    No hydronephrosis. No significant post void residual.          < from: US Duplex Arterial Lower Ext Compl, Bilateral (09.22.20 @ 10:30) >    EXAM:  US DPLX LWR EXT ARTS COMPL BI                          PROCEDURE DATE:  09/22/2020      INTERPRETATION:  HISTORY: Peripheral arterial disease, leg ulcer.    Bilateral lower extremity arterial Doppler was performed using grayscale, color and spectral Doppler. There are no prior studies available for comparison.    Findings:    RIGHT: There is triphasic flow within the right external iliac, common femoral, deep femoral, superficial femoral and popliteal arteries. Abnormal low resistance flow within the posterior tibial artery. Peroneal and anterior tibial arteries could not be visualized due to swelling.  Peak systolic velocities are as follows (in CM/sec):  EIA: 127  CFA: 139  DFA: 77  SFA: (Proximal, mid, distal): 135, 122, 94  Popliteal: 73  PTA: 52, 94, 110  Peroneal: Not visualized  AAMIR: Not visualized  DPA: 58      LEFT: There is triphasic flow throughout the left lower extremity.  Peak systolic velocities are as follows (in CM/sec):  EIA: 98  CFA: 89  DFA: 71  SFA: (Proximal, mid, distal): 78, 75, 83  Popliteal: 58  PTA: 50, 62, 76  Peroneal: 63, 56, 45  AAMIR: 57, 40, 59  DPA: 74    IMPRESSION:    Limited visualization of the right calf arteries due to swelling. Abnormal low resistance flow within the posterior tibial artery. No visualized occlusion.    Normal velocities and waveforms within the left leg.                < from: US Duplex Venous Lower Ext Complete, Bilateral (09.21.20 @ 17:21) >  EXAM:  US DPLX LWR EXT VEINS COMPL BI                          PROCEDURE DATE:  09/21/2020      INTERPRETATION:  CLINICAL INFORMATION: Bilateral leg ulcers and edema.    COMPARISON: Doppler venous sonogram 3/27/2019.    TECHNIQUE: Duplex sonography of the BILATERAL LOWER extremity veins with color and spectral Doppler, with and without compression.    FINDINGS:    There is normal compressibility of the bilateral common femoral, femoral and popliteal veins.  Doppler examination shows normal spontaneous and phasic flow.    No calf vein thrombosis is detected.    IMPRESSION:    No evidence of deep venous thrombosis in either lower extremity.          IMPRESSION:   59 y/o  male with hx of HTN,  Morbid Obesity, OA, PUD, Chronic/ Recurring Ulcers of the RLE with Cellulitis, s/p Ventral Hernia Repair, known patient in the [facility], admitted with worsening of his RLE Ulcerations with drainage and associated Cellulitis despite reported po ab rx.  Remains afebrile now off ab rx.   ESR slightly lower and CRP slightly increased , but  WBC's remain WNL.  Clinically with continued  slow improvement.        Suggestions:  Will continue to observe now off ab rx as patient has completed 14 days of rx with Cefepime for severe Cellulitis with infected Leg Ulcers with Staph Aureus and Pseudomonas Aeruginosa in Cx's.  Patient to be d/c'ed home today with continued local wound care and  with follow-up appointment scheduled for 10/8/20 in the [facility] Care Center.  Discussed with patient in detail at the bedside.

## 2020-10-06 NOTE — DISCHARGE NOTE NURSING/CASE MANAGEMENT/SOCIAL WORK - PATIENT PORTAL LINK FT
You can access the FollowMyHealth Patient Portal offered by [facility] by registering at the following website: [email or web address]. By joining [facility]’s FollowMyHealth portal, you will also be able to view your health information using other applications (apps) compatible with our system.

## 2020-10-06 NOTE — DISCHARGE NOTE PROVIDER - PROVIDER TOKENS
PROVIDER:[TOKEN:[[id number]]],FREE:[LAST:[[facility]],PHONE:[(   )    -],FAX:[(   )    -]]

## 2020-10-06 NOTE — DISCHARGE NOTE PROVIDER - CARE PROVIDER_API CALL
Darius Guzmán  FAMILY MEDICINE  [address], [address]  [place] [address]  Phone: [phone]  Fax: [phone]  Follow Up Time:     [facility],   Phone: (   )    -  Fax: (   )    -  Follow Up Time:

## 2020-10-06 NOTE — DISCHARGE NOTE NURSING/CASE MANAGEMENT/SOCIAL WORK - NSDCFUADDAPPT_GEN_ALL_CORE_FT
Thursday 10/8 14:45 [facility] @[facility], [place] ,[address]   Ground level  clinic-Dr Chavarria

## 2020-10-06 NOTE — DISCHARGE NOTE PROVIDER - NSFOLLOWUPCLINICS_GEN_ALL_ED_FT
[facility]  Wound Care  [address] [place] [address]  Phone: [phone]  Fax: [phone]  Follow Up Time:

## 2020-10-06 NOTE — PROGRESS NOTE ADULT - REASON FOR ADMISSION
leg ulcer

## 2020-10-06 NOTE — DISCHARGE NOTE PROVIDER - NSDCMRMEDTOKEN_GEN_ALL_CORE_FT
acetaminophen 325 mg oral tablet: 2 tab(s) orally every 6 hours, As needed, Temp greater or equal to 38C (100.4F), Mild Pain (1 - 3)  amLODIPine 10 mg oral tablet: 1 tab(s) orally once a day  carvedilol 3.125 mg oral tablet: 1 tab(s) orally 2 times a day  enalapril 20 mg oral tablet: 1 tab(s) orally once a day  furosemide 20 mg oral tablet: 1 tab(s) orally once a day   pantoprazole 40 mg oral delayed release tablet: 1 tab(s) orally 2 times a day  potassium chloride: 40 milligram(s) orally once a day

## 2020-10-06 NOTE — DISCHARGE NOTE PROVIDER - NSDCCPCAREPLAN_GEN_ALL_CORE_FT
PRINCIPAL DISCHARGE DIAGNOSIS  Diagnosis: Cellulitis  Assessment and Plan of Treatment: Follow up with wound care clinic

## 2020-10-06 NOTE — DISCHARGE NOTE NURSING/CASE MANAGEMENT/SOCIAL WORK - NSDCVIVACCINE_GEN_ALL_CORE_FT
Influenza , 2015/9/23 13:45 , Dada Almazan (RN)   Influenza , 2015/9/23 13:45 , Dada Almazan (RN)  Influenza , 2020/10/6 13:25 , Patricia Burdick (RN)

## 2020-10-06 NOTE — DISCHARGE NOTE PROVIDER - HOSPITAL COURSE
61 y/o  male with hx of HTN,  Morbid Obesity, OA, PUD, Chronic/ Recurring Ulcers of the RLE with Cellulitis, s/p Ventral Hernia Repair, [facility], admitted with worsening of his RLE Ulcerations with drainage and associated Cellulitis despite reported po ab rx.  Remains afebrile on current ab rx with Cefepime.  ESR slightly lower and CRP slightly increased today, but  WBC's remain WNL.  Clinically with continued  slow improvement.        R Leg Ulcer/Cellulitis  -Local wound care  -IV Cefepim x 14 days, completed regimen  -Follow up with outpatient wound care    HTN  -Controlled on home medication  -Continue with home medications    Patient clinically stable for discharge.

## 2020-10-08 ENCOUNTER — OUTPATIENT (OUTPATIENT)
Dept: OUTPATIENT SERVICES | Facility: HOSPITAL | Age: 60
LOS: 1 days | Discharge: HOME | End: 2020-10-08

## 2020-10-08 DIAGNOSIS — Z98.89 OTHER SPECIFIED POSTPROCEDURAL STATES: Chronic | ICD-10-CM

## 2020-10-08 DIAGNOSIS — L89.90 PRESSURE ULCER OF UNSPECIFIED SITE, UNSPECIFIED STAGE: ICD-10-CM

## 2020-10-08 DIAGNOSIS — K46.9 UNSPECIFIED ABDOMINAL HERNIA WITHOUT OBSTRUCTION OR GANGRENE: Chronic | ICD-10-CM

## 2020-10-08 DIAGNOSIS — R58 HEMORRHAGE, NOT ELSEWHERE CLASSIFIED: Chronic | ICD-10-CM

## 2020-10-09 DIAGNOSIS — I10 ESSENTIAL (PRIMARY) HYPERTENSION: ICD-10-CM

## 2020-10-09 DIAGNOSIS — R60.9 EDEMA, UNSPECIFIED: ICD-10-CM

## 2020-10-09 DIAGNOSIS — L97.818 NON-PRESSURE CHRONIC ULCER OF OTHER PART OF RIGHT LOWER LEG WITH OTHER SPECIFIED SEVERITY: ICD-10-CM

## 2020-10-09 DIAGNOSIS — D64.9 ANEMIA, UNSPECIFIED: ICD-10-CM

## 2020-10-09 DIAGNOSIS — Z79.899 OTHER LONG TERM (CURRENT) DRUG THERAPY: ICD-10-CM

## 2020-10-09 DIAGNOSIS — M17.12 UNILATERAL PRIMARY OSTEOARTHRITIS, LEFT KNEE: ICD-10-CM

## 2020-10-09 DIAGNOSIS — Z74.1 NEED FOR ASSISTANCE WITH PERSONAL CARE: ICD-10-CM

## 2020-10-09 DIAGNOSIS — I87.311 CHRONIC VENOUS HYPERTENSION (IDIOPATHIC) WITH ULCER OF RIGHT LOWER EXTREMITY: ICD-10-CM

## 2020-10-09 DIAGNOSIS — Z91.81 HISTORY OF FALLING: ICD-10-CM

## 2020-10-15 ENCOUNTER — OUTPATIENT (OUTPATIENT)
Dept: OUTPATIENT SERVICES | Facility: HOSPITAL | Age: 60
LOS: 1 days | Discharge: HOME | End: 2020-10-15

## 2020-10-15 DIAGNOSIS — L89.90 PRESSURE ULCER OF UNSPECIFIED SITE, UNSPECIFIED STAGE: ICD-10-CM

## 2020-10-15 DIAGNOSIS — Z98.89 OTHER SPECIFIED POSTPROCEDURAL STATES: Chronic | ICD-10-CM

## 2020-10-15 DIAGNOSIS — K46.9 UNSPECIFIED ABDOMINAL HERNIA WITHOUT OBSTRUCTION OR GANGRENE: Chronic | ICD-10-CM

## 2020-10-15 DIAGNOSIS — R58 HEMORRHAGE, NOT ELSEWHERE CLASSIFIED: Chronic | ICD-10-CM

## 2020-10-16 DIAGNOSIS — L03.115 CELLULITIS OF RIGHT LOWER LIMB: ICD-10-CM

## 2020-10-16 DIAGNOSIS — L97.211 NON-PRESSURE CHRONIC ULCER OF RIGHT CALF LIMITED TO BREAKDOWN OF SKIN: ICD-10-CM

## 2020-10-16 DIAGNOSIS — M17.0 BILATERAL PRIMARY OSTEOARTHRITIS OF KNEE: ICD-10-CM

## 2020-10-16 DIAGNOSIS — M84.462A PATHOLOGICAL FRACTURE, LEFT TIBIA, INITIAL ENCOUNTER FOR FRACTURE: ICD-10-CM

## 2020-10-16 DIAGNOSIS — N40.0 BENIGN PROSTATIC HYPERPLASIA WITHOUT LOWER URINARY TRACT SYMPTOMS: ICD-10-CM

## 2020-10-16 DIAGNOSIS — B95.8 UNSPECIFIED STAPHYLOCOCCUS AS THE CAUSE OF DISEASES CLASSIFIED ELSEWHERE: ICD-10-CM

## 2020-10-16 DIAGNOSIS — M21.612 BUNION OF LEFT FOOT: ICD-10-CM

## 2020-10-16 DIAGNOSIS — B35.1 TINEA UNGUIUM: ICD-10-CM

## 2020-10-16 DIAGNOSIS — I89.0 LYMPHEDEMA, NOT ELSEWHERE CLASSIFIED: ICD-10-CM

## 2020-10-16 DIAGNOSIS — B96.5 PSEUDOMONAS (AERUGINOSA) (MALLEI) (PSEUDOMALLEI) AS THE CAUSE OF DISEASES CLASSIFIED ELSEWHERE: ICD-10-CM

## 2020-10-16 DIAGNOSIS — M84.461A PATHOLOGICAL FRACTURE, RIGHT TIBIA, INITIAL ENCOUNTER FOR FRACTURE: ICD-10-CM
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
## 2020-10-16 DIAGNOSIS — K26.9 DUODENAL ULCER, UNSPECIFIED AS ACUTE OR CHRONIC, WITHOUT HEMORRHAGE OR PERFORATION: ICD-10-CM

## 2020-10-16 DIAGNOSIS — K59.00 CONSTIPATION, UNSPECIFIED: ICD-10-CM

## 2020-10-16 DIAGNOSIS — E66.01 MORBID (SEVERE) OBESITY DUE TO EXCESS CALORIES: ICD-10-CM

## 2020-10-16 DIAGNOSIS — I10 ESSENTIAL (PRIMARY) HYPERTENSION: ICD-10-CM
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
## 2020-10-22 ENCOUNTER — OUTPATIENT (OUTPATIENT)
Dept: OUTPATIENT SERVICES | Facility: HOSPITAL | Age: 60
LOS: 1 days | Discharge: HOME | End: 2020-10-22

## 2020-10-22 DIAGNOSIS — Z74.1 NEED FOR ASSISTANCE WITH PERSONAL CARE: ICD-10-CM

## 2020-10-22 DIAGNOSIS — R60.9 EDEMA, UNSPECIFIED: ICD-10-CM

## 2020-10-22 DIAGNOSIS — I87.311 CHRONIC VENOUS HYPERTENSION (IDIOPATHIC) WITH ULCER OF RIGHT LOWER EXTREMITY: ICD-10-CM

## 2020-10-22 DIAGNOSIS — Z79.899 OTHER LONG TERM (CURRENT) DRUG THERAPY: ICD-10-CM

## 2020-10-22 DIAGNOSIS — Z98.89 OTHER SPECIFIED POSTPROCEDURAL STATES: Chronic | ICD-10-CM

## 2020-10-22 DIAGNOSIS — K46.9 UNSPECIFIED ABDOMINAL HERNIA WITHOUT OBSTRUCTION OR GANGRENE: Chronic | ICD-10-CM

## 2020-10-22 DIAGNOSIS — M17.12 UNILATERAL PRIMARY OSTEOARTHRITIS, LEFT KNEE: ICD-10-CM

## 2020-10-22 DIAGNOSIS — R58 HEMORRHAGE, NOT ELSEWHERE CLASSIFIED: Chronic | ICD-10-CM

## 2020-10-22 DIAGNOSIS — L97.818 NON-PRESSURE CHRONIC ULCER OF OTHER PART OF RIGHT LOWER LEG WITH OTHER SPECIFIED SEVERITY: ICD-10-CM

## 2020-10-22 DIAGNOSIS — D64.9 ANEMIA, UNSPECIFIED: ICD-10-CM

## 2020-10-22 DIAGNOSIS — L89.90 PRESSURE ULCER OF UNSPECIFIED SITE, UNSPECIFIED STAGE: ICD-10-CM

## 2020-10-22 DIAGNOSIS — Z91.81 HISTORY OF FALLING: ICD-10-CM

## 2020-10-22 DIAGNOSIS — I10 ESSENTIAL (PRIMARY) HYPERTENSION: ICD-10-CM

## 2020-10-28 DIAGNOSIS — Z91.81 HISTORY OF FALLING: ICD-10-CM

## 2020-10-28 DIAGNOSIS — M17.12 UNILATERAL PRIMARY OSTEOARTHRITIS, LEFT KNEE: ICD-10-CM

## 2020-10-28 DIAGNOSIS — I87.311 CHRONIC VENOUS HYPERTENSION (IDIOPATHIC) WITH ULCER OF RIGHT LOWER EXTREMITY: ICD-10-CM

## 2020-10-28 DIAGNOSIS — Z74.1 NEED FOR ASSISTANCE WITH PERSONAL CARE: ICD-10-CM

## 2020-10-28 DIAGNOSIS — D64.9 ANEMIA, UNSPECIFIED: ICD-10-CM

## 2020-10-28 DIAGNOSIS — Z79.899 OTHER LONG TERM (CURRENT) DRUG THERAPY: ICD-10-CM

## 2020-10-28 DIAGNOSIS — L97.818 NON-PRESSURE CHRONIC ULCER OF OTHER PART OF RIGHT LOWER LEG WITH OTHER SPECIFIED SEVERITY: ICD-10-CM

## 2020-10-28 DIAGNOSIS — I10 ESSENTIAL (PRIMARY) HYPERTENSION: ICD-10-CM

## 2020-10-28 DIAGNOSIS — R60.9 EDEMA, UNSPECIFIED: ICD-10-CM

## 2020-10-29 ENCOUNTER — OUTPATIENT (OUTPATIENT)
Dept: OUTPATIENT SERVICES | Facility: HOSPITAL | Age: 60
LOS: 1 days | Discharge: HOME | End: 2020-10-29

## 2020-10-29 DIAGNOSIS — Z98.89 OTHER SPECIFIED POSTPROCEDURAL STATES: Chronic | ICD-10-CM

## 2020-10-29 DIAGNOSIS — K46.9 UNSPECIFIED ABDOMINAL HERNIA WITHOUT OBSTRUCTION OR GANGRENE: Chronic | ICD-10-CM

## 2020-10-29 DIAGNOSIS — L89.90 PRESSURE ULCER OF UNSPECIFIED SITE, UNSPECIFIED STAGE: ICD-10-CM

## 2020-10-29 DIAGNOSIS — R58 HEMORRHAGE, NOT ELSEWHERE CLASSIFIED: Chronic | ICD-10-CM

## 2020-11-04 DIAGNOSIS — D64.9 ANEMIA, UNSPECIFIED: ICD-10-CM

## 2020-11-04 DIAGNOSIS — L97.818 NON-PRESSURE CHRONIC ULCER OF OTHER PART OF RIGHT LOWER LEG WITH OTHER SPECIFIED SEVERITY: ICD-10-CM

## 2020-11-04 DIAGNOSIS — Z74.1 NEED FOR ASSISTANCE WITH PERSONAL CARE: ICD-10-CM

## 2020-11-04 DIAGNOSIS — R60.9 EDEMA, UNSPECIFIED: ICD-10-CM

## 2020-11-04 DIAGNOSIS — Z91.81 HISTORY OF FALLING: ICD-10-CM

## 2020-11-04 DIAGNOSIS — Z79.899 OTHER LONG TERM (CURRENT) DRUG THERAPY: ICD-10-CM

## 2020-11-04 DIAGNOSIS — I87.311 CHRONIC VENOUS HYPERTENSION (IDIOPATHIC) WITH ULCER OF RIGHT LOWER EXTREMITY: ICD-10-CM

## 2020-11-04 DIAGNOSIS — M17.12 UNILATERAL PRIMARY OSTEOARTHRITIS, LEFT KNEE: ICD-10-CM

## 2020-11-05 ENCOUNTER — OUTPATIENT (OUTPATIENT)
Dept: OUTPATIENT SERVICES | Facility: HOSPITAL | Age: 60
LOS: 1 days | Discharge: HOME | End: 2020-11-05

## 2020-11-05 DIAGNOSIS — Z98.89 OTHER SPECIFIED POSTPROCEDURAL STATES: Chronic | ICD-10-CM

## 2020-11-05 DIAGNOSIS — L89.90 PRESSURE ULCER OF UNSPECIFIED SITE, UNSPECIFIED STAGE: ICD-10-CM

## 2020-11-05 DIAGNOSIS — K46.9 UNSPECIFIED ABDOMINAL HERNIA WITHOUT OBSTRUCTION OR GANGRENE: Chronic | ICD-10-CM

## 2020-11-05 DIAGNOSIS — R58 HEMORRHAGE, NOT ELSEWHERE CLASSIFIED: Chronic | ICD-10-CM

## 2020-11-07 LAB
-  AMIKACIN: SIGNIFICANT CHANGE UP
-  AMPICILLIN/SULBACTAM: SIGNIFICANT CHANGE UP
-  AZTREONAM: SIGNIFICANT CHANGE UP
-  CEFAZOLIN: SIGNIFICANT CHANGE UP
-  CEFEPIME: SIGNIFICANT CHANGE UP
-  CEFTAZIDIME: SIGNIFICANT CHANGE UP
-  CIPROFLOXACIN: SIGNIFICANT CHANGE UP
-  CLINDAMYCIN: SIGNIFICANT CHANGE UP
-  ERYTHROMYCIN: SIGNIFICANT CHANGE UP
-  GENTAMICIN: SIGNIFICANT CHANGE UP
-  GENTAMICIN: SIGNIFICANT CHANGE UP
-  IMIPENEM: SIGNIFICANT CHANGE UP
-  LEVOFLOXACIN: SIGNIFICANT CHANGE UP
-  MEROPENEM: SIGNIFICANT CHANGE UP
-  OXACILLIN: SIGNIFICANT CHANGE UP
-  PIPERACILLIN/TAZOBACTAM: SIGNIFICANT CHANGE UP
-  RIFAMPIN: SIGNIFICANT CHANGE UP
-  TETRACYCLINE: SIGNIFICANT CHANGE UP
-  TOBRAMYCIN: SIGNIFICANT CHANGE UP
-  TRIMETHOPRIM/SULFAMETHOXAZOLE: SIGNIFICANT CHANGE UP
-  VANCOMYCIN: SIGNIFICANT CHANGE UP
METHOD TYPE: SIGNIFICANT CHANGE UP
METHOD TYPE: SIGNIFICANT CHANGE UP

## 2020-11-08 LAB
-  AMIKACIN: SIGNIFICANT CHANGE UP
-  AMOXICILLIN/CLAVULANIC ACID: SIGNIFICANT CHANGE UP
-  AMPICILLIN/SULBACTAM: SIGNIFICANT CHANGE UP
-  AMPICILLIN: SIGNIFICANT CHANGE UP
-  AZTREONAM: SIGNIFICANT CHANGE UP
-  CEFAZOLIN: SIGNIFICANT CHANGE UP
-  CEFEPIME: SIGNIFICANT CHANGE UP
-  CEFOXITIN: SIGNIFICANT CHANGE UP
-  CEFTRIAXONE: SIGNIFICANT CHANGE UP
-  CIPROFLOXACIN: SIGNIFICANT CHANGE UP
-  ERTAPENEM: SIGNIFICANT CHANGE UP
-  GENTAMICIN: SIGNIFICANT CHANGE UP
-  IMIPENEM: SIGNIFICANT CHANGE UP
-  LEVOFLOXACIN: SIGNIFICANT CHANGE UP
-  MEROPENEM: SIGNIFICANT CHANGE UP
-  PIPERACILLIN/TAZOBACTAM: SIGNIFICANT CHANGE UP
-  TOBRAMYCIN: SIGNIFICANT CHANGE UP
-  TRIMETHOPRIM/SULFAMETHOXAZOLE: SIGNIFICANT CHANGE UP
CULTURE RESULTS: SIGNIFICANT CHANGE UP
METHOD TYPE: SIGNIFICANT CHANGE UP
ORGANISM # SPEC MICROSCOPIC CNT: SIGNIFICANT CHANGE UP
SPECIMEN SOURCE: SIGNIFICANT CHANGE UP

## 2020-11-11 DIAGNOSIS — Z91.81 HISTORY OF FALLING: ICD-10-CM

## 2020-11-11 DIAGNOSIS — D64.9 ANEMIA, UNSPECIFIED: ICD-10-CM

## 2020-11-11 DIAGNOSIS — I87.311 CHRONIC VENOUS HYPERTENSION (IDIOPATHIC) WITH ULCER OF RIGHT LOWER EXTREMITY: ICD-10-CM

## 2020-11-11 DIAGNOSIS — R60.9 EDEMA, UNSPECIFIED: ICD-10-CM

## 2020-11-11 DIAGNOSIS — Z79.899 OTHER LONG TERM (CURRENT) DRUG THERAPY: ICD-10-CM

## 2020-11-11 DIAGNOSIS — M17.12 UNILATERAL PRIMARY OSTEOARTHRITIS, LEFT KNEE: ICD-10-CM

## 2020-11-11 DIAGNOSIS — Z74.1 NEED FOR ASSISTANCE WITH PERSONAL CARE: ICD-10-CM

## 2020-11-11 DIAGNOSIS — L97.812 NON-PRESSURE CHRONIC ULCER OF OTHER PART OF RIGHT LOWER LEG WITH FAT LAYER EXPOSED: ICD-10-CM

## 2020-11-12 ENCOUNTER — OUTPATIENT (OUTPATIENT)
Dept: OUTPATIENT SERVICES | Facility: HOSPITAL | Age: 60
LOS: 1 days | Discharge: HOME | End: 2020-11-12

## 2020-11-12 DIAGNOSIS — R58 HEMORRHAGE, NOT ELSEWHERE CLASSIFIED: Chronic | ICD-10-CM

## 2020-11-12 DIAGNOSIS — Z98.89 OTHER SPECIFIED POSTPROCEDURAL STATES: Chronic | ICD-10-CM

## 2020-11-12 DIAGNOSIS — K46.9 UNSPECIFIED ABDOMINAL HERNIA WITHOUT OBSTRUCTION OR GANGRENE: Chronic | ICD-10-CM

## 2020-11-12 DIAGNOSIS — L89.90 PRESSURE ULCER OF UNSPECIFIED SITE, UNSPECIFIED STAGE: ICD-10-CM

## 2020-11-13 DIAGNOSIS — Z91.81 HISTORY OF FALLING: ICD-10-CM

## 2020-11-13 DIAGNOSIS — I87.2 VENOUS INSUFFICIENCY (CHRONIC) (PERIPHERAL): ICD-10-CM

## 2020-11-13 DIAGNOSIS — I87.311 CHRONIC VENOUS HYPERTENSION (IDIOPATHIC) WITH ULCER OF RIGHT LOWER EXTREMITY: ICD-10-CM

## 2020-11-13 DIAGNOSIS — L92.8 OTHER GRANULOMATOUS DISORDERS OF THE SKIN AND SUBCUTANEOUS TISSUE: ICD-10-CM

## 2020-11-13 DIAGNOSIS — Z79.899 OTHER LONG TERM (CURRENT) DRUG THERAPY: ICD-10-CM

## 2020-11-13 DIAGNOSIS — R60.9 EDEMA, UNSPECIFIED: ICD-10-CM

## 2020-11-13 DIAGNOSIS — M17.12 UNILATERAL PRIMARY OSTEOARTHRITIS, LEFT KNEE: ICD-10-CM

## 2020-11-13 DIAGNOSIS — Z74.1 NEED FOR ASSISTANCE WITH PERSONAL CARE: ICD-10-CM

## 2020-11-13 DIAGNOSIS — D64.9 ANEMIA, UNSPECIFIED: ICD-10-CM

## 2020-11-13 DIAGNOSIS — L97.812 NON-PRESSURE CHRONIC ULCER OF OTHER PART OF RIGHT LOWER LEG WITH FAT LAYER EXPOSED: ICD-10-CM

## 2020-11-19 ENCOUNTER — OUTPATIENT (OUTPATIENT)
Dept: OUTPATIENT SERVICES | Facility: HOSPITAL | Age: 60
LOS: 1 days | Discharge: HOME | End: 2020-11-19

## 2020-11-19 DIAGNOSIS — R58 HEMORRHAGE, NOT ELSEWHERE CLASSIFIED: Chronic | ICD-10-CM

## 2020-11-19 DIAGNOSIS — K46.9 UNSPECIFIED ABDOMINAL HERNIA WITHOUT OBSTRUCTION OR GANGRENE: Chronic | ICD-10-CM

## 2020-11-19 DIAGNOSIS — Z98.89 OTHER SPECIFIED POSTPROCEDURAL STATES: Chronic | ICD-10-CM

## 2020-11-19 DIAGNOSIS — L89.90 PRESSURE ULCER OF UNSPECIFIED SITE, UNSPECIFIED STAGE: ICD-10-CM

## 2020-11-20 DIAGNOSIS — Z91.81 HISTORY OF FALLING: ICD-10-CM

## 2020-11-20 DIAGNOSIS — I87.2 VENOUS INSUFFICIENCY (CHRONIC) (PERIPHERAL): ICD-10-CM

## 2020-11-20 DIAGNOSIS — D64.9 ANEMIA, UNSPECIFIED: ICD-10-CM

## 2020-11-20 DIAGNOSIS — L97.812 NON-PRESSURE CHRONIC ULCER OF OTHER PART OF RIGHT LOWER LEG WITH FAT LAYER EXPOSED: ICD-10-CM

## 2020-11-20 DIAGNOSIS — M17.12 UNILATERAL PRIMARY OSTEOARTHRITIS, LEFT KNEE: ICD-10-CM

## 2020-11-20 DIAGNOSIS — R60.9 EDEMA, UNSPECIFIED: ICD-10-CM

## 2020-11-20 DIAGNOSIS — Z74.1 NEED FOR ASSISTANCE WITH PERSONAL CARE: ICD-10-CM

## 2020-11-20 DIAGNOSIS — I87.311 CHRONIC VENOUS HYPERTENSION (IDIOPATHIC) WITH ULCER OF RIGHT LOWER EXTREMITY: ICD-10-CM

## 2020-11-20 DIAGNOSIS — Z79.899 OTHER LONG TERM (CURRENT) DRUG THERAPY: ICD-10-CM

## 2020-11-23 ENCOUNTER — EMERGENCY (EMERGENCY)
Facility: HOSPITAL | Age: 60
LOS: 0 days | Discharge: HOME | End: 2020-11-23
Attending: EMERGENCY MEDICINE
Payer: MEDICAID

## 2020-11-23 VITALS
RESPIRATION RATE: 17 BRPM | DIASTOLIC BLOOD PRESSURE: 98 MMHG | OXYGEN SATURATION: 99 % | HEIGHT: 66 IN | SYSTOLIC BLOOD PRESSURE: 140 MMHG | HEART RATE: 86 BPM | TEMPERATURE: 97 F | WEIGHT: 274.92 LBS

## 2020-11-23 DIAGNOSIS — Z98.89 OTHER SPECIFIED POSTPROCEDURAL STATES: Chronic | ICD-10-CM

## 2020-11-23 DIAGNOSIS — R33.9 RETENTION OF URINE, UNSPECIFIED: ICD-10-CM

## 2020-11-23 DIAGNOSIS — N39.0 URINARY TRACT INFECTION, SITE NOT SPECIFIED: ICD-10-CM

## 2020-11-23 DIAGNOSIS — I10 ESSENTIAL (PRIMARY) HYPERTENSION: ICD-10-CM

## 2020-11-23 DIAGNOSIS — R58 HEMORRHAGE, NOT ELSEWHERE CLASSIFIED: Chronic | ICD-10-CM

## 2020-11-23 DIAGNOSIS — M19.90 UNSPECIFIED OSTEOARTHRITIS, UNSPECIFIED SITE: ICD-10-CM

## 2020-11-23 DIAGNOSIS — Z87.11 PERSONAL HISTORY OF PEPTIC ULCER DISEASE: ICD-10-CM

## 2020-11-23 DIAGNOSIS — L97.919 NON-PRESSURE CHRONIC ULCER OF UNSPECIFIED PART OF RIGHT LOWER LEG WITH UNSPECIFIED SEVERITY: ICD-10-CM

## 2020-11-23 DIAGNOSIS — R10.30 LOWER ABDOMINAL PAIN, UNSPECIFIED: ICD-10-CM

## 2020-11-23 DIAGNOSIS — E66.9 OBESITY, UNSPECIFIED: ICD-10-CM

## 2020-11-23 DIAGNOSIS — K46.9 UNSPECIFIED ABDOMINAL HERNIA WITHOUT OBSTRUCTION OR GANGRENE: Chronic | ICD-10-CM

## 2020-11-23 LAB
APPEARANCE UR: CLEAR — SIGNIFICANT CHANGE UP
BACTERIA # UR AUTO: NEGATIVE — SIGNIFICANT CHANGE UP
BILIRUB UR-MCNC: NEGATIVE — SIGNIFICANT CHANGE UP
COLOR SPEC: YELLOW — SIGNIFICANT CHANGE UP
DIFF PNL FLD: ABNORMAL
GLUCOSE UR QL: NEGATIVE MG/DL — SIGNIFICANT CHANGE UP
KETONES UR-MCNC: NEGATIVE — SIGNIFICANT CHANGE UP
LEUKOCYTE ESTERASE UR-ACNC: NEGATIVE — SIGNIFICANT CHANGE UP
NITRITE UR-MCNC: NEGATIVE — SIGNIFICANT CHANGE UP
PH UR: 7 — SIGNIFICANT CHANGE UP (ref 5–8)
PROT UR-MCNC: NEGATIVE MG/DL — SIGNIFICANT CHANGE UP
RBC CASTS # UR COMP ASSIST: ABNORMAL /HPF (ref 0–4)
SP GR SPEC: 1.01 — SIGNIFICANT CHANGE UP (ref 1.01–1.02)
UROBILINOGEN FLD QL: NEGATIVE MG/DL — SIGNIFICANT CHANGE UP
WBC UR QL: SIGNIFICANT CHANGE UP

## 2020-11-23 PROCEDURE — 99284 EMERGENCY DEPT VISIT MOD MDM: CPT

## 2020-11-23 RX ORDER — CEFPODOXIME PROXETIL 100 MG
1 TABLET ORAL
Qty: 14 | Refills: 0
Start: 2020-11-23 | End: 2020-11-29

## 2020-11-23 RX ORDER — CEFUROXIME AXETIL 250 MG
1 TABLET ORAL
Qty: 14 | Refills: 0
Start: 2020-11-23 | End: 2020-11-29

## 2020-11-23 NOTE — ED PROVIDER NOTE - PHYSICAL EXAMINATION
Gen: Alert, Well appearing. NAD    Head: NC, AT, PERRL, normal lids/conjunctiva   ENT: patent oropharynx without erythema/exudate, uvula midline  Neck: supple, no tenderness/meningismus  Pulm: Bilateral clear BS, normal resp effort  CV: RRR, no M/R/G, +dist pulses   Abd: soft, + suprapubic distension, ND, +BS, no guarding/rebound tenderness  Skin: no rash, no bruising  Neuro: AAOx3, no sensory/motor deficits, CN 2-12 intact

## 2020-11-23 NOTE — ED PROVIDER NOTE - OBJECTIVE STATEMENT
61yo obese male with pmh HTN, OA, PUD, chronic RL ulcer/cellulitis presents with urinary retention since last night with abd pain. Pt reports some dysuria prior. no fever, flank pain, body aches, hematuria.     No fever/chills, No photophobia/eye pain/changes in vision, No ear pain/sore throat/dysphagia, No chest pain/palpitations, no SOB/cough, no wheeze/stridor, + abdominal pain, No N/V/D, no dysuria/frequency/discharge, No neck/back pain, no rash, no changes in neurological status/function.

## 2020-11-23 NOTE — ED PROVIDER NOTE - PATIENT PORTAL LINK FT
You can access the FollowMyHealth Patient Portal offered by [facility] by registering at the following website: [email or web address]. By joining [facility]’s FollowMyHealth portal, you will also be able to view your health information using other applications (apps) compatible with our system.

## 2020-11-23 NOTE — ED PROVIDER NOTE - CARE PROVIDER_API CALL
HAMZAH ROMERO  UROLOGY  [address], [address]  [place] [address]  Phone: [phone]  Fax: [phone]  Follow Up Time:

## 2020-11-23 NOTE — ED ADULT NURSE NOTE - CAS ELECT INFOMATION PROVIDED
Pt given leg bag and instructed on emptying and utilization. Advised to follow up w PCP, and  meds from pharmacy. UA sent as per MD Sood orders./DC instructions

## 2020-11-23 NOTE — ED ADULT NURSE NOTE - OBJECTIVE STATEMENT
Pt received at bed 7 A&Ox4. Pt co of urinary retention since 10 pm last night. Endorses penile pain and lower abdominal pain 7/10. Denies NV, chest pain. MD Sood at bedside, inserted urinary catheter. Pt produced dark yellow urine w 18 fr caudet.

## 2020-11-24 ENCOUNTER — OUTPATIENT (OUTPATIENT)
Dept: OUTPATIENT SERVICES | Facility: HOSPITAL | Age: 60
LOS: 1 days | Discharge: HOME | End: 2020-11-24

## 2020-11-24 DIAGNOSIS — L89.90 PRESSURE ULCER OF UNSPECIFIED SITE, UNSPECIFIED STAGE: ICD-10-CM

## 2020-11-24 DIAGNOSIS — K46.9 UNSPECIFIED ABDOMINAL HERNIA WITHOUT OBSTRUCTION OR GANGRENE: Chronic | ICD-10-CM

## 2020-11-24 DIAGNOSIS — R58 HEMORRHAGE, NOT ELSEWHERE CLASSIFIED: Chronic | ICD-10-CM

## 2020-11-24 DIAGNOSIS — Z98.89 OTHER SPECIFIED POSTPROCEDURAL STATES: Chronic | ICD-10-CM

## 2020-11-24 LAB
CULTURE RESULTS: NO GROWTH — SIGNIFICANT CHANGE UP
SPECIMEN SOURCE: SIGNIFICANT CHANGE UP

## 2020-11-25 DIAGNOSIS — Z74.1 NEED FOR ASSISTANCE WITH PERSONAL CARE: ICD-10-CM

## 2020-11-25 DIAGNOSIS — Z79.899 OTHER LONG TERM (CURRENT) DRUG THERAPY: ICD-10-CM

## 2020-11-25 DIAGNOSIS — D64.9 ANEMIA, UNSPECIFIED: ICD-10-CM

## 2020-11-25 DIAGNOSIS — I87.311 CHRONIC VENOUS HYPERTENSION (IDIOPATHIC) WITH ULCER OF RIGHT LOWER EXTREMITY: ICD-10-CM

## 2020-11-25 DIAGNOSIS — I87.2 VENOUS INSUFFICIENCY (CHRONIC) (PERIPHERAL): ICD-10-CM

## 2020-11-25 DIAGNOSIS — Z91.81 HISTORY OF FALLING: ICD-10-CM

## 2020-11-25 DIAGNOSIS — Z96.0 PRESENCE OF UROGENITAL IMPLANTS: ICD-10-CM

## 2020-11-25 DIAGNOSIS — L97.812 NON-PRESSURE CHRONIC ULCER OF OTHER PART OF RIGHT LOWER LEG WITH FAT LAYER EXPOSED: ICD-10-CM

## 2020-11-25 DIAGNOSIS — R60.9 EDEMA, UNSPECIFIED: ICD-10-CM

## 2020-11-25 DIAGNOSIS — M17.12 UNILATERAL PRIMARY OSTEOARTHRITIS, LEFT KNEE: ICD-10-CM

## 2020-12-01 ENCOUNTER — OUTPATIENT (OUTPATIENT)
Dept: OUTPATIENT SERVICES | Facility: HOSPITAL | Age: 60
LOS: 1 days | Discharge: HOME | End: 2020-12-01

## 2020-12-01 DIAGNOSIS — R58 HEMORRHAGE, NOT ELSEWHERE CLASSIFIED: Chronic | ICD-10-CM

## 2020-12-01 DIAGNOSIS — L89.90 PRESSURE ULCER OF UNSPECIFIED SITE, UNSPECIFIED STAGE: ICD-10-CM

## 2020-12-01 DIAGNOSIS — Z98.89 OTHER SPECIFIED POSTPROCEDURAL STATES: Chronic | ICD-10-CM

## 2020-12-01 DIAGNOSIS — K46.9 UNSPECIFIED ABDOMINAL HERNIA WITHOUT OBSTRUCTION OR GANGRENE: Chronic | ICD-10-CM

## 2020-12-03 ENCOUNTER — OUTPATIENT (OUTPATIENT)
Dept: OUTPATIENT SERVICES | Facility: HOSPITAL | Age: 60
LOS: 1 days | Discharge: HOME | End: 2020-12-03

## 2020-12-03 DIAGNOSIS — L89.90 PRESSURE ULCER OF UNSPECIFIED SITE, UNSPECIFIED STAGE: ICD-10-CM

## 2020-12-03 DIAGNOSIS — Z98.89 OTHER SPECIFIED POSTPROCEDURAL STATES: Chronic | ICD-10-CM

## 2020-12-03 DIAGNOSIS — R58 HEMORRHAGE, NOT ELSEWHERE CLASSIFIED: Chronic | ICD-10-CM

## 2020-12-03 DIAGNOSIS — K46.9 UNSPECIFIED ABDOMINAL HERNIA WITHOUT OBSTRUCTION OR GANGRENE: Chronic | ICD-10-CM

## 2020-12-07 DIAGNOSIS — L97.812 NON-PRESSURE CHRONIC ULCER OF OTHER PART OF RIGHT LOWER LEG WITH FAT LAYER EXPOSED: ICD-10-CM

## 2020-12-07 DIAGNOSIS — D64.9 ANEMIA, UNSPECIFIED: ICD-10-CM

## 2020-12-07 DIAGNOSIS — M17.12 UNILATERAL PRIMARY OSTEOARTHRITIS, LEFT KNEE: ICD-10-CM

## 2020-12-07 DIAGNOSIS — Z91.81 HISTORY OF FALLING: ICD-10-CM

## 2020-12-07 DIAGNOSIS — L92.8 OTHER GRANULOMATOUS DISORDERS OF THE SKIN AND SUBCUTANEOUS TISSUE: ICD-10-CM

## 2020-12-07 DIAGNOSIS — Z79.899 OTHER LONG TERM (CURRENT) DRUG THERAPY: ICD-10-CM

## 2020-12-07 DIAGNOSIS — I87.311 CHRONIC VENOUS HYPERTENSION (IDIOPATHIC) WITH ULCER OF RIGHT LOWER EXTREMITY: ICD-10-CM

## 2020-12-07 DIAGNOSIS — Z96.0 PRESENCE OF UROGENITAL IMPLANTS: ICD-10-CM

## 2020-12-07 DIAGNOSIS — R60.9 EDEMA, UNSPECIFIED: ICD-10-CM

## 2020-12-07 DIAGNOSIS — Z74.1 NEED FOR ASSISTANCE WITH PERSONAL CARE: ICD-10-CM

## 2020-12-07 DIAGNOSIS — I87.2 VENOUS INSUFFICIENCY (CHRONIC) (PERIPHERAL): ICD-10-CM

## 2020-12-10 ENCOUNTER — OUTPATIENT (OUTPATIENT)
Dept: OUTPATIENT SERVICES | Facility: HOSPITAL | Age: 60
LOS: 1 days | Discharge: HOME | End: 2020-12-10

## 2020-12-10 DIAGNOSIS — K46.9 UNSPECIFIED ABDOMINAL HERNIA WITHOUT OBSTRUCTION OR GANGRENE: Chronic | ICD-10-CM

## 2020-12-10 DIAGNOSIS — Z98.89 OTHER SPECIFIED POSTPROCEDURAL STATES: Chronic | ICD-10-CM

## 2020-12-10 DIAGNOSIS — L89.90 PRESSURE ULCER OF UNSPECIFIED SITE, UNSPECIFIED STAGE: ICD-10-CM

## 2020-12-10 DIAGNOSIS — R58 HEMORRHAGE, NOT ELSEWHERE CLASSIFIED: Chronic | ICD-10-CM

## 2020-12-14 DIAGNOSIS — L92.8 OTHER GRANULOMATOUS DISORDERS OF THE SKIN AND SUBCUTANEOUS TISSUE: ICD-10-CM

## 2020-12-14 DIAGNOSIS — Z74.1 NEED FOR ASSISTANCE WITH PERSONAL CARE: ICD-10-CM

## 2020-12-14 DIAGNOSIS — Z96.0 PRESENCE OF UROGENITAL IMPLANTS: ICD-10-CM

## 2020-12-14 DIAGNOSIS — Z91.81 HISTORY OF FALLING: ICD-10-CM

## 2020-12-14 DIAGNOSIS — I87.311 CHRONIC VENOUS HYPERTENSION (IDIOPATHIC) WITH ULCER OF RIGHT LOWER EXTREMITY: ICD-10-CM

## 2020-12-14 DIAGNOSIS — D46.9 MYELODYSPLASTIC SYNDROME, UNSPECIFIED: ICD-10-CM

## 2020-12-14 DIAGNOSIS — R60.9 EDEMA, UNSPECIFIED: ICD-10-CM

## 2020-12-14 DIAGNOSIS — Z79.899 OTHER LONG TERM (CURRENT) DRUG THERAPY: ICD-10-CM

## 2020-12-14 DIAGNOSIS — I87.2 VENOUS INSUFFICIENCY (CHRONIC) (PERIPHERAL): ICD-10-CM

## 2020-12-14 DIAGNOSIS — L97.812 NON-PRESSURE CHRONIC ULCER OF OTHER PART OF RIGHT LOWER LEG WITH FAT LAYER EXPOSED: ICD-10-CM

## 2020-12-14 DIAGNOSIS — M17.12 UNILATERAL PRIMARY OSTEOARTHRITIS, LEFT KNEE: ICD-10-CM

## 2020-12-16 DIAGNOSIS — I87.2 VENOUS INSUFFICIENCY (CHRONIC) (PERIPHERAL): ICD-10-CM

## 2020-12-16 DIAGNOSIS — Z74.1 NEED FOR ASSISTANCE WITH PERSONAL CARE: ICD-10-CM

## 2020-12-16 DIAGNOSIS — R60.9 EDEMA, UNSPECIFIED: ICD-10-CM

## 2020-12-16 DIAGNOSIS — Z96.0 PRESENCE OF UROGENITAL IMPLANTS: ICD-10-CM

## 2020-12-16 DIAGNOSIS — Z91.81 HISTORY OF FALLING: ICD-10-CM

## 2020-12-16 DIAGNOSIS — Z79.899 OTHER LONG TERM (CURRENT) DRUG THERAPY: ICD-10-CM

## 2020-12-16 DIAGNOSIS — M17.12 UNILATERAL PRIMARY OSTEOARTHRITIS, LEFT KNEE: ICD-10-CM

## 2020-12-16 DIAGNOSIS — I87.311 CHRONIC VENOUS HYPERTENSION (IDIOPATHIC) WITH ULCER OF RIGHT LOWER EXTREMITY: ICD-10-CM

## 2020-12-16 DIAGNOSIS — D64.9 ANEMIA, UNSPECIFIED: ICD-10-CM

## 2020-12-23 ENCOUNTER — APPOINTMENT (OUTPATIENT)
Dept: UROLOGY | Facility: CLINIC | Age: 60
End: 2020-12-23
Payer: COMMERCIAL

## 2020-12-23 VITALS
HEIGHT: 66 IN | BODY MASS INDEX: 43.71 KG/M2 | HEART RATE: 69 BPM | SYSTOLIC BLOOD PRESSURE: 144 MMHG | RESPIRATION RATE: 17 BRPM | DIASTOLIC BLOOD PRESSURE: 87 MMHG | WEIGHT: 272 LBS | TEMPERATURE: 98.5 F

## 2020-12-23 PROCEDURE — 99205 OFFICE O/P NEW HI 60 MIN: CPT | Mod: 25

## 2020-12-23 PROCEDURE — 99072 ADDL SUPL MATRL&STAF TM PHE: CPT

## 2020-12-23 PROCEDURE — 51702 INSERT TEMP BLADDER CATH: CPT

## 2020-12-23 RX ORDER — CLINDAMYCIN HYDROCHLORIDE 150 MG/1
150 CAPSULE ORAL
Qty: 24 | Refills: 0 | Status: DISCONTINUED | COMMUNITY
Start: 2018-06-11 | End: 2020-12-23

## 2020-12-23 RX ORDER — POTASSIUM CHLORIDE 750 MG/1
10 TABLET, FILM COATED, EXTENDED RELEASE ORAL
Qty: 30 | Refills: 0 | Status: DISCONTINUED | COMMUNITY
Start: 2018-06-11 | End: 2020-12-23

## 2020-12-23 RX ORDER — POTASSIUM CHLORIDE 1500 MG/1
20 TABLET, EXTENDED RELEASE ORAL
Qty: 30 | Refills: 0 | Status: DISCONTINUED | COMMUNITY
Start: 2018-06-13 | End: 2020-12-23

## 2020-12-23 RX ORDER — POLYETHYLENE GLYCOL 3350 17 G/17G
17 POWDER, FOR SOLUTION ORAL
Qty: 255 | Refills: 0 | Status: DISCONTINUED | COMMUNITY
Start: 2018-06-11 | End: 2020-12-23

## 2020-12-23 RX ORDER — TAMSULOSIN HYDROCHLORIDE 0.4 MG/1
0.4 CAPSULE ORAL
Qty: 90 | Refills: 1 | Status: ACTIVE | COMMUNITY
Start: 2020-12-23 | End: 1900-01-01

## 2020-12-23 NOTE — REVIEW OF SYSTEMS
[Negative] : Endocrine [Pain during urination] : pain during urination [Limb Swelling] : limb swelling [Skin Wound] : skin wound [Difficulty Walking] : difficulty walking [see HPI] : see HPI [Easy Bruising] : a tendency for easy bruising

## 2020-12-24 ENCOUNTER — OUTPATIENT (OUTPATIENT)
Dept: OUTPATIENT SERVICES | Facility: HOSPITAL | Age: 60
LOS: 1 days | Discharge: HOME | End: 2020-12-24

## 2020-12-24 DIAGNOSIS — Z98.89 OTHER SPECIFIED POSTPROCEDURAL STATES: Chronic | ICD-10-CM

## 2020-12-24 DIAGNOSIS — K46.9 UNSPECIFIED ABDOMINAL HERNIA WITHOUT OBSTRUCTION OR GANGRENE: Chronic | ICD-10-CM

## 2020-12-24 DIAGNOSIS — R58 HEMORRHAGE, NOT ELSEWHERE CLASSIFIED: Chronic | ICD-10-CM

## 2020-12-24 DIAGNOSIS — L89.90 PRESSURE ULCER OF UNSPECIFIED SITE, UNSPECIFIED STAGE: ICD-10-CM

## 2020-12-24 NOTE — ADDENDUM
[FreeTextEntry1] : I, Karen Innocent, acted solely as a scribe for Dr. Marin Parson on this date, 12/23/2020.\par \par All medical record entries made by the Scribe were at my Dr. Marin Parson direction and personally dictated by me on, 12/23/2020. I have reviewed the chart and agree that the record accurately reflects my personal performance of the history, physical exam, assessment and plan. I have also personally directed, reviewed and agreed with the chart.\par

## 2020-12-24 NOTE — HISTORY OF PRESENT ILLNESS
[FreeTextEntry1] : 59 y/o male presenting for initial visit for urinary retention. Pt presented to [facility] ER on 11/23/2020 for urinary retention. UA and culture in hospital negative. Barbosa catheter was placed in hospital. \par \par Barbosa catheter in place. Barbosa catheter changed today. \par \par O/E: uncircumcised phallus, adequate meatus,  left epididymal cyst, right hydrocele; intrascrotal contents cannot be examined secondary to large hydrocele\par LEILANI: deferred\par \par Will start on Tamsulosin\par Will schedule for UDS and cystoscopy possible biopsy and fulguration in 3 weeks\par Will do prostate sonogram at next visit\par

## 2020-12-24 NOTE — PHYSICAL EXAM
[General Appearance - Well Developed] : well developed [General Appearance - Well Nourished] : well nourished [Normal Appearance] : normal appearance [Well Groomed] : well groomed [General Appearance - In No Acute Distress] : no acute distress [Abdomen Soft] : soft [Abdomen Tenderness] : non-tender [Costovertebral Angle Tenderness] : no ~M costovertebral angle tenderness [Urethral Meatus] : meatus normal [Urinary Bladder Findings] : the bladder was normal on palpation [Scrotum] : the scrotum was normal [No Prostate Nodules] : no prostate nodules [Edema] : no peripheral edema [] : no respiratory distress [Respiration, Rhythm And Depth] : normal respiratory rhythm and effort [Exaggerated Use Of Accessory Muscles For Inspiration] : no accessory muscle use [Oriented To Time, Place, And Person] : oriented to person, place, and time [Affect] : the affect was normal [Mood] : the mood was normal [Not Anxious] : not anxious [No Focal Deficits] : no focal deficits [No Palpable Adenopathy] : no palpable adenopathy [Penis Abnormality] : normal uncircumcised penis [FreeTextEntry1] : uses crutches

## 2020-12-31 ENCOUNTER — OUTPATIENT (OUTPATIENT)
Dept: OUTPATIENT SERVICES | Facility: HOSPITAL | Age: 60
LOS: 1 days | Discharge: HOME | End: 2020-12-31

## 2020-12-31 DIAGNOSIS — Z74.1 NEED FOR ASSISTANCE WITH PERSONAL CARE: ICD-10-CM

## 2020-12-31 DIAGNOSIS — R60.9 EDEMA, UNSPECIFIED: ICD-10-CM

## 2020-12-31 DIAGNOSIS — Z98.89 OTHER SPECIFIED POSTPROCEDURAL STATES: Chronic | ICD-10-CM

## 2020-12-31 DIAGNOSIS — I87.2 VENOUS INSUFFICIENCY (CHRONIC) (PERIPHERAL): ICD-10-CM

## 2020-12-31 DIAGNOSIS — L97.812 NON-PRESSURE CHRONIC ULCER OF OTHER PART OF RIGHT LOWER LEG WITH FAT LAYER EXPOSED: ICD-10-CM

## 2020-12-31 DIAGNOSIS — L89.90 PRESSURE ULCER OF UNSPECIFIED SITE, UNSPECIFIED STAGE: ICD-10-CM

## 2020-12-31 DIAGNOSIS — K46.9 UNSPECIFIED ABDOMINAL HERNIA WITHOUT OBSTRUCTION OR GANGRENE: Chronic | ICD-10-CM

## 2020-12-31 DIAGNOSIS — M17.12 UNILATERAL PRIMARY OSTEOARTHRITIS, LEFT KNEE: ICD-10-CM

## 2020-12-31 DIAGNOSIS — I87.311 CHRONIC VENOUS HYPERTENSION (IDIOPATHIC) WITH ULCER OF RIGHT LOWER EXTREMITY: ICD-10-CM

## 2020-12-31 DIAGNOSIS — Z96.0 PRESENCE OF UROGENITAL IMPLANTS: ICD-10-CM

## 2020-12-31 DIAGNOSIS — R58 HEMORRHAGE, NOT ELSEWHERE CLASSIFIED: Chronic | ICD-10-CM

## 2020-12-31 DIAGNOSIS — Z91.81 HISTORY OF FALLING: ICD-10-CM

## 2020-12-31 DIAGNOSIS — Z79.899 OTHER LONG TERM (CURRENT) DRUG THERAPY: ICD-10-CM

## 2020-12-31 DIAGNOSIS — D64.9 ANEMIA, UNSPECIFIED: ICD-10-CM

## 2021-01-01 NOTE — PROGRESS NOTE ADULT - SUBJECTIVE AND OBJECTIVE BOX
Patient is a 57y old  Male who presents with a chief complaint of     OVERNIGHT EVENTS: none. bilateral leg pain stable on current regimen     REVIEW OF SYSTEMS: denies chest pain/SOB, diaphoresis, no F/C, cough, dizziness, headache, blurry vision, nausea, vomiting, abdominal pain. All others review of systems negative     MEDICATIONS  (STANDING):  carvedilol 3.125 milliGRAM(s) Oral every 12 hours  enalapril 10 milliGRAM(s) Oral daily  enoxaparin Injectable 40 milliGRAM(s) SubCutaneous every 24 hours  furosemide    Tablet 40 milliGRAM(s) Oral daily  influenza   Vaccine 0.5 milliLiter(s) IntraMuscular once    MEDICATIONS  (PRN):  acetaminophen   Tablet. 650 milliGRAM(s) Oral every 6 hours PRN Mild Pain (1 - 3)      Allergies    No Known Allergies    Intolerances        T(F): 97.4 (10-18-17 @ 11:10), Max: 98.8 (10-17-17 @ 16:31)  HR: 91 (10-18-17 @ 11:10) (63 - 91)  BP: 126/81 (10-18-17 @ 11:10) (126/81 - 194/131)  RR: 18 (10-18-17 @ 11:10) (17 - 18)  SpO2: 96% (10-18-17 @ 11:10) (96% - 98%)  Wt(kg): --    PHYSICAL EXAM:  GENERAL: NAD, well-groomed, well-developed, morbidly obese  HEAD:  Atraumatic, Normocephalic  EYES: EOMI, PERRLA, conjunctiva and sclera clear  ENMT: No tonsillar erythema, exudates, or enlargement; Moist mucous membranes, Good dentition, No lesions  NECK: Supple, No JVD, Normal thyroid  NERVOUS SYSTEM:  Alert & Oriented X3, Good concentration; Motor Strength 5/5 B/L upper and lower extremities; DTRs 2+ intact and symmetric  CHEST/LUNG: Clear to percussion bilaterally; No rales, rhonchi, wheezing, or rubs BL  HEART: Regular rate and rhythm; No murmurs, rubs, or gallops  ABDOMEN: Soft, Nontender, Nondistended; Bowel sounds present  EXTREMITIES:  2+ Peripheral Pulses, No clubbing, cyanosis, or edema BL LE  LYMPH: No lymphadenopathy noted  SKIN: RLE ulcer     LABS:                        12.9   8.3   )-----------( 250      ( 17 Oct 2017 04:38 )             40.1     10-    140  |  103  |  22  ----------------------------<  114<H>  3.8   |  29  |  0.69    Ca    8.7      17 Oct 2017 04:38    TPro  7.5  /  Alb  3.2<L>  /  TBili  0.3  /  DBili  x   /  AST  19  /  ALT  20  /  AlkPhos  112  10-    PT/INR - ( 17 Oct 2017 04:38 )   PT: 11.5 sec;   INR: 1.05 ratio         PTT - ( 17 Oct 2017 04:38 )  PTT:33.9 sec  Urinalysis Basic - ( 17 Oct 2017 04:38 )    Color: Yellow / Appearance: Clear / S.010 / pH: x  Gluc: x / Ketone: Negative  / Bili: Negative / Urobili: Negative mg/dL   Blood: x / Protein: Negative mg/dL / Nitrite: Negative   Leuk Esterase: Negative / RBC: 0-2 /HPF / WBC x   Sq Epi: x / Non Sq Epi: Occasional / Bacteria: Occasional      Cultures;   CAPILLARY BLOOD GLUCOSE        Lipid panel:     CARDIAC MARKERS ( 17 Oct 2017 04:38 )  <.015 ng/mL / x     / 151 U/L / x     / 3.0 ng/mL        RADIOLOGY & ADDITIONAL TESTS:    Imaging Personally Reviewed:  [x ] YES      Consultant(s) Notes Reviewed:  [ x] YES     Care Discussed with [x ] Consultants [X ] Patient [ ] Family  [x ]    [x ]  Other; RN Hospitalist

## 2021-01-07 ENCOUNTER — OUTPATIENT (OUTPATIENT)
Dept: OUTPATIENT SERVICES | Facility: HOSPITAL | Age: 61
LOS: 1 days | Discharge: HOME | End: 2021-01-07

## 2021-01-07 DIAGNOSIS — K46.9 UNSPECIFIED ABDOMINAL HERNIA WITHOUT OBSTRUCTION OR GANGRENE: Chronic | ICD-10-CM

## 2021-01-07 DIAGNOSIS — L89.90 PRESSURE ULCER OF UNSPECIFIED SITE, UNSPECIFIED STAGE: ICD-10-CM

## 2021-01-07 DIAGNOSIS — I87.311 CHRONIC VENOUS HYPERTENSION (IDIOPATHIC) WITH ULCER OF RIGHT LOWER EXTREMITY: ICD-10-CM

## 2021-01-07 DIAGNOSIS — Z74.1 NEED FOR ASSISTANCE WITH PERSONAL CARE: ICD-10-CM

## 2021-01-07 DIAGNOSIS — Z91.81 HISTORY OF FALLING: ICD-10-CM

## 2021-01-07 DIAGNOSIS — Z79.899 OTHER LONG TERM (CURRENT) DRUG THERAPY: ICD-10-CM

## 2021-01-07 DIAGNOSIS — R60.9 EDEMA, UNSPECIFIED: ICD-10-CM

## 2021-01-07 DIAGNOSIS — L97.812 NON-PRESSURE CHRONIC ULCER OF OTHER PART OF RIGHT LOWER LEG WITH FAT LAYER EXPOSED: ICD-10-CM

## 2021-01-07 DIAGNOSIS — D64.9 ANEMIA, UNSPECIFIED: ICD-10-CM

## 2021-01-07 DIAGNOSIS — R58 HEMORRHAGE, NOT ELSEWHERE CLASSIFIED: Chronic | ICD-10-CM

## 2021-01-07 DIAGNOSIS — Z96.0 PRESENCE OF UROGENITAL IMPLANTS: ICD-10-CM

## 2021-01-07 DIAGNOSIS — Z98.89 OTHER SPECIFIED POSTPROCEDURAL STATES: Chronic | ICD-10-CM

## 2021-01-07 DIAGNOSIS — M17.12 UNILATERAL PRIMARY OSTEOARTHRITIS, LEFT KNEE: ICD-10-CM

## 2021-01-07 DIAGNOSIS — I87.2 VENOUS INSUFFICIENCY (CHRONIC) (PERIPHERAL): ICD-10-CM

## 2021-01-14 ENCOUNTER — OUTPATIENT (OUTPATIENT)
Dept: OUTPATIENT SERVICES | Facility: HOSPITAL | Age: 61
LOS: 1 days | Discharge: HOME | End: 2021-01-14

## 2021-01-14 DIAGNOSIS — R58 HEMORRHAGE, NOT ELSEWHERE CLASSIFIED: Chronic | ICD-10-CM

## 2021-01-14 DIAGNOSIS — L89.90 PRESSURE ULCER OF UNSPECIFIED SITE, UNSPECIFIED STAGE: ICD-10-CM

## 2021-01-14 DIAGNOSIS — Z91.81 HISTORY OF FALLING: ICD-10-CM

## 2021-01-14 DIAGNOSIS — Z74.1 NEED FOR ASSISTANCE WITH PERSONAL CARE: ICD-10-CM

## 2021-01-14 DIAGNOSIS — L97.812 NON-PRESSURE CHRONIC ULCER OF OTHER PART OF RIGHT LOWER LEG WITH FAT LAYER EXPOSED: ICD-10-CM

## 2021-01-14 DIAGNOSIS — I87.311 CHRONIC VENOUS HYPERTENSION (IDIOPATHIC) WITH ULCER OF RIGHT LOWER EXTREMITY: ICD-10-CM

## 2021-01-14 DIAGNOSIS — M17.12 UNILATERAL PRIMARY OSTEOARTHRITIS, LEFT KNEE: ICD-10-CM

## 2021-01-14 DIAGNOSIS — R60.9 EDEMA, UNSPECIFIED: ICD-10-CM

## 2021-01-14 DIAGNOSIS — K46.9 UNSPECIFIED ABDOMINAL HERNIA WITHOUT OBSTRUCTION OR GANGRENE: Chronic | ICD-10-CM

## 2021-01-14 DIAGNOSIS — D64.9 ANEMIA, UNSPECIFIED: ICD-10-CM

## 2021-01-14 DIAGNOSIS — Z98.89 OTHER SPECIFIED POSTPROCEDURAL STATES: Chronic | ICD-10-CM

## 2021-01-14 DIAGNOSIS — I87.2 VENOUS INSUFFICIENCY (CHRONIC) (PERIPHERAL): ICD-10-CM

## 2021-01-14 DIAGNOSIS — Z79.899 OTHER LONG TERM (CURRENT) DRUG THERAPY: ICD-10-CM

## 2021-01-14 DIAGNOSIS — Z96.0 PRESENCE OF UROGENITAL IMPLANTS: ICD-10-CM

## 2021-01-21 ENCOUNTER — APPOINTMENT (OUTPATIENT)
Dept: UROLOGY | Facility: CLINIC | Age: 61
End: 2021-01-21
Payer: COMMERCIAL

## 2021-01-21 VITALS — TEMPERATURE: 97.7 F | HEART RATE: 66 BPM | SYSTOLIC BLOOD PRESSURE: 160 MMHG | DIASTOLIC BLOOD PRESSURE: 99 MMHG

## 2021-01-21 PROCEDURE — 99072 ADDL SUPL MATRL&STAF TM PHE: CPT

## 2021-01-21 PROCEDURE — 51741 ELECTRO-UROFLOWMETRY FIRST: CPT

## 2021-01-21 PROCEDURE — 51798 US URINE CAPACITY MEASURE: CPT | Mod: 59

## 2021-01-21 PROCEDURE — 51797 INTRAABDOMINAL PRESSURE TEST: CPT

## 2021-01-21 PROCEDURE — 99213 OFFICE O/P EST LOW 20 MIN: CPT | Mod: 25

## 2021-01-21 PROCEDURE — 52000 CYSTOURETHROSCOPY: CPT

## 2021-01-21 PROCEDURE — 51728 CYSTOMETROGRAM W/VP: CPT

## 2021-01-21 PROCEDURE — 51784 ANAL/URINARY MUSCLE STUDY: CPT

## 2021-01-21 NOTE — REVIEW OF SYSTEMS
[Pain during urination] : pain during urination [Limb Swelling] : limb swelling [Skin Wound] : skin wound [Difficulty Walking] : difficulty walking [see HPI] : see HPI [Easy Bruising] : a tendency for easy bruising [Negative] : Endocrine

## 2021-01-22 ENCOUNTER — OUTPATIENT (OUTPATIENT)
Dept: OUTPATIENT SERVICES | Facility: HOSPITAL | Age: 61
LOS: 1 days | Discharge: HOME | End: 2021-01-22

## 2021-01-22 DIAGNOSIS — K46.9 UNSPECIFIED ABDOMINAL HERNIA WITHOUT OBSTRUCTION OR GANGRENE: Chronic | ICD-10-CM

## 2021-01-22 DIAGNOSIS — Z98.89 OTHER SPECIFIED POSTPROCEDURAL STATES: Chronic | ICD-10-CM

## 2021-01-22 DIAGNOSIS — L89.90 PRESSURE ULCER OF UNSPECIFIED SITE, UNSPECIFIED STAGE: ICD-10-CM

## 2021-01-22 DIAGNOSIS — R58 HEMORRHAGE, NOT ELSEWHERE CLASSIFIED: Chronic | ICD-10-CM

## 2021-01-22 NOTE — PHYSICAL EXAM
[General Appearance - Well Developed] : well developed [General Appearance - Well Nourished] : well nourished [Normal Appearance] : normal appearance [Well Groomed] : well groomed [General Appearance - In No Acute Distress] : no acute distress [Abdomen Soft] : soft [Abdomen Tenderness] : non-tender [Costovertebral Angle Tenderness] : no ~M costovertebral angle tenderness [Urethral Meatus] : meatus normal [Penis Abnormality] : normal uncircumcised penis [Urinary Bladder Findings] : the bladder was normal on palpation [Scrotum] : the scrotum was normal [Edema] : no peripheral edema [] : no respiratory distress [Respiration, Rhythm And Depth] : normal respiratory rhythm and effort [Exaggerated Use Of Accessory Muscles For Inspiration] : no accessory muscle use [Oriented To Time, Place, And Person] : oriented to person, place, and time [Affect] : the affect was normal [Mood] : the mood was normal [Not Anxious] : not anxious [No Focal Deficits] : no focal deficits [No Palpable Adenopathy] : no palpable adenopathy [FreeTextEntry1] : uses crutches

## 2021-01-22 NOTE — HISTORY OF PRESENT ILLNESS
[FreeTextEntry1] : 61 y/o male presenting for initial visit for urinary retention. Pt presented to [facility] ER on 11/23/2020 for urinary retention. UA and culture in hospital negative. Barbosa catheter was placed in hospital. \par \par Barbosa catheter in place. Barbosa catheter changed today. \par \par O/E: uncircumcised phallus, adequate meatus,  left epididymal cyst, right hydrocele; intrascrotal contents cannot be examined secondary to large hydrocele\par LEILANI: deferred\par \par Will start on Tamsulosin\par Will schedule for UDS and cystoscopy possible biopsy and fulguration in 3 weeks\par Will do prostate sonogram at next visit\par \par 01/21/2021: Pt is here today for follow up on urinary retention. \par \par Urodynamics was done today. Detrusor pressure increased. Obstruction present\par \par Cystoscopy was done today. Enlarged prostate. Bladder stones visualized secondary to Barbosa catheter.\par \par Testicular sonogram was done today. Bilateral hydrocele. Testes normal. \par \par Barbosa catheter was changed today. \par \par On Tamsulosin; will continue\par \par Will consult with PMD to discuss clearance for cystolitholapaxy and Green light TURP\par \par Follow up in 2 weeks

## 2021-01-22 NOTE — LETTER BODY
[Dear  ___] : Dear  [unfilled], [Consult Letter:] : I had the pleasure of evaluating your patient, [unfilled]. [Please see my note below.] : Please see my note below. [Consult Closing:] : Thank you very much for allowing me to participate in the care of this patient.  If you have any questions, please do not hesitate to contact me. [Sincerely,] : Sincerely, [FreeTextEntry3] : Marin Parson MD\par

## 2021-01-22 NOTE — ADDENDUM
[FreeTextEntry1] : I, Karen Innocent, acted solely as a scribe for Dr. Marin Parson on this date, 01/21/2021.\par \par All medical record entries made by the Scribe were at my Dr. Marin Parson direction and personally dictated by me on, 01/21/2021. I have reviewed the chart and agree that the record accurately reflects my personal performance of the history, physical exam, assessment and plan. I have also personally directed, reviewed and agreed with the chart.\par

## 2021-01-27 DIAGNOSIS — D64.9 ANEMIA, UNSPECIFIED: ICD-10-CM

## 2021-01-27 DIAGNOSIS — I87.2 VENOUS INSUFFICIENCY (CHRONIC) (PERIPHERAL): ICD-10-CM

## 2021-01-27 DIAGNOSIS — I87.311 CHRONIC VENOUS HYPERTENSION (IDIOPATHIC) WITH ULCER OF RIGHT LOWER EXTREMITY: ICD-10-CM

## 2021-01-27 DIAGNOSIS — Z74.1 NEED FOR ASSISTANCE WITH PERSONAL CARE: ICD-10-CM

## 2021-01-27 DIAGNOSIS — Z79.899 OTHER LONG TERM (CURRENT) DRUG THERAPY: ICD-10-CM

## 2021-01-27 DIAGNOSIS — R60.9 EDEMA, UNSPECIFIED: ICD-10-CM

## 2021-01-27 DIAGNOSIS — Z96.0 PRESENCE OF UROGENITAL IMPLANTS: ICD-10-CM

## 2021-01-27 DIAGNOSIS — M17.12 UNILATERAL PRIMARY OSTEOARTHRITIS, LEFT KNEE: ICD-10-CM

## 2021-01-27 DIAGNOSIS — Z91.81 HISTORY OF FALLING: ICD-10-CM

## 2021-01-27 DIAGNOSIS — L97.812 NON-PRESSURE CHRONIC ULCER OF OTHER PART OF RIGHT LOWER LEG WITH FAT LAYER EXPOSED: ICD-10-CM

## 2021-01-28 ENCOUNTER — OUTPATIENT (OUTPATIENT)
Dept: OUTPATIENT SERVICES | Facility: HOSPITAL | Age: 61
LOS: 1 days | Discharge: HOME | End: 2021-01-28

## 2021-01-28 DIAGNOSIS — K46.9 UNSPECIFIED ABDOMINAL HERNIA WITHOUT OBSTRUCTION OR GANGRENE: Chronic | ICD-10-CM

## 2021-01-28 DIAGNOSIS — L89.90 PRESSURE ULCER OF UNSPECIFIED SITE, UNSPECIFIED STAGE: ICD-10-CM

## 2021-01-28 DIAGNOSIS — R58 HEMORRHAGE, NOT ELSEWHERE CLASSIFIED: Chronic | ICD-10-CM

## 2021-01-28 DIAGNOSIS — Z98.89 OTHER SPECIFIED POSTPROCEDURAL STATES: Chronic | ICD-10-CM

## 2021-01-29 DIAGNOSIS — I87.311 CHRONIC VENOUS HYPERTENSION (IDIOPATHIC) WITH ULCER OF RIGHT LOWER EXTREMITY: ICD-10-CM

## 2021-01-29 DIAGNOSIS — D64.9 ANEMIA, UNSPECIFIED: ICD-10-CM

## 2021-01-29 DIAGNOSIS — R60.9 EDEMA, UNSPECIFIED: ICD-10-CM

## 2021-01-29 DIAGNOSIS — Z91.81 HISTORY OF FALLING: ICD-10-CM

## 2021-01-29 DIAGNOSIS — Z79.899 OTHER LONG TERM (CURRENT) DRUG THERAPY: ICD-10-CM

## 2021-01-29 DIAGNOSIS — Z74.1 NEED FOR ASSISTANCE WITH PERSONAL CARE: ICD-10-CM

## 2021-01-29 DIAGNOSIS — M17.12 UNILATERAL PRIMARY OSTEOARTHRITIS, LEFT KNEE: ICD-10-CM

## 2021-01-29 DIAGNOSIS — L97.812 NON-PRESSURE CHRONIC ULCER OF OTHER PART OF RIGHT LOWER LEG WITH FAT LAYER EXPOSED: ICD-10-CM

## 2021-01-29 DIAGNOSIS — Z96.0 PRESENCE OF UROGENITAL IMPLANTS: ICD-10-CM

## 2021-01-29 DIAGNOSIS — I87.2 VENOUS INSUFFICIENCY (CHRONIC) (PERIPHERAL): ICD-10-CM

## 2021-02-02 ENCOUNTER — EMERGENCY (EMERGENCY)
Facility: HOSPITAL | Age: 61
LOS: 0 days | Discharge: HOME | End: 2021-02-02
Payer: MEDICAID

## 2021-02-02 VITALS
SYSTOLIC BLOOD PRESSURE: 154 MMHG | HEART RATE: 78 BPM | HEIGHT: 66 IN | TEMPERATURE: 98 F | RESPIRATION RATE: 17 BRPM | WEIGHT: 268.08 LBS | DIASTOLIC BLOOD PRESSURE: 97 MMHG | OXYGEN SATURATION: 100 %

## 2021-02-02 DIAGNOSIS — Z98.89 OTHER SPECIFIED POSTPROCEDURAL STATES: Chronic | ICD-10-CM

## 2021-02-02 DIAGNOSIS — T83.511A INFECTION AND INFLAMMATORY REACTION DUE TO INDWELLING URETHRAL CATHETER, INITIAL ENCOUNTER: ICD-10-CM
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
## 2021-02-02 DIAGNOSIS — K46.9 UNSPECIFIED ABDOMINAL HERNIA WITHOUT OBSTRUCTION OR GANGRENE: Chronic | ICD-10-CM

## 2021-02-02 DIAGNOSIS — R58 HEMORRHAGE, NOT ELSEWHERE CLASSIFIED: Chronic | ICD-10-CM

## 2021-02-02 DIAGNOSIS — R30.0 DYSURIA: ICD-10-CM
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
## 2021-02-02 LAB
APPEARANCE UR: ABNORMAL
BACTERIA # UR AUTO: ABNORMAL
BILIRUB UR-MCNC: ABNORMAL
COLOR SPEC: YELLOW — SIGNIFICANT CHANGE UP
DIFF PNL FLD: ABNORMAL
GLUCOSE UR QL: NEGATIVE MG/DL — SIGNIFICANT CHANGE UP
KETONES UR-MCNC: ABNORMAL
LEUKOCYTE ESTERASE UR-ACNC: ABNORMAL
NITRITE UR-MCNC: POSITIVE
PH UR: 8 — SIGNIFICANT CHANGE UP (ref 5–8)
PROT UR-MCNC: 30 MG/DL
RBC CASTS # UR COMP ASSIST: ABNORMAL /HPF (ref 0–4)
SP GR SPEC: 1.01 — SIGNIFICANT CHANGE UP (ref 1.01–1.02)
TRI-PHOS CRY UR QL COMP ASSIST: ABNORMAL
UROBILINOGEN FLD QL: 1 MG/DL
WBC UR QL: ABNORMAL

## 2021-02-02 PROCEDURE — 99284 EMERGENCY DEPT VISIT MOD MDM: CPT

## 2021-02-02 RX ORDER — CEFPODOXIME PROXETIL 100 MG
100 TABLET ORAL ONCE
Refills: 0 | Status: COMPLETED | OUTPATIENT
Start: 2021-02-02 | End: 2021-02-02

## 2021-02-02 RX ORDER — CEFPODOXIME PROXETIL 100 MG
1 TABLET ORAL
Qty: 20 | Refills: 0
Start: 2021-02-02 | End: 2021-02-11

## 2021-02-02 RX ADMIN — Medication 100 MILLIGRAM(S): at 22:36

## 2021-02-02 NOTE — ED PROVIDER NOTE - PHYSICAL EXAMINATION
PE:   GEN: Awake, alert, interactive, NAD, non-toxic appearing.   HEAD AND NECK: NC/AT. Airway patent. Neck supple.   EYES: Clear b/l. PERRL  CARDIAC: RRR. S1, S2. No evident pedal edema.    RESP: Normal respiratory effort with no use of accessory muscles or retractions. Clear throughout on auscultation.  ABD: soft, non-distended, non-tender. No rebound, no guarding.   : Barbosa out with surrounding prurlent drainage.   NEURO: AOx3, CN II-XII grossly intact, no focal deficits.   MSK: Moving all extremities with no apparent deformities.   SKIN: Warm, dry, intact normal color

## 2021-02-02 NOTE — ED PROVIDER NOTE - PATIENT PORTAL LINK FT
You can access the FollowMyHealth Patient Portal offered by [facility] by registering at the following website: [email or web address]. By joining [facility]’s FollowMyHealth portal, you will also be able to view your health information using other applications (apps) compatible with our system.

## 2021-02-02 NOTE — ED PROVIDER NOTE - CARE PLAN
Principal Discharge DX:	Urinary tract infection associated with indwelling urethral catheter, initial encounter

## 2021-02-02 NOTE — ED PROVIDER NOTE - CLINICAL SUMMARY MEDICAL DECISION MAKING FREE TEXT BOX
61yo male with pmh of urinary retention with wahl presents with wahl clog x 2 hours. Pruurlent drainage surrounding wahl. Will replace and send UA

## 2021-02-02 NOTE — ED PROVIDER NOTE - NS ED ROS FT
Constitutional: (-) Fever, (-) Anorexia, (-) Generalized Malaise  Eyes: (-)Discharge, (-) Irritation,  (-) Visual changes  EARS: (-) Ear Pain, (-) Apparent hearing changes  NOSE: (-) Congestion, (-) Bloody nose  MOUTH/THROAT: (-) Vocal Changes, (-) Drooling, (-) Sore throat  NECK: (-) Lumps, (-) Stiffness, (-) Pain  CV: (-) Chest Pain, (-) Palpitations, (-) Edema   RESP:  (-) Cough, (-) SOB, (-) FONSECA,  (-) Wheezing  GI: (-) Nausea, (-) Vomiting, (-) Abdominal Pain, (-) Diarrhea, (-) Constipation, (-) Bloody stools  : (-) Dysuria, (-) Frequency, (-) Hematuria, (-) Incontinence  MSK: (-) Joint Pain, (-) Back Pain, (-) Deformities  SKIN: (-) Wounds, (-) Color change, (-)Rash, (-) Swelling  NEURO:(-) Headache, (-) Dizziness, (-) Numbness/Tingling,  (-)LOC

## 2021-02-02 NOTE — ED PROVIDER NOTE - NSFOLLOWUPINSTRUCTIONS_ED_ALL_ED_FT
Urinary Tract Infection    A urinary tract infection (UTI) is an infection of any part of the urinary tract, which includes the kidneys, ureters, bladder, and urethra. Risk factors include ignoring your need to urinate, wiping back to front if female, being an uncircumcised male, and having diabetes or a weak immune system. Symptoms include frequent urination, pain or burning with urination, foul smelling urine, cloudy urine, pain in the lower abdomen, blood in the urine, and fever. If you were prescribed an antibiotic medicine, take it as told by your health care provider. Do not stop taking the antibiotic even if you start to feel better.    SEEK IMMEDIATE MEDICAL CARE IF YOU HAVE ANY OF THE FOLLOWING SYMPTOMS: severe back or abdominal pain, fever, inability to keep fluids or medicine down, dizziness/lightheadedness, or a change in mental status.     Follow up with UROLOGY asap for further management.

## 2021-02-02 NOTE — ED PROVIDER NOTE - OBJECTIVE STATEMENT
61yo male with pmh of urinary retention with wahl presents with wahl clog x 2 hours. Endorses lower abdominal pain x 1 hour. Denies fevers/chills, bloody urine and other associated sx.

## 2021-02-04 ENCOUNTER — OUTPATIENT (OUTPATIENT)
Dept: OUTPATIENT SERVICES | Facility: HOSPITAL | Age: 61
LOS: 1 days | Discharge: HOME | End: 2021-02-04

## 2021-02-04 ENCOUNTER — APPOINTMENT (OUTPATIENT)
Dept: UROLOGY | Facility: CLINIC | Age: 61
End: 2021-02-04
Payer: COMMERCIAL

## 2021-02-04 VITALS
HEART RATE: 71 BPM | TEMPERATURE: 98.7 F | WEIGHT: 272 LBS | RESPIRATION RATE: 17 BRPM | SYSTOLIC BLOOD PRESSURE: 158 MMHG | DIASTOLIC BLOOD PRESSURE: 88 MMHG | BODY MASS INDEX: 43.71 KG/M2 | HEIGHT: 66 IN

## 2021-02-04 DIAGNOSIS — L89.90 PRESSURE ULCER OF UNSPECIFIED SITE, UNSPECIFIED STAGE: ICD-10-CM

## 2021-02-04 DIAGNOSIS — E66.9 OBESITY, UNSPECIFIED: ICD-10-CM

## 2021-02-04 DIAGNOSIS — K46.9 UNSPECIFIED ABDOMINAL HERNIA WITHOUT OBSTRUCTION OR GANGRENE: Chronic | ICD-10-CM

## 2021-02-04 DIAGNOSIS — R58 HEMORRHAGE, NOT ELSEWHERE CLASSIFIED: Chronic | ICD-10-CM

## 2021-02-04 DIAGNOSIS — Z98.89 OTHER SPECIFIED POSTPROCEDURAL STATES: Chronic | ICD-10-CM

## 2021-02-04 LAB
CULTURE RESULTS: SIGNIFICANT CHANGE UP
SPECIMEN SOURCE: SIGNIFICANT CHANGE UP

## 2021-02-04 PROCEDURE — 99072 ADDL SUPL MATRL&STAF TM PHE: CPT

## 2021-02-04 PROCEDURE — 99213 OFFICE O/P EST LOW 20 MIN: CPT

## 2021-02-05 DIAGNOSIS — Z96.0 PRESENCE OF UROGENITAL IMPLANTS: ICD-10-CM

## 2021-02-05 DIAGNOSIS — Z79.899 OTHER LONG TERM (CURRENT) DRUG THERAPY: ICD-10-CM

## 2021-02-05 DIAGNOSIS — I87.311 CHRONIC VENOUS HYPERTENSION (IDIOPATHIC) WITH ULCER OF RIGHT LOWER EXTREMITY: ICD-10-CM

## 2021-02-05 DIAGNOSIS — I87.2 VENOUS INSUFFICIENCY (CHRONIC) (PERIPHERAL): ICD-10-CM

## 2021-02-05 DIAGNOSIS — L97.812 NON-PRESSURE CHRONIC ULCER OF OTHER PART OF RIGHT LOWER LEG WITH FAT LAYER EXPOSED: ICD-10-CM

## 2021-02-05 DIAGNOSIS — D64.9 ANEMIA, UNSPECIFIED: ICD-10-CM

## 2021-02-05 DIAGNOSIS — R60.9 EDEMA, UNSPECIFIED: ICD-10-CM

## 2021-02-05 DIAGNOSIS — M17.12 UNILATERAL PRIMARY OSTEOARTHRITIS, LEFT KNEE: ICD-10-CM

## 2021-02-05 DIAGNOSIS — Z91.81 HISTORY OF FALLING: ICD-10-CM

## 2021-02-05 DIAGNOSIS — Z74.1 NEED FOR ASSISTANCE WITH PERSONAL CARE: ICD-10-CM

## 2021-02-08 NOTE — HISTORY OF PRESENT ILLNESS
[FreeTextEntry1] : 59 y/o male presenting for initial visit for urinary retention. Pt presented to [facility] ER on 11/23/2020 for urinary retention. UA and culture in hospital negative. Barbosa catheter was placed in hospital. \par \par Barbosa catheter in place. Barbosa catheter changed today. \par \par O/E: uncircumcised phallus, adequate meatus,  left epididymal cyst, right hydrocele; intrascrotal contents cannot be examined secondary to large hydrocele\par LEILANI: deferred\par \par Will start on Tamsulosin\par Will schedule for UDS and cystoscopy possible biopsy and fulguration in 3 weeks\par Will do prostate sonogram at next visit\par \par 01/21/2021: Pt is here today for follow up on urinary retention. \par \par Urodynamics was done today. Detrusor pressure increased. Obstruction present\par \par Cystoscopy was done today. Enlarged prostate. Bladder stones visualized secondary to Barbosa catheter.\par \par Testicular sonogram was done today. Bilateral hydrocele. Testes normal. \par \par Barbosa catheter was changed today. \par \par On Tamsulosin; will continue\par \par Will consult with PMD to discuss clearance for cystolitholapaxy and Green light TURP\par \par Follow up in 2 weeks\par \par 02/04/2021: Pt is here today for follow up for urinary retention. Pt presented to [facility] ER for Barbosa catheter blockage. Barbosa catheter was changed in hospital.\par \par Pt will consult with cardiologist for clearance for cystolitholapaxy and Green light TURP\par \par On Tamsulosin ; will continue\par Will call for appt

## 2021-02-08 NOTE — PHYSICAL EXAM
[General Appearance - Well Developed] : well developed [General Appearance - Well Nourished] : well nourished [Normal Appearance] : normal appearance [Well Groomed] : well groomed [General Appearance - In No Acute Distress] : no acute distress [Abdomen Soft] : soft [Abdomen Tenderness] : non-tender [Costovertebral Angle Tenderness] : no ~M costovertebral angle tenderness [Urethral Meatus] : meatus normal [Penis Abnormality] : normal uncircumcised penis [Urinary Bladder Findings] : the bladder was normal on palpation [Scrotum] : the scrotum was normal [Edema] : no peripheral edema [] : no respiratory distress [Respiration, Rhythm And Depth] : normal respiratory rhythm and effort [Oriented To Time, Place, And Person] : oriented to person, place, and time [Exaggerated Use Of Accessory Muscles For Inspiration] : no accessory muscle use [Affect] : the affect was normal [Mood] : the mood was normal [Not Anxious] : not anxious [No Focal Deficits] : no focal deficits [No Palpable Adenopathy] : no palpable adenopathy [FreeTextEntry1] : uses crutches

## 2021-02-08 NOTE — ADDENDUM
[FreeTextEntry1] : I, Karen Innocent, acted solely as a scribe for Dr. Marin Parson on this date, 02/04/2021.\par \par All medical record entries made by the Scribe were at my Dr. Marin Parson direction and personally dictated by me on, 02/04/2021. I have reviewed the chart and agree that the record accurately reflects my personal performance of the history, physical exam, assessment and plan. I have also personally directed, reviewed and agreed with the chart.\par

## 2021-02-11 ENCOUNTER — OUTPATIENT (OUTPATIENT)
Dept: OUTPATIENT SERVICES | Facility: HOSPITAL | Age: 61
LOS: 1 days | Discharge: HOME | End: 2021-02-11

## 2021-02-11 DIAGNOSIS — Z91.81 HISTORY OF FALLING: ICD-10-CM

## 2021-02-11 DIAGNOSIS — R60.9 EDEMA, UNSPECIFIED: ICD-10-CM

## 2021-02-11 DIAGNOSIS — D64.9 ANEMIA, UNSPECIFIED: ICD-10-CM

## 2021-02-11 DIAGNOSIS — K46.9 UNSPECIFIED ABDOMINAL HERNIA WITHOUT OBSTRUCTION OR GANGRENE: Chronic | ICD-10-CM

## 2021-02-11 DIAGNOSIS — R58 HEMORRHAGE, NOT ELSEWHERE CLASSIFIED: Chronic | ICD-10-CM

## 2021-02-11 DIAGNOSIS — L89.90 PRESSURE ULCER OF UNSPECIFIED SITE, UNSPECIFIED STAGE: ICD-10-CM

## 2021-02-11 DIAGNOSIS — I87.2 VENOUS INSUFFICIENCY (CHRONIC) (PERIPHERAL): ICD-10-CM

## 2021-02-11 DIAGNOSIS — L97.812 NON-PRESSURE CHRONIC ULCER OF OTHER PART OF RIGHT LOWER LEG WITH FAT LAYER EXPOSED: ICD-10-CM

## 2021-02-11 DIAGNOSIS — Z98.89 OTHER SPECIFIED POSTPROCEDURAL STATES: Chronic | ICD-10-CM

## 2021-02-11 DIAGNOSIS — I87.311 CHRONIC VENOUS HYPERTENSION (IDIOPATHIC) WITH ULCER OF RIGHT LOWER EXTREMITY: ICD-10-CM

## 2021-02-11 DIAGNOSIS — Z79.899 OTHER LONG TERM (CURRENT) DRUG THERAPY: ICD-10-CM

## 2021-02-11 DIAGNOSIS — Z96.0 PRESENCE OF UROGENITAL IMPLANTS: ICD-10-CM

## 2021-02-11 DIAGNOSIS — Z74.1 NEED FOR ASSISTANCE WITH PERSONAL CARE: ICD-10-CM

## 2021-02-11 DIAGNOSIS — M17.12 UNILATERAL PRIMARY OSTEOARTHRITIS, LEFT KNEE: ICD-10-CM

## 2021-02-14 LAB
-  AMIKACIN: SIGNIFICANT CHANGE UP
-  AZTREONAM: SIGNIFICANT CHANGE UP
-  CEFEPIME: SIGNIFICANT CHANGE UP
-  CEFTRIAXONE: SIGNIFICANT CHANGE UP
-  CIPROFLOXACIN: SIGNIFICANT CHANGE UP
-  GENTAMICIN: SIGNIFICANT CHANGE UP
-  LEVOFLOXACIN: SIGNIFICANT CHANGE UP
-  MEROPENEM: SIGNIFICANT CHANGE UP
-  PIPERACILLIN/TAZOBACTAM: SIGNIFICANT CHANGE UP
-  TOBRAMYCIN: SIGNIFICANT CHANGE UP
-  TRIMETHOPRIM/SULFAMETHOXAZOLE: SIGNIFICANT CHANGE UP
CULTURE RESULTS: SIGNIFICANT CHANGE UP
METHOD TYPE: SIGNIFICANT CHANGE UP
ORGANISM # SPEC MICROSCOPIC CNT: SIGNIFICANT CHANGE UP
SPECIMEN SOURCE: SIGNIFICANT CHANGE UP

## 2021-02-25 ENCOUNTER — OUTPATIENT (OUTPATIENT)
Dept: OUTPATIENT SERVICES | Facility: HOSPITAL | Age: 61
LOS: 1 days | Discharge: HOME | End: 2021-02-25

## 2021-02-25 DIAGNOSIS — L89.90 PRESSURE ULCER OF UNSPECIFIED SITE, UNSPECIFIED STAGE: ICD-10-CM

## 2021-02-25 DIAGNOSIS — Z98.89 OTHER SPECIFIED POSTPROCEDURAL STATES: Chronic | ICD-10-CM

## 2021-02-25 DIAGNOSIS — I87.311 CHRONIC VENOUS HYPERTENSION (IDIOPATHIC) WITH ULCER OF RIGHT LOWER EXTREMITY: ICD-10-CM

## 2021-02-25 DIAGNOSIS — L97.812 NON-PRESSURE CHRONIC ULCER OF OTHER PART OF RIGHT LOWER LEG WITH FAT LAYER EXPOSED: ICD-10-CM

## 2021-02-25 DIAGNOSIS — M17.12 UNILATERAL PRIMARY OSTEOARTHRITIS, LEFT KNEE: ICD-10-CM

## 2021-02-25 DIAGNOSIS — R60.9 EDEMA, UNSPECIFIED: ICD-10-CM

## 2021-02-25 DIAGNOSIS — I87.2 VENOUS INSUFFICIENCY (CHRONIC) (PERIPHERAL): ICD-10-CM

## 2021-02-25 DIAGNOSIS — Z74.1 NEED FOR ASSISTANCE WITH PERSONAL CARE: ICD-10-CM

## 2021-02-25 DIAGNOSIS — Z91.81 HISTORY OF FALLING: ICD-10-CM

## 2021-02-25 DIAGNOSIS — D64.9 ANEMIA, UNSPECIFIED: ICD-10-CM

## 2021-02-25 DIAGNOSIS — K46.9 UNSPECIFIED ABDOMINAL HERNIA WITHOUT OBSTRUCTION OR GANGRENE: Chronic | ICD-10-CM

## 2021-02-25 DIAGNOSIS — Z79.899 OTHER LONG TERM (CURRENT) DRUG THERAPY: ICD-10-CM

## 2021-02-25 DIAGNOSIS — R58 HEMORRHAGE, NOT ELSEWHERE CLASSIFIED: Chronic | ICD-10-CM

## 2021-02-25 DIAGNOSIS — Z96.0 PRESENCE OF UROGENITAL IMPLANTS: ICD-10-CM

## 2021-03-04 ENCOUNTER — OUTPATIENT (OUTPATIENT)
Dept: OUTPATIENT SERVICES | Facility: HOSPITAL | Age: 61
LOS: 1 days | Discharge: HOME | End: 2021-03-04

## 2021-03-04 DIAGNOSIS — Z98.89 OTHER SPECIFIED POSTPROCEDURAL STATES: Chronic | ICD-10-CM

## 2021-03-04 DIAGNOSIS — L89.90 PRESSURE ULCER OF UNSPECIFIED SITE, UNSPECIFIED STAGE: ICD-10-CM

## 2021-03-04 DIAGNOSIS — R58 HEMORRHAGE, NOT ELSEWHERE CLASSIFIED: Chronic | ICD-10-CM

## 2021-03-04 DIAGNOSIS — K46.9 UNSPECIFIED ABDOMINAL HERNIA WITHOUT OBSTRUCTION OR GANGRENE: Chronic | ICD-10-CM

## 2021-03-08 DIAGNOSIS — Z96.0 PRESENCE OF UROGENITAL IMPLANTS: ICD-10-CM

## 2021-03-08 DIAGNOSIS — I87.311 CHRONIC VENOUS HYPERTENSION (IDIOPATHIC) WITH ULCER OF RIGHT LOWER EXTREMITY: ICD-10-CM

## 2021-03-08 DIAGNOSIS — D64.9 ANEMIA, UNSPECIFIED: ICD-10-CM

## 2021-03-08 DIAGNOSIS — L97.812 NON-PRESSURE CHRONIC ULCER OF OTHER PART OF RIGHT LOWER LEG WITH FAT LAYER EXPOSED: ICD-10-CM

## 2021-03-08 DIAGNOSIS — L92.8 OTHER GRANULOMATOUS DISORDERS OF THE SKIN AND SUBCUTANEOUS TISSUE: ICD-10-CM

## 2021-03-08 DIAGNOSIS — Z79.899 OTHER LONG TERM (CURRENT) DRUG THERAPY: ICD-10-CM

## 2021-03-08 DIAGNOSIS — I87.2 VENOUS INSUFFICIENCY (CHRONIC) (PERIPHERAL): ICD-10-CM

## 2021-03-08 DIAGNOSIS — M17.12 UNILATERAL PRIMARY OSTEOARTHRITIS, LEFT KNEE: ICD-10-CM

## 2021-03-08 DIAGNOSIS — R60.9 EDEMA, UNSPECIFIED: ICD-10-CM

## 2021-03-08 DIAGNOSIS — Z91.81 HISTORY OF FALLING: ICD-10-CM

## 2021-03-08 DIAGNOSIS — Z74.1 NEED FOR ASSISTANCE WITH PERSONAL CARE: ICD-10-CM

## 2021-03-11 ENCOUNTER — OUTPATIENT (OUTPATIENT)
Dept: OUTPATIENT SERVICES | Facility: HOSPITAL | Age: 61
LOS: 1 days | Discharge: HOME | End: 2021-03-11

## 2021-03-11 DIAGNOSIS — L89.90 PRESSURE ULCER OF UNSPECIFIED SITE, UNSPECIFIED STAGE: ICD-10-CM

## 2021-03-11 DIAGNOSIS — K46.9 UNSPECIFIED ABDOMINAL HERNIA WITHOUT OBSTRUCTION OR GANGRENE: Chronic | ICD-10-CM

## 2021-03-11 DIAGNOSIS — Z98.89 OTHER SPECIFIED POSTPROCEDURAL STATES: Chronic | ICD-10-CM

## 2021-03-11 DIAGNOSIS — R58 HEMORRHAGE, NOT ELSEWHERE CLASSIFIED: Chronic | ICD-10-CM

## 2021-03-18 ENCOUNTER — OUTPATIENT (OUTPATIENT)
Dept: OUTPATIENT SERVICES | Facility: HOSPITAL | Age: 61
LOS: 1 days | Discharge: HOME | End: 2021-03-18

## 2021-03-18 DIAGNOSIS — I87.311 CHRONIC VENOUS HYPERTENSION (IDIOPATHIC) WITH ULCER OF RIGHT LOWER EXTREMITY: ICD-10-CM

## 2021-03-18 DIAGNOSIS — Z79.899 OTHER LONG TERM (CURRENT) DRUG THERAPY: ICD-10-CM

## 2021-03-18 DIAGNOSIS — M17.12 UNILATERAL PRIMARY OSTEOARTHRITIS, LEFT KNEE: ICD-10-CM

## 2021-03-18 DIAGNOSIS — D64.9 ANEMIA, UNSPECIFIED: ICD-10-CM

## 2021-03-18 DIAGNOSIS — L89.90 PRESSURE ULCER OF UNSPECIFIED SITE, UNSPECIFIED STAGE: ICD-10-CM

## 2021-03-18 DIAGNOSIS — Z74.1 NEED FOR ASSISTANCE WITH PERSONAL CARE: ICD-10-CM

## 2021-03-18 DIAGNOSIS — Z91.81 HISTORY OF FALLING: ICD-10-CM

## 2021-03-18 DIAGNOSIS — L97.812 NON-PRESSURE CHRONIC ULCER OF OTHER PART OF RIGHT LOWER LEG WITH FAT LAYER EXPOSED: ICD-10-CM

## 2021-03-18 DIAGNOSIS — Z96.0 PRESENCE OF UROGENITAL IMPLANTS: ICD-10-CM

## 2021-03-18 DIAGNOSIS — R60.9 EDEMA, UNSPECIFIED: ICD-10-CM

## 2021-03-18 DIAGNOSIS — Z98.89 OTHER SPECIFIED POSTPROCEDURAL STATES: Chronic | ICD-10-CM

## 2021-03-18 DIAGNOSIS — K46.9 UNSPECIFIED ABDOMINAL HERNIA WITHOUT OBSTRUCTION OR GANGRENE: Chronic | ICD-10-CM

## 2021-03-18 DIAGNOSIS — R58 HEMORRHAGE, NOT ELSEWHERE CLASSIFIED: Chronic | ICD-10-CM

## 2021-03-19 DIAGNOSIS — R60.9 EDEMA, UNSPECIFIED: ICD-10-CM

## 2021-03-19 DIAGNOSIS — I87.2 VENOUS INSUFFICIENCY (CHRONIC) (PERIPHERAL): ICD-10-CM

## 2021-03-19 DIAGNOSIS — L97.812 NON-PRESSURE CHRONIC ULCER OF OTHER PART OF RIGHT LOWER LEG WITH FAT LAYER EXPOSED: ICD-10-CM

## 2021-03-19 DIAGNOSIS — D64.9 ANEMIA, UNSPECIFIED: ICD-10-CM

## 2021-03-19 DIAGNOSIS — Z74.1 NEED FOR ASSISTANCE WITH PERSONAL CARE: ICD-10-CM

## 2021-03-19 DIAGNOSIS — I87.311 CHRONIC VENOUS HYPERTENSION (IDIOPATHIC) WITH ULCER OF RIGHT LOWER EXTREMITY: ICD-10-CM

## 2021-03-19 DIAGNOSIS — Z91.81 HISTORY OF FALLING: ICD-10-CM

## 2021-03-19 DIAGNOSIS — M17.12 UNILATERAL PRIMARY OSTEOARTHRITIS, LEFT KNEE: ICD-10-CM

## 2021-03-19 DIAGNOSIS — Z96.0 PRESENCE OF UROGENITAL IMPLANTS: ICD-10-CM

## 2021-03-19 DIAGNOSIS — Z79.899 OTHER LONG TERM (CURRENT) DRUG THERAPY: ICD-10-CM

## 2021-03-25 ENCOUNTER — OUTPATIENT (OUTPATIENT)
Dept: OUTPATIENT SERVICES | Facility: HOSPITAL | Age: 61
LOS: 1 days | Discharge: HOME | End: 2021-03-25

## 2021-03-25 DIAGNOSIS — Z98.89 OTHER SPECIFIED POSTPROCEDURAL STATES: Chronic | ICD-10-CM

## 2021-03-25 DIAGNOSIS — K46.9 UNSPECIFIED ABDOMINAL HERNIA WITHOUT OBSTRUCTION OR GANGRENE: Chronic | ICD-10-CM

## 2021-03-25 DIAGNOSIS — L89.90 PRESSURE ULCER OF UNSPECIFIED SITE, UNSPECIFIED STAGE: ICD-10-CM

## 2021-03-25 DIAGNOSIS — R58 HEMORRHAGE, NOT ELSEWHERE CLASSIFIED: Chronic | ICD-10-CM

## 2021-04-01 ENCOUNTER — OUTPATIENT (OUTPATIENT)
Dept: OUTPATIENT SERVICES | Facility: HOSPITAL | Age: 61
LOS: 1 days | Discharge: HOME | End: 2021-04-01

## 2021-04-01 DIAGNOSIS — Z96.0 PRESENCE OF UROGENITAL IMPLANTS: ICD-10-CM

## 2021-04-01 DIAGNOSIS — I87.2 VENOUS INSUFFICIENCY (CHRONIC) (PERIPHERAL): ICD-10-CM

## 2021-04-01 DIAGNOSIS — I87.311 CHRONIC VENOUS HYPERTENSION (IDIOPATHIC) WITH ULCER OF RIGHT LOWER EXTREMITY: ICD-10-CM

## 2021-04-01 DIAGNOSIS — Z79.899 OTHER LONG TERM (CURRENT) DRUG THERAPY: ICD-10-CM

## 2021-04-01 DIAGNOSIS — D64.9 ANEMIA, UNSPECIFIED: ICD-10-CM

## 2021-04-01 DIAGNOSIS — R60.9 EDEMA, UNSPECIFIED: ICD-10-CM

## 2021-04-01 DIAGNOSIS — L89.90 PRESSURE ULCER OF UNSPECIFIED SITE, UNSPECIFIED STAGE: ICD-10-CM

## 2021-04-01 DIAGNOSIS — Z91.81 HISTORY OF FALLING: ICD-10-CM

## 2021-04-01 DIAGNOSIS — R58 HEMORRHAGE, NOT ELSEWHERE CLASSIFIED: Chronic | ICD-10-CM

## 2021-04-01 DIAGNOSIS — K46.9 UNSPECIFIED ABDOMINAL HERNIA WITHOUT OBSTRUCTION OR GANGRENE: Chronic | ICD-10-CM

## 2021-04-01 DIAGNOSIS — L97.812 NON-PRESSURE CHRONIC ULCER OF OTHER PART OF RIGHT LOWER LEG WITH FAT LAYER EXPOSED: ICD-10-CM

## 2021-04-01 DIAGNOSIS — M17.12 UNILATERAL PRIMARY OSTEOARTHRITIS, LEFT KNEE: ICD-10-CM

## 2021-04-01 DIAGNOSIS — L92.2 GRANULOMA FACIALE [EOSINOPHILIC GRANULOMA OF SKIN]: ICD-10-CM

## 2021-04-01 DIAGNOSIS — Z74.1 NEED FOR ASSISTANCE WITH PERSONAL CARE: ICD-10-CM

## 2021-04-01 DIAGNOSIS — Z98.89 OTHER SPECIFIED POSTPROCEDURAL STATES: Chronic | ICD-10-CM

## 2021-04-07 DIAGNOSIS — I87.311 CHRONIC VENOUS HYPERTENSION (IDIOPATHIC) WITH ULCER OF RIGHT LOWER EXTREMITY: ICD-10-CM

## 2021-04-07 DIAGNOSIS — D64.9 ANEMIA, UNSPECIFIED: ICD-10-CM

## 2021-04-07 DIAGNOSIS — Z91.81 HISTORY OF FALLING: ICD-10-CM

## 2021-04-07 DIAGNOSIS — Z96.0 PRESENCE OF UROGENITAL IMPLANTS: ICD-10-CM

## 2021-04-07 DIAGNOSIS — Z74.1 NEED FOR ASSISTANCE WITH PERSONAL CARE: ICD-10-CM

## 2021-04-07 DIAGNOSIS — L97.812 NON-PRESSURE CHRONIC ULCER OF OTHER PART OF RIGHT LOWER LEG WITH FAT LAYER EXPOSED: ICD-10-CM

## 2021-04-07 DIAGNOSIS — I87.2 VENOUS INSUFFICIENCY (CHRONIC) (PERIPHERAL): ICD-10-CM

## 2021-04-07 DIAGNOSIS — R60.9 EDEMA, UNSPECIFIED: ICD-10-CM

## 2021-04-07 DIAGNOSIS — L92.9 GRANULOMATOUS DISORDER OF THE SKIN AND SUBCUTANEOUS TISSUE, UNSPECIFIED: ICD-10-CM

## 2021-04-07 DIAGNOSIS — M17.12 UNILATERAL PRIMARY OSTEOARTHRITIS, LEFT KNEE: ICD-10-CM

## 2021-04-07 DIAGNOSIS — Z79.899 OTHER LONG TERM (CURRENT) DRUG THERAPY: ICD-10-CM

## 2021-04-08 ENCOUNTER — OUTPATIENT (OUTPATIENT)
Dept: OUTPATIENT SERVICES | Facility: HOSPITAL | Age: 61
LOS: 1 days | Discharge: HOME | End: 2021-04-08

## 2021-04-08 DIAGNOSIS — R58 HEMORRHAGE, NOT ELSEWHERE CLASSIFIED: Chronic | ICD-10-CM

## 2021-04-08 DIAGNOSIS — K46.9 UNSPECIFIED ABDOMINAL HERNIA WITHOUT OBSTRUCTION OR GANGRENE: Chronic | ICD-10-CM

## 2021-04-08 DIAGNOSIS — Z98.89 OTHER SPECIFIED POSTPROCEDURAL STATES: Chronic | ICD-10-CM

## 2021-04-08 DIAGNOSIS — L89.90 PRESSURE ULCER OF UNSPECIFIED SITE, UNSPECIFIED STAGE: ICD-10-CM

## 2021-04-10 ENCOUNTER — EMERGENCY (EMERGENCY)
Facility: HOSPITAL | Age: 61
LOS: 0 days | Discharge: HOME | End: 2021-04-10
Payer: MEDICAID

## 2021-04-10 VITALS
HEART RATE: 81 BPM | TEMPERATURE: 98 F | OXYGEN SATURATION: 97 % | WEIGHT: 274.92 LBS | SYSTOLIC BLOOD PRESSURE: 185 MMHG | RESPIRATION RATE: 17 BRPM | DIASTOLIC BLOOD PRESSURE: 100 MMHG | HEIGHT: 66 IN

## 2021-04-10 VITALS
SYSTOLIC BLOOD PRESSURE: 166 MMHG | HEART RATE: 98 BPM | OXYGEN SATURATION: 98 % | DIASTOLIC BLOOD PRESSURE: 97 MMHG | RESPIRATION RATE: 19 BRPM | TEMPERATURE: 98 F

## 2021-04-10 DIAGNOSIS — K27.9 PEPTIC ULCER, SITE UNSPECIFIED, UNSPECIFIED AS ACUTE OR CHRONIC, WITHOUT HEMORRHAGE OR PERFORATION: ICD-10-CM

## 2021-04-10 DIAGNOSIS — K26.9 DUODENAL ULCER, UNSPECIFIED AS ACUTE OR CHRONIC, WITHOUT HEMORRHAGE OR PERFORATION: ICD-10-CM

## 2021-04-10 DIAGNOSIS — R58 HEMORRHAGE, NOT ELSEWHERE CLASSIFIED: Chronic | ICD-10-CM

## 2021-04-10 DIAGNOSIS — Z46.6 ENCOUNTER FOR FITTING AND ADJUSTMENT OF URINARY DEVICE: ICD-10-CM

## 2021-04-10 DIAGNOSIS — Z98.89 OTHER SPECIFIED POSTPROCEDURAL STATES: Chronic | ICD-10-CM

## 2021-04-10 DIAGNOSIS — E66.01 MORBID (SEVERE) OBESITY DUE TO EXCESS CALORIES: ICD-10-CM

## 2021-04-10 DIAGNOSIS — I10 ESSENTIAL (PRIMARY) HYPERTENSION: ICD-10-CM

## 2021-04-10 DIAGNOSIS — M19.90 UNSPECIFIED OSTEOARTHRITIS, UNSPECIFIED SITE: ICD-10-CM

## 2021-04-10 DIAGNOSIS — K46.9 UNSPECIFIED ABDOMINAL HERNIA WITHOUT OBSTRUCTION OR GANGRENE: Chronic | ICD-10-CM

## 2021-04-10 PROCEDURE — 93306 TTE W/DOPPLER COMPLETE: CPT | Mod: 26

## 2021-04-10 PROCEDURE — 99283 EMERGENCY DEPT VISIT LOW MDM: CPT

## 2021-04-10 PROCEDURE — 99284 EMERGENCY DEPT VISIT MOD MDM: CPT

## 2021-04-10 NOTE — ED ADULT TRIAGE NOTE - CHIEF COMPLAINT QUOTE
60 y/o male with PMH of Urinary retention. Presents to the ED with c/c of urinary catheter dislodge. pt denies any pain.

## 2021-04-10 NOTE — ED PROVIDER NOTE - NS ED MD DISPO DISCHARGE CCDA
Patient given contact information to [facility]. Encouraged him to call them if he has not been contacted as of Monday, 7/10/2017. PAP adherence appointment was discussed with patient.  Patient's adherence visit is scheduled for Monday, 9/11/2017 8:20am. Patient/Caregiver provided printed discharge information.

## 2021-04-10 NOTE — ED ADULT NURSE NOTE - CHIEF COMPLAINT QUOTE
62 y/o male with PMH of Urinary retention. Presents to the ED with c/c of urinary catheter dislodge. pt denies any pain.

## 2021-04-10 NOTE — ED PROVIDER NOTE - CLINICAL SUMMARY MEDICAL DECISION MAKING FREE TEXT BOX
62 y/o male with PMH of Urinary retention. Presents to the ED with c/c of urinary catheter dislodge.     imp catheter change     plan   catheter change   urology referral   patient education   reassess

## 2021-04-10 NOTE — ED PROVIDER NOTE - NSFOLLOWUPCLINICS_GEN_ALL_ED_FT
[facility]  Neurology  [address], [address]  [place] [id number]  Phone: [phone]  Fax:   Follow Up Time: 4-6 Days

## 2021-04-10 NOTE — ED ADULT NURSE NOTE - OBJECTIVE STATEMENT
Received patient in FT. AOX4. 62 y/o male with PMH of Urinary retention. Presents to the ED with c/c of urinary catheter dislodge. pt denies any pain. Barbosa changed immediately. Noted bag leaking. Supplies provided. Awaiting to be seen

## 2021-04-10 NOTE — ED PROVIDER NOTE - OBJECTIVE STATEMENT
62 y/o male with PMH of Urinary retention, HTN OA. Presents to the ED with c/c of urinary catheter dislodge. pt denies any pain.

## 2021-04-10 NOTE — ED PROVIDER NOTE - PATIENT PORTAL LINK FT
You can access the FollowMyHealth Patient Portal offered by [facility] by registering at the following website: [email or web address]. By joining [facility]’s FollowMyHealth portal, you will also be able to view your health information using other applications (apps) compatible with our system.

## 2021-04-10 NOTE — ED PROVIDER NOTE - NSFOLLOWUPINSTRUCTIONS_ED_ALL_ED_FT
Please schedule to follow up with urology within 7 days for urine catheter replacement   [facility]  urology  [address], [address]  [place] [address]  Phone: [phone]  Fax:   Follow Up Time: 4-6 Days         please return to the ED for worst symptoms chest pain, short of breath, fever, chills, cough, hemoptysis, numbness, weakness, drooling, blurry vision, headache, dizziness, bleeding, dysuria, frequency, urgency, rash, lesion, fall, injury, trauma, paresthesia, swelling, pain, discoloration,

## 2021-04-12 ENCOUNTER — APPOINTMENT (OUTPATIENT)
Dept: CARDIOLOGY | Facility: CLINIC | Age: 61
End: 2021-04-12
Payer: MEDICAID

## 2021-04-12 ENCOUNTER — NON-APPOINTMENT (OUTPATIENT)
Age: 61
End: 2021-04-12

## 2021-04-12 VITALS
HEART RATE: 68 BPM | WEIGHT: 290 LBS | HEIGHT: 66 IN | OXYGEN SATURATION: 97 % | TEMPERATURE: 98.6 F | BODY MASS INDEX: 46.61 KG/M2 | DIASTOLIC BLOOD PRESSURE: 90 MMHG | SYSTOLIC BLOOD PRESSURE: 158 MMHG

## 2021-04-12 DIAGNOSIS — R60.0 LOCALIZED EDEMA: ICD-10-CM

## 2021-04-12 DIAGNOSIS — I11.9 HYPERTENSIVE HEART DISEASE W/OUT HEART FAILURE: ICD-10-CM

## 2021-04-12 DIAGNOSIS — I71.2 THORACIC AORTIC ANEURYSM, W/OUT RUPTURE: ICD-10-CM

## 2021-04-12 PROCEDURE — 99072 ADDL SUPL MATRL&STAF TM PHE: CPT

## 2021-04-12 PROCEDURE — 99214 OFFICE O/P EST MOD 30 MIN: CPT

## 2021-04-12 PROCEDURE — 93000 ELECTROCARDIOGRAM COMPLETE: CPT

## 2021-04-14 ENCOUNTER — OUTPATIENT (OUTPATIENT)
Dept: OUTPATIENT SERVICES | Facility: HOSPITAL | Age: 61
LOS: 1 days | Discharge: HOME | End: 2021-04-14

## 2021-04-14 DIAGNOSIS — K46.9 UNSPECIFIED ABDOMINAL HERNIA WITHOUT OBSTRUCTION OR GANGRENE: Chronic | ICD-10-CM

## 2021-04-14 DIAGNOSIS — M17.12 UNILATERAL PRIMARY OSTEOARTHRITIS, LEFT KNEE: ICD-10-CM

## 2021-04-14 DIAGNOSIS — D64.9 ANEMIA, UNSPECIFIED: ICD-10-CM

## 2021-04-14 DIAGNOSIS — I87.311 CHRONIC VENOUS HYPERTENSION (IDIOPATHIC) WITH ULCER OF RIGHT LOWER EXTREMITY: ICD-10-CM

## 2021-04-14 DIAGNOSIS — I71.2 THORACIC AORTIC ANEURYSM, WITHOUT RUPTURE: ICD-10-CM

## 2021-04-14 DIAGNOSIS — L97.812 NON-PRESSURE CHRONIC ULCER OF OTHER PART OF RIGHT LOWER LEG WITH FAT LAYER EXPOSED: ICD-10-CM

## 2021-04-14 DIAGNOSIS — Z96.0 PRESENCE OF UROGENITAL IMPLANTS: ICD-10-CM

## 2021-04-14 DIAGNOSIS — Z74.1 NEED FOR ASSISTANCE WITH PERSONAL CARE: ICD-10-CM

## 2021-04-14 DIAGNOSIS — R60.9 EDEMA, UNSPECIFIED: ICD-10-CM

## 2021-04-14 DIAGNOSIS — Z98.89 OTHER SPECIFIED POSTPROCEDURAL STATES: Chronic | ICD-10-CM

## 2021-04-14 DIAGNOSIS — Z91.81 HISTORY OF FALLING: ICD-10-CM

## 2021-04-14 DIAGNOSIS — I87.2 VENOUS INSUFFICIENCY (CHRONIC) (PERIPHERAL): ICD-10-CM

## 2021-04-14 DIAGNOSIS — Z79.899 OTHER LONG TERM (CURRENT) DRUG THERAPY: ICD-10-CM

## 2021-04-14 DIAGNOSIS — R58 HEMORRHAGE, NOT ELSEWHERE CLASSIFIED: Chronic | ICD-10-CM

## 2021-04-15 ENCOUNTER — OUTPATIENT (OUTPATIENT)
Dept: OUTPATIENT SERVICES | Facility: HOSPITAL | Age: 61
LOS: 1 days | Discharge: HOME | End: 2021-04-15

## 2021-04-15 DIAGNOSIS — L89.90 PRESSURE ULCER OF UNSPECIFIED SITE, UNSPECIFIED STAGE: ICD-10-CM

## 2021-04-15 DIAGNOSIS — K46.9 UNSPECIFIED ABDOMINAL HERNIA WITHOUT OBSTRUCTION OR GANGRENE: Chronic | ICD-10-CM

## 2021-04-15 DIAGNOSIS — R58 HEMORRHAGE, NOT ELSEWHERE CLASSIFIED: Chronic | ICD-10-CM

## 2021-04-15 DIAGNOSIS — Z98.89 OTHER SPECIFIED POSTPROCEDURAL STATES: Chronic | ICD-10-CM

## 2021-04-16 NOTE — HISTORY OF PRESENT ILLNESS
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  Denies current chest pains, shortness of breath, palpitations.  He has chronic wounds wraps of his legs and wound care along with edema.

## 2021-04-16 NOTE — PHYSICAL EXAM
[General Appearance - Well Developed] : well developed [Normal Appearance] : normal appearance [Well Groomed] : well groomed [General Appearance - Well Nourished] : well nourished [No Deformities] : no deformities [General Appearance - In No Acute Distress] : no acute distress [Normal Conjunctiva] : the conjunctiva exhibited no abnormalities [Eyelids - No Xanthelasma] : the eyelids demonstrated no xanthelasmas [Normal Oral Mucosa] : normal oral mucosa [No Oral Pallor] : no oral pallor [No Oral Cyanosis] : no oral cyanosis [Normal Jugular Venous A Waves Present] : normal jugular venous A waves present [Normal Jugular Venous V Waves Present] : normal jugular venous V waves present [No Jugular Venous Leyva A Waves] : no jugular venous leyva A waves [Respiration, Rhythm And Depth] : normal respiratory rhythm and effort [Exaggerated Use Of Accessory Muscles For Inspiration] : no accessory muscle use [Auscultation Breath Sounds / Voice Sounds] : lungs were clear to auscultation bilaterally [Heart Sounds] : normal S1 and S2 [Heart Rate And Rhythm] : heart rate and rhythm were normal [Murmurs] : no murmurs present [1+] : left 1+ [Bowel Sounds] : normal bowel sounds [Abdomen Tenderness] : non-tender [Abdomen Soft] : soft [Abnormal Walk] : normal gait [Gait - Sufficient For Exercise Testing] : the gait was sufficient for exercise testing [Nail Clubbing] : no clubbing of the fingernails [Cyanosis, Localized] : no localized cyanosis [Petechial Hemorrhages (___cm)] : no petechial hemorrhages [Skin Color & Pigmentation] : normal skin color and pigmentation [] : no rash [No Venous Stasis] : no venous stasis [Skin Lesions] : no skin lesions [No Skin Ulcers] : no skin ulcer [No Xanthoma] : no  xanthoma was observed [Oriented To Time, Place, And Person] : oriented to person, place, and time [Affect] : the affect was normal [Mood] : the mood was normal [Memory Recent] : recent memory was not impaired [No Anxiety] : not feeling anxious [Right Carotid Bruit] : no bruit heard over the right carotid [Left Carotid Bruit] : no bruit heard over the left carotid [Bruit] : no bruit heard [FreeTextEntry1] : 1+ bilateral pedal and ankle edema noted.

## 2021-04-16 NOTE — REASON FOR VISIT
[Follow-Up - Clinic] : a clinic follow-up of [Abnormal ECG] : an abnormal ECG [Hypertension] : hypertension [FreeTextEntry1] : Thoracic aortic aneurysm

## 2021-04-16 NOTE — ADDENDUM
[FreeTextEntry1] : He underwent echocardiogram on 4/14/2021 mild concentric LVH, normal biventricular size and systolic function with moderate to severe left atrial enlargement, mild tricuspid insufficiency and dilated aortic root measuring 4.2 cm along with dilated ascending aorta measuring 4.4 cm.  Based on his updated information, there appears to be at least stability possible improvement in his aortic root and ascending aortic size.  He may proceed ahead with his upcoming TURP procedure from a current cardiac standpoint with routine hemodynamic monitoring.

## 2021-04-16 NOTE — DISCUSSION/SUMMARY
[___ Month(s)] : [unfilled] month(s) [FreeTextEntry1] : He will continue on his current antihypertensive medication regimen of amlodipine, carvedilol and enalapril. Lasix will be continued as well for leg edema improvement.  I have renewed his enalapril today as he reports he needs any medications.  I reviewed available labs and cardiac data.  To further cardiac risk stratify, I have ordered an echocardiogram to assess LV function and valvular status.  This will be important to reassess his aortic root.  If needed we may consider repeat chest CT imaging of his chest.  I recommended a heart healthy lifestyle including a low-saturated fat, low cholesterol diet with better aerobic activities as tolerated for cardiovascular benefits.  An addendum to this note regarding fitness for upcoming TURP will be added based on the echo report.  Follow-up with you for care and see me in about 6 months or sooner if needed.  Kindly send blood work over for review on file.

## 2021-04-22 ENCOUNTER — OUTPATIENT (OUTPATIENT)
Dept: OUTPATIENT SERVICES | Facility: HOSPITAL | Age: 61
LOS: 1 days | Discharge: HOME | End: 2021-04-22

## 2021-04-22 DIAGNOSIS — Z98.89 OTHER SPECIFIED POSTPROCEDURAL STATES: Chronic | ICD-10-CM

## 2021-04-22 DIAGNOSIS — L89.90 PRESSURE ULCER OF UNSPECIFIED SITE, UNSPECIFIED STAGE: ICD-10-CM

## 2021-04-22 DIAGNOSIS — Z74.1 NEED FOR ASSISTANCE WITH PERSONAL CARE: ICD-10-CM

## 2021-04-22 DIAGNOSIS — L92.9 GRANULOMATOUS DISORDER OF THE SKIN AND SUBCUTANEOUS TISSUE, UNSPECIFIED: ICD-10-CM

## 2021-04-22 DIAGNOSIS — Z79.899 OTHER LONG TERM (CURRENT) DRUG THERAPY: ICD-10-CM

## 2021-04-22 DIAGNOSIS — L97.812 NON-PRESSURE CHRONIC ULCER OF OTHER PART OF RIGHT LOWER LEG WITH FAT LAYER EXPOSED: ICD-10-CM

## 2021-04-22 DIAGNOSIS — Z96.0 PRESENCE OF UROGENITAL IMPLANTS: ICD-10-CM

## 2021-04-22 DIAGNOSIS — M17.12 UNILATERAL PRIMARY OSTEOARTHRITIS, LEFT KNEE: ICD-10-CM

## 2021-04-22 DIAGNOSIS — I87.311 CHRONIC VENOUS HYPERTENSION (IDIOPATHIC) WITH ULCER OF RIGHT LOWER EXTREMITY: ICD-10-CM

## 2021-04-22 DIAGNOSIS — K46.9 UNSPECIFIED ABDOMINAL HERNIA WITHOUT OBSTRUCTION OR GANGRENE: Chronic | ICD-10-CM

## 2021-04-22 DIAGNOSIS — R60.9 EDEMA, UNSPECIFIED: ICD-10-CM

## 2021-04-22 DIAGNOSIS — D64.9 ANEMIA, UNSPECIFIED: ICD-10-CM

## 2021-04-22 DIAGNOSIS — I87.2 VENOUS INSUFFICIENCY (CHRONIC) (PERIPHERAL): ICD-10-CM

## 2021-04-22 DIAGNOSIS — Z91.81 HISTORY OF FALLING: ICD-10-CM

## 2021-04-22 DIAGNOSIS — R58 HEMORRHAGE, NOT ELSEWHERE CLASSIFIED: Chronic | ICD-10-CM

## 2021-04-26 DIAGNOSIS — I87.2 VENOUS INSUFFICIENCY (CHRONIC) (PERIPHERAL): ICD-10-CM

## 2021-04-26 DIAGNOSIS — D64.9 ANEMIA, UNSPECIFIED: ICD-10-CM

## 2021-04-26 DIAGNOSIS — I87.311 CHRONIC VENOUS HYPERTENSION (IDIOPATHIC) WITH ULCER OF RIGHT LOWER EXTREMITY: ICD-10-CM

## 2021-04-26 DIAGNOSIS — L92.9 GRANULOMATOUS DISORDER OF THE SKIN AND SUBCUTANEOUS TISSUE, UNSPECIFIED: ICD-10-CM

## 2021-04-26 DIAGNOSIS — L97.812 NON-PRESSURE CHRONIC ULCER OF OTHER PART OF RIGHT LOWER LEG WITH FAT LAYER EXPOSED: ICD-10-CM

## 2021-04-26 DIAGNOSIS — Z96.0 PRESENCE OF UROGENITAL IMPLANTS: ICD-10-CM

## 2021-04-26 DIAGNOSIS — Z74.1 NEED FOR ASSISTANCE WITH PERSONAL CARE: ICD-10-CM

## 2021-04-26 DIAGNOSIS — Z91.81 HISTORY OF FALLING: ICD-10-CM

## 2021-04-26 DIAGNOSIS — Z79.899 OTHER LONG TERM (CURRENT) DRUG THERAPY: ICD-10-CM

## 2021-04-26 DIAGNOSIS — R60.9 EDEMA, UNSPECIFIED: ICD-10-CM

## 2021-04-26 DIAGNOSIS — M17.12 UNILATERAL PRIMARY OSTEOARTHRITIS, LEFT KNEE: ICD-10-CM

## 2021-04-27 ENCOUNTER — EMERGENCY (EMERGENCY)
Facility: HOSPITAL | Age: 61
LOS: 0 days | Discharge: HOME | End: 2021-04-27
Attending: EMERGENCY MEDICINE
Payer: MEDICAID

## 2021-04-27 VITALS
SYSTOLIC BLOOD PRESSURE: 142 MMHG | TEMPERATURE: 98 F | OXYGEN SATURATION: 100 % | RESPIRATION RATE: 16 BRPM | WEIGHT: 268.08 LBS | HEART RATE: 74 BPM | DIASTOLIC BLOOD PRESSURE: 88 MMHG | HEIGHT: 66 IN

## 2021-04-27 DIAGNOSIS — K27.9 PEPTIC ULCER, SITE UNSPECIFIED, UNSPECIFIED AS ACUTE OR CHRONIC, WITHOUT HEMORRHAGE OR PERFORATION: ICD-10-CM

## 2021-04-27 DIAGNOSIS — K46.9 UNSPECIFIED ABDOMINAL HERNIA WITHOUT OBSTRUCTION OR GANGRENE: Chronic | ICD-10-CM

## 2021-04-27 DIAGNOSIS — I10 ESSENTIAL (PRIMARY) HYPERTENSION: ICD-10-CM

## 2021-04-27 DIAGNOSIS — E66.01 MORBID (SEVERE) OBESITY DUE TO EXCESS CALORIES: ICD-10-CM
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
## 2021-04-27 DIAGNOSIS — T83.9XXA UNSPECIFIED COMPLICATION OF GENITOURINARY PROSTHETIC DEVICE, IMPLANT AND GRAFT, INITIAL ENCOUNTER: ICD-10-CM

## 2021-04-27 DIAGNOSIS — K26.9 DUODENAL ULCER, UNSPECIFIED AS ACUTE OR CHRONIC, WITHOUT HEMORRHAGE OR PERFORATION: ICD-10-CM

## 2021-04-27 DIAGNOSIS — M17.10 UNILATERAL PRIMARY OSTEOARTHRITIS, UNSPECIFIED KNEE: ICD-10-CM

## 2021-04-27 DIAGNOSIS — Z98.89 OTHER SPECIFIED POSTPROCEDURAL STATES: Chronic | ICD-10-CM

## 2021-04-27 DIAGNOSIS — R58 HEMORRHAGE, NOT ELSEWHERE CLASSIFIED: Chronic | ICD-10-CM
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
## 2021-04-27 PROCEDURE — 99283 EMERGENCY DEPT VISIT LOW MDM: CPT

## 2021-04-27 NOTE — ED PROVIDER NOTE - PATIENT PORTAL LINK FT
You can access the FollowMyHealth Patient Portal offered by [facility] by registering at the following website: [email or web address]. By joining [email or web address]’s FollowMyHealth portal, you will also be able to view your health information using other applications (apps) compatible with our system.

## 2021-04-27 NOTE — ED PROVIDER NOTE - OBJECTIVE STATEMENT
This patient is a 61 year old man who presents to the ER requesting a new wahl leg bag.  Patient states that the wahl leg bag he has at home accidently tore.  He states that he is also having occasional leaking around the wahl at head of penis.  He denies fever and hematuria.  Patient was seen in the recently for wahl catheter change.  He has not followed up with urology.    : [id number]

## 2021-04-27 NOTE — ED PROVIDER NOTE - CLINICAL SUMMARY MEDICAL DECISION MAKING FREE TEXT BOX
Wahl bag replacement no leakage from wahl noted urine draining.  Will change to leg bag.  Patient encouraged to follow-up with urology.

## 2021-04-27 NOTE — ED ADULT NURSE NOTE - OBJECTIVE STATEMENT
PT stated the wahl catheter is leaking. Last time wahl change was April 10th. Denies any pain or discomfort.

## 2021-04-29 ENCOUNTER — OUTPATIENT (OUTPATIENT)
Dept: OUTPATIENT SERVICES | Facility: HOSPITAL | Age: 61
LOS: 1 days | Discharge: HOME | End: 2021-04-29

## 2021-04-29 DIAGNOSIS — K46.9 UNSPECIFIED ABDOMINAL HERNIA WITHOUT OBSTRUCTION OR GANGRENE: Chronic | ICD-10-CM

## 2021-04-29 DIAGNOSIS — Z98.89 OTHER SPECIFIED POSTPROCEDURAL STATES: Chronic | ICD-10-CM

## 2021-04-29 DIAGNOSIS — L89.90 PRESSURE ULCER OF UNSPECIFIED SITE, UNSPECIFIED STAGE: ICD-10-CM

## 2021-04-29 DIAGNOSIS — R58 HEMORRHAGE, NOT ELSEWHERE CLASSIFIED: Chronic | ICD-10-CM

## 2021-05-06 ENCOUNTER — OUTPATIENT (OUTPATIENT)
Dept: OUTPATIENT SERVICES | Facility: HOSPITAL | Age: 61
LOS: 1 days | Discharge: HOME | End: 2021-05-06

## 2021-05-06 DIAGNOSIS — R58 HEMORRHAGE, NOT ELSEWHERE CLASSIFIED: Chronic | ICD-10-CM

## 2021-05-06 DIAGNOSIS — Z98.89 OTHER SPECIFIED POSTPROCEDURAL STATES: Chronic | ICD-10-CM

## 2021-05-06 DIAGNOSIS — K46.9 UNSPECIFIED ABDOMINAL HERNIA WITHOUT OBSTRUCTION OR GANGRENE: Chronic | ICD-10-CM

## 2021-05-06 DIAGNOSIS — L89.90 PRESSURE ULCER OF UNSPECIFIED SITE, UNSPECIFIED STAGE: ICD-10-CM

## 2021-05-10 DIAGNOSIS — Z96.0 PRESENCE OF UROGENITAL IMPLANTS: ICD-10-CM

## 2021-05-10 DIAGNOSIS — L92.9 GRANULOMATOUS DISORDER OF THE SKIN AND SUBCUTANEOUS TISSUE, UNSPECIFIED: ICD-10-CM

## 2021-05-10 DIAGNOSIS — D64.9 ANEMIA, UNSPECIFIED: ICD-10-CM

## 2021-05-10 DIAGNOSIS — Z91.81 HISTORY OF FALLING: ICD-10-CM

## 2021-05-10 DIAGNOSIS — Z79.899 OTHER LONG TERM (CURRENT) DRUG THERAPY: ICD-10-CM

## 2021-05-10 DIAGNOSIS — I87.311 CHRONIC VENOUS HYPERTENSION (IDIOPATHIC) WITH ULCER OF RIGHT LOWER EXTREMITY: ICD-10-CM

## 2021-05-10 DIAGNOSIS — M17.12 UNILATERAL PRIMARY OSTEOARTHRITIS, LEFT KNEE: ICD-10-CM

## 2021-05-10 DIAGNOSIS — L97.812 NON-PRESSURE CHRONIC ULCER OF OTHER PART OF RIGHT LOWER LEG WITH FAT LAYER EXPOSED: ICD-10-CM

## 2021-05-10 DIAGNOSIS — I87.2 VENOUS INSUFFICIENCY (CHRONIC) (PERIPHERAL): ICD-10-CM

## 2021-05-10 DIAGNOSIS — R60.9 EDEMA, UNSPECIFIED: ICD-10-CM

## 2021-05-12 DIAGNOSIS — R60.9 EDEMA, UNSPECIFIED: ICD-10-CM

## 2021-05-12 DIAGNOSIS — Z96.0 PRESENCE OF UROGENITAL IMPLANTS: ICD-10-CM

## 2021-05-12 DIAGNOSIS — L97.812 NON-PRESSURE CHRONIC ULCER OF OTHER PART OF RIGHT LOWER LEG WITH FAT LAYER EXPOSED: ICD-10-CM

## 2021-05-12 DIAGNOSIS — Z91.81 HISTORY OF FALLING: ICD-10-CM

## 2021-05-12 DIAGNOSIS — I87.311 CHRONIC VENOUS HYPERTENSION (IDIOPATHIC) WITH ULCER OF RIGHT LOWER EXTREMITY: ICD-10-CM

## 2021-05-12 DIAGNOSIS — M17.12 UNILATERAL PRIMARY OSTEOARTHRITIS, LEFT KNEE: ICD-10-CM

## 2021-05-12 DIAGNOSIS — Z79.899 OTHER LONG TERM (CURRENT) DRUG THERAPY: ICD-10-CM

## 2021-05-12 DIAGNOSIS — L92.9 GRANULOMATOUS DISORDER OF THE SKIN AND SUBCUTANEOUS TISSUE, UNSPECIFIED: ICD-10-CM

## 2021-05-12 DIAGNOSIS — I87.2 VENOUS INSUFFICIENCY (CHRONIC) (PERIPHERAL): ICD-10-CM

## 2021-05-12 DIAGNOSIS — D64.9 ANEMIA, UNSPECIFIED: ICD-10-CM

## 2021-05-12 DIAGNOSIS — Z74.1 NEED FOR ASSISTANCE WITH PERSONAL CARE: ICD-10-CM

## 2021-05-13 ENCOUNTER — OUTPATIENT (OUTPATIENT)
Dept: OUTPATIENT SERVICES | Facility: HOSPITAL | Age: 61
LOS: 1 days | Discharge: HOME | End: 2021-05-13

## 2021-05-13 DIAGNOSIS — Z98.89 OTHER SPECIFIED POSTPROCEDURAL STATES: Chronic | ICD-10-CM

## 2021-05-13 DIAGNOSIS — L89.90 PRESSURE ULCER OF UNSPECIFIED SITE, UNSPECIFIED STAGE: ICD-10-CM

## 2021-05-13 DIAGNOSIS — R58 HEMORRHAGE, NOT ELSEWHERE CLASSIFIED: Chronic | ICD-10-CM

## 2021-05-13 DIAGNOSIS — K46.9 UNSPECIFIED ABDOMINAL HERNIA WITHOUT OBSTRUCTION OR GANGRENE: Chronic | ICD-10-CM

## 2021-05-14 DIAGNOSIS — L97.812 NON-PRESSURE CHRONIC ULCER OF OTHER PART OF RIGHT LOWER LEG WITH FAT LAYER EXPOSED: ICD-10-CM

## 2021-05-14 DIAGNOSIS — L92.9 GRANULOMATOUS DISORDER OF THE SKIN AND SUBCUTANEOUS TISSUE, UNSPECIFIED: ICD-10-CM

## 2021-05-14 DIAGNOSIS — D64.9 ANEMIA, UNSPECIFIED: ICD-10-CM

## 2021-05-14 DIAGNOSIS — I87.311 CHRONIC VENOUS HYPERTENSION (IDIOPATHIC) WITH ULCER OF RIGHT LOWER EXTREMITY: ICD-10-CM

## 2021-05-14 DIAGNOSIS — Z91.81 HISTORY OF FALLING: ICD-10-CM

## 2021-05-14 DIAGNOSIS — Z96.0 PRESENCE OF UROGENITAL IMPLANTS: ICD-10-CM

## 2021-05-14 DIAGNOSIS — M17.12 UNILATERAL PRIMARY OSTEOARTHRITIS, LEFT KNEE: ICD-10-CM

## 2021-05-14 DIAGNOSIS — Z79.899 OTHER LONG TERM (CURRENT) DRUG THERAPY: ICD-10-CM

## 2021-05-14 DIAGNOSIS — Z74.1 NEED FOR ASSISTANCE WITH PERSONAL CARE: ICD-10-CM

## 2021-05-14 DIAGNOSIS — R60.9 EDEMA, UNSPECIFIED: ICD-10-CM

## 2021-05-20 ENCOUNTER — OUTPATIENT (OUTPATIENT)
Dept: OUTPATIENT SERVICES | Facility: HOSPITAL | Age: 61
LOS: 1 days | Discharge: HOME | End: 2021-05-20

## 2021-05-20 DIAGNOSIS — K46.9 UNSPECIFIED ABDOMINAL HERNIA WITHOUT OBSTRUCTION OR GANGRENE: Chronic | ICD-10-CM

## 2021-05-20 DIAGNOSIS — L89.90 PRESSURE ULCER OF UNSPECIFIED SITE, UNSPECIFIED STAGE: ICD-10-CM

## 2021-05-20 DIAGNOSIS — Z98.89 OTHER SPECIFIED POSTPROCEDURAL STATES: Chronic | ICD-10-CM

## 2021-05-20 DIAGNOSIS — R58 HEMORRHAGE, NOT ELSEWHERE CLASSIFIED: Chronic | ICD-10-CM

## 2021-05-21 DIAGNOSIS — Z96.0 PRESENCE OF UROGENITAL IMPLANTS: ICD-10-CM

## 2021-05-21 DIAGNOSIS — R60.9 EDEMA, UNSPECIFIED: ICD-10-CM

## 2021-05-21 DIAGNOSIS — I87.311 CHRONIC VENOUS HYPERTENSION (IDIOPATHIC) WITH ULCER OF RIGHT LOWER EXTREMITY: ICD-10-CM

## 2021-05-21 DIAGNOSIS — Z79.899 OTHER LONG TERM (CURRENT) DRUG THERAPY: ICD-10-CM

## 2021-05-21 DIAGNOSIS — L97.812 NON-PRESSURE CHRONIC ULCER OF OTHER PART OF RIGHT LOWER LEG WITH FAT LAYER EXPOSED: ICD-10-CM

## 2021-05-21 DIAGNOSIS — Z91.81 HISTORY OF FALLING: ICD-10-CM

## 2021-05-21 DIAGNOSIS — Z74.1 NEED FOR ASSISTANCE WITH PERSONAL CARE: ICD-10-CM

## 2021-05-21 DIAGNOSIS — D64.9 ANEMIA, UNSPECIFIED: ICD-10-CM

## 2021-05-21 DIAGNOSIS — L92.9 GRANULOMATOUS DISORDER OF THE SKIN AND SUBCUTANEOUS TISSUE, UNSPECIFIED: ICD-10-CM

## 2021-05-21 DIAGNOSIS — M17.12 UNILATERAL PRIMARY OSTEOARTHRITIS, LEFT KNEE: ICD-10-CM

## 2021-05-27 ENCOUNTER — OUTPATIENT (OUTPATIENT)
Dept: OUTPATIENT SERVICES | Facility: HOSPITAL | Age: 61
LOS: 1 days | Discharge: HOME | End: 2021-05-27

## 2021-05-27 DIAGNOSIS — R58 HEMORRHAGE, NOT ELSEWHERE CLASSIFIED: Chronic | ICD-10-CM

## 2021-05-27 DIAGNOSIS — L89.90 PRESSURE ULCER OF UNSPECIFIED SITE, UNSPECIFIED STAGE: ICD-10-CM

## 2021-05-27 DIAGNOSIS — K46.9 UNSPECIFIED ABDOMINAL HERNIA WITHOUT OBSTRUCTION OR GANGRENE: Chronic | ICD-10-CM

## 2021-05-27 DIAGNOSIS — Z98.89 OTHER SPECIFIED POSTPROCEDURAL STATES: Chronic | ICD-10-CM

## 2021-05-28 DIAGNOSIS — R60.9 EDEMA, UNSPECIFIED: ICD-10-CM

## 2021-05-28 DIAGNOSIS — Z91.81 HISTORY OF FALLING: ICD-10-CM

## 2021-05-28 DIAGNOSIS — D64.9 ANEMIA, UNSPECIFIED: ICD-10-CM

## 2021-05-28 DIAGNOSIS — I87.311 CHRONIC VENOUS HYPERTENSION (IDIOPATHIC) WITH ULCER OF RIGHT LOWER EXTREMITY: ICD-10-CM

## 2021-05-28 DIAGNOSIS — M17.12 UNILATERAL PRIMARY OSTEOARTHRITIS, LEFT KNEE: ICD-10-CM

## 2021-05-28 DIAGNOSIS — L97.812 NON-PRESSURE CHRONIC ULCER OF OTHER PART OF RIGHT LOWER LEG WITH FAT LAYER EXPOSED: ICD-10-CM

## 2021-05-28 DIAGNOSIS — Z79.899 OTHER LONG TERM (CURRENT) DRUG THERAPY: ICD-10-CM

## 2021-05-29 LAB
-  AMPICILLIN/SULBACTAM: SIGNIFICANT CHANGE UP
-  CEFAZOLIN: SIGNIFICANT CHANGE UP
-  CLINDAMYCIN: SIGNIFICANT CHANGE UP
-  ERYTHROMYCIN: SIGNIFICANT CHANGE UP
-  GENTAMICIN: SIGNIFICANT CHANGE UP
-  OXACILLIN: SIGNIFICANT CHANGE UP
-  PENICILLIN: SIGNIFICANT CHANGE UP
-  RIFAMPIN: SIGNIFICANT CHANGE UP
-  TETRACYCLINE: SIGNIFICANT CHANGE UP
-  TRIMETHOPRIM/SULFAMETHOXAZOLE: SIGNIFICANT CHANGE UP
-  VANCOMYCIN: SIGNIFICANT CHANGE UP
CULTURE RESULTS: SIGNIFICANT CHANGE UP
METHOD TYPE: SIGNIFICANT CHANGE UP
ORGANISM # SPEC MICROSCOPIC CNT: SIGNIFICANT CHANGE UP
ORGANISM # SPEC MICROSCOPIC CNT: SIGNIFICANT CHANGE UP
SPECIMEN SOURCE: SIGNIFICANT CHANGE UP

## 2021-06-03 ENCOUNTER — OUTPATIENT (OUTPATIENT)
Dept: OUTPATIENT SERVICES | Facility: HOSPITAL | Age: 61
LOS: 1 days | Discharge: HOME | End: 2021-06-03

## 2021-06-03 DIAGNOSIS — K46.9 UNSPECIFIED ABDOMINAL HERNIA WITHOUT OBSTRUCTION OR GANGRENE: Chronic | ICD-10-CM

## 2021-06-03 DIAGNOSIS — Z98.89 OTHER SPECIFIED POSTPROCEDURAL STATES: Chronic | ICD-10-CM

## 2021-06-03 DIAGNOSIS — R58 HEMORRHAGE, NOT ELSEWHERE CLASSIFIED: Chronic | ICD-10-CM

## 2021-06-03 DIAGNOSIS — L89.90 PRESSURE ULCER OF UNSPECIFIED SITE, UNSPECIFIED STAGE: ICD-10-CM

## 2021-06-08 DIAGNOSIS — Z79.899 OTHER LONG TERM (CURRENT) DRUG THERAPY: ICD-10-CM

## 2021-06-08 DIAGNOSIS — I87.311 CHRONIC VENOUS HYPERTENSION (IDIOPATHIC) WITH ULCER OF RIGHT LOWER EXTREMITY: ICD-10-CM

## 2021-06-08 DIAGNOSIS — R60.9 EDEMA, UNSPECIFIED: ICD-10-CM

## 2021-06-08 DIAGNOSIS — Z91.81 HISTORY OF FALLING: ICD-10-CM

## 2021-06-08 DIAGNOSIS — L97.812 NON-PRESSURE CHRONIC ULCER OF OTHER PART OF RIGHT LOWER LEG WITH FAT LAYER EXPOSED: ICD-10-CM

## 2021-06-08 DIAGNOSIS — D64.9 ANEMIA, UNSPECIFIED: ICD-10-CM

## 2021-06-08 DIAGNOSIS — M17.12 UNILATERAL PRIMARY OSTEOARTHRITIS, LEFT KNEE: ICD-10-CM

## 2021-06-10 ENCOUNTER — OUTPATIENT (OUTPATIENT)
Dept: OUTPATIENT SERVICES | Facility: HOSPITAL | Age: 61
LOS: 1 days | Discharge: HOME | End: 2021-06-10

## 2021-06-10 DIAGNOSIS — K46.9 UNSPECIFIED ABDOMINAL HERNIA WITHOUT OBSTRUCTION OR GANGRENE: Chronic | ICD-10-CM

## 2021-06-10 DIAGNOSIS — Z98.89 OTHER SPECIFIED POSTPROCEDURAL STATES: Chronic | ICD-10-CM

## 2021-06-10 DIAGNOSIS — R58 HEMORRHAGE, NOT ELSEWHERE CLASSIFIED: Chronic | ICD-10-CM

## 2021-06-10 DIAGNOSIS — L89.90 PRESSURE ULCER OF UNSPECIFIED SITE, UNSPECIFIED STAGE: ICD-10-CM

## 2021-06-11 DIAGNOSIS — I87.311 CHRONIC VENOUS HYPERTENSION (IDIOPATHIC) WITH ULCER OF RIGHT LOWER EXTREMITY: ICD-10-CM

## 2021-06-11 DIAGNOSIS — L97.812 NON-PRESSURE CHRONIC ULCER OF OTHER PART OF RIGHT LOWER LEG WITH FAT LAYER EXPOSED: ICD-10-CM

## 2021-06-11 DIAGNOSIS — M17.12 UNILATERAL PRIMARY OSTEOARTHRITIS, LEFT KNEE: ICD-10-CM

## 2021-06-11 DIAGNOSIS — Z79.899 OTHER LONG TERM (CURRENT) DRUG THERAPY: ICD-10-CM

## 2021-06-11 DIAGNOSIS — Z91.81 HISTORY OF FALLING: ICD-10-CM

## 2021-06-11 DIAGNOSIS — R60.9 EDEMA, UNSPECIFIED: ICD-10-CM

## 2021-06-17 ENCOUNTER — OUTPATIENT (OUTPATIENT)
Dept: OUTPATIENT SERVICES | Facility: HOSPITAL | Age: 61
LOS: 1 days | Discharge: HOME | End: 2021-06-17

## 2021-06-17 DIAGNOSIS — K46.9 UNSPECIFIED ABDOMINAL HERNIA WITHOUT OBSTRUCTION OR GANGRENE: Chronic | ICD-10-CM

## 2021-06-17 DIAGNOSIS — Z98.89 OTHER SPECIFIED POSTPROCEDURAL STATES: Chronic | ICD-10-CM

## 2021-06-17 DIAGNOSIS — L89.90 PRESSURE ULCER OF UNSPECIFIED SITE, UNSPECIFIED STAGE: ICD-10-CM

## 2021-06-17 DIAGNOSIS — R58 HEMORRHAGE, NOT ELSEWHERE CLASSIFIED: Chronic | ICD-10-CM

## 2021-06-23 DIAGNOSIS — M17.12 UNILATERAL PRIMARY OSTEOARTHRITIS, LEFT KNEE: ICD-10-CM

## 2021-06-23 DIAGNOSIS — I87.311 CHRONIC VENOUS HYPERTENSION (IDIOPATHIC) WITH ULCER OF RIGHT LOWER EXTREMITY: ICD-10-CM

## 2021-06-23 DIAGNOSIS — L92.9 GRANULOMATOUS DISORDER OF THE SKIN AND SUBCUTANEOUS TISSUE, UNSPECIFIED: ICD-10-CM

## 2021-06-23 DIAGNOSIS — R60.9 EDEMA, UNSPECIFIED: ICD-10-CM

## 2021-06-23 DIAGNOSIS — L97.812 NON-PRESSURE CHRONIC ULCER OF OTHER PART OF RIGHT LOWER LEG WITH FAT LAYER EXPOSED: ICD-10-CM

## 2021-06-23 DIAGNOSIS — Z91.81 HISTORY OF FALLING: ICD-10-CM

## 2021-06-23 DIAGNOSIS — I87.2 VENOUS INSUFFICIENCY (CHRONIC) (PERIPHERAL): ICD-10-CM

## 2021-06-23 DIAGNOSIS — Z79.899 OTHER LONG TERM (CURRENT) DRUG THERAPY: ICD-10-CM

## 2021-06-24 ENCOUNTER — OUTPATIENT (OUTPATIENT)
Dept: OUTPATIENT SERVICES | Facility: HOSPITAL | Age: 61
LOS: 1 days | Discharge: HOME | End: 2021-06-24

## 2021-06-24 DIAGNOSIS — Z98.89 OTHER SPECIFIED POSTPROCEDURAL STATES: Chronic | ICD-10-CM

## 2021-06-24 DIAGNOSIS — K46.9 UNSPECIFIED ABDOMINAL HERNIA WITHOUT OBSTRUCTION OR GANGRENE: Chronic | ICD-10-CM

## 2021-06-24 DIAGNOSIS — L89.90 PRESSURE ULCER OF UNSPECIFIED SITE, UNSPECIFIED STAGE: ICD-10-CM

## 2021-06-24 DIAGNOSIS — R58 HEMORRHAGE, NOT ELSEWHERE CLASSIFIED: Chronic | ICD-10-CM

## 2021-06-25 DIAGNOSIS — Z91.81 HISTORY OF FALLING: ICD-10-CM

## 2021-06-25 DIAGNOSIS — Z79.899 OTHER LONG TERM (CURRENT) DRUG THERAPY: ICD-10-CM

## 2021-06-25 DIAGNOSIS — L97.812 NON-PRESSURE CHRONIC ULCER OF OTHER PART OF RIGHT LOWER LEG WITH FAT LAYER EXPOSED: ICD-10-CM

## 2021-06-25 DIAGNOSIS — I87.311 CHRONIC VENOUS HYPERTENSION (IDIOPATHIC) WITH ULCER OF RIGHT LOWER EXTREMITY: ICD-10-CM

## 2021-06-25 DIAGNOSIS — M17.12 UNILATERAL PRIMARY OSTEOARTHRITIS, LEFT KNEE: ICD-10-CM

## 2021-06-25 DIAGNOSIS — R60.9 EDEMA, UNSPECIFIED: ICD-10-CM

## 2021-07-01 ENCOUNTER — OUTPATIENT (OUTPATIENT)
Dept: OUTPATIENT SERVICES | Facility: HOSPITAL | Age: 61
LOS: 1 days | Discharge: HOME | End: 2021-07-01

## 2021-07-01 DIAGNOSIS — L89.90 PRESSURE ULCER OF UNSPECIFIED SITE, UNSPECIFIED STAGE: ICD-10-CM

## 2021-07-01 DIAGNOSIS — Z79.899 OTHER LONG TERM (CURRENT) DRUG THERAPY: ICD-10-CM

## 2021-07-01 DIAGNOSIS — I87.311 CHRONIC VENOUS HYPERTENSION (IDIOPATHIC) WITH ULCER OF RIGHT LOWER EXTREMITY: ICD-10-CM

## 2021-07-01 DIAGNOSIS — Z91.81 HISTORY OF FALLING: ICD-10-CM

## 2021-07-01 DIAGNOSIS — R58 HEMORRHAGE, NOT ELSEWHERE CLASSIFIED: Chronic | ICD-10-CM

## 2021-07-01 DIAGNOSIS — Z98.89 OTHER SPECIFIED POSTPROCEDURAL STATES: Chronic | ICD-10-CM

## 2021-07-01 DIAGNOSIS — R60.9 EDEMA, UNSPECIFIED: ICD-10-CM

## 2021-07-01 DIAGNOSIS — K46.9 UNSPECIFIED ABDOMINAL HERNIA WITHOUT OBSTRUCTION OR GANGRENE: Chronic | ICD-10-CM

## 2021-07-01 DIAGNOSIS — M17.12 UNILATERAL PRIMARY OSTEOARTHRITIS, LEFT KNEE: ICD-10-CM

## 2021-07-01 DIAGNOSIS — L97.812 NON-PRESSURE CHRONIC ULCER OF OTHER PART OF RIGHT LOWER LEG WITH FAT LAYER EXPOSED: ICD-10-CM

## 2021-07-08 ENCOUNTER — OUTPATIENT (OUTPATIENT)
Dept: OUTPATIENT SERVICES | Facility: HOSPITAL | Age: 61
LOS: 1 days | Discharge: HOME | End: 2021-07-08

## 2021-07-08 DIAGNOSIS — R58 HEMORRHAGE, NOT ELSEWHERE CLASSIFIED: Chronic | ICD-10-CM

## 2021-07-08 DIAGNOSIS — Z98.89 OTHER SPECIFIED POSTPROCEDURAL STATES: Chronic | ICD-10-CM

## 2021-07-08 DIAGNOSIS — L89.90 PRESSURE ULCER OF UNSPECIFIED SITE, UNSPECIFIED STAGE: ICD-10-CM

## 2021-07-08 DIAGNOSIS — K46.9 UNSPECIFIED ABDOMINAL HERNIA WITHOUT OBSTRUCTION OR GANGRENE: Chronic | ICD-10-CM

## 2021-07-13 ENCOUNTER — EMERGENCY (EMERGENCY)
Facility: HOSPITAL | Age: 61
LOS: 0 days | Discharge: HOME | End: 2021-07-13
Attending: STUDENT IN AN ORGANIZED HEALTH CARE EDUCATION/TRAINING PROGRAM
Payer: MEDICAID

## 2021-07-13 VITALS
TEMPERATURE: 98 F | HEART RATE: 76 BPM | DIASTOLIC BLOOD PRESSURE: 97 MMHG | SYSTOLIC BLOOD PRESSURE: 163 MMHG | OXYGEN SATURATION: 97 % | RESPIRATION RATE: 18 BRPM

## 2021-07-13 VITALS
HEART RATE: 68 BPM | SYSTOLIC BLOOD PRESSURE: 157 MMHG | WEIGHT: 274.92 LBS | OXYGEN SATURATION: 97 % | HEIGHT: 66 IN | DIASTOLIC BLOOD PRESSURE: 94 MMHG | RESPIRATION RATE: 16 BRPM | TEMPERATURE: 98 F

## 2021-07-13 DIAGNOSIS — Z98.89 OTHER SPECIFIED POSTPROCEDURAL STATES: Chronic | ICD-10-CM

## 2021-07-13 DIAGNOSIS — M17.0 BILATERAL PRIMARY OSTEOARTHRITIS OF KNEE: ICD-10-CM

## 2021-07-13 DIAGNOSIS — I10 ESSENTIAL (PRIMARY) HYPERTENSION: ICD-10-CM

## 2021-07-13 DIAGNOSIS — T83.030A LEAKAGE OF CYSTOSTOMY CATHETER, INITIAL ENCOUNTER: ICD-10-CM

## 2021-07-13 DIAGNOSIS — E66.01 MORBID (SEVERE) OBESITY DUE TO EXCESS CALORIES: ICD-10-CM

## 2021-07-13 DIAGNOSIS — R58 HEMORRHAGE, NOT ELSEWHERE CLASSIFIED: Chronic | ICD-10-CM

## 2021-07-13 DIAGNOSIS — N39.0 URINARY TRACT INFECTION, SITE NOT SPECIFIED: ICD-10-CM

## 2021-07-13 DIAGNOSIS — K46.9 UNSPECIFIED ABDOMINAL HERNIA WITHOUT OBSTRUCTION OR GANGRENE: Chronic | ICD-10-CM

## 2021-07-13 DIAGNOSIS — K27.9 PEPTIC ULCER, SITE UNSPECIFIED, UNSPECIFIED AS ACUTE OR CHRONIC, WITHOUT HEMORRHAGE OR PERFORATION: ICD-10-CM

## 2021-07-13 LAB
ANION GAP SERPL CALC-SCNC: 3 MMOL/L — LOW (ref 5–17)
APPEARANCE UR: CLEAR — SIGNIFICANT CHANGE UP
BACTERIA # UR AUTO: ABNORMAL
BILIRUB UR-MCNC: NEGATIVE — SIGNIFICANT CHANGE UP
BUN SERPL-MCNC: 17 MG/DL — SIGNIFICANT CHANGE UP (ref 7–23)
CALCIUM SERPL-MCNC: 8.2 MG/DL — LOW (ref 8.5–10.1)
CHLORIDE SERPL-SCNC: 103 MMOL/L — SIGNIFICANT CHANGE UP (ref 96–108)
CO2 SERPL-SCNC: 33 MMOL/L — HIGH (ref 22–31)
COLOR SPEC: YELLOW — SIGNIFICANT CHANGE UP
CREAT SERPL-MCNC: 0.81 MG/DL — SIGNIFICANT CHANGE UP (ref 0.5–1.3)
DIFF PNL FLD: NEGATIVE — SIGNIFICANT CHANGE UP
EPI CELLS # UR: SIGNIFICANT CHANGE UP
GLUCOSE SERPL-MCNC: 83 MG/DL — SIGNIFICANT CHANGE UP (ref 70–99)
GLUCOSE UR QL: NEGATIVE MG/DL — SIGNIFICANT CHANGE UP
HCT VFR BLD CALC: 35.7 % — LOW (ref 39–50)
HGB BLD-MCNC: 10.4 G/DL — LOW (ref 13–17)
KETONES UR-MCNC: NEGATIVE — SIGNIFICANT CHANGE UP
LEUKOCYTE ESTERASE UR-ACNC: NEGATIVE — SIGNIFICANT CHANGE UP
MCHC RBC-ENTMCNC: 24.1 PG — LOW (ref 27–34)
MCHC RBC-ENTMCNC: 29.1 GM/DL — LOW (ref 32–36)
MCV RBC AUTO: 82.6 FL — SIGNIFICANT CHANGE UP (ref 80–100)
NITRITE UR-MCNC: POSITIVE
NRBC # BLD: 0 /100 WBCS — SIGNIFICANT CHANGE UP (ref 0–0)
PH UR: 8 — SIGNIFICANT CHANGE UP (ref 5–8)
PLATELET # BLD AUTO: 317 K/UL — SIGNIFICANT CHANGE UP (ref 150–400)
POTASSIUM SERPL-MCNC: 4.4 MMOL/L — SIGNIFICANT CHANGE UP (ref 3.5–5.3)
POTASSIUM SERPL-SCNC: 4.4 MMOL/L — SIGNIFICANT CHANGE UP (ref 3.5–5.3)
PROT UR-MCNC: NEGATIVE MG/DL — SIGNIFICANT CHANGE UP
RBC # BLD: 4.32 M/UL — SIGNIFICANT CHANGE UP (ref 4.2–5.8)
RBC # FLD: 17.4 % — HIGH (ref 10.3–14.5)
SODIUM SERPL-SCNC: 139 MMOL/L — SIGNIFICANT CHANGE UP (ref 135–145)
SP GR SPEC: 1.01 — SIGNIFICANT CHANGE UP (ref 1.01–1.02)
UROBILINOGEN FLD QL: NEGATIVE MG/DL — SIGNIFICANT CHANGE UP
WBC # BLD: 8.55 K/UL — SIGNIFICANT CHANGE UP (ref 3.8–10.5)
WBC # FLD AUTO: 8.55 K/UL — SIGNIFICANT CHANGE UP (ref 3.8–10.5)

## 2021-07-13 PROCEDURE — 99284 EMERGENCY DEPT VISIT MOD MDM: CPT

## 2021-07-13 RX ADMIN — Medication 1 TABLET(S): at 20:20

## 2021-07-13 NOTE — ED PROVIDER NOTE - NS ED ROS FT
ROS: negative for fever, cough, headache, chest pain, shortness of breath, abd pain, nausea, vomiting, diarrhea, rash, paresthesia, and weakness--all other systems reviewed are negative.

## 2021-07-13 NOTE — ED PROVIDER NOTE - PATIENT PORTAL LINK FT
You can access the FollowMyHealth Patient Portal offered by [facility] by registering at the following website: [email or web address]. By joining [email or web address]’s FollowMyHealth portal, you will also be able to view your health information using other applications (apps) compatible with our system.

## 2021-07-13 NOTE — ED PROVIDER NOTE - CARE PROVIDER_API CALL
Marin Parson)  Urology  [address] [facility], [address] [place] [address]  Phone: [phone]  Fax: [phone]  Follow Up Time: 1-3 Days

## 2021-07-13 NOTE — ED ADULT NURSE NOTE - OBJECTIVE STATEMENT
Pt received in bed 8, A&OX4. States wahl leaking since today. States has had wahl for 2 months. Pt unable to urinate through wahl and urinating on own at bedside. Denies pain with urination, fever, chills, n/v/d.

## 2021-07-13 NOTE — ED PROVIDER NOTE - PHYSICAL EXAMINATION
VITALS: reviewed  GEN: NAD, A & O x 4  HEAD/EYES: NCAT, PERRL, EOMI, anicteric sclerae, no conjunctival pallor  ENT: mucus membranes moist, oropharynx WNL, trachea midline, no JVD  RESP: lungs CTA with equal breath sounds bilaterally, chest wall nontender and atraumatic  CV: heart with reg rhythm S1, S2, no murmur; distal pulses intact and symmetric bilaterally  ABDOMEN: normoactive bowel sounds, soft, nondistended, nontender, no palpable masses  : no CVAT  MSK: extremities atraumatic and nontender, no edema, no asymmetry. the back is without midline or lateral tenderness, there is no spinal deformity or stepoff and the back is ranged painlessly. the neck has no midline tenderness, deformity, or stepoff, and is ranged painlessly.  SKIN: warm, dry, no rash, no bruising, no cyanosis. color appropriate for ethnicity  NEURO: alert, mentating appropriately, no facial asymmetry. gross sensation, motor, coordination are intact  PSYCH: Affect appropriate VITALS: reviewed  GEN: NAD, A & O x 4  HEAD/EYES: NCAT, EOMI, anicteric sclerae,  ENT: mucus membranes moist, oropharynx WNL, trachea midline  RESP: lungs CTA with equal breath sounds bilaterally, chest wall nontender and atraumatic  CV: heart with reg rhythm S1, S2, no murmur; distal pulses intact and symmetric bilaterally  ABDOMEN: normoactive bowel sounds, soft, nondistended, nontender, no palpable masses  : no CVAT  MSK: extremities atraumatic and nontender, no edema, no asymmetry.   SKIN: warm, dry, no rash, no bruising, no cyanosis. color appropriate for ethnicity  NEURO: alert, mentating appropriately, no facial asymmetry. gross sensation, motor, coordination are intact  PSYCH: Affect appropriate

## 2021-07-13 NOTE — ED PROVIDER NOTE - NSFOLLOWUPINSTRUCTIONS_ED_ALL_ED_FT
Please  your prescriptions at your preferred pharmacy that you provided today and take as directed.    follow up with urology within 1-3 days.    Urinary Tract Infection    A urinary tract infection (UTI) is an infection of any part of the urinary tract, which includes the kidneys, ureters, bladder, and urethra. Risk factors include ignoring your need to urinate, wiping back to front if female, being an uncircumcised male, and having diabetes or a weak immune system. Symptoms include frequent urination, pain or burning with urination, foul smelling urine, cloudy urine, pain in the lower abdomen, blood in the urine, and fever. If you were prescribed an antibiotic medicine, take it as told by your health care provider. Do not stop taking the antibiotic even if you start to feel better.    SEEK IMMEDIATE MEDICAL CARE IF YOU HAVE ANY OF THE FOLLOWING SYMPTOMS: severe back or abdominal pain, fever, inability to keep fluids or medicine down, dizziness/lightheadedness, or a change in mental status.        Barbosa Catheter Placement and Care    WHAT YOU NEED TO KNOW:    A Barbosa catheter is a sterile tube that is inserted into your bladder to drain urine. It is also called an indwelling urinary catheter.     DISCHARGE INSTRUCTIONS:    Return to the emergency department if:   •Your catheter comes out.       •You suddenly have material that looks like sand in the tubing or drainage bag.      •No urine is draining into the bag and you have checked the system.      •You have pain in your hip, back, pelvis, or lower abdomen.      •You are confused or cannot think clearly.      Call your doctor or urologist if:   •You have a fever.      •You have bladder spasms for more than 1 day after the catheter is placed.      •You see blood in the tubing or drainage bag.      •You have a rash or itching where the catheter tube is secured to your skin.      •Urine leaks from or around the catheter, tubing, or drainage bag.      •The closed drainage system has accidently come open or apart.       •You see a layer of crystals inside the tubing.      •You have questions or concerns about your condition or care.      Care for your catheter and drainage bag: You can reduce your risk for infection and injury by caring for your catheter and drainage bag properly.  •Wash your hands often. Wash before and after you touch your catheter, tubing, or drainage bag. Use soap and water. Wear clean disposable gloves when you care for your catheter or disconnect the drainage bag. Wash your hands before you prepare or eat food.   Handwashing           •Clean your genital area 2 times every day. Clean your catheter area and anal opening after every bowel movement. ?For men: Use a soapy cloth to clean the tip of your penis. Start where the catheter enters. Wipe backward making sure to pull back the foreskin. Then use a cloth with clear water in the same direction to clean away the soap.       ?For women: Use a soapy cloth to clean the area that the catheter enters your body. Make sure to separate your labia and wipe toward the anus. Then use a cloth with clear water and wipe in the same direction.       •Secure the catheter tube so you do not pull or move the catheter. This helps prevent pain and bladder spasms. Healthcare providers will show you how to use medical tape or a strap to secure the catheter tube to your body.       •Keep a closed drainage system. Your catheter should always be attached to the drainage bag to form a closed system. Do not disconnect any part of the closed system unless you need to change the bag.      •Keep the drainage bag below the level of your waist. This helps stop urine from moving back up the tubing and into your bladder. Do not loop or kink the tubing. This can cause urine to back up and collect in your bladder. Do not let the drainage bag touch or lie on the floor.      •Empty the drainage bag when needed. The weight of a full drainage bag can be painful. Empty the drainage bag every 3 to 6 hours or when it is ? full.       •Clean and change the drainage bag as directed. Ask your healthcare provider how often you should change the drainage bag and what cleaning solution to use. Wear disposable gloves when you change the bag. Do not allow the end of the catheter or tubing to touch anything. Clean the ends with an alcohol pad before you reconnect them.      What to do if problems develop:   •No urine is draining into the bag: ?Check for kinks in the tubing and straighten them out.       ?Check the tape or strap used to secure the catheter tube to your skin. Make sure it is not blocking the tube.       ?Make sure you are not sitting or lying on the tubing.      ?Make sure the urine bag is hanging below the level of your waist.      •Urine leaks from or around the catheter, tubing, or drainage bag: Check if the closed drainage system has accidently come open or apart. Clean the catheter and tubing ends with a new alcohol pad and reconnect them.       Follow up with your doctor or urologist as directed: Write down your questions so you remember to ask them during your visits.

## 2021-07-13 NOTE — ED PROVIDER NOTE - OBJECTIVE STATEMENT
Pt is a 62 yo male w/PMH of APH and HTN, and presents to the ED for chronic Barbosa catheter. Pt has no burning or abdominal pain. Pt states he ran out of Zomax x1 month go. Pt is a 62 yo male w/PMH of APH and HTN, and presents to the ED for urine leaking from chronic wahl catheter. Pt has no burning or abdominal pain. Pt states he ran out of Flomax x1 month ago.

## 2021-07-15 ENCOUNTER — OUTPATIENT (OUTPATIENT)
Dept: OUTPATIENT SERVICES | Facility: HOSPITAL | Age: 61
LOS: 1 days | Discharge: HOME | End: 2021-07-15

## 2021-07-15 DIAGNOSIS — K46.9 UNSPECIFIED ABDOMINAL HERNIA WITHOUT OBSTRUCTION OR GANGRENE: Chronic | ICD-10-CM

## 2021-07-15 DIAGNOSIS — R58 HEMORRHAGE, NOT ELSEWHERE CLASSIFIED: Chronic | ICD-10-CM

## 2021-07-15 DIAGNOSIS — Z98.89 OTHER SPECIFIED POSTPROCEDURAL STATES: Chronic | ICD-10-CM

## 2021-07-15 DIAGNOSIS — L89.90 PRESSURE ULCER OF UNSPECIFIED SITE, UNSPECIFIED STAGE: ICD-10-CM

## 2021-07-15 LAB
GRAM STN FLD: SIGNIFICANT CHANGE UP
SPECIMEN SOURCE: SIGNIFICANT CHANGE UP

## 2021-07-17 LAB
-  AMIKACIN: SIGNIFICANT CHANGE UP
-  AMOXICILLIN/CLAVULANIC ACID: SIGNIFICANT CHANGE UP
-  AMPICILLIN/SULBACTAM: SIGNIFICANT CHANGE UP
-  AMPICILLIN: SIGNIFICANT CHANGE UP
-  AMPICILLIN: SIGNIFICANT CHANGE UP
-  AZTREONAM: SIGNIFICANT CHANGE UP
-  CEFAZOLIN: SIGNIFICANT CHANGE UP
-  CEFEPIME: SIGNIFICANT CHANGE UP
-  CEFOXITIN: SIGNIFICANT CHANGE UP
-  CEFTAZIDIME: SIGNIFICANT CHANGE UP
-  CEFTRIAXONE: SIGNIFICANT CHANGE UP
-  CIPROFLOXACIN: SIGNIFICANT CHANGE UP
-  ERTAPENEM: SIGNIFICANT CHANGE UP
-  GENTAMICIN: SIGNIFICANT CHANGE UP
-  IMIPENEM: SIGNIFICANT CHANGE UP
-  LEVOFLOXACIN: SIGNIFICANT CHANGE UP
-  LEVOFLOXACIN: SIGNIFICANT CHANGE UP
-  MEROPENEM: SIGNIFICANT CHANGE UP
-  NITROFURANTOIN: SIGNIFICANT CHANGE UP
-  PIPERACILLIN/TAZOBACTAM: SIGNIFICANT CHANGE UP
-  TETRACYCLINE: SIGNIFICANT CHANGE UP
-  TIGECYCLINE: SIGNIFICANT CHANGE UP
-  TOBRAMYCIN: SIGNIFICANT CHANGE UP
-  TRIMETHOPRIM/SULFAMETHOXAZOLE: SIGNIFICANT CHANGE UP
-  TRIMETHOPRIM/SULFAMETHOXAZOLE: SIGNIFICANT CHANGE UP
-  VANCOMYCIN: SIGNIFICANT CHANGE UP
CULTURE RESULTS: SIGNIFICANT CHANGE UP
METHOD TYPE: SIGNIFICANT CHANGE UP
ORGANISM # SPEC MICROSCOPIC CNT: SIGNIFICANT CHANGE UP
ORGANISM # SPEC MICROSCOPIC CNT: SIGNIFICANT CHANGE UP
SPECIMEN SOURCE: SIGNIFICANT CHANGE UP

## 2021-07-19 DIAGNOSIS — L97.812 NON-PRESSURE CHRONIC ULCER OF OTHER PART OF RIGHT LOWER LEG WITH FAT LAYER EXPOSED: ICD-10-CM

## 2021-07-19 DIAGNOSIS — I87.311 CHRONIC VENOUS HYPERTENSION (IDIOPATHIC) WITH ULCER OF RIGHT LOWER EXTREMITY: ICD-10-CM

## 2021-07-19 DIAGNOSIS — R60.9 EDEMA, UNSPECIFIED: ICD-10-CM

## 2021-07-19 DIAGNOSIS — Z79.899 OTHER LONG TERM (CURRENT) DRUG THERAPY: ICD-10-CM

## 2021-07-19 DIAGNOSIS — Z91.81 HISTORY OF FALLING: ICD-10-CM

## 2021-07-19 DIAGNOSIS — I87.2 VENOUS INSUFFICIENCY (CHRONIC) (PERIPHERAL): ICD-10-CM

## 2021-07-19 DIAGNOSIS — M17.12 UNILATERAL PRIMARY OSTEOARTHRITIS, LEFT KNEE: ICD-10-CM

## 2021-07-20 LAB
CULTURE RESULTS: SIGNIFICANT CHANGE UP
ORGANISM # SPEC MICROSCOPIC CNT: SIGNIFICANT CHANGE UP
SPECIMEN SOURCE: SIGNIFICANT CHANGE UP

## 2021-07-22 ENCOUNTER — OUTPATIENT (OUTPATIENT)
Dept: OUTPATIENT SERVICES | Facility: HOSPITAL | Age: 61
LOS: 1 days | Discharge: HOME | End: 2021-07-22

## 2021-07-22 DIAGNOSIS — L89.90 PRESSURE ULCER OF UNSPECIFIED SITE, UNSPECIFIED STAGE: ICD-10-CM

## 2021-07-22 DIAGNOSIS — K46.9 UNSPECIFIED ABDOMINAL HERNIA WITHOUT OBSTRUCTION OR GANGRENE: Chronic | ICD-10-CM

## 2021-07-22 DIAGNOSIS — R58 HEMORRHAGE, NOT ELSEWHERE CLASSIFIED: Chronic | ICD-10-CM

## 2021-07-22 DIAGNOSIS — Z98.89 OTHER SPECIFIED POSTPROCEDURAL STATES: Chronic | ICD-10-CM

## 2021-07-29 ENCOUNTER — OUTPATIENT (OUTPATIENT)
Dept: OUTPATIENT SERVICES | Facility: HOSPITAL | Age: 61
LOS: 1 days | Discharge: HOME | End: 2021-07-29

## 2021-07-29 DIAGNOSIS — L89.90 PRESSURE ULCER OF UNSPECIFIED SITE, UNSPECIFIED STAGE: ICD-10-CM

## 2021-07-29 DIAGNOSIS — Z98.89 OTHER SPECIFIED POSTPROCEDURAL STATES: Chronic | ICD-10-CM

## 2021-07-29 DIAGNOSIS — K46.9 UNSPECIFIED ABDOMINAL HERNIA WITHOUT OBSTRUCTION OR GANGRENE: Chronic | ICD-10-CM

## 2021-07-29 DIAGNOSIS — R58 HEMORRHAGE, NOT ELSEWHERE CLASSIFIED: Chronic | ICD-10-CM

## 2021-08-02 DIAGNOSIS — R60.9 EDEMA, UNSPECIFIED: ICD-10-CM

## 2021-08-02 DIAGNOSIS — I87.311 CHRONIC VENOUS HYPERTENSION (IDIOPATHIC) WITH ULCER OF RIGHT LOWER EXTREMITY: ICD-10-CM

## 2021-08-02 DIAGNOSIS — M17.12 UNILATERAL PRIMARY OSTEOARTHRITIS, LEFT KNEE: ICD-10-CM

## 2021-08-02 DIAGNOSIS — Z79.899 OTHER LONG TERM (CURRENT) DRUG THERAPY: ICD-10-CM

## 2021-08-02 DIAGNOSIS — L97.812 NON-PRESSURE CHRONIC ULCER OF OTHER PART OF RIGHT LOWER LEG WITH FAT LAYER EXPOSED: ICD-10-CM

## 2021-08-02 DIAGNOSIS — I87.2 VENOUS INSUFFICIENCY (CHRONIC) (PERIPHERAL): ICD-10-CM

## 2021-08-02 DIAGNOSIS — L92.9 GRANULOMATOUS DISORDER OF THE SKIN AND SUBCUTANEOUS TISSUE, UNSPECIFIED: ICD-10-CM

## 2021-08-02 DIAGNOSIS — Z91.81 HISTORY OF FALLING: ICD-10-CM

## 2021-08-06 ENCOUNTER — OUTPATIENT (OUTPATIENT)
Dept: OUTPATIENT SERVICES | Facility: HOSPITAL | Age: 61
LOS: 1 days | Discharge: HOME | End: 2021-08-06

## 2021-08-06 DIAGNOSIS — L89.90 PRESSURE ULCER OF UNSPECIFIED SITE, UNSPECIFIED STAGE: ICD-10-CM

## 2021-08-06 DIAGNOSIS — K46.9 UNSPECIFIED ABDOMINAL HERNIA WITHOUT OBSTRUCTION OR GANGRENE: Chronic | ICD-10-CM

## 2021-08-06 DIAGNOSIS — R58 HEMORRHAGE, NOT ELSEWHERE CLASSIFIED: Chronic | ICD-10-CM

## 2021-08-06 DIAGNOSIS — Z98.89 OTHER SPECIFIED POSTPROCEDURAL STATES: Chronic | ICD-10-CM

## 2021-08-09 DIAGNOSIS — Z91.81 HISTORY OF FALLING: ICD-10-CM

## 2021-08-09 DIAGNOSIS — I87.311 CHRONIC VENOUS HYPERTENSION (IDIOPATHIC) WITH ULCER OF RIGHT LOWER EXTREMITY: ICD-10-CM

## 2021-08-09 DIAGNOSIS — L97.812 NON-PRESSURE CHRONIC ULCER OF OTHER PART OF RIGHT LOWER LEG WITH FAT LAYER EXPOSED: ICD-10-CM

## 2021-08-09 DIAGNOSIS — I87.2 VENOUS INSUFFICIENCY (CHRONIC) (PERIPHERAL): ICD-10-CM

## 2021-08-09 DIAGNOSIS — L92.9 GRANULOMATOUS DISORDER OF THE SKIN AND SUBCUTANEOUS TISSUE, UNSPECIFIED: ICD-10-CM

## 2021-08-09 DIAGNOSIS — R60.9 EDEMA, UNSPECIFIED: ICD-10-CM

## 2021-08-09 DIAGNOSIS — M17.12 UNILATERAL PRIMARY OSTEOARTHRITIS, LEFT KNEE: ICD-10-CM

## 2021-08-09 DIAGNOSIS — Z79.899 OTHER LONG TERM (CURRENT) DRUG THERAPY: ICD-10-CM

## 2021-08-12 ENCOUNTER — OUTPATIENT (OUTPATIENT)
Dept: OUTPATIENT SERVICES | Facility: HOSPITAL | Age: 61
LOS: 1 days | Discharge: HOME | End: 2021-08-12

## 2021-08-12 DIAGNOSIS — R58 HEMORRHAGE, NOT ELSEWHERE CLASSIFIED: Chronic | ICD-10-CM

## 2021-08-12 DIAGNOSIS — Z98.89 OTHER SPECIFIED POSTPROCEDURAL STATES: Chronic | ICD-10-CM

## 2021-08-12 DIAGNOSIS — K46.9 UNSPECIFIED ABDOMINAL HERNIA WITHOUT OBSTRUCTION OR GANGRENE: Chronic | ICD-10-CM

## 2021-08-12 DIAGNOSIS — L89.90 PRESSURE ULCER OF UNSPECIFIED SITE, UNSPECIFIED STAGE: ICD-10-CM

## 2021-08-19 ENCOUNTER — OUTPATIENT (OUTPATIENT)
Dept: OUTPATIENT SERVICES | Facility: HOSPITAL | Age: 61
LOS: 1 days | Discharge: HOME | End: 2021-08-19

## 2021-08-19 DIAGNOSIS — Z98.89 OTHER SPECIFIED POSTPROCEDURAL STATES: Chronic | ICD-10-CM

## 2021-08-19 DIAGNOSIS — K46.9 UNSPECIFIED ABDOMINAL HERNIA WITHOUT OBSTRUCTION OR GANGRENE: Chronic | ICD-10-CM

## 2021-08-19 DIAGNOSIS — R58 HEMORRHAGE, NOT ELSEWHERE CLASSIFIED: Chronic | ICD-10-CM

## 2021-08-19 DIAGNOSIS — L89.90 PRESSURE ULCER OF UNSPECIFIED SITE, UNSPECIFIED STAGE: ICD-10-CM

## 2021-08-23 DIAGNOSIS — L97.812 NON-PRESSURE CHRONIC ULCER OF OTHER PART OF RIGHT LOWER LEG WITH FAT LAYER EXPOSED: ICD-10-CM

## 2021-08-23 DIAGNOSIS — I87.311 CHRONIC VENOUS HYPERTENSION (IDIOPATHIC) WITH ULCER OF RIGHT LOWER EXTREMITY: ICD-10-CM

## 2021-08-23 DIAGNOSIS — L92.9 GRANULOMATOUS DISORDER OF THE SKIN AND SUBCUTANEOUS TISSUE, UNSPECIFIED: ICD-10-CM

## 2021-08-23 DIAGNOSIS — R60.9 EDEMA, UNSPECIFIED: ICD-10-CM

## 2021-08-23 DIAGNOSIS — Z79.899 OTHER LONG TERM (CURRENT) DRUG THERAPY: ICD-10-CM

## 2021-08-23 DIAGNOSIS — M17.12 UNILATERAL PRIMARY OSTEOARTHRITIS, LEFT KNEE: ICD-10-CM

## 2021-08-23 DIAGNOSIS — I87.2 VENOUS INSUFFICIENCY (CHRONIC) (PERIPHERAL): ICD-10-CM

## 2021-08-23 DIAGNOSIS — Z91.81 HISTORY OF FALLING: ICD-10-CM

## 2021-08-26 ENCOUNTER — OUTPATIENT (OUTPATIENT)
Dept: OUTPATIENT SERVICES | Facility: HOSPITAL | Age: 61
LOS: 1 days | Discharge: HOME | End: 2021-08-26

## 2021-08-26 DIAGNOSIS — K46.9 UNSPECIFIED ABDOMINAL HERNIA WITHOUT OBSTRUCTION OR GANGRENE: Chronic | ICD-10-CM

## 2021-08-26 DIAGNOSIS — L89.90 PRESSURE ULCER OF UNSPECIFIED SITE, UNSPECIFIED STAGE: ICD-10-CM

## 2021-08-26 DIAGNOSIS — Z98.89 OTHER SPECIFIED POSTPROCEDURAL STATES: Chronic | ICD-10-CM

## 2021-08-26 DIAGNOSIS — R58 HEMORRHAGE, NOT ELSEWHERE CLASSIFIED: Chronic | ICD-10-CM

## 2021-08-30 DIAGNOSIS — L97.812 NON-PRESSURE CHRONIC ULCER OF OTHER PART OF RIGHT LOWER LEG WITH FAT LAYER EXPOSED: ICD-10-CM

## 2021-08-30 DIAGNOSIS — Z91.81 HISTORY OF FALLING: ICD-10-CM

## 2021-08-30 DIAGNOSIS — I87.2 VENOUS INSUFFICIENCY (CHRONIC) (PERIPHERAL): ICD-10-CM

## 2021-08-30 DIAGNOSIS — I87.311 CHRONIC VENOUS HYPERTENSION (IDIOPATHIC) WITH ULCER OF RIGHT LOWER EXTREMITY: ICD-10-CM

## 2021-08-30 DIAGNOSIS — R60.9 EDEMA, UNSPECIFIED: ICD-10-CM

## 2021-08-30 DIAGNOSIS — Z79.899 OTHER LONG TERM (CURRENT) DRUG THERAPY: ICD-10-CM

## 2021-08-30 DIAGNOSIS — M17.12 UNILATERAL PRIMARY OSTEOARTHRITIS, LEFT KNEE: ICD-10-CM

## 2021-09-02 ENCOUNTER — APPOINTMENT (OUTPATIENT)
Dept: UROLOGY | Facility: CLINIC | Age: 61
End: 2021-09-02
Payer: MEDICAID

## 2021-09-02 ENCOUNTER — OUTPATIENT (OUTPATIENT)
Dept: OUTPATIENT SERVICES | Facility: HOSPITAL | Age: 61
LOS: 1 days | Discharge: HOME | End: 2021-09-02

## 2021-09-02 VITALS — SYSTOLIC BLOOD PRESSURE: 161 MMHG | DIASTOLIC BLOOD PRESSURE: 93 MMHG | TEMPERATURE: 97.9 F | HEART RATE: 87 BPM

## 2021-09-02 DIAGNOSIS — K46.9 UNSPECIFIED ABDOMINAL HERNIA WITHOUT OBSTRUCTION OR GANGRENE: Chronic | ICD-10-CM

## 2021-09-02 DIAGNOSIS — L89.90 PRESSURE ULCER OF UNSPECIFIED SITE, UNSPECIFIED STAGE: ICD-10-CM

## 2021-09-02 DIAGNOSIS — R58 HEMORRHAGE, NOT ELSEWHERE CLASSIFIED: Chronic | ICD-10-CM

## 2021-09-02 DIAGNOSIS — Z98.89 OTHER SPECIFIED POSTPROCEDURAL STATES: Chronic | ICD-10-CM

## 2021-09-02 PROCEDURE — 51702 INSERT TEMP BLADDER CATH: CPT

## 2021-09-02 PROCEDURE — 99072 ADDL SUPL MATRL&STAF TM PHE: CPT

## 2021-09-02 PROCEDURE — 99214 OFFICE O/P EST MOD 30 MIN: CPT | Mod: 25

## 2021-09-02 NOTE — HISTORY OF PRESENT ILLNESS
[FreeTextEntry1] : 59 y/o male presenting for initial visit for urinary retention. Pt presented to [facility] ER on 11/23/2020 for urinary retention. UA and culture in hospital negative. Barbosa catheter was placed in hospital. \par \par Barbosa catheter in place. Barbosa catheter changed today. \par \par O/E: uncircumcised phallus, adequate meatus,  left epididymal cyst, right hydrocele; intrascrotal contents cannot be examined secondary to large hydrocele\par LEILANI: deferred\par \par Will start on Tamsulosin\par Will schedule for UDS and cystoscopy possible biopsy and fulguration in 3 weeks\par Will do prostate sonogram at next visit\par \par 01/21/2021: Pt is here today for follow up on urinary retention. \par \par Urodynamics was done today. Detrusor pressure increased. Obstruction present\par \par Cystoscopy was done today. Enlarged prostate. Bladder stones visualized secondary to Barbosa catheter.\par \par Testicular sonogram was done today. Bilateral hydrocele. Testes normal. \par \par Barbosa catheter was changed today. \par \par On Tamsulosin; will continue\par \par Will consult with PMD to discuss clearance for cystolitholapaxy and Green light TURP\par \par Follow up in 2 weeks\par \par 02/04/2021: Pt is here today for follow up for urinary retention. Pt presented to [facility] ER for Barbosa catheter blockage. Barbosa catheter was changed in hospital.\par \par Pt will consult with cardiologist for clearance for cystolitholapaxy and Green light TURP\par \par On Tamsulosin ; will continue\par Will call for appt\par \par 09/02/2021: Marked obesity. Urinary retention, managed with Barbosa catheter. Abrbosa changed. Continue meds.\par \par Large right hydrocele. Will schedule for aspiration in the office.\par

## 2021-09-02 NOTE — PHYSICAL EXAM
[General Appearance - Well Developed] : well developed [General Appearance - Well Nourished] : well nourished [Normal Appearance] : normal appearance [Well Groomed] : well groomed [General Appearance - In No Acute Distress] : no acute distress [Abdomen Soft] : soft [Abdomen Tenderness] : non-tender [Costovertebral Angle Tenderness] : no ~M costovertebral angle tenderness [Urethral Meatus] : meatus normal [Penis Abnormality] : normal uncircumcised penis [Urinary Bladder Findings] : the bladder was normal on palpation [Scrotum] : the scrotum was normal [Edema] : no peripheral edema [] : no respiratory distress [Respiration, Rhythm And Depth] : normal respiratory rhythm and effort [Exaggerated Use Of Accessory Muscles For Inspiration] : no accessory muscle use [Oriented To Time, Place, And Person] : oriented to person, place, and time [Affect] : the affect was normal [Mood] : the mood was normal [Not Anxious] : not anxious [No Focal Deficits] : no focal deficits [No Palpable Adenopathy] : no palpable adenopathy [Epididymis] : the epididymides were normal [Testes Tenderness] : no tenderness of the testes [FreeTextEntry1] : uses crutches

## 2021-09-09 ENCOUNTER — OUTPATIENT (OUTPATIENT)
Dept: OUTPATIENT SERVICES | Facility: HOSPITAL | Age: 61
LOS: 1 days | Discharge: HOME | End: 2021-09-09

## 2021-09-09 DIAGNOSIS — L89.90 PRESSURE ULCER OF UNSPECIFIED SITE, UNSPECIFIED STAGE: ICD-10-CM

## 2021-09-09 DIAGNOSIS — M17.12 UNILATERAL PRIMARY OSTEOARTHRITIS, LEFT KNEE: ICD-10-CM

## 2021-09-09 DIAGNOSIS — I87.311 CHRONIC VENOUS HYPERTENSION (IDIOPATHIC) WITH ULCER OF RIGHT LOWER EXTREMITY: ICD-10-CM

## 2021-09-09 DIAGNOSIS — Z98.89 OTHER SPECIFIED POSTPROCEDURAL STATES: Chronic | ICD-10-CM

## 2021-09-09 DIAGNOSIS — K46.9 UNSPECIFIED ABDOMINAL HERNIA WITHOUT OBSTRUCTION OR GANGRENE: Chronic | ICD-10-CM

## 2021-09-09 DIAGNOSIS — R58 HEMORRHAGE, NOT ELSEWHERE CLASSIFIED: Chronic | ICD-10-CM

## 2021-09-09 DIAGNOSIS — Z91.81 HISTORY OF FALLING: ICD-10-CM

## 2021-09-09 DIAGNOSIS — Z79.899 OTHER LONG TERM (CURRENT) DRUG THERAPY: ICD-10-CM

## 2021-09-09 DIAGNOSIS — L97.812 NON-PRESSURE CHRONIC ULCER OF OTHER PART OF RIGHT LOWER LEG WITH FAT LAYER EXPOSED: ICD-10-CM

## 2021-09-09 DIAGNOSIS — R60.9 EDEMA, UNSPECIFIED: ICD-10-CM

## 2021-09-09 DIAGNOSIS — I87.2 VENOUS INSUFFICIENCY (CHRONIC) (PERIPHERAL): ICD-10-CM

## 2021-09-10 DIAGNOSIS — I87.2 VENOUS INSUFFICIENCY (CHRONIC) (PERIPHERAL): ICD-10-CM

## 2021-09-10 DIAGNOSIS — Z91.81 HISTORY OF FALLING: ICD-10-CM

## 2021-09-10 DIAGNOSIS — R60.9 EDEMA, UNSPECIFIED: ICD-10-CM

## 2021-09-10 DIAGNOSIS — Z79.899 OTHER LONG TERM (CURRENT) DRUG THERAPY: ICD-10-CM

## 2021-09-10 DIAGNOSIS — I87.311 CHRONIC VENOUS HYPERTENSION (IDIOPATHIC) WITH ULCER OF RIGHT LOWER EXTREMITY: ICD-10-CM

## 2021-09-10 DIAGNOSIS — M17.12 UNILATERAL PRIMARY OSTEOARTHRITIS, LEFT KNEE: ICD-10-CM

## 2021-09-10 DIAGNOSIS — L97.812 NON-PRESSURE CHRONIC ULCER OF OTHER PART OF RIGHT LOWER LEG WITH FAT LAYER EXPOSED: ICD-10-CM

## 2021-09-16 ENCOUNTER — OUTPATIENT (OUTPATIENT)
Dept: OUTPATIENT SERVICES | Facility: HOSPITAL | Age: 61
LOS: 1 days | Discharge: HOME | End: 2021-09-16
Payer: MEDICAID

## 2021-09-16 DIAGNOSIS — K46.9 UNSPECIFIED ABDOMINAL HERNIA WITHOUT OBSTRUCTION OR GANGRENE: Chronic | ICD-10-CM

## 2021-09-16 DIAGNOSIS — R58 HEMORRHAGE, NOT ELSEWHERE CLASSIFIED: Chronic | ICD-10-CM

## 2021-09-16 DIAGNOSIS — L89.90 PRESSURE ULCER OF UNSPECIFIED SITE, UNSPECIFIED STAGE: ICD-10-CM

## 2021-09-16 DIAGNOSIS — Z98.89 OTHER SPECIFIED POSTPROCEDURAL STATES: Chronic | ICD-10-CM

## 2021-09-16 PROCEDURE — 29580 STRAPPING UNNA BOOT: CPT | Mod: 59,RT

## 2021-09-16 PROCEDURE — 99212 OFFICE O/P EST SF 10 MIN: CPT | Mod: 25

## 2021-09-17 DIAGNOSIS — L97.812 NON-PRESSURE CHRONIC ULCER OF OTHER PART OF RIGHT LOWER LEG WITH FAT LAYER EXPOSED: ICD-10-CM

## 2021-09-17 DIAGNOSIS — R60.9 EDEMA, UNSPECIFIED: ICD-10-CM

## 2021-09-17 DIAGNOSIS — M17.12 UNILATERAL PRIMARY OSTEOARTHRITIS, LEFT KNEE: ICD-10-CM

## 2021-09-17 DIAGNOSIS — I87.2 VENOUS INSUFFICIENCY (CHRONIC) (PERIPHERAL): ICD-10-CM

## 2021-09-17 DIAGNOSIS — I87.311 CHRONIC VENOUS HYPERTENSION (IDIOPATHIC) WITH ULCER OF RIGHT LOWER EXTREMITY: ICD-10-CM

## 2021-09-17 DIAGNOSIS — Z79.899 OTHER LONG TERM (CURRENT) DRUG THERAPY: ICD-10-CM

## 2021-09-17 DIAGNOSIS — Z91.81 HISTORY OF FALLING: ICD-10-CM

## 2021-09-23 ENCOUNTER — OUTPATIENT (OUTPATIENT)
Dept: OUTPATIENT SERVICES | Facility: HOSPITAL | Age: 61
LOS: 1 days | Discharge: HOME | End: 2021-09-23

## 2021-09-23 DIAGNOSIS — R58 HEMORRHAGE, NOT ELSEWHERE CLASSIFIED: Chronic | ICD-10-CM

## 2021-09-23 DIAGNOSIS — K46.9 UNSPECIFIED ABDOMINAL HERNIA WITHOUT OBSTRUCTION OR GANGRENE: Chronic | ICD-10-CM

## 2021-09-23 DIAGNOSIS — L89.90 PRESSURE ULCER OF UNSPECIFIED SITE, UNSPECIFIED STAGE: ICD-10-CM

## 2021-09-23 DIAGNOSIS — Z98.89 OTHER SPECIFIED POSTPROCEDURAL STATES: Chronic | ICD-10-CM

## 2021-09-27 DIAGNOSIS — R60.9 EDEMA, UNSPECIFIED: ICD-10-CM

## 2021-09-27 DIAGNOSIS — M17.12 UNILATERAL PRIMARY OSTEOARTHRITIS, LEFT KNEE: ICD-10-CM

## 2021-09-27 DIAGNOSIS — Z91.81 HISTORY OF FALLING: ICD-10-CM

## 2021-09-27 DIAGNOSIS — L97.812 NON-PRESSURE CHRONIC ULCER OF OTHER PART OF RIGHT LOWER LEG WITH FAT LAYER EXPOSED: ICD-10-CM

## 2021-09-27 DIAGNOSIS — I87.2 VENOUS INSUFFICIENCY (CHRONIC) (PERIPHERAL): ICD-10-CM

## 2021-09-27 DIAGNOSIS — I87.311 CHRONIC VENOUS HYPERTENSION (IDIOPATHIC) WITH ULCER OF RIGHT LOWER EXTREMITY: ICD-10-CM

## 2021-09-27 DIAGNOSIS — Z79.899 OTHER LONG TERM (CURRENT) DRUG THERAPY: ICD-10-CM

## 2021-09-30 ENCOUNTER — OUTPATIENT (OUTPATIENT)
Dept: OUTPATIENT SERVICES | Facility: HOSPITAL | Age: 61
LOS: 1 days | Discharge: HOME | End: 2021-09-30

## 2021-09-30 DIAGNOSIS — K46.9 UNSPECIFIED ABDOMINAL HERNIA WITHOUT OBSTRUCTION OR GANGRENE: Chronic | ICD-10-CM

## 2021-09-30 DIAGNOSIS — R58 HEMORRHAGE, NOT ELSEWHERE CLASSIFIED: Chronic | ICD-10-CM

## 2021-09-30 DIAGNOSIS — Z98.89 OTHER SPECIFIED POSTPROCEDURAL STATES: Chronic | ICD-10-CM

## 2021-10-07 ENCOUNTER — OUTPATIENT (OUTPATIENT)
Dept: OUTPATIENT SERVICES | Facility: HOSPITAL | Age: 61
LOS: 1 days | Discharge: HOME | End: 2021-10-07

## 2021-10-07 DIAGNOSIS — Z91.81 HISTORY OF FALLING: ICD-10-CM

## 2021-10-07 DIAGNOSIS — K46.9 UNSPECIFIED ABDOMINAL HERNIA WITHOUT OBSTRUCTION OR GANGRENE: Chronic | ICD-10-CM

## 2021-10-07 DIAGNOSIS — I87.311 CHRONIC VENOUS HYPERTENSION (IDIOPATHIC) WITH ULCER OF RIGHT LOWER EXTREMITY: ICD-10-CM

## 2021-10-07 DIAGNOSIS — M17.12 UNILATERAL PRIMARY OSTEOARTHRITIS, LEFT KNEE: ICD-10-CM

## 2021-10-07 DIAGNOSIS — I87.2 VENOUS INSUFFICIENCY (CHRONIC) (PERIPHERAL): ICD-10-CM

## 2021-10-07 DIAGNOSIS — R58 HEMORRHAGE, NOT ELSEWHERE CLASSIFIED: Chronic | ICD-10-CM

## 2021-10-07 DIAGNOSIS — Z79.899 OTHER LONG TERM (CURRENT) DRUG THERAPY: ICD-10-CM

## 2021-10-07 DIAGNOSIS — R60.9 EDEMA, UNSPECIFIED: ICD-10-CM

## 2021-10-07 DIAGNOSIS — Z98.89 OTHER SPECIFIED POSTPROCEDURAL STATES: Chronic | ICD-10-CM

## 2021-10-07 DIAGNOSIS — L89.90 PRESSURE ULCER OF UNSPECIFIED SITE, UNSPECIFIED STAGE: ICD-10-CM

## 2021-10-07 DIAGNOSIS — L97.812 NON-PRESSURE CHRONIC ULCER OF OTHER PART OF RIGHT LOWER LEG WITH FAT LAYER EXPOSED: ICD-10-CM

## 2021-10-14 ENCOUNTER — OUTPATIENT (OUTPATIENT)
Dept: OUTPATIENT SERVICES | Facility: HOSPITAL | Age: 61
LOS: 1 days | Discharge: HOME | End: 2021-10-14

## 2021-10-14 DIAGNOSIS — I87.2 VENOUS INSUFFICIENCY (CHRONIC) (PERIPHERAL): ICD-10-CM

## 2021-10-14 DIAGNOSIS — I87.311 CHRONIC VENOUS HYPERTENSION (IDIOPATHIC) WITH ULCER OF RIGHT LOWER EXTREMITY: ICD-10-CM

## 2021-10-14 DIAGNOSIS — Z98.89 OTHER SPECIFIED POSTPROCEDURAL STATES: Chronic | ICD-10-CM

## 2021-10-14 DIAGNOSIS — R58 HEMORRHAGE, NOT ELSEWHERE CLASSIFIED: Chronic | ICD-10-CM

## 2021-10-14 DIAGNOSIS — L97.812 NON-PRESSURE CHRONIC ULCER OF OTHER PART OF RIGHT LOWER LEG WITH FAT LAYER EXPOSED: ICD-10-CM

## 2021-10-14 DIAGNOSIS — R60.9 EDEMA, UNSPECIFIED: ICD-10-CM

## 2021-10-14 DIAGNOSIS — K46.9 UNSPECIFIED ABDOMINAL HERNIA WITHOUT OBSTRUCTION OR GANGRENE: Chronic | ICD-10-CM

## 2021-10-14 DIAGNOSIS — M17.12 UNILATERAL PRIMARY OSTEOARTHRITIS, LEFT KNEE: ICD-10-CM

## 2021-10-14 DIAGNOSIS — Z91.81 HISTORY OF FALLING: ICD-10-CM

## 2021-10-14 DIAGNOSIS — L89.90 PRESSURE ULCER OF UNSPECIFIED SITE, UNSPECIFIED STAGE: ICD-10-CM

## 2021-10-14 DIAGNOSIS — Z79.899 OTHER LONG TERM (CURRENT) DRUG THERAPY: ICD-10-CM

## 2021-10-20 DIAGNOSIS — I87.2 VENOUS INSUFFICIENCY (CHRONIC) (PERIPHERAL): ICD-10-CM

## 2021-10-20 DIAGNOSIS — L97.812 NON-PRESSURE CHRONIC ULCER OF OTHER PART OF RIGHT LOWER LEG WITH FAT LAYER EXPOSED: ICD-10-CM

## 2021-10-20 DIAGNOSIS — M17.12 UNILATERAL PRIMARY OSTEOARTHRITIS, LEFT KNEE: ICD-10-CM

## 2021-10-20 DIAGNOSIS — Z91.81 HISTORY OF FALLING: ICD-10-CM

## 2021-10-20 DIAGNOSIS — I87.311 CHRONIC VENOUS HYPERTENSION (IDIOPATHIC) WITH ULCER OF RIGHT LOWER EXTREMITY: ICD-10-CM

## 2021-10-20 DIAGNOSIS — R60.9 EDEMA, UNSPECIFIED: ICD-10-CM

## 2021-10-20 DIAGNOSIS — Z79.899 OTHER LONG TERM (CURRENT) DRUG THERAPY: ICD-10-CM

## 2021-10-21 ENCOUNTER — OUTPATIENT (OUTPATIENT)
Dept: OUTPATIENT SERVICES | Facility: HOSPITAL | Age: 61
LOS: 1 days | Discharge: HOME | End: 2021-10-21

## 2021-10-21 DIAGNOSIS — K46.9 UNSPECIFIED ABDOMINAL HERNIA WITHOUT OBSTRUCTION OR GANGRENE: Chronic | ICD-10-CM

## 2021-10-21 DIAGNOSIS — L89.90 PRESSURE ULCER OF UNSPECIFIED SITE, UNSPECIFIED STAGE: ICD-10-CM

## 2021-10-21 DIAGNOSIS — Z98.89 OTHER SPECIFIED POSTPROCEDURAL STATES: Chronic | ICD-10-CM

## 2021-10-21 DIAGNOSIS — R58 HEMORRHAGE, NOT ELSEWHERE CLASSIFIED: Chronic | ICD-10-CM

## 2021-10-28 ENCOUNTER — OUTPATIENT (OUTPATIENT)
Dept: OUTPATIENT SERVICES | Facility: HOSPITAL | Age: 61
LOS: 1 days | Discharge: HOME | End: 2021-10-28

## 2021-10-28 DIAGNOSIS — K46.9 UNSPECIFIED ABDOMINAL HERNIA WITHOUT OBSTRUCTION OR GANGRENE: Chronic | ICD-10-CM

## 2021-10-28 DIAGNOSIS — Z98.89 OTHER SPECIFIED POSTPROCEDURAL STATES: Chronic | ICD-10-CM

## 2021-10-28 DIAGNOSIS — R58 HEMORRHAGE, NOT ELSEWHERE CLASSIFIED: Chronic | ICD-10-CM

## 2021-10-28 DIAGNOSIS — L89.90 PRESSURE ULCER OF UNSPECIFIED SITE, UNSPECIFIED STAGE: ICD-10-CM

## 2021-11-01 DIAGNOSIS — Z91.81 HISTORY OF FALLING: ICD-10-CM

## 2021-11-01 DIAGNOSIS — R60.9 EDEMA, UNSPECIFIED: ICD-10-CM

## 2021-11-01 DIAGNOSIS — I87.2 VENOUS INSUFFICIENCY (CHRONIC) (PERIPHERAL): ICD-10-CM

## 2021-11-01 DIAGNOSIS — I87.311 CHRONIC VENOUS HYPERTENSION (IDIOPATHIC) WITH ULCER OF RIGHT LOWER EXTREMITY: ICD-10-CM

## 2021-11-01 DIAGNOSIS — Z79.899 OTHER LONG TERM (CURRENT) DRUG THERAPY: ICD-10-CM

## 2021-11-01 DIAGNOSIS — M17.12 UNILATERAL PRIMARY OSTEOARTHRITIS, LEFT KNEE: ICD-10-CM

## 2021-11-01 DIAGNOSIS — L97.812 NON-PRESSURE CHRONIC ULCER OF OTHER PART OF RIGHT LOWER LEG WITH FAT LAYER EXPOSED: ICD-10-CM

## 2021-11-04 ENCOUNTER — OUTPATIENT (OUTPATIENT)
Dept: OUTPATIENT SERVICES | Facility: HOSPITAL | Age: 61
LOS: 1 days | Discharge: HOME | End: 2021-11-04

## 2021-11-04 DIAGNOSIS — Z98.89 OTHER SPECIFIED POSTPROCEDURAL STATES: Chronic | ICD-10-CM

## 2021-11-04 DIAGNOSIS — R58 HEMORRHAGE, NOT ELSEWHERE CLASSIFIED: Chronic | ICD-10-CM

## 2021-11-04 DIAGNOSIS — K46.9 UNSPECIFIED ABDOMINAL HERNIA WITHOUT OBSTRUCTION OR GANGRENE: Chronic | ICD-10-CM

## 2021-11-04 DIAGNOSIS — L89.899 PRESSURE ULCER OF OTHER SITE, UNSPECIFIED STAGE: ICD-10-CM

## 2021-11-11 ENCOUNTER — APPOINTMENT (OUTPATIENT)
Dept: UROLOGY | Facility: CLINIC | Age: 61
End: 2021-11-11
Payer: MEDICAID

## 2021-11-11 VITALS
HEART RATE: 89 BPM | SYSTOLIC BLOOD PRESSURE: 150 MMHG | TEMPERATURE: 98.7 F | OXYGEN SATURATION: 94 % | DIASTOLIC BLOOD PRESSURE: 94 MMHG

## 2021-11-11 PROCEDURE — 99072 ADDL SUPL MATRL&STAF TM PHE: CPT

## 2021-11-11 PROCEDURE — 51702 INSERT TEMP BLADDER CATH: CPT | Mod: 59

## 2021-11-11 PROCEDURE — 99213 OFFICE O/P EST LOW 20 MIN: CPT | Mod: 25

## 2021-11-11 PROCEDURE — 55000 DRAINAGE OF HYDROCELE: CPT | Mod: RT

## 2021-11-11 NOTE — PHYSICAL EXAM
[General Appearance - Well Developed] : well developed [General Appearance - Well Nourished] : well nourished [Normal Appearance] : normal appearance [Well Groomed] : well groomed [General Appearance - In No Acute Distress] : no acute distress [Abdomen Soft] : soft [Abdomen Tenderness] : non-tender [Costovertebral Angle Tenderness] : no ~M costovertebral angle tenderness [Urethral Meatus] : meatus normal [Penis Abnormality] : normal uncircumcised penis [Urinary Bladder Findings] : the bladder was normal on palpation [Scrotum] : the scrotum was normal [Epididymis] : the epididymides were normal [Testes Tenderness] : no tenderness of the testes [Edema] : no peripheral edema [] : no respiratory distress [Respiration, Rhythm And Depth] : normal respiratory rhythm and effort [Exaggerated Use Of Accessory Muscles For Inspiration] : no accessory muscle use [Oriented To Time, Place, And Person] : oriented to person, place, and time [Affect] : the affect was normal [Mood] : the mood was normal [Not Anxious] : not anxious [No Focal Deficits] : no focal deficits [No Palpable Adenopathy] : no palpable adenopathy [FreeTextEntry1] : uses crutches

## 2021-11-11 NOTE — HISTORY OF PRESENT ILLNESS
[FreeTextEntry1] : 59 y/o male presenting for initial visit for urinary retention. Pt presented to [facility] ER on 11/23/2020 for urinary retention. UA and culture in hospital negative. Barbosa catheter was placed in hospital. \par \par Barbosa catheter in place. Barbosa catheter changed today. \par \par O/E: uncircumcised phallus, adequate meatus,  left epididymal cyst, right hydrocele; intrascrotal contents cannot be examined secondary to large hydrocele\par LEILANI: deferred\par \par Will start on Tamsulosin\par Will schedule for UDS and cystoscopy possible biopsy and fulguration in 3 weeks\par Will do prostate sonogram at next visit\par \par 01/21/2021: Pt is here today for follow up on urinary retention. \par \par Urodynamics was done today. Detrusor pressure increased. Obstruction present\par \par Cystoscopy was done today. Enlarged prostate. Bladder stones visualized secondary to Barbosa catheter.\par \par Testicular sonogram was done today. Bilateral hydrocele. Testes normal. \par \par Barbosa catheter was changed today. \par \par On Tamsulosin; will continue\par \par Will consult with PMD to discuss clearance for cystolitholapaxy and Green light TURP\par \par Follow up in 2 weeks\par \par 02/04/2021: Pt is here today for follow up for urinary retention. Pt presented to [facility] ER for Barbosa catheter blockage. Barbosa catheter was changed in hospital.\par \par Pt will consult with cardiologist for clearance for cystolitholapaxy and Green light TURP\par \par On Tamsulosin ; will continue\par Will call for appt\par \par 09/02/2021: Marked obesity. Urinary retention, managed with Barbosa catheter. Barbosa changed. Continue meds.\par \par Large right hydrocele. Will schedule for aspiration in the office.\par

## 2021-11-15 DIAGNOSIS — Z91.81 HISTORY OF FALLING: ICD-10-CM

## 2021-11-15 DIAGNOSIS — I87.2 VENOUS INSUFFICIENCY (CHRONIC) (PERIPHERAL): ICD-10-CM

## 2021-11-15 DIAGNOSIS — Z79.899 OTHER LONG TERM (CURRENT) DRUG THERAPY: ICD-10-CM

## 2021-11-15 DIAGNOSIS — L97.812 NON-PRESSURE CHRONIC ULCER OF OTHER PART OF RIGHT LOWER LEG WITH FAT LAYER EXPOSED: ICD-10-CM

## 2021-11-15 DIAGNOSIS — R60.9 EDEMA, UNSPECIFIED: ICD-10-CM

## 2021-11-15 DIAGNOSIS — I87.311 CHRONIC VENOUS HYPERTENSION (IDIOPATHIC) WITH ULCER OF RIGHT LOWER EXTREMITY: ICD-10-CM

## 2021-11-15 DIAGNOSIS — M17.12 UNILATERAL PRIMARY OSTEOARTHRITIS, LEFT KNEE: ICD-10-CM

## 2021-11-18 ENCOUNTER — OUTPATIENT (OUTPATIENT)
Dept: OUTPATIENT SERVICES | Facility: HOSPITAL | Age: 61
LOS: 1 days | Discharge: HOME | End: 2021-11-18

## 2021-11-18 DIAGNOSIS — Z79.899 OTHER LONG TERM (CURRENT) DRUG THERAPY: ICD-10-CM

## 2021-11-18 DIAGNOSIS — Z98.89 OTHER SPECIFIED POSTPROCEDURAL STATES: Chronic | ICD-10-CM

## 2021-11-18 DIAGNOSIS — I87.311 CHRONIC VENOUS HYPERTENSION (IDIOPATHIC) WITH ULCER OF RIGHT LOWER EXTREMITY: ICD-10-CM

## 2021-11-18 DIAGNOSIS — L89.90 PRESSURE ULCER OF UNSPECIFIED SITE, UNSPECIFIED STAGE: ICD-10-CM

## 2021-11-18 DIAGNOSIS — L97.812 NON-PRESSURE CHRONIC ULCER OF OTHER PART OF RIGHT LOWER LEG WITH FAT LAYER EXPOSED: ICD-10-CM

## 2021-11-18 DIAGNOSIS — M17.12 UNILATERAL PRIMARY OSTEOARTHRITIS, LEFT KNEE: ICD-10-CM

## 2021-11-18 DIAGNOSIS — R60.9 EDEMA, UNSPECIFIED: ICD-10-CM

## 2021-11-18 DIAGNOSIS — R58 HEMORRHAGE, NOT ELSEWHERE CLASSIFIED: Chronic | ICD-10-CM

## 2021-11-18 DIAGNOSIS — I87.2 VENOUS INSUFFICIENCY (CHRONIC) (PERIPHERAL): ICD-10-CM

## 2021-11-18 DIAGNOSIS — K46.9 UNSPECIFIED ABDOMINAL HERNIA WITHOUT OBSTRUCTION OR GANGRENE: Chronic | ICD-10-CM

## 2021-11-18 DIAGNOSIS — Z91.81 HISTORY OF FALLING: ICD-10-CM

## 2021-11-24 ENCOUNTER — OUTPATIENT (OUTPATIENT)
Dept: OUTPATIENT SERVICES | Facility: HOSPITAL | Age: 61
LOS: 1 days | Discharge: HOME | End: 2021-11-24
Payer: MEDICAID

## 2021-11-24 DIAGNOSIS — L89.899 PRESSURE ULCER OF OTHER SITE, UNSPECIFIED STAGE: ICD-10-CM

## 2021-11-24 DIAGNOSIS — Z98.89 OTHER SPECIFIED POSTPROCEDURAL STATES: Chronic | ICD-10-CM

## 2021-11-24 DIAGNOSIS — R58 HEMORRHAGE, NOT ELSEWHERE CLASSIFIED: Chronic | ICD-10-CM

## 2021-11-24 DIAGNOSIS — K46.9 UNSPECIFIED ABDOMINAL HERNIA WITHOUT OBSTRUCTION OR GANGRENE: Chronic | ICD-10-CM

## 2021-11-24 PROCEDURE — 29580 STRAPPING UNNA BOOT: CPT | Mod: 59,RT

## 2021-11-24 PROCEDURE — 99213 OFFICE O/P EST LOW 20 MIN: CPT | Mod: 25

## 2021-11-28 NOTE — DIETITIAN INITIAL EVALUATION ADULT. - RD TO REMAIN AVAILABLE
Patient: Rachael South    Procedure Summary     Date: 11/27/21 Room / Location: [facility] ENDOSCOPY 3 / [facility] ENDOSCOPY    Anesthesia Start: 0843 Anesthesia Stop: 0912    Procedure: ESOPHAGOGASTRODUODENOSCOPY AT BEDSIDE WITH ENDO CLIPS X3 (N/A ) Diagnosis:     Surgeons: Toney Tang MD Provider: Lux Taylor MD    Anesthesia Type: general, MAC ASA Status: 4          Anesthesia Type: general, MAC    Vitals  Vitals Value Taken Time   /50 11/28/21 1512   Temp 37.2 °C (98.9 °F) 11/28/21 1512   Pulse 81 11/28/21 1512   Resp 18 11/28/21 1512   SpO2 100 % 11/28/21 1512           Post Anesthesia Care and Evaluation    Patient location during evaluation: bedside  Patient participation: complete - patient participated  Level of consciousness: awake  Pain management: adequate  Airway patency: patent  Anesthetic complications: No anesthetic complications  PONV Status: none  Cardiovascular status: acceptable and stable  Respiratory status: acceptable and room air  Hydration status: acceptable    Comments: An Anesthesiologist personally participated in the most demanding procedures (including induction and emergence if applicable) in the anesthesia plan, monitored the course of anesthesia administration at frequent intervals and remained physically present and available for immediate diagnosis and treatment of emergencies.             yes

## 2021-11-30 ENCOUNTER — APPOINTMENT (OUTPATIENT)
Dept: UROLOGY | Facility: CLINIC | Age: 61
End: 2021-11-30
Payer: MEDICAID

## 2021-11-30 VITALS
SYSTOLIC BLOOD PRESSURE: 154 MMHG | DIASTOLIC BLOOD PRESSURE: 89 MMHG | HEART RATE: 70 BPM | OXYGEN SATURATION: 94 % | TEMPERATURE: 97.8 F

## 2021-11-30 DIAGNOSIS — I10 ESSENTIAL (PRIMARY) HYPERTENSION: ICD-10-CM

## 2021-11-30 DIAGNOSIS — Z91.81 HISTORY OF FALLING: ICD-10-CM

## 2021-11-30 DIAGNOSIS — I87.2 VENOUS INSUFFICIENCY (CHRONIC) (PERIPHERAL): ICD-10-CM

## 2021-11-30 DIAGNOSIS — L97.812 NON-PRESSURE CHRONIC ULCER OF OTHER PART OF RIGHT LOWER LEG WITH FAT LAYER EXPOSED: ICD-10-CM

## 2021-11-30 DIAGNOSIS — I87.311 CHRONIC VENOUS HYPERTENSION (IDIOPATHIC) WITH ULCER OF RIGHT LOWER EXTREMITY: ICD-10-CM

## 2021-11-30 DIAGNOSIS — L92.9 GRANULOMATOUS DISORDER OF THE SKIN AND SUBCUTANEOUS TISSUE, UNSPECIFIED: ICD-10-CM

## 2021-11-30 DIAGNOSIS — R60.9 EDEMA, UNSPECIFIED: ICD-10-CM

## 2021-11-30 DIAGNOSIS — M17.12 UNILATERAL PRIMARY OSTEOARTHRITIS, LEFT KNEE: ICD-10-CM

## 2021-11-30 DIAGNOSIS — N21.0 CALCULUS IN BLADDER: ICD-10-CM

## 2021-11-30 DIAGNOSIS — Z79.899 OTHER LONG TERM (CURRENT) DRUG THERAPY: ICD-10-CM

## 2021-11-30 PROCEDURE — 99072 ADDL SUPL MATRL&STAF TM PHE: CPT

## 2021-11-30 PROCEDURE — 99213 OFFICE O/P EST LOW 20 MIN: CPT

## 2021-11-30 NOTE — PHYSICAL EXAM
[General Appearance - Well Developed] : well developed [Normal Appearance] : normal appearance [Well Groomed] : well groomed [General Appearance - In No Acute Distress] : no acute distress [Abdomen Soft] : soft [Abdomen Tenderness] : non-tender [Costovertebral Angle Tenderness] : no ~M costovertebral angle tenderness [Urethral Meatus] : meatus normal [Penis Abnormality] : normal uncircumcised penis [Urinary Bladder Findings] : the bladder was normal on palpation [Scrotum] : the scrotum was normal [Epididymis] : the epididymides were normal [Testes Tenderness] : no tenderness of the testes [Edema] : no peripheral edema [] : no respiratory distress [Respiration, Rhythm And Depth] : normal respiratory rhythm and effort [Exaggerated Use Of Accessory Muscles For Inspiration] : no accessory muscle use [Oriented To Time, Place, And Person] : oriented to person, place, and time [Affect] : the affect was normal [Mood] : the mood was normal [Not Anxious] : not anxious [No Focal Deficits] : no focal deficits [No Palpable Adenopathy] : no palpable adenopathy [FreeTextEntry1] : uses crutches

## 2021-11-30 NOTE — HISTORY OF PRESENT ILLNESS
[FreeTextEntry1] : 59 y/o male presenting for initial visit for urinary retention. Pt presented to [facility] ER on 11/23/2020 for urinary retention. UA and culture in hospital negative. Barbosa catheter was placed in hospital. \par \par Barbosa catheter in place. Barbosa catheter changed today. \par \par O/E: uncircumcised phallus, adequate meatus,  left epididymal cyst, right hydrocele; intrascrotal contents cannot be examined secondary to large hydrocele\par LEILANI: deferred\par \par Will start on Tamsulosin\par Will schedule for UDS and cystoscopy possible biopsy and fulguration in 3 weeks\par Will do prostate sonogram at next visit\par \par 01/21/2021: Pt is here today for follow up on urinary retention. \par \par Urodynamics was done today. Detrusor pressure increased. Obstruction present\par \par Cystoscopy was done today. Enlarged prostate. Bladder stones visualized secondary to Barbosa catheter.\par \par Testicular sonogram was done today. Bilateral hydrocele. Testes normal. \par \par Barbosa catheter was changed today. \par \par On Tamsulosin; will continue\par \par Will consult with PMD to discuss clearance for cystolitholapaxy and Green light TURP\par \par Follow up in 2 weeks\par \par 02/04/2021: Pt is here today for follow up for urinary retention. Pt presented to [facility] ER for Barbosa catheter blockage. Barbosa catheter was changed in hospital.\par \par Pt will consult with cardiologist for clearance for cystolitholapaxy and Green light TURP\par \par On Tamsulosin ; will continue\par Will call for appt\par \par 09/02/2021: Marked obesity. Urinary retention, managed with Barbosa catheter. Barbosa changed. Continue meds.\par \par Large right hydrocele. Will schedule for aspiration in the office.\par \par 11/30/2021: Mr. SALAZAR is a 61 year male who presents today for a follow for post aspiration (11/11/2021), he is doing well. Urinary retention is managed with Barbosa Catheter\par On Tamsulosin. Will TOV next Visit\par \par Follow up in 2 weeks for Barbosa Catheter change and TOV. \par \par

## 2021-11-30 NOTE — HISTORY OF PRESENT ILLNESS
[FreeTextEntry1] : 59 y/o male presenting for initial visit for urinary retention. Pt presented to [facility] ER on 11/23/2020 for urinary retention. UA and culture in hospital negative. Barbosa catheter was placed in hospital. \par \par Barbosa catheter in place. Barbosa catheter changed today. \par \par O/E: uncircumcised phallus, adequate meatus,  left epididymal cyst, right hydrocele; intrascrotal contents cannot be examined secondary to large hydrocele\par LEILANI: deferred\par \par Will start on Tamsulosin\par Will schedule for UDS and cystoscopy possible biopsy and fulguration in 3 weeks\par Will do prostate sonogram at next visit\par \par 01/21/2021: Pt is here today for follow up on urinary retention. \par \par Urodynamics was done today. Detrusor pressure increased. Obstruction present\par \par Cystoscopy was done today. Enlarged prostate. Bladder stones visualized secondary to Barbosa catheter.\par \par Testicular sonogram was done today. Bilateral hydrocele. Testes normal. \par \par Barbosa catheter was changed today. \par \par On Tamsulosin; will continue\par \par Will consult with PMD to discuss clearance for cystolitholapaxy and Green light TURP\par \par Follow up in 2 weeks\par \par 02/04/2021: Pt is here today for follow up for urinary retention. Pt presented to [facility] ER for Barbosa catheter blockage. Barboas catheter was changed in hospital.\par \par Pt will consult with cardiologist for clearance for cystolitholapaxy and Green light TURP\par \par On Tamsulosin ; will continue\par Will call for appt\par \par 09/02/2021: Marked obesity. Urinary retention, managed with Barbosa catheter. Barbosa changed. Continue meds.\par \par Large right hydrocele. Will schedule for aspiration in the office.\par \par 11/30/2021: Mr. SALAZAR is a 61 year male who presents today for a follow for post aspiration (11/11/2021), he is doing well. Urinary retention is managed with Barbosa Catheter\par On Tamsulosin. Will TOV next Visit\par \par Follow up in 2 weeks for Barbosa Catheter change and TOV. \par \par

## 2021-11-30 NOTE — ADDENDUM
[FreeTextEntry1] : I, Breanne Venegas, acted solely as a scribe for Dr. Marin Parson on this date, 11/30/2021. \par \par All medical record entries made by Scribe were at my Dr. Marin Parson direction and personally dictated by be on, 11/30/2021. I have reviewed the chart and agree that the record accurately reflects my personal performance of the history, physical exam, assessment and plan, I have also personally directed, reviewed and agreed with the chart.\par

## 2021-12-02 ENCOUNTER — OUTPATIENT (OUTPATIENT)
Dept: OUTPATIENT SERVICES | Facility: HOSPITAL | Age: 61
LOS: 1 days | Discharge: HOME | End: 2021-12-02

## 2021-12-02 DIAGNOSIS — K46.9 UNSPECIFIED ABDOMINAL HERNIA WITHOUT OBSTRUCTION OR GANGRENE: Chronic | ICD-10-CM

## 2021-12-02 DIAGNOSIS — L89.899 PRESSURE ULCER OF OTHER SITE, UNSPECIFIED STAGE: ICD-10-CM

## 2021-12-02 DIAGNOSIS — Z98.89 OTHER SPECIFIED POSTPROCEDURAL STATES: Chronic | ICD-10-CM

## 2021-12-02 DIAGNOSIS — R58 HEMORRHAGE, NOT ELSEWHERE CLASSIFIED: Chronic | ICD-10-CM

## 2021-12-08 DIAGNOSIS — L97.812 NON-PRESSURE CHRONIC ULCER OF OTHER PART OF RIGHT LOWER LEG WITH FAT LAYER EXPOSED: ICD-10-CM

## 2021-12-08 DIAGNOSIS — I87.2 VENOUS INSUFFICIENCY (CHRONIC) (PERIPHERAL): ICD-10-CM

## 2021-12-08 DIAGNOSIS — E66.01 MORBID (SEVERE) OBESITY DUE TO EXCESS CALORIES: ICD-10-CM

## 2021-12-08 DIAGNOSIS — Z79.899 OTHER LONG TERM (CURRENT) DRUG THERAPY: ICD-10-CM

## 2021-12-08 DIAGNOSIS — I87.311 CHRONIC VENOUS HYPERTENSION (IDIOPATHIC) WITH ULCER OF RIGHT LOWER EXTREMITY: ICD-10-CM

## 2021-12-08 DIAGNOSIS — Z91.81 HISTORY OF FALLING: ICD-10-CM

## 2021-12-08 DIAGNOSIS — M17.12 UNILATERAL PRIMARY OSTEOARTHRITIS, LEFT KNEE: ICD-10-CM

## 2021-12-08 DIAGNOSIS — R60.9 EDEMA, UNSPECIFIED: ICD-10-CM

## 2021-12-09 ENCOUNTER — OUTPATIENT (OUTPATIENT)
Dept: OUTPATIENT SERVICES | Facility: HOSPITAL | Age: 61
LOS: 1 days | Discharge: HOME | End: 2021-12-09

## 2021-12-09 DIAGNOSIS — R58 HEMORRHAGE, NOT ELSEWHERE CLASSIFIED: Chronic | ICD-10-CM

## 2021-12-09 DIAGNOSIS — Z98.89 OTHER SPECIFIED POSTPROCEDURAL STATES: Chronic | ICD-10-CM

## 2021-12-09 DIAGNOSIS — L89.899 PRESSURE ULCER OF OTHER SITE, UNSPECIFIED STAGE: ICD-10-CM

## 2021-12-09 DIAGNOSIS — K46.9 UNSPECIFIED ABDOMINAL HERNIA WITHOUT OBSTRUCTION OR GANGRENE: Chronic | ICD-10-CM

## 2021-12-10 DIAGNOSIS — M17.12 UNILATERAL PRIMARY OSTEOARTHRITIS, LEFT KNEE: ICD-10-CM

## 2021-12-10 DIAGNOSIS — Z91.81 HISTORY OF FALLING: ICD-10-CM

## 2021-12-10 DIAGNOSIS — I87.311 CHRONIC VENOUS HYPERTENSION (IDIOPATHIC) WITH ULCER OF RIGHT LOWER EXTREMITY: ICD-10-CM

## 2021-12-10 DIAGNOSIS — R60.9 EDEMA, UNSPECIFIED: ICD-10-CM

## 2021-12-10 DIAGNOSIS — Z79.899 OTHER LONG TERM (CURRENT) DRUG THERAPY: ICD-10-CM

## 2021-12-10 DIAGNOSIS — L97.812 NON-PRESSURE CHRONIC ULCER OF OTHER PART OF RIGHT LOWER LEG WITH FAT LAYER EXPOSED: ICD-10-CM

## 2021-12-10 DIAGNOSIS — I87.2 VENOUS INSUFFICIENCY (CHRONIC) (PERIPHERAL): ICD-10-CM

## 2021-12-10 DIAGNOSIS — E66.01 MORBID (SEVERE) OBESITY DUE TO EXCESS CALORIES: ICD-10-CM

## 2021-12-14 ENCOUNTER — EMERGENCY (EMERGENCY)
Facility: HOSPITAL | Age: 61
LOS: 0 days | Discharge: HOME | End: 2021-12-14
Attending: EMERGENCY MEDICINE
Payer: MEDICAID

## 2021-12-14 VITALS
WEIGHT: 270.07 LBS | OXYGEN SATURATION: 98 % | SYSTOLIC BLOOD PRESSURE: 150 MMHG | HEART RATE: 80 BPM | TEMPERATURE: 98 F | HEIGHT: 66 IN | DIASTOLIC BLOOD PRESSURE: 91 MMHG | RESPIRATION RATE: 20 BRPM

## 2021-12-14 DIAGNOSIS — T83.011A BREAKDOWN (MECHANICAL) OF INDWELLING URETHRAL CATHETER, INITIAL ENCOUNTER: ICD-10-CM

## 2021-12-14 DIAGNOSIS — R10.30 LOWER ABDOMINAL PAIN, UNSPECIFIED: ICD-10-CM

## 2021-12-14 DIAGNOSIS — Z87.11 PERSONAL HISTORY OF PEPTIC ULCER DISEASE: ICD-10-CM

## 2021-12-14 DIAGNOSIS — I10 ESSENTIAL (PRIMARY) HYPERTENSION: ICD-10-CM
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
## 2021-12-14 DIAGNOSIS — R30.0 DYSURIA: ICD-10-CM

## 2021-12-14 DIAGNOSIS — R58 HEMORRHAGE, NOT ELSEWHERE CLASSIFIED: Chronic | ICD-10-CM

## 2021-12-14 DIAGNOSIS — Z98.89 OTHER SPECIFIED POSTPROCEDURAL STATES: Chronic | ICD-10-CM

## 2021-12-14 DIAGNOSIS — M17.9 OSTEOARTHRITIS OF KNEE, UNSPECIFIED: ICD-10-CM

## 2021-12-14 DIAGNOSIS — R06.02 SHORTNESS OF BREATH: ICD-10-CM

## 2021-12-14 DIAGNOSIS — K46.9 UNSPECIFIED ABDOMINAL HERNIA WITHOUT OBSTRUCTION OR GANGRENE: Chronic | ICD-10-CM

## 2021-12-14 PROCEDURE — 99283 EMERGENCY DEPT VISIT LOW MDM: CPT

## 2021-12-14 NOTE — ED PROVIDER NOTE - PATIENT PORTAL LINK FT
You can access the FollowMyHealth Patient Portal offered by [facility] by registering at the following website: [email or web address]. By joining [email or web address]’s FollowMyHealth portal, you will also be able to view your health information using other applications (apps) compatible with our system. none

## 2021-12-14 NOTE — ED PROVIDER NOTE - OBJECTIVE STATEMENT
60yo M Hx of HTN, HLD, chronic LE ulcers follows with wound clinic, PUD, morbid obesity and BPH with chronic wahl presenting with wahl catheter complications. had wahl catheter placed 3ks ago with his urologist, Dr. Parson. last night changed his leg bag and since that time has not had any drainage from the catheter. now with suprapubic and lower abdominal pain. pt appears SOB upon movement which pt reports is normal for him and that he doesn't feel short of breath at this time. denies any hematuria, nausea or vomiting.

## 2021-12-14 NOTE — ED ADULT TRIAGE NOTE - CHIEF COMPLAINT QUOTE
Patient states" my pipe is not working, no water coming out" Patient has a urinary catheter to leg bag which he changes yesterday and since then no urine has drained since, c/i 8/10 bladder pain. Patient appears sob and states its new for him. Denies chest pains

## 2021-12-14 NOTE — ED PROVIDER NOTE - ATTENDING CONTRIBUTION TO CARE
Patient evaluated and seen with PGY3 Millie agree with above history and physical - pt examined and seen by me personally - findings as seen: Pt with urinary retention secondary to blocked wahl - 16Fr replaced with good flow. pt without any systemic symptoms of fever/chills, feeling improved after wahl placement - with 500 urine drainage, will place on leg bag and dc with Dr Parson follow up.

## 2021-12-14 NOTE — ED PROVIDER NOTE - CLINICAL SUMMARY MEDICAL DECISION MAKING FREE TEXT BOX
62yo M with chronic indwelling wahl, placed 3 weeks ago presenting with no output from wahl since last night after changing leg bag. with suprapubic/lower abdominal pain. possible clot/kink in wahl catheter, will exchange catheter. urology follow up.

## 2021-12-14 NOTE — ED PROVIDER NOTE - RESPIRATORY, MLM
Breath sounds clear and equal bilaterally. becomes short of breath with movement but speaking in full sentences, saturating well on RA

## 2021-12-14 NOTE — ED PROVIDER NOTE - CARE PROVIDER_API CALL
Marin Parson)  Urology  [address], [address] [place] [address]  Phone: [phone]  Fax: [phone]  Follow Up Time: 4-6 Days

## 2021-12-14 NOTE — ED ADULT TRIAGE NOTE - ISOLATION TYPE:
LVM for patient to return call. CCR- please inform patient that they are due for a hypertension follow up visit  assist in scheduling if agreeable.
None

## 2021-12-14 NOTE — ED ADULT NURSE NOTE - OBJECTIVE STATEMENT
Patient received ambulatory c/o unable to urinate this morning. Patient has 16f Barbosa catheter in place with drainage bag , no urine in bag at present time. Patient is currently aox3 v/s wnl, lung sounds clear andra, abdomen soft with pressure to pelvic area. Barbosa catheter removed and new 16f Barbosa catheter was applied to gravity. Patient has 200ml clear yellow urine drained at present time. Patient states he has relief and is being monitored closely.

## 2021-12-16 ENCOUNTER — APPOINTMENT (OUTPATIENT)
Dept: UROLOGY | Facility: CLINIC | Age: 61
End: 2021-12-16
Payer: MEDICAID

## 2021-12-16 VITALS — SYSTOLIC BLOOD PRESSURE: 175 MMHG | HEART RATE: 69 BPM | TEMPERATURE: 98.7 F | DIASTOLIC BLOOD PRESSURE: 96 MMHG

## 2021-12-16 DIAGNOSIS — K59.09 OTHER CONSTIPATION: ICD-10-CM

## 2021-12-16 DIAGNOSIS — N43.3 HYDROCELE, UNSPECIFIED: ICD-10-CM

## 2021-12-16 DIAGNOSIS — N40.1 BENIGN PROSTATIC HYPERPLASIA WITH LOWER URINARY TRACT SYMPMS: ICD-10-CM

## 2021-12-16 DIAGNOSIS — R33.8 BENIGN PROSTATIC HYPERPLASIA WITH LOWER URINARY TRACT SYMPMS: ICD-10-CM

## 2021-12-16 DIAGNOSIS — R33.9 RETENTION OF URINE, UNSPECIFIED: ICD-10-CM

## 2021-12-16 PROCEDURE — 51702 INSERT TEMP BLADDER CATH: CPT

## 2021-12-16 PROCEDURE — 99072 ADDL SUPL MATRL&STAF TM PHE: CPT

## 2021-12-16 PROCEDURE — 99213 OFFICE O/P EST LOW 20 MIN: CPT | Mod: 25

## 2021-12-16 RX ORDER — DOCUSATE SODIUM 100 MG/1
100 CAPSULE, LIQUID FILLED ORAL
Refills: 0 | Status: ACTIVE | COMMUNITY
Start: 2018-02-16

## 2021-12-16 RX ORDER — AMLODIPINE BESYLATE 5 MG/1
5 TABLET ORAL
Refills: 0 | Status: ACTIVE | COMMUNITY
Start: 2018-06-11

## 2021-12-16 RX ORDER — DICLOFENAC SODIUM 10 MG/G
1 GEL TOPICAL
Refills: 0 | Status: ACTIVE | COMMUNITY
Start: 2018-06-12

## 2021-12-16 RX ORDER — FUROSEMIDE 40 MG/1
40 TABLET ORAL
Refills: 0 | Status: ACTIVE | COMMUNITY
Start: 2018-06-13

## 2021-12-16 RX ORDER — FAMOTIDINE 20 MG/1
20 TABLET, FILM COATED ORAL
Refills: 0 | Status: ACTIVE | COMMUNITY

## 2021-12-16 NOTE — REVIEW OF SYSTEMS
[see HPI] : see HPI [Pain during urination] : pain during urination [Limb Swelling] : limb swelling [Skin Wound] : skin wound [Difficulty Walking] : difficulty walking [Easy Bruising] : a tendency for easy bruising [Negative] : Endocrine

## 2021-12-16 NOTE — HISTORY OF PRESENT ILLNESS
[FreeTextEntry1] : 59 y/o male presenting for initial visit for urinary retention. Pt presented to [facility] ER on 11/23/2020 for urinary retention. UA and culture in hospital negative. Barbosa catheter was placed in hospital. \par \par Barbosa catheter in place. Barbosa catheter changed today. \par \par O/E: uncircumcised phallus, adequate meatus,  left epididymal cyst, right hydrocele; intrascrotal contents cannot be examined secondary to large hydrocele\par LEILANI: deferred\par \par Will start on Tamsulosin\par Will schedule for UDS and cystoscopy possible biopsy and fulguration in 3 weeks\par Will do prostate sonogram at next visit\par \par 01/21/2021: Pt is here today for follow up on urinary retention. \par \par Urodynamics was done today. Detrusor pressure increased. Obstruction present\par \par Cystoscopy was done today. Enlarged prostate. Bladder stones visualized secondary to Barbosa catheter.\par \par Testicular sonogram was done today. Bilateral hydrocele. Testes normal. \par \par Barbosa catheter was changed today. \par \par On Tamsulosin; will continue\par \par Will consult with PMD to discuss clearance for cystolitholapaxy and Green light TURP\par \par Follow up in 2 weeks\par \par 02/04/2021: Pt is here today for follow up for urinary retention. Pt presented to [facility] ER for Barbosa catheter blockage. Barbosa catheter was changed in hospital.\par \par Pt will consult with cardiologist for clearance for cystolitholapaxy and Green light TURP\par \par On Tamsulosin ; will continue\par Will call for appt\par \par 09/02/2021: Marked obesity. Urinary retention, managed with Barbosa catheter. Barbosa changed. Continue meds.\par \par Large right hydrocele. Will schedule for aspiration in the office.\par \par 11/30/2021: Mr. SALAZAR is a 61 year male who presents today for a follow for post aspiration (11/11/2021), he is doing well. Urinary retention is managed with Barbosa Catheter\par On Tamsulosin. Will TOV next Visit\par \par Follow up in 2 weeks for Barbosa Catheter change and TOV. \par \par 12/16/2021: Bladder distended with 250 ml of water, voided with PVR 50 ml. Barbosa catheter reinserted. TOV in one month. Pt will remove Barbosa at home at 0700 hrs and if retention will reinsert Barbosa. \par \par Bilateral hydroceles present. Penis buried in the fat secondary to obesity.\par \par

## 2021-12-23 ENCOUNTER — OUTPATIENT (OUTPATIENT)
Dept: OUTPATIENT SERVICES | Facility: HOSPITAL | Age: 61
LOS: 1 days | Discharge: HOME | End: 2021-12-23

## 2021-12-23 DIAGNOSIS — R58 HEMORRHAGE, NOT ELSEWHERE CLASSIFIED: Chronic | ICD-10-CM

## 2021-12-23 DIAGNOSIS — K46.9 UNSPECIFIED ABDOMINAL HERNIA WITHOUT OBSTRUCTION OR GANGRENE: Chronic | ICD-10-CM

## 2021-12-23 DIAGNOSIS — L89.90 PRESSURE ULCER OF UNSPECIFIED SITE, UNSPECIFIED STAGE: ICD-10-CM

## 2021-12-23 DIAGNOSIS — Z98.89 OTHER SPECIFIED POSTPROCEDURAL STATES: Chronic | ICD-10-CM

## 2021-12-27 DIAGNOSIS — Z79.899 OTHER LONG TERM (CURRENT) DRUG THERAPY: ICD-10-CM

## 2021-12-27 DIAGNOSIS — Z91.81 HISTORY OF FALLING: ICD-10-CM

## 2021-12-27 DIAGNOSIS — E66.01 MORBID (SEVERE) OBESITY DUE TO EXCESS CALORIES: ICD-10-CM

## 2021-12-27 DIAGNOSIS — R60.9 EDEMA, UNSPECIFIED: ICD-10-CM

## 2021-12-27 DIAGNOSIS — I87.2 VENOUS INSUFFICIENCY (CHRONIC) (PERIPHERAL): ICD-10-CM

## 2021-12-27 DIAGNOSIS — I87.311 CHRONIC VENOUS HYPERTENSION (IDIOPATHIC) WITH ULCER OF RIGHT LOWER EXTREMITY: ICD-10-CM

## 2021-12-27 DIAGNOSIS — M17.12 UNILATERAL PRIMARY OSTEOARTHRITIS, LEFT KNEE: ICD-10-CM

## 2021-12-27 DIAGNOSIS — L97.812 NON-PRESSURE CHRONIC ULCER OF OTHER PART OF RIGHT LOWER LEG WITH FAT LAYER EXPOSED: ICD-10-CM

## 2021-12-30 ENCOUNTER — OUTPATIENT (OUTPATIENT)
Dept: OUTPATIENT SERVICES | Facility: HOSPITAL | Age: 61
LOS: 1 days | Discharge: HOME | End: 2021-12-30

## 2021-12-30 DIAGNOSIS — K46.9 UNSPECIFIED ABDOMINAL HERNIA WITHOUT OBSTRUCTION OR GANGRENE: Chronic | ICD-10-CM

## 2021-12-30 DIAGNOSIS — L89.899 PRESSURE ULCER OF OTHER SITE, UNSPECIFIED STAGE: ICD-10-CM

## 2021-12-30 DIAGNOSIS — R58 HEMORRHAGE, NOT ELSEWHERE CLASSIFIED: Chronic | ICD-10-CM

## 2021-12-30 DIAGNOSIS — Z98.89 OTHER SPECIFIED POSTPROCEDURAL STATES: Chronic | ICD-10-CM

## 2022-01-03 DIAGNOSIS — Z91.81 HISTORY OF FALLING: ICD-10-CM

## 2022-01-03 DIAGNOSIS — I87.311 CHRONIC VENOUS HYPERTENSION (IDIOPATHIC) WITH ULCER OF RIGHT LOWER EXTREMITY: ICD-10-CM

## 2022-01-03 DIAGNOSIS — M17.12 UNILATERAL PRIMARY OSTEOARTHRITIS, LEFT KNEE: ICD-10-CM

## 2022-01-03 DIAGNOSIS — E66.01 MORBID (SEVERE) OBESITY DUE TO EXCESS CALORIES: ICD-10-CM

## 2022-01-03 DIAGNOSIS — R60.9 EDEMA, UNSPECIFIED: ICD-10-CM

## 2022-01-03 DIAGNOSIS — Z79.899 OTHER LONG TERM (CURRENT) DRUG THERAPY: ICD-10-CM

## 2022-01-03 DIAGNOSIS — I87.2 VENOUS INSUFFICIENCY (CHRONIC) (PERIPHERAL): ICD-10-CM

## 2022-01-03 DIAGNOSIS — L97.812 NON-PRESSURE CHRONIC ULCER OF OTHER PART OF RIGHT LOWER LEG WITH FAT LAYER EXPOSED: ICD-10-CM

## 2022-01-06 ENCOUNTER — OUTPATIENT (OUTPATIENT)
Dept: OUTPATIENT SERVICES | Facility: HOSPITAL | Age: 62
LOS: 1 days | Discharge: HOME | End: 2022-01-06
Payer: COMMERCIAL

## 2022-01-06 DIAGNOSIS — R58 HEMORRHAGE, NOT ELSEWHERE CLASSIFIED: Chronic | ICD-10-CM

## 2022-01-06 DIAGNOSIS — L89.899 PRESSURE ULCER OF OTHER SITE, UNSPECIFIED STAGE: ICD-10-CM

## 2022-01-06 DIAGNOSIS — K46.9 UNSPECIFIED ABDOMINAL HERNIA WITHOUT OBSTRUCTION OR GANGRENE: Chronic | ICD-10-CM

## 2022-01-06 DIAGNOSIS — Z98.89 OTHER SPECIFIED POSTPROCEDURAL STATES: Chronic | ICD-10-CM

## 2022-01-06 PROCEDURE — 99213 OFFICE O/P EST LOW 20 MIN: CPT | Mod: 25

## 2022-01-06 PROCEDURE — 29580 STRAPPING UNNA BOOT: CPT | Mod: 59,RT

## 2022-01-06 PROCEDURE — 17250 CHEM CAUT OF GRANLTJ TISSUE: CPT | Mod: 59,RT

## 2022-01-07 DIAGNOSIS — L92.8 OTHER GRANULOMATOUS DISORDERS OF THE SKIN AND SUBCUTANEOUS TISSUE: ICD-10-CM

## 2022-01-07 DIAGNOSIS — M17.12 UNILATERAL PRIMARY OSTEOARTHRITIS, LEFT KNEE: ICD-10-CM

## 2022-01-07 DIAGNOSIS — Z79.899 OTHER LONG TERM (CURRENT) DRUG THERAPY: ICD-10-CM

## 2022-01-07 DIAGNOSIS — Z91.81 HISTORY OF FALLING: ICD-10-CM

## 2022-01-07 DIAGNOSIS — I87.311 CHRONIC VENOUS HYPERTENSION (IDIOPATHIC) WITH ULCER OF RIGHT LOWER EXTREMITY: ICD-10-CM

## 2022-01-07 DIAGNOSIS — R60.9 EDEMA, UNSPECIFIED: ICD-10-CM

## 2022-01-07 DIAGNOSIS — L97.812 NON-PRESSURE CHRONIC ULCER OF OTHER PART OF RIGHT LOWER LEG WITH FAT LAYER EXPOSED: ICD-10-CM

## 2022-01-07 DIAGNOSIS — I87.2 VENOUS INSUFFICIENCY (CHRONIC) (PERIPHERAL): ICD-10-CM

## 2022-01-07 DIAGNOSIS — E66.01 MORBID (SEVERE) OBESITY DUE TO EXCESS CALORIES: ICD-10-CM

## 2022-01-13 ENCOUNTER — OUTPATIENT (OUTPATIENT)
Dept: OUTPATIENT SERVICES | Facility: HOSPITAL | Age: 62
LOS: 1 days | Discharge: HOME | End: 2022-01-13
Payer: COMMERCIAL

## 2022-01-13 DIAGNOSIS — R58 HEMORRHAGE, NOT ELSEWHERE CLASSIFIED: Chronic | ICD-10-CM

## 2022-01-13 DIAGNOSIS — K46.9 UNSPECIFIED ABDOMINAL HERNIA WITHOUT OBSTRUCTION OR GANGRENE: Chronic | ICD-10-CM

## 2022-01-13 DIAGNOSIS — L89.90 PRESSURE ULCER OF UNSPECIFIED SITE, UNSPECIFIED STAGE: ICD-10-CM

## 2022-01-13 DIAGNOSIS — Z98.89 OTHER SPECIFIED POSTPROCEDURAL STATES: Chronic | ICD-10-CM

## 2022-01-13 PROCEDURE — 29580 STRAPPING UNNA BOOT: CPT | Mod: 59,RT

## 2022-01-13 PROCEDURE — 99212 OFFICE O/P EST SF 10 MIN: CPT | Mod: 25

## 2022-01-18 DIAGNOSIS — I87.2 VENOUS INSUFFICIENCY (CHRONIC) (PERIPHERAL): ICD-10-CM

## 2022-01-18 DIAGNOSIS — R60.9 EDEMA, UNSPECIFIED: ICD-10-CM

## 2022-01-18 DIAGNOSIS — I87.311 CHRONIC VENOUS HYPERTENSION (IDIOPATHIC) WITH ULCER OF RIGHT LOWER EXTREMITY: ICD-10-CM

## 2022-01-18 DIAGNOSIS — E66.01 MORBID (SEVERE) OBESITY DUE TO EXCESS CALORIES: ICD-10-CM

## 2022-01-18 DIAGNOSIS — M17.12 UNILATERAL PRIMARY OSTEOARTHRITIS, LEFT KNEE: ICD-10-CM

## 2022-01-18 DIAGNOSIS — Z91.81 HISTORY OF FALLING: ICD-10-CM

## 2022-01-18 DIAGNOSIS — Z79.899 OTHER LONG TERM (CURRENT) DRUG THERAPY: ICD-10-CM

## 2022-01-18 DIAGNOSIS — L92.9 GRANULOMATOUS DISORDER OF THE SKIN AND SUBCUTANEOUS TISSUE, UNSPECIFIED: ICD-10-CM

## 2022-01-18 DIAGNOSIS — L97.812 NON-PRESSURE CHRONIC ULCER OF OTHER PART OF RIGHT LOWER LEG WITH FAT LAYER EXPOSED: ICD-10-CM

## 2022-01-20 ENCOUNTER — OUTPATIENT (OUTPATIENT)
Dept: OUTPATIENT SERVICES | Facility: HOSPITAL | Age: 62
LOS: 1 days | Discharge: HOME | End: 2022-01-20
Payer: COMMERCIAL

## 2022-01-20 DIAGNOSIS — K46.9 UNSPECIFIED ABDOMINAL HERNIA WITHOUT OBSTRUCTION OR GANGRENE: Chronic | ICD-10-CM

## 2022-01-20 DIAGNOSIS — R58 HEMORRHAGE, NOT ELSEWHERE CLASSIFIED: Chronic | ICD-10-CM

## 2022-01-20 DIAGNOSIS — L89.90 PRESSURE ULCER OF UNSPECIFIED SITE, UNSPECIFIED STAGE: ICD-10-CM

## 2022-01-20 DIAGNOSIS — Z98.89 OTHER SPECIFIED POSTPROCEDURAL STATES: Chronic | ICD-10-CM

## 2022-01-20 PROCEDURE — 99212 OFFICE O/P EST SF 10 MIN: CPT | Mod: 25

## 2022-01-20 PROCEDURE — 29580 STRAPPING UNNA BOOT: CPT | Mod: 59,RT

## 2022-01-21 DIAGNOSIS — I87.2 VENOUS INSUFFICIENCY (CHRONIC) (PERIPHERAL): ICD-10-CM

## 2022-01-21 DIAGNOSIS — R60.9 EDEMA, UNSPECIFIED: ICD-10-CM

## 2022-01-21 DIAGNOSIS — L97.812 NON-PRESSURE CHRONIC ULCER OF OTHER PART OF RIGHT LOWER LEG WITH FAT LAYER EXPOSED: ICD-10-CM

## 2022-01-21 DIAGNOSIS — I87.311 CHRONIC VENOUS HYPERTENSION (IDIOPATHIC) WITH ULCER OF RIGHT LOWER EXTREMITY: ICD-10-CM

## 2022-01-21 DIAGNOSIS — E66.01 MORBID (SEVERE) OBESITY DUE TO EXCESS CALORIES: ICD-10-CM

## 2022-01-21 DIAGNOSIS — Z79.899 OTHER LONG TERM (CURRENT) DRUG THERAPY: ICD-10-CM

## 2022-01-21 DIAGNOSIS — Z91.81 HISTORY OF FALLING: ICD-10-CM

## 2022-01-21 DIAGNOSIS — M17.12 UNILATERAL PRIMARY OSTEOARTHRITIS, LEFT KNEE: ICD-10-CM

## 2022-01-27 ENCOUNTER — OUTPATIENT (OUTPATIENT)
Dept: OUTPATIENT SERVICES | Facility: HOSPITAL | Age: 62
LOS: 1 days | Discharge: HOME | End: 2022-01-27

## 2022-01-27 DIAGNOSIS — L89.90 PRESSURE ULCER OF UNSPECIFIED SITE, UNSPECIFIED STAGE: ICD-10-CM

## 2022-01-27 DIAGNOSIS — R58 HEMORRHAGE, NOT ELSEWHERE CLASSIFIED: Chronic | ICD-10-CM

## 2022-01-27 DIAGNOSIS — K46.9 UNSPECIFIED ABDOMINAL HERNIA WITHOUT OBSTRUCTION OR GANGRENE: Chronic | ICD-10-CM

## 2022-01-27 DIAGNOSIS — Z98.89 OTHER SPECIFIED POSTPROCEDURAL STATES: Chronic | ICD-10-CM

## 2022-01-28 DIAGNOSIS — M17.12 UNILATERAL PRIMARY OSTEOARTHRITIS, LEFT KNEE: ICD-10-CM

## 2022-01-28 DIAGNOSIS — Z91.81 HISTORY OF FALLING: ICD-10-CM

## 2022-01-28 DIAGNOSIS — R60.9 EDEMA, UNSPECIFIED: ICD-10-CM

## 2022-01-28 DIAGNOSIS — I87.311 CHRONIC VENOUS HYPERTENSION (IDIOPATHIC) WITH ULCER OF RIGHT LOWER EXTREMITY: ICD-10-CM

## 2022-01-28 DIAGNOSIS — Z79.899 OTHER LONG TERM (CURRENT) DRUG THERAPY: ICD-10-CM

## 2022-01-28 DIAGNOSIS — L97.812 NON-PRESSURE CHRONIC ULCER OF OTHER PART OF RIGHT LOWER LEG WITH FAT LAYER EXPOSED: ICD-10-CM

## 2022-01-28 DIAGNOSIS — I87.2 VENOUS INSUFFICIENCY (CHRONIC) (PERIPHERAL): ICD-10-CM

## 2022-01-28 DIAGNOSIS — E66.01 MORBID (SEVERE) OBESITY DUE TO EXCESS CALORIES: ICD-10-CM

## 2022-02-03 ENCOUNTER — OUTPATIENT (OUTPATIENT)
Dept: OUTPATIENT SERVICES | Facility: HOSPITAL | Age: 62
LOS: 1 days | Discharge: HOME | End: 2022-02-03

## 2022-02-03 DIAGNOSIS — R58 HEMORRHAGE, NOT ELSEWHERE CLASSIFIED: Chronic | ICD-10-CM

## 2022-02-03 DIAGNOSIS — K46.9 UNSPECIFIED ABDOMINAL HERNIA WITHOUT OBSTRUCTION OR GANGRENE: Chronic | ICD-10-CM

## 2022-02-03 DIAGNOSIS — Z98.89 OTHER SPECIFIED POSTPROCEDURAL STATES: Chronic | ICD-10-CM

## 2022-02-03 DIAGNOSIS — L89.90 PRESSURE ULCER OF UNSPECIFIED SITE, UNSPECIFIED STAGE: ICD-10-CM

## 2022-02-10 ENCOUNTER — OUTPATIENT (OUTPATIENT)
Dept: OUTPATIENT SERVICES | Facility: HOSPITAL | Age: 62
LOS: 1 days | Discharge: HOME | End: 2022-02-10

## 2022-02-10 DIAGNOSIS — I87.311 CHRONIC VENOUS HYPERTENSION (IDIOPATHIC) WITH ULCER OF RIGHT LOWER EXTREMITY: ICD-10-CM

## 2022-02-10 DIAGNOSIS — R60.9 EDEMA, UNSPECIFIED: ICD-10-CM

## 2022-02-10 DIAGNOSIS — Z91.81 HISTORY OF FALLING: ICD-10-CM

## 2022-02-10 DIAGNOSIS — I87.2 VENOUS INSUFFICIENCY (CHRONIC) (PERIPHERAL): ICD-10-CM

## 2022-02-10 DIAGNOSIS — E66.01 MORBID (SEVERE) OBESITY DUE TO EXCESS CALORIES: ICD-10-CM

## 2022-02-10 DIAGNOSIS — D64.9 ANEMIA, UNSPECIFIED: ICD-10-CM

## 2022-02-10 DIAGNOSIS — R58 HEMORRHAGE, NOT ELSEWHERE CLASSIFIED: Chronic | ICD-10-CM

## 2022-02-10 DIAGNOSIS — M17.12 UNILATERAL PRIMARY OSTEOARTHRITIS, LEFT KNEE: ICD-10-CM

## 2022-02-10 DIAGNOSIS — L89.90 PRESSURE ULCER OF UNSPECIFIED SITE, UNSPECIFIED STAGE: ICD-10-CM

## 2022-02-10 DIAGNOSIS — L97.812 NON-PRESSURE CHRONIC ULCER OF OTHER PART OF RIGHT LOWER LEG WITH FAT LAYER EXPOSED: ICD-10-CM

## 2022-02-10 DIAGNOSIS — K46.9 UNSPECIFIED ABDOMINAL HERNIA WITHOUT OBSTRUCTION OR GANGRENE: Chronic | ICD-10-CM

## 2022-02-10 DIAGNOSIS — Z79.899 OTHER LONG TERM (CURRENT) DRUG THERAPY: ICD-10-CM

## 2022-02-10 DIAGNOSIS — Z98.89 OTHER SPECIFIED POSTPROCEDURAL STATES: Chronic | ICD-10-CM

## 2022-02-14 DIAGNOSIS — I87.311 CHRONIC VENOUS HYPERTENSION (IDIOPATHIC) WITH ULCER OF RIGHT LOWER EXTREMITY: ICD-10-CM

## 2022-02-14 DIAGNOSIS — I87.2 VENOUS INSUFFICIENCY (CHRONIC) (PERIPHERAL): ICD-10-CM

## 2022-02-14 DIAGNOSIS — E66.01 MORBID (SEVERE) OBESITY DUE TO EXCESS CALORIES: ICD-10-CM

## 2022-02-14 DIAGNOSIS — R60.9 EDEMA, UNSPECIFIED: ICD-10-CM

## 2022-02-14 DIAGNOSIS — Z91.81 HISTORY OF FALLING: ICD-10-CM

## 2022-02-14 DIAGNOSIS — M17.12 UNILATERAL PRIMARY OSTEOARTHRITIS, LEFT KNEE: ICD-10-CM

## 2022-02-14 DIAGNOSIS — Z79.899 OTHER LONG TERM (CURRENT) DRUG THERAPY: ICD-10-CM

## 2022-02-14 DIAGNOSIS — D64.9 ANEMIA, UNSPECIFIED: ICD-10-CM

## 2022-02-14 DIAGNOSIS — L97.812 NON-PRESSURE CHRONIC ULCER OF OTHER PART OF RIGHT LOWER LEG WITH FAT LAYER EXPOSED: ICD-10-CM

## 2022-02-17 ENCOUNTER — OUTPATIENT (OUTPATIENT)
Dept: OUTPATIENT SERVICES | Facility: HOSPITAL | Age: 62
LOS: 1 days | Discharge: HOME | End: 2022-02-17

## 2022-02-17 DIAGNOSIS — K46.9 UNSPECIFIED ABDOMINAL HERNIA WITHOUT OBSTRUCTION OR GANGRENE: Chronic | ICD-10-CM

## 2022-02-17 DIAGNOSIS — Z98.89 OTHER SPECIFIED POSTPROCEDURAL STATES: Chronic | ICD-10-CM

## 2022-02-17 DIAGNOSIS — R58 HEMORRHAGE, NOT ELSEWHERE CLASSIFIED: Chronic | ICD-10-CM

## 2022-02-17 DIAGNOSIS — L89.90 PRESSURE ULCER OF UNSPECIFIED SITE, UNSPECIFIED STAGE: ICD-10-CM

## 2022-02-24 ENCOUNTER — OUTPATIENT (OUTPATIENT)
Dept: OUTPATIENT SERVICES | Facility: HOSPITAL | Age: 62
LOS: 1 days | Discharge: HOME | End: 2022-02-24

## 2022-02-24 DIAGNOSIS — R60.9 EDEMA, UNSPECIFIED: ICD-10-CM

## 2022-02-24 DIAGNOSIS — I87.311 CHRONIC VENOUS HYPERTENSION (IDIOPATHIC) WITH ULCER OF RIGHT LOWER EXTREMITY: ICD-10-CM

## 2022-02-24 DIAGNOSIS — Z91.81 HISTORY OF FALLING: ICD-10-CM

## 2022-02-24 DIAGNOSIS — K46.9 UNSPECIFIED ABDOMINAL HERNIA WITHOUT OBSTRUCTION OR GANGRENE: Chronic | ICD-10-CM

## 2022-02-24 DIAGNOSIS — R58 HEMORRHAGE, NOT ELSEWHERE CLASSIFIED: Chronic | ICD-10-CM

## 2022-02-24 DIAGNOSIS — Z79.899 OTHER LONG TERM (CURRENT) DRUG THERAPY: ICD-10-CM

## 2022-02-24 DIAGNOSIS — E66.01 MORBID (SEVERE) OBESITY DUE TO EXCESS CALORIES: ICD-10-CM

## 2022-02-24 DIAGNOSIS — M17.12 UNILATERAL PRIMARY OSTEOARTHRITIS, LEFT KNEE: ICD-10-CM

## 2022-02-24 DIAGNOSIS — L89.90 PRESSURE ULCER OF UNSPECIFIED SITE, UNSPECIFIED STAGE: ICD-10-CM

## 2022-02-24 DIAGNOSIS — D64.9 ANEMIA, UNSPECIFIED: ICD-10-CM

## 2022-02-24 DIAGNOSIS — I87.2 VENOUS INSUFFICIENCY (CHRONIC) (PERIPHERAL): ICD-10-CM

## 2022-02-24 DIAGNOSIS — L97.812 NON-PRESSURE CHRONIC ULCER OF OTHER PART OF RIGHT LOWER LEG WITH FAT LAYER EXPOSED: ICD-10-CM

## 2022-02-24 DIAGNOSIS — Z98.89 OTHER SPECIFIED POSTPROCEDURAL STATES: Chronic | ICD-10-CM

## 2022-02-25 DIAGNOSIS — R60.9 EDEMA, UNSPECIFIED: ICD-10-CM

## 2022-02-25 DIAGNOSIS — M17.12 UNILATERAL PRIMARY OSTEOARTHRITIS, LEFT KNEE: ICD-10-CM

## 2022-02-25 DIAGNOSIS — Z79.899 OTHER LONG TERM (CURRENT) DRUG THERAPY: ICD-10-CM

## 2022-02-25 DIAGNOSIS — L97.812 NON-PRESSURE CHRONIC ULCER OF OTHER PART OF RIGHT LOWER LEG WITH FAT LAYER EXPOSED: ICD-10-CM

## 2022-02-25 DIAGNOSIS — Z91.81 HISTORY OF FALLING: ICD-10-CM

## 2022-02-25 DIAGNOSIS — D64.9 ANEMIA, UNSPECIFIED: ICD-10-CM

## 2022-02-25 DIAGNOSIS — E66.01 MORBID (SEVERE) OBESITY DUE TO EXCESS CALORIES: ICD-10-CM

## 2022-02-25 DIAGNOSIS — I87.311 CHRONIC VENOUS HYPERTENSION (IDIOPATHIC) WITH ULCER OF RIGHT LOWER EXTREMITY: ICD-10-CM

## 2022-02-25 DIAGNOSIS — I87.2 VENOUS INSUFFICIENCY (CHRONIC) (PERIPHERAL): ICD-10-CM

## 2022-03-01 ENCOUNTER — OUTPATIENT (OUTPATIENT)
Dept: OUTPATIENT SERVICES | Facility: HOSPITAL | Age: 62
LOS: 1 days | Discharge: HOME | End: 2022-03-01

## 2022-03-01 DIAGNOSIS — K46.9 UNSPECIFIED ABDOMINAL HERNIA WITHOUT OBSTRUCTION OR GANGRENE: Chronic | ICD-10-CM

## 2022-03-01 DIAGNOSIS — L89.90 PRESSURE ULCER OF UNSPECIFIED SITE, UNSPECIFIED STAGE: ICD-10-CM

## 2022-03-01 DIAGNOSIS — Z98.89 OTHER SPECIFIED POSTPROCEDURAL STATES: Chronic | ICD-10-CM

## 2022-03-01 DIAGNOSIS — R58 HEMORRHAGE, NOT ELSEWHERE CLASSIFIED: Chronic | ICD-10-CM

## 2022-03-02 NOTE — ED ADULT TRIAGE NOTE - SOURCE OF INFORMATION
SW supervisor received [facility] IQ testing, psychological testing, and biopsychocial. Uploaded to [facility].    11:27am Would not allow me to upload PT/OT and MAR. Pending response from [facility] to allow this.     11:58am Uploaded PT/OT records, pending MAR records.     I'm early out: Handed off to supervisor, Mary LYONS RN       EMS

## 2022-03-03 DIAGNOSIS — L97.812 NON-PRESSURE CHRONIC ULCER OF OTHER PART OF RIGHT LOWER LEG WITH FAT LAYER EXPOSED: ICD-10-CM

## 2022-03-03 DIAGNOSIS — R60.9 EDEMA, UNSPECIFIED: ICD-10-CM

## 2022-03-03 DIAGNOSIS — D64.9 ANEMIA, UNSPECIFIED: ICD-10-CM

## 2022-03-03 DIAGNOSIS — I87.311 CHRONIC VENOUS HYPERTENSION (IDIOPATHIC) WITH ULCER OF RIGHT LOWER EXTREMITY: ICD-10-CM

## 2022-03-03 DIAGNOSIS — I87.2 VENOUS INSUFFICIENCY (CHRONIC) (PERIPHERAL): ICD-10-CM

## 2022-03-03 DIAGNOSIS — Z91.81 HISTORY OF FALLING: ICD-10-CM

## 2022-03-03 DIAGNOSIS — M17.12 UNILATERAL PRIMARY OSTEOARTHRITIS, LEFT KNEE: ICD-10-CM

## 2022-03-03 DIAGNOSIS — E66.01 MORBID (SEVERE) OBESITY DUE TO EXCESS CALORIES: ICD-10-CM

## 2022-03-03 DIAGNOSIS — Z79.899 OTHER LONG TERM (CURRENT) DRUG THERAPY: ICD-10-CM

## 2022-03-10 ENCOUNTER — OUTPATIENT (OUTPATIENT)
Dept: OUTPATIENT SERVICES | Facility: HOSPITAL | Age: 62
LOS: 1 days | Discharge: HOME | End: 2022-03-10

## 2022-03-10 DIAGNOSIS — K46.9 UNSPECIFIED ABDOMINAL HERNIA WITHOUT OBSTRUCTION OR GANGRENE: Chronic | ICD-10-CM

## 2022-03-10 DIAGNOSIS — Z98.89 OTHER SPECIFIED POSTPROCEDURAL STATES: Chronic | ICD-10-CM

## 2022-03-10 DIAGNOSIS — R58 HEMORRHAGE, NOT ELSEWHERE CLASSIFIED: Chronic | ICD-10-CM

## 2022-03-10 DIAGNOSIS — L89.90 PRESSURE ULCER OF UNSPECIFIED SITE, UNSPECIFIED STAGE: ICD-10-CM

## 2022-03-15 DIAGNOSIS — R60.9 EDEMA, UNSPECIFIED: ICD-10-CM

## 2022-03-15 DIAGNOSIS — Z91.81 HISTORY OF FALLING: ICD-10-CM

## 2022-03-15 DIAGNOSIS — D64.9 ANEMIA, UNSPECIFIED: ICD-10-CM

## 2022-03-15 DIAGNOSIS — I87.2 VENOUS INSUFFICIENCY (CHRONIC) (PERIPHERAL): ICD-10-CM

## 2022-03-15 DIAGNOSIS — E66.01 MORBID (SEVERE) OBESITY DUE TO EXCESS CALORIES: ICD-10-CM

## 2022-03-15 DIAGNOSIS — I87.311 CHRONIC VENOUS HYPERTENSION (IDIOPATHIC) WITH ULCER OF RIGHT LOWER EXTREMITY: ICD-10-CM

## 2022-03-15 DIAGNOSIS — L97.812 NON-PRESSURE CHRONIC ULCER OF OTHER PART OF RIGHT LOWER LEG WITH FAT LAYER EXPOSED: ICD-10-CM

## 2022-03-15 DIAGNOSIS — M17.12 UNILATERAL PRIMARY OSTEOARTHRITIS, LEFT KNEE: ICD-10-CM

## 2022-03-15 DIAGNOSIS — Z79.899 OTHER LONG TERM (CURRENT) DRUG THERAPY: ICD-10-CM

## 2022-03-17 ENCOUNTER — OUTPATIENT (OUTPATIENT)
Dept: OUTPATIENT SERVICES | Facility: HOSPITAL | Age: 62
LOS: 1 days | Discharge: HOME | End: 2022-03-17

## 2022-03-17 DIAGNOSIS — Z98.89 OTHER SPECIFIED POSTPROCEDURAL STATES: Chronic | ICD-10-CM

## 2022-03-17 DIAGNOSIS — L89.90 PRESSURE ULCER OF UNSPECIFIED SITE, UNSPECIFIED STAGE: ICD-10-CM

## 2022-03-17 DIAGNOSIS — R58 HEMORRHAGE, NOT ELSEWHERE CLASSIFIED: Chronic | ICD-10-CM

## 2022-03-17 DIAGNOSIS — K46.9 UNSPECIFIED ABDOMINAL HERNIA WITHOUT OBSTRUCTION OR GANGRENE: Chronic | ICD-10-CM

## 2022-03-18 DIAGNOSIS — I87.311 CHRONIC VENOUS HYPERTENSION (IDIOPATHIC) WITH ULCER OF RIGHT LOWER EXTREMITY: ICD-10-CM

## 2022-03-18 DIAGNOSIS — Z79.899 OTHER LONG TERM (CURRENT) DRUG THERAPY: ICD-10-CM

## 2022-03-18 DIAGNOSIS — M17.12 UNILATERAL PRIMARY OSTEOARTHRITIS, LEFT KNEE: ICD-10-CM

## 2022-03-18 DIAGNOSIS — I87.2 VENOUS INSUFFICIENCY (CHRONIC) (PERIPHERAL): ICD-10-CM

## 2022-03-18 DIAGNOSIS — D64.9 ANEMIA, UNSPECIFIED: ICD-10-CM

## 2022-03-18 DIAGNOSIS — R60.9 EDEMA, UNSPECIFIED: ICD-10-CM

## 2022-03-18 DIAGNOSIS — Z91.81 HISTORY OF FALLING: ICD-10-CM

## 2022-03-18 DIAGNOSIS — L97.812 NON-PRESSURE CHRONIC ULCER OF OTHER PART OF RIGHT LOWER LEG WITH FAT LAYER EXPOSED: ICD-10-CM

## 2022-03-18 DIAGNOSIS — E66.01 MORBID (SEVERE) OBESITY DUE TO EXCESS CALORIES: ICD-10-CM

## 2022-03-24 ENCOUNTER — OUTPATIENT (OUTPATIENT)
Dept: OUTPATIENT SERVICES | Facility: HOSPITAL | Age: 62
LOS: 1 days | Discharge: HOME | End: 2022-03-24

## 2022-03-24 DIAGNOSIS — U07.1 COVID-19: ICD-10-CM

## 2022-03-24 DIAGNOSIS — K46.9 UNSPECIFIED ABDOMINAL HERNIA WITHOUT OBSTRUCTION OR GANGRENE: Chronic | ICD-10-CM

## 2022-03-24 DIAGNOSIS — Z98.89 OTHER SPECIFIED POSTPROCEDURAL STATES: Chronic | ICD-10-CM

## 2022-03-24 DIAGNOSIS — R58 HEMORRHAGE, NOT ELSEWHERE CLASSIFIED: Chronic | ICD-10-CM

## 2022-03-25 DIAGNOSIS — Z79.899 OTHER LONG TERM (CURRENT) DRUG THERAPY: ICD-10-CM

## 2022-03-25 DIAGNOSIS — Z91.81 HISTORY OF FALLING: ICD-10-CM

## 2022-03-25 DIAGNOSIS — I87.311 CHRONIC VENOUS HYPERTENSION (IDIOPATHIC) WITH ULCER OF RIGHT LOWER EXTREMITY: ICD-10-CM

## 2022-03-25 DIAGNOSIS — R60.9 EDEMA, UNSPECIFIED: ICD-10-CM

## 2022-03-25 DIAGNOSIS — I87.2 VENOUS INSUFFICIENCY (CHRONIC) (PERIPHERAL): ICD-10-CM

## 2022-03-25 DIAGNOSIS — L97.812 NON-PRESSURE CHRONIC ULCER OF OTHER PART OF RIGHT LOWER LEG WITH FAT LAYER EXPOSED: ICD-10-CM

## 2022-03-25 DIAGNOSIS — E66.01 MORBID (SEVERE) OBESITY DUE TO EXCESS CALORIES: ICD-10-CM

## 2022-03-25 DIAGNOSIS — D64.9 ANEMIA, UNSPECIFIED: ICD-10-CM

## 2022-03-25 DIAGNOSIS — M17.12 UNILATERAL PRIMARY OSTEOARTHRITIS, LEFT KNEE: ICD-10-CM

## 2022-03-31 ENCOUNTER — OUTPATIENT (OUTPATIENT)
Dept: OUTPATIENT SERVICES | Facility: HOSPITAL | Age: 62
LOS: 1 days | Discharge: HOME | End: 2022-03-31

## 2022-03-31 DIAGNOSIS — L89.90 PRESSURE ULCER OF UNSPECIFIED SITE, UNSPECIFIED STAGE: ICD-10-CM

## 2022-03-31 DIAGNOSIS — R58 HEMORRHAGE, NOT ELSEWHERE CLASSIFIED: Chronic | ICD-10-CM

## 2022-03-31 DIAGNOSIS — Z98.89 OTHER SPECIFIED POSTPROCEDURAL STATES: Chronic | ICD-10-CM

## 2022-03-31 DIAGNOSIS — K46.9 UNSPECIFIED ABDOMINAL HERNIA WITHOUT OBSTRUCTION OR GANGRENE: Chronic | ICD-10-CM

## 2022-04-05 DIAGNOSIS — D64.9 ANEMIA, UNSPECIFIED: ICD-10-CM

## 2022-04-05 DIAGNOSIS — I87.311 CHRONIC VENOUS HYPERTENSION (IDIOPATHIC) WITH ULCER OF RIGHT LOWER EXTREMITY: ICD-10-CM

## 2022-04-05 DIAGNOSIS — M17.12 UNILATERAL PRIMARY OSTEOARTHRITIS, LEFT KNEE: ICD-10-CM

## 2022-04-05 DIAGNOSIS — Z91.81 HISTORY OF FALLING: ICD-10-CM

## 2022-04-05 DIAGNOSIS — R60.9 EDEMA, UNSPECIFIED: ICD-10-CM

## 2022-04-05 DIAGNOSIS — E66.01 MORBID (SEVERE) OBESITY DUE TO EXCESS CALORIES: ICD-10-CM

## 2022-04-05 DIAGNOSIS — I87.2 VENOUS INSUFFICIENCY (CHRONIC) (PERIPHERAL): ICD-10-CM

## 2022-04-05 DIAGNOSIS — L97.812 NON-PRESSURE CHRONIC ULCER OF OTHER PART OF RIGHT LOWER LEG WITH FAT LAYER EXPOSED: ICD-10-CM

## 2022-04-05 DIAGNOSIS — Z79.899 OTHER LONG TERM (CURRENT) DRUG THERAPY: ICD-10-CM

## 2022-04-07 ENCOUNTER — OUTPATIENT (OUTPATIENT)
Dept: OUTPATIENT SERVICES | Facility: HOSPITAL | Age: 62
LOS: 1 days | Discharge: HOME | End: 2022-04-07

## 2022-04-07 DIAGNOSIS — R58 HEMORRHAGE, NOT ELSEWHERE CLASSIFIED: Chronic | ICD-10-CM

## 2022-04-07 DIAGNOSIS — K46.9 UNSPECIFIED ABDOMINAL HERNIA WITHOUT OBSTRUCTION OR GANGRENE: Chronic | ICD-10-CM

## 2022-04-07 DIAGNOSIS — L89.90 PRESSURE ULCER OF UNSPECIFIED SITE, UNSPECIFIED STAGE: ICD-10-CM

## 2022-04-07 DIAGNOSIS — Z98.89 OTHER SPECIFIED POSTPROCEDURAL STATES: Chronic | ICD-10-CM

## 2022-04-08 DIAGNOSIS — L97.812 NON-PRESSURE CHRONIC ULCER OF OTHER PART OF RIGHT LOWER LEG WITH FAT LAYER EXPOSED: ICD-10-CM

## 2022-04-08 DIAGNOSIS — Z91.81 HISTORY OF FALLING: ICD-10-CM

## 2022-04-08 DIAGNOSIS — Z79.899 OTHER LONG TERM (CURRENT) DRUG THERAPY: ICD-10-CM

## 2022-04-08 DIAGNOSIS — I87.311 CHRONIC VENOUS HYPERTENSION (IDIOPATHIC) WITH ULCER OF RIGHT LOWER EXTREMITY: ICD-10-CM

## 2022-04-08 DIAGNOSIS — I87.2 VENOUS INSUFFICIENCY (CHRONIC) (PERIPHERAL): ICD-10-CM

## 2022-04-08 DIAGNOSIS — R60.9 EDEMA, UNSPECIFIED: ICD-10-CM

## 2022-04-08 DIAGNOSIS — D64.9 ANEMIA, UNSPECIFIED: ICD-10-CM

## 2022-04-08 DIAGNOSIS — M17.12 UNILATERAL PRIMARY OSTEOARTHRITIS, LEFT KNEE: ICD-10-CM

## 2022-04-08 DIAGNOSIS — E66.01 MORBID (SEVERE) OBESITY DUE TO EXCESS CALORIES: ICD-10-CM

## 2022-04-14 ENCOUNTER — OUTPATIENT (OUTPATIENT)
Dept: OUTPATIENT SERVICES | Facility: HOSPITAL | Age: 62
LOS: 1 days | Discharge: HOME | End: 2022-04-14

## 2022-04-14 DIAGNOSIS — Z98.89 OTHER SPECIFIED POSTPROCEDURAL STATES: Chronic | ICD-10-CM

## 2022-04-14 DIAGNOSIS — L89.90 PRESSURE ULCER OF UNSPECIFIED SITE, UNSPECIFIED STAGE: ICD-10-CM

## 2022-04-14 DIAGNOSIS — R58 HEMORRHAGE, NOT ELSEWHERE CLASSIFIED: Chronic | ICD-10-CM

## 2022-04-14 DIAGNOSIS — K46.9 UNSPECIFIED ABDOMINAL HERNIA WITHOUT OBSTRUCTION OR GANGRENE: Chronic | ICD-10-CM

## 2022-04-15 DIAGNOSIS — E66.01 MORBID (SEVERE) OBESITY DUE TO EXCESS CALORIES: ICD-10-CM

## 2022-04-15 DIAGNOSIS — L97.812 NON-PRESSURE CHRONIC ULCER OF OTHER PART OF RIGHT LOWER LEG WITH FAT LAYER EXPOSED: ICD-10-CM

## 2022-04-15 DIAGNOSIS — I87.311 CHRONIC VENOUS HYPERTENSION (IDIOPATHIC) WITH ULCER OF RIGHT LOWER EXTREMITY: ICD-10-CM

## 2022-04-15 DIAGNOSIS — R60.9 EDEMA, UNSPECIFIED: ICD-10-CM

## 2022-04-15 DIAGNOSIS — Z91.81 HISTORY OF FALLING: ICD-10-CM

## 2022-04-15 DIAGNOSIS — I87.2 VENOUS INSUFFICIENCY (CHRONIC) (PERIPHERAL): ICD-10-CM

## 2022-04-15 DIAGNOSIS — Z79.899 OTHER LONG TERM (CURRENT) DRUG THERAPY: ICD-10-CM

## 2022-04-15 DIAGNOSIS — M17.12 UNILATERAL PRIMARY OSTEOARTHRITIS, LEFT KNEE: ICD-10-CM

## 2022-04-15 DIAGNOSIS — D64.9 ANEMIA, UNSPECIFIED: ICD-10-CM

## 2022-04-21 ENCOUNTER — OUTPATIENT (OUTPATIENT)
Dept: OUTPATIENT SERVICES | Facility: HOSPITAL | Age: 62
LOS: 1 days | Discharge: HOME | End: 2022-04-21

## 2022-04-21 DIAGNOSIS — R58 HEMORRHAGE, NOT ELSEWHERE CLASSIFIED: Chronic | ICD-10-CM

## 2022-04-21 DIAGNOSIS — L89.90 PRESSURE ULCER OF UNSPECIFIED SITE, UNSPECIFIED STAGE: ICD-10-CM

## 2022-04-21 DIAGNOSIS — Z98.89 OTHER SPECIFIED POSTPROCEDURAL STATES: Chronic | ICD-10-CM

## 2022-04-21 DIAGNOSIS — K46.9 UNSPECIFIED ABDOMINAL HERNIA WITHOUT OBSTRUCTION OR GANGRENE: Chronic | ICD-10-CM

## 2022-04-22 DIAGNOSIS — I87.311 CHRONIC VENOUS HYPERTENSION (IDIOPATHIC) WITH ULCER OF RIGHT LOWER EXTREMITY: ICD-10-CM

## 2022-04-22 DIAGNOSIS — R60.9 EDEMA, UNSPECIFIED: ICD-10-CM

## 2022-04-22 DIAGNOSIS — L97.812 NON-PRESSURE CHRONIC ULCER OF OTHER PART OF RIGHT LOWER LEG WITH FAT LAYER EXPOSED: ICD-10-CM

## 2022-04-22 DIAGNOSIS — E66.01 MORBID (SEVERE) OBESITY DUE TO EXCESS CALORIES: ICD-10-CM

## 2022-04-22 DIAGNOSIS — M17.12 UNILATERAL PRIMARY OSTEOARTHRITIS, LEFT KNEE: ICD-10-CM

## 2022-04-22 DIAGNOSIS — I87.2 VENOUS INSUFFICIENCY (CHRONIC) (PERIPHERAL): ICD-10-CM

## 2022-04-22 DIAGNOSIS — Z79.899 OTHER LONG TERM (CURRENT) DRUG THERAPY: ICD-10-CM

## 2022-04-22 DIAGNOSIS — Z91.81 HISTORY OF FALLING: ICD-10-CM

## 2022-04-22 DIAGNOSIS — D64.9 ANEMIA, UNSPECIFIED: ICD-10-CM

## 2022-04-26 ENCOUNTER — RX RENEWAL (OUTPATIENT)
Age: 62
End: 2022-04-26

## 2022-04-28 ENCOUNTER — OUTPATIENT (OUTPATIENT)
Dept: OUTPATIENT SERVICES | Facility: HOSPITAL | Age: 62
LOS: 1 days | Discharge: HOME | End: 2022-04-28

## 2022-04-28 DIAGNOSIS — Z98.89 OTHER SPECIFIED POSTPROCEDURAL STATES: Chronic | ICD-10-CM

## 2022-04-28 DIAGNOSIS — R58 HEMORRHAGE, NOT ELSEWHERE CLASSIFIED: Chronic | ICD-10-CM

## 2022-04-28 DIAGNOSIS — K46.9 UNSPECIFIED ABDOMINAL HERNIA WITHOUT OBSTRUCTION OR GANGRENE: Chronic | ICD-10-CM

## 2022-04-28 DIAGNOSIS — L89.90 PRESSURE ULCER OF UNSPECIFIED SITE, UNSPECIFIED STAGE: ICD-10-CM

## 2022-05-04 DIAGNOSIS — I87.311 CHRONIC VENOUS HYPERTENSION (IDIOPATHIC) WITH ULCER OF RIGHT LOWER EXTREMITY: ICD-10-CM

## 2022-05-04 DIAGNOSIS — L97.812 NON-PRESSURE CHRONIC ULCER OF OTHER PART OF RIGHT LOWER LEG WITH FAT LAYER EXPOSED: ICD-10-CM

## 2022-05-04 DIAGNOSIS — Z91.81 HISTORY OF FALLING: ICD-10-CM

## 2022-05-04 DIAGNOSIS — M17.12 UNILATERAL PRIMARY OSTEOARTHRITIS, LEFT KNEE: ICD-10-CM

## 2022-05-04 DIAGNOSIS — I87.2 VENOUS INSUFFICIENCY (CHRONIC) (PERIPHERAL): ICD-10-CM

## 2022-05-04 DIAGNOSIS — Z79.899 OTHER LONG TERM (CURRENT) DRUG THERAPY: ICD-10-CM

## 2022-05-04 DIAGNOSIS — E66.01 MORBID (SEVERE) OBESITY DUE TO EXCESS CALORIES: ICD-10-CM

## 2022-05-04 DIAGNOSIS — R60.9 EDEMA, UNSPECIFIED: ICD-10-CM

## 2022-05-04 DIAGNOSIS — D64.9 ANEMIA, UNSPECIFIED: ICD-10-CM

## 2022-05-05 ENCOUNTER — OUTPATIENT (OUTPATIENT)
Dept: OUTPATIENT SERVICES | Facility: HOSPITAL | Age: 62
LOS: 1 days | Discharge: HOME | End: 2022-05-05

## 2022-05-05 DIAGNOSIS — L89.90 PRESSURE ULCER OF UNSPECIFIED SITE, UNSPECIFIED STAGE: ICD-10-CM

## 2022-05-05 DIAGNOSIS — R58 HEMORRHAGE, NOT ELSEWHERE CLASSIFIED: Chronic | ICD-10-CM

## 2022-05-05 DIAGNOSIS — Z98.89 OTHER SPECIFIED POSTPROCEDURAL STATES: Chronic | ICD-10-CM

## 2022-05-05 DIAGNOSIS — K46.9 UNSPECIFIED ABDOMINAL HERNIA WITHOUT OBSTRUCTION OR GANGRENE: Chronic | ICD-10-CM

## 2022-05-06 DIAGNOSIS — I87.2 VENOUS INSUFFICIENCY (CHRONIC) (PERIPHERAL): ICD-10-CM

## 2022-05-06 DIAGNOSIS — M17.12 UNILATERAL PRIMARY OSTEOARTHRITIS, LEFT KNEE: ICD-10-CM

## 2022-05-06 DIAGNOSIS — R60.9 EDEMA, UNSPECIFIED: ICD-10-CM

## 2022-05-06 DIAGNOSIS — Z91.81 HISTORY OF FALLING: ICD-10-CM

## 2022-05-06 DIAGNOSIS — I87.311 CHRONIC VENOUS HYPERTENSION (IDIOPATHIC) WITH ULCER OF RIGHT LOWER EXTREMITY: ICD-10-CM

## 2022-05-06 DIAGNOSIS — Z79.899 OTHER LONG TERM (CURRENT) DRUG THERAPY: ICD-10-CM

## 2022-05-06 DIAGNOSIS — L97.812 NON-PRESSURE CHRONIC ULCER OF OTHER PART OF RIGHT LOWER LEG WITH FAT LAYER EXPOSED: ICD-10-CM

## 2022-05-06 DIAGNOSIS — D64.9 ANEMIA, UNSPECIFIED: ICD-10-CM

## 2022-05-06 DIAGNOSIS — E66.01 MORBID (SEVERE) OBESITY DUE TO EXCESS CALORIES: ICD-10-CM

## 2022-05-12 ENCOUNTER — OUTPATIENT (OUTPATIENT)
Dept: OUTPATIENT SERVICES | Facility: HOSPITAL | Age: 62
LOS: 1 days | Discharge: HOME | End: 2022-05-12

## 2022-05-12 DIAGNOSIS — Z98.89 OTHER SPECIFIED POSTPROCEDURAL STATES: Chronic | ICD-10-CM

## 2022-05-12 DIAGNOSIS — R58 HEMORRHAGE, NOT ELSEWHERE CLASSIFIED: Chronic | ICD-10-CM

## 2022-05-12 DIAGNOSIS — K46.9 UNSPECIFIED ABDOMINAL HERNIA WITHOUT OBSTRUCTION OR GANGRENE: Chronic | ICD-10-CM

## 2022-05-12 DIAGNOSIS — L89.90 PRESSURE ULCER OF UNSPECIFIED SITE, UNSPECIFIED STAGE: ICD-10-CM

## 2022-05-13 DIAGNOSIS — L97.812 NON-PRESSURE CHRONIC ULCER OF OTHER PART OF RIGHT LOWER LEG WITH FAT LAYER EXPOSED: ICD-10-CM

## 2022-05-13 DIAGNOSIS — I87.311 CHRONIC VENOUS HYPERTENSION (IDIOPATHIC) WITH ULCER OF RIGHT LOWER EXTREMITY: ICD-10-CM

## 2022-05-13 DIAGNOSIS — M17.12 UNILATERAL PRIMARY OSTEOARTHRITIS, LEFT KNEE: ICD-10-CM

## 2022-05-13 DIAGNOSIS — Z79.899 OTHER LONG TERM (CURRENT) DRUG THERAPY: ICD-10-CM

## 2022-05-13 DIAGNOSIS — I87.2 VENOUS INSUFFICIENCY (CHRONIC) (PERIPHERAL): ICD-10-CM

## 2022-05-13 DIAGNOSIS — Z91.81 HISTORY OF FALLING: ICD-10-CM

## 2022-05-13 DIAGNOSIS — D64.9 ANEMIA, UNSPECIFIED: ICD-10-CM

## 2022-05-13 DIAGNOSIS — R60.9 EDEMA, UNSPECIFIED: ICD-10-CM

## 2022-05-13 DIAGNOSIS — E66.01 MORBID (SEVERE) OBESITY DUE TO EXCESS CALORIES: ICD-10-CM

## 2022-05-19 ENCOUNTER — OUTPATIENT (OUTPATIENT)
Dept: OUTPATIENT SERVICES | Facility: HOSPITAL | Age: 62
LOS: 1 days | Discharge: HOME | End: 2022-05-19
Payer: COMMERCIAL

## 2022-05-19 DIAGNOSIS — K46.9 UNSPECIFIED ABDOMINAL HERNIA WITHOUT OBSTRUCTION OR GANGRENE: Chronic | ICD-10-CM

## 2022-05-19 DIAGNOSIS — Z98.89 OTHER SPECIFIED POSTPROCEDURAL STATES: Chronic | ICD-10-CM

## 2022-05-19 DIAGNOSIS — L89.90 PRESSURE ULCER OF UNSPECIFIED SITE, UNSPECIFIED STAGE: ICD-10-CM

## 2022-05-19 DIAGNOSIS — R58 HEMORRHAGE, NOT ELSEWHERE CLASSIFIED: Chronic | ICD-10-CM

## 2022-05-19 PROCEDURE — 29580 STRAPPING UNNA BOOT: CPT | Mod: 59,RT

## 2022-05-19 PROCEDURE — 99212 OFFICE O/P EST SF 10 MIN: CPT | Mod: 25

## 2022-05-20 DIAGNOSIS — E66.01 MORBID (SEVERE) OBESITY DUE TO EXCESS CALORIES: ICD-10-CM

## 2022-05-20 DIAGNOSIS — M17.12 UNILATERAL PRIMARY OSTEOARTHRITIS, LEFT KNEE: ICD-10-CM

## 2022-05-20 DIAGNOSIS — Z91.81 HISTORY OF FALLING: ICD-10-CM

## 2022-05-20 DIAGNOSIS — Z79.899 OTHER LONG TERM (CURRENT) DRUG THERAPY: ICD-10-CM

## 2022-05-20 DIAGNOSIS — D64.9 ANEMIA, UNSPECIFIED: ICD-10-CM

## 2022-05-20 DIAGNOSIS — I87.311 CHRONIC VENOUS HYPERTENSION (IDIOPATHIC) WITH ULCER OF RIGHT LOWER EXTREMITY: ICD-10-CM

## 2022-05-20 DIAGNOSIS — I87.2 VENOUS INSUFFICIENCY (CHRONIC) (PERIPHERAL): ICD-10-CM

## 2022-05-20 DIAGNOSIS — L97.812 NON-PRESSURE CHRONIC ULCER OF OTHER PART OF RIGHT LOWER LEG WITH FAT LAYER EXPOSED: ICD-10-CM

## 2022-05-20 DIAGNOSIS — R60.9 EDEMA, UNSPECIFIED: ICD-10-CM

## 2022-05-26 ENCOUNTER — OUTPATIENT (OUTPATIENT)
Dept: OUTPATIENT SERVICES | Facility: HOSPITAL | Age: 62
LOS: 1 days | Discharge: HOME | End: 2022-05-26

## 2022-05-26 DIAGNOSIS — L89.90 PRESSURE ULCER OF UNSPECIFIED SITE, UNSPECIFIED STAGE: ICD-10-CM

## 2022-05-26 DIAGNOSIS — R58 HEMORRHAGE, NOT ELSEWHERE CLASSIFIED: Chronic | ICD-10-CM

## 2022-05-26 DIAGNOSIS — K46.9 UNSPECIFIED ABDOMINAL HERNIA WITHOUT OBSTRUCTION OR GANGRENE: Chronic | ICD-10-CM

## 2022-05-26 DIAGNOSIS — Z98.89 OTHER SPECIFIED POSTPROCEDURAL STATES: Chronic | ICD-10-CM

## 2022-05-27 DIAGNOSIS — Z79.899 OTHER LONG TERM (CURRENT) DRUG THERAPY: ICD-10-CM

## 2022-05-27 DIAGNOSIS — D64.9 ANEMIA, UNSPECIFIED: ICD-10-CM

## 2022-05-27 DIAGNOSIS — I87.311 CHRONIC VENOUS HYPERTENSION (IDIOPATHIC) WITH ULCER OF RIGHT LOWER EXTREMITY: ICD-10-CM

## 2022-05-27 DIAGNOSIS — L97.812 NON-PRESSURE CHRONIC ULCER OF OTHER PART OF RIGHT LOWER LEG WITH FAT LAYER EXPOSED: ICD-10-CM

## 2022-05-27 DIAGNOSIS — Z91.81 HISTORY OF FALLING: ICD-10-CM

## 2022-05-27 DIAGNOSIS — R60.9 EDEMA, UNSPECIFIED: ICD-10-CM

## 2022-05-27 DIAGNOSIS — I87.2 VENOUS INSUFFICIENCY (CHRONIC) (PERIPHERAL): ICD-10-CM

## 2022-05-27 DIAGNOSIS — M17.12 UNILATERAL PRIMARY OSTEOARTHRITIS, LEFT KNEE: ICD-10-CM

## 2022-05-27 DIAGNOSIS — E66.01 MORBID (SEVERE) OBESITY DUE TO EXCESS CALORIES: ICD-10-CM

## 2022-06-02 ENCOUNTER — OUTPATIENT (OUTPATIENT)
Dept: OUTPATIENT SERVICES | Facility: HOSPITAL | Age: 62
LOS: 1 days | Discharge: HOME | End: 2022-06-02

## 2022-06-02 DIAGNOSIS — L89.90 PRESSURE ULCER OF UNSPECIFIED SITE, UNSPECIFIED STAGE: ICD-10-CM

## 2022-06-02 DIAGNOSIS — Z98.89 OTHER SPECIFIED POSTPROCEDURAL STATES: Chronic | ICD-10-CM

## 2022-06-02 DIAGNOSIS — R58 HEMORRHAGE, NOT ELSEWHERE CLASSIFIED: Chronic | ICD-10-CM

## 2022-06-02 DIAGNOSIS — K46.9 UNSPECIFIED ABDOMINAL HERNIA WITHOUT OBSTRUCTION OR GANGRENE: Chronic | ICD-10-CM

## 2022-06-09 ENCOUNTER — OUTPATIENT (OUTPATIENT)
Dept: OUTPATIENT SERVICES | Facility: HOSPITAL | Age: 62
LOS: 1 days | Discharge: HOME | End: 2022-06-09

## 2022-06-09 DIAGNOSIS — R58 HEMORRHAGE, NOT ELSEWHERE CLASSIFIED: Chronic | ICD-10-CM

## 2022-06-09 DIAGNOSIS — K46.9 UNSPECIFIED ABDOMINAL HERNIA WITHOUT OBSTRUCTION OR GANGRENE: Chronic | ICD-10-CM

## 2022-06-09 DIAGNOSIS — L89.90 PRESSURE ULCER OF UNSPECIFIED SITE, UNSPECIFIED STAGE: ICD-10-CM

## 2022-06-09 DIAGNOSIS — Z98.89 OTHER SPECIFIED POSTPROCEDURAL STATES: Chronic | ICD-10-CM

## 2022-06-11 NOTE — ED PROVIDER NOTE - NSTOBACCONEVERSMOKERY/N_GEN_A
Patient vital signs are at baseline: Yes  Patient able to ambulate as they were prior to admission or with assist devices provided by therapies during their stay:  No,  Reason:  lift, A2-3 when upt with  a walker GB with PT.   Patient MUST void prior to discharge:  Yes  Patient able to tolerate oral intake:  Yes  Pain has adequate pain control using Oral analgesics:  Yes  Does patient have an identified :  Yes  Has goal D/C date and time been discussed with patient:  No,  Reason:  waiting for sensitivity, PICC placed. Will keep monitoring.        No

## 2022-06-16 ENCOUNTER — OUTPATIENT (OUTPATIENT)
Dept: OUTPATIENT SERVICES | Facility: HOSPITAL | Age: 62
LOS: 1 days | Discharge: HOME | End: 2022-06-16

## 2022-06-16 DIAGNOSIS — R58 HEMORRHAGE, NOT ELSEWHERE CLASSIFIED: Chronic | ICD-10-CM

## 2022-06-16 DIAGNOSIS — L89.90 PRESSURE ULCER OF UNSPECIFIED SITE, UNSPECIFIED STAGE: ICD-10-CM

## 2022-06-16 DIAGNOSIS — Z98.89 OTHER SPECIFIED POSTPROCEDURAL STATES: Chronic | ICD-10-CM

## 2022-06-16 DIAGNOSIS — K46.9 UNSPECIFIED ABDOMINAL HERNIA WITHOUT OBSTRUCTION OR GANGRENE: Chronic | ICD-10-CM

## 2022-06-17 DIAGNOSIS — I87.311 CHRONIC VENOUS HYPERTENSION (IDIOPATHIC) WITH ULCER OF RIGHT LOWER EXTREMITY: ICD-10-CM

## 2022-06-17 DIAGNOSIS — L97.812 NON-PRESSURE CHRONIC ULCER OF OTHER PART OF RIGHT LOWER LEG WITH FAT LAYER EXPOSED: ICD-10-CM

## 2022-06-17 DIAGNOSIS — E66.01 MORBID (SEVERE) OBESITY DUE TO EXCESS CALORIES: ICD-10-CM

## 2022-06-17 DIAGNOSIS — D64.9 ANEMIA, UNSPECIFIED: ICD-10-CM

## 2022-06-17 DIAGNOSIS — M17.12 UNILATERAL PRIMARY OSTEOARTHRITIS, LEFT KNEE: ICD-10-CM

## 2022-06-17 DIAGNOSIS — Z91.81 HISTORY OF FALLING: ICD-10-CM

## 2022-06-17 DIAGNOSIS — R60.9 EDEMA, UNSPECIFIED: ICD-10-CM

## 2022-06-17 DIAGNOSIS — Z79.899 OTHER LONG TERM (CURRENT) DRUG THERAPY: ICD-10-CM

## 2022-06-17 DIAGNOSIS — I87.2 VENOUS INSUFFICIENCY (CHRONIC) (PERIPHERAL): ICD-10-CM

## 2022-06-23 ENCOUNTER — OUTPATIENT (OUTPATIENT)
Dept: OUTPATIENT SERVICES | Facility: HOSPITAL | Age: 62
LOS: 1 days | Discharge: HOME | End: 2022-06-23

## 2022-06-23 DIAGNOSIS — D64.9 ANEMIA, UNSPECIFIED: ICD-10-CM

## 2022-06-23 DIAGNOSIS — M17.12 UNILATERAL PRIMARY OSTEOARTHRITIS, LEFT KNEE: ICD-10-CM

## 2022-06-23 DIAGNOSIS — Z79.899 OTHER LONG TERM (CURRENT) DRUG THERAPY: ICD-10-CM

## 2022-06-23 DIAGNOSIS — R58 HEMORRHAGE, NOT ELSEWHERE CLASSIFIED: Chronic | ICD-10-CM

## 2022-06-23 DIAGNOSIS — Z91.81 HISTORY OF FALLING: ICD-10-CM

## 2022-06-23 DIAGNOSIS — I87.2 VENOUS INSUFFICIENCY (CHRONIC) (PERIPHERAL): ICD-10-CM

## 2022-06-23 DIAGNOSIS — L97.812 NON-PRESSURE CHRONIC ULCER OF OTHER PART OF RIGHT LOWER LEG WITH FAT LAYER EXPOSED: ICD-10-CM

## 2022-06-23 DIAGNOSIS — E66.01 MORBID (SEVERE) OBESITY DUE TO EXCESS CALORIES: ICD-10-CM

## 2022-06-23 DIAGNOSIS — I87.311 CHRONIC VENOUS HYPERTENSION (IDIOPATHIC) WITH ULCER OF RIGHT LOWER EXTREMITY: ICD-10-CM

## 2022-06-23 DIAGNOSIS — L92.9 GRANULOMATOUS DISORDER OF THE SKIN AND SUBCUTANEOUS TISSUE, UNSPECIFIED: ICD-10-CM

## 2022-06-23 DIAGNOSIS — L89.90 PRESSURE ULCER OF UNSPECIFIED SITE, UNSPECIFIED STAGE: ICD-10-CM

## 2022-06-23 DIAGNOSIS — K46.9 UNSPECIFIED ABDOMINAL HERNIA WITHOUT OBSTRUCTION OR GANGRENE: Chronic | ICD-10-CM

## 2022-06-23 DIAGNOSIS — Z98.89 OTHER SPECIFIED POSTPROCEDURAL STATES: Chronic | ICD-10-CM

## 2022-06-23 DIAGNOSIS — R60.9 EDEMA, UNSPECIFIED: ICD-10-CM

## 2022-06-24 DIAGNOSIS — D64.9 ANEMIA, UNSPECIFIED: ICD-10-CM

## 2022-06-24 DIAGNOSIS — L87.2 ELASTOSIS PERFORANS SERPIGINOSA: ICD-10-CM

## 2022-06-24 DIAGNOSIS — I87.311 CHRONIC VENOUS HYPERTENSION (IDIOPATHIC) WITH ULCER OF RIGHT LOWER EXTREMITY: ICD-10-CM

## 2022-06-24 DIAGNOSIS — L92.9 GRANULOMATOUS DISORDER OF THE SKIN AND SUBCUTANEOUS TISSUE, UNSPECIFIED: ICD-10-CM

## 2022-06-24 DIAGNOSIS — E66.01 MORBID (SEVERE) OBESITY DUE TO EXCESS CALORIES: ICD-10-CM

## 2022-06-24 DIAGNOSIS — L97.812 NON-PRESSURE CHRONIC ULCER OF OTHER PART OF RIGHT LOWER LEG WITH FAT LAYER EXPOSED: ICD-10-CM

## 2022-06-24 DIAGNOSIS — R60.9 EDEMA, UNSPECIFIED: ICD-10-CM

## 2022-06-24 DIAGNOSIS — Z91.81 HISTORY OF FALLING: ICD-10-CM

## 2022-06-24 DIAGNOSIS — M17.12 UNILATERAL PRIMARY OSTEOARTHRITIS, LEFT KNEE: ICD-10-CM

## 2022-06-24 DIAGNOSIS — Z79.899 OTHER LONG TERM (CURRENT) DRUG THERAPY: ICD-10-CM

## 2022-06-30 ENCOUNTER — OUTPATIENT (OUTPATIENT)
Dept: OUTPATIENT SERVICES | Facility: HOSPITAL | Age: 62
LOS: 1 days | Discharge: HOME | End: 2022-06-30

## 2022-06-30 DIAGNOSIS — R58 HEMORRHAGE, NOT ELSEWHERE CLASSIFIED: Chronic | ICD-10-CM

## 2022-06-30 DIAGNOSIS — K46.9 UNSPECIFIED ABDOMINAL HERNIA WITHOUT OBSTRUCTION OR GANGRENE: Chronic | ICD-10-CM

## 2022-06-30 DIAGNOSIS — Z98.89 OTHER SPECIFIED POSTPROCEDURAL STATES: Chronic | ICD-10-CM

## 2022-06-30 DIAGNOSIS — L89.90 PRESSURE ULCER OF UNSPECIFIED SITE, UNSPECIFIED STAGE: ICD-10-CM

## 2022-07-07 ENCOUNTER — OUTPATIENT (OUTPATIENT)
Dept: OUTPATIENT SERVICES | Facility: HOSPITAL | Age: 62
LOS: 1 days | Discharge: HOME | End: 2022-07-07

## 2022-07-07 DIAGNOSIS — K46.9 UNSPECIFIED ABDOMINAL HERNIA WITHOUT OBSTRUCTION OR GANGRENE: Chronic | ICD-10-CM

## 2022-07-07 DIAGNOSIS — L89.90 PRESSURE ULCER OF UNSPECIFIED SITE, UNSPECIFIED STAGE: ICD-10-CM

## 2022-07-07 DIAGNOSIS — R58 HEMORRHAGE, NOT ELSEWHERE CLASSIFIED: Chronic | ICD-10-CM

## 2022-07-07 DIAGNOSIS — Z98.89 OTHER SPECIFIED POSTPROCEDURAL STATES: Chronic | ICD-10-CM

## 2022-07-08 DIAGNOSIS — D64.9 ANEMIA, UNSPECIFIED: ICD-10-CM

## 2022-07-08 DIAGNOSIS — M17.12 UNILATERAL PRIMARY OSTEOARTHRITIS, LEFT KNEE: ICD-10-CM

## 2022-07-08 DIAGNOSIS — I87.2 VENOUS INSUFFICIENCY (CHRONIC) (PERIPHERAL): ICD-10-CM

## 2022-07-08 DIAGNOSIS — E66.01 MORBID (SEVERE) OBESITY DUE TO EXCESS CALORIES: ICD-10-CM

## 2022-07-08 DIAGNOSIS — I87.311 CHRONIC VENOUS HYPERTENSION (IDIOPATHIC) WITH ULCER OF RIGHT LOWER EXTREMITY: ICD-10-CM

## 2022-07-08 DIAGNOSIS — Z79.899 OTHER LONG TERM (CURRENT) DRUG THERAPY: ICD-10-CM

## 2022-07-08 DIAGNOSIS — R60.9 EDEMA, UNSPECIFIED: ICD-10-CM

## 2022-07-08 DIAGNOSIS — L97.812 NON-PRESSURE CHRONIC ULCER OF OTHER PART OF RIGHT LOWER LEG WITH FAT LAYER EXPOSED: ICD-10-CM

## 2022-07-08 DIAGNOSIS — Z91.81 HISTORY OF FALLING: ICD-10-CM

## 2022-07-11 DIAGNOSIS — Z79.899 OTHER LONG TERM (CURRENT) DRUG THERAPY: ICD-10-CM

## 2022-07-11 DIAGNOSIS — I87.311 CHRONIC VENOUS HYPERTENSION (IDIOPATHIC) WITH ULCER OF RIGHT LOWER EXTREMITY: ICD-10-CM

## 2022-07-11 DIAGNOSIS — D64.9 ANEMIA, UNSPECIFIED: ICD-10-CM

## 2022-07-11 DIAGNOSIS — E66.01 MORBID (SEVERE) OBESITY DUE TO EXCESS CALORIES: ICD-10-CM

## 2022-07-11 DIAGNOSIS — L97.812 NON-PRESSURE CHRONIC ULCER OF OTHER PART OF RIGHT LOWER LEG WITH FAT LAYER EXPOSED: ICD-10-CM

## 2022-07-11 DIAGNOSIS — Z91.81 HISTORY OF FALLING: ICD-10-CM

## 2022-07-11 DIAGNOSIS — I87.2 VENOUS INSUFFICIENCY (CHRONIC) (PERIPHERAL): ICD-10-CM

## 2022-07-11 DIAGNOSIS — R60.9 EDEMA, UNSPECIFIED: ICD-10-CM

## 2022-07-11 DIAGNOSIS — M17.12 UNILATERAL PRIMARY OSTEOARTHRITIS, LEFT KNEE: ICD-10-CM

## 2022-07-14 ENCOUNTER — OUTPATIENT (OUTPATIENT)
Dept: OUTPATIENT SERVICES | Facility: HOSPITAL | Age: 62
LOS: 1 days | Discharge: HOME | End: 2022-07-14

## 2022-07-14 DIAGNOSIS — Z98.89 OTHER SPECIFIED POSTPROCEDURAL STATES: Chronic | ICD-10-CM

## 2022-07-14 DIAGNOSIS — L89.90 PRESSURE ULCER OF UNSPECIFIED SITE, UNSPECIFIED STAGE: ICD-10-CM

## 2022-07-14 DIAGNOSIS — K46.9 UNSPECIFIED ABDOMINAL HERNIA WITHOUT OBSTRUCTION OR GANGRENE: Chronic | ICD-10-CM

## 2022-07-14 DIAGNOSIS — R58 HEMORRHAGE, NOT ELSEWHERE CLASSIFIED: Chronic | ICD-10-CM

## 2022-07-15 DIAGNOSIS — I87.311 CHRONIC VENOUS HYPERTENSION (IDIOPATHIC) WITH ULCER OF RIGHT LOWER EXTREMITY: ICD-10-CM

## 2022-07-15 DIAGNOSIS — I87.2 VENOUS INSUFFICIENCY (CHRONIC) (PERIPHERAL): ICD-10-CM

## 2022-07-15 DIAGNOSIS — R60.9 EDEMA, UNSPECIFIED: ICD-10-CM

## 2022-07-15 DIAGNOSIS — Z91.81 HISTORY OF FALLING: ICD-10-CM

## 2022-07-15 DIAGNOSIS — M17.12 UNILATERAL PRIMARY OSTEOARTHRITIS, LEFT KNEE: ICD-10-CM

## 2022-07-15 DIAGNOSIS — Z79.899 OTHER LONG TERM (CURRENT) DRUG THERAPY: ICD-10-CM

## 2022-07-15 DIAGNOSIS — L97.812 NON-PRESSURE CHRONIC ULCER OF OTHER PART OF RIGHT LOWER LEG WITH FAT LAYER EXPOSED: ICD-10-CM

## 2022-07-15 DIAGNOSIS — D64.9 ANEMIA, UNSPECIFIED: ICD-10-CM

## 2022-07-15 DIAGNOSIS — E66.01 MORBID (SEVERE) OBESITY DUE TO EXCESS CALORIES: ICD-10-CM

## 2022-07-21 ENCOUNTER — OUTPATIENT (OUTPATIENT)
Dept: OUTPATIENT SERVICES | Facility: HOSPITAL | Age: 62
LOS: 1 days | Discharge: HOME | End: 2022-07-21

## 2022-07-21 DIAGNOSIS — L89.90 PRESSURE ULCER OF UNSPECIFIED SITE, UNSPECIFIED STAGE: ICD-10-CM

## 2022-07-21 DIAGNOSIS — K46.9 UNSPECIFIED ABDOMINAL HERNIA WITHOUT OBSTRUCTION OR GANGRENE: Chronic | ICD-10-CM

## 2022-07-21 DIAGNOSIS — Z98.89 OTHER SPECIFIED POSTPROCEDURAL STATES: Chronic | ICD-10-CM

## 2022-07-21 DIAGNOSIS — R58 HEMORRHAGE, NOT ELSEWHERE CLASSIFIED: Chronic | ICD-10-CM

## 2022-07-25 DIAGNOSIS — D64.9 ANEMIA, UNSPECIFIED: ICD-10-CM

## 2022-07-25 DIAGNOSIS — E66.01 MORBID (SEVERE) OBESITY DUE TO EXCESS CALORIES: ICD-10-CM

## 2022-07-25 DIAGNOSIS — M17.12 UNILATERAL PRIMARY OSTEOARTHRITIS, LEFT KNEE: ICD-10-CM

## 2022-07-25 DIAGNOSIS — Z91.81 HISTORY OF FALLING: ICD-10-CM

## 2022-07-25 DIAGNOSIS — I87.2 VENOUS INSUFFICIENCY (CHRONIC) (PERIPHERAL): ICD-10-CM

## 2022-07-25 DIAGNOSIS — I87.311 CHRONIC VENOUS HYPERTENSION (IDIOPATHIC) WITH ULCER OF RIGHT LOWER EXTREMITY: ICD-10-CM

## 2022-07-25 DIAGNOSIS — R60.9 EDEMA, UNSPECIFIED: ICD-10-CM

## 2022-07-25 DIAGNOSIS — Z79.899 OTHER LONG TERM (CURRENT) DRUG THERAPY: ICD-10-CM

## 2022-07-25 DIAGNOSIS — L97.812 NON-PRESSURE CHRONIC ULCER OF OTHER PART OF RIGHT LOWER LEG WITH FAT LAYER EXPOSED: ICD-10-CM

## 2022-07-28 ENCOUNTER — OUTPATIENT (OUTPATIENT)
Dept: OUTPATIENT SERVICES | Facility: HOSPITAL | Age: 62
LOS: 1 days | Discharge: HOME | End: 2022-07-28

## 2022-07-28 DIAGNOSIS — R58 HEMORRHAGE, NOT ELSEWHERE CLASSIFIED: Chronic | ICD-10-CM

## 2022-07-28 DIAGNOSIS — L89.90 PRESSURE ULCER OF UNSPECIFIED SITE, UNSPECIFIED STAGE: ICD-10-CM

## 2022-07-28 DIAGNOSIS — K46.9 UNSPECIFIED ABDOMINAL HERNIA WITHOUT OBSTRUCTION OR GANGRENE: Chronic | ICD-10-CM

## 2022-07-28 DIAGNOSIS — Z98.89 OTHER SPECIFIED POSTPROCEDURAL STATES: Chronic | ICD-10-CM

## 2022-07-28 NOTE — DISCHARGE NOTE NURSING/CASE MANAGEMENT/SOCIAL WORK - NSDCDPATPORTLINK_GEN_ALL_CORE
You can access the [facility] Patient Portal, offered by [facility], by registering with the following website: [email or web address]

## 2022-08-01 DIAGNOSIS — Z91.81 HISTORY OF FALLING: ICD-10-CM

## 2022-08-01 DIAGNOSIS — I87.2 VENOUS INSUFFICIENCY (CHRONIC) (PERIPHERAL): ICD-10-CM

## 2022-08-01 DIAGNOSIS — Z79.899 OTHER LONG TERM (CURRENT) DRUG THERAPY: ICD-10-CM

## 2022-08-01 DIAGNOSIS — M17.12 UNILATERAL PRIMARY OSTEOARTHRITIS, LEFT KNEE: ICD-10-CM

## 2022-08-01 DIAGNOSIS — D64.9 ANEMIA, UNSPECIFIED: ICD-10-CM

## 2022-08-01 DIAGNOSIS — E66.01 MORBID (SEVERE) OBESITY DUE TO EXCESS CALORIES: ICD-10-CM

## 2022-08-01 DIAGNOSIS — R60.9 EDEMA, UNSPECIFIED: ICD-10-CM

## 2022-08-01 DIAGNOSIS — L97.812 NON-PRESSURE CHRONIC ULCER OF OTHER PART OF RIGHT LOWER LEG WITH FAT LAYER EXPOSED: ICD-10-CM

## 2022-08-01 DIAGNOSIS — I87.311 CHRONIC VENOUS HYPERTENSION (IDIOPATHIC) WITH ULCER OF RIGHT LOWER EXTREMITY: ICD-10-CM

## 2022-08-04 ENCOUNTER — OUTPATIENT (OUTPATIENT)
Dept: OUTPATIENT SERVICES | Facility: HOSPITAL | Age: 62
LOS: 1 days | Discharge: HOME | End: 2022-08-04

## 2022-08-04 DIAGNOSIS — R58 HEMORRHAGE, NOT ELSEWHERE CLASSIFIED: Chronic | ICD-10-CM

## 2022-08-04 DIAGNOSIS — K46.9 UNSPECIFIED ABDOMINAL HERNIA WITHOUT OBSTRUCTION OR GANGRENE: Chronic | ICD-10-CM

## 2022-08-04 DIAGNOSIS — Z98.89 OTHER SPECIFIED POSTPROCEDURAL STATES: Chronic | ICD-10-CM

## 2022-08-04 DIAGNOSIS — L89.90 PRESSURE ULCER OF UNSPECIFIED SITE, UNSPECIFIED STAGE: ICD-10-CM

## 2022-08-05 DIAGNOSIS — L97.812 NON-PRESSURE CHRONIC ULCER OF OTHER PART OF RIGHT LOWER LEG WITH FAT LAYER EXPOSED: ICD-10-CM

## 2022-08-05 DIAGNOSIS — I87.311 CHRONIC VENOUS HYPERTENSION (IDIOPATHIC) WITH ULCER OF RIGHT LOWER EXTREMITY: ICD-10-CM

## 2022-08-05 DIAGNOSIS — I87.2 VENOUS INSUFFICIENCY (CHRONIC) (PERIPHERAL): ICD-10-CM

## 2022-08-05 DIAGNOSIS — Z91.81 HISTORY OF FALLING: ICD-10-CM

## 2022-08-05 DIAGNOSIS — Z79.899 OTHER LONG TERM (CURRENT) DRUG THERAPY: ICD-10-CM

## 2022-08-05 DIAGNOSIS — R60.9 EDEMA, UNSPECIFIED: ICD-10-CM

## 2022-08-05 DIAGNOSIS — E66.01 MORBID (SEVERE) OBESITY DUE TO EXCESS CALORIES: ICD-10-CM

## 2022-08-05 DIAGNOSIS — M17.12 UNILATERAL PRIMARY OSTEOARTHRITIS, LEFT KNEE: ICD-10-CM

## 2022-08-05 DIAGNOSIS — D64.9 ANEMIA, UNSPECIFIED: ICD-10-CM

## 2022-08-08 NOTE — PHYSICAL THERAPY INITIAL EVALUATION ADULT - FOLLOWS COMMANDS/ANSWERS QUESTIONS, REHAB EVAL
HIDA scan was reviewed by myself and discussed with Dr. De La Cruz it is consistent with a biliary leak.  My opinion she needs an ERCP as the next step.  That study is not available at this facility because Dr. Rodriguez is out of town for the week.  I have called the transfer line and attempt to get her transferred to [facility] for higher level of care than can be provided at this facility at this time.  I discussed this with the patient and she is agreeable.  She is stable for transfer.  I am awaiting a call from the hospitalist service to facilitate her transfer.   100% of the time/able to follow multistep instructions

## 2022-08-11 ENCOUNTER — OUTPATIENT (OUTPATIENT)
Dept: OUTPATIENT SERVICES | Facility: HOSPITAL | Age: 62
LOS: 1 days | Discharge: HOME | End: 2022-08-11

## 2022-08-11 DIAGNOSIS — R58 HEMORRHAGE, NOT ELSEWHERE CLASSIFIED: Chronic | ICD-10-CM

## 2022-08-11 DIAGNOSIS — K46.9 UNSPECIFIED ABDOMINAL HERNIA WITHOUT OBSTRUCTION OR GANGRENE: Chronic | ICD-10-CM

## 2022-08-11 DIAGNOSIS — L89.90 PRESSURE ULCER OF UNSPECIFIED SITE, UNSPECIFIED STAGE: ICD-10-CM

## 2022-08-11 DIAGNOSIS — Z98.89 OTHER SPECIFIED POSTPROCEDURAL STATES: Chronic | ICD-10-CM

## 2022-08-12 DIAGNOSIS — I87.311 CHRONIC VENOUS HYPERTENSION (IDIOPATHIC) WITH ULCER OF RIGHT LOWER EXTREMITY: ICD-10-CM

## 2022-08-12 DIAGNOSIS — Z91.81 HISTORY OF FALLING: ICD-10-CM

## 2022-08-12 DIAGNOSIS — I87.2 VENOUS INSUFFICIENCY (CHRONIC) (PERIPHERAL): ICD-10-CM

## 2022-08-12 DIAGNOSIS — L97.812 NON-PRESSURE CHRONIC ULCER OF OTHER PART OF RIGHT LOWER LEG WITH FAT LAYER EXPOSED: ICD-10-CM

## 2022-08-12 DIAGNOSIS — M17.12 UNILATERAL PRIMARY OSTEOARTHRITIS, LEFT KNEE: ICD-10-CM

## 2022-08-12 DIAGNOSIS — E66.01 MORBID (SEVERE) OBESITY DUE TO EXCESS CALORIES: ICD-10-CM

## 2022-08-12 DIAGNOSIS — D64.9 ANEMIA, UNSPECIFIED: ICD-10-CM

## 2022-08-12 DIAGNOSIS — R60.9 EDEMA, UNSPECIFIED: ICD-10-CM

## 2022-08-12 DIAGNOSIS — Z79.899 OTHER LONG TERM (CURRENT) DRUG THERAPY: ICD-10-CM

## 2022-08-18 ENCOUNTER — OUTPATIENT (OUTPATIENT)
Dept: OUTPATIENT SERVICES | Facility: HOSPITAL | Age: 62
LOS: 1 days | Discharge: HOME | End: 2022-08-18

## 2022-08-18 DIAGNOSIS — R58 HEMORRHAGE, NOT ELSEWHERE CLASSIFIED: Chronic | ICD-10-CM

## 2022-08-18 DIAGNOSIS — K46.9 UNSPECIFIED ABDOMINAL HERNIA WITHOUT OBSTRUCTION OR GANGRENE: Chronic | ICD-10-CM

## 2022-08-18 DIAGNOSIS — Z98.89 OTHER SPECIFIED POSTPROCEDURAL STATES: Chronic | ICD-10-CM

## 2022-08-18 DIAGNOSIS — L89.90 PRESSURE ULCER OF UNSPECIFIED SITE, UNSPECIFIED STAGE: ICD-10-CM

## 2022-08-23 DIAGNOSIS — I87.311 CHRONIC VENOUS HYPERTENSION (IDIOPATHIC) WITH ULCER OF RIGHT LOWER EXTREMITY: ICD-10-CM

## 2022-08-23 DIAGNOSIS — I87.2 VENOUS INSUFFICIENCY (CHRONIC) (PERIPHERAL): ICD-10-CM

## 2022-08-23 DIAGNOSIS — R60.9 EDEMA, UNSPECIFIED: ICD-10-CM

## 2022-08-23 DIAGNOSIS — E66.01 MORBID (SEVERE) OBESITY DUE TO EXCESS CALORIES: ICD-10-CM

## 2022-08-23 DIAGNOSIS — L97.812 NON-PRESSURE CHRONIC ULCER OF OTHER PART OF RIGHT LOWER LEG WITH FAT LAYER EXPOSED: ICD-10-CM

## 2022-08-23 DIAGNOSIS — D64.9 ANEMIA, UNSPECIFIED: ICD-10-CM

## 2022-08-23 DIAGNOSIS — Z79.899 OTHER LONG TERM (CURRENT) DRUG THERAPY: ICD-10-CM

## 2022-08-23 DIAGNOSIS — M17.12 UNILATERAL PRIMARY OSTEOARTHRITIS, LEFT KNEE: ICD-10-CM

## 2022-08-23 DIAGNOSIS — Z91.81 HISTORY OF FALLING: ICD-10-CM

## 2022-08-25 ENCOUNTER — OUTPATIENT (OUTPATIENT)
Dept: OUTPATIENT SERVICES | Facility: HOSPITAL | Age: 62
LOS: 1 days | Discharge: HOME | End: 2022-08-25

## 2022-08-25 DIAGNOSIS — Z98.89 OTHER SPECIFIED POSTPROCEDURAL STATES: Chronic | ICD-10-CM

## 2022-08-25 DIAGNOSIS — K46.9 UNSPECIFIED ABDOMINAL HERNIA WITHOUT OBSTRUCTION OR GANGRENE: Chronic | ICD-10-CM

## 2022-08-25 DIAGNOSIS — R58 HEMORRHAGE, NOT ELSEWHERE CLASSIFIED: Chronic | ICD-10-CM

## 2022-08-25 DIAGNOSIS — L89.90 PRESSURE ULCER OF UNSPECIFIED SITE, UNSPECIFIED STAGE: ICD-10-CM

## 2022-09-01 ENCOUNTER — OUTPATIENT (OUTPATIENT)
Dept: OUTPATIENT SERVICES | Facility: HOSPITAL | Age: 62
LOS: 1 days | Discharge: HOME | End: 2022-09-01

## 2022-09-01 DIAGNOSIS — Z98.89 OTHER SPECIFIED POSTPROCEDURAL STATES: Chronic | ICD-10-CM

## 2022-09-01 DIAGNOSIS — L89.90 PRESSURE ULCER OF UNSPECIFIED SITE, UNSPECIFIED STAGE: ICD-10-CM

## 2022-09-01 DIAGNOSIS — K46.9 UNSPECIFIED ABDOMINAL HERNIA WITHOUT OBSTRUCTION OR GANGRENE: Chronic | ICD-10-CM

## 2022-09-01 DIAGNOSIS — R58 HEMORRHAGE, NOT ELSEWHERE CLASSIFIED: Chronic | ICD-10-CM

## 2022-09-06 DIAGNOSIS — I87.2 VENOUS INSUFFICIENCY (CHRONIC) (PERIPHERAL): ICD-10-CM

## 2022-09-06 DIAGNOSIS — R60.9 EDEMA, UNSPECIFIED: ICD-10-CM

## 2022-09-06 DIAGNOSIS — Z91.81 HISTORY OF FALLING: ICD-10-CM

## 2022-09-06 DIAGNOSIS — I87.311 CHRONIC VENOUS HYPERTENSION (IDIOPATHIC) WITH ULCER OF RIGHT LOWER EXTREMITY: ICD-10-CM

## 2022-09-06 DIAGNOSIS — D64.9 ANEMIA, UNSPECIFIED: ICD-10-CM

## 2022-09-06 DIAGNOSIS — E66.01 MORBID (SEVERE) OBESITY DUE TO EXCESS CALORIES: ICD-10-CM

## 2022-09-06 DIAGNOSIS — Z79.899 OTHER LONG TERM (CURRENT) DRUG THERAPY: ICD-10-CM

## 2022-09-06 DIAGNOSIS — L97.812 NON-PRESSURE CHRONIC ULCER OF OTHER PART OF RIGHT LOWER LEG WITH FAT LAYER EXPOSED: ICD-10-CM

## 2022-09-06 DIAGNOSIS — M17.12 UNILATERAL PRIMARY OSTEOARTHRITIS, LEFT KNEE: ICD-10-CM

## 2022-09-07 DIAGNOSIS — D64.9 ANEMIA, UNSPECIFIED: ICD-10-CM

## 2022-09-07 DIAGNOSIS — Z79.899 OTHER LONG TERM (CURRENT) DRUG THERAPY: ICD-10-CM

## 2022-09-07 DIAGNOSIS — R60.9 EDEMA, UNSPECIFIED: ICD-10-CM

## 2022-09-07 DIAGNOSIS — L97.812 NON-PRESSURE CHRONIC ULCER OF OTHER PART OF RIGHT LOWER LEG WITH FAT LAYER EXPOSED: ICD-10-CM

## 2022-09-07 DIAGNOSIS — E66.01 MORBID (SEVERE) OBESITY DUE TO EXCESS CALORIES: ICD-10-CM

## 2022-09-07 DIAGNOSIS — L97.822 NON-PRESSURE CHRONIC ULCER OF OTHER PART OF LEFT LOWER LEG WITH FAT LAYER EXPOSED: ICD-10-CM

## 2022-09-07 DIAGNOSIS — I87.313 CHRONIC VENOUS HYPERTENSION (IDIOPATHIC) WITH ULCER OF BILATERAL LOWER EXTREMITY: ICD-10-CM

## 2022-09-07 DIAGNOSIS — L97.528 NON-PRESSURE CHRONIC ULCER OF OTHER PART OF LEFT FOOT WITH OTHER SPECIFIED SEVERITY: ICD-10-CM

## 2022-09-07 DIAGNOSIS — M17.12 UNILATERAL PRIMARY OSTEOARTHRITIS, LEFT KNEE: ICD-10-CM

## 2022-09-07 DIAGNOSIS — Z91.81 HISTORY OF FALLING: ICD-10-CM

## 2022-09-08 ENCOUNTER — OUTPATIENT (OUTPATIENT)
Dept: OUTPATIENT SERVICES | Facility: HOSPITAL | Age: 62
LOS: 1 days | Discharge: HOME | End: 2022-09-08

## 2022-09-08 DIAGNOSIS — M17.12 UNILATERAL PRIMARY OSTEOARTHRITIS, LEFT KNEE: ICD-10-CM

## 2022-09-08 DIAGNOSIS — L97.822 NON-PRESSURE CHRONIC ULCER OF OTHER PART OF LEFT LOWER LEG WITH FAT LAYER EXPOSED: ICD-10-CM

## 2022-09-08 DIAGNOSIS — L89.90 PRESSURE ULCER OF UNSPECIFIED SITE, UNSPECIFIED STAGE: ICD-10-CM

## 2022-09-08 DIAGNOSIS — I87.313 CHRONIC VENOUS HYPERTENSION (IDIOPATHIC) WITH ULCER OF BILATERAL LOWER EXTREMITY: ICD-10-CM

## 2022-09-08 DIAGNOSIS — Z79.899 OTHER LONG TERM (CURRENT) DRUG THERAPY: ICD-10-CM

## 2022-09-08 DIAGNOSIS — R58 HEMORRHAGE, NOT ELSEWHERE CLASSIFIED: Chronic | ICD-10-CM

## 2022-09-08 DIAGNOSIS — D64.9 ANEMIA, UNSPECIFIED: ICD-10-CM

## 2022-09-08 DIAGNOSIS — Z91.81 HISTORY OF FALLING: ICD-10-CM

## 2022-09-08 DIAGNOSIS — K46.9 UNSPECIFIED ABDOMINAL HERNIA WITHOUT OBSTRUCTION OR GANGRENE: Chronic | ICD-10-CM

## 2022-09-08 DIAGNOSIS — L97.812 NON-PRESSURE CHRONIC ULCER OF OTHER PART OF RIGHT LOWER LEG WITH FAT LAYER EXPOSED: ICD-10-CM

## 2022-09-08 DIAGNOSIS — R60.9 EDEMA, UNSPECIFIED: ICD-10-CM

## 2022-09-08 DIAGNOSIS — L97.528 NON-PRESSURE CHRONIC ULCER OF OTHER PART OF LEFT FOOT WITH OTHER SPECIFIED SEVERITY: ICD-10-CM

## 2022-09-08 DIAGNOSIS — E66.01 MORBID (SEVERE) OBESITY DUE TO EXCESS CALORIES: ICD-10-CM

## 2022-09-08 DIAGNOSIS — Z98.89 OTHER SPECIFIED POSTPROCEDURAL STATES: Chronic | ICD-10-CM

## 2022-09-15 ENCOUNTER — OUTPATIENT (OUTPATIENT)
Dept: OUTPATIENT SERVICES | Facility: HOSPITAL | Age: 62
LOS: 1 days | Discharge: HOME | End: 2022-09-15

## 2022-09-15 DIAGNOSIS — K46.9 UNSPECIFIED ABDOMINAL HERNIA WITHOUT OBSTRUCTION OR GANGRENE: Chronic | ICD-10-CM

## 2022-09-15 DIAGNOSIS — Z98.89 OTHER SPECIFIED POSTPROCEDURAL STATES: Chronic | ICD-10-CM

## 2022-09-15 DIAGNOSIS — L89.90 PRESSURE ULCER OF UNSPECIFIED SITE, UNSPECIFIED STAGE: ICD-10-CM

## 2022-09-15 DIAGNOSIS — R58 HEMORRHAGE, NOT ELSEWHERE CLASSIFIED: Chronic | ICD-10-CM

## 2022-09-19 DIAGNOSIS — R60.9 EDEMA, UNSPECIFIED: ICD-10-CM

## 2022-09-19 DIAGNOSIS — L97.822 NON-PRESSURE CHRONIC ULCER OF OTHER PART OF LEFT LOWER LEG WITH FAT LAYER EXPOSED: ICD-10-CM

## 2022-09-19 DIAGNOSIS — L97.812 NON-PRESSURE CHRONIC ULCER OF OTHER PART OF RIGHT LOWER LEG WITH FAT LAYER EXPOSED: ICD-10-CM

## 2022-09-19 DIAGNOSIS — M17.12 UNILATERAL PRIMARY OSTEOARTHRITIS, LEFT KNEE: ICD-10-CM

## 2022-09-19 DIAGNOSIS — I87.313 CHRONIC VENOUS HYPERTENSION (IDIOPATHIC) WITH ULCER OF BILATERAL LOWER EXTREMITY: ICD-10-CM

## 2022-09-19 DIAGNOSIS — Z79.899 OTHER LONG TERM (CURRENT) DRUG THERAPY: ICD-10-CM

## 2022-09-19 DIAGNOSIS — D64.9 ANEMIA, UNSPECIFIED: ICD-10-CM

## 2022-09-19 DIAGNOSIS — Z91.81 HISTORY OF FALLING: ICD-10-CM

## 2022-09-19 DIAGNOSIS — E66.01 MORBID (SEVERE) OBESITY DUE TO EXCESS CALORIES: ICD-10-CM

## 2022-09-22 ENCOUNTER — OUTPATIENT (OUTPATIENT)
Dept: OUTPATIENT SERVICES | Facility: HOSPITAL | Age: 62
LOS: 1 days | Discharge: HOME | End: 2022-09-22

## 2022-09-22 DIAGNOSIS — M17.12 UNILATERAL PRIMARY OSTEOARTHRITIS, LEFT KNEE: ICD-10-CM

## 2022-09-22 DIAGNOSIS — Z98.89 OTHER SPECIFIED POSTPROCEDURAL STATES: Chronic | ICD-10-CM

## 2022-09-22 DIAGNOSIS — L97.812 NON-PRESSURE CHRONIC ULCER OF OTHER PART OF RIGHT LOWER LEG WITH FAT LAYER EXPOSED: ICD-10-CM

## 2022-09-22 DIAGNOSIS — I87.311 CHRONIC VENOUS HYPERTENSION (IDIOPATHIC) WITH ULCER OF RIGHT LOWER EXTREMITY: ICD-10-CM

## 2022-09-22 DIAGNOSIS — K46.9 UNSPECIFIED ABDOMINAL HERNIA WITHOUT OBSTRUCTION OR GANGRENE: Chronic | ICD-10-CM

## 2022-09-22 DIAGNOSIS — R60.9 EDEMA, UNSPECIFIED: ICD-10-CM

## 2022-09-22 DIAGNOSIS — E66.01 MORBID (SEVERE) OBESITY DUE TO EXCESS CALORIES: ICD-10-CM

## 2022-09-22 DIAGNOSIS — L89.90 PRESSURE ULCER OF UNSPECIFIED SITE, UNSPECIFIED STAGE: ICD-10-CM

## 2022-09-22 DIAGNOSIS — Z91.81 HISTORY OF FALLING: ICD-10-CM

## 2022-09-22 DIAGNOSIS — D64.9 ANEMIA, UNSPECIFIED: ICD-10-CM

## 2022-09-22 DIAGNOSIS — Z79.899 OTHER LONG TERM (CURRENT) DRUG THERAPY: ICD-10-CM

## 2022-09-22 DIAGNOSIS — R58 HEMORRHAGE, NOT ELSEWHERE CLASSIFIED: Chronic | ICD-10-CM

## 2022-09-29 ENCOUNTER — OUTPATIENT (OUTPATIENT)
Dept: OUTPATIENT SERVICES | Facility: HOSPITAL | Age: 62
LOS: 1 days | Discharge: HOME | End: 2022-09-29

## 2022-09-29 DIAGNOSIS — K46.9 UNSPECIFIED ABDOMINAL HERNIA WITHOUT OBSTRUCTION OR GANGRENE: Chronic | ICD-10-CM

## 2022-09-29 DIAGNOSIS — Z98.89 OTHER SPECIFIED POSTPROCEDURAL STATES: Chronic | ICD-10-CM

## 2022-09-29 DIAGNOSIS — R58 HEMORRHAGE, NOT ELSEWHERE CLASSIFIED: Chronic | ICD-10-CM

## 2022-09-29 DIAGNOSIS — L89.90 PRESSURE ULCER OF UNSPECIFIED SITE, UNSPECIFIED STAGE: ICD-10-CM

## 2022-09-30 DIAGNOSIS — I87.311 CHRONIC VENOUS HYPERTENSION (IDIOPATHIC) WITH ULCER OF RIGHT LOWER EXTREMITY: ICD-10-CM

## 2022-09-30 DIAGNOSIS — D64.9 ANEMIA, UNSPECIFIED: ICD-10-CM

## 2022-09-30 DIAGNOSIS — Z79.899 OTHER LONG TERM (CURRENT) DRUG THERAPY: ICD-10-CM

## 2022-09-30 DIAGNOSIS — I87.2 VENOUS INSUFFICIENCY (CHRONIC) (PERIPHERAL): ICD-10-CM

## 2022-09-30 DIAGNOSIS — E66.01 MORBID (SEVERE) OBESITY DUE TO EXCESS CALORIES: ICD-10-CM

## 2022-09-30 DIAGNOSIS — L97.812 NON-PRESSURE CHRONIC ULCER OF OTHER PART OF RIGHT LOWER LEG WITH FAT LAYER EXPOSED: ICD-10-CM

## 2022-09-30 DIAGNOSIS — R60.9 EDEMA, UNSPECIFIED: ICD-10-CM

## 2022-09-30 DIAGNOSIS — M17.12 UNILATERAL PRIMARY OSTEOARTHRITIS, LEFT KNEE: ICD-10-CM

## 2022-09-30 DIAGNOSIS — Z91.81 HISTORY OF FALLING: ICD-10-CM

## 2022-10-03 NOTE — CONSULT NOTE ADULT - SUBJECTIVE AND OBJECTIVE BOX
Infectious Diseases - Attending at Dr. Coburn    HPI:  58 year old male PMH HTN, presents with chronic leg wound with worsening for past few weeks; not on abx - pt has had R lower leg pain and worsened redness and increase in size of wound/darkened color of ulceration. Pt states it started a few months ago - leg wound about 8-9 months old - had blister to area that popped after scratching and wound has been persistent and worsening since, has associated tenderness, pt has no current care provider. (16 Oct 2018 04:01)      PAST MEDICAL & SURGICAL HISTORY:  HTN (hypertension)  Knee osteoarthritis  Duodenal ulcer disease  Morbid obesity  Hypertension  Osteoarthritis  PUD (peptic ulcer disease)  Hypertension  Gastroduodenal artery bleed: IR embolization of gastroduodenal artery  Abdominal hernia  H/O ventral hernia repair      Allergies    No Known Allergies    Intolerances        FAMILY HISTORY:  No pertinent family history in first degree relatives  No pertinent family history in first degree relatives      SOCIAL HISTORY:    REVIEW OF SYSTEMS:    Constitutional: No fever, weight loss or fatigue  Eyes: No eye pain, visual disturbances, or discharge  ENT:  No difficulty hearing, tinnitus, vertigo; No sinus or throat pain  Neck: No pain or stiffness  Respiratory: No cough, wheezing, chills or hemoptysis  Cardiovascular: No chest pain, palpitations, shortness of breath, dizziness or leg swelling  Gastrointestinal: No abdominal or epigastric pain. No nausea, vomiting or hematemesis; No diarrhea or constipation. No melena or hematochezia.  Genitourinary: No dysuria, frequency, hematuria or incontinence  Neurological: No headaches, memory loss, loss of strength, numbness or tremors  Skin: No itching, burning, rashes or lesions       MEDICATIONS  (STANDING):  amLODIPine   Tablet 2.5 milliGRAM(s) Oral daily  carvedilol 3.125 milliGRAM(s) Oral every 12 hours  enalapril 10 milliGRAM(s) Oral daily  heparin  Injectable 5000 Unit(s) SubCutaneous every 12 hours  lactated ringers. 1000 milliLiter(s) (100 mL/Hr) IV Continuous <Continuous>  pantoprazole    Tablet 40 milliGRAM(s) Oral before breakfast  piperacillin/tazobactam IVPB. 3.375 Gram(s) IV Intermittent every 8 hours  potassium chloride    Tablet ER 40 milliEquivalent(s) Oral every 4 hours  vancomycin  IVPB 1000 milliGRAM(s) IV Intermittent every 12 hours    MEDICATIONS  (PRN):  oxyCODONE    5 mG/acetaminophen 325 mG 1 Tablet(s) Oral every 4 hours PRN Severe Pain (7 - 10)      Vital Signs Last 24 Hrs  T(C): 36.4 (16 Oct 2018 05:53), Max: 37.3 (16 Oct 2018 00:15)  T(F): 97.5 (16 Oct 2018 05:53), Max: 99.1 (16 Oct 2018 00:15)  HR: 60 (16 Oct 2018 05:53) (60 - 85)  BP: 127/71 (16 Oct 2018 05:53) (127/71 - 185/120)  BP(mean): --  RR: 18 (16 Oct 2018 05:53) (18 - 20)  SpO2: 97% (16 Oct 2018 05:53) (90% - 99%)    PHYSICAL EXAM:    Constitutional: NAD, well-groomed, well-developed  HEENT: PERRLA, EOMI, Normal Hearing, MMM  Neck: No LAD, No JVD  Back: Normal spine flexure, No CVA tenderness  Respiratory: CTAB/L  Cardiovascular: S1 and S2, RRR, no M/G/R  Gastrointestinal: BS+, soft, NT/ND  Extremities: + peripheral edema  Vascular: 2+ peripheral pulses  Neurological: A/O x 3, no focal deficits  Skin: right leg wounds with malodorous discharge ,swelling of leg and foot  R> L       LABS:                        10.8   9.38  )-----------( 364      ( 16 Oct 2018 09:23 )             35.7     10-16    141  |  104  |  22  ----------------------------<  95  3.3<L>   |  29  |  1.08    Ca    8.0<L>      16 Oct 2018 05:46    TPro  7.2  /  Alb  2.9<L>  /  TBili  0.3  /  DBili  x   /  AST  21  /  ALT  17  /  AlkPhos  91  10-16          MICROBIOLOGY:  RECENT CULTURES:        RADIOLOGY & ADDITIONAL STUDIES: 5

## 2022-10-06 ENCOUNTER — OUTPATIENT (OUTPATIENT)
Dept: OUTPATIENT SERVICES | Facility: HOSPITAL | Age: 62
LOS: 1 days | Discharge: HOME | End: 2022-10-06

## 2022-10-06 DIAGNOSIS — L89.90 PRESSURE ULCER OF UNSPECIFIED SITE, UNSPECIFIED STAGE: ICD-10-CM

## 2022-10-06 DIAGNOSIS — R58 HEMORRHAGE, NOT ELSEWHERE CLASSIFIED: Chronic | ICD-10-CM

## 2022-10-06 DIAGNOSIS — Z98.89 OTHER SPECIFIED POSTPROCEDURAL STATES: Chronic | ICD-10-CM

## 2022-10-06 DIAGNOSIS — K46.9 UNSPECIFIED ABDOMINAL HERNIA WITHOUT OBSTRUCTION OR GANGRENE: Chronic | ICD-10-CM

## 2022-10-07 DIAGNOSIS — L97.812 NON-PRESSURE CHRONIC ULCER OF OTHER PART OF RIGHT LOWER LEG WITH FAT LAYER EXPOSED: ICD-10-CM

## 2022-10-07 DIAGNOSIS — S91.105A UNSPECIFIED OPEN WOUND OF LEFT LESSER TOE(S) WITHOUT DAMAGE TO NAIL, INITIAL ENCOUNTER: ICD-10-CM
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
## 2022-10-07 DIAGNOSIS — I87.311 CHRONIC VENOUS HYPERTENSION (IDIOPATHIC) WITH ULCER OF RIGHT LOWER EXTREMITY: ICD-10-CM
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
## 2022-10-07 DIAGNOSIS — E66.01 MORBID (SEVERE) OBESITY DUE TO EXCESS CALORIES: ICD-10-CM

## 2022-10-07 DIAGNOSIS — Z91.81 HISTORY OF FALLING: ICD-10-CM

## 2022-10-07 DIAGNOSIS — D64.9 ANEMIA, UNSPECIFIED: ICD-10-CM
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
## 2022-10-07 DIAGNOSIS — M17.12 UNILATERAL PRIMARY OSTEOARTHRITIS, LEFT KNEE: ICD-10-CM

## 2022-10-07 DIAGNOSIS — R60.9 EDEMA, UNSPECIFIED: ICD-10-CM

## 2022-10-07 DIAGNOSIS — I89.0 LYMPHEDEMA, NOT ELSEWHERE CLASSIFIED: ICD-10-CM

## 2022-10-07 DIAGNOSIS — Z79.899 OTHER LONG TERM (CURRENT) DRUG THERAPY: ICD-10-CM

## 2022-10-13 ENCOUNTER — OUTPATIENT (OUTPATIENT)
Dept: OUTPATIENT SERVICES | Facility: HOSPITAL | Age: 62
LOS: 1 days | Discharge: HOME | End: 2022-10-13

## 2022-10-13 DIAGNOSIS — R58 HEMORRHAGE, NOT ELSEWHERE CLASSIFIED: Chronic | ICD-10-CM

## 2022-10-13 DIAGNOSIS — Z98.89 OTHER SPECIFIED POSTPROCEDURAL STATES: Chronic | ICD-10-CM

## 2022-10-13 DIAGNOSIS — K46.9 UNSPECIFIED ABDOMINAL HERNIA WITHOUT OBSTRUCTION OR GANGRENE: Chronic | ICD-10-CM

## 2022-10-13 DIAGNOSIS — L89.90 PRESSURE ULCER OF UNSPECIFIED SITE, UNSPECIFIED STAGE: ICD-10-CM

## 2022-10-14 DIAGNOSIS — D64.9 ANEMIA, UNSPECIFIED: ICD-10-CM

## 2022-10-14 DIAGNOSIS — S91.105A UNSPECIFIED OPEN WOUND OF LEFT LESSER TOE(S) WITHOUT DAMAGE TO NAIL, INITIAL ENCOUNTER: ICD-10-CM

## 2022-10-14 DIAGNOSIS — M17.12 UNILATERAL PRIMARY OSTEOARTHRITIS, LEFT KNEE: ICD-10-CM
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
## 2022-10-14 DIAGNOSIS — E66.01 MORBID (SEVERE) OBESITY DUE TO EXCESS CALORIES: ICD-10-CM

## 2022-10-14 DIAGNOSIS — Z91.81 HISTORY OF FALLING: ICD-10-CM

## 2022-10-14 DIAGNOSIS — I87.2 VENOUS INSUFFICIENCY (CHRONIC) (PERIPHERAL): ICD-10-CM
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
## 2022-10-14 DIAGNOSIS — I87.311 CHRONIC VENOUS HYPERTENSION (IDIOPATHIC) WITH ULCER OF RIGHT LOWER EXTREMITY: ICD-10-CM
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
## 2022-10-14 DIAGNOSIS — R60.9 EDEMA, UNSPECIFIED: ICD-10-CM

## 2022-10-14 DIAGNOSIS — L97.812 NON-PRESSURE CHRONIC ULCER OF OTHER PART OF RIGHT LOWER LEG WITH FAT LAYER EXPOSED: ICD-10-CM

## 2022-10-14 DIAGNOSIS — Z79.899 OTHER LONG TERM (CURRENT) DRUG THERAPY: ICD-10-CM

## 2022-10-17 NOTE — DIETITIAN INITIAL EVALUATION ADULT. - PATIENT PROFILE REVIEWED
yes Cibinqo Pregnancy And Lactation Text: It is unknown if this medication will adversely affect pregnancy or breast feeding.  You should not take this medication if you are currently pregnant or planning a pregnancy or while breastfeeding.

## 2022-10-20 ENCOUNTER — OUTPATIENT (OUTPATIENT)
Dept: OUTPATIENT SERVICES | Facility: HOSPITAL | Age: 62
LOS: 1 days | Discharge: HOME | End: 2022-10-20

## 2022-10-20 DIAGNOSIS — L89.90 PRESSURE ULCER OF UNSPECIFIED SITE, UNSPECIFIED STAGE: ICD-10-CM

## 2022-10-20 DIAGNOSIS — Z98.89 OTHER SPECIFIED POSTPROCEDURAL STATES: Chronic | ICD-10-CM

## 2022-10-20 DIAGNOSIS — K46.9 UNSPECIFIED ABDOMINAL HERNIA WITHOUT OBSTRUCTION OR GANGRENE: Chronic | ICD-10-CM

## 2022-10-20 DIAGNOSIS — R58 HEMORRHAGE, NOT ELSEWHERE CLASSIFIED: Chronic | ICD-10-CM

## 2022-10-27 ENCOUNTER — OUTPATIENT (OUTPATIENT)
Dept: OUTPATIENT SERVICES | Facility: HOSPITAL | Age: 62
LOS: 1 days | Discharge: HOME | End: 2022-10-27

## 2022-10-27 DIAGNOSIS — L89.90 PRESSURE ULCER OF UNSPECIFIED SITE, UNSPECIFIED STAGE: ICD-10-CM

## 2022-10-27 DIAGNOSIS — R58 HEMORRHAGE, NOT ELSEWHERE CLASSIFIED: Chronic | ICD-10-CM

## 2022-10-27 DIAGNOSIS — K46.9 UNSPECIFIED ABDOMINAL HERNIA WITHOUT OBSTRUCTION OR GANGRENE: Chronic | ICD-10-CM

## 2022-10-27 DIAGNOSIS — Z98.89 OTHER SPECIFIED POSTPROCEDURAL STATES: Chronic | ICD-10-CM

## 2022-10-27 PROCEDURE — 29580 STRAPPING UNNA BOOT: CPT | Mod: 59,RT

## 2022-10-27 PROCEDURE — 99212 OFFICE O/P EST SF 10 MIN: CPT | Mod: 25

## 2022-10-31 DIAGNOSIS — I87.2 VENOUS INSUFFICIENCY (CHRONIC) (PERIPHERAL): ICD-10-CM
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
## 2022-10-31 DIAGNOSIS — M17.12 UNILATERAL PRIMARY OSTEOARTHRITIS, LEFT KNEE: ICD-10-CM

## 2022-10-31 DIAGNOSIS — E66.01 MORBID (SEVERE) OBESITY DUE TO EXCESS CALORIES: ICD-10-CM
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
## 2022-10-31 DIAGNOSIS — Z91.81 HISTORY OF FALLING: ICD-10-CM

## 2022-10-31 DIAGNOSIS — D64.9 ANEMIA, UNSPECIFIED: ICD-10-CM

## 2022-10-31 DIAGNOSIS — R60.9 EDEMA, UNSPECIFIED: ICD-10-CM
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
## 2022-10-31 DIAGNOSIS — S91.105A UNSPECIFIED OPEN WOUND OF LEFT LESSER TOE(S) WITHOUT DAMAGE TO NAIL, INITIAL ENCOUNTER: ICD-10-CM

## 2022-10-31 DIAGNOSIS — Z79.899 OTHER LONG TERM (CURRENT) DRUG THERAPY: ICD-10-CM

## 2022-11-03 ENCOUNTER — OUTPATIENT (OUTPATIENT)
Dept: OUTPATIENT SERVICES | Facility: HOSPITAL | Age: 62
LOS: 1 days | Discharge: HOME | End: 2022-11-03

## 2022-11-03 DIAGNOSIS — L89.90 PRESSURE ULCER OF UNSPECIFIED SITE, UNSPECIFIED STAGE: ICD-10-CM

## 2022-11-03 DIAGNOSIS — K46.9 UNSPECIFIED ABDOMINAL HERNIA WITHOUT OBSTRUCTION OR GANGRENE: Chronic | ICD-10-CM

## 2022-11-03 DIAGNOSIS — R58 HEMORRHAGE, NOT ELSEWHERE CLASSIFIED: Chronic | ICD-10-CM

## 2022-11-03 DIAGNOSIS — Z98.89 OTHER SPECIFIED POSTPROCEDURAL STATES: Chronic | ICD-10-CM

## 2022-11-03 PROCEDURE — 29580 STRAPPING UNNA BOOT: CPT | Mod: 59,RT

## 2022-11-03 PROCEDURE — 99212 OFFICE O/P EST SF 10 MIN: CPT | Mod: 25

## 2022-11-09 DIAGNOSIS — D64.9 ANEMIA, UNSPECIFIED: ICD-10-CM

## 2022-11-09 DIAGNOSIS — I87.311 CHRONIC VENOUS HYPERTENSION (IDIOPATHIC) WITH ULCER OF RIGHT LOWER EXTREMITY: ICD-10-CM

## 2022-11-09 DIAGNOSIS — M17.12 UNILATERAL PRIMARY OSTEOARTHRITIS, LEFT KNEE: ICD-10-CM

## 2022-11-09 DIAGNOSIS — L97.812 NON-PRESSURE CHRONIC ULCER OF OTHER PART OF RIGHT LOWER LEG WITH FAT LAYER EXPOSED: ICD-10-CM

## 2022-11-09 DIAGNOSIS — Z91.81 HISTORY OF FALLING: ICD-10-CM

## 2022-11-09 DIAGNOSIS — Z79.899 OTHER LONG TERM (CURRENT) DRUG THERAPY: ICD-10-CM

## 2022-11-09 DIAGNOSIS — E66.01 MORBID (SEVERE) OBESITY DUE TO EXCESS CALORIES: ICD-10-CM

## 2022-11-09 DIAGNOSIS — I87.2 VENOUS INSUFFICIENCY (CHRONIC) (PERIPHERAL): ICD-10-CM

## 2022-11-09 DIAGNOSIS — L97.521 NON-PRESSURE CHRONIC ULCER OF OTHER PART OF LEFT FOOT LIMITED TO BREAKDOWN OF SKIN: ICD-10-CM

## 2022-11-09 DIAGNOSIS — R60.9 EDEMA, UNSPECIFIED: ICD-10-CM

## 2022-11-10 ENCOUNTER — OUTPATIENT (OUTPATIENT)
Dept: OUTPATIENT SERVICES | Facility: HOSPITAL | Age: 62
LOS: 1 days | Discharge: HOME | End: 2022-11-10

## 2022-11-10 DIAGNOSIS — Z98.89 OTHER SPECIFIED POSTPROCEDURAL STATES: Chronic | ICD-10-CM

## 2022-11-10 DIAGNOSIS — L89.90 PRESSURE ULCER OF UNSPECIFIED SITE, UNSPECIFIED STAGE: ICD-10-CM

## 2022-11-10 DIAGNOSIS — R58 HEMORRHAGE, NOT ELSEWHERE CLASSIFIED: Chronic | ICD-10-CM

## 2022-11-10 DIAGNOSIS — K46.9 UNSPECIFIED ABDOMINAL HERNIA WITHOUT OBSTRUCTION OR GANGRENE: Chronic | ICD-10-CM

## 2022-11-10 PROCEDURE — 99213 OFFICE O/P EST LOW 20 MIN: CPT | Mod: 25

## 2022-11-10 PROCEDURE — 29580 STRAPPING UNNA BOOT: CPT | Mod: 59,RT

## 2022-11-11 DIAGNOSIS — Z79.899 OTHER LONG TERM (CURRENT) DRUG THERAPY: ICD-10-CM

## 2022-11-11 DIAGNOSIS — L97.812 NON-PRESSURE CHRONIC ULCER OF OTHER PART OF RIGHT LOWER LEG WITH FAT LAYER EXPOSED: ICD-10-CM

## 2022-11-11 DIAGNOSIS — D64.9 ANEMIA, UNSPECIFIED: ICD-10-CM

## 2022-11-11 DIAGNOSIS — L97.521 NON-PRESSURE CHRONIC ULCER OF OTHER PART OF LEFT FOOT LIMITED TO BREAKDOWN OF SKIN: ICD-10-CM

## 2022-11-11 DIAGNOSIS — M17.12 UNILATERAL PRIMARY OSTEOARTHRITIS, LEFT KNEE: ICD-10-CM

## 2022-11-11 DIAGNOSIS — I87.2 VENOUS INSUFFICIENCY (CHRONIC) (PERIPHERAL): ICD-10-CM

## 2022-11-11 DIAGNOSIS — R60.9 EDEMA, UNSPECIFIED: ICD-10-CM

## 2022-11-11 DIAGNOSIS — I87.311 CHRONIC VENOUS HYPERTENSION (IDIOPATHIC) WITH ULCER OF RIGHT LOWER EXTREMITY: ICD-10-CM

## 2022-11-11 DIAGNOSIS — Z91.81 HISTORY OF FALLING: ICD-10-CM

## 2022-11-11 DIAGNOSIS — E66.01 MORBID (SEVERE) OBESITY DUE TO EXCESS CALORIES: ICD-10-CM

## 2022-11-17 ENCOUNTER — OUTPATIENT (OUTPATIENT)
Dept: OUTPATIENT SERVICES | Facility: HOSPITAL | Age: 62
LOS: 1 days | Discharge: HOME | End: 2022-11-17

## 2022-11-17 DIAGNOSIS — R58 HEMORRHAGE, NOT ELSEWHERE CLASSIFIED: Chronic | ICD-10-CM

## 2022-11-17 DIAGNOSIS — K46.9 UNSPECIFIED ABDOMINAL HERNIA WITHOUT OBSTRUCTION OR GANGRENE: Chronic | ICD-10-CM

## 2022-11-17 DIAGNOSIS — Z98.89 OTHER SPECIFIED POSTPROCEDURAL STATES: Chronic | ICD-10-CM

## 2022-11-17 DIAGNOSIS — L89.90 PRESSURE ULCER OF UNSPECIFIED SITE, UNSPECIFIED STAGE: ICD-10-CM

## 2022-11-18 DIAGNOSIS — E66.01 MORBID (SEVERE) OBESITY DUE TO EXCESS CALORIES: ICD-10-CM

## 2022-11-18 DIAGNOSIS — I87.311 CHRONIC VENOUS HYPERTENSION (IDIOPATHIC) WITH ULCER OF RIGHT LOWER EXTREMITY: ICD-10-CM

## 2022-11-18 DIAGNOSIS — D64.9 ANEMIA, UNSPECIFIED: ICD-10-CM

## 2022-11-18 DIAGNOSIS — M17.12 UNILATERAL PRIMARY OSTEOARTHRITIS, LEFT KNEE: ICD-10-CM

## 2022-11-18 DIAGNOSIS — I87.2 VENOUS INSUFFICIENCY (CHRONIC) (PERIPHERAL): ICD-10-CM

## 2022-11-18 DIAGNOSIS — Z91.81 HISTORY OF FALLING: ICD-10-CM

## 2022-11-18 DIAGNOSIS — Z79.899 OTHER LONG TERM (CURRENT) DRUG THERAPY: ICD-10-CM

## 2022-11-18 DIAGNOSIS — L97.521 NON-PRESSURE CHRONIC ULCER OF OTHER PART OF LEFT FOOT LIMITED TO BREAKDOWN OF SKIN: ICD-10-CM

## 2022-11-18 DIAGNOSIS — L97.812 NON-PRESSURE CHRONIC ULCER OF OTHER PART OF RIGHT LOWER LEG WITH FAT LAYER EXPOSED: ICD-10-CM

## 2022-11-18 DIAGNOSIS — R60.9 EDEMA, UNSPECIFIED: ICD-10-CM

## 2022-11-22 ENCOUNTER — OUTPATIENT (OUTPATIENT)
Dept: OUTPATIENT SERVICES | Facility: HOSPITAL | Age: 62
LOS: 1 days | Discharge: HOME | End: 2022-11-22

## 2022-11-22 DIAGNOSIS — Z98.89 OTHER SPECIFIED POSTPROCEDURAL STATES: Chronic | ICD-10-CM

## 2022-11-22 DIAGNOSIS — R58 HEMORRHAGE, NOT ELSEWHERE CLASSIFIED: Chronic | ICD-10-CM

## 2022-11-22 DIAGNOSIS — L89.90 PRESSURE ULCER OF UNSPECIFIED SITE, UNSPECIFIED STAGE: ICD-10-CM

## 2022-11-22 DIAGNOSIS — K46.9 UNSPECIFIED ABDOMINAL HERNIA WITHOUT OBSTRUCTION OR GANGRENE: Chronic | ICD-10-CM

## 2022-11-30 DIAGNOSIS — D64.9 ANEMIA, UNSPECIFIED: ICD-10-CM

## 2022-11-30 DIAGNOSIS — I87.2 VENOUS INSUFFICIENCY (CHRONIC) (PERIPHERAL): ICD-10-CM

## 2022-11-30 DIAGNOSIS — Z91.81 HISTORY OF FALLING: ICD-10-CM

## 2022-11-30 DIAGNOSIS — Z79.899 OTHER LONG TERM (CURRENT) DRUG THERAPY: ICD-10-CM

## 2022-11-30 DIAGNOSIS — L97.521 NON-PRESSURE CHRONIC ULCER OF OTHER PART OF LEFT FOOT LIMITED TO BREAKDOWN OF SKIN: ICD-10-CM

## 2022-11-30 DIAGNOSIS — I87.311 CHRONIC VENOUS HYPERTENSION (IDIOPATHIC) WITH ULCER OF RIGHT LOWER EXTREMITY: ICD-10-CM

## 2022-11-30 DIAGNOSIS — L97.812 NON-PRESSURE CHRONIC ULCER OF OTHER PART OF RIGHT LOWER LEG WITH FAT LAYER EXPOSED: ICD-10-CM

## 2022-11-30 DIAGNOSIS — E66.01 MORBID (SEVERE) OBESITY DUE TO EXCESS CALORIES: ICD-10-CM

## 2022-11-30 DIAGNOSIS — M17.12 UNILATERAL PRIMARY OSTEOARTHRITIS, LEFT KNEE: ICD-10-CM

## 2022-11-30 DIAGNOSIS — R60.9 EDEMA, UNSPECIFIED: ICD-10-CM

## 2022-12-01 ENCOUNTER — OUTPATIENT (OUTPATIENT)
Dept: OUTPATIENT SERVICES | Facility: HOSPITAL | Age: 62
LOS: 1 days | Discharge: HOME | End: 2022-12-01

## 2022-12-01 DIAGNOSIS — L89.90 PRESSURE ULCER OF UNSPECIFIED SITE, UNSPECIFIED STAGE: ICD-10-CM

## 2022-12-01 DIAGNOSIS — R58 HEMORRHAGE, NOT ELSEWHERE CLASSIFIED: Chronic | ICD-10-CM

## 2022-12-01 DIAGNOSIS — K46.9 UNSPECIFIED ABDOMINAL HERNIA WITHOUT OBSTRUCTION OR GANGRENE: Chronic | ICD-10-CM

## 2022-12-01 DIAGNOSIS — Z98.89 OTHER SPECIFIED POSTPROCEDURAL STATES: Chronic | ICD-10-CM

## 2022-12-02 DIAGNOSIS — E66.01 MORBID (SEVERE) OBESITY DUE TO EXCESS CALORIES: ICD-10-CM

## 2022-12-02 DIAGNOSIS — Z79.899 OTHER LONG TERM (CURRENT) DRUG THERAPY: ICD-10-CM

## 2022-12-02 DIAGNOSIS — M17.12 UNILATERAL PRIMARY OSTEOARTHRITIS, LEFT KNEE: ICD-10-CM

## 2022-12-02 DIAGNOSIS — L97.521 NON-PRESSURE CHRONIC ULCER OF OTHER PART OF LEFT FOOT LIMITED TO BREAKDOWN OF SKIN: ICD-10-CM

## 2022-12-02 DIAGNOSIS — I87.311 CHRONIC VENOUS HYPERTENSION (IDIOPATHIC) WITH ULCER OF RIGHT LOWER EXTREMITY: ICD-10-CM

## 2022-12-02 DIAGNOSIS — D64.9 ANEMIA, UNSPECIFIED: ICD-10-CM

## 2022-12-02 DIAGNOSIS — I87.2 VENOUS INSUFFICIENCY (CHRONIC) (PERIPHERAL): ICD-10-CM

## 2022-12-02 DIAGNOSIS — Z91.81 HISTORY OF FALLING: ICD-10-CM

## 2022-12-02 DIAGNOSIS — L97.812 NON-PRESSURE CHRONIC ULCER OF OTHER PART OF RIGHT LOWER LEG WITH FAT LAYER EXPOSED: ICD-10-CM

## 2022-12-02 DIAGNOSIS — R60.9 EDEMA, UNSPECIFIED: ICD-10-CM

## 2022-12-08 ENCOUNTER — OUTPATIENT (OUTPATIENT)
Dept: OUTPATIENT SERVICES | Facility: HOSPITAL | Age: 62
LOS: 1 days | Discharge: HOME | End: 2022-12-08

## 2022-12-08 DIAGNOSIS — K46.9 UNSPECIFIED ABDOMINAL HERNIA WITHOUT OBSTRUCTION OR GANGRENE: Chronic | ICD-10-CM

## 2022-12-08 DIAGNOSIS — L89.90 PRESSURE ULCER OF UNSPECIFIED SITE, UNSPECIFIED STAGE: ICD-10-CM

## 2022-12-08 DIAGNOSIS — R58 HEMORRHAGE, NOT ELSEWHERE CLASSIFIED: Chronic | ICD-10-CM

## 2022-12-08 DIAGNOSIS — Z98.89 OTHER SPECIFIED POSTPROCEDURAL STATES: Chronic | ICD-10-CM

## 2022-12-08 PROCEDURE — 99213 OFFICE O/P EST LOW 20 MIN: CPT | Mod: 25

## 2022-12-08 PROCEDURE — 29580 STRAPPING UNNA BOOT: CPT | Mod: 59,RT

## 2022-12-09 DIAGNOSIS — I87.311 CHRONIC VENOUS HYPERTENSION (IDIOPATHIC) WITH ULCER OF RIGHT LOWER EXTREMITY: ICD-10-CM

## 2022-12-09 DIAGNOSIS — M17.12 UNILATERAL PRIMARY OSTEOARTHRITIS, LEFT KNEE: ICD-10-CM
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
## 2022-12-09 DIAGNOSIS — E66.01 MORBID (SEVERE) OBESITY DUE TO EXCESS CALORIES: ICD-10-CM

## 2022-12-09 DIAGNOSIS — Z79.899 OTHER LONG TERM (CURRENT) DRUG THERAPY: ICD-10-CM

## 2022-12-09 DIAGNOSIS — L97.812 NON-PRESSURE CHRONIC ULCER OF OTHER PART OF RIGHT LOWER LEG WITH FAT LAYER EXPOSED: ICD-10-CM
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
## 2022-12-09 DIAGNOSIS — Z91.81 HISTORY OF FALLING: ICD-10-CM

## 2022-12-09 DIAGNOSIS — D64.9 ANEMIA, UNSPECIFIED: ICD-10-CM

## 2022-12-09 DIAGNOSIS — R60.9 EDEMA, UNSPECIFIED: ICD-10-CM
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
## 2022-12-09 DIAGNOSIS — I87.2 VENOUS INSUFFICIENCY (CHRONIC) (PERIPHERAL): ICD-10-CM

## 2022-12-09 DIAGNOSIS — S91.105A UNSPECIFIED OPEN WOUND OF LEFT LESSER TOE(S) WITHOUT DAMAGE TO NAIL, INITIAL ENCOUNTER: ICD-10-CM

## 2022-12-14 ENCOUNTER — OUTPATIENT (OUTPATIENT)
Dept: OUTPATIENT SERVICES | Facility: HOSPITAL | Age: 62
LOS: 1 days | Discharge: HOME | End: 2022-12-14

## 2022-12-14 DIAGNOSIS — K46.9 UNSPECIFIED ABDOMINAL HERNIA WITHOUT OBSTRUCTION OR GANGRENE: Chronic | ICD-10-CM

## 2022-12-14 DIAGNOSIS — R58 HEMORRHAGE, NOT ELSEWHERE CLASSIFIED: Chronic | ICD-10-CM

## 2022-12-14 DIAGNOSIS — L89.90 PRESSURE ULCER OF UNSPECIFIED SITE, UNSPECIFIED STAGE: ICD-10-CM

## 2022-12-14 DIAGNOSIS — Z98.89 OTHER SPECIFIED POSTPROCEDURAL STATES: Chronic | ICD-10-CM

## 2022-12-20 DIAGNOSIS — D64.9 ANEMIA, UNSPECIFIED: ICD-10-CM

## 2022-12-20 DIAGNOSIS — I87.2 VENOUS INSUFFICIENCY (CHRONIC) (PERIPHERAL): ICD-10-CM

## 2022-12-20 DIAGNOSIS — M17.12 UNILATERAL PRIMARY OSTEOARTHRITIS, LEFT KNEE: ICD-10-CM

## 2022-12-20 DIAGNOSIS — E66.01 MORBID (SEVERE) OBESITY DUE TO EXCESS CALORIES: ICD-10-CM

## 2022-12-20 DIAGNOSIS — Z79.899 OTHER LONG TERM (CURRENT) DRUG THERAPY: ICD-10-CM

## 2022-12-20 DIAGNOSIS — I87.311 CHRONIC VENOUS HYPERTENSION (IDIOPATHIC) WITH ULCER OF RIGHT LOWER EXTREMITY: ICD-10-CM

## 2022-12-20 DIAGNOSIS — R60.9 EDEMA, UNSPECIFIED: ICD-10-CM

## 2022-12-20 DIAGNOSIS — L92.9 GRANULOMATOUS DISORDER OF THE SKIN AND SUBCUTANEOUS TISSUE, UNSPECIFIED: ICD-10-CM

## 2022-12-20 DIAGNOSIS — L97.812 NON-PRESSURE CHRONIC ULCER OF OTHER PART OF RIGHT LOWER LEG WITH FAT LAYER EXPOSED: ICD-10-CM

## 2022-12-20 DIAGNOSIS — Z91.81 HISTORY OF FALLING: ICD-10-CM

## 2022-12-22 ENCOUNTER — OUTPATIENT (OUTPATIENT)
Dept: OUTPATIENT SERVICES | Facility: HOSPITAL | Age: 62
LOS: 1 days | Discharge: HOME | End: 2022-12-22

## 2022-12-22 DIAGNOSIS — K46.9 UNSPECIFIED ABDOMINAL HERNIA WITHOUT OBSTRUCTION OR GANGRENE: Chronic | ICD-10-CM

## 2022-12-22 DIAGNOSIS — R58 HEMORRHAGE, NOT ELSEWHERE CLASSIFIED: Chronic | ICD-10-CM

## 2022-12-22 DIAGNOSIS — L89.90 PRESSURE ULCER OF UNSPECIFIED SITE, UNSPECIFIED STAGE: ICD-10-CM

## 2022-12-22 DIAGNOSIS — Z98.89 OTHER SPECIFIED POSTPROCEDURAL STATES: Chronic | ICD-10-CM

## 2022-12-29 ENCOUNTER — OUTPATIENT (OUTPATIENT)
Dept: OUTPATIENT SERVICES | Facility: HOSPITAL | Age: 62
LOS: 1 days | Discharge: HOME | End: 2022-12-29

## 2022-12-29 DIAGNOSIS — K46.9 UNSPECIFIED ABDOMINAL HERNIA WITHOUT OBSTRUCTION OR GANGRENE: Chronic | ICD-10-CM

## 2022-12-29 DIAGNOSIS — E66.01 MORBID (SEVERE) OBESITY DUE TO EXCESS CALORIES: ICD-10-CM

## 2022-12-29 DIAGNOSIS — L89.90 PRESSURE ULCER OF UNSPECIFIED SITE, UNSPECIFIED STAGE: ICD-10-CM

## 2022-12-29 DIAGNOSIS — M17.12 UNILATERAL PRIMARY OSTEOARTHRITIS, LEFT KNEE: ICD-10-CM

## 2022-12-29 DIAGNOSIS — L97.812 NON-PRESSURE CHRONIC ULCER OF OTHER PART OF RIGHT LOWER LEG WITH FAT LAYER EXPOSED: ICD-10-CM

## 2022-12-29 DIAGNOSIS — I87.311 CHRONIC VENOUS HYPERTENSION (IDIOPATHIC) WITH ULCER OF RIGHT LOWER EXTREMITY: ICD-10-CM

## 2022-12-29 DIAGNOSIS — Z91.81 HISTORY OF FALLING: ICD-10-CM

## 2022-12-29 DIAGNOSIS — L92.9 GRANULOMATOUS DISORDER OF THE SKIN AND SUBCUTANEOUS TISSUE, UNSPECIFIED: ICD-10-CM

## 2022-12-29 DIAGNOSIS — R58 HEMORRHAGE, NOT ELSEWHERE CLASSIFIED: Chronic | ICD-10-CM

## 2022-12-29 DIAGNOSIS — D64.9 ANEMIA, UNSPECIFIED: ICD-10-CM

## 2022-12-29 DIAGNOSIS — Z79.899 OTHER LONG TERM (CURRENT) DRUG THERAPY: ICD-10-CM

## 2022-12-29 DIAGNOSIS — R60.9 EDEMA, UNSPECIFIED: ICD-10-CM

## 2022-12-29 DIAGNOSIS — I87.2 VENOUS INSUFFICIENCY (CHRONIC) (PERIPHERAL): ICD-10-CM

## 2022-12-29 DIAGNOSIS — Z98.89 OTHER SPECIFIED POSTPROCEDURAL STATES: Chronic | ICD-10-CM

## 2022-12-30 DIAGNOSIS — L92.9 GRANULOMATOUS DISORDER OF THE SKIN AND SUBCUTANEOUS TISSUE, UNSPECIFIED: ICD-10-CM

## 2022-12-30 DIAGNOSIS — Z91.81 HISTORY OF FALLING: ICD-10-CM

## 2022-12-30 DIAGNOSIS — D64.9 ANEMIA, UNSPECIFIED: ICD-10-CM

## 2022-12-30 DIAGNOSIS — M17.12 UNILATERAL PRIMARY OSTEOARTHRITIS, LEFT KNEE: ICD-10-CM

## 2022-12-30 DIAGNOSIS — Z79.899 OTHER LONG TERM (CURRENT) DRUG THERAPY: ICD-10-CM

## 2022-12-30 DIAGNOSIS — E66.01 MORBID (SEVERE) OBESITY DUE TO EXCESS CALORIES: ICD-10-CM

## 2022-12-30 DIAGNOSIS — I87.311 CHRONIC VENOUS HYPERTENSION (IDIOPATHIC) WITH ULCER OF RIGHT LOWER EXTREMITY: ICD-10-CM

## 2022-12-30 DIAGNOSIS — R60.9 EDEMA, UNSPECIFIED: ICD-10-CM

## 2022-12-30 DIAGNOSIS — I87.2 VENOUS INSUFFICIENCY (CHRONIC) (PERIPHERAL): ICD-10-CM

## 2022-12-30 DIAGNOSIS — L97.812 NON-PRESSURE CHRONIC ULCER OF OTHER PART OF RIGHT LOWER LEG WITH FAT LAYER EXPOSED: ICD-10-CM

## 2023-01-05 ENCOUNTER — OUTPATIENT (OUTPATIENT)
Dept: OUTPATIENT SERVICES | Facility: HOSPITAL | Age: 63
LOS: 1 days | Discharge: HOME | End: 2023-01-05
Payer: COMMERCIAL

## 2023-01-05 DIAGNOSIS — K46.9 UNSPECIFIED ABDOMINAL HERNIA WITHOUT OBSTRUCTION OR GANGRENE: Chronic | ICD-10-CM

## 2023-01-05 DIAGNOSIS — L89.90 PRESSURE ULCER OF UNSPECIFIED SITE, UNSPECIFIED STAGE: ICD-10-CM

## 2023-01-05 DIAGNOSIS — R58 HEMORRHAGE, NOT ELSEWHERE CLASSIFIED: Chronic | ICD-10-CM

## 2023-01-05 DIAGNOSIS — Z98.89 OTHER SPECIFIED POSTPROCEDURAL STATES: Chronic | ICD-10-CM

## 2023-01-05 PROCEDURE — 99213 OFFICE O/P EST LOW 20 MIN: CPT

## 2023-01-06 DIAGNOSIS — M17.12 UNILATERAL PRIMARY OSTEOARTHRITIS, LEFT KNEE: ICD-10-CM

## 2023-01-06 DIAGNOSIS — R60.9 EDEMA, UNSPECIFIED: ICD-10-CM

## 2023-01-06 DIAGNOSIS — I87.2 VENOUS INSUFFICIENCY (CHRONIC) (PERIPHERAL): ICD-10-CM

## 2023-01-06 DIAGNOSIS — Z79.899 OTHER LONG TERM (CURRENT) DRUG THERAPY: ICD-10-CM

## 2023-01-06 DIAGNOSIS — D64.9 ANEMIA, UNSPECIFIED: ICD-10-CM

## 2023-01-06 DIAGNOSIS — E66.01 MORBID (SEVERE) OBESITY DUE TO EXCESS CALORIES: ICD-10-CM

## 2023-01-06 DIAGNOSIS — Z91.81 HISTORY OF FALLING: ICD-10-CM

## 2023-04-20 NOTE — PHYSICAL THERAPY INITIAL EVALUATION ADULT - ADDITIONAL COMMENTS
It appeared 2 days ago  Very painful  fluid is present     I feel it is stress related and have experienced this a couple times a year for at least 20 years      Please advise on rx for cold sore.    Pt lives with cousins in a private single story home, no steps to enter. Prior to admission pt was independent with all functional mobility including community ambulation without a device. Pt states he began using bilateral axillary crutches recently when his legs began to swell (given by a friend). Pt is right hand dominant, does not drive, & currently out of work. Goal of therapy is to manage pain.

## 2023-05-23 NOTE — H&P ADULT - NSHPLABSRESULTS_GEN_ALL_CORE
[facility] Consult Note      Patient Name: Angel Remy  : 1943  MRN: [id number]  Primary Care Physician:  Damián Vuong Jr., MD  Referring Physician: Angel De La Vega MD  Date of admission: 2023    Subjective     Reason for Consult: Hyponatremia    HPI:   Angel Remy is a 79 y.o. male,, works as a   Dermatologist  . 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  Patient recently traveling to [place]. .  After returning home noticing some fatigue weakness accompanied with nausea and emesis.  He presented to the emergency department where he was found to have severe hyponatremia    Nephrology consultation of been requested for the management    Review of Systems:   14 point review of systems is otherwise negative except for mentioned above on HPI    Personal History     Past Medical History:   Diagnosis Date   • BCC (basal cell carcinoma of skin) 2022    NOSE   • Benign prostate hyperplasia    • H/O Elevated PSA    • H/O Hairy cell leukemia (CMS/HCC)    • H/O Internal thrombosed hemorrhoids    • H/O minimal right carotid plaque    • H/O peripheral neuropathy    • Hyperlipidemia    • Hypertension    • Primary open-angle glaucoma, bilateral, mild stage        Past Surgical History:   Procedure Laterality Date   • BONE MARROW BIOPSY W/ ASPIRATION     • COLONOSCOPY     • COLONOSCOPY N/A 2021    Procedure: COLONOSCOPY TO CECUM AND INTO TI WITH COLD BIOPSY POLYPECTOMIES;  Surgeon: Juan Jenkins MD;  Location: [facility] ENDOSCOPY;  Service: Gastroenterology;  Laterality: N/A;  pre: family history of colon cancer  post: polyps, diverticulosis  and internal hemorrhoids   • EXCISION MASS HEAD/NECK N/A 2022     Procedure: EXCISION BASAL CELL CARCINOMA NOSE WITH FROZEN SECTION FULL THICKNESS GRAFT;  Surgeon: Arturo Weiss MD;  Location[facility];  Service: Plastics;  Laterality: N/A;   • HERNIA REPAIR Right 02/2000    Inguinal    • OTHER SURGICAL HISTORY  1989    Median nerve foreign body excision   • PROSTATE BIOPSY      March 2010 and October 2011 whan PSA had acceleration   • SIGMOIDOSCOPY  1999    With small thrombosed internal hemorrhoid.   • TONSILLECTOMY     • WISDOM TOOTH EXTRACTION  1961       Family History: family history includes Cancer in his father; Cancer (age of onset: 90) in his mother; Hypertension in his father, mother, and sister.    Social History:  reports that he has quit smoking. His smoking use included cigarettes. He smoked an average of .5 packs per day. He has never used smokeless tobacco. He reports current alcohol use. He reports that he does not use drugs.    Home Medications:  Prior to Admission medications    Medication Sig Start Date End Date Taking? Authorizing Provider   acyclovir (ZOVIRAX) 400 MG tablet Take 1 tablet by mouth 2 (two) times a day. Take no more than 5 doses a day.  Patient taking differently: Take 1 tablet by mouth Daily. Take no more than 5 doses a day. 3/30/16  Yes Paco Jeffers MD   amLODIPine (NORVASC) 2.5 MG tablet Take 6 mg by mouth Daily. 11/24/19  Yes Yojana Rincon MD   bimatoprost (LUMIGAN) 0.03 % ophthalmic drops Administer 1 drop to both eyes Every Night. 5/27/21  Yes Yojana Rincon MD   Calcium-Magnesium-Vitamin D (CALCIUM 1200+D3 PO) Take  by mouth.   Yes Yojana Rincon MD   finasteride (PROSCAR) 5 MG tablet  11/10/22  Yes Yojana Rincon MD   Lumigan 0.01 % ophthalmic drops Administer 1 drop to both eyes every night at bedtime. 11/30/22  Yes Yojana Rincon MD   Magnesium 500 MG tablet Take 1 tablet by mouth 2 (two) times a day.   Yes Yojana Rincon MD   niacin 500 MG tablet Take 1 tablet by mouth  Every Night.   Yes Yojana Rincon MD   propafenone (RHTHYMOL) 150 MG tablet Take 1 tablet by mouth 3 (Three) Times a Day. 8/5/14  Yes Yojana Rincon MD   ramipril (ALTACE) 10 MG capsule Take 1 capsule by mouth Daily. 7/22/18  Yes Yojana Rincon MD   rosuvastatin (CRESTOR) 10 MG tablet Take 1 tablet by mouth Daily.   Yes Yojana Rincon MD   tamsulosin (FLOMAX) 0.4 MG capsule 24 hr capsule  10/13/22  Yes Yojana Rincon MD   vitamin C (ASCORBIC ACID) 250 MG tablet Take 4 tablets by mouth Daily. With madisyn hips   Yes Yojana Rincon MD   vitamin D3 125 MCG (5000 UT) capsule capsule Take 1 capsule by mouth Daily.   Yes Yojana Rincon MD   bisoprolol-hydrochlorothiazide (ZIAC) 10-6.25 MG per tablet Take 1.5 tablets by mouth 2 (Two) Times a Day. Pt states they take 1 tablet in morning and 0.5 tablet in evening    Yojana Rincon MD   HYDROcodone-acetaminophen (Norco) 7.5-325 MG per tablet Take 1 tablet by mouth Every 6 (Six) Hours As Needed for Moderate Pain . 6/23/22   Arturo Weiss MD       Allergies:  No Known Allergies    Objective     Vitals:   Temp:  [97.2 °F (36.2 °C)-98 °F (36.7 °C)] 97.2 °F (36.2 °C)  Heart Rate:  [59-67] 64  Resp:  [16-18] 16  BP: ()/(51-85) 126/63  No intake or output data in the 24 hours ending 05/23/23 1851    Physical Exam:   Constitutional: Awake, alert, no acute distress.  HEENT: Sclera anicteric, no conjunctival injection  Neck: Supple, no thyromegaly, no lymphadenopathy, trachea at midline, no JVD  Respiratory: Clear to auscultation bilaterally, nonlabored respiration  Cardiovascular: RRR, no murmurs, no rubs or gallops, no carotid bruit  Gastrointestinal: Positive bowel sounds, abdomen is soft, nontender and nondistended  : No palpable bladder  Musculoskeletal: No edema, no clubbing or cyanosis  Psychiatric: Appropriate affect, cooperative  Neurologic: Oriented x3, moving all extremities, normal speech and mental  status  Skin: Warm and dry       Scheduled Meds:     sodium chloride, 1 g, Oral, BID With Meals      IV Meds:        Results Reviewed:   I have personally reviewed the results from the time of this admission to 5/23/2023 18:51 EDT     Lab Results   Component Value Date    GLUCOSE 148 (H) 05/23/2023    CALCIUM 8.4 (L) 05/23/2023     (C) 05/23/2023    K 4.4 05/23/2023    CO2 21.7 (L) 05/23/2023    CL 78 (L) 05/23/2023    BUN 10 05/23/2023    CREATININE 0.68 (L) 05/23/2023    EGFRIFNONA 108 09/25/2021    BCR 14.7 05/23/2023    ANIONGAP 15.3 (H) 05/23/2023      Lab Results   Component Value Date    ALBUMIN 4.3 05/23/2023           Assessment / Plan       Hyponatremia      ASSESSMENT:    -Acute on chronic normovolemic hypotonic hyponatremia .  Patient previous sodiums 130-135 ( from 2018 to 2021 ) .  His sodium today is 115.  Serum osmolarity 246.  Urine sodium 138, urine osmolarity 458.  TSH at 5.0, cortisol 42, suggestive of SIADH aggravated by decreased osmolar/protein intake and increase her free water intake.   In combination with use of hydrochlorothiazide  .patient was treated with 1 L of normal solution in the ER.  Currently placed on fluid restriction and will attempt sodium chloride at twice a day.  Hope to avoid overcorrection and keep sodium rising between 6-8 medical Inspra 24 hours to avoid complications.  We will continue to follow sodium closely every 6 hours   -Hypertension patient is taking amlodipine 2.5 mg, ramipril 10 mg a day, bisoprolol with hydrochlorothiazide.  We will adjust and restart during admission we will hold  Thiazide diuretics  -Benign prostatic hypertrophy currently on tamsulosin and finasteride will resume during his admission    PLAN:  -We will place the patient on fluid restriction and sodium chloride tablets twice a day  -Will continue to follow sodium trend closely to avoid overcorrection    Spoke with patient Daughter .  Dr. Francisco at [phone].   , as per patient request  to give her a update .    Thank you for involving us in the care of Angel Remy.  Please feel free to call with any questions.    Derek Corbett MD  05/23/23  18:51 EDT    [facility]  [phone]      Please note that portions of this note were completed with a voice recognition program.     LABS:                        11.3   9.68  )-----------( 372      ( 16 Oct 2018 03:03 )             36.5     10-16    142  |  104  |  20  ----------------------------<  103<H>  3.4<L>   |  29  |  0.99    Ca    8.2<L>      16 Oct 2018 03:03    TPro  7.2  /  Alb  2.9<L>  /  TBili  0.3  /  DBili  x   /  AST  21  /  ALT  17  /  AlkPhos  91  10-16        CAPILLARY BLOOD GLUCOSE  Lactate, Blood: 0.9 mmol/L (10.16.18 @ 03:15)                RADIOLOGY & ADDITIONAL TESTS:  CXR: no infiltrate  x-ray RLE: + swelling, no fx, FB, or SQ air  Imaging Personally Reviewed:  [x ] YES  [ ] NO

## 2023-07-14 ENCOUNTER — INPATIENT (INPATIENT)
Facility: HOSPITAL | Age: 63
LOS: 9 days | Discharge: NURSING, REHAB OR LONG-TERM CARE | End: 2023-07-24
Attending: INTERNAL MEDICINE | Admitting: INTERNAL MEDICINE
Payer: MEDICAID

## 2023-07-14 VITALS
SYSTOLIC BLOOD PRESSURE: 171 MMHG | HEIGHT: 66 IN | OXYGEN SATURATION: 98 % | HEART RATE: 73 BPM | RESPIRATION RATE: 18 BRPM | WEIGHT: 300.05 LBS | DIASTOLIC BLOOD PRESSURE: 117 MMHG | TEMPERATURE: 98 F

## 2023-07-14 DIAGNOSIS — Z98.89 OTHER SPECIFIED POSTPROCEDURAL STATES: Chronic | ICD-10-CM

## 2023-07-14 DIAGNOSIS — R58 HEMORRHAGE, NOT ELSEWHERE CLASSIFIED: Chronic | ICD-10-CM

## 2023-07-14 DIAGNOSIS — K46.9 UNSPECIFIED ABDOMINAL HERNIA WITHOUT OBSTRUCTION OR GANGRENE: Chronic | ICD-10-CM

## 2023-07-14 LAB
ALBUMIN SERPL ELPH-MCNC: 3.1 G/DL — LOW (ref 3.3–5)
ALP SERPL-CCNC: 96 U/L — SIGNIFICANT CHANGE UP (ref 40–120)
ALT FLD-CCNC: 20 U/L — SIGNIFICANT CHANGE UP (ref 12–78)
ANION GAP SERPL CALC-SCNC: 4 MMOL/L — LOW (ref 5–17)
APPEARANCE UR: CLEAR — SIGNIFICANT CHANGE UP
APTT BLD: 30.7 SEC — SIGNIFICANT CHANGE UP (ref 27.5–35.5)
AST SERPL-CCNC: 22 U/L — SIGNIFICANT CHANGE UP (ref 15–37)
BACTERIA # UR AUTO: ABNORMAL
BASOPHILS # BLD AUTO: 0.06 K/UL — SIGNIFICANT CHANGE UP (ref 0–0.2)
BASOPHILS NFR BLD AUTO: 0.6 % — SIGNIFICANT CHANGE UP (ref 0–2)
BILIRUB SERPL-MCNC: 0.4 MG/DL — SIGNIFICANT CHANGE UP (ref 0.2–1.2)
BILIRUB UR-MCNC: NEGATIVE — SIGNIFICANT CHANGE UP
BUN SERPL-MCNC: 18 MG/DL — SIGNIFICANT CHANGE UP (ref 7–23)
CALCIUM SERPL-MCNC: 8.5 MG/DL — SIGNIFICANT CHANGE UP (ref 8.5–10.1)
CHLORIDE SERPL-SCNC: 92 MMOL/L — LOW (ref 96–108)
CO2 SERPL-SCNC: 41 MMOL/L — HIGH (ref 22–31)
COLOR SPEC: YELLOW — SIGNIFICANT CHANGE UP
CREAT SERPL-MCNC: 0.78 MG/DL — SIGNIFICANT CHANGE UP (ref 0.5–1.3)
DIFF PNL FLD: ABNORMAL
EGFR: 101 ML/MIN/1.73M2 — SIGNIFICANT CHANGE UP
EOSINOPHIL # BLD AUTO: 0.09 K/UL — SIGNIFICANT CHANGE UP (ref 0–0.5)
EOSINOPHIL NFR BLD AUTO: 1 % — SIGNIFICANT CHANGE UP (ref 0–6)
EPI CELLS # UR: SIGNIFICANT CHANGE UP
ERYTHROCYTE [SEDIMENTATION RATE] IN BLOOD: 20 MM/HR — SIGNIFICANT CHANGE UP (ref 0–20)
GLUCOSE SERPL-MCNC: 101 MG/DL — HIGH (ref 70–99)
GLUCOSE UR QL: NEGATIVE MG/DL — SIGNIFICANT CHANGE UP
HCT VFR BLD CALC: 38.1 % — LOW (ref 39–50)
HGB BLD-MCNC: 11.2 G/DL — LOW (ref 13–17)
IMM GRANULOCYTES NFR BLD AUTO: 0.5 % — SIGNIFICANT CHANGE UP (ref 0–0.9)
INR BLD: 1.07 RATIO — SIGNIFICANT CHANGE UP (ref 0.88–1.16)
KETONES UR-MCNC: NEGATIVE — SIGNIFICANT CHANGE UP
LACTATE SERPL-SCNC: 0.8 MMOL/L — SIGNIFICANT CHANGE UP (ref 0.7–2)
LEUKOCYTE ESTERASE UR-ACNC: NEGATIVE — SIGNIFICANT CHANGE UP
LYMPHOCYTES # BLD AUTO: 1.45 K/UL — SIGNIFICANT CHANGE UP (ref 1–3.3)
LYMPHOCYTES # BLD AUTO: 15.5 % — SIGNIFICANT CHANGE UP (ref 13–44)
MCHC RBC-ENTMCNC: 24.9 PG — LOW (ref 27–34)
MCHC RBC-ENTMCNC: 29.4 G/DL — LOW (ref 32–36)
MCV RBC AUTO: 84.7 FL — SIGNIFICANT CHANGE UP (ref 80–100)
MONOCYTES # BLD AUTO: 0.88 K/UL — SIGNIFICANT CHANGE UP (ref 0–0.9)
MONOCYTES NFR BLD AUTO: 9.4 % — SIGNIFICANT CHANGE UP (ref 2–14)
NEUTROPHILS # BLD AUTO: 6.83 K/UL — SIGNIFICANT CHANGE UP (ref 1.8–7.4)
NEUTROPHILS NFR BLD AUTO: 73 % — SIGNIFICANT CHANGE UP (ref 43–77)
NITRITE UR-MCNC: NEGATIVE — SIGNIFICANT CHANGE UP
NRBC # BLD: 0 /100 WBCS — SIGNIFICANT CHANGE UP (ref 0–0)
PH UR: 7 — SIGNIFICANT CHANGE UP (ref 5–8)
PLATELET # BLD AUTO: 337 K/UL — SIGNIFICANT CHANGE UP (ref 150–400)
POTASSIUM SERPL-MCNC: 3.5 MMOL/L — SIGNIFICANT CHANGE UP (ref 3.5–5.3)
POTASSIUM SERPL-SCNC: 3.5 MMOL/L — SIGNIFICANT CHANGE UP (ref 3.5–5.3)
PROT SERPL-MCNC: 7.8 GM/DL — SIGNIFICANT CHANGE UP (ref 6–8.3)
PROT UR-MCNC: 15 MG/DL
PROTHROM AB SERPL-ACNC: 12.8 SEC — SIGNIFICANT CHANGE UP (ref 10.5–13.4)
RBC # BLD: 4.5 M/UL — SIGNIFICANT CHANGE UP (ref 4.2–5.8)
RBC # FLD: 15.3 % — HIGH (ref 10.3–14.5)
RBC CASTS # UR COMP ASSIST: SIGNIFICANT CHANGE UP /HPF (ref 0–4)
SODIUM SERPL-SCNC: 137 MMOL/L — SIGNIFICANT CHANGE UP (ref 135–145)
SP GR SPEC: 1.01 — SIGNIFICANT CHANGE UP (ref 1.01–1.02)
UROBILINOGEN FLD QL: NEGATIVE MG/DL — SIGNIFICANT CHANGE UP
WBC # BLD: 9.36 K/UL — SIGNIFICANT CHANGE UP (ref 3.8–10.5)
WBC # FLD AUTO: 9.36 K/UL — SIGNIFICANT CHANGE UP (ref 3.8–10.5)
WBC UR QL: SIGNIFICANT CHANGE UP

## 2023-07-14 PROCEDURE — 99223 1ST HOSP IP/OBS HIGH 75: CPT

## 2023-07-14 PROCEDURE — 73590 X-RAY EXAM OF LOWER LEG: CPT | Mod: 26,RT

## 2023-07-14 PROCEDURE — 71045 X-RAY EXAM CHEST 1 VIEW: CPT | Mod: 26

## 2023-07-14 PROCEDURE — 99285 EMERGENCY DEPT VISIT HI MDM: CPT

## 2023-07-14 PROCEDURE — 93010 ELECTROCARDIOGRAM REPORT: CPT

## 2023-07-14 RX ORDER — CARVEDILOL PHOSPHATE 80 MG/1
3.12 CAPSULE, EXTENDED RELEASE ORAL EVERY 12 HOURS
Refills: 0 | Status: DISCONTINUED | OUTPATIENT
Start: 2023-07-14 | End: 2023-07-24

## 2023-07-14 RX ORDER — HYDRALAZINE HCL 50 MG
25 TABLET ORAL THREE TIMES A DAY
Refills: 0 | Status: DISCONTINUED | OUTPATIENT
Start: 2023-07-14 | End: 2023-07-24

## 2023-07-14 RX ORDER — AMLODIPINE BESYLATE 2.5 MG/1
10 TABLET ORAL DAILY
Refills: 0 | Status: DISCONTINUED | OUTPATIENT
Start: 2023-07-14 | End: 2023-07-24

## 2023-07-14 RX ORDER — PANTOPRAZOLE SODIUM 20 MG/1
40 TABLET, DELAYED RELEASE ORAL
Refills: 0 | Status: DISCONTINUED | OUTPATIENT
Start: 2023-07-14 | End: 2023-07-24

## 2023-07-14 RX ORDER — VANCOMYCIN HCL 1 G
1250 VIAL (EA) INTRAVENOUS EVERY 8 HOURS
Refills: 0 | Status: DISCONTINUED | OUTPATIENT
Start: 2023-07-14 | End: 2023-07-17

## 2023-07-14 RX ORDER — ASCORBIC ACID 60 MG
500 TABLET,CHEWABLE ORAL DAILY
Refills: 0 | Status: DISCONTINUED | OUTPATIENT
Start: 2023-07-14 | End: 2023-07-24

## 2023-07-14 RX ORDER — SODIUM CHLORIDE 9 MG/ML
2000 INJECTION INTRAMUSCULAR; INTRAVENOUS; SUBCUTANEOUS ONCE
Refills: 0 | Status: DISCONTINUED | OUTPATIENT
Start: 2023-07-14 | End: 2023-07-14

## 2023-07-14 RX ORDER — SODIUM CHLORIDE 9 MG/ML
2000 INJECTION, SOLUTION INTRAVENOUS ONCE
Refills: 0 | Status: COMPLETED | OUTPATIENT
Start: 2023-07-14 | End: 2023-07-14

## 2023-07-14 RX ORDER — HEPARIN SODIUM 5000 [USP'U]/ML
5000 INJECTION INTRAVENOUS; SUBCUTANEOUS EVERY 8 HOURS
Refills: 0 | Status: DISCONTINUED | OUTPATIENT
Start: 2023-07-14 | End: 2023-07-24

## 2023-07-14 RX ORDER — FUROSEMIDE 40 MG
40 TABLET ORAL DAILY
Refills: 0 | Status: DISCONTINUED | OUTPATIENT
Start: 2023-07-14 | End: 2023-07-15

## 2023-07-14 RX ORDER — PIPERACILLIN AND TAZOBACTAM 4; .5 G/20ML; G/20ML
3.38 INJECTION, POWDER, LYOPHILIZED, FOR SOLUTION INTRAVENOUS EVERY 8 HOURS
Refills: 0 | Status: DISCONTINUED | OUTPATIENT
Start: 2023-07-14 | End: 2023-07-20

## 2023-07-14 RX ORDER — POTASSIUM CHLORIDE 20 MEQ
20 PACKET (EA) ORAL DAILY
Refills: 0 | Status: DISCONTINUED | OUTPATIENT
Start: 2023-07-14 | End: 2023-07-24

## 2023-07-14 RX ADMIN — Medication 166.67 MILLIGRAM(S): at 21:01

## 2023-07-14 RX ADMIN — Medication 25 MILLIGRAM(S): at 21:01

## 2023-07-14 RX ADMIN — Medication 166.67 MILLIGRAM(S): at 18:41

## 2023-07-14 RX ADMIN — SODIUM CHLORIDE 2000 MILLILITER(S): 9 INJECTION, SOLUTION INTRAVENOUS at 14:45

## 2023-07-14 RX ADMIN — CARVEDILOL PHOSPHATE 3.12 MILLIGRAM(S): 80 CAPSULE, EXTENDED RELEASE ORAL at 17:59

## 2023-07-14 RX ADMIN — Medication 25 MILLIGRAM(S): at 18:26

## 2023-07-14 RX ADMIN — HEPARIN SODIUM 5000 UNIT(S): 5000 INJECTION INTRAVENOUS; SUBCUTANEOUS at 21:01

## 2023-07-14 NOTE — ED ADULT NURSE NOTE - NSFALLUNIVINTERV_ED_ALL_ED
Bed/Stretcher in lowest position, wheels locked, appropriate side rails in place/Call bell, personal items and telephone in reach/Instruct patient to call for assistance before getting out of bed/chair/stretcher/Non-slip footwear applied when patient is off stretcher/[place] to call system/Physically safe environment - no spills, clutter or unnecessary equipment/Purposeful proactive rounding/Room/bathroom lighting operational, light cord in reach

## 2023-07-14 NOTE — H&P ADULT - ASSESSMENT
63 year old male PMH HTN, morbid obesity, p/w right lower ext wound and diff walking.  Has been in wound care for weeks but last time was 2 weeks ago. Recently notes increased difficulty walking.  Last time walked was 1 year ago.  Brother from out of state came to visit and told him to come to hospital.  Lives in an apt but says hard to care for self.  Denies cp, sob, fever, chills, nausea, vomiting.    Plan:   63 year old male PMH HTN, morbid obesity, p/w right lower ext wound and diff walking.  Has been in wound care for weeks but last time was 2 weeks ago. Recently notes increased difficulty walking.  Last time walked was 1 year ago.  Brother from out of state came to visit and told him to come to hospital.  Lives in an apt but says hard to care for self.  Denies cp, sob, fever, chills, nausea, vomiting.      Plan:  Admit to medicine for right calf cellulitis with stage II ulcers. Will start IV Vanco and Zosyn with vitamin C.   CT non-contrast requested to r/o abscess. Vascular consult was called by ER. Left leg and both feet all intact.   Wound care consult was requested.     Continue home meds all at home dose of Norvasc, Coreg, Vasotec, Lasix and daily KCL.  Add Hydralazine   25 mg tid for BP control.   63 year old male PMH HTN, morbid obesity, p/w right lower ext wound and diff walking.  Has been in wound care for weeks but last time was 2 weeks ago. Recently notes increased difficulty walking.  Last time walked was 1 year ago.  Brother from out of state came to visit and told him to come to hospital.  Lives in an apt but says hard to care for self.  Denies cp, sob, fever, chills, nausea, vomiting.      Plan:  Admit to medicine for right calf cellulitis with stage II ulcers. Will start IV Vanco and Zosyn with vitamin C.   Vanco dose confirmed with pharmacy.  CT non-contrast requested to r/o abscess. Vascular consult was called by ER. Left leg and both feet all intact. Wound care consult was requested.     Continue home meds all at home dose of Norvasc, Coreg, Vasotec, Lasix and daily KCL.  Add Hydralazine   25 mg tid for BP control.

## 2023-07-14 NOTE — ED PROVIDER NOTE - PHYSICAL EXAMINATION
Gen: Alert, NAD, obese, disheveled, malodorous   Head: NC, AT   Eyes: PERRL, EOMI, normal lids/conjunctiva  ENT: normal hearing, patent oropharynx without erythema/exudate, uvula midline  Neck: supple, no tenderness, Trachea midline  Pulm: Bilateral BS, normal resp effort, no wheeze/stridor/retractions  CV: RRR, no M/R/G, 2+ radial and dp pulses bl, no edema  Abd: soft, NT/ND, +BS, no hepatosplenomegaly  Mskel: extremities x4 with normal ROM and no joint effusions. no ctl spine ttp.   Skin: large wound on right leg. does not appear infected. numerous maggots on right leg.   Neuro: AAOx3, no sensory/motor deficits, CN 2-12 intact

## 2023-07-14 NOTE — ED PROVIDER NOTE - CLINICAL SUMMARY MEDICAL DECISION MAKING FREE TEXT BOX
pt pw failure to thrive and right leg wound with maggots. the maggots were removed. bed bugs also noted. needs contact precatuons.   no sign of sepsis. admit for wound care consult and placement  I read ekg as sinus rate 62, LVH, qrs 450, no st elevation or depression.

## 2023-07-14 NOTE — H&P ADULT - NSHPLABSRESULTS_GEN_ALL_CORE
LABS:                        11.2   9.36  )-----------( 337      ( 2023 14:20 )             38.1     07-14    137  |  92<L>  |  18  ----------------------------<  101<H>  3.5   |  41<H>  |  0.78    Ca    8.5      2023 14:20    TPro  7.8  /  Alb  3.1<L>  /  TBili  0.4  /  DBili  x   /  AST  22  /  ALT  20  /  AlkPhos  96  07-14    PT/INR - ( 2023 14:20 )   PT: 12.8 sec;   INR: 1.07 ratio         PTT - ( 2023 14:20 )  PTT:30.7 sec  Urinalysis Basic - ( 2023 14:20 )    Color: Yellow / Appearance: Clear / S.010 / pH: x  Gluc: 101 mg/dL / Ketone: Negative  / Bili: Negative / Urobili: Negative mg/dL   Blood: x / Protein: 15 mg/dL / Nitrite: Negative   Leuk Esterase: Negative / RBC: 0-2 /HPF / WBC 0-2   Sq Epi: x / Non Sq Epi: x / Bacteria: Few          RADIOLOGY & ADDITIONAL TESTS:

## 2023-07-14 NOTE — ED ADULT TRIAGE NOTE - CHIEF COMPLAINT QUOTE
pt c/o swelling, foul smelling  wound on his right lower leg and b/L knee pain for 2 weeks. history of htn. pt states, he was a pt at the wound care department in this hospital

## 2023-07-14 NOTE — ED PROVIDER NOTE - OBJECTIVE STATEMENT
63m hx htn pw right lower ext wound and diff walking. has been in wound care for weeks but last time was x2 weeks ago he says. now with increase diff walking. last time walked was 1 year ago. brother from out of state came to visit and told him to come to hospital. pt lives in an apt but says hard to care for self. no cp, sob, fever, chills, nausea, vomiting.

## 2023-07-14 NOTE — H&P ADULT - NSHPPHYSICALEXAM_GEN_ALL_CORE
PHYSICAL EXAMINATION:  Vital Signs Last 24 Hrs  T(C): 36.5 (14 Jul 2023 15:43), Max: 36.7 (14 Jul 2023 12:57)  T(F): 97.7 (14 Jul 2023 15:43), Max: 98.1 (14 Jul 2023 12:57)  HR: 68 (14 Jul 2023 15:43) (68 - 73)  BP: 178/115 (14 Jul 2023 15:43) (171/117 - 178/115)  BP(mean): --  RR: 18 (14 Jul 2023 15:43) (18 - 18)  SpO2: 95% (14 Jul 2023 15:43) (95% - 98%)    Parameters below as of 14 Jul 2023 15:43  Patient On (Oxygen Delivery Method): nasal cannula  O2 Flow (L/min): 3    CAPILLARY BLOOD GLUCOSE      POCT Blood Glucose.: 128 mg/dL (14 Jul 2023 13:05)      GENERAL: NAD,   ENMT: mucous membranes moist  NECK: supple, No JVD  CHEST/LUNG: clear to auscultation bilaterally; no rales, rhonchi, or wheezing b/l  HEART: normal S1, S2  ABDOMEN: BS+, soft, ND, NT   EXTREMITIES:  pulses palpable; no clubbing, cyanosis, or edema b/l LEs    NEURO: awake, alert, interactive; moves all extremities PHYSICAL EXAMINATION:  Vital Signs Last 24 Hrs  T(C): 36.5 (14 Jul 2023 15:43), Max: 36.7 (14 Jul 2023 12:57)  T(F): 97.7 (14 Jul 2023 15:43), Max: 98.1 (14 Jul 2023 12:57)  HR: 68 (14 Jul 2023 15:43) (68 - 73)  BP: 178/115 (14 Jul 2023 15:43) (171/117 - 178/115)  BP(mean): --  RR: 18 (14 Jul 2023 15:43) (18 - 18)  SpO2: 95% (14 Jul 2023 15:43) (95% - 98%)    Parameters below as of 14 Jul 2023 15:43  Patient On (Oxygen Delivery Method): nasal cannula  O2 Flow (L/min): 3    CAPILLARY BLOOD GLUCOSE      POCT Blood Glucose.: 128 mg/dL (14 Jul 2023 13:05)      GENERAL: NAD, seen in ER, right calf has stage II ulcers mid calf, no puss  ENMT: mucous membranes moist  NECK: supple, No JVD  CHEST/LUNG: clear to auscultation bilaterally; no rales, rhonchi, or wheezing b/l  HEART: normal S1, S2  ABDOMEN: BS+, soft, ND, NT   NEURO: awake, alert, interactive; moves all extremities

## 2023-07-14 NOTE — ED ADULT NURSE NOTE - OBJECTIVE STATEMENT
pt c/o BL knee pain x3 days. denies taking any pain medication. pt also has maggots to the lower extremity.

## 2023-07-14 NOTE — PATIENT PROFILE ADULT - FALL HARM RISK - RISK INTERVENTIONS

## 2023-07-14 NOTE — PATIENT PROFILE ADULT - NS PRO AD NO ADVANCE DIRECTIVE
Called by the RN patient became hypoxic and required on 7 LPM Oxygen. On exam she was de sat to 89 on 10 LPM. Appeared distress. No wheezes but crackles on the right side.   Given her acute hypoxemic respiratory failure. Plan is to ABG,CXR. Started on BIPAP. Will transfer to ICU for better monitoring.     Addend:  cxr showing new left sided infiltrate  - given she was treated for severe multifocal pneumonia in january 2017 with fluconazole. Will add fluconazole.   - will repeat blood culture and will obtain ID for consultation   
eICU® Admission Review Note    Reason for ICU Admission: worsening hypoxia in the context of already treated R PNA    Unplanned Post-Op ICU Admission: No     Code Status: Full Resuscitation      Data Reviewed:   Recent Notes:yes   Laboratory Results:yes   Radiology Images/Reports:Yes                AV Assessment:Yes    EBBP Review:       SUP:   PPI   VTEP: Drug Therapy     Blood Glucose Monitoring:    DM: Type1     Blood Glucose:  Glucose (mg/dL)   Date Value   10/31/2017 116 (H)      Treatment Ordered:Sliding scale and Long acting insulin     Ventilator Review:   Intubated: NIPPV     Communication with: None    Active Problems: 64 yo with RUL PNA transferred to ICU with acute hypoxic respiratory failure due to RUL PNA, Acute Kidney injury, DM1, anemia without visible blood loss  
No

## 2023-07-14 NOTE — ED ADULT NURSE NOTE - ED STAT RN HANDOFF DETAILS
Report endorsed to oncoming RN olive  for my break coverage. Report given to covering RN and patient informed during rounding Safety checks compld this shift/Safety rounds completed hourly.

## 2023-07-14 NOTE — H&P ADULT - HISTORY OF PRESENT ILLNESS
63 year old male PMH HTN, morbid obesity, p/w right lower ext wound and diff walking.  Has been in wound care for weeks but last time was 2 weeks ago. Recently notes increased difficulty walking.  Last time walked was 1 year ago.  Brother from out of state came to visit and told him to come to hospital.  Lives in an apt but says hard to care for self.  Denies cp, sob, fever, chills, nausea, vomiting. 63 year old male PMH only HTN, morbid obesity, p/w right lower ext wound and diff walking.  Has been in wound care for weeks but last time was 2 weeks ago. Recently notes increased difficulty walking.  Brother from out of state came to visit and told him to come to hospital.  Lives in an apt but says hard to care for self.  Denies cp, sob, fever, chills, nausea, vomiting. Goes to wound   care clinic, not clear last time her was there.

## 2023-07-15 LAB
CRP SERPL-MCNC: 22 MG/L — HIGH
CULTURE RESULTS: NO GROWTH — SIGNIFICANT CHANGE UP
SPECIMEN SOURCE: SIGNIFICANT CHANGE UP
VANCOMYCIN TROUGH SERPL-MCNC: 15.5 UG/ML — SIGNIFICANT CHANGE UP (ref 10–20)

## 2023-07-15 PROCEDURE — 99232 SBSQ HOSP IP/OBS MODERATE 35: CPT

## 2023-07-15 PROCEDURE — 73701 CT LOWER EXTREMITY W/DYE: CPT | Mod: 26,RT

## 2023-07-15 RX ORDER — FUROSEMIDE 40 MG
40 TABLET ORAL
Refills: 0 | Status: DISCONTINUED | OUTPATIENT
Start: 2023-07-15 | End: 2023-07-17

## 2023-07-15 RX ADMIN — PIPERACILLIN AND TAZOBACTAM 25 GRAM(S): 4; .5 INJECTION, POWDER, LYOPHILIZED, FOR SOLUTION INTRAVENOUS at 08:01

## 2023-07-15 RX ADMIN — Medication 166.67 MILLIGRAM(S): at 16:06

## 2023-07-15 RX ADMIN — Medication 166.67 MILLIGRAM(S): at 06:28

## 2023-07-15 RX ADMIN — Medication 40 MILLIGRAM(S): at 14:19

## 2023-07-15 RX ADMIN — CARVEDILOL PHOSPHATE 3.12 MILLIGRAM(S): 80 CAPSULE, EXTENDED RELEASE ORAL at 17:36

## 2023-07-15 RX ADMIN — HEPARIN SODIUM 5000 UNIT(S): 5000 INJECTION INTRAVENOUS; SUBCUTANEOUS at 06:27

## 2023-07-15 RX ADMIN — PIPERACILLIN AND TAZOBACTAM 25 GRAM(S): 4; .5 INJECTION, POWDER, LYOPHILIZED, FOR SOLUTION INTRAVENOUS at 00:17

## 2023-07-15 RX ADMIN — Medication 25 MILLIGRAM(S): at 21:52

## 2023-07-15 RX ADMIN — Medication 40 MILLIGRAM(S): at 06:27

## 2023-07-15 RX ADMIN — Medication 25 MILLIGRAM(S): at 14:19

## 2023-07-15 RX ADMIN — AMLODIPINE BESYLATE 10 MILLIGRAM(S): 2.5 TABLET ORAL at 06:27

## 2023-07-15 RX ADMIN — HEPARIN SODIUM 5000 UNIT(S): 5000 INJECTION INTRAVENOUS; SUBCUTANEOUS at 21:52

## 2023-07-15 RX ADMIN — CARVEDILOL PHOSPHATE 3.12 MILLIGRAM(S): 80 CAPSULE, EXTENDED RELEASE ORAL at 06:27

## 2023-07-15 RX ADMIN — Medication 166.67 MILLIGRAM(S): at 21:53

## 2023-07-15 RX ADMIN — PIPERACILLIN AND TAZOBACTAM 25 GRAM(S): 4; .5 INJECTION, POWDER, LYOPHILIZED, FOR SOLUTION INTRAVENOUS at 17:36

## 2023-07-15 RX ADMIN — PANTOPRAZOLE SODIUM 40 MILLIGRAM(S): 20 TABLET, DELAYED RELEASE ORAL at 08:01

## 2023-07-15 RX ADMIN — Medication 25 MILLIGRAM(S): at 06:27

## 2023-07-15 RX ADMIN — Medication 20 MILLIGRAM(S): at 06:27

## 2023-07-15 RX ADMIN — HEPARIN SODIUM 5000 UNIT(S): 5000 INJECTION INTRAVENOUS; SUBCUTANEOUS at 14:18

## 2023-07-15 NOTE — PROGRESS NOTE ADULT - SUBJECTIVE AND OBJECTIVE BOX
Patient is a 63y old  Male who presents with a chief complaint of Right leg infection. (2023 17:01)      INTERVAL HPI/OVERNIGHT EVENTS: none     MEDICATIONS  (STANDING):  amLODIPine   Tablet 10 milliGRAM(s) Oral daily  ascorbic acid 500 milliGRAM(s) Oral daily  carvedilol 3.125 milliGRAM(s) Oral every 12 hours  enalapril 20 milliGRAM(s) Oral daily  furosemide    Tablet 40 milliGRAM(s) Oral daily  heparin   Injectable 5000 Unit(s) SubCutaneous every 8 hours  hydrALAZINE 25 milliGRAM(s) Oral three times a day  pantoprazole    Tablet 40 milliGRAM(s) Oral before breakfast  piperacillin/tazobactam IVPB.. 3.375 Gram(s) IV Intermittent every 8 hours  potassium chloride    Tablet ER 20 milliEquivalent(s) Oral daily  vancomycin  IVPB 1250 milliGRAM(s) IV Intermittent every 8 hours    MEDICATIONS  (PRN):      Allergies    No Known Allergies    Intolerances        REVIEW OF SYSTEMS:  CONSTITUTIONAL: No fever, weight loss  EYES: No eye pain, visual disturbances, or discharge  ENMT:  No difficulty hearing, tinnitus, vertigo; No sinus or throat pain  RESPIRATORY: No cough, wheezing, chills or hemoptysis; No shortness of breath  CARDIOVASCULAR: No chest pain, palpitations, dizziness, or leg swelling  GASTROINTESTINAL: No abdominal or epigastric pain. No nausea, vomiting, or hematemesis; No diarrhea or constipation. No melena or hematochezia.  GENITOURINARY: No dysuria, frequency, hematuria, or incontinence  NEUROLOGICAL: No headaches, memory loss, loss of strength, numbness, or tremors  SKIN: No itching, burning, rashes, or lesions   MUSCULOSKELETAL: No joint pain or swelling; No muscle, back, or extremity pain  PSYCHIATRIC: No depression, anxiety, mood swings, or difficulty sleeping  HEME/LYMPH: No easy bruising, or bleeding gums      Vital Signs Last 24 Hrs  T(C): 36.6 (15 Jul 2023 12:17), Max: 36.7 (2023 12:57)  T(F): 97.8 (15 Jul 2023 12:17), Max: 98.1 (2023 12:57)  HR: 63 (15 Jul 2023 12:17) (60 - 73)  BP: 123/70 (15 Jul 2023 12:17) (123/70 - 179/92)  BP(mean): --  RR: 18 (15 Jul 2023 12:17) (17 - 20)  SpO2: 92% (15 Jul 2023 12:17) (92% - 98%)    Parameters below as of 15 Jul 2023 06:48  Patient On (Oxygen Delivery Method): nasal cannula  O2 Flow (L/min): 3      PHYSICAL EXAM:  GENERAL: NAD  HEAD:  Atraumatic, Normocephalic  EYES: EOMI, PERRLA, conjunctiva and sclera clear  ENMT: No tonsillar erythema, exudates, or enlargement;   NECK: Supple, Normal thyroid  NERVOUS SYSTEM:  Alert & Oriented X3, Good concentration; Motor Strength 5/5 B/L upper and lower extremities; DTRs 2+ intact and symmetric  CHEST/LUNG: CTABL; No rales, rhonchi, wheezing, or rubs  HEART: Regular rate and rhythm; No murmurs, rubs, or gallops  ABDOMEN: Soft, Nontender, Nondistended; Bowel sounds present  EXTREMITIES:  2+ edema R>L. RLE ulcer   LYMPH: No lymphadenopathy noted  SKIN: No rashes or lesions    LABS:                        11.2   9.36  )-----------( 337      ( 2023 14:20 )             38.1     07-14    137  |  92<L>  |  18  ----------------------------<  101<H>  3.5   |  41<H>  |  0.78    Ca    8.5      2023 14:20    TPro  7.8  /  Alb  3.1<L>  /  TBili  0.4  /  DBili  x   /  AST  22  /  ALT  20  /  AlkPhos  96  07-14    PT/INR - ( 2023 14:20 )   PT: 12.8 sec;   INR: 1.07 ratio         PTT - ( 2023 14:20 )  PTT:30.7 sec  Urinalysis Basic - ( 2023 14:20 )    Color: Yellow / Appearance: Clear / S.010 / pH: x  Gluc: 101 mg/dL / Ketone: Negative  / Bili: Negative / Urobili: Negative mg/dL   Blood: x / Protein: 15 mg/dL / Nitrite: Negative   Leuk Esterase: Negative / RBC: 0-2 /HPF / WBC 0-2   Sq Epi: x / Non Sq Epi: x / Bacteria: Few      CAPILLARY BLOOD GLUCOSE      POCT Blood Glucose.: 128 mg/dL (2023 13:05)      RADIOLOGY & ADDITIONAL TESTS:    Imaging Personally Reviewed:  [x ] YES  [ ] NO    Consultant(s) Notes Reviewed:  [ ] YES  [ ] NO    Care Discussed with Consultants/Other Providers [ ] YES  [ ] NO

## 2023-07-15 NOTE — PROGRESS NOTE ADULT - ASSESSMENT
63 year old male PMH HTN, morbid obesity, p/w right lower ext wound and diff walking.  Has been in wound care for weeks but last time was 2 weeks ago. Recently notes increased difficulty walking.  Last time walked was 1 year ago.  Brother from out of state came to visit and told him to come to hospital.  Lives in an apt but says hard to care for self.  Denies cp, sob, fever, chills, nausea, vomiting.        RLE stage II ulcers with maggots and bed bugs   - Vanco and Zosyn with vitamin C started by admitted   - wbc normal, afebrile and has borderline esr   - may only need short course of abx   - CT requested to r/o abscess. Vascular consult was called by ER. Left leg and both feet all intact. Wound care consult was requested.   - will increase lasix d/t LE swelling     acute hypoxic resp failure   - suspect d/y fluid overload   - lasix   - echo - last one 2021 showed aortic aneurysm       Continue home meds all at home dose of Norvasc, Coreg, Vasotec, Lasix and daily KCL.  Add Hydralazine   25 mg tid for BP control.

## 2023-07-16 LAB
ANION GAP SERPL CALC-SCNC: 2 MMOL/L — LOW (ref 5–17)
BASE EXCESS BLDA CALC-SCNC: 22.6 MMOL/L — HIGH (ref -2–3)
BLOOD GAS COMMENTS ARTERIAL: SIGNIFICANT CHANGE UP
BUN SERPL-MCNC: 11 MG/DL — SIGNIFICANT CHANGE UP (ref 7–23)
CALCIUM SERPL-MCNC: 8 MG/DL — LOW (ref 8.5–10.1)
CHLORIDE SERPL-SCNC: 91 MMOL/L — LOW (ref 96–108)
CO2 BLDA-SCNC: 54 MMOL/L — HIGH (ref 19–24)
CO2 SERPL-SCNC: 44 MMOL/L — HIGH (ref 22–31)
CREAT SERPL-MCNC: 0.77 MG/DL — SIGNIFICANT CHANGE UP (ref 0.5–1.3)
EGFR: 102 ML/MIN/1.73M2 — SIGNIFICANT CHANGE UP
GAS PNL BLDA: SIGNIFICANT CHANGE UP
GLUCOSE SERPL-MCNC: 94 MG/DL — SIGNIFICANT CHANGE UP (ref 70–99)
HCO3 BLDA-SCNC: 52 MMOL/L — CRITICAL HIGH (ref 21–28)
HOROWITZ INDEX BLDA+IHG-RTO: 21 — SIGNIFICANT CHANGE UP
PCO2 BLDA: 76 MMHG — CRITICAL HIGH (ref 32–46)
PH BLDA: 7.44 — SIGNIFICANT CHANGE UP (ref 7.35–7.45)
PO2 BLDA: 50 MMHG — LOW (ref 83–108)
POTASSIUM SERPL-MCNC: 3.4 MMOL/L — LOW (ref 3.5–5.3)
POTASSIUM SERPL-SCNC: 3.4 MMOL/L — LOW (ref 3.5–5.3)
SAO2 % BLDA: 86.3 % — LOW (ref 94–98)
SODIUM SERPL-SCNC: 137 MMOL/L — SIGNIFICANT CHANGE UP (ref 135–145)
VANCOMYCIN TROUGH SERPL-MCNC: 19 UG/ML — SIGNIFICANT CHANGE UP (ref 10–20)

## 2023-07-16 PROCEDURE — 99232 SBSQ HOSP IP/OBS MODERATE 35: CPT

## 2023-07-16 RX ORDER — SENNA PLUS 8.6 MG/1
2 TABLET ORAL AT BEDTIME
Refills: 0 | Status: DISCONTINUED | OUTPATIENT
Start: 2023-07-16 | End: 2023-07-24

## 2023-07-16 RX ORDER — POLYETHYLENE GLYCOL 3350 17 G/17G
17 POWDER, FOR SOLUTION ORAL DAILY
Refills: 0 | Status: DISCONTINUED | OUTPATIENT
Start: 2023-07-16 | End: 2023-07-24

## 2023-07-16 RX ADMIN — HEPARIN SODIUM 5000 UNIT(S): 5000 INJECTION INTRAVENOUS; SUBCUTANEOUS at 13:15

## 2023-07-16 RX ADMIN — Medication 40 MILLIGRAM(S): at 05:31

## 2023-07-16 RX ADMIN — HEPARIN SODIUM 5000 UNIT(S): 5000 INJECTION INTRAVENOUS; SUBCUTANEOUS at 05:32

## 2023-07-16 RX ADMIN — HEPARIN SODIUM 5000 UNIT(S): 5000 INJECTION INTRAVENOUS; SUBCUTANEOUS at 22:55

## 2023-07-16 RX ADMIN — Medication 40 MILLIGRAM(S): at 13:15

## 2023-07-16 RX ADMIN — Medication 25 MILLIGRAM(S): at 13:15

## 2023-07-16 RX ADMIN — POLYETHYLENE GLYCOL 3350 17 GRAM(S): 17 POWDER, FOR SOLUTION ORAL at 11:25

## 2023-07-16 RX ADMIN — PANTOPRAZOLE SODIUM 40 MILLIGRAM(S): 20 TABLET, DELAYED RELEASE ORAL at 05:31

## 2023-07-16 RX ADMIN — PIPERACILLIN AND TAZOBACTAM 25 GRAM(S): 4; .5 INJECTION, POWDER, LYOPHILIZED, FOR SOLUTION INTRAVENOUS at 16:59

## 2023-07-16 RX ADMIN — AMLODIPINE BESYLATE 10 MILLIGRAM(S): 2.5 TABLET ORAL at 05:31

## 2023-07-16 RX ADMIN — Medication 166.67 MILLIGRAM(S): at 13:06

## 2023-07-16 RX ADMIN — Medication 20 MILLIGRAM(S): at 05:31

## 2023-07-16 RX ADMIN — Medication 25 MILLIGRAM(S): at 05:31

## 2023-07-16 RX ADMIN — Medication 500 MILLIGRAM(S): at 11:24

## 2023-07-16 RX ADMIN — SENNA PLUS 2 TABLET(S): 8.6 TABLET ORAL at 22:54

## 2023-07-16 RX ADMIN — PIPERACILLIN AND TAZOBACTAM 25 GRAM(S): 4; .5 INJECTION, POWDER, LYOPHILIZED, FOR SOLUTION INTRAVENOUS at 01:05

## 2023-07-16 RX ADMIN — CARVEDILOL PHOSPHATE 3.12 MILLIGRAM(S): 80 CAPSULE, EXTENDED RELEASE ORAL at 05:31

## 2023-07-16 RX ADMIN — PIPERACILLIN AND TAZOBACTAM 25 GRAM(S): 4; .5 INJECTION, POWDER, LYOPHILIZED, FOR SOLUTION INTRAVENOUS at 09:07

## 2023-07-16 RX ADMIN — Medication 166.67 MILLIGRAM(S): at 05:31

## 2023-07-16 RX ADMIN — Medication 20 MILLIEQUIVALENT(S): at 11:25

## 2023-07-16 RX ADMIN — CARVEDILOL PHOSPHATE 3.12 MILLIGRAM(S): 80 CAPSULE, EXTENDED RELEASE ORAL at 17:02

## 2023-07-16 RX ADMIN — Medication 25 MILLIGRAM(S): at 22:54

## 2023-07-16 RX ADMIN — Medication 166.67 MILLIGRAM(S): at 23:58

## 2023-07-16 NOTE — PHYSICAL THERAPY INITIAL EVALUATION ADULT - GENERAL OBSERVATIONS, REHAB EVAL
Chart (EMR) reviewed. On contact precautions. Pt seen for wound care assessment. Received supine c HOB elevated, NAD. Able to make a conversation in simple English(K[place]akh is the main language). +IV intact, +O2 via nasal cannula, +primafit, +dressing to right leg intact. Alert. Ox4. Able to follow multistep commands/directions.

## 2023-07-16 NOTE — PHYSICAL THERAPY INITIAL EVALUATION ADULT - THERAPY FREQUENCY, PT EVAL
3-5x/week Bed in low position, brakes on/Side rails x 2 or 4 up, assess large gaps, such that a patient could get extremity or other body part entrapped, use additional safety procedures/Call light is within reach, educate patient/family on its functionality/Use of non-skid footwear for ambulating patients, use of appropriate size clothing to prevent risk of tripping/Orientation to room

## 2023-07-16 NOTE — PHYSICAL THERAPY INITIAL EVALUATION ADULT - PERTINENT HX OF CURRENT PROBLEM, REHAB EVAL
Patient is a 64 y/o male admitted to [facility] due to right leg infection. Pt has a PMH of HTN, morbid obesity, p/w right lower ext wound and diff walking.  Has been in wound care for weeks but last time was 2 weeks ago. Recently notes increased difficulty walking.

## 2023-07-16 NOTE — CONSULT NOTE ADULT - SUBJECTIVE AND OBJECTIVE BOX
Chief Complaint:  Patient is a 63y old  Male who presents with a chief complaint of Right leg infection. (16 Jul 2023 13:41)    HPI:  63 year old male PMH only HTN, morbid obesity, p/w right lower ext wound and diff walking.  Has been in wound care for weeks but last time was 2 weeks ago. Recently notes increased difficulty walking.  Brother from out of state came to visit and told him to come to hospital.  Lives in an apt but says hard to care for self.  Denies cp, sob, fever, chills, nausea, vomiting. Goes to wound   care clinic, not clear last time her was there.   (14 Jul 2023 17:01)    Interval hx:  Patient 63 year old male HTN, morbid obesity, non-ambulatory x1 year, admitted with right lower extremity cellulitis. Patient explains he previously followed with Dr. Lima at wound care center for his right lower extremity wounds, which have been present "for a long time". Patient unable to state how long wounds have been present. Reports pain at RLE wound sites, occasional drainage from sites.     REVIEW OF SYSTEMS:  All other review of systems is negative unless indicated above.    PMH/PSH:PAST MEDICAL & SURGICAL HISTORY:  Hypertension  PUD (peptic ulcer disease)  Osteoarthritis  Hypertension  Morbid obesity  Duodenal ulcer disease  Knee osteoarthritis  HTN (hypertension)  H/O ventral hernia repair  Abdominal hernia  Gastroduodenal artery bleed  IR embolization of gastroduodenal artery    Relevant Family History:   FAMILY HISTORY:  No pertinent family history in first degree relatives  Relevant Social History:  Denies ETOH or tobacco history    Allergies:  No Known Allergies      Medications:  amLODIPine   Tablet 10 milliGRAM(s) Oral daily  ascorbic acid 500 milliGRAM(s) Oral daily  carvedilol 3.125 milliGRAM(s) Oral every 12 hours  enalapril 20 milliGRAM(s) Oral daily  furosemide   Injectable 40 milliGRAM(s) IV Push two times a day  heparin   Injectable 5000 Unit(s) SubCutaneous every 8 hours  hydrALAZINE 25 milliGRAM(s) Oral three times a day  pantoprazole    Tablet 40 milliGRAM(s) Oral before breakfast  piperacillin/tazobactam IVPB.. 3.375 Gram(s) IV Intermittent every 8 hours  polyethylene glycol 3350 17 Gram(s) Oral daily  potassium chloride    Tablet ER 20 milliEquivalent(s) Oral daily  senna 2 Tablet(s) Oral at bedtime  vancomycin  IVPB 1250 milliGRAM(s) IV Intermittent every 8 hours      Physical Exam:    Vital Signs:  Vital Signs Last 24 Hrs  T(C): 36.5 (16 Jul 2023 11:39), Max: 36.8 (16 Jul 2023 08:09)  T(F): 97.7 (16 Jul 2023 11:39), Max: 98.2 (16 Jul 2023 08:09)  HR: 64 (16 Jul 2023 11:39) (64 - 66)  BP: 121/73 (16 Jul 2023 11:39) (121/73 - 149/81)  BP(mean): --  RR: 18 (16 Jul 2023 11:39) (18 - 20)  SpO2: 94% (16 Jul 2023 11:39) (90% - 97%)    Constitutional: NAD, appears comfortable resting in bed  HEENT: NC/AT, PERRL, EOMI  Extremities: bilateral lower extremities swollen, nonpitting edema extending to thighs bilaterally. Hyperpigmented skin surrounding bilateral calves with skin thickening. Right lower extremity with superficial circumferential wounds approx. 2 inches above ankle. Ulcer base is yellow-white with some punctate bleeding. Wounds copiously irrigated with saline, dressed with xeroform, 4x4 guaze, ABD pads, wrapped in kerlix  Vascular: bilateral dorsalis pedis pulses auscultated by doppler bilaterally, unable to appreciate posterior tibialis pulses secondary to lymphedema   Skin: warm, dry intact  Neuro: AAOx3     Laboratory:      07-16    137  |  91<L>  |  11  ----------------------------<  94  3.4<L>   |  44<H>  |  0.77    Ca    8.0<L>      16 Jul 2023 06:40          Urinalysis Basic - ( 16 Jul 2023 06:40 )    Color: x / Appearance: x / SG: x / pH: x  Gluc: 94 mg/dL / Ketone: x  / Bili: x / Urobili: x   Blood: x / Protein: x / Nitrite: x   Leuk Esterase: x / RBC: x / WBC x   Sq Epi: x / Non Sq Epi: x / Bacteria: x          Intake and Output    07-15-23 @ 07:01  -  07-16-23 @ 07:00  --------------------------------------------------------  IN: 1460 mL / OUT: 800 mL / NET: 660 mL        Imaging:  < from: CT Lower Extremity w/ IV Cont, Right (07.15.23 @ 12:21) >  ACC: [id number] EXAM:  CT LWR EXT IC RT   ORDERED BY: MARITZA YOU     PROCEDURE DATE:  07/15/2023          INTERPRETATION:  CT OF THE RIGHT LOWER EXTREMITY WITH CONTRAST.    CLINICAL INFORMATION: Right leg pain and swelling. Evaluate for abscess.  TECHNIQUE: Axial CT images were obtained of the right lower extremity   with coronal and sagittal reconstructions. 95 cc of Omnipaque 350 was   administered intravenously. 5 cc discarded.    FINDINGS:    Marked circumferential subcutaneous soft tissue edema as well as skin   thickening throughout the visualized right lower extremity that is most   prominent at the level of the inner thigh where there additionally is   fluid attenuation within the skin suggesting skin blistering. There is no   discrete peripherally enhancing fluid collection identified.    There is no intramuscular abscess identified.    There is severe tricompartmental osteoarthrosis of the right knee and   there is multifocal moderate ankle and midfoot arthrosis. There is a   moderate-sized knee joint effusion.    IMPRESSION:    Marked circumferential subcutaneous soft tissue edema as well as skin   thickening throughout the visualized right lower extremity that is most   prominent at the level of the inner thigh where thereadditionally is   fluid attenuation within the skin suggesting skin blistering. There is no   discrete peripherally enhancing fluid collection identified.    Severe right knee arthrosis.    --- End of Report ---            SHIMON GARCIA MD; Attending Radiologist  This document has been electronically signed. Jul 15 2023 12:44PM    < end of copied text >  < from: Xray Tibia + Fibula 2 Views, Right (07.14.23 @ 16:24) >  ACC: [id number] EXAM:  XR TIB FIB AP LAT 2 VIEWS RT   ORDERED BY: ESMER AEST     PROCEDURE DATE:  07/14/2023          INTERPRETATION:  Right tib-fib fibula AP and lateral projection    CLINICAL HISTORY: Right lower leg wound    COMPARISON: 10/16/2018        IMPRESSION: Intact bones and alignment. No evidence of fracture or   suspicious lesion.    Diffuse soft tissue thickening and edema is suggested with marked   pretibial thickening apparent. No evidence of soft tissue air or   radiopaque foreign body.        --- End of Report ---  DAISY DURAN MD; Attending Radiologist  This document has been electronically signed. Jul 15 2023  9:25AM    < end of copied text >

## 2023-07-16 NOTE — PHYSICAL THERAPY INITIAL EVALUATION ADULT - MODALITIES TREATMENT COMMENTS
Pt presents with open wound(possibly venous ulcer) over the circumference of the right leg c moderate exudates, intact periwound and no odor. Pt presents with hemosiderin staining consistent with chronic venous insufficiency. Pt presents with open wound(possibly venous ulcer) over the circumference of the right leg c moderate exudates, intact periwound and no odor.

## 2023-07-16 NOTE — PROGRESS NOTE ADULT - SUBJECTIVE AND OBJECTIVE BOX
Patient is a 63y old  Male who presents with a chief complaint of Right leg infection. (14 Jul 2023 17:01)      INTERVAL HPI/OVERNIGHT EVENTS: none     MEDICATIONS  (STANDING):  amLODIPine   Tablet 10 milliGRAM(s) Oral daily  ascorbic acid 500 milliGRAM(s) Oral daily  carvedilol 3.125 milliGRAM(s) Oral every 12 hours  enalapril 20 milliGRAM(s) Oral daily  furosemide   Injectable 40 milliGRAM(s) IV Push two times a day  heparin   Injectable 5000 Unit(s) SubCutaneous every 8 hours  hydrALAZINE 25 milliGRAM(s) Oral three times a day  pantoprazole    Tablet 40 milliGRAM(s) Oral before breakfast  piperacillin/tazobactam IVPB.. 3.375 Gram(s) IV Intermittent every 8 hours  polyethylene glycol 3350 17 Gram(s) Oral daily  potassium chloride    Tablet ER 20 milliEquivalent(s) Oral daily  senna 2 Tablet(s) Oral at bedtime  vancomycin  IVPB 1250 milliGRAM(s) IV Intermittent every 8 hours    MEDICATIONS  (PRN):        Allergies    No Known Allergies    Intolerances        REVIEW OF SYSTEMS:  CONSTITUTIONAL: No fever, weight loss  EYES: No eye pain, visual disturbances, or discharge  ENMT:  No difficulty hearing, tinnitus, vertigo; No sinus or throat pain  RESPIRATORY: No cough, wheezing, chills or hemoptysis; No shortness of breath  CARDIOVASCULAR: No chest pain, palpitations, dizziness, or leg swelling  GASTROINTESTINAL: No abdominal or epigastric pain. No nausea, vomiting, or hematemesis; No diarrhea or constipation. No melena or hematochezia.  GENITOURINARY: No dysuria, frequency, hematuria, or incontinence  NEUROLOGICAL: No headaches, memory loss, loss of strength, numbness, or tremors  SKIN: No itching, burning, rashes, or lesions   MUSCULOSKELETAL: No joint pain or swelling; No muscle, back, or extremity pain  PSYCHIATRIC: No depression, anxiety, mood swings, or difficulty sleeping  HEME/LYMPH: No easy bruising, or bleeding gums      Vital Signs Last 24 Hrs  T(C): 36.5 (16 Jul 2023 11:39), Max: 36.8 (16 Jul 2023 08:09)  T(F): 97.7 (16 Jul 2023 11:39), Max: 98.2 (16 Jul 2023 08:09)  HR: 64 (16 Jul 2023 11:39) (64 - 66)  BP: 121/73 (16 Jul 2023 11:39) (121/73 - 149/81)  BP(mean): --  RR: 18 (16 Jul 2023 11:39) (18 - 20)  SpO2: 94% (16 Jul 2023 11:39) (90% - 97%)    Parameters below as of 16 Jul 2023 05:46  Patient On (Oxygen Delivery Method): nasal cannula        PHYSICAL EXAM:  GENERAL: NAD  HEAD:  Atraumatic, Normocephalic  EYES: EOMI, PERRLA, conjunctiva and sclera clear  ENMT: No tonsillar erythema, exudates, or enlargement;   NECK: Supple, Normal thyroid  NERVOUS SYSTEM:  Alert & Oriented X3, Good concentration; Motor Strength 5/5 B/L upper and lower extremities; DTRs 2+ intact and symmetric  CHEST/LUNG: CTABL; No rales, rhonchi, wheezing, or rubs  HEART: Regular rate and rhythm; No murmurs, rubs, or gallops  ABDOMEN: Soft, Nontender, Nondistended; Bowel sounds present  EXTREMITIES:  2+ edema R>L. RLE ulcer   LYMPH: No lymphadenopathy noted  SKIN: No rashes or lesions    LABS:                                   11.2   9.36  )-----------( 337      ( 14 Jul 2023 14:20 )             38.1     07-16    137  |  91<L>  |  11  ----------------------------<  94  3.4<L>   |  44<H>  |  0.77    Ca    8.0<L>      16 Jul 2023 06:40    TPro  7.8  /  Alb  3.1<L>  /  TBili  0.4  /  DBili  x   /  AST  22  /  ALT  20  /  AlkPhos  96  07-14    PT/INR - ( 14 Jul 2023 14:20 )   PT: 12.8 sec;   INR: 1.07 ratio         PTT - ( 14 Jul 2023 14:20 )  PTT:30.7 sec  Urinalysis Basic - ( 16 Jul 2023 06:40 )    Color: x / Appearance: x / SG: x / pH: x  Gluc: 94 mg/dL / Ketone: x  / Bili: x / Urobili: x   Blood: x / Protein: x / Nitrite: x   Leuk Esterase: x / RBC: x / WBC x   Sq Epi: x / Non Sq Epi: x / Bacteria: x        CAPILLARY BLOOD GLUCOSE      POCT Blood Glucose.: 128 mg/dL (14 Jul 2023 13:05)      RADIOLOGY & ADDITIONAL TESTS:    Imaging Personally Reviewed:  [x ] YES  [ ] NO    Consultant(s) Notes Reviewed:  [ ] YES  [ ] NO    Care Discussed with Consultants/Other Providers [ ] YES  [ ] NO

## 2023-07-16 NOTE — PROGRESS NOTE ADULT - ASSESSMENT
63 year old male PMH HTN, morbid obesity, p/w right lower ext wound and diff walking.  Has been in wound care for weeks but last time was 2 weeks ago. Recently notes increased difficulty walking.  Last time walked was 1 year ago.  Brother from out of state came to visit and told him to come to hospital.  Lives in an apt but says hard to care for self.  Denies cp, sob, fever, chills, nausea, vomiting.        RLE stage II ulcers with maggots and bed bugs   - Vanco and Zosyn with vitamin C started by admitter  - wbc normal, afebrile and has borderline esr   - may only need short course of abx   - CT of LE shows no discreet abscess   - will increase lasix d/t LE swelling     acute hypoxic resp failure with hypercarbia   - suspect component of  fluid overload   - ABG shows chronic co2 retention. Likely 2/2 body habitus. will likely need home bipap. consult pulm in the am   - c/w lasix   - echo - last one 2021 showed aortic aneurysm       Continue home meds all at home dose of Norvasc, Coreg, Vasotec, Lasix and daily KCL.  Add Hydralazine   25 mg tid for BP control.      dc to [facility] when medically stable

## 2023-07-16 NOTE — PHYSICAL THERAPY INITIAL EVALUATION ADULT - ADDITIONAL COMMENTS
Patient lives alone in an apt house in the 1st floor, no entry step. Independent c all ADL's and ambulation without device.

## 2023-07-17 LAB
ANION GAP SERPL CALC-SCNC: <3 MMOL/L — LOW (ref 5–17)
BASE EXCESS BLDA CALC-SCNC: 22 MMOL/L — HIGH (ref -2–3)
BLOOD GAS COMMENTS ARTERIAL: SIGNIFICANT CHANGE UP
BUN SERPL-MCNC: 13 MG/DL — SIGNIFICANT CHANGE UP (ref 7–23)
CALCIUM SERPL-MCNC: 8.8 MG/DL — SIGNIFICANT CHANGE UP (ref 8.5–10.1)
CHLORIDE SERPL-SCNC: 88 MMOL/L — LOW (ref 96–108)
CO2 BLDA-SCNC: 53 MMOL/L — HIGH (ref 19–24)
CO2 SERPL-SCNC: >45 MMOL/L — CRITICAL HIGH (ref 22–31)
CREAT SERPL-MCNC: 0.85 MG/DL — SIGNIFICANT CHANGE UP (ref 0.5–1.3)
EGFR: 99 ML/MIN/1.73M2 — SIGNIFICANT CHANGE UP
GAS PNL BLDA: SIGNIFICANT CHANGE UP
GLUCOSE SERPL-MCNC: 94 MG/DL — SIGNIFICANT CHANGE UP (ref 70–99)
HCO3 BLDA-SCNC: 51 MMOL/L — CRITICAL HIGH (ref 21–28)
HOROWITZ INDEX BLDA+IHG-RTO: 21 — SIGNIFICANT CHANGE UP
PCO2 BLDA: 75 MMHG — CRITICAL HIGH (ref 32–46)
PH BLDA: 7.44 — SIGNIFICANT CHANGE UP (ref 7.35–7.45)
PO2 BLDA: 54 MMHG — LOW (ref 83–108)
POTASSIUM SERPL-MCNC: 3.9 MMOL/L — SIGNIFICANT CHANGE UP (ref 3.5–5.3)
POTASSIUM SERPL-SCNC: 3.9 MMOL/L — SIGNIFICANT CHANGE UP (ref 3.5–5.3)
SAO2 % BLDA: 88.8 % — LOW (ref 94–98)
SODIUM SERPL-SCNC: 136 MMOL/L — SIGNIFICANT CHANGE UP (ref 135–145)
VANCOMYCIN TROUGH SERPL-MCNC: 20.6 UG/ML — HIGH (ref 10–20)

## 2023-07-17 PROCEDURE — 99232 SBSQ HOSP IP/OBS MODERATE 35: CPT

## 2023-07-17 PROCEDURE — 99223 1ST HOSP IP/OBS HIGH 75: CPT

## 2023-07-17 RX ORDER — ACETAZOLAMIDE 250 MG/1
500 TABLET ORAL ONCE
Refills: 0 | Status: COMPLETED | OUTPATIENT
Start: 2023-07-17 | End: 2023-07-17

## 2023-07-17 RX ORDER — ACETAZOLAMIDE 250 MG/1
500 TABLET ORAL ONCE
Refills: 0 | Status: DISCONTINUED | OUTPATIENT
Start: 2023-07-17 | End: 2023-07-17

## 2023-07-17 RX ORDER — SODIUM HYPOCHLORITE 0.125 %
1 SOLUTION, NON-ORAL MISCELLANEOUS DAILY
Refills: 0 | Status: DISCONTINUED | OUTPATIENT
Start: 2023-07-17 | End: 2023-07-24

## 2023-07-17 RX ADMIN — PIPERACILLIN AND TAZOBACTAM 25 GRAM(S): 4; .5 INJECTION, POWDER, LYOPHILIZED, FOR SOLUTION INTRAVENOUS at 10:05

## 2023-07-17 RX ADMIN — CARVEDILOL PHOSPHATE 3.12 MILLIGRAM(S): 80 CAPSULE, EXTENDED RELEASE ORAL at 05:47

## 2023-07-17 RX ADMIN — Medication 166.67 MILLIGRAM(S): at 09:32

## 2023-07-17 RX ADMIN — HEPARIN SODIUM 5000 UNIT(S): 5000 INJECTION INTRAVENOUS; SUBCUTANEOUS at 05:47

## 2023-07-17 RX ADMIN — PIPERACILLIN AND TAZOBACTAM 25 GRAM(S): 4; .5 INJECTION, POWDER, LYOPHILIZED, FOR SOLUTION INTRAVENOUS at 17:07

## 2023-07-17 RX ADMIN — Medication 20 MILLIGRAM(S): at 05:47

## 2023-07-17 RX ADMIN — Medication 1 APPLICATION(S): at 14:50

## 2023-07-17 RX ADMIN — Medication 25 MILLIGRAM(S): at 14:30

## 2023-07-17 RX ADMIN — Medication 40 MILLIGRAM(S): at 14:29

## 2023-07-17 RX ADMIN — PANTOPRAZOLE SODIUM 40 MILLIGRAM(S): 20 TABLET, DELAYED RELEASE ORAL at 09:08

## 2023-07-17 RX ADMIN — HEPARIN SODIUM 5000 UNIT(S): 5000 INJECTION INTRAVENOUS; SUBCUTANEOUS at 22:36

## 2023-07-17 RX ADMIN — ACETAZOLAMIDE 500 MILLIGRAM(S): 250 TABLET ORAL at 17:06

## 2023-07-17 RX ADMIN — PIPERACILLIN AND TAZOBACTAM 25 GRAM(S): 4; .5 INJECTION, POWDER, LYOPHILIZED, FOR SOLUTION INTRAVENOUS at 02:16

## 2023-07-17 RX ADMIN — Medication 20 MILLIEQUIVALENT(S): at 12:13

## 2023-07-17 RX ADMIN — Medication 25 MILLIGRAM(S): at 05:49

## 2023-07-17 RX ADMIN — SENNA PLUS 2 TABLET(S): 8.6 TABLET ORAL at 22:34

## 2023-07-17 RX ADMIN — Medication 500 MILLIGRAM(S): at 12:14

## 2023-07-17 RX ADMIN — Medication 25 MILLIGRAM(S): at 22:35

## 2023-07-17 RX ADMIN — HEPARIN SODIUM 5000 UNIT(S): 5000 INJECTION INTRAVENOUS; SUBCUTANEOUS at 14:30

## 2023-07-17 RX ADMIN — Medication 40 MILLIGRAM(S): at 05:47

## 2023-07-17 RX ADMIN — AMLODIPINE BESYLATE 10 MILLIGRAM(S): 2.5 TABLET ORAL at 05:47

## 2023-07-17 RX ADMIN — CARVEDILOL PHOSPHATE 3.12 MILLIGRAM(S): 80 CAPSULE, EXTENDED RELEASE ORAL at 17:07

## 2023-07-17 NOTE — CONSULT NOTE ADULT - NS ATTEND AMEND GEN_ALL_CORE FT
The patient has venous stasis and venous ulcers and has circumferential ulcer on the right distal leg and had maggots.  Patient is SOB, has gained large amount of weight.  plan-- Pepeell silver, and ace and out patient f/u in the wound center upon Discharge.
63M w/ HTN, morbid obesity. Presents w/ RLE wound/pain and difficulty walking. Admitted for cellulitis and wound care. ABG performed showing chronic hypercapnia and noted hypoxia. Placed on O2 and BiPAP at night Pt states he does not ambulate much due to leg and knee pain. Has no SOB or chest pain. He has never used O2 at home and has never seen pulmonary as outpt.     - suspect chronic hypoxia from OHS/LORI in setting of morbid obesity  - pt's hypercapnia is well compensated at this time, likely due to OHS/LORI  - continue w/ BiPAP qhs, will need home NIV; case mgmt to help with this  - continue w/ supplemental O2, maintain O2 >90%  - clinically concerned for volume overload, on lasix 40 bid; given elevated bicarb and mild alkalemia recommend diamox 500 mg x1 today, and hold lasix dose; will decide on further dosing of lasix moving forward; TTE pending  - pt will need sleep study and close pulmonary followup upon d/c; Please call [phone] or email  [email or web address] for an appointment at the Pulmonary office ([address], [address], [place], [place]).   - will continue to follow along

## 2023-07-17 NOTE — CONSULT NOTE ADULT - SUBJECTIVE AND OBJECTIVE BOX
HPI: 63 year old male PMH HTN, morbid obesity, p/w right lower ext wound and diff walking.  Has been in wound care for weeks but last time was 2 weeks ago. Recently notes increased difficulty walking & reports he hasn't walked for 1 year ago.  Lives in an apt but says hard to care for self.      Interval: Pulmonary consulted as patient is hypoxic requiring NC and given body habitus has required bipap HS     ROS:   - + leg/knee pain, + lower extremity swelling   - No SOB, says breathing is at baseline   - reports that the mask overnight was good, he slept fine with it   - weaned to 2L NC, 02 saturation 93%     PAST MEDICAL & SURGICAL HISTORY:  Hypertension  PUD (peptic ulcer disease)  Osteoarthritis  Hypertension  Morbid obesity  Duodenal ulcer disease  Knee osteoarthritis  HTN (hypertension)  H/O ventral hernia repair  Abdominal hernia  Gastroduodenal artery bleed  IR embolization of gastroduodenal artery    Social:   - Lives at home in apt alone   - Hasn't walked/mobile in "a long time"   - Used to drink but stopped years ago   - Never smoked or did illicit drugs   - Worked at [employer] in past     Vital Signs Last 24 Hrs  T(C): 36.6 (17 Jul 2023 11:55), Max: 36.9 (16 Jul 2023 17:27)  T(F): 97.9 (17 Jul 2023 11:55), Max: 98.5 (16 Jul 2023 17:27)  HR: 65 (17 Jul 2023 11:55) (64 - 70)  BP: 121/77 (17 Jul 2023 11:55) (115/70 - 125/75)  BP(mean): --  RR: 18 (17 Jul 2023 11:55) (18 - 19)  SpO2: 100% (17 Jul 2023 11:55) (90% - 100%)    Parameters below as of 17 Jul 2023 11:55  Patient On (Oxygen Delivery Method): nasal cannula, 3litr    PE:   General: No acute distress. Obese male resting in bed.   HEENT: Pupils equal and symmetrically reactive to light.  PULM: Diminished to auscultation bilaterally.  CVS: Regular rate and rhythm, no murmurs, rubs, or gallops.  ABD: Obese, soft, nondistended, no masses.  EXT: Edema vs lymphadema R>L + leg wounds/ulcers. +scrotal edema   SKIN: No rashes       LABS:      07-17    136  |  88<L>  |  13  ----------------------------<  94  3.9   |  >45<HH>  |  0.85    Ca    8.8      17 Jul 2023 07:02      thy    Urinalysis Basic - ( 17 Jul 2023 07:02 )    Color: x / Appearance: x / SG: x / pH: x  Gluc: 94 mg/dL / Ketone: x  / Bili: x / Urobili: x   Blood: x / Protein: x / Nitrite: x   Leuk Esterase: x / RBC: x / WBC x   Sq Epi: x / Non Sq Epi: x / Bacteria: x      < from: Xray Chest 1 View-PORTABLE IMMEDIATE (07.14.23 @ 15:40) >  IMPRESSION: Mild central CHF versus prior. Heart enlargement again noted.    --- End of Report ---    < end of copied text >

## 2023-07-17 NOTE — DIETITIAN INITIAL EVALUATION ADULT - NSPROEDAABILITYLEARN_GEN_A_NUR
Belarusian speaking, however able to communicate effectively in English/language/physical limitations

## 2023-07-17 NOTE — DIETITIAN INITIAL EVALUATION ADULT - OTHER INFO
Pt with PMHx of HTN, morbid obesity, knee OA; presents with right lower extremity wound and difficulty walking; as per H&P, last time pt walked was 1 year ago. Found with RLE stage II ulcers with maggots and bed bugs, & acute hypoxic respiratory failure with hypercarbia (pulmonary following).  Pt primarily Kyrgyz speaking, however declined translation device and able to answer questions in English at time of visit.   Pt lives in apartment alone; difficulty caring for himself. States good appetite and good PO intake PTA. Due to mobility issues, pt mostly eats prepared food, delivered foods. Denies difficulty chewing/swallowing or any nausea or vomiting. States constipation, with no BM x 2 days; noted on stool softener & laxative.  Per flow sheets pt consuming mostly % of documented meals during admission. States he is unsure of his UBW. Per RD note from 2020, pt's dosing wt recorded as 280.9 lbs. & current wt 300 lbs(07/14/23).  Provided pt with setting goals for wt management & tips to support wt loss handouts in Kyrgyz. Discussed importance of wt loss for overall health, as well as consuming smaller portion sizes, choosing leaner meats, and consuming more fruits and vegetables. Pt with PMHx of HTN, morbid obesity, knee OA; presents with right lower extremity wound and difficulty walking; as per H&P, last time pt walked was 1 year ago. Found with RLE stage II ulcers with maggots and bed bugs, & acute hypoxic respiratory failure with hypercarbia (pulmonary following). Plan is for FATOU.  Pt primarily French speaking, however declined translation device and able to answer questions in English at time of visit.   Pt lives in apartment alone; difficulty caring for himself. States good appetite and good PO intake PTA. Due to mobility issues, pt mostly eats prepared food, delivered foods. Denies difficulty chewing/swallowing or any nausea or vomiting. States constipation, with no BM x 2 days; noted on stool softener & laxative.  Per flow sheets pt consuming mostly % of documented meals during admission. States he is unsure of his UBW. Per RD note from 2020, pt's dosing wt recorded as 280.9 lbs. & current wt 300 lbs(07/14/23).  Provided pt with setting goals for wt management & tips to support wt loss handouts in French. Discussed importance of wt loss for overall health, as well as consuming smaller portion sizes, choosing leaner meats, and consuming more fruits and vegetables.

## 2023-07-17 NOTE — PROGRESS NOTE ADULT - SUBJECTIVE AND OBJECTIVE BOX
Patient is a 63y old  Male who presents with a chief complaint of Right leg infection. (14 Jul 2023 17:01)      INTERVAL HPI/OVERNIGHT EVENTS: none     MEDICATIONS  (STANDING):  amLODIPine   Tablet 10 milliGRAM(s) Oral daily  ascorbic acid 500 milliGRAM(s) Oral daily  carvedilol 3.125 milliGRAM(s) Oral every 12 hours  Dakins Solution - 1/2 Strength 1 Application(s) Topical daily  enalapril 20 milliGRAM(s) Oral daily  furosemide   Injectable 40 milliGRAM(s) IV Push two times a day  heparin   Injectable 5000 Unit(s) SubCutaneous every 8 hours  hydrALAZINE 25 milliGRAM(s) Oral three times a day  pantoprazole    Tablet 40 milliGRAM(s) Oral before breakfast  piperacillin/tazobactam IVPB.. 3.375 Gram(s) IV Intermittent every 8 hours  polyethylene glycol 3350 17 Gram(s) Oral daily  potassium chloride    Tablet ER 20 milliEquivalent(s) Oral daily  senna 2 Tablet(s) Oral at bedtime    MEDICATIONS  (PRN):        Allergies    No Known Allergies    Intolerances        REVIEW OF SYSTEMS:  CONSTITUTIONAL: No fever, weight loss  EYES: No eye pain, visual disturbances, or discharge  ENMT:  No difficulty hearing, tinnitus, vertigo; No sinus or throat pain  RESPIRATORY: No cough, wheezing, chills or hemoptysis; No shortness of breath  CARDIOVASCULAR: No chest pain, palpitations, dizziness, or leg swelling  GASTROINTESTINAL: No abdominal or epigastric pain. No nausea, vomiting, or hematemesis; No diarrhea or constipation. No melena or hematochezia.  GENITOURINARY: No dysuria, frequency, hematuria, or incontinence  NEUROLOGICAL: No headaches, memory loss, loss of strength, numbness, or tremors  SKIN: No itching, burning, rashes, or lesions   MUSCULOSKELETAL: No joint pain or swelling; No muscle, back, or extremity pain  PSYCHIATRIC: No depression, anxiety, mood swings, or difficulty sleeping  HEME/LYMPH: No easy bruising, or bleeding gums      Vital Signs Last 24 Hrs  T(C): 36.6 (17 Jul 2023 11:55), Max: 36.9 (16 Jul 2023 17:27)  T(F): 97.9 (17 Jul 2023 11:55), Max: 98.5 (16 Jul 2023 17:27)  HR: 65 (17 Jul 2023 11:55) (64 - 70)  BP: 121/77 (17 Jul 2023 11:55) (115/70 - 125/75)  BP(mean): --  RR: 18 (17 Jul 2023 11:55) (18 - 19)  SpO2: 100% (17 Jul 2023 11:55) (90% - 100%)    Parameters below as of 17 Jul 2023 11:55  Patient On (Oxygen Delivery Method): nasal cannula, 3litr          PHYSICAL EXAM:  GENERAL: NAD  HEAD:  Atraumatic, Normocephalic  EYES: EOMI, PERRLA, conjunctiva and sclera clear  ENMT: No tonsillar erythema, exudates, or enlargement;   NECK: Supple, Normal thyroid  NERVOUS SYSTEM:  Alert & Oriented X3, Good concentration; Motor Strength 5/5 B/L upper and lower extremities; DTRs 2+ intact and symmetric  CHEST/LUNG: CTABL; No rales, rhonchi, wheezing, or rubs  HEART: Regular rate and rhythm; No murmurs, rubs, or gallops  ABDOMEN: Soft, Nontender, Nondistended; Bowel sounds present  EXTREMITIES:  2+ edema R>L. RLE ulcer   LYMPH: No lymphadenopathy noted  SKIN: No rashes or lesions    LABS:                          07-17    136  |  88<L>  |  13  ----------------------------<  94  3.9   |  >45<HH>  |  0.85    Ca    8.8      17 Jul 2023 07:02        Urinalysis Basic - ( 17 Jul 2023 07:02 )    Color: x / Appearance: x / SG: x / pH: x  Gluc: 94 mg/dL / Ketone: x  / Bili: x / Urobili: x   Blood: x / Protein: x / Nitrite: x   Leuk Esterase: x / RBC: x / WBC x   Sq Epi: x / Non Sq Epi: x / Bacteria: x          Color: x / Appearance: x / SG: x / pH: x  Gluc: 94 mg/dL / Ketone: x  / Bili: x / Urobili: x   Blood: x / Protein: x / Nitrite: x   Leuk Esterase: x / RBC: x / WBC x   Sq Epi: x / Non Sq Epi: x / Bacteria: x        CAPILLARY BLOOD GLUCOSE      POCT Blood Glucose.: 128 mg/dL (14 Jul 2023 13:05)      RADIOLOGY & ADDITIONAL TESTS:    Imaging Personally Reviewed:  [x ] YES  [ ] NO    Consultant(s) Notes Reviewed:  [ ] YES  [ ] NO    Care Discussed with Consultants/Other Providers [ ] YES  [ ] NO

## 2023-07-17 NOTE — DIETITIAN INITIAL EVALUATION ADULT - PERTINENT LABORATORY DATA
07-17    136  |  88<L>  |  13  ----------------------------<  94  3.9   |  >45<HH>  |  0.85    Ca    8.8      17 Jul 2023 07:02

## 2023-07-17 NOTE — DIETITIAN INITIAL EVALUATION ADULT - PERTINENT MEDS FT
MEDICATIONS  (STANDING):  amLODIPine   Tablet 10 milliGRAM(s) Oral daily  ascorbic acid 500 milliGRAM(s) Oral daily  carvedilol 3.125 milliGRAM(s) Oral every 12 hours  Dakins Solution - 1/2 Strength 1 Application(s) Topical daily  enalapril 20 milliGRAM(s) Oral daily  furosemide   Injectable 40 milliGRAM(s) IV Push two times a day  heparin   Injectable 5000 Unit(s) SubCutaneous every 8 hours  hydrALAZINE 25 milliGRAM(s) Oral three times a day  pantoprazole    Tablet 40 milliGRAM(s) Oral before breakfast  piperacillin/tazobactam IVPB.. 3.375 Gram(s) IV Intermittent every 8 hours  polyethylene glycol 3350 17 Gram(s) Oral daily  potassium chloride    Tablet ER 20 milliEquivalent(s) Oral daily  senna 2 Tablet(s) Oral at bedtime    MEDICATIONS  (PRN):

## 2023-07-17 NOTE — DIETITIAN INITIAL EVALUATION ADULT - NUTRITIONGOAL OUTCOME2
Pt to consume portion-controlled meals during LOS; promote desirable wt loss 1-2 lbs./week s/p discharge from hospital

## 2023-07-17 NOTE — PROGRESS NOTE ADULT - ASSESSMENT
63 year old male PMH HTN, morbid obesity, p/w right lower ext wound and diff walking.  Has been in wound care for weeks but last time was 2 weeks ago. Recently notes increased difficulty walking.  Last time walked was 1 year ago.  Brother from out of state came to visit and told him to come to hospital.  Lives in an apt but says hard to care for self.  Denies cp, sob, fever, chills, nausea, vomiting.        RLE stage II ulcers with maggots and bed bugs   - Vanco and Zosyn with vitamin C started by admitter  - wbc normal, afebrile and has borderline esr   - CT of LE shows no discreet abscess. Will stop vanco and c/w zosyn for now   - c./w lasix and check daily weight. clinically improving     acute hypoxic resp failure with hypercarbia   - suspect component of  fluid overload   - ABG shows chronic co2 retention. Likely 2/2 body habitus. will likely need home bipap. consulted pulm   - c/w lasix   - echo - last one 2021 showed aortic aneurysm       Continue home meds all at home dose of Norvasc, Coreg, Vasotec, Lasix and daily KCL.  Add Hydralazine   25 mg tid for BP control.      dc to [facility] vs home when medically stable . will need trilogy and likely home o2

## 2023-07-17 NOTE — DIETITIAN NUTRITION RISK NOTIFICATION - TREATMENT: THE FOLLOWING DIET HAS BEEN RECOMMENDED
Diet, DASH/TLC:   Sodium & Cholesterol Restricted  Liquid Protein Supplement     Qty per Day:  1 (07-17-23 @ 15:16) [Pending Verification By Attending]  Diet, Regular (07-14-23 @ 17:01) [Active]

## 2023-07-18 LAB
ANION GAP SERPL CALC-SCNC: 2 MMOL/L — LOW (ref 5–17)
BUN SERPL-MCNC: 22 MG/DL — SIGNIFICANT CHANGE UP (ref 7–23)
CALCIUM SERPL-MCNC: 8.5 MG/DL — SIGNIFICANT CHANGE UP (ref 8.5–10.1)
CHLORIDE SERPL-SCNC: 90 MMOL/L — LOW (ref 96–108)
CO2 SERPL-SCNC: 43 MMOL/L — HIGH (ref 22–31)
CREAT SERPL-MCNC: 1.14 MG/DL — SIGNIFICANT CHANGE UP (ref 0.5–1.3)
EGFR: 73 ML/MIN/1.73M2 — SIGNIFICANT CHANGE UP
GLUCOSE SERPL-MCNC: 138 MG/DL — HIGH (ref 70–99)
POTASSIUM SERPL-MCNC: 3 MMOL/L — LOW (ref 3.5–5.3)
POTASSIUM SERPL-SCNC: 3 MMOL/L — LOW (ref 3.5–5.3)
SODIUM SERPL-SCNC: 135 MMOL/L — SIGNIFICANT CHANGE UP (ref 135–145)

## 2023-07-18 PROCEDURE — 99232 SBSQ HOSP IP/OBS MODERATE 35: CPT

## 2023-07-18 RX ORDER — ACETAZOLAMIDE 250 MG/1
500 TABLET ORAL ONCE
Refills: 0 | Status: COMPLETED | OUTPATIENT
Start: 2023-07-18 | End: 2023-07-18

## 2023-07-18 RX ORDER — LACTULOSE 10 G/15ML
20 SOLUTION ORAL EVERY 4 HOURS
Refills: 0 | Status: COMPLETED | OUTPATIENT
Start: 2023-07-18 | End: 2023-07-18

## 2023-07-18 RX ORDER — POTASSIUM CHLORIDE 20 MEQ
40 PACKET (EA) ORAL EVERY 4 HOURS
Refills: 0 | Status: COMPLETED | OUTPATIENT
Start: 2023-07-18 | End: 2023-07-18

## 2023-07-18 RX ADMIN — HEPARIN SODIUM 5000 UNIT(S): 5000 INJECTION INTRAVENOUS; SUBCUTANEOUS at 14:24

## 2023-07-18 RX ADMIN — POLYETHYLENE GLYCOL 3350 17 GRAM(S): 17 POWDER, FOR SOLUTION ORAL at 11:44

## 2023-07-18 RX ADMIN — CARVEDILOL PHOSPHATE 3.12 MILLIGRAM(S): 80 CAPSULE, EXTENDED RELEASE ORAL at 17:51

## 2023-07-18 RX ADMIN — LACTULOSE 20 GRAM(S): 10 SOLUTION ORAL at 10:27

## 2023-07-18 RX ADMIN — HEPARIN SODIUM 5000 UNIT(S): 5000 INJECTION INTRAVENOUS; SUBCUTANEOUS at 06:20

## 2023-07-18 RX ADMIN — Medication 40 MILLIEQUIVALENT(S): at 22:05

## 2023-07-18 RX ADMIN — PIPERACILLIN AND TAZOBACTAM 25 GRAM(S): 4; .5 INJECTION, POWDER, LYOPHILIZED, FOR SOLUTION INTRAVENOUS at 17:51

## 2023-07-18 RX ADMIN — HEPARIN SODIUM 5000 UNIT(S): 5000 INJECTION INTRAVENOUS; SUBCUTANEOUS at 22:04

## 2023-07-18 RX ADMIN — Medication 20 MILLIEQUIVALENT(S): at 11:45

## 2023-07-18 RX ADMIN — CARVEDILOL PHOSPHATE 3.12 MILLIGRAM(S): 80 CAPSULE, EXTENDED RELEASE ORAL at 06:19

## 2023-07-18 RX ADMIN — SENNA PLUS 2 TABLET(S): 8.6 TABLET ORAL at 22:05

## 2023-07-18 RX ADMIN — Medication 1 APPLICATION(S): at 11:45

## 2023-07-18 RX ADMIN — PIPERACILLIN AND TAZOBACTAM 25 GRAM(S): 4; .5 INJECTION, POWDER, LYOPHILIZED, FOR SOLUTION INTRAVENOUS at 10:45

## 2023-07-18 RX ADMIN — Medication 20 MILLIGRAM(S): at 06:19

## 2023-07-18 RX ADMIN — PANTOPRAZOLE SODIUM 40 MILLIGRAM(S): 20 TABLET, DELAYED RELEASE ORAL at 08:52

## 2023-07-18 RX ADMIN — AMLODIPINE BESYLATE 10 MILLIGRAM(S): 2.5 TABLET ORAL at 06:20

## 2023-07-18 RX ADMIN — Medication 500 MILLIGRAM(S): at 11:46

## 2023-07-18 RX ADMIN — PIPERACILLIN AND TAZOBACTAM 25 GRAM(S): 4; .5 INJECTION, POWDER, LYOPHILIZED, FOR SOLUTION INTRAVENOUS at 00:27

## 2023-07-18 RX ADMIN — LACTULOSE 20 GRAM(S): 10 SOLUTION ORAL at 17:50

## 2023-07-18 RX ADMIN — Medication 25 MILLIGRAM(S): at 22:05

## 2023-07-18 RX ADMIN — ACETAZOLAMIDE 110 MILLIGRAM(S): 250 TABLET ORAL at 10:26

## 2023-07-18 RX ADMIN — Medication 25 MILLIGRAM(S): at 06:20

## 2023-07-18 RX ADMIN — Medication 40 MILLIEQUIVALENT(S): at 17:51

## 2023-07-18 RX ADMIN — LACTULOSE 20 GRAM(S): 10 SOLUTION ORAL at 14:20

## 2023-07-18 NOTE — PROGRESS NOTE ADULT - SUBJECTIVE AND OBJECTIVE BOX
CHIEF COMPLAINT:  ===============  - Right leg wound    INTERVAL EVENTS:  ==================  - used BiPap  overnight   -states breathing feels improved       REVIEW OF SYSTEMS:  ==================  - no fever, no chills  - no HA, no dizziness  - no visual changes, no auditory changes  - no sore throat, no sinus congestion  - no SOB, no cough  - no chest pain, no palpitations  - no abdominal pain, no N/V/D  - no dysuria, no hematuria  - no myalgias, no arthralgias  - no pain in extremity, no swelling   - no rashes, no pruritis  - no neuro deficits  - no psychiatric concerns       HPI:  HPI:  63 year old male PMH only HTN, morbid obesity, p/w right lower ext wound and diff walking.  Has been in wound care for weeks but last time was 2 weeks ago. Recently notes increased difficulty walking.  Brother from out of state came to visit and told him to come to hospital.  Lives in an apt but says hard to care for self.  Denies cp, sob, fever, chills, nausea, vomiting. Goes to wound   care clinic, not clear last time her was there.   (14 Jul 2023 17:01)      OBJECTIVE:  ===========    ICU Vital Signs Last 24 Hrs  T(C): 36.3 (18 Jul 2023 05:06), Max: 36.6 (17 Jul 2023 11:55)  T(F): 97.3 (18 Jul 2023 05:06), Max: 97.9 (17 Jul 2023 11:55)  HR: 71 (18 Jul 2023 09:12) (62 - 93)  BP: 122/71 (18 Jul 2023 05:06) (121/77 - 130/72)  RR: 18 (18 Jul 2023 05:06) (18 - 18)  SpO2: 91% (18 Jul 2023 09:12) (91% - 100%)    O2 Parameters below as of 18 Jul 2023 05:06  Patient On (Oxygen Delivery Method): nasal cannula      07-17 @ 07:01  -  07-18 @ 07:00  --------------------------------------------------------  IN: 200 mL / OUT: 0 mL / NET: 200 mL      CAPILLARY BLOOD GLUCOSE      PHYSICAL EXAM:  ==============  General: NAD   Respiratory:  CTA B/L   Cardiovascular: S1S2 RRR  Abdomen:  soft + BS   Extremities: Right leg dressing CDI   Skin:  vascular changes to LE   Neurological: no deficits   Psychiatry: appropriate     HOSPITAL MEDICATIONS:  ======================  heparin   Injectable 5000 Unit(s) SubCutaneous every 8 hours  piperacillin/tazobactam IVPB.. 3.375 Gram(s) IV Intermittent every 8 hours  amLODIPine   Tablet 10 milliGRAM(s) Oral daily  carvedilol 3.125 milliGRAM(s) Oral every 12 hours  enalapril 20 milliGRAM(s) Oral daily  hydrALAZINE 25 milliGRAM(s) Oral three times a day  lactulose Syrup 20 Gram(s) Oral every 4 hours  pantoprazole    Tablet 40 milliGRAM(s) Oral before breakfast  polyethylene glycol 3350 17 Gram(s) Oral daily  senna 2 Tablet(s) Oral at bedtime  ascorbic acid 500 milliGRAM(s) Oral daily  potassium chloride    Tablet ER 20 milliEquivalent(s) Oral daily  Dakins Solution - 1/2 Strength 1 Application(s) Topical daily        LABS:  =====    Hgb Trend: 11.2<--  07-17    136  |  88<L>  |  13  ----------------------------<  94  3.9   |  >45<HH>  |  0.85    Ca    8.8      17 Jul 2023 07:02      Creatinine Trend: 0.85<--, 0.77<--, 0.78<--    Lactate, Blood: 0.8    Urinalysis Basic - ( 17 Jul 2023 07:02 )    Color: x / Appearance: x / SG: x / pH: x  Gluc: 94 mg/dL / Ketone: x  / Bili: x / Urobili: x   Blood: x / Protein: x / Nitrite: x   Leuk Esterase: x / RBC: x / WBC x   Sq Epi: x / Non Sq Epi: x / Bacteria: x      Arterial Blood Gas:  07-17 @ 04:36  7.44/75/54/51/88.8/22.0  ABG lactate: --  Arterial Blood Gas:  07-16 @ 12:20  7.44/76/50/52/86.3/22.6  ABG lactate: --    MICROBIOLOGY:     Culture - Urine (collected 07-14-23 @ 14:20)  Source: Clean Catch Clean Catch (Midstream)  Final Report (07-15-23 @ 15:27):    No growth    Culture - Blood (collected 07-14-23 @ 14:20)  Source: .Blood Blood-Peripheral  Preliminary Report (07-17-23 @ 18:01):    No growth at 72 Hours    Culture - Blood (collected 07-14-23 @ 14:20)  Source: .Blood Blood-Peripheral  Preliminary Report (07-17-23 @ 18:01):    No growth at 72 Hours    piperacillin/tazobactam IVPB..: 25,   vancomycin  IVPB: 166.67,        RADIOLOGY:  ==========  rad< from: CT Lower Extremity w/ IV Cont, Right (07.15.23 @ 12:21) >  IMPRESSION:    Marked circumferential subcutaneous soft tissue edema as well as skin   thickening throughout the visualized right lower extremity that is most   prominent at the level of the inner thigh where thereadditionally is   fluid attenuation within the skin suggesting skin blistering. There is no   discrete peripherally enhancing fluid collection identified.    < end of copied text >  < from: Xray Chest 1 View-PORTABLE IMMEDIATE (07.14.23 @ 15:40) >  IMPRESSION: Mild central CHF versus prior. Heart enlargement again noted.    < end of copied text >    PULMONARY:  ============  - Bipap 12/5 30%   -2 L NC     CARDIAC:  ========  echo - pending

## 2023-07-18 NOTE — PROGRESS NOTE ADULT - ASSESSMENT
63 year old male PMH HTN, morbid obesity, p/w right lower ext wound and diff walking.  Has been in wound care for weeks but last time was 2 weeks ago. Recently notes increased difficulty walking.  Last time walked was 1 year ago.  Brother from out of state came to visit and told him to come to hospital.  Lives in an apt but says hard to care for self.  Denies cp, sob, fever, chills, nausea, vomiting.        RLE stage II ulcers with maggots and bed bugs   - wbc normal, afebrile and has borderline esr   - CT of LE shows no discreet abscess. Will stop vanco and c/w zosyn for now. will swtich to keflex tomorrow   - c./w lasix and check daily weight. clinically improving     acute hypoxic resp failure with hypercarbia   - suspect component of  fluid overload   - ABG shows chronic co2 retention. Likely 2/2 body habitus. will likely need home bipap. consulted pulm   - hold lasix for now. s/p diamox x1. f/u bicarb    - f/u echo - last one 2021 showed aortic aneurysm       Continue home meds all at home dose of Norvasc, Coreg, Vasotec, Lasix and daily KCL.  Add Hydralazine   25 mg tid for BP control.      dc to [facility]  when medically stable . will need trilogy and likely home o2. echo pending. dc planning  63 year old male PMH HTN, morbid obesity, p/w right lower ext wound and diff walking.  Has been in wound care for weeks but last time was 2 weeks ago. Recently notes increased difficulty walking.  Last time walked was 1 year ago.  Brother from out of state came to visit and told him to come to hospital.  Lives in an apt but says hard to care for self.  Denies cp, sob, fever, chills, nausea, vomiting.        RLE stage II ulcers with maggots and bed bugs   - wbc normal, afebrile and has borderline esr   - CT of LE shows no discreet abscess. Will stop vanco and c/w zosyn for now. will swtich to keflex tomorrow   - c./w lasix and check daily weight. clinically improving     acute hypoxic resp failure with hypercarbia   - suspect component of  fluid overload   - ABG shows chronic co2 retention. Likely 2/2 body habitus. will likely need home bipap. consulted pulm   - hold lasix for now. s/p diamox x1. f/u bicarb    - f/u echo - last one 2021 showed aortic aneurysm     RUE swelling  - check doppler      Continue home meds all at home dose of Norvasc, Coreg, Vasotec, Lasix and daily KCL.  Add Hydralazine   25 mg tid for BP control.      dc to [facility]  when medically stable . will need trilogy and likely home o2. echo pending. dc planning

## 2023-07-18 NOTE — PROGRESS NOTE ADULT - SUBJECTIVE AND OBJECTIVE BOX
Patient is a 63y old  Male who presents with a chief complaint of Right leg infection. (14 Jul 2023 17:01)      INTERVAL HPI/OVERNIGHT EVENTS: none     MEDICATIONS  (STANDING):  amLODIPine   Tablet 10 milliGRAM(s) Oral daily  ascorbic acid 500 milliGRAM(s) Oral daily  carvedilol 3.125 milliGRAM(s) Oral every 12 hours  Dakins Solution - 1/2 Strength 1 Application(s) Topical daily  enalapril 20 milliGRAM(s) Oral daily  furosemide   Injectable 40 milliGRAM(s) IV Push two times a day  heparin   Injectable 5000 Unit(s) SubCutaneous every 8 hours  hydrALAZINE 25 milliGRAM(s) Oral three times a day  pantoprazole    Tablet 40 milliGRAM(s) Oral before breakfast  piperacillin/tazobactam IVPB.. 3.375 Gram(s) IV Intermittent every 8 hours  polyethylene glycol 3350 17 Gram(s) Oral daily  potassium chloride    Tablet ER 20 milliEquivalent(s) Oral daily  senna 2 Tablet(s) Oral at bedtime    MEDICATIONS  (PRN):        Allergies    No Known Allergies    Intolerances        REVIEW OF SYSTEMS:  CONSTITUTIONAL: No fever, weight loss  EYES: No eye pain, visual disturbances, or discharge  ENMT:  No difficulty hearing, tinnitus, vertigo; No sinus or throat pain  RESPIRATORY: No cough, wheezing, chills or hemoptysis; No shortness of breath  CARDIOVASCULAR: No chest pain, palpitations, dizziness, or leg swelling  GASTROINTESTINAL: No abdominal or epigastric pain. No nausea, vomiting, or hematemesis; No diarrhea or constipation. No melena or hematochezia.  GENITOURINARY: No dysuria, frequency, hematuria, or incontinence  NEUROLOGICAL: No headaches, memory loss, loss of strength, numbness, or tremors  SKIN: No itching, burning, rashes, or lesions   MUSCULOSKELETAL: No joint pain or swelling; No muscle, back, or extremity pain  PSYCHIATRIC: No depression, anxiety, mood swings, or difficulty sleeping  HEME/LYMPH: No easy bruising, or bleeding gums      Vital Signs Last 24 Hrs  T(C): 36.3 (18 Jul 2023 05:06), Max: 36.6 (17 Jul 2023 11:55)  T(F): 97.3 (18 Jul 2023 05:06), Max: 97.9 (17 Jul 2023 11:55)  HR: 93 (18 Jul 2023 05:06) (62 - 93)  BP: 122/71 (18 Jul 2023 05:06) (121/77 - 130/72)  BP(mean): --  RR: 18 (18 Jul 2023 05:06) (18 - 18)  SpO2: 98% (18 Jul 2023 05:06) (96% - 100%)    Parameters below as of 18 Jul 2023 05:06  Patient On (Oxygen Delivery Method): nasal cannula        PHYSICAL EXAM:  GENERAL: NAD  HEAD:  Atraumatic, Normocephalic  EYES: EOMI, PERRLA, conjunctiva and sclera clear  ENMT: No tonsillar erythema, exudates, or enlargement;   NECK: Supple, Normal thyroid  NERVOUS SYSTEM:  Alert & Oriented X3, Good concentration; Motor Strength 5/5 B/L upper and lower extremities; DTRs 2+ intact and symmetric  CHEST/LUNG: CTABL; No rales, rhonchi, wheezing, or rubs  HEART: Regular rate and rhythm; No murmurs, rubs, or gallops  ABDOMEN: Soft, Nontender, Nondistended; Bowel sounds present  EXTREMITIES:  2+ edema R>L. RLE ulcer swelling and erythema much improved   LYMPH: No lymphadenopathy noted  SKIN: No rashes or lesions    LABS:                          07-17    136  |  88<L>  |  13  ----------------------------<  94  3.9   |  >45<HH>  |  0.85    Ca    8.8      17 Jul 2023 07:02        Urinalysis Basic - ( 17 Jul 2023 07:02 )    Color: x / Appearance: x / SG: x / pH: x  Gluc: 94 mg/dL / Ketone: x  / Bili: x / Urobili: x   Blood: x / Protein: x / Nitrite: x   Leuk Esterase: x / RBC: x / WBC x   Sq Epi: x / Non Sq Epi: x / Bacteria: x          Color: x / Appearance: x / SG: x / pH: x  Gluc: 94 mg/dL / Ketone: x  / Bili: x / Urobili: x   Blood: x / Protein: x / Nitrite: x   Leuk Esterase: x / RBC: x / WBC x   Sq Epi: x / Non Sq Epi: x / Bacteria: x        CAPILLARY BLOOD GLUCOSE      POCT Blood Glucose.: 128 mg/dL (14 Jul 2023 13:05)      RADIOLOGY & ADDITIONAL TESTS:    Imaging Personally Reviewed:  [x ] YES  [ ] NO    Consultant(s) Notes Reviewed:  [ ] YES  [ ] NO    Care Discussed with Consultants/Other Providers [ ] YES  [ ] NO

## 2023-07-18 NOTE — PROGRESS NOTE ADULT - ASSESSMENT
Assessment: 63 year old male PMH HTN, morbid obesity, p/w right lower ext wound and diff walking.  Has been in wound care for weeks but last time was 2 weeks ago. Recently notes increased difficulty walking & reports he hasn't walked for 1 year ago.  Lives in an apt but says hard to care for self. Pulmonary consulted as patient is hypoxic requiring NC and given body habitus has required bipap HS     Dx: Acute/ Chronic hypoxic Respiratory Failure, HTN, Morbid Obesity, Right Leg wound     Plan:   1. Acute on Chronic hypoxic respiratory failure with hypercapnia ABG with chronic C02 retention with a pCO2 75 with serum Co2 > 45 but normal pH.  - patient used BiPap overnight , now on 2 L NC O2 saturation 96% -  - titrate O2 to 1 L as tolerated  - patient feels breathing is improved today   - BiCarb remains  elevated today - will dose Diamox additional today   - Given these lab abnormalities and body habitus will qualify for for home bipap.   - Continue bipap HS, will need case management to help organize home bipap   - Requires outpatient pulmonary FUP with PFTs and a sleep study. Please call [phone] or email  [email or web address] for an appointment at the Pulmonary office ([address], [address], [place], [place]).       2. HTN   - pending echo  - continue current BP management     3. Right Leg Wound  - chronic right LE ulcers.   - vascular following - dressing per recommendations     *Plan of care discussed with Pulmonary attending, MD Jose

## 2023-07-19 LAB
ANION GAP SERPL CALC-SCNC: 3 MMOL/L — LOW (ref 5–17)
BUN SERPL-MCNC: 22 MG/DL — SIGNIFICANT CHANGE UP (ref 7–23)
CALCIUM SERPL-MCNC: 9.6 MG/DL — SIGNIFICANT CHANGE UP (ref 8.5–10.1)
CHLORIDE SERPL-SCNC: 93 MMOL/L — LOW (ref 96–108)
CO2 SERPL-SCNC: 37 MMOL/L — HIGH (ref 22–31)
CREAT SERPL-MCNC: 1.08 MG/DL — SIGNIFICANT CHANGE UP (ref 0.5–1.3)
CULTURE RESULTS: SIGNIFICANT CHANGE UP
CULTURE RESULTS: SIGNIFICANT CHANGE UP
EGFR: 78 ML/MIN/1.73M2 — SIGNIFICANT CHANGE UP
GLUCOSE SERPL-MCNC: 101 MG/DL — HIGH (ref 70–99)
POTASSIUM SERPL-MCNC: 3.6 MMOL/L — SIGNIFICANT CHANGE UP (ref 3.5–5.3)
POTASSIUM SERPL-SCNC: 3.6 MMOL/L — SIGNIFICANT CHANGE UP (ref 3.5–5.3)
SODIUM SERPL-SCNC: 133 MMOL/L — LOW (ref 135–145)
SPECIMEN SOURCE: SIGNIFICANT CHANGE UP
SPECIMEN SOURCE: SIGNIFICANT CHANGE UP

## 2023-07-19 PROCEDURE — 99232 SBSQ HOSP IP/OBS MODERATE 35: CPT

## 2023-07-19 PROCEDURE — 93971 EXTREMITY STUDY: CPT | Mod: 26,RT

## 2023-07-19 RX ORDER — MORPHINE SULFATE 50 MG/1
2 CAPSULE, EXTENDED RELEASE ORAL EVERY 4 HOURS
Refills: 0 | Status: DISCONTINUED | OUTPATIENT
Start: 2023-07-19 | End: 2023-07-24

## 2023-07-19 RX ORDER — FUROSEMIDE 40 MG
40 TABLET ORAL DAILY
Refills: 0 | Status: DISCONTINUED | OUTPATIENT
Start: 2023-07-19 | End: 2023-07-24

## 2023-07-19 RX ORDER — OXYCODONE AND ACETAMINOPHEN 5; 325 MG/1; MG/1
1 TABLET ORAL EVERY 6 HOURS
Refills: 0 | Status: DISCONTINUED | OUTPATIENT
Start: 2023-07-19 | End: 2023-07-24

## 2023-07-19 RX ADMIN — SENNA PLUS 2 TABLET(S): 8.6 TABLET ORAL at 22:12

## 2023-07-19 RX ADMIN — Medication 20 MILLIGRAM(S): at 05:31

## 2023-07-19 RX ADMIN — Medication 1 APPLICATION(S): at 18:04

## 2023-07-19 RX ADMIN — HEPARIN SODIUM 5000 UNIT(S): 5000 INJECTION INTRAVENOUS; SUBCUTANEOUS at 14:08

## 2023-07-19 RX ADMIN — Medication 500 MILLIGRAM(S): at 12:32

## 2023-07-19 RX ADMIN — PIPERACILLIN AND TAZOBACTAM 25 GRAM(S): 4; .5 INJECTION, POWDER, LYOPHILIZED, FOR SOLUTION INTRAVENOUS at 10:31

## 2023-07-19 RX ADMIN — PIPERACILLIN AND TAZOBACTAM 25 GRAM(S): 4; .5 INJECTION, POWDER, LYOPHILIZED, FOR SOLUTION INTRAVENOUS at 01:31

## 2023-07-19 RX ADMIN — HEPARIN SODIUM 5000 UNIT(S): 5000 INJECTION INTRAVENOUS; SUBCUTANEOUS at 05:32

## 2023-07-19 RX ADMIN — PANTOPRAZOLE SODIUM 40 MILLIGRAM(S): 20 TABLET, DELAYED RELEASE ORAL at 12:32

## 2023-07-19 RX ADMIN — HEPARIN SODIUM 5000 UNIT(S): 5000 INJECTION INTRAVENOUS; SUBCUTANEOUS at 22:11

## 2023-07-19 RX ADMIN — MORPHINE SULFATE 2 MILLIGRAM(S): 50 CAPSULE, EXTENDED RELEASE ORAL at 18:04

## 2023-07-19 RX ADMIN — POLYETHYLENE GLYCOL 3350 17 GRAM(S): 17 POWDER, FOR SOLUTION ORAL at 12:32

## 2023-07-19 RX ADMIN — AMLODIPINE BESYLATE 10 MILLIGRAM(S): 2.5 TABLET ORAL at 05:31

## 2023-07-19 RX ADMIN — Medication 25 MILLIGRAM(S): at 22:12

## 2023-07-19 RX ADMIN — PIPERACILLIN AND TAZOBACTAM 25 GRAM(S): 4; .5 INJECTION, POWDER, LYOPHILIZED, FOR SOLUTION INTRAVENOUS at 18:04

## 2023-07-19 RX ADMIN — Medication 20 MILLIEQUIVALENT(S): at 12:32

## 2023-07-19 RX ADMIN — Medication 25 MILLIGRAM(S): at 05:31

## 2023-07-19 RX ADMIN — CARVEDILOL PHOSPHATE 3.12 MILLIGRAM(S): 80 CAPSULE, EXTENDED RELEASE ORAL at 18:04

## 2023-07-19 NOTE — PROGRESS NOTE ADULT - ASSESSMENT
Assessment: 63 year old male PMH HTN, morbid obesity, p/w right lower ext wound and diff walking.  Has been in wound care for weeks but last time was 2 weeks ago. Recently notes increased difficulty walking & reports he hasn't walked for 1 year ago.  Lives in an apt but says hard to care for self. Pulmonary consulted as patient is hypoxic requiring NC and given body habitus has required bipap HS     Dx: Acute/ Chronic hypoxic Respiratory Failure, HTN, Morbid Obesity, Right Leg wound     Plan:   1. Acute on Chronic hypoxic respiratory failure with hypercapnia   - patient used BiPap overnight , now on 1L NC O2 saturation 96% -  - titrate O2 down as tolerated   - patient feels breathing is improved today   - BiCarb remains  elevated today -   - Given these lab abnormalities and body habitus will qualify for for home bipap.   - Continue bipap HS, will need case management to help organize home bipap   - Requires outpatient pulmonary FUP with PFTs and a sleep study. Please call [phone] or email  [email or web address] for an appointment at the Pulmonary office ([address] [place] [address], [address], [place], [place]).       2. HTN   - pending echo  - continue current BP management     3. Right Leg Wound  - chronic right LE ulcers.   - vascular following - dressing per recommendations     *Plan of care discussed with Pulmonary attending, MD Jose  Assessment: 63 year old male PMH HTN, morbid obesity, p/w right lower ext wound and diff walking.  Has been in wound care for weeks but last time was 2 weeks ago. Recently notes increased difficulty walking & reports he hasn't walked for 1 year ago.  Lives in an apt but says hard to care for self. Pulmonary consulted as patient is hypoxic requiring NC and given body habitus has required bipap HS     Dx: Acute/ Chronic hypoxic Respiratory Failure, HTN, Morbid Obesity, Right Leg wound     Plan:   1. Acute on Chronic hypoxic respiratory failure with hypercapnia   - patient used BiPap overnight , now on 1L NC O2 saturation 96% -  - titrate O2 down as tolerated   - patient feels breathing is improved today   - BiCarb improved -   - Given these lab abnormalities and body habitus will qualify for for home bipap.   - Continue bipap HS, will need case management to help organize home bipap   - Requires outpatient pulmonary FUP with PFTs and a sleep study. Please call [phone] or email  [email or web address] for an appointment at the Pulmonary office ([address] [place] [address], [address], [place], [place]).     2. HTN   - Echo completed - results pending   - continue current BP management     3. Right Leg Wound  - chronic right LE ulcers.   - vascular following - dressing per recommendations     4. Right upper arm swelling   - doppler negative      *Plan of care discussed with Pulmonary attending, MD Jose

## 2023-07-19 NOTE — PROGRESS NOTE ADULT - ASSESSMENT
63 year old male PMH HTN, morbid obesity, p/w right lower ext wound and diff walking.  Has been in wound care for weeks but last time was 2 weeks ago. Recently notes increased difficulty walking.  Last time walked was 1 year ago.  Brother from [place] state came to visit and told him to come to hospital.  Lives in an apt but says hard to care for self.  Denies cp, sob, fever, chills, nausea, vomiting.        RLE stage II ulcers with maggots and bed bugs   per my colleague's documentation  "wbc normal, afebrile and has borderline esr   - CT of LE shows no discreet abscess. Will stop vanco and c/w zosyn for now. will swtich to keflex tomorrow   - c./w lasix and check daily weight. clinically improving "  7/19/2023 - appreciated dr. camilo note supportive care and oupt f/u    acute hypoxic resp failure with hypercarbia   per my colleague's documentation " suspect component of  fluid overload   - ABG shows chronic co2 retention. Likely 2/2 body habitus. will likely need home bipap. consulted pulm   - hold lasix for now. s/p diamox x1. f/u bicarb    - f/u echo - last one 2021 showed aortic aneurysm "    7/19/2023 echo done today f/u results   was resumed on lasix , bicarbonate better today  on labs at 37 down from 43   appreciate pulmonary consult     RUE swelling  - check doppler  7/19/2023 doppler negative      HTN_ bp stable  continue with meds      dc to [facility]  when medically stable . will need trilogy and likely home o2.

## 2023-07-19 NOTE — PROGRESS NOTE ADULT - SUBJECTIVE AND OBJECTIVE BOX
Patient is a 63y old  Male who presents with a chief complaint of Right leg infection. (14 Jul 2023 17:01)      INTERVAL HPI/OVERNIGHT EVENTS: none     MEDICATIONS  (STANDING):  amLODIPine   Tablet 10 milliGRAM(s) Oral daily  ascorbic acid 500 milliGRAM(s) Oral daily  carvedilol 3.125 milliGRAM(s) Oral every 12 hours  Dakins Solution - 1/2 Strength 1 Application(s) Topical daily  enalapril 20 milliGRAM(s) Oral daily  furosemide   Injectable 40 milliGRAM(s) IV Push daily  heparin   Injectable 5000 Unit(s) SubCutaneous every 8 hours  hydrALAZINE 25 milliGRAM(s) Oral three times a day  pantoprazole    Tablet 40 milliGRAM(s) Oral before breakfast  piperacillin/tazobactam IVPB.. 3.375 Gram(s) IV Intermittent every 8 hours  polyethylene glycol 3350 17 Gram(s) Oral daily  potassium chloride    Tablet ER 20 milliEquivalent(s) Oral daily  senna 2 Tablet(s) Oral at bedtime    MEDICATIONS  (PRN):        Allergies    No Known Allergies    Intolerances          Vital Signs Last 24 Hrs  T(C): 36.3 (18 Jul 2023 05:06), Max: 36.6 (17 Jul 2023 11:55)  T(F): 97.3 (18 Jul 2023 05:06), Max: 97.9 (17 Jul 2023 11:55)  HR: 93 (18 Jul 2023 05:06) (62 - 93)  BP: 122/71 (18 Jul 2023 05:06) (121/77 - 130/72)  BP(mean): --  RR: 18 (18 Jul 2023 05:06) (18 - 18)  SpO2: 98% (18 Jul 2023 05:06) (96% - 100%)    Parameters below as of 18 Jul 2023 05:06  Patient On (Oxygen Delivery Method): nasal cannula        PHYSICAL EXAM:  GENERAL: NAD  HEAD:  Atraumatic, Normocephalic  EYES: EOMI, PERRLA, conjunctiva and sclera clear  ENMT: No tonsillar erythema, exudates, or enlargement;   NECK: Supple,   NERVOUS SYSTEM:  Alert & Oriented X3, Good concentration; Motor Strength 5/5 B/L upper and lower extremities; DTRs 2+ intact and symmetric  CHEST/LUNG: CTABL; No rales, rhonchi, wheezing, or rubs  HEART: Regular rate and rhythm; No murmurs, rubs, or gallops  ABDOMEN: Soft, Nontender, Nondistended; Bowel sounds present  EXTREMITIES:  2+ edema R>L. RLE ulcer swelling and erythema much improved   SKIN: No rashes or lesions    LABS:                 07-19    133<L>  |  93<L>  |  22  ----------------------------<  101<H>  3.6   |  37<H>  |  1.08    Ca    9.6      19 Jul 2023 06:35          CAPILLARY BLOOD GLUCOSE  CAPILLARY BLOOD GLUCOSE          RADIOLOGY & ADDITIONAL TESTS:  < from: US Duplex Venous Upper Ext Ltd, Right (07.19.23 @ 10:12) >    IMPRESSION:  No evidence of right upper extremity deep venous thrombosis.      < end of copied text >    Imaging Personally Reviewed:  [x ] YES  [ ] NO    Consultant(s) Notes Reviewed:  [ ] YES  [ ] NO    Care Discussed with Consultants/Other Providers [ ] YES  [ ] NO Patient is a 63y old  Male who presents with a chief complaint of Right leg infection. (14 Jul 2023 17:01)      INTERVAL HPI/OVERNIGHT EVENTS: none     MEDICATIONS  (STANDING):  amLODIPine   Tablet 10 milliGRAM(s) Oral daily  ascorbic acid 500 milliGRAM(s) Oral daily  carvedilol 3.125 milliGRAM(s) Oral every 12 hours  Dakins Solution - 1/2 Strength 1 Application(s) Topical daily  enalapril 20 milliGRAM(s) Oral daily  furosemide   Injectable 40 milliGRAM(s) IV Push daily  heparin   Injectable 5000 Unit(s) SubCutaneous every 8 hours  hydrALAZINE 25 milliGRAM(s) Oral three times a day  pantoprazole    Tablet 40 milliGRAM(s) Oral before breakfast  piperacillin/tazobactam IVPB.. 3.375 Gram(s) IV Intermittent every 8 hours  polyethylene glycol 3350 17 Gram(s) Oral daily  potassium chloride    Tablet ER 20 milliEquivalent(s) Oral daily  senna 2 Tablet(s) Oral at bedtime    MEDICATIONS  (PRN):        Allergies    No Known Allergies    Intolerances          Vital Signs Last 24 Hrs  T(C): 36.3 (18 Jul 2023 05:06), Max: 36.6 (17 Jul 2023 11:55)  T(F): 97.3 (18 Jul 2023 05:06), Max: 97.9 (17 Jul 2023 11:55)  HR: 93 (18 Jul 2023 05:06) (62 - 93)  BP: 122/71 (18 Jul 2023 05:06) (121/77 - 130/72)  BP(mean): --  RR: 18 (18 Jul 2023 05:06) (18 - 18)  SpO2: 98% (18 Jul 2023 05:06) (96% - 100%)    Parameters below as of 18 Jul 2023 05:06  Patient On (Oxygen Delivery Method): nasal cannula        PHYSICAL EXAM:  GENERAL: NAD  HEAD:  Atraumatic, Normocephalic  EYES: EOMI, PERRLA, conjunctiva and sclera clear  ENMT: No tonsillar erythema, exudates, or enlargement;   NECK: Supple,   NERVOUS SYSTEM:  Alert & Oriented X3, Good concentration; Motor Strength 5/5 B/L upper and lower extremities; DTRs 2+ intact and symmetric  CHEST/LUNG: CTABL; No rales, rhonchi, wheezing, or rubs  HEART: Regular rate and rhythm; No murmurs, rubs, or gallops  ABDOMEN: Soft, Nontender, Nondistended; Bowel sounds present  EXTREMITIES:  2+ edema R>L. RLE ulcer swelling and erythema much improved , rue edema erythema and warmth  SKIN: No rashes or lesions    LABS:                 07-19    133<L>  |  93<L>  |  22  ----------------------------<  101<H>  3.6   |  37<H>  |  1.08    Ca    9.6      19 Jul 2023 06:35          CAPILLARY BLOOD GLUCOSE  CAPILLARY BLOOD GLUCOSE          RADIOLOGY & ADDITIONAL TESTS:  < from: US Duplex Venous Upper Ext Ltd, Right (07.19.23 @ 10:12) >    IMPRESSION:  No evidence of right upper extremity deep venous thrombosis.      < end of copied text >    Imaging Personally Reviewed:  [x ] YES  [ ] NO    Consultant(s) Notes Reviewed:  [ ] YES  [ ] NO    Care Discussed with Consultants/Other Providers [ ] YES  [ ] NO

## 2023-07-19 NOTE — CHART NOTE - NSCHARTNOTEFT_GEN_A_CORE
Hospitalist Medicine NP    Called by Aric in echo department for patient in need of Definity study with TTE. Procedure explained to patient along with risks/benefits of Definity contrast. Written consent obtained.    Pushed Definity 1.5 cc in 10 cc saline syringe for TTE study. 2 cc pushed. Patient tolerated procedure well.    Pre-Procedure Vital Signs:  T(C): 37.2 (07-19-23 @ 04:46), Max: 37.2 (07-18-23 @ 23:48)  HR: 73 (07-19-23 @ 08:44) (58 - 76)  BP: 123/72 (07-19-23 @ 04:46) (101/61 - 124/72)  RR: 18 (07-19-23 @ 04:46) (17 - 18)  SpO2: 95% (07-19-23 @ 08:44) (91% - 97%)    LOT# [id number]  EXP# 7/1/2024    RN instructed to recheck VS and monitor for headache/signs of anaphylaxis once patient returns to floor.

## 2023-07-19 NOTE — PROGRESS NOTE ADULT - SUBJECTIVE AND OBJECTIVE BOX
CHIEF COMPLAINT:  ===============  - Right leg wound    INTERVAL EVENTS:  ==================  - used BiPap overnight 22:00-5:20  - last 24 hr tmax 98.1      REVIEW OF SYSTEMS:  ==================  - no fever, no chills  - no HA, no dizziness  - no visual changes, no auditory changes  - no sore throat, no sinus congestion  - no SOB, no cough  - no chest pain, no palpitations  - no abdominal pain, no N/V/D  - no dysuria, no hematuria  - no myalgias, no arthralgias  - no pain in extremity, no swelling   - no rashes, no pruritis  - no neuro deficits  - no psychiatric concerns     HPI:  63 year old male PMH only HTN, morbid obesity, p/w right lower ext wound and diff walking.  Has been in wound care for weeks but last time was 2 weeks ago. Recently notes increased difficulty walking.  Brother from out of state came to visit and told him to come to hospital.  Lives in an apt but says hard to care for self.  Denies cp, sob, fever, chills, nausea, vomiting. Goes to wound   care clinic, not clear last time her was there.   (14 Jul 2023 17:01)      OBJECTIVE:  ===========    ICU Vital Signs Last 24 Hrs  T(C): 36.3 (18 Jul 2023 05:06), Max: 36.6 (17 Jul 2023 11:55)  T(F): 97.3 (18 Jul 2023 05:06), Max: 97.9 (17 Jul 2023 11:55)  HR: 71 (18 Jul 2023 09:12) (62 - 93)  BP: 122/71 (18 Jul 2023 05:06) (121/77 - 130/72)  RR: 18 (18 Jul 2023 05:06) (18 - 18)  SpO2: 91% (18 Jul 2023 09:12) (91% - 100%)    O2 Parameters below as of 18 Jul 2023 05:06  Patient On (Oxygen Delivery Method): nasal cannula      07-17 @ 07:01  -  07-18 @ 07:00  --------------------------------------------------------  IN: 200 mL / OUT: 0 mL / NET: 200 mL    CAPILLARY BLOOD GLUCOSE    PHYSICAL EXAM:  ==============  General: NAD   Respiratory:  CTA B/L   Cardiovascular: S1S2 RRR  Abdomen:  soft + BS   Extremities: Right leg dressing CDI   Skin:  vascular changes to LE   Neurological: no deficits   Psychiatry: appropriate     HOSPITAL MEDICATIONS:  ======================  heparin   Injectable 5000 Unit(s) SubCutaneous every 8 hours  piperacillin/tazobactam IVPB.. 3.375 Gram(s) IV Intermittent every 8 hours  amLODIPine   Tablet 10 milliGRAM(s) Oral daily  carvedilol 3.125 milliGRAM(s) Oral every 12 hours  enalapril 20 milliGRAM(s) Oral daily  hydrALAZINE 25 milliGRAM(s) Oral three times a day  lactulose Syrup 20 Gram(s) Oral every 4 hours  pantoprazole    Tablet 40 milliGRAM(s) Oral before breakfast  polyethylene glycol 3350 17 Gram(s) Oral daily  senna 2 Tablet(s) Oral at bedtime  ascorbic acid 500 milliGRAM(s) Oral daily  potassium chloride    Tablet ER 20 milliEquivalent(s) Oral daily  Dakins Solution - 1/2 Strength 1 Application(s) Topical daily        LABS:  =====    Hgb Trend: 11.2<--  07-17    136  |  88<L>  |  13  ----------------------------<  94  3.9   |  >45<HH>  |  0.85    Ca    8.8      17 Jul 2023 07:02      Creatinine Trend: 0.85<--, 0.77<--, 0.78<--    Lactate, Blood: 0.8    Urinalysis Basic - ( 17 Jul 2023 07:02 )    Color: x / Appearance: x / SG: x / pH: x  Gluc: 94 mg/dL / Ketone: x  / Bili: x / Urobili: x   Blood: x / Protein: x / Nitrite: x   Leuk Esterase: x / RBC: x / WBC x   Sq Epi: x / Non Sq Epi: x / Bacteria: x      Arterial Blood Gas:  07-17 @ 04:36  7.44/75/54/51/88.8/22.0  ABG lactate: --  Arterial Blood Gas:  07-16 @ 12:20  7.44/76/50/52/86.3/22.6  ABG lactate: --    MICROBIOLOGY:     Culture - Urine (collected 07-14-23 @ 14:20)  Source: Clean Catch Clean Catch (Midstream)  Final Report (07-15-23 @ 15:27):    No growth    Culture - Blood (collected 07-14-23 @ 14:20)  Source: .Blood Blood-Peripheral  Preliminary Report (07-17-23 @ 18:01):    No growth at 72 Hours    Culture - Blood (collected 07-14-23 @ 14:20)  Source: .Blood Blood-Peripheral  Preliminary Report (07-17-23 @ 18:01):    No growth at 72 Hours    piperacillin/tazobactam IVPB..: 25,   vancomycin  IVPB: 166.67,        RADIOLOGY:  ==========   CT Lower Extremity w/ IV Cont, Right (07.15.23 @ 12:21) >  IMPRESSION:    Marked circumferential subcutaneous soft tissue edema as well as skin   thickening throughout the visualized right lower extremity that is most   prominent at the level of the inner thigh where thereadditionally is   fluid attenuation within the skin suggesting skin blistering. There is no   discrete peripherally enhancing fluid collection identified.      Xray Chest 1 View-PORTABLE IMMEDIATE (07.14.23 @ 15:40) >  IMPRESSION: Mild central CHF versus prior. Heart enlargement again noted.      PULMONARY:  ============  - Bipap 12/5 30%   -2 L NC     CARDIAC:  ========  echo - pending      CHIEF COMPLAINT:  ===============  - Right leg wound    INTERVAL EVENTS:  ==================  - used BiPap overnight 22:00-5:20  - last 24 hr tmax 98.1      REVIEW OF SYSTEMS:  ==================  - no fever, no chills  - no HA, no dizziness  - no visual changes, no auditory changes  - no sore throat, no sinus congestion  - no SOB, no cough  - no chest pain, no palpitations  - no abdominal pain, no N/V/D  - no dysuria, no hematuria  - no myalgias, no arthralgias  - no pain in extremity, + slight right arm swelling   - no rashes, no pruritis  - no neuro deficits  - no psychiatric concerns     HPI:  63 year old male PMH only HTN, morbid obesity, p/w right lower ext wound and diff walking.  Has been in wound care for weeks but last time was 2 weeks ago. Recently notes increased difficulty walking.  Brother from out of state came to visit and told him to come to hospital.  Lives in an apt but says hard to care for self.  Denies cp, sob, fever, chills, nausea, vomiting. Goes to wound   care clinic, not clear last time her was there.   (14 Jul 2023 17:01)      OBJECTIVE:  ===========    ICU Vital Signs Last 24 Hrs  T(C): 36.3 (18 Jul 2023 05:06), Max: 36.6 (17 Jul 2023 11:55)  T(F): 97.3 (18 Jul 2023 05:06), Max: 97.9 (17 Jul 2023 11:55)  HR: 71 (18 Jul 2023 09:12) (62 - 93)  BP: 122/71 (18 Jul 2023 05:06) (121/77 - 130/72)  RR: 18 (18 Jul 2023 05:06) (18 - 18)  SpO2: 91% (18 Jul 2023 09:12) (91% - 100%)    O2 Parameters below as of 18 Jul 2023 05:06  Patient On (Oxygen Delivery Method): nasal cannula      07-17 @ 07:01  -  07-18 @ 07:00  --------------------------------------------------------  IN: 200 mL / OUT: 0 mL / NET: 200 mL    CAPILLARY BLOOD GLUCOSE    PHYSICAL EXAM:  ==============  General: NAD   Respiratory:  CTA B/L   Cardiovascular: S1S2 RRR  Abdomen:  soft + BS   Extremities: Right leg dressing CDI   Skin:  vascular changes to LE   Neurological: no deficits   Psychiatry: appropriate     HOSPITAL MEDICATIONS:  ======================  heparin   Injectable 5000 Unit(s) SubCutaneous every 8 hours  piperacillin/tazobactam IVPB.. 3.375 Gram(s) IV Intermittent every 8 hours  amLODIPine   Tablet 10 milliGRAM(s) Oral daily  carvedilol 3.125 milliGRAM(s) Oral every 12 hours  enalapril 20 milliGRAM(s) Oral daily  hydrALAZINE 25 milliGRAM(s) Oral three times a day  lactulose Syrup 20 Gram(s) Oral every 4 hours  pantoprazole    Tablet 40 milliGRAM(s) Oral before breakfast  polyethylene glycol 3350 17 Gram(s) Oral daily  senna 2 Tablet(s) Oral at bedtime  ascorbic acid 500 milliGRAM(s) Oral daily  potassium chloride    Tablet ER 20 milliEquivalent(s) Oral daily  Dakins Solution - 1/2 Strength 1 Application(s) Topical daily        LABS:  =====    Hgb Trend: 11.2<--  07-17    136  |  88<L>  |  13  ----------------------------<  94  3.9   |  >45<HH>  |  0.85    Ca    8.8      17 Jul 2023 07:02      Creatinine Trend: 0.85<--, 0.77<--, 0.78<--    Lactate, Blood: 0.8    Urinalysis Basic - ( 17 Jul 2023 07:02 )    Color: x / Appearance: x / SG: x / pH: x  Gluc: 94 mg/dL / Ketone: x  / Bili: x / Urobili: x   Blood: x / Protein: x / Nitrite: x   Leuk Esterase: x / RBC: x / WBC x   Sq Epi: x / Non Sq Epi: x / Bacteria: x      Arterial Blood Gas:  07-17 @ 04:36  7.44/75/54/51/88.8/22.0  ABG lactate: --  Arterial Blood Gas:  07-16 @ 12:20  7.44/76/50/52/86.3/22.6  ABG lactate: --    MICROBIOLOGY:     Culture - Urine (collected 07-14-23 @ 14:20)  Source: Clean Catch Clean Catch (Midstream)  Final Report (07-15-23 @ 15:27):    No growth    Culture - Blood (collected 07-14-23 @ 14:20)  Source: .Blood Blood-Peripheral  Preliminary Report (07-17-23 @ 18:01):    No growth at 72 Hours    Culture - Blood (collected 07-14-23 @ 14:20)  Source: .Blood Blood-Peripheral  Preliminary Report (07-17-23 @ 18:01):    No growth at 72 Hours    piperacillin/tazobactam IVPB..: 25,   vancomycin  IVPB: 166.67,        RADIOLOGY:  ==========   CT Lower Extremity w/ IV Cont, Right (07.15.23 @ 12:21) >  IMPRESSION:    Marked circumferential subcutaneous soft tissue edema as well as skin   thickening throughout the visualized right lower extremity that is most   prominent at the level of the inner thigh where thereadditionally is   fluid attenuation within the skin suggesting skin blistering. There is no   discrete peripherally enhancing fluid collection identified.      Xray Chest 1 View-PORTABLE IMMEDIATE (07.14.23 @ 15:40) >  IMPRESSION: Mild central CHF versus prior. Heart enlargement again noted.      PULMONARY:  ============  - Bipap 12/5 30%   -2 L NC     CARDIAC:  ========  echo - pending

## 2023-07-20 LAB
ANION GAP SERPL CALC-SCNC: 2 MMOL/L — LOW (ref 5–17)
BUN SERPL-MCNC: 25 MG/DL — HIGH (ref 7–23)
CALCIUM SERPL-MCNC: 8.9 MG/DL — SIGNIFICANT CHANGE UP (ref 8.5–10.1)
CHLORIDE SERPL-SCNC: 92 MMOL/L — LOW (ref 96–108)
CO2 SERPL-SCNC: 38 MMOL/L — HIGH (ref 22–31)
CREAT SERPL-MCNC: 1.06 MG/DL — SIGNIFICANT CHANGE UP (ref 0.5–1.3)
EGFR: 80 ML/MIN/1.73M2 — SIGNIFICANT CHANGE UP
ERYTHROCYTE [SEDIMENTATION RATE] IN BLOOD: 67 MM/HR — HIGH (ref 0–20)
GLUCOSE SERPL-MCNC: 114 MG/DL — HIGH (ref 70–99)
HCT VFR BLD CALC: 36.1 % — LOW (ref 39–50)
HGB BLD-MCNC: 10.2 G/DL — LOW (ref 13–17)
MAGNESIUM SERPL-MCNC: 2 MG/DL — SIGNIFICANT CHANGE UP (ref 1.6–2.6)
MCHC RBC-ENTMCNC: 24.7 PG — LOW (ref 27–34)
MCHC RBC-ENTMCNC: 28.3 G/DL — LOW (ref 32–36)
MCV RBC AUTO: 87.4 FL — SIGNIFICANT CHANGE UP (ref 80–100)
NRBC # BLD: 0 /100 WBCS — SIGNIFICANT CHANGE UP (ref 0–0)
PHOSPHATE SERPL-MCNC: 2.9 MG/DL — SIGNIFICANT CHANGE UP (ref 2.5–4.5)
PLATELET # BLD AUTO: 325 K/UL — SIGNIFICANT CHANGE UP (ref 150–400)
POTASSIUM SERPL-MCNC: 3.7 MMOL/L — SIGNIFICANT CHANGE UP (ref 3.5–5.3)
POTASSIUM SERPL-SCNC: 3.7 MMOL/L — SIGNIFICANT CHANGE UP (ref 3.5–5.3)
RBC # BLD: 4.13 M/UL — LOW (ref 4.2–5.8)
RBC # FLD: 15.6 % — HIGH (ref 10.3–14.5)
SODIUM SERPL-SCNC: 132 MMOL/L — LOW (ref 135–145)
WBC # BLD: 7.5 K/UL — SIGNIFICANT CHANGE UP (ref 3.8–10.5)
WBC # FLD AUTO: 7.5 K/UL — SIGNIFICANT CHANGE UP (ref 3.8–10.5)

## 2023-07-20 PROCEDURE — 99254 IP/OBS CNSLTJ NEW/EST MOD 60: CPT

## 2023-07-20 PROCEDURE — 99232 SBSQ HOSP IP/OBS MODERATE 35: CPT

## 2023-07-20 RX ORDER — VANCOMYCIN HCL 1 G
VIAL (EA) INTRAVENOUS
Refills: 0 | Status: DISCONTINUED | OUTPATIENT
Start: 2023-07-20 | End: 2023-07-24

## 2023-07-20 RX ORDER — VANCOMYCIN HCL 1 G
1000 VIAL (EA) INTRAVENOUS EVERY 12 HOURS
Refills: 0 | Status: DISCONTINUED | OUTPATIENT
Start: 2023-07-20 | End: 2023-07-24

## 2023-07-20 RX ORDER — VANCOMYCIN HCL 1 G
1000 VIAL (EA) INTRAVENOUS ONCE
Refills: 0 | Status: COMPLETED | OUTPATIENT
Start: 2023-07-20 | End: 2023-07-20

## 2023-07-20 RX ADMIN — PANTOPRAZOLE SODIUM 40 MILLIGRAM(S): 20 TABLET, DELAYED RELEASE ORAL at 06:13

## 2023-07-20 RX ADMIN — Medication 20 MILLIEQUIVALENT(S): at 12:12

## 2023-07-20 RX ADMIN — Medication 250 MILLIGRAM(S): at 20:52

## 2023-07-20 RX ADMIN — CARVEDILOL PHOSPHATE 3.12 MILLIGRAM(S): 80 CAPSULE, EXTENDED RELEASE ORAL at 05:34

## 2023-07-20 RX ADMIN — AMLODIPINE BESYLATE 10 MILLIGRAM(S): 2.5 TABLET ORAL at 05:35

## 2023-07-20 RX ADMIN — Medication 250 MILLIGRAM(S): at 12:02

## 2023-07-20 RX ADMIN — Medication 40 MILLIGRAM(S): at 05:34

## 2023-07-20 RX ADMIN — Medication 20 MILLIGRAM(S): at 05:33

## 2023-07-20 RX ADMIN — Medication 25 MILLIGRAM(S): at 15:00

## 2023-07-20 RX ADMIN — HEPARIN SODIUM 5000 UNIT(S): 5000 INJECTION INTRAVENOUS; SUBCUTANEOUS at 05:33

## 2023-07-20 RX ADMIN — SENNA PLUS 2 TABLET(S): 8.6 TABLET ORAL at 21:56

## 2023-07-20 RX ADMIN — HEPARIN SODIUM 5000 UNIT(S): 5000 INJECTION INTRAVENOUS; SUBCUTANEOUS at 21:56

## 2023-07-20 RX ADMIN — PIPERACILLIN AND TAZOBACTAM 25 GRAM(S): 4; .5 INJECTION, POWDER, LYOPHILIZED, FOR SOLUTION INTRAVENOUS at 01:25

## 2023-07-20 RX ADMIN — Medication 25 MILLIGRAM(S): at 05:35

## 2023-07-20 RX ADMIN — Medication 500 MILLIGRAM(S): at 12:14

## 2023-07-20 RX ADMIN — Medication 1 APPLICATION(S): at 19:00

## 2023-07-20 RX ADMIN — CARVEDILOL PHOSPHATE 3.12 MILLIGRAM(S): 80 CAPSULE, EXTENDED RELEASE ORAL at 19:12

## 2023-07-20 RX ADMIN — Medication 25 MILLIGRAM(S): at 21:55

## 2023-07-20 RX ADMIN — HEPARIN SODIUM 5000 UNIT(S): 5000 INJECTION INTRAVENOUS; SUBCUTANEOUS at 19:00

## 2023-07-20 NOTE — CONSULT NOTE ADULT - ATTENDING COMMENTS
Onychomycosis 1-10  Debrided nails 1-10  Application of betadine solution   Needs podiatric care every 2months

## 2023-07-20 NOTE — PROGRESS NOTE ADULT - ASSESSMENT
63 year old male PMH HTN, morbid obesity, p/w right lower ext wound and diff walking.  Has been in wound care for weeks but last time was 2 weeks ago. Recently notes increased difficulty walking.  Last time walked was 1 year ago.  Brother from out of state came to visit and told him to come to hospital.  Lives in an apt but says hard to care for self.  Denies cp, sob, fever, chills, nausea, vomiting.        RLE stage II ulcers with maggots and bed bugs   per my colleague's documentation  "wbc normal, afebrile and has borderline esr   - CT of LE shows no discreet abscess. Will stop vanco and c/w zosyn for now. will swtich to keflex tomorrow   - c./w lasix and check daily weight. clinically improving "  7/19/2023 - appreciated dr. camilo note supportive care and oupt f/u    acute hypoxic resp failure with hypercarbia   per my colleague's documentation " suspect component of  fluid overload   - ABG shows chronic co2 retention. Likely 2/2 body habitus. will likely need home bipap. consulted pulm   - hold lasix for now. s/p diamox x1. f/u bicarb    - f/u echo - last one 2021 showed aortic aneurysm "    7/19/2023 echo done today f/u results   was resumed on lasix , bicarbonate better today  on labs at 37 down from 43   appreciate pulmonary consult   7/20/2023 echo as above - aortic aneurysm is stable when compared to 4/2021   appreciate pulm consult    RUE swelling  - check doppler  7/19/2023 doppler negative  supportive care      HTN_ bp stable  continue with meds      dc to [facility]  when medically stable . will need trilogy and likely home o2.

## 2023-07-20 NOTE — PROGRESS NOTE ADULT - SUBJECTIVE AND OBJECTIVE BOX
Patient is a 63y old  Male who presents with a chief complaint of Right leg infection. (14 Jul 2023 17:01)      INTERVAL HPI/OVERNIGHT EVENTS: none     MEDICATIONS  (STANDING):  amLODIPine   Tablet 10 milliGRAM(s) Oral daily  ascorbic acid 500 milliGRAM(s) Oral daily  carvedilol 3.125 milliGRAM(s) Oral every 12 hours  Dakins Solution - 1/2 Strength 1 Application(s) Topical daily  enalapril 20 milliGRAM(s) Oral daily  furosemide   Injectable 40 milliGRAM(s) IV Push daily  heparin   Injectable 5000 Unit(s) SubCutaneous every 8 hours  hydrALAZINE 25 milliGRAM(s) Oral three times a day  pantoprazole    Tablet 40 milliGRAM(s) Oral before breakfast  polyethylene glycol 3350 17 Gram(s) Oral daily  potassium chloride    Tablet ER 20 milliEquivalent(s) Oral daily  senna 2 Tablet(s) Oral at bedtime  vancomycin  IVPB        MEDICATIONS  (PRN):  morphine  - Injectable 2 milliGRAM(s) IV Push every 4 hours PRN Severe Pain (7 - 10)  oxycodone    5 mG/acetaminophen 325 mG 1 Tablet(s) Oral every 6 hours PRN Moderate Pain (4 - 6)      Allergies    No Known Allergies    Intolerances    Vital Signs Last 24 Hrs  T(C): 36.7 (20 Jul 2023 05:13), Max: 37.1 (19 Jul 2023 23:52)  T(F): 98.1 (20 Jul 2023 05:13), Max: 98.8 (19 Jul 2023 23:52)  HR: 69 (20 Jul 2023 09:35) (59 - 71)  BP: 137/76 (20 Jul 2023 05:13) (112/66 - 137/76)  BP(mean): --  RR: 18 (20 Jul 2023 05:13) (17 - 18)  SpO2: 97% (20 Jul 2023 09:35) (94% - 98%)    Parameters below as of 20 Jul 2023 05:13  Patient On (Oxygen Delivery Method): room air      PHYSICAL EXAM:  GENERAL: NAD  HEAD:  Atraumatic, Normocephalic  EYES: EOMI, PERRLA, conjunctiva and sclera clear  ENMT: No tonsillar erythema, exudates, or enlargement;   NECK: Supple,   NERVOUS SYSTEM:  Alert & Oriented X3, Good concentration; Motor Strength 5/5 B/L upper and lower extremities; DTRs 2+ intact and symmetric  CHEST/LUNG: CTABL; No rales, rhonchi, wheezing, or rubs  HEART: Regular rate and rhythm; No murmurs, rubs, or gallops  ABDOMEN: Soft, Nontender, Nondistended; Bowel sounds present  EXTREMITIES:  2+ edema R>L. RLE ulcer swelling and erythema much improved , rue edema erythema and warmth  SKIN: No rashes or lesions    LABS:                                   10.2   7.50  )-----------( 325      ( 20 Jul 2023 05:40 )             36.1   07-20    132<L>  |  92<L>  |  25<H>  ----------------------------<  114<H>  3.7   |  38<H>  |  1.06    Ca    8.9      20 Jul 2023 05:40  Phos  2.9     07-20  Mg     2.0     07-20          CAPILLARY BLOOD GLUCOSE  CAPILLARY BLOOD GLUCOSE          RADIOLOGY & ADDITIONAL TESTS:  < from: US Duplex Venous Upper Ext Ltd, Right (07.19.23 @ 10:12) >    IMPRESSION:  No evidence of right upper extremity deep venous thrombosis.      < end of copied text >    Imaging Personally Reviewed:  [x ] YES  [ ] NO    Consultant(s) Notes Reviewed:  [ ] YES  [ ] NO    Care Discussed with Consultants/Other Providers [ ] YES  [ ] NO

## 2023-07-20 NOTE — CONSULT NOTE ADULT - ASSESSMENT
62 y/o Male presents with fungal toenails 1-5 b/l  - pt seen and evaluated   - afebrile, WBC 7.50  - hypertrophic fungal nails noted to digits 1-5 b/l, no open wounds, dry skin to plantar aspects b/l no acute signs of infection b/l   - pod plan: fungal nails 1-5 b/l were debrided using a septic technique & cleansed with betadine   - please reconsult as needed   - seen with attending 
63 year old male HTN, morbid obesity, non-ambulatory x1 year, admitted with right lower extremity cellulitis. Vascular surgery consulted regarding chronic right LE ulcers.     - dress with xeroform, guaze, ABD pads, wrap in kerlix daily  - f/u blood, wound cultures  - no vascular surgery interventions necessary at this time  - continue care per primary medical team  - discussed with Dr. Lima 
Assessment: 63 year old male PMH HTN, morbid obesity, p/w right lower ext wound and diff walking.  Has been in wound care for weeks but last time was 2 weeks ago. Recently notes increased difficulty walking & reports he hasn't walked for 1 year ago.  Lives in an apt but says hard to care for self. Pulmonary consulted as patient is hypoxic requiring NC and given body habitus has required bipap HS     Plan:   - Acute on Chronic hypoxic respiratory failure with hypercapnia ABG with chronic C02 retention with a pCO2 75 with serum Co2 > 45 but normal pH. Given these lab abnormalities and body habitus will qualify for for home bipap.   - Saturating well on 2L NC, weaned from 4L. Given body habitus and hx patient most likely is baseline hypoxic. Wean o2 with goal saturation > 89%. Hypoxia most likely cardiac in origin with evident fluid overload. Continue aggressive diuresis. Can benefit from a dose of Diamox elevate bicarb.   - needs echo f/u   - Continue bipap HS, will need case management to help organize home bipap   - Requires outpatient pulmonary FUP with PFTs and a sleep study. Please call [phone] or email  [email or web address] for an appointment at the Pulmonary office ([address], [address], [place], [place]).     *Plan of care discussed with Pulmonary attending, MD Jose 
Palpable cord R AC fossa with cellulitis  Possible site in hand as well  Edema elbow to hand  Chronic RLE ulcer since at least 2017, seen by vascular  Bedbugs- previously addressed  Maggots- previously addressed  Serial exams RUE  Stop zosyn  Will confer with pharmacy re: Vancomycin vs. Daptomycin this setting  No concern of systemic illness, I do not think patient merits blood cx at this time  LE wound care per vascular  Thank you for the courtesy of this referral.  Alex Cheatham MD  Attending Physician  [facility]  Division of Infectious Diseases  [phone]

## 2023-07-20 NOTE — CONSULT NOTE ADULT - SUBJECTIVE AND OBJECTIVE BOX
Patient is a 63y old  Male who presents with a chief complaint of Right leg infection. (19 Jul 2023 13:22)      HPI:  63 year old male PMH only HTN, morbid obesity, p/w right lower ext wound and diff walking.  Has been in wound care for weeks but last time was 2 weeks ago. Recently notes increased difficulty walking.  Brother from out of state came to visit and told him to come to hospital.  Lives in an apt but says hard to care for self.  Denies cp, sob, fever, chills, nausea, vomiting. Goes to wound   care clinic, not clear last time her was there.   (14 Jul 2023 17:01)      PAST MEDICAL & SURGICAL HISTORY:  Hypertension      PUD (peptic ulcer disease)      Osteoarthritis      Hypertension      Morbid obesity      Duodenal ulcer disease      Knee osteoarthritis      HTN (hypertension)      H/O ventral hernia repair      Abdominal hernia      Gastroduodenal artery bleed  IR embolization of gastroduodenal artery          MEDICATIONS  (STANDING):  amLODIPine   Tablet 10 milliGRAM(s) Oral daily  ascorbic acid 500 milliGRAM(s) Oral daily  carvedilol 3.125 milliGRAM(s) Oral every 12 hours  Dakins Solution - 1/2 Strength 1 Application(s) Topical daily  enalapril 20 milliGRAM(s) Oral daily  furosemide   Injectable 40 milliGRAM(s) IV Push daily  heparin   Injectable 5000 Unit(s) SubCutaneous every 8 hours  hydrALAZINE 25 milliGRAM(s) Oral three times a day  pantoprazole    Tablet 40 milliGRAM(s) Oral before breakfast  piperacillin/tazobactam IVPB.. 3.375 Gram(s) IV Intermittent every 8 hours  polyethylene glycol 3350 17 Gram(s) Oral daily  potassium chloride    Tablet ER 20 milliEquivalent(s) Oral daily  senna 2 Tablet(s) Oral at bedtime    MEDICATIONS  (PRN):  morphine  - Injectable 2 milliGRAM(s) IV Push every 4 hours PRN Severe Pain (7 - 10)  oxycodone    5 mG/acetaminophen 325 mG 1 Tablet(s) Oral every 6 hours PRN Moderate Pain (4 - 6)      Allergies    No Known Allergies    Intolerances        VITALS:    Vital Signs Last 24 Hrs  T(C): 36.7 (20 Jul 2023 05:13), Max: 37.1 (19 Jul 2023 23:52)  T(F): 98.1 (20 Jul 2023 05:13), Max: 98.8 (19 Jul 2023 23:52)  HR: 69 (20 Jul 2023 09:35) (59 - 71)  BP: 137/76 (20 Jul 2023 05:13) (112/66 - 137/76)  BP(mean): --  RR: 18 (20 Jul 2023 05:13) (17 - 18)  SpO2: 97% (20 Jul 2023 09:35) (94% - 98%)    Parameters below as of 20 Jul 2023 05:13  Patient On (Oxygen Delivery Method): room air        LABS:                          10.2   7.50  )-----------( 325      ( 20 Jul 2023 05:40 )             36.1       07-20    132<L>  |  92<L>  |  25<H>  ----------------------------<  114<H>  3.7   |  38<H>  |  1.06    Ca    8.9      20 Jul 2023 05:40  Phos  2.9     07-20  Mg     2.0     07-20        CAPILLARY BLOOD GLUCOSE              LOWER EXTREMITY PHYSICAL EXAM:    Vascular: DP/PT 1/4, B/L, CFT >3 seconds B/L, Temperature gradient warm to cool, B/L.   Neuro: Epicritic sensation diminished to the level of digits, B/L.  Musculoskeletal/Ortho: mild pitting edema noted b/l  Skin: hypertrophic fungal nails noted to digits 1-5 b/l, no open wounds, dry skin to plantar aspects b/l no acute signs of infection b/l       RADIOLOGY & ADDITIONAL STUDIES:

## 2023-07-20 NOTE — CONSULT NOTE ADULT - SUBJECTIVE AND OBJECTIVE BOX
Full note to follow  Palpable cord R AC fossa with cellulitis  Possible site in hand as well  Edema elbow to hand  Chronic RLE ulcer since at least 2017, seen by vascular  Bedbugs- previously addressed  Maggots- previously addressed  Serial exams RUE  Stop zosyn  Will confer with pharmacy re: Vancomycin vs. Daptomycin this setting  No concern of systemic illness, I do not think patient merits blood cx at this time  LE wound care per vascular  Thank you for the courtesy of this referral.  Alex Cheatham MD  Attending Physician  [facility]  Division of Infectious Diseases  [phone]  =============  [facility]  Division of Infectious Diseases  [phone]    CYNTHIA SALAZAR  63y, Male  [id number]    HPI--      PMH/PSH--  Hypertension    PUD (peptic ulcer disease)    Osteoarthritis    Hypertension    Morbid obesity    Duodenal ulcer disease    Knee osteoarthritis    HTN (hypertension)    No significant past surgical history    H/O ventral hernia repair    Abdominal hernia    Gastroduodenal artery bleed    No significant past surgical history        Allergies--  No Known Allergies      Medications--  Antibiotics: piperacillin/tazobactam IVPB.. 3.375 Gram(s) IV Intermittent every 8 hours    Immunologic:   Other: amLODIPine   Tablet  ascorbic acid  carvedilol  Dakins Solution - 1/2 Strength  enalapril  furosemide   Injectable  heparin   Injectable  hydrALAZINE  morphine  - Injectable PRN  oxycodone    5 mG/acetaminophen 325 mG PRN  pantoprazole    Tablet  polyethylene glycol 3350  potassium chloride    Tablet ER  senna    Antimicrobials last 90 days per EMR: MEDICATIONS  (STANDING):  piperacillin/tazobactam IVPB..   25 mL/Hr IV Intermittent (07-20-23 @ 01:25)   25 mL/Hr IV Intermittent (07-19-23 @ 18:04)   25 mL/Hr IV Intermittent (07-19-23 @ 10:31)   25 mL/Hr IV Intermittent (07-19-23 @ 01:31)   25 mL/Hr IV Intermittent (07-18-23 @ 17:51)   25 mL/Hr IV Intermittent (07-18-23 @ 10:45)   25 mL/Hr IV Intermittent (07-18-23 @ 00:27)   25 mL/Hr IV Intermittent (07-17-23 @ 17:07)   25 mL/Hr IV Intermittent (07-17-23 @ 10:05)   25 mL/Hr IV Intermittent (07-17-23 @ 02:16)   25 mL/Hr IV Intermittent (07-16-23 @ 16:59)   25 mL/Hr IV Intermittent (07-16-23 @ 09:07)   25 mL/Hr IV Intermittent (07-16-23 @ 01:05)   25 mL/Hr IV Intermittent (07-15-23 @ 17:36)   25 mL/Hr IV Intermittent (07-15-23 @ 08:01)   25 mL/Hr IV Intermittent (07-15-23 @ 00:17)    vancomycin  IVPB   166.67 mL/Hr IV Intermittent (07-17-23 @ 09:32)   166.67 mL/Hr IV Intermittent (07-16-23 @ 23:58)   166.67 mL/Hr IV Intermittent (07-16-23 @ 13:06)   166.67 mL/Hr IV Intermittent (07-16-23 @ 05:31)   166.67 mL/Hr IV Intermittent (07-15-23 @ 21:53)   166.67 mL/Hr IV Intermittent (07-15-23 @ 16:06)   166.67 mL/Hr IV Intermittent (07-15-23 @ 06:28)   166.67 mL/Hr IV Intermittent (07-14-23 @ 21:01)   166.67 mL/Hr IV Intermittent (07-14-23 @ 18:41)        Social History--  EtOH: denies   Tobacco: denies   Drug Use: denies     Family/Marital History--  No pertinent family history in first degree relatives    No pertinent family history in first degree relatives          Travel/Environmental/Occupational History:      Review of Systems:  A >=10-point review of systems was obtained.     Pertinent positives and negatives--  Constitutional: No fevers. No Chills. No Rigors.   Eyes:  ENMT:  Cardiovascular: No chest pain. No palpitations.  Respiratory: No shortness of breath. No cough.  Gastrointestinal: No nausea or vomiting. No diarrhea or constipation.   Genitourinary:  Musculoskeletal:  Skin:  Neurologic:  Psychiatric: Pleasant. Appropriate affect.  Endocrine:  Heme/Lymphatic:  Allergy/Immunologic:    Review of systems otherwise negative except as previously noted.    Physical Exam--  Vital Signs: T(F): 98.1 (07-20-23 @ 05:13), Max: 98.8 (07-19-23 @ 23:52)  HR: 69 (07-20-23 @ 09:35)  BP: 137/76 (07-20-23 @ 05:13)  RR: 18 (07-20-23 @ 05:13)  SpO2: 97% (07-20-23 @ 09:35)  Wt(kg): --  General: Nontoxic-appearing Male in no acute distress.  HEENT: AT/NC. PERRL. EOMI. Anicteric. Conjunctiva pink and moist. Oropharynx clear. Dentition fair.  Neck: Not rigid. No sense of mass.  Nodes: None palpable.  Lungs: Clear bilaterally without rales, wheezing or rhonchi  Heart: Regular rate and rhythm. No Murmur. No rub. No gallop. No palpable thrill.  Abdomen: Bowel sounds present and normoactive. Soft. Nondistended. Nontender. No sense of mass. No organomegaly.  Back: No spinal tenderness. No costovertebral angle tenderness.   Extremities: No cyanosis or clubbing. No edema.   Skin: Warm. Dry. Good turgor. No rash. No vasculitic stigmata.  Psychiatric: Appropriate affect and mood for situation.         Laboratory & Imaging Data--  CBC                        10.2   7.50  )-----------( 325      ( 20 Jul 2023 05:40 )             36.1       Chemistries  07-20    132<L>  |  92<L>  |  25<H>  ----------------------------<  114<H>  3.7   |  38<H>  |  1.06    Ca    8.9      20 Jul 2023 05:40  Phos  2.9     07-20  Mg     2.0     07-20        Culture Data    Culture - Urine (collected 14 Jul 2023 14:20)  Source: Clean Catch Clean Catch (Midstream)  Final Report (15 Jul 2023 15:27):    No growth    Culture - Blood (collected 14 Jul 2023 14:20)  Source: .Blood Blood-Peripheral  Final Report (19 Jul 2023 18:00):    No growth at 5 days    Culture - Blood (collected 14 Jul 2023 14:20)  Source: .Blood Blood-Peripheral  Final Report (19 Jul 2023 18:00):    No growth at 5 days         Full note to follow  Palpable cord R AC fossa with cellulitis  Possible site in hand as well  Edema elbow to hand  Chronic RLE ulcer since at least 2017, seen by vascular  Bedbugs- previously addressed  Maggots- previously addressed  Serial exams RUE  Stop zosyn  Will confer with pharmacy re: Vancomycin vs. Daptomycin this setting  No concern of systemic illness, I do not think patient merits blood cx at this time  LE wound care per vascular  Thank you for the courtesy of this referral.  Alex Cheatham MD  Attending Physician  [facility]  Division of Infectious Diseases  [phone]  =============  [facility]  Division of Infectious Diseases  [phone]    CYNTHIA SALAZAR  63y, Male  [id number]    HPI--  63 year old male PMH only HTN, morbid obesity, p/w right lower ext wound and diff walking.  Has been in wound care for weeks but last time was 2 weeks ago. Recently notes increased difficulty walking.  Brother from out of state came to visit and told him to come to hospital.  Lives in an apt but says hard to care for self.  Denies cp, sob, fever, chills, nausea, vomiting. Goes to wound   care clinic, not clear last time her was there.   (14 Jul 2023 17:01)    Hospital stay notable for possible bedbug infestation of patient's garments/belongings; maggots in patient's chonic leg wound (per review of EMR present since at least 2017); s/p assessment by vascular surgery; imaging without abscess; on broad spectrum antibitoics; and pulmonary evaluation for hypoxia attributed to OHS/LORI/component volume overload/diastolic heart failure.    Today patient c/o RUE redness, pain and swelling. Denies fevers, chills, or rigors. No other new complaints.       PMH/PSH--  Hypertension    PUD (peptic ulcer disease)    Osteoarthritis    Hypertension    Morbid obesity    Duodenal ulcer disease    Knee osteoarthritis    HTN (hypertension)    No significant past surgical history    H/O ventral hernia repair    Abdominal hernia    Gastroduodenal artery bleed    No significant past surgical history        Allergies--  No Known Allergies      Medications--  Antibiotics: piperacillin/tazobactam IVPB.. 3.375 Gram(s) IV Intermittent every 8 hours    Immunologic:   Other: amLODIPine   Tablet  ascorbic acid  carvedilol  Dakins Solution - 1/2 Strength  enalapril  furosemide   Injectable  heparin   Injectable  hydrALAZINE  morphine  - Injectable PRN  oxycodone    5 mG/acetaminophen 325 mG PRN  pantoprazole    Tablet  polyethylene glycol 3350  potassium chloride    Tablet ER  senna    Antimicrobials last 90 days per EMR: MEDICATIONS  (STANDING):  piperacillin/tazobactam IVPB..   25 mL/Hr IV Intermittent (07-20-23 @ 01:25)   25 mL/Hr IV Intermittent (07-19-23 @ 18:04)   25 mL/Hr IV Intermittent (07-19-23 @ 10:31)   25 mL/Hr IV Intermittent (07-19-23 @ 01:31)   25 mL/Hr IV Intermittent (07-18-23 @ 17:51)   25 mL/Hr IV Intermittent (07-18-23 @ 10:45)   25 mL/Hr IV Intermittent (07-18-23 @ 00:27)   25 mL/Hr IV Intermittent (07-17-23 @ 17:07)   25 mL/Hr IV Intermittent (07-17-23 @ 10:05)   25 mL/Hr IV Intermittent (07-17-23 @ 02:16)   25 mL/Hr IV Intermittent (07-16-23 @ 16:59)   25 mL/Hr IV Intermittent (07-16-23 @ 09:07)   25 mL/Hr IV Intermittent (07-16-23 @ 01:05)   25 mL/Hr IV Intermittent (07-15-23 @ 17:36)   25 mL/Hr IV Intermittent (07-15-23 @ 08:01)   25 mL/Hr IV Intermittent (07-15-23 @ 00:17)    vancomycin  IVPB   166.67 mL/Hr IV Intermittent (07-17-23 @ 09:32)   166.67 mL/Hr IV Intermittent (07-16-23 @ 23:58)   166.67 mL/Hr IV Intermittent (07-16-23 @ 13:06)   166.67 mL/Hr IV Intermittent (07-16-23 @ 05:31)   166.67 mL/Hr IV Intermittent (07-15-23 @ 21:53)   166.67 mL/Hr IV Intermittent (07-15-23 @ 16:06)   166.67 mL/Hr IV Intermittent (07-15-23 @ 06:28)   166.67 mL/Hr IV Intermittent (07-14-23 @ 21:01)   166.67 mL/Hr IV Intermittent (07-14-23 @ 18:41)        Social History--  EtOH: denies   Tobacco: denies   Drug Use: denies     Family/Marital History--  No pertinent family history in first degree relatives  No pertinent family history in first degree relatives        Review of Systems:  A >=10-point review of systems was obtained.   Review of systems otherwise negative except as previously noted.    Physical Exam--  Vital Signs: T(F): 98.1 (07-20-23 @ 05:13), Max: 98.8 (07-19-23 @ 23:52)  HR: 69 (07-20-23 @ 09:35)  BP: 137/76 (07-20-23 @ 05:13)  RR: 18 (07-20-23 @ 05:13)  SpO2: 97% (07-20-23 @ 09:35)  Wt(kg): --  General: Nontoxic-appearing Male in no acute distress.  HEENT: AT/NC. Anicteric. Conjunctiva pink and moist. Oropharynx clear.  Neck: Not rigid. No sense of mass.  Nodes: None palpable.  Lungs: Exam limited by body habitus. Grossly clear  Heart: Exam limited by body habitus. Grossly RRR   Abdomen: Obese. Bowel sounds present and normoactive. Soft. Nondistended. Nontender.  Extremities: No cyanosis or clubbing. RLE wrapped distally. RUE with warmth, tenderness, palpable cord, small scab in AC fossa at what looks like was a former IV site. R hand also with edema/erythema and cellulitis centered around what appeats to be a former IV site.   Skin: Warm. Dry. Good turgor. No vasculitic stigmata.  Psychiatric: Grossly appropriate affect and mood for situation.       Laboratory & Imaging Data--  CBC                        10.2   7.50  )-----------( 325      ( 20 Jul 2023 05:40 )             36.1       Chemistries  07-20    132<L>  |  92<L>  |  25<H>  ----------------------------<  114<H>  3.7   |  38<H>  |  1.06    Ca    8.9      20 Jul 2023 05:40  Phos  2.9     07-20  Mg     2.0     07-20        Culture Data    Culture - Urine (collected 14 Jul 2023 14:20)  Source: Clean Catch Clean Catch (Midstream)  Final Report (15 Jul 2023 15:27):    No growth    Culture - Blood (collected 14 Jul 2023 14:20)  Source: .Blood Blood-Peripheral  Final Report (19 Jul 2023 18:00):    No growth at 5 days    Culture - Blood (collected 14 Jul 2023 14:20)  Source: .Blood Blood-Peripheral  Final Report (19 Jul 2023 18:00):    No growth at 5 days

## 2023-07-21 ENCOUNTER — TRANSCRIPTION ENCOUNTER (OUTPATIENT)
Age: 63
End: 2023-07-21

## 2023-07-21 DIAGNOSIS — E66.01 MORBID (SEVERE) OBESITY DUE TO EXCESS CALORIES: ICD-10-CM

## 2023-07-21 DIAGNOSIS — I80.9 PHLEBITIS AND THROMBOPHLEBITIS OF UNSPECIFIED SITE: ICD-10-CM

## 2023-07-21 DIAGNOSIS — L03.119 CELLULITIS OF UNSPECIFIED PART OF LIMB: ICD-10-CM

## 2023-07-21 LAB — VANCOMYCIN TROUGH SERPL-MCNC: 12.3 UG/ML — SIGNIFICANT CHANGE UP (ref 10–20)

## 2023-07-21 PROCEDURE — 99232 SBSQ HOSP IP/OBS MODERATE 35: CPT

## 2023-07-21 RX ADMIN — Medication 20 MILLIGRAM(S): at 05:09

## 2023-07-21 RX ADMIN — Medication 40 MILLIGRAM(S): at 05:09

## 2023-07-21 RX ADMIN — Medication 25 MILLIGRAM(S): at 21:55

## 2023-07-21 RX ADMIN — HEPARIN SODIUM 5000 UNIT(S): 5000 INJECTION INTRAVENOUS; SUBCUTANEOUS at 13:22

## 2023-07-21 RX ADMIN — PANTOPRAZOLE SODIUM 40 MILLIGRAM(S): 20 TABLET, DELAYED RELEASE ORAL at 05:10

## 2023-07-21 RX ADMIN — POLYETHYLENE GLYCOL 3350 17 GRAM(S): 17 POWDER, FOR SOLUTION ORAL at 11:14

## 2023-07-21 RX ADMIN — HEPARIN SODIUM 5000 UNIT(S): 5000 INJECTION INTRAVENOUS; SUBCUTANEOUS at 05:09

## 2023-07-21 RX ADMIN — Medication 500 MILLIGRAM(S): at 11:11

## 2023-07-21 RX ADMIN — AMLODIPINE BESYLATE 10 MILLIGRAM(S): 2.5 TABLET ORAL at 05:10

## 2023-07-21 RX ADMIN — CARVEDILOL PHOSPHATE 3.12 MILLIGRAM(S): 80 CAPSULE, EXTENDED RELEASE ORAL at 05:09

## 2023-07-21 RX ADMIN — Medication 250 MILLIGRAM(S): at 18:05

## 2023-07-21 RX ADMIN — CARVEDILOL PHOSPHATE 3.12 MILLIGRAM(S): 80 CAPSULE, EXTENDED RELEASE ORAL at 18:04

## 2023-07-21 RX ADMIN — Medication 25 MILLIGRAM(S): at 13:25

## 2023-07-21 RX ADMIN — Medication 1 APPLICATION(S): at 11:14

## 2023-07-21 RX ADMIN — SENNA PLUS 2 TABLET(S): 8.6 TABLET ORAL at 21:53

## 2023-07-21 RX ADMIN — HEPARIN SODIUM 5000 UNIT(S): 5000 INJECTION INTRAVENOUS; SUBCUTANEOUS at 21:53

## 2023-07-21 RX ADMIN — Medication 20 MILLIEQUIVALENT(S): at 11:11

## 2023-07-21 RX ADMIN — Medication 250 MILLIGRAM(S): at 05:36

## 2023-07-21 RX ADMIN — Medication 25 MILLIGRAM(S): at 05:10

## 2023-07-21 NOTE — DISCHARGE NOTE PROVIDER - DETAILS OF MALNUTRITION DIAGNOSIS/DIAGNOSES
This patient has been assessed with a concern for Malnutrition and was treated during this hospitalization for the following Nutrition diagnosis/diagnoses:     -  07/17/2023: Morbid obesity (BMI > 40)

## 2023-07-21 NOTE — PROGRESS NOTE ADULT - SUBJECTIVE AND OBJECTIVE BOX
Patient is a 63y old  Male who presents with a chief complaint of Right leg infection. (14 Jul 2023 17:01)      INTERVAL HPI/OVERNIGHT EVENTS: none       MEDICATIONS  (STANDING):  amLODIPine   Tablet 10 milliGRAM(s) Oral daily  ascorbic acid 500 milliGRAM(s) Oral daily  carvedilol 3.125 milliGRAM(s) Oral every 12 hours  Dakins Solution - 1/2 Strength 1 Application(s) Topical daily  enalapril 20 milliGRAM(s) Oral daily  furosemide   Injectable 40 milliGRAM(s) IV Push daily  heparin   Injectable 5000 Unit(s) SubCutaneous every 8 hours  hydrALAZINE 25 milliGRAM(s) Oral three times a day  pantoprazole    Tablet 40 milliGRAM(s) Oral before breakfast  polyethylene glycol 3350 17 Gram(s) Oral daily  potassium chloride    Tablet ER 20 milliEquivalent(s) Oral daily  senna 2 Tablet(s) Oral at bedtime  vancomycin  IVPB      vancomycin  IVPB 1000 milliGRAM(s) IV Intermittent every 12 hours    MEDICATIONS  (PRN):  morphine  - Injectable 2 milliGRAM(s) IV Push every 4 hours PRN Severe Pain (7 - 10)  oxycodone    5 mG/acetaminophen 325 mG 1 Tablet(s) Oral every 6 hours PRN Moderate Pain (4 - 6)    Allergies    No Known Allergies    Intolerances    Vital Signs Last 24 Hrs  T(C): 36.8 (20 Jul 2023 23:30), Max: 36.8 (20 Jul 2023 23:30)  T(F): 98.2 (20 Jul 2023 23:30), Max: 98.2 (20 Jul 2023 23:30)  HR: 69 (21 Jul 2023 09:15) (62 - 78)  BP: 133/86 (21 Jul 2023 04:57) (120/83 - 144/89)  BP(mean): --  RR: 18 (21 Jul 2023 04:57) (18 - 18)  SpO2: 97% (21 Jul 2023 09:15) (92% - 98%)    Parameters below as of 20 Jul 2023 23:30  Patient On (Oxygen Delivery Method): nasal cannula        PHYSICAL EXAM:  GENERAL: NAD  HEAD:  Atraumatic, Normocephalic  EYES: EOMI, PERRLA, conjunctiva and sclera clear  ENMT: No tonsillar erythema, exudates, or enlargement;   NECK: Supple,   NERVOUS SYSTEM:  Alert & Oriented X3, Good concentration; Motor Strength 5/5 B/L upper and lower extremities; DTRs 2+ intact and symmetric  CHEST/LUNG: CTABL; No rales, rhonchi, wheezing, or rubs  HEART: Regular rate and rhythm; No murmurs, rubs, or gallops  ABDOMEN: Soft, Nontender, Nondistended; Bowel sounds present  EXTREMITIES:  2+ edema R>L. RLE ulcer swelling and erythema much improved , rue edema erythema  improving   SKIN: No rashes or lesions    LABS:                                            10.2   7.50  )-----------( 325      ( 20 Jul 2023 05:40 )             36.1   07-20    132<L>  |  92<L>  |  25<H>  ----------------------------<  114<H>  3.7   |  38<H>  |  1.06    Ca    8.9      20 Jul 2023 05:40  Phos  2.9     07-20  Mg     2.0     07-20        CAPILLARY BLOOD GLUCOSE            RADIOLOGY & ADDITIONAL TESTS:  < from: US Duplex Venous Upper Ext Ltd, Right (07.19.23 @ 10:12) >    IMPRESSION:  No evidence of right upper extremity deep venous thrombosis.      < end of copied text >    Imaging Personally Reviewed:  [x ] YES  [ ] NO    Consultant(s) Notes Reviewed:  [ ] YES  [ ] NO    Care Discussed with Consultants/Other Providers [ ] YES  [ ] NO

## 2023-07-21 NOTE — PROGRESS NOTE ADULT - ASSESSMENT
7/20:  Palpable cord R AC fossa with cellulitis  Possible site in hand as well  Edema elbow to hand  Chronic RLE ulcer since at least 2017, seen by vascular  Bedbugs- previously addressed  Maggots- previously addressed  Serial exams RUE  Stop zosyn  Will confer with pharmacy re: Vancomycin vs. Daptomycin this setting  No concern of systemic illness, I do not think patient merits blood cx at this time  LE wound care per vascular  Thank you for the courtesy of this referral.    7/21: afebrile, no leukocytosis, Cr ok, RUE cellulitis with improvement, Vancomycin IV continued while in pt. No objections to discharge on po abx, Bactrim to complete 7 days course total.     Suggestions:  - continue Vancomycin IV while in pt  - ok to discharge on po Bactrim DS to complete 7 days course total  - trend temperatures and wbc  - wound care     Discussed with Dr. Cheatham

## 2023-07-21 NOTE — PROGRESS NOTE ADULT - SUBJECTIVE AND OBJECTIVE BOX
KRYSTYNAVALERIECYNTHIA RIOS  MRN-[id number]    Follow Up:   cellulitis RUE, RLE ulcer     Interval History: the pt was seen and examined earlier, no acute distress, no new complaints, states that he is feeling a little better. Pt has no fevers, no leukocytosis.     PAST MEDICAL & SURGICAL HISTORY:  Hypertension      PUD (peptic ulcer disease)      Osteoarthritis      Hypertension      Morbid obesity      Duodenal ulcer disease      Knee osteoarthritis      HTN (hypertension)      H/O ventral hernia repair      Abdominal hernia      Gastroduodenal artery bleed  IR embolization of gastroduodenal artery          ROS:    [ ] Unobtainable because:  [x ] All other systems negative    Constitutional: no fever, no chills  Head: no trauma  Eyes: no vision changes, no eye pain  ENT:  no sore throat, no rhinorrhea  Cardiovascular:  no chest pain, no palpitation  Respiratory:  no SOB, no cough  GI:  no abd pain, no vomiting, no diarrhea  urinary: no dysuria, no hematuria, no flank pain  musculoskeletal:  no joint pain, no joint swelling  skin: right arm is better, right LE was re-dressed earlier today  neurology:  no headache, no seizure, no change in mental status  psych: no anxiety, no depression         Allergies  No Known Allergies        ANTIMICROBIALS:  vancomycin  IVPB    vancomycin  IVPB 1000 every 12 hours      OTHER MEDS:  amLODIPine   Tablet 10 milliGRAM(s) Oral daily  ascorbic acid 500 milliGRAM(s) Oral daily  carvedilol 3.125 milliGRAM(s) Oral every 12 hours  Dakins Solution - 1/2 Strength 1 Application(s) Topical daily  enalapril 20 milliGRAM(s) Oral daily  furosemide   Injectable 40 milliGRAM(s) IV Push daily  heparin   Injectable 5000 Unit(s) SubCutaneous every 8 hours  hydrALAZINE 25 milliGRAM(s) Oral three times a day  morphine  - Injectable 2 milliGRAM(s) IV Push every 4 hours PRN  oxycodone    5 mG/acetaminophen 325 mG 1 Tablet(s) Oral every 6 hours PRN  pantoprazole    Tablet 40 milliGRAM(s) Oral before breakfast  polyethylene glycol 3350 17 Gram(s) Oral daily  potassium chloride    Tablet ER 20 milliEquivalent(s) Oral daily  senna 2 Tablet(s) Oral at bedtime      Vital Signs Last 24 Hrs  T(C): 36.3 (21 Jul 2023 10:51), Max: 36.8 (20 Jul 2023 23:30)  T(F): 97.3 (21 Jul 2023 10:51), Max: 98.2 (20 Jul 2023 23:30)  HR: 62 (21 Jul 2023 10:51) (62 - 72)  BP: 115/70 (21 Jul 2023 13:17) (103/59 - 144/89)  BP(mean): --  RR: 18 (21 Jul 2023 10:51) (18 - 18)  SpO2: 94% (21 Jul 2023 10:51) (92% - 98%)    Parameters below as of 20 Jul 2023 23:30  Patient On (Oxygen Delivery Method): nasal cannula        Physical Exam:  General: Nontoxic-appearing Male in no acute distress. Obese   HEENT: AT/NC. Anicteric. Conjunctiva pink and moist. Oropharynx clear.  Neck: Not rigid. No sense of mass.  Nodes: None palpable.  Lungs: Exam limited by body habitus. Grossly clear  Heart: Exam limited by body habitus. Grossly RRR   Abdomen: Obese. Bowel sounds present and normoactive. Soft. Nondistended. Nontender.  Extremities: No cyanosis or clubbing. RLE wrapped distally c/d/i, was re-dressed today. RUE with improvement, less of warmth, not tender, new IV RUE - functional, R hand with less of edema/erythema  Skin: Warm. Dry. Good turgor. No vasculitic stigmata.  Psychiatric: Grossly appropriate affect and mood for situation.     WBC Count: 7.50 K/uL (07-20 @ 05:40)                            10.2   7.50  )-----------( 325      ( 20 Jul 2023 05:40 )             36.1       07-20    132<L>  |  92<L>  |  25<H>  ----------------------------<  114<H>  3.7   |  38<H>  |  1.06    Ca    8.9      20 Jul 2023 05:40  Phos  2.9     07-20  Mg     2.0     07-20        Urinalysis Basic - ( 20 Jul 2023 05:40 )    Color: x / Appearance: x / SG: x / pH: x  Gluc: 114 mg/dL / Ketone: x  / Bili: x / Urobili: x   Blood: x / Protein: x / Nitrite: x   Leuk Esterase: x / RBC: x / WBC x   Sq Epi: x / Non Sq Epi: x / Bacteria: x        Creatinine Trend: 1.06<--, 1.08<--, 1.14<--, 0.85<--, 0.77<--, 0.78<--      MICROBIOLOGY:  v  .Blood Blood-Peripheral  07-14-23   No growth at 5 days  --  --    C-Reactive Protein, Serum: 22 (07-14)    RADIOLOGY:

## 2023-07-21 NOTE — DISCHARGE NOTE PROVIDER - NSDCFUADDINST_GEN_ALL_CORE_FT
Diet DASH/TLC-  Sodium & Cholesterol Restricted  Liquid Protein Supplement     Qty per Day:  1      bipap at nuight 10pm -6am   fio2 30%   inspiratory 12   expiratory 5      back up rate 12     during day 6am to 10pm nasal cannula 2-3LPM    follow up  Please call [phone] or email [email or web address] for an appointment at the Pulmonary office ([address] [place] [address], [address], [place], [place]).     wound care to right leg  Cleanse with Dakins solution  Apply aquacel Ag  wrap leg with kerlex from toe to below knee  Wrap with ACE bandage

## 2023-07-21 NOTE — DISCHARGE NOTE PROVIDER - NSDCMRMEDTOKEN_GEN_ALL_CORE_FT
acetaminophen 325 mg oral tablet: 2 tab(s) orally every 6 hours, As needed, Temp greater or equal to 38C (100.4F), Mild Pain (1 - 3)  amLODIPine 10 mg oral tablet: 1 tab(s) orally once a day  Bactrim  mg-160 mg oral tablet: 1 tab(s) orally every 12 hours   carvedilol 3.125 mg oral tablet: 1 tab(s) orally 2 times a day  cefpodoxime 100 mg oral tablet: 1 tab(s) orally 2 times a day   cefuroxime 500 mg oral tablet: 1 tab(s) orally every 12 hours   enalapril 20 mg oral tablet: 1 tab(s) orally once a day  furosemide 20 mg oral tablet: 1 tab(s) orally once a day   pantoprazole 40 mg oral delayed release tablet: 1 tab(s) orally 2 times a day  potassium chloride: 40 milligram(s) orally once a day   amLODIPine 10 mg oral tablet: 1 tab(s) orally once a day  ascorbic acid 500 mg oral tablet: 1 tab(s) orally once a day  Bactrim  mg-160 mg oral tablet: 1 tab(s) orally every 12 hours  carvedilol 3.125 mg oral tablet: 1 tab(s) orally every 12 hours  enalapril 20 mg oral tablet: 1 tab(s) orally once a day  heparin: 5,000 unit(s) subcutaneous every 8 hours  hydrALAZINE 25 mg oral tablet: 1 tab(s) orally 3 times a day  Klor-Con 10 oral tablet, extended release: 1 tab(s) orally once a day  Lasix 40 mg oral tablet: 1 tab(s) orally once a day  pantoprazole 40 mg oral delayed release tablet: 1 tab(s) orally once a day (before a meal)  polyethylene glycol 3350 oral powder for reconstitution: 17 gram(s) orally once a day  senna leaf extract oral tablet: 2 tab(s) orally once a day (at bedtime)  sodium hypochlorite 0.25% topical solution: 1 Apply topically to affected area once a day

## 2023-07-21 NOTE — DISCHARGE NOTE PROVIDER - HOSPITAL COURSE
HPI:  63 year old male PMH only HTN, morbid obesity, p/w right lower ext wound and diff walking.  Has been in wound care for weeks but last time was 2 weeks ago. Recently notes increased difficulty walking.  Brother from out of state came to visit and told him to come to hospital.  Lives in an apt but says hard to care for self.  Denies cp, sob, fever, chills, nausea, vomiting. Goes to wound   care clinic, not clear last time her was there.   (14 Jul 2023 17:01)  RLE stage II ulcers with maggots and bed bugs   Hospital stay notable for possible bedbug infestation of patient's garments/belongings; maggots in patient's chronic leg wound (per review of EMR present since at least 2017); s/p assessment by vascular surgery; imaging without abscess; on broad spectrum antibiotics; and pulmonary evaluation for hypoxia attributed to OHS/LORI/component volume overload/diastolic heart failure.    Subjective/Observations: Pt. seen and examined and evaluated. Pt. resting comfortably in bed in NAD, with no respiratory distress, no chest pain, dyspnea, palpitations, PND, or orthopnea.    REVIEW OF SYSTEMS: All other review of systems is negative unless indicated above      MEDICATIONS  (STANDING):  amLODIPine   Tablet 10 milliGRAM(s) Oral daily  ascorbic acid 500 milliGRAM(s) Oral daily  carvedilol 3.125 milliGRAM(s) Oral every 12 hours  Dakins Solution - 1/2 Strength 1 Application(s) Topical daily  enalapril 20 milliGRAM(s) Oral daily  furosemide   Injectable 40 milliGRAM(s) IV Push daily  heparin   Injectable 5000 Unit(s) SubCutaneous every 8 hours  hydrALAZINE 25 milliGRAM(s) Oral three times a day  pantoprazole    Tablet 40 milliGRAM(s) Oral before breakfast  polyethylene glycol 3350 17 Gram(s) Oral daily  potassium chloride    Tablet ER 20 milliEquivalent(s) Oral daily  senna 2 Tablet(s) Oral at bedtime  vancomycin  IVPB 1000 milliGRAM(s) IV Intermittent every 12 hours  vancomycin  IVPB        MEDICATIONS  (PRN):  morphine  - Injectable 2 milliGRAM(s) IV Push every 4 hours PRN Severe Pain (7 - 10)  oxycodone    5 mG/acetaminophen 325 mG 1 Tablet(s) Oral every 6 hours PRN Moderate Pain (4 - 6)      Allergies: No Known Allergies    Vital Signs Last 24 Hrs  T(C): 36.3 (21 Jul 2023 10:51), Max: 36.8 (20 Jul 2023 23:30)  T(F): 97.3 (21 Jul 2023 10:51), Max: 98.2 (20 Jul 2023 23:30)  HR: 62 (21 Jul 2023 10:51) (62 - 72)  BP: 115/70 (21 Jul 2023 13:17) (103/59 - 144/89)  BP(mean): --  RR: 18 (21 Jul 2023 10:51) (18 - 18)  SpO2: 94% (21 Jul 2023 10:51) (92% - 98%)    Parameters below as of 20 Jul 2023 23:30  Patient On (Oxygen Delivery Method): nasal cannula        Echo:  < from: TTE Echo Complete w/ Contrast w/ Doppler (07.19.23 @ 08:19) >    Summary:   1. Normal global left ventricular systolic function.   2. Moderately enlarged left atrium.   3. Mild tricuspid regurgitation.   4. Sclerotic aortic valve with normal opening.   5. Dilatation of the aortic root and ascending aorta.   6. No interval changes as compared to prior study from 4/14/21.        Physical Exam:  Appearance: [ ] Normal  [ ] abnormal [ ] NAD   Eyes: [ ] PERRL [ ] EOMI  HEENT: [ ] Normal [ ] Abnormal oral mucosa [ ]NC/AT  Cardiovascular: [ ] S1 [ ] S2 [ ] RRR [ ] m/r/g [ ]edema [ ] JVP  Procedural Access Site: [ ]  hematoma [ ] tender to palpation [ ] 2+ pulse [ ] bruit [ ] Ecchymosis  Respiratory: [ ] Clear to auscultation bilaterally  Gastrointestinal: [ ] Soft [ ] tenderness[ ] distension [ ] BS  Musculoskeletal: [ ] clubbing [ ] joint deformity   Neurologic: [ ] Non-focal  Lymphatic: [ ] lymphadenopathy  Psychiatry: [ ] AAOx3  [ ] confused [ ] disoriented [ ] Mood & affect appropriate  Skin: [ ]  rashes [ ] ecchymoses [ ] cyanosis   · Assessment	  63 year old male PMH HTN, morbid obesity, p/w right lower ext wound and diff walking.  Has been in wound care for weeks but last time was 2 weeks ago. Recently notes increased difficulty walking.  Last time walked was 1 year ago.  Brother from out of state came to visit and told him to come to hospital.  Lives in an apt but says hard to care for self.  Denies cp, sob, fever, chills, nausea, vomiting.        RLE stage II ulcers with maggots and bed bugs   per my colleague's documentation  "wbc normal, afebrile and has borderline esr   - CT of LE shows no discreet abscess. Will stop vanco and c/w zosyn for now. will swtich to keflex tomorrow   - c./w lasix and check daily weight. clinically improving "  7/19/2023 - appreciated dr. camilo note supportive care and oupt f/u    acute hypoxic resp failure with hypercarbia   per my colleague's documentation " suspect component of  fluid overload   - ABG shows chronic co2 retention. Likely 2/2 body habitus. will likely need home bipap. consulted pulm   - hold lasix for now. s/p diamox x1. 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 will need trilogy and likely home o2. for home as d/w pulmonary dr. benitez   await str auth and bed       Nutritional Assessment:  · Nutritional Assessment	This patient has been assessed with a concern for Malnutrition and has been determined to have a diagnosis/diagnoses of Morbid obesity (BMI > 40).    This patient is being managed with:   Diet DASH/TLC-  Sodium & Cholesterol Restricted  Liquid Protein Supplement     Qty per Day:  1  Entered: Jul 17 2023  3:16PM

## 2023-07-21 NOTE — PROGRESS NOTE ADULT - SUBJECTIVE AND OBJECTIVE BOX
Interval Events:  no o/n events  noted to be off O2 when arrived in room - saturation was 86%, with deep breathing increased to 91%; with o2 increased >90%     REVIEW OF SYSTEMS:  Constitutional: No fevers or chills. No weight loss. No fatigue or generalised malaise.  Eyes: No itching or discharge from the eyes  ENT: No ear pain. No ear discharge. No nasal congestion. No post nasal drip. No epistaxis. No throat pain. No sore throat. No difficulty swallowing.   CV: No chest pain. No palpitations. No lightheadedness or dizziness.   Resp: No dyspnea at rest. No dyspnea on exertion. No orthopnea. No wheezing. No cough. No stridor. No sputum production. No chest pain with respiration.  GI: No nausea. No vomiting. No diarrhea.  MSK: No joint pain or pain in any extremities  Integumentary: No skin lesions. No pedal edema.  Neurological: No gross motor weakness. No sensory changes.  [ x] All other systems negative    OBJECTIVE:  ICU Vital Signs Last 24 Hrs  T(C): 36.8 (20 Jul 2023 23:30), Max: 36.8 (20 Jul 2023 23:30)  T(F): 98.2 (20 Jul 2023 23:30), Max: 98.2 (20 Jul 2023 23:30)  HR: 69 (21 Jul 2023 09:15) (62 - 78)  BP: 133/86 (21 Jul 2023 04:57) (120/83 - 144/89)  BP(mean): --  ABP: --  ABP(mean): --  RR: 18 (21 Jul 2023 04:57) (18 - 18)  SpO2: 97% (21 Jul 2023 09:15) (92% - 98%)    O2 Parameters below as of 20 Jul 2023 23:30  Patient On (Oxygen Delivery Method): nasal cannula              07-20 @ 07:01  -  07-21 @ 07:00  --------------------------------------------------------  IN: 740 mL / OUT: 550 mL / NET: 190 mL    07-21 @ 07:01 - 07-21 @ 10:16  --------------------------------------------------------  IN: 360 mL / OUT: 0 mL / NET: 360 mL      CAPILLARY BLOOD GLUCOSE          PHYSICAL EXAM:  General: NAD, non toxic appearing  HEENT: dry MM, EOMI  Neck: supple  Respiratory: diminished BS  Cardiovascular: s1s2 RRR  Abdomen: soft, non tender, non distended   Extremities: warm, +2 pitting edema  Skin: chronic venous changes to LE; RLE wounds wrapped  Neurological: no focal deficits  Psychiatry: ace [address] MEDICATIONS:  MEDICATIONS  (STANDING):  amLODIPine   Tablet 10 milliGRAM(s) Oral daily  ascorbic acid 500 milliGRAM(s) Oral daily  carvedilol 3.125 milliGRAM(s) Oral every 12 hours  Dakins Solution - 1/2 Strength 1 Application(s) Topical daily  enalapril 20 milliGRAM(s) Oral daily  furosemide   Injectable 40 milliGRAM(s) IV Push daily  heparin   Injectable 5000 Unit(s) SubCutaneous every 8 hours  hydrALAZINE 25 milliGRAM(s) Oral three times a day  pantoprazole    Tablet 40 milliGRAM(s) Oral before breakfast  polyethylene glycol 3350 17 Gram(s) Oral daily  potassium chloride    Tablet ER 20 milliEquivalent(s) Oral daily  senna 2 Tablet(s) Oral at bedtime  vancomycin  IVPB      vancomycin  IVPB 1000 milliGRAM(s) IV Intermittent every 12 hours    MEDICATIONS  (PRN):  morphine  - Injectable 2 milliGRAM(s) IV Push every 4 hours PRN Severe Pain (7 - 10)  oxycodone    5 mG/acetaminophen 325 mG 1 Tablet(s) Oral every 6 hours PRN Moderate Pain (4 - 6)      LABS:                        10.2   7.50  )-----------( 325      ( 20 Jul 2023 05:40 )             36.1     Hgb Trend: 10.2<--, 11.2<--  07-20    132<L>  |  92<L>  |  25<H>  ----------------------------<  114<H>  3.7   |  38<H>  |  1.06    Ca    8.9      20 Jul 2023 05:40  Phos  2.9     07-20  Mg     2.0     07-20        Urinalysis Basic - ( 20 Jul 2023 05:40 )    Color: x / Appearance: x / SG: x / pH: x  Gluc: 114 mg/dL / Ketone: x  / Bili: x / Urobili: x   Blood: x / Protein: x / Nitrite: x   Leuk Esterase: x / RBC: x / WBC x   Sq Epi: x / Non Sq Epi: x / Bacteria: x        < from: TTE Echo Complete w/ Contrast w/ Doppler (07.19.23 @ 08:19) >  PHYSICIAN INTERPRETATION:  Left Ventricle: Normal left ventricular size and wall thicknesses, with   normal systolic and diastolic function.  Global LV systolic function was normal. Spectral Doppler shows normal   pattern of LV diastolic filling.  Right Ventricle: Normal right ventricular size and function.  Left Atrium: Moderately enlarged left atrium.  Right Atrium: The right atrium is normal in size.  Pericardium: There is no evidence of pericardial effusion.  Mitral Valve: Structurally normal mitral valve, with normal leaflet   excursion.  Tricuspid Valve: Structurally normal tricuspid valve, with normal leaflet   excursion. Mild tricuspid regurgitation is visualized.  Aortic Valve: Normal trileaflet aortic valve with normal opening. The   aortic valve is trileaflet. Sclerotic aortic valvewith normal opening.   No evidence of aortic valve regurgitation is seen.  Pulmonic Valve: Structurally normal pulmonic valve, with normal leaflet   excursion. Trace pulmonic valve regurgitation.  Aorta: The ascending aorta was not well visualized. The aortic arch was   not well visualized. There is dilatation of the aortic root and ascending   aorta. Dilated aortic root measuring 4.4cm. Dilated ascending aorta   measuring 4.4cm.  Pulmonary Artery: The main pulmonary artery is normal in size.  Venous: The inferior vena cava was normal sized, with respiratory size   variation greater than 50%.  In comparison to the previous echocardiogram(s): Prior examinations are   available and were reviewed for comparison purposes. No interval chnages   ascompared to prior study from 4/14/21.      Summary:   1. Normal global left ventricular systolic function.   2. Moderately enlarged left atrium.   3. Mild tricuspid regurgitation.   4. Sclerotic aortic valve with normal opening.   5. Dilatation of the aortic root and ascending aorta.   6. No interval chnages as compared to prior study from 4/14/21.    < end of copied text >      MICROBIOLOGY:     RADIOLOGY:  [ ] Reviewed and interpreted by me    Bedside US:

## 2023-07-21 NOTE — PROGRESS NOTE ADULT - ASSESSMENT
63M w/ HTN, morbid obesity. Presents w/ RLE wound/pain and difficulty walking. Admitted for cellulitis and wound care. ABG performed showing chronic hypercapnia and noted hypoxia. Placed on O2 and BiPAP at night. Pt states he does not ambulate much due to leg and knee pain. Has no SOB or chest pain. He has never used O2 at home and has never seen pulmonary as outpt.     - suspect chronic hypoxia from OHS/LORI in setting of morbid obesity; classically improves with deep inspiration  - this morning 86% on RA at rest, improves with 1-2L O2, will need O2 for d/c; maintain O2 >90%  - pt's hypercapnia is well compensated at this time, likely due to OHS/LORI  - continue w/ BiPAP qhs, will need home NIV; case mgmt to help with this  - volume overload improving slowly, edema improving; continue lasix 40 mg IV daily and can transition to PO when ready for d/c; TTE is relatively normal although w/ moderately enlarged LA, suspect diastolic dysfunction  - BP well controlled, continue current medications  - pt will need sleep study and close pulmonary followup upon d/c; Please call [phone] or email [email or web address] for an appointment at the Pulmonary office ([address], [address], [place], [place]).

## 2023-07-21 NOTE — DISCHARGE NOTE PROVIDER - CARE PROVIDER_API CALL
pulmonary doctor,   Please call [phone] or email [email or web address] for an appointment at the Pulmonary office ([address] [place] [address], [address], [place], [place]).  Phone: (   )    -  Fax: (   )    -  Follow Up Time:

## 2023-07-21 NOTE — PROGRESS NOTE ADULT - NS ATTEST RISK PROBLEM GEN_ALL_CORE FT
new use of BiPAP, morbid obesity and volume overload
new use of BiPAP, morbid obesity and volume overload.
new use of BiPAP, morbid obesity and volume overload.

## 2023-07-21 NOTE — DISCHARGE NOTE PROVIDER - NSDCCPCAREPLAN_GEN_ALL_CORE_FT
PRINCIPAL DISCHARGE DIAGNOSIS  Diagnosis: Open wound of right lower leg  Assessment and Plan of Treatment:      PRINCIPAL DISCHARGE DIAGNOSIS  Diagnosis: Open wound of right lower leg  Assessment and Plan of Treatment:       SECONDARY DISCHARGE DIAGNOSES  Diagnosis: Morbid obesity  Assessment and Plan of Treatment:     Diagnosis: Cellulitis of right leg  Assessment and Plan of Treatment:     Diagnosis: Acute respiratory failure with hypercapnia  Assessment and Plan of Treatment:     Diagnosis: Benign essential HTN  Assessment and Plan of Treatment:

## 2023-07-21 NOTE — PROGRESS NOTE ADULT - ASSESSMENT
63 year old male PMH HTN, morbid obesity, p/w right lower ext wound and diff walking.  Has been in wound care for weeks but last time was 2 weeks ago. Recently notes increased difficulty walking.  Last time walked was 1 year ago.  Brother from out of state came to visit and told him to come to hospital.  Lives in an apt but says hard to care for self.  Denies cp, sob, fever, chills, nausea, vomiting.        RLE stage II ulcers with maggots and bed bugs   per my colleague's documentation  "wbc normal, afebrile and has borderline esr   - CT of LE shows no discreet abscess. Will stop vanco and c/w zosyn for now. will swtich to keflex tomorrow   - c./w lasix and check daily weight. clinically improving "  7/19/2023 - appreciated dr. camilo note supportive care and oupt f/u    acute hypoxic resp failure with hypercarbia   per my colleague's documentation " suspect component of  fluid overload   - ABG shows chronic co2 retention. Likely 2/2 body habitus. will likely need home bipap. consulted pulm   - hold lasix for now. s/p diamox x1. f/u bicarb    - f/u echo - last one 2021 showed aortic aneurysm "    7/19/2023 echo done today f/u results   was resumed on lasix , bicarbonate better today  on labs at 37 down from 43   appreciate pulmonary consult   7/20/2023 echo as above - aortic aneurysm is stable when compared to 4/2021   appreciate pulm consult    RUE swelling  - check doppler  7/19/2023 doppler negative  supportive care  7/21/2023 improving    HTN_ bp stable  continue with meds      dc to [facility]  when medically stable . will need trilogy and likely home o2.

## 2023-07-22 LAB
ANION GAP SERPL CALC-SCNC: 1 MMOL/L — LOW (ref 5–17)
BUN SERPL-MCNC: 24 MG/DL — HIGH (ref 7–23)
CALCIUM SERPL-MCNC: 9 MG/DL — SIGNIFICANT CHANGE UP (ref 8.5–10.1)
CHLORIDE SERPL-SCNC: 95 MMOL/L — LOW (ref 96–108)
CO2 SERPL-SCNC: 41 MMOL/L — HIGH (ref 22–31)
CREAT SERPL-MCNC: 0.79 MG/DL — SIGNIFICANT CHANGE UP (ref 0.5–1.3)
EGFR: 101 ML/MIN/1.73M2 — SIGNIFICANT CHANGE UP
GLUCOSE SERPL-MCNC: 137 MG/DL — HIGH (ref 70–99)
MAGNESIUM SERPL-MCNC: 1.8 MG/DL — SIGNIFICANT CHANGE UP (ref 1.6–2.6)
PHOSPHATE SERPL-MCNC: 2.5 MG/DL — SIGNIFICANT CHANGE UP (ref 2.5–4.5)
POTASSIUM SERPL-MCNC: 3.6 MMOL/L — SIGNIFICANT CHANGE UP (ref 3.5–5.3)
POTASSIUM SERPL-SCNC: 3.6 MMOL/L — SIGNIFICANT CHANGE UP (ref 3.5–5.3)
SODIUM SERPL-SCNC: 137 MMOL/L — SIGNIFICANT CHANGE UP (ref 135–145)

## 2023-07-22 PROCEDURE — 99232 SBSQ HOSP IP/OBS MODERATE 35: CPT

## 2023-07-22 RX ADMIN — Medication 20 MILLIGRAM(S): at 05:38

## 2023-07-22 RX ADMIN — Medication 250 MILLIGRAM(S): at 17:25

## 2023-07-22 RX ADMIN — Medication 25 MILLIGRAM(S): at 21:38

## 2023-07-22 RX ADMIN — Medication 20 MILLIEQUIVALENT(S): at 11:01

## 2023-07-22 RX ADMIN — CARVEDILOL PHOSPHATE 3.12 MILLIGRAM(S): 80 CAPSULE, EXTENDED RELEASE ORAL at 17:23

## 2023-07-22 RX ADMIN — POLYETHYLENE GLYCOL 3350 17 GRAM(S): 17 POWDER, FOR SOLUTION ORAL at 11:01

## 2023-07-22 RX ADMIN — Medication 25 MILLIGRAM(S): at 05:37

## 2023-07-22 RX ADMIN — HEPARIN SODIUM 5000 UNIT(S): 5000 INJECTION INTRAVENOUS; SUBCUTANEOUS at 14:40

## 2023-07-22 RX ADMIN — Medication 40 MILLIGRAM(S): at 05:37

## 2023-07-22 RX ADMIN — Medication 1 APPLICATION(S): at 11:01

## 2023-07-22 RX ADMIN — Medication 250 MILLIGRAM(S): at 05:34

## 2023-07-22 RX ADMIN — SENNA PLUS 2 TABLET(S): 8.6 TABLET ORAL at 21:41

## 2023-07-22 RX ADMIN — HEPARIN SODIUM 5000 UNIT(S): 5000 INJECTION INTRAVENOUS; SUBCUTANEOUS at 05:39

## 2023-07-22 RX ADMIN — CARVEDILOL PHOSPHATE 3.12 MILLIGRAM(S): 80 CAPSULE, EXTENDED RELEASE ORAL at 05:37

## 2023-07-22 RX ADMIN — PANTOPRAZOLE SODIUM 40 MILLIGRAM(S): 20 TABLET, DELAYED RELEASE ORAL at 08:47

## 2023-07-22 RX ADMIN — HEPARIN SODIUM 5000 UNIT(S): 5000 INJECTION INTRAVENOUS; SUBCUTANEOUS at 21:39

## 2023-07-22 RX ADMIN — Medication 25 MILLIGRAM(S): at 14:40

## 2023-07-22 RX ADMIN — Medication 500 MILLIGRAM(S): at 11:01

## 2023-07-22 RX ADMIN — AMLODIPINE BESYLATE 10 MILLIGRAM(S): 2.5 TABLET ORAL at 05:37

## 2023-07-22 NOTE — PROGRESS NOTE ADULT - SUBJECTIVE AND OBJECTIVE BOX
Patient is a 63y old  Male who presents with a chief complaint of Right leg infection. (14 Jul 2023 17:01)      INTERVAL HPI/OVERNIGHT EVENTS: none       MEDICATIONS  (STANDING):  amLODIPine   Tablet 10 milliGRAM(s) Oral daily  ascorbic acid 500 milliGRAM(s) Oral daily  carvedilol 3.125 milliGRAM(s) Oral every 12 hours  Dakins Solution - 1/2 Strength 1 Application(s) Topical daily  enalapril 20 milliGRAM(s) Oral daily  furosemide   Injectable 40 milliGRAM(s) IV Push daily  heparin   Injectable 5000 Unit(s) SubCutaneous every 8 hours  hydrALAZINE 25 milliGRAM(s) Oral three times a day  pantoprazole    Tablet 40 milliGRAM(s) Oral before breakfast  polyethylene glycol 3350 17 Gram(s) Oral daily  potassium chloride    Tablet ER 20 milliEquivalent(s) Oral daily  senna 2 Tablet(s) Oral at bedtime  vancomycin  IVPB      vancomycin  IVPB 1000 milliGRAM(s) IV Intermittent every 12 hours    MEDICATIONS  (PRN):  morphine  - Injectable 2 milliGRAM(s) IV Push every 4 hours PRN Severe Pain (7 - 10)  oxycodone    5 mG/acetaminophen 325 mG 1 Tablet(s) Oral every 6 hours PRN Moderate Pain (4 - 6)    Intolerances    Vital Signs Last 24 Hrs  T(C): 36.4 (22 Jul 2023 05:17), Max: 36.8 (21 Jul 2023 23:46)  T(F): 97.5 (22 Jul 2023 05:17), Max: 98.2 (21 Jul 2023 23:46)  HR: 67 (22 Jul 2023 08:29) (63 - 76)  BP: 154/96 (22 Jul 2023 05:17) (115/70 - 154/96)  BP(mean): --  RR: 18 (22 Jul 2023 05:17) (18 - 18)  SpO2: 95% (22 Jul 2023 08:29) (94% - 96%)    Parameters below as of 21 Jul 2023 16:11  Patient On (Oxygen Delivery Method): nasal cannula        PHYSICAL EXAM:  GENERAL: NAD  HEAD:  Atraumatic, Normocephalic  EYES: EOMI, PERRLA, conjunctiva and sclera clear  ENMT: No tonsillar erythema, exudates, or enlargement;   NECK: Supple,   NERVOUS SYSTEM:  Alert & Oriented X3, Good concentration; Motor Strength 5/5 B/L upper and lower extremities; DTRs 2+ intact and symmetric  CHEST/LUNG: CTABL; No rales, rhonchi, wheezing, or rubs  HEART: Regular rate and rhythm; No murmurs, rubs, or gallops  ABDOMEN: Soft, Nontender, Nondistended; Bowel sounds present  EXTREMITIES:  2+ edema R>L. RLE ulcer swelling and erythema much improved , rue edema erythema  improving   SKIN: No rashes or lesions    LABS:                      RADIOLOGY & ADDITIONAL TESTS:  < from: US Duplex Venous Upper Ext Ltd, Right (07.19.23 @ 10:12) >    IMPRESSION:  No evidence of right upper extremity deep venous thrombosis.      < end of copied text >    Imaging Personally Reviewed:  [x ] YES  [ ] NO    Consultant(s) Notes Reviewed:  [ ] YES  [ ] NO    Care Discussed with Consultants/Other Providers [ ] YES  [ ] NO

## 2023-07-22 NOTE — PROGRESS NOTE ADULT - ASSESSMENT
63 year old male PMH HTN, morbid obesity, p/w right lower ext wound and diff walking.  Has been in wound care for weeks but last time was 2 weeks ago. Recently notes increased difficulty walking.  Last time walked was 1 year ago.  Brother from out of state came to visit and told him to come to hospital.  Lives in an apt but says hard to care for self.  Denies cp, sob, fever, chills, nausea, vomiting.        RLE stage II ulcers with maggots and bed bugs   per my colleague's documentation  "wbc normal, afebrile and has borderline esr   - CT of LE shows no discreet abscess. Will stop vanco and c/w zosyn for now. will swtich to keflex tomorrow   - c./w lasix and check daily weight. clinically improving "  7/19/2023 - appreciated dr. camilo note supportive care and oupt f/u    acute hypoxic resp failure with hypercarbia   per my colleague's documentation " suspect component of  fluid overload   - ABG shows chronic co2 retention. Likely 2/2 body habitus. will likely need home bipap. consulted pulm   - hold lasix for now. s/p diamox x1. f/u bicarb    - f/u echo - last one 2021 showed aortic aneurysm "    7/19/2023 echo done today f/u results   was resumed on lasix , bicarbonate better today  on labs at 37 down from 43   appreciate pulmonary consult   7/20/2023 echo as above - aortic aneurysm is stable when compared to 4/2021   appreciate pulm consult    RUE swelling  - check doppler  7/19/2023 doppler negative  supportive care  7/21/2023 improving    HTN_ bp stable  continue with meds        dc to [facility]  when medically stable . will need trilogy and likely home o2. for home as d/w pulmonary dr. benitez

## 2023-07-23 LAB — VANCOMYCIN TROUGH SERPL-MCNC: 14.3 UG/ML — SIGNIFICANT CHANGE UP (ref 10–20)

## 2023-07-23 PROCEDURE — 99231 SBSQ HOSP IP/OBS SF/LOW 25: CPT

## 2023-07-23 RX ADMIN — HEPARIN SODIUM 5000 UNIT(S): 5000 INJECTION INTRAVENOUS; SUBCUTANEOUS at 21:48

## 2023-07-23 RX ADMIN — HEPARIN SODIUM 5000 UNIT(S): 5000 INJECTION INTRAVENOUS; SUBCUTANEOUS at 14:08

## 2023-07-23 RX ADMIN — Medication 500 MILLIGRAM(S): at 11:00

## 2023-07-23 RX ADMIN — SENNA PLUS 2 TABLET(S): 8.6 TABLET ORAL at 21:49

## 2023-07-23 RX ADMIN — CARVEDILOL PHOSPHATE 3.12 MILLIGRAM(S): 80 CAPSULE, EXTENDED RELEASE ORAL at 17:08

## 2023-07-23 RX ADMIN — Medication 20 MILLIEQUIVALENT(S): at 11:01

## 2023-07-23 RX ADMIN — PANTOPRAZOLE SODIUM 40 MILLIGRAM(S): 20 TABLET, DELAYED RELEASE ORAL at 05:24

## 2023-07-23 RX ADMIN — Medication 25 MILLIGRAM(S): at 05:24

## 2023-07-23 RX ADMIN — Medication 1 APPLICATION(S): at 11:00

## 2023-07-23 RX ADMIN — Medication 20 MILLIGRAM(S): at 05:23

## 2023-07-23 RX ADMIN — Medication 250 MILLIGRAM(S): at 06:56

## 2023-07-23 RX ADMIN — AMLODIPINE BESYLATE 10 MILLIGRAM(S): 2.5 TABLET ORAL at 05:24

## 2023-07-23 RX ADMIN — Medication 25 MILLIGRAM(S): at 21:48

## 2023-07-23 RX ADMIN — Medication 25 MILLIGRAM(S): at 14:07

## 2023-07-23 RX ADMIN — Medication 40 MILLIGRAM(S): at 05:24

## 2023-07-23 RX ADMIN — CARVEDILOL PHOSPHATE 3.12 MILLIGRAM(S): 80 CAPSULE, EXTENDED RELEASE ORAL at 05:24

## 2023-07-23 RX ADMIN — HEPARIN SODIUM 5000 UNIT(S): 5000 INJECTION INTRAVENOUS; SUBCUTANEOUS at 05:23

## 2023-07-23 RX ADMIN — Medication 250 MILLIGRAM(S): at 17:08

## 2023-07-23 NOTE — PROGRESS NOTE ADULT - SUBJECTIVE AND OBJECTIVE BOX
Patient is a 63y old  Male who presents with a chief complaint of Right leg infection. (14 Jul 2023 17:01)      INTERVAL HPI/OVERNIGHT EVENTS: none     MEDICATIONS  (STANDING):  amLODIPine   Tablet 10 milliGRAM(s) Oral daily  ascorbic acid 500 milliGRAM(s) Oral daily  carvedilol 3.125 milliGRAM(s) Oral every 12 hours  Dakins Solution - 1/2 Strength 1 Application(s) Topical daily  enalapril 20 milliGRAM(s) Oral daily  furosemide   Injectable 40 milliGRAM(s) IV Push daily  heparin   Injectable 5000 Unit(s) SubCutaneous every 8 hours  hydrALAZINE 25 milliGRAM(s) Oral three times a day  pantoprazole    Tablet 40 milliGRAM(s) Oral before breakfast  polyethylene glycol 3350 17 Gram(s) Oral daily  potassium chloride    Tablet ER 20 milliEquivalent(s) Oral daily  senna 2 Tablet(s) Oral at bedtime  vancomycin  IVPB      vancomycin  IVPB 1000 milliGRAM(s) IV Intermittent every 12 hours    MEDICATIONS  (PRN):  morphine  - Injectable 2 milliGRAM(s) IV Push every 4 hours PRN Severe Pain (7 - 10)  oxycodone    5 mG/acetaminophen 325 mG 1 Tablet(s) Oral every 6 hours PRN Moderate Pain (4 - 6)    Intolerances    Vital Signs Last 24 Hrs  T(C): 36.5 (23 Jul 2023 05:02), Max: 36.9 (22 Jul 2023 23:51)  T(F): 97.7 (23 Jul 2023 05:02), Max: 98.5 (22 Jul 2023 23:51)  HR: 65 (23 Jul 2023 06:48) (63 - 71)  BP: 126/76 (23 Jul 2023 05:02) (124/74 - 137/78)  BP(mean): 80 (22 Jul 2023 21:35) (77 - 80)  RR: 18 (23 Jul 2023 05:02) (17 - 19)  SpO2: 94% (23 Jul 2023 06:48) (94% - 97%)    Parameters below as of 22 Jul 2023 21:35  Patient On (Oxygen Delivery Method): nasal cannula  O2 Flow (L/min): 2      PHYSICAL EXAM:  GENERAL: NAD  HEAD:  Atraumatic, Normocephalic  EYES: EOMI, PERRLA, conjunctiva and sclera clear  ENMT: No tonsillar erythema, exudates, or enlargement;   NECK: Supple,   NERVOUS SYSTEM:  Alert & Oriented X3, Good concentration; Motor Strength 5/5 B/L upper and lower extremities; DTRs 2+ intact and symmetric  CHEST/LUNG: CTABL; No rales, rhonchi, wheezing, or rubs  HEART: Regular rate and rhythm; No murmurs, rubs, or gallops  ABDOMEN: Soft, Nontender, Nondistended; Bowel sounds present  EXTREMITIES:  2+ edema R>L. RLE ulcer swelling and erythema much improved , rue edema erythema  improving   SKIN: No rashes or lesions    LABS:                      RADIOLOGY & ADDITIONAL TESTS:  < from: US Duplex Venous Upper Ext Ltd, Right (07.19.23 @ 10:12) >    IMPRESSION:  No evidence of right upper extremity deep venous thrombosis.      < end of copied text >    Imaging Personally Reviewed:  [x ] YES  [ ] NO    Consultant(s) Notes Reviewed:  [ ] YES  [ ] NO    Care Discussed with Consultants/Other Providers [ ] YES  [ ] NO

## 2023-07-24 ENCOUNTER — TRANSCRIPTION ENCOUNTER (OUTPATIENT)
Age: 63
End: 2023-07-24

## 2023-07-24 VITALS
OXYGEN SATURATION: 97 % | SYSTOLIC BLOOD PRESSURE: 128 MMHG | DIASTOLIC BLOOD PRESSURE: 81 MMHG | RESPIRATION RATE: 19 BRPM | TEMPERATURE: 99 F | HEART RATE: 72 BPM

## 2023-07-24 PROCEDURE — 99239 HOSP IP/OBS DSCHRG MGMT >30: CPT

## 2023-07-24 PROCEDURE — 99232 SBSQ HOSP IP/OBS MODERATE 35: CPT

## 2023-07-24 RX ORDER — POTASSIUM CHLORIDE 20 MEQ
1 PACKET (EA) ORAL
Qty: 30 | Refills: 0
Start: 2023-07-24 | End: 2023-08-22

## 2023-07-24 RX ORDER — PANTOPRAZOLE SODIUM 20 MG/1
1 TABLET, DELAYED RELEASE ORAL
Qty: 0 | Refills: 0 | DISCHARGE
Start: 2023-07-24

## 2023-07-24 RX ORDER — FUROSEMIDE 40 MG
1 TABLET ORAL
Qty: 30 | Refills: 0
Start: 2023-07-24 | End: 2023-08-22

## 2023-07-24 RX ORDER — ASCORBIC ACID 60 MG
1 TABLET,CHEWABLE ORAL
Qty: 0 | Refills: 0 | DISCHARGE
Start: 2023-07-24

## 2023-07-24 RX ORDER — SODIUM HYPOCHLORITE 0.125 %
1 SOLUTION, NON-ORAL MISCELLANEOUS
Qty: 0 | Refills: 0 | DISCHARGE
Start: 2023-07-24

## 2023-07-24 RX ORDER — AMLODIPINE BESYLATE 2.5 MG/1
1 TABLET ORAL
Qty: 0 | Refills: 0 | DISCHARGE

## 2023-07-24 RX ORDER — CARVEDILOL PHOSPHATE 80 MG/1
1 CAPSULE, EXTENDED RELEASE ORAL
Qty: 0 | Refills: 0 | DISCHARGE
Start: 2023-07-24

## 2023-07-24 RX ORDER — AMLODIPINE BESYLATE 2.5 MG/1
1 TABLET ORAL
Qty: 0 | Refills: 0 | DISCHARGE
Start: 2023-07-24

## 2023-07-24 RX ORDER — SENNA PLUS 8.6 MG/1
2 TABLET ORAL
Qty: 0 | Refills: 0 | DISCHARGE
Start: 2023-07-24

## 2023-07-24 RX ORDER — CARVEDILOL PHOSPHATE 80 MG/1
1 CAPSULE, EXTENDED RELEASE ORAL
Qty: 0 | Refills: 0 | DISCHARGE

## 2023-07-24 RX ORDER — HYDRALAZINE HCL 50 MG
1 TABLET ORAL
Qty: 0 | Refills: 0 | DISCHARGE
Start: 2023-07-24

## 2023-07-24 RX ORDER — VANCOMYCIN HCL 1 G
1250 VIAL (EA) INTRAVENOUS EVERY 12 HOURS
Refills: 0 | Status: DISCONTINUED | OUTPATIENT
Start: 2023-07-24 | End: 2023-07-24

## 2023-07-24 RX ORDER — POLYETHYLENE GLYCOL 3350 17 G/17G
17 POWDER, FOR SOLUTION ORAL
Qty: 0 | Refills: 0 | DISCHARGE
Start: 2023-07-24

## 2023-07-24 RX ORDER — HEPARIN SODIUM 5000 [USP'U]/ML
5000 INJECTION INTRAVENOUS; SUBCUTANEOUS
Qty: 0 | Refills: 0 | DISCHARGE
Start: 2023-07-24

## 2023-07-24 RX ADMIN — Medication 25 MILLIGRAM(S): at 05:48

## 2023-07-24 RX ADMIN — Medication 40 MILLIGRAM(S): at 05:48

## 2023-07-24 RX ADMIN — HEPARIN SODIUM 5000 UNIT(S): 5000 INJECTION INTRAVENOUS; SUBCUTANEOUS at 05:49

## 2023-07-24 RX ADMIN — PANTOPRAZOLE SODIUM 40 MILLIGRAM(S): 20 TABLET, DELAYED RELEASE ORAL at 05:46

## 2023-07-24 RX ADMIN — Medication 20 MILLIGRAM(S): at 05:47

## 2023-07-24 RX ADMIN — Medication 250 MILLIGRAM(S): at 05:48

## 2023-07-24 RX ADMIN — AMLODIPINE BESYLATE 10 MILLIGRAM(S): 2.5 TABLET ORAL at 05:46

## 2023-07-24 RX ADMIN — CARVEDILOL PHOSPHATE 3.12 MILLIGRAM(S): 80 CAPSULE, EXTENDED RELEASE ORAL at 05:48

## 2023-07-24 NOTE — PROGRESS NOTE ADULT - SUBJECTIVE AND OBJECTIVE BOX
Patient is a 63y old  Male who presents with a chief complaint of Right leg infection. (14 Jul 2023 17:01)      INTERVAL HPI/OVERNIGHT EVENTS: none     MEDICATIONS  (STANDING):  amLODIPine   Tablet 10 milliGRAM(s) Oral daily  ascorbic acid 500 milliGRAM(s) Oral daily  carvedilol 3.125 milliGRAM(s) Oral every 12 hours  Dakins Solution - 1/2 Strength 1 Application(s) Topical daily  enalapril 20 milliGRAM(s) Oral daily  furosemide   Injectable 40 milliGRAM(s) IV Push daily  heparin   Injectable 5000 Unit(s) SubCutaneous every 8 hours  hydrALAZINE 25 milliGRAM(s) Oral three times a day  pantoprazole    Tablet 40 milliGRAM(s) Oral before breakfast  polyethylene glycol 3350 17 Gram(s) Oral daily  potassium chloride    Tablet ER 20 milliEquivalent(s) Oral daily  senna 2 Tablet(s) Oral at bedtime  vancomycin  IVPB      vancomycin  IVPB 1000 milliGRAM(s) IV Intermittent every 12 hours    MEDICATIONS  (PRN):  morphine  - Injectable 2 milliGRAM(s) IV Push every 4 hours PRN Severe Pain (7 - 10)  oxycodone    5 mG/acetaminophen 325 mG 1 Tablet(s) Oral every 6 hours PRN Moderate Pain (4 - 6)      Intolerances    Vital Signs Last 24 Hrs  T(C): 36.6 (24 Jul 2023 05:21), Max: 36.9 (23 Jul 2023 23:53)  T(F): 97.8 (24 Jul 2023 05:21), Max: 98.4 (23 Jul 2023 23:53)  HR: 69 (24 Jul 2023 06:18) (63 - 69)  BP: 156/90 (24 Jul 2023 05:21) (122/79 - 156/90)  BP(mean): --  RR: 18 (24 Jul 2023 05:21) (18 - 18)  SpO2: 95% (24 Jul 2023 06:18) (95% - 98%)    Parameters below as of 24 Jul 2023 05:21  Patient On (Oxygen Delivery Method): nasal cannula  O2 Flow (L/min): 3      PHYSICAL EXAM:  GENERAL: NAD  HEAD:  Atraumatic, Normocephalic  EYES: EOMI, PERRLA, conjunctiva and sclera clear  ENMT: No tonsillar erythema, exudates, or enlargement;   NECK: Supple,   NERVOUS SYSTEM:  Alert & Oriented X3, Good concentration; Motor Strength 5/5 B/L upper and lower extremities; DTRs 2+ intact and symmetric  CHEST/LUNG: CTABL; No rales, rhonchi, wheezing, or rubs  HEART: Regular rate and rhythm; No murmurs, rubs, or gallops  ABDOMEN: Soft, Nontender, Nondistended; Bowel sounds present  EXTREMITIES:  2+ edema R>L. RLE ulcer swelling and erythema much improved , rue edema erythema  improving   SKIN: No rashes or lesions    LABS:                      RADIOLOGY & ADDITIONAL TESTS:  < from: US Duplex Venous Upper Ext Ltd, Right (07.19.23 @ 10:12) >    IMPRESSION:  No evidence of right upper extremity deep venous thrombosis.      < end of copied text >    Imaging Personally Reviewed:  [x ] YES  [ ] NO    Consultant(s) Notes Reviewed:  [ ] YES  [ ] NO    Care Discussed with Consultants/Other Providers [ ] YES  [ ] NO Patient is a 63y old  Male who presents with a chief complaint of Right leg infection. (14 Jul 2023 17:01)      INTERVAL HPI/OVERNIGHT EVENTS: none     MEDICATIONS  (STANDING):  amLODIPine   Tablet 10 milliGRAM(s) Oral daily  ascorbic acid 500 milliGRAM(s) Oral daily  carvedilol 3.125 milliGRAM(s) Oral every 12 hours  Dakins Solution - 1/2 Strength 1 Application(s) Topical daily  enalapril 20 milliGRAM(s) Oral daily  furosemide   Injectable 40 milliGRAM(s) IV Push daily  heparin   Injectable 5000 Unit(s) SubCutaneous every 8 hours  hydrALAZINE 25 milliGRAM(s) Oral three times a day  pantoprazole    Tablet 40 milliGRAM(s) Oral before breakfast  polyethylene glycol 3350 17 Gram(s) Oral daily  potassium chloride    Tablet ER 20 milliEquivalent(s) Oral daily  senna 2 Tablet(s) Oral at bedtime  vancomycin  IVPB      vancomycin  IVPB 1000 milliGRAM(s) IV Intermittent every 12 hours    MEDICATIONS  (PRN):  morphine  - Injectable 2 milliGRAM(s) IV Push every 4 hours PRN Severe Pain (7 - 10)  oxycodone    5 mG/acetaminophen 325 mG 1 Tablet(s) Oral every 6 hours PRN Moderate Pain (4 - 6)      Intolerances    Vital Signs Last 24 Hrs  T(C): 36.6 (24 Jul 2023 05:21), Max: 36.9 (23 Jul 2023 23:53)  T(F): 97.8 (24 Jul 2023 05:21), Max: 98.4 (23 Jul 2023 23:53)  HR: 69 (24 Jul 2023 06:18) (63 - 69)  BP: 156/90 (24 Jul 2023 05:21) (122/79 - 156/90)  BP(mean): --  RR: 18 (24 Jul 2023 05:21) (18 - 18)  SpO2: 95% (24 Jul 2023 06:18) (95% - 98%)    Parameters below as of 24 Jul 2023 05:21  Patient On (Oxygen Delivery Method): nasal cannula  O2 Flow (L/min): 3      PHYSICAL EXAM:  GENERAL: NAD  HEAD:  Atraumatic, Normocephalic  EYES: EOMI, PERRLA, conjunctiva and sclera clear  ENMT: No tonsillar erythema, exudates, or enlargement;   NECK: Supple,   NERVOUS SYSTEM:  Alert & Oriented X3, Good concentration; Motor Strength 4/5 B/L upper and lower extremities; DTRs 2+ intact and symmetric  CHEST/LUNG: CTABL; No rales, rhonchi, wheezing, or rubs  HEART: Regular rate and rhythm; No murmurs, rubs, or gallops  ABDOMEN: Soft, Nontender, Nondistended; Bowel sounds present  EXTREMITIES:  2+ edema R>L. RLE ulcer swelling and erythema much improved , rue edema erythema  improving   SKIN: No rashes or lesions    LABS:                      RADIOLOGY & ADDITIONAL TESTS:  < from: US Duplex Venous Upper Ext Ltd, Right (07.19.23 @ 10:12) >    IMPRESSION:  No evidence of right upper extremity deep venous thrombosis.      < end of copied text >    Imaging Personally Reviewed:  [x ] YES  [ ] NO    Consultant(s) Notes Reviewed:  [ ] YES  [ ] NO    Care Discussed with Consultants/Other Providers [ ] YES  [ ] NO

## 2023-07-24 NOTE — PROGRESS NOTE ADULT - SUBJECTIVE AND OBJECTIVE BOX
KRYSTYNAVALERIECYNTHIA RIOS  MRN-[id number]    Follow Up:  Cellulitis of RUE, RLE wound.     Interval History: pt was seen and examined earlier, no acute distress, complains of swelling or his RUE, unable to make a fist. Pt is afebrile, NC, no new lab work.     PAST MEDICAL & SURGICAL HISTORY:  Hypertension      PUD (peptic ulcer disease)      Osteoarthritis      Hypertension      Morbid obesity      Duodenal ulcer disease      Knee osteoarthritis      HTN (hypertension)      H/O ventral hernia repair      Abdominal hernia      Gastroduodenal artery bleed  IR embolization of gastroduodenal artery          ROS:    [ ] Unobtainable because:  [x ] All other systems negative    Constitutional: no fever, no chills  Head: no trauma  Eyes: no vision changes, no eye pain  ENT:  no sore throat, no rhinorrhea  Cardiovascular:  no chest pain, no palpitation  Respiratory:  no SOB, no cough  GI:  no abd pain, no vomiting, no diarrhea  urinary: no dysuria, no hematuria, no flank pain  musculoskeletal:  no joint pain, no joint swelling  skin:  no rash, RUE with swelling   neurology:  no headache, no seizure, no change in mental status  psych: no anxiety, no depression         Allergies  No Known Allergies        ANTIMICROBIALS:  vancomycin  IVPB 1250 every 12 hours      OTHER MEDS:  amLODIPine   Tablet 10 milliGRAM(s) Oral daily  ascorbic acid 500 milliGRAM(s) Oral daily  carvedilol 3.125 milliGRAM(s) Oral every 12 hours  Dakins Solution - 1/2 Strength 1 Application(s) Topical daily  enalapril 20 milliGRAM(s) Oral daily  furosemide   Injectable 40 milliGRAM(s) IV Push daily  heparin   Injectable 5000 Unit(s) SubCutaneous every 8 hours  hydrALAZINE 25 milliGRAM(s) Oral three times a day  morphine  - Injectable 2 milliGRAM(s) IV Push every 4 hours PRN  oxycodone    5 mG/acetaminophen 325 mG 1 Tablet(s) Oral every 6 hours PRN  pantoprazole    Tablet 40 milliGRAM(s) Oral before breakfast  polyethylene glycol 3350 17 Gram(s) Oral daily  potassium chloride    Tablet ER 20 milliEquivalent(s) Oral daily  senna 2 Tablet(s) Oral at bedtime      Vital Signs Last 24 Hrs  T(C): 36.7 (24 Jul 2023 11:57), Max: 36.9 (23 Jul 2023 23:53)  T(F): 98 (24 Jul 2023 11:57), Max: 98.4 (23 Jul 2023 23:53)  HR: 78 (24 Jul 2023 11:57) (64 - 78)  BP: 120/76 (24 Jul 2023 11:57) (120/76 - 156/90)  BP(mean): --  RR: 18 (24 Jul 2023 11:57) (18 - 18)  SpO2: 97% (24 Jul 2023 11:57) (95% - 98%)    Parameters below as of 24 Jul 2023 11:57  Patient On (Oxygen Delivery Method): room air        Physical Exam:  General: Nontoxic-appearing Male in no acute distress. Obese   HEENT: AT/NC. Anicteric. Conjunctiva pink and moist. Oropharynx clear.  Neck: Not rigid. No sense of mass.  Nodes: None palpable.  Lungs: Exam limited by body habitus. Grossly clear  Heart: Exam limited by body habitus. Grossly RRR   Abdomen: Obese. Bowel sounds present and normoactive. Soft. Nondistended. Nontender.  Extremities: No cyanosis or clubbing. RLE wrapped distally c/d/i, was re-dressed earlier today, not tender, pt asked not to open the dressing, will take a look tomorrow. RUE still with swelling but with some improvement, warm to touch, + erythema, tender  Skin: Warm. Dry. Good turgor. No vasculitic stigmata. Cellulitis as above   Psychiatric: Grossly appropriate affect and mood for situation.     WBC Count: 7.50 K/uL (07-20 @ 05:40)    Creatinine Trend: 0.79<--, 1.06<--, 1.08<--, 1.14<--, 0.85<--, 0.77<--      MICROBIOLOGY:  v  .Blood Blood-Peripheral  07-14-23   No growth at 5 days  --  --      C-Reactive Protein, Serum: 22 (07-14)      RADIOLOGY:

## 2023-07-24 NOTE — PROGRESS NOTE ADULT - NS ATTEND AMEND GEN_ALL_CORE FT
Attending Addendum--  Case reviewed with INOCENCIA Herring. Her note reviewed and modified as appropriate.   Patient personally assessed and examined.  Seen earlier today.  Improved, but still inflamed with some suggestion of phlebitis on upper arm now too.  Still no anesthesia, bullae, necrosis etc. to raise concern of deep infection.  Cont Vanco, appreciate dose adjustment by pharmacy  Alex Cheatham MD  Attending Physician  [facility]  Division of Infectious Diseases  [phone]
63M w/ HTN, morbid obesity. Presents w/ RLE wound/pain and difficulty walking. Admitted for cellulitis and wound care. ABG performed showing chronic hypercapnia and noted hypoxia. Placed on O2 and BiPAP at night. Pt states he does not ambulate much due to leg and knee pain. Has no SOB or chest pain. He has never used O2 at home and has never seen pulmonary as outpt.     - suspect chronic hypoxia from OHS/LORI in setting of morbid obesity  - pt's hypercapnia is well compensated at this time, likely due to OHS/LORI  - continue w/ BiPAP qhs, will need home NIV; case mgmt to help with this  - hypoxia improving, now down to 1 L NC while we were present in room; maintain O2 >90%  - clinically concerned for volume overload, would give additional dose of diamox today; when ready for d/c would switch to PO lasix; TTE pending  - BP well controlled, continue current medications  - pt will need sleep study and close pulmonary followup upon d/c; Please call [phone] or email  [email or web address] for an appointment at the Pulmonary office ([address], [address], [place], [place])
Attending Addendum--  Case reviewed with NP Mena Herring. Her note reviewed and modified as appropriate.   Patient personally assessed and examined.  Improved RUE but still with erythema, edema. Unclear why new IV placed in same area as active phlebitis. D/W RN at the time of assessment- to be removed.  Serial exams  Cont Abx  F/U Cx  Alex Cheatham MD  I will be available via Teams for any issues that may arise over the weekend.    Alex Cheatham MD  Attending Physician  [facility]  Division of Infectious Diseases  [phone]
63M w/ HTN, morbid obesity. Presents w/ RLE wound/pain and difficulty walking. Admitted for cellulitis and wound care. ABG performed showing chronic hypercapnia and noted hypoxia. Placed on O2 and BiPAP at night. Pt states he does not ambulate much due to leg and knee pain. Has no SOB or chest pain. He has never used O2 at home and has never seen pulmonary as outpt.     - suspect chronic hypoxia from OHS/LORI in setting of morbid obesity  - pt's hypercapnia is well compensated at this time, likely due to OHS/LORI  - continue w/ BiPAP qhs, will need home NIV; case mgmt to help with this  - hypoxia stable, remains on 1-2L NC; maintain O2 >90%  - volume overload improving slowly, edema improving; start lasix 40 mg IV daily and can transition to PO when ready for d/c; TTE pending  - BP well controlled, continue current medications  - pt will need sleep study and close pulmonary followup upon d/c; Please call [phone] or email [email or web address] for an appointment at the Pulmonary office ([address], [address], [place], [place]).

## 2023-07-24 NOTE — DISCHARGE NOTE NURSING/CASE MANAGEMENT/SOCIAL WORK - PATIENT PORTAL LINK FT
You can access the FollowMyHealth Patient Portal offered by [facility] by registering at the following website: [email or web address]. By joining [employer]’s FollowMyHealth portal, you will also be able to view your health information using other applications (apps) compatible with our system.

## 2023-07-24 NOTE — PROGRESS NOTE ADULT - NUTRITIONAL ASSESSMENT
This patient has been assessed with a concern for Malnutrition and has been determined to have a diagnosis/diagnoses of Morbid obesity (BMI > 40).    This patient is being managed with:   Diet DASH/TLC-  Sodium & Cholesterol Restricted  Liquid Protein Supplement     Qty per Day:  1  Entered: Jul 17 2023  3:16PM  
This patient has been assessed with a concern for Malnutrition and has been determined to have a diagnosis/diagnoses of Morbid obesity (BMI > 40).    This patient is being managed with:   Diet Regular-  Entered: Jul 14 2023  4:58PM    The following pending diet order is being considered for treatment of Morbid obesity (BMI > 40):  Diet DASH/TLC-  Sodium & Cholesterol Restricted  Liquid Protein Supplement     Qty per Day:  1  Entered: Jul 17 2023  3:16PM  
This patient has been assessed with a concern for Malnutrition and has been determined to have a diagnosis/diagnoses of Morbid obesity (BMI > 40).    This patient is being managed with:   Diet DASH/TLC-  Sodium & Cholesterol Restricted  Liquid Protein Supplement     Qty per Day:  1  Entered: Jul 17 2023  3:16PM  
This patient has been assessed with a concern for Malnutrition and has been determined to have a diagnosis/diagnoses of Morbid obesity (BMI > 40).    This patient is being managed with:   Diet Regular-  Entered: Jul 14 2023  4:58PM    The following pending diet order is being considered for treatment of Morbid obesity (BMI > 40):  Diet DASH/TLC-  Sodium & Cholesterol Restricted  Liquid Protein Supplement     Qty per Day:  1  Entered: Jul 17 2023  3:16PM

## 2023-07-24 NOTE — PROGRESS NOTE ADULT - REASON FOR ADMISSION
Right leg infection.

## 2023-07-24 NOTE — DISCHARGE NOTE NURSING/CASE MANAGEMENT/SOCIAL WORK - NSDCVIVACCINE_GEN_ALL_CORE_FT
influenza, injectable, quadrivalent, preservative free; 23-Sep-2015 13:45; Dada Almazan (RN); [employer]; [id number]; IntraMuscular; Deltoid Right.; 0.5 milliLiter(s); VIS (VIS Published: 07-Aug-2015, VIS Presented: 23-Sep-2015);   influenza, injectable, quadrivalent, preservative free; 06-Oct-2020 13:25; Patricia Burdick (RN); [employer]; [id number] (Exp. Date: 30-Jun-2021); IntraMuscular; Deltoid Left.; 0.5 milliLiter(s); VIS (VIS Published: 15-Aug-2019, VIS Presented: 06-Oct-2020);

## 2023-07-24 NOTE — PHARMACOTHERAPY INTERVENTION NOTE - COMMENTS
Patient on vancomycin 1000mg q12h with AUC < 400. Recommended to increase vancomycin dose to 1250mg q12h to target AUC of 497.
Pt being treated for cellulitis/phlebitis. Was previously receiving vancomycin 1250mg q8h with a calculated AUC of ~700. Recommended to initiate vancomycin 1000mg q12h for estimated AUC of 512.

## 2023-07-24 NOTE — PROGRESS NOTE ADULT - PROVIDER SPECIALTY LIST ADULT
Hospitalist
Infectious Disease
Pulmonology
Pulmonology
Hospitalist
Hospitalist
Infectious Disease
Pulmonology
Hospitalist

## 2023-07-24 NOTE — PROGRESS NOTE ADULT - ASSESSMENT
7/20:  Palpable cord R AC fossa with cellulitis  Possible site in hand as well  Edema elbow to hand  Chronic RLE ulcer since at least 2017, seen by vascular  Bedbugs- previously addressed  Maggots- previously addressed  Serial exams RUE  Stop zosyn  Will confer with pharmacy re: Vancomycin vs. Daptomycin this setting  No concern of systemic illness, I do not think patient merits blood cx at this time  LE wound care per vascular  Thank you for the courtesy of this referral.    7/21: afebrile, no leukocytosis, Cr ok, RUE cellulitis with improvement, Vancomycin IV continued while in pt. No objections to discharge on po abx, Bactrim to complete 7 days course total.   Attending Addendum--  Case reviewed with NP Mena Herring. Her note reviewed and modified as appropriate.   Patient personally assessed and examined.  Improved RUE but still with erythema, edema. Unclear why new IV placed in same area as active phlebitis. D/W RN at the time of assessment- to be removed.  Serial exams  Cont Abx  F/U Cx  Alex Cheatham MD  I will be available via Teams for any issues that may arise over the weekend.    7/24: pt's cellulitis of RUE with some improvement, however, still with swelling, erythema, tender to touch, + warmth, would leave him on IV vancomycin for now and monitor for cellulitis improvement, Vancomycin trough is 14.3 yesterday morning. Pt is afebrile, NC, no new lab work, BCs and UC with no growth to date.     Suggestions:  - continue Vancomycin IV   - monitor vancomycin levels, required   - eventually, will transition to po Bactrim DS to complete the course  - trend temperatures and wbc  - wound care     Discussed with Dr. Cheatham

## 2023-07-24 NOTE — PROGRESS NOTE ADULT - ASSESSMENT
63 year old male PMH HTN, morbid obesity, p/w right lower ext wound and diff walking.  Has been in wound care for weeks but last time was 2 weeks ago. Recently notes increased difficulty walking.  Last time walked was 1 year ago.  Brother from [place] state came to visit and told him to come to hospital.  Lives in an apt but says hard to care for self.  Denies cp, sob, fever, chills, nausea, vomiting.        RLE stage II ulcers with maggots and bed bugs   per my colleague's documentation  "wbc normal, afebrile and has borderline esr   - CT of LE shows no discreet abscess. Will stop vanco and c/w zosyn for now. will swtich to keflex tomorrow   - c./w lasix and check daily weight. clinically improving "  7/19/2023 - appreciated dr. camilo note supportive care and oupt f/u    acute hypoxic resp failure with hypercarbia   per my colleague's documentation " suspect component of  fluid overload   - ABG shows chronic co2 retention. Likely 2/2 body habitus. will likely need home bipap. consulted pulm   - hold lasix for now. s/p diamox x1. f/u bicarb    - f/u echo - last one 2021 showed aortic aneurysm "    7/19/2023 echo done today f/u results   was resumed on lasix , bicarbonate better today  on labs at 37 down from 43   appreciate pulmonary consult   7/20/2023 echo as above - aortic aneurysm is stable when compared to 4/2021   appreciate pulm consult    RUE swelling  - check doppler  7/19/2023 doppler negative  supportive care  7/21/2023 improving    HTN_ bp stable  continue with meds        dc to [facility]  when medically stable . will need trilogy and likely home o2. for home as d/w pulmonary dr. benitez   await str auth and bed 63 year old male PMH HTN, morbid obesity, p/w right lower ext wound and diff walking.  Has been in wound care for weeks but last time was 2 weeks ago. Recently notes increased difficulty walking.  Last time walked was 1 year ago.  Brother from [place] of state came to visit and told him to come to hospital.  Lives in an apt but says hard to care for self.  Denies cp, sob, fever, chills, nausea, vomiting.        RLE stage II ulcers with maggots and bed bugs   per my colleague's documentation  "wbc normal, afebrile and has borderline esr   - CT of LE shows no discreet abscess. Will stop vanco and c/w zosyn for now. will swtich to keflex tomorrow   - c./w lasix and check daily weight. clinically improving "  7/19/2023 - appreciated dr. camilo note supportive care and oupt f/u    acute hypoxic resp failure with hypercarbia   per my colleague's documentation " suspect component of  fluid overload   - ABG shows chronic co2 retention. Likely 2/2 body habitus. will likely need home bipap. consulted pulm   - hold lasix for now. s/p diamox x1. 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 will need trilogy and likely home o2. for home as d/w pulmonary dr. benitez   await str auth and bed

## 2023-07-28 DIAGNOSIS — L97.919 NON-PRESSURE CHRONIC ULCER OF UNSPECIFIED PART OF RIGHT LOWER LEG WITH UNSPECIFIED SEVERITY: ICD-10-CM

## 2023-07-28 DIAGNOSIS — I83.019 VARICOSE VEINS OF RIGHT LOWER EXTREMITY WITH ULCER OF UNSPECIFIED SITE: ICD-10-CM

## 2023-07-28 DIAGNOSIS — I71.9 AORTIC ANEURYSM OF UNSPECIFIED SITE, WITHOUT RUPTURE: ICD-10-CM
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
## 2023-07-28 DIAGNOSIS — E66.2 MORBID (SEVERE) OBESITY WITH ALVEOLAR HYPOVENTILATION: ICD-10-CM

## 2023-07-28 DIAGNOSIS — S80.861A INSECT BITE (NONVENOMOUS), RIGHT LOWER LEG, INITIAL ENCOUNTER: ICD-10-CM

## 2023-07-28 DIAGNOSIS — J96.21 ACUTE AND CHRONIC RESPIRATORY FAILURE WITH HYPOXIA: ICD-10-CM
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
## 2023-07-28 DIAGNOSIS — B87.1 WOUND MYIASIS: ICD-10-CM

## 2023-07-28 DIAGNOSIS — E87.70 FLUID OVERLOAD, UNSPECIFIED: ICD-10-CM

## 2023-07-28 DIAGNOSIS — L60.2 ONYCHOGRYPHOSIS: ICD-10-CM

## 2023-07-28 DIAGNOSIS — I10 ESSENTIAL (PRIMARY) HYPERTENSION: ICD-10-CM

## 2023-07-28 DIAGNOSIS — L03.113 CELLULITIS OF RIGHT UPPER LIMB: ICD-10-CM

## 2023-07-28 DIAGNOSIS — R62.7 ADULT FAILURE TO THRIVE: ICD-10-CM

## 2023-07-28 DIAGNOSIS — L03.115 CELLULITIS OF RIGHT LOWER LIMB: ICD-10-CM

## 2023-07-28 DIAGNOSIS — J96.22 ACUTE AND CHRONIC RESPIRATORY FAILURE WITH HYPERCAPNIA: ICD-10-CM

## 2023-08-29 NOTE — H&P ADULT - PROBLEM/PLAN-3
Vyvanse 40 mg daily. The current medical regimen is effective;  continue present plan and medications.   DISPLAY PLAN FREE TEXT

## 2024-02-13 NOTE — DISCHARGE NOTE NURSING/CASE MANAGEMENT/SOCIAL WORK - NSDCFUADDAPPT_GEN_ALL_CORE_FT
oriented to person, place and time , normal sensation , short and long term memory intact , oriented to person, place and time , normal sensation , short and long term memory intact
Wound Center Appt Roberta March 5th , 2019 @ 9ay - [address] [facility]  [phone]

## 2024-02-23 NOTE — PATIENT PROFILE ADULT - HEALTH LITERACY
Please bring all eye medications to the office for the next day appointment.    Start eye drops today 4 times a day in the operated eye.  Prednisolone acetate 1% one drop to the operated eye 4 times a day  Ofloxacin one drop to the operated eye 4 times a day  Ketorolac one drop to the operated eye 4 times a day    Wear glasses during the day and eye shield at night.    No bending over from the waist  No lifting any objects more than 10 pounds  No water in the operated eye  No sleeping on the operated side        Want to Say “Thank You” to a Nurse?  The SUMEET Award® was created in memory of DAVID Ramírez by his family to say thank you to bedside nurses who provide an outstanding level of care.    Submit a nomination using any method below.     OR    [email or web address]  Or visit the Resource section   on your Travador blessing      
no

## 2024-04-12 ENCOUNTER — INPATIENT (INPATIENT)
Facility: HOSPITAL | Age: 64
LOS: 9 days | Discharge: NURSING, REHAB OR LONG-TERM CARE | End: 2024-04-22
Attending: INTERNAL MEDICINE | Admitting: INTERNAL MEDICINE
Payer: MEDICAID

## 2024-04-12 VITALS
RESPIRATION RATE: 18 BRPM | HEIGHT: 65 IN | HEART RATE: 105 BPM | OXYGEN SATURATION: 100 % | TEMPERATURE: 98 F | SYSTOLIC BLOOD PRESSURE: 146 MMHG | DIASTOLIC BLOOD PRESSURE: 110 MMHG | WEIGHT: 315 LBS

## 2024-04-12 DIAGNOSIS — E66.2 MORBID (SEVERE) OBESITY WITH ALVEOLAR HYPOVENTILATION: ICD-10-CM

## 2024-04-12 DIAGNOSIS — K46.9 UNSPECIFIED ABDOMINAL HERNIA WITHOUT OBSTRUCTION OR GANGRENE: Chronic | ICD-10-CM

## 2024-04-12 DIAGNOSIS — Z98.89 OTHER SPECIFIED POSTPROCEDURAL STATES: Chronic | ICD-10-CM

## 2024-04-12 DIAGNOSIS — I48.91 UNSPECIFIED ATRIAL FIBRILLATION: ICD-10-CM

## 2024-04-12 DIAGNOSIS — J96.21 ACUTE AND CHRONIC RESPIRATORY FAILURE WITH HYPOXIA: ICD-10-CM

## 2024-04-12 DIAGNOSIS — R79.89 OTHER SPECIFIED ABNORMAL FINDINGS OF BLOOD CHEMISTRY: ICD-10-CM

## 2024-04-12 DIAGNOSIS — I10 ESSENTIAL (PRIMARY) HYPERTENSION: ICD-10-CM

## 2024-04-12 DIAGNOSIS — R58 HEMORRHAGE, NOT ELSEWHERE CLASSIFIED: Chronic | ICD-10-CM

## 2024-04-12 LAB
ALBUMIN SERPL ELPH-MCNC: 2.8 G/DL — LOW (ref 3.3–5)
ALP SERPL-CCNC: 87 U/L — SIGNIFICANT CHANGE UP (ref 40–120)
ALT FLD-CCNC: 16 U/L — SIGNIFICANT CHANGE UP (ref 12–78)
ANION GAP SERPL CALC-SCNC: <3 MMOL/L — LOW (ref 5–17)
APPEARANCE UR: CLEAR — SIGNIFICANT CHANGE UP
APTT BLD: 35.9 SEC — HIGH (ref 24.5–35.6)
AST SERPL-CCNC: 25 U/L — SIGNIFICANT CHANGE UP (ref 15–37)
BACTERIA # UR AUTO: ABNORMAL /HPF
BASE EXCESS BLDA CALC-SCNC: 31.3 MMOL/L — HIGH (ref -2–3)
BASE EXCESS BLDA CALC-SCNC: 32.5 MMOL/L — HIGH (ref -2–3)
BASOPHILS # BLD AUTO: 0.03 K/UL — SIGNIFICANT CHANGE UP (ref 0–0.2)
BASOPHILS NFR BLD AUTO: 0.4 % — SIGNIFICANT CHANGE UP (ref 0–2)
BILIRUB SERPL-MCNC: 0.3 MG/DL — SIGNIFICANT CHANGE UP (ref 0.2–1.2)
BILIRUB UR-MCNC: NEGATIVE — SIGNIFICANT CHANGE UP
BLOOD GAS COMMENTS ARTERIAL: SIGNIFICANT CHANGE UP
BLOOD GAS COMMENTS ARTERIAL: SIGNIFICANT CHANGE UP
BUN SERPL-MCNC: 8 MG/DL — SIGNIFICANT CHANGE UP (ref 7–23)
CALCIUM SERPL-MCNC: 9.1 MG/DL — SIGNIFICANT CHANGE UP (ref 8.5–10.1)
CHLORIDE SERPL-SCNC: 84 MMOL/L — LOW (ref 96–108)
CO2 BLDA-SCNC: 64 MMOL/L — HIGH (ref 19–24)
CO2 BLDA-SCNC: 65 MMOL/L — HIGH (ref 19–24)
CO2 SERPL-SCNC: >45 MMOL/L — CRITICAL HIGH (ref 22–31)
COLOR SPEC: YELLOW — SIGNIFICANT CHANGE UP
CREAT SERPL-MCNC: 0.44 MG/DL — LOW (ref 0.5–1.3)
D DIMER BLD IA.RAPID-MCNC: 174 NG/ML DDU — SIGNIFICANT CHANGE UP
DIFF PNL FLD: ABNORMAL
EGFR: 120 ML/MIN/1.73M2 — SIGNIFICANT CHANGE UP
EOSINOPHIL # BLD AUTO: 0.01 K/UL — SIGNIFICANT CHANGE UP (ref 0–0.5)
EOSINOPHIL NFR BLD AUTO: 0.1 % — SIGNIFICANT CHANGE UP (ref 0–6)
FLUAV AG NPH QL: SIGNIFICANT CHANGE UP
FLUBV AG NPH QL: SIGNIFICANT CHANGE UP
GAS PNL BLDA: SIGNIFICANT CHANGE UP
GLUCOSE SERPL-MCNC: 114 MG/DL — HIGH (ref 70–99)
GLUCOSE UR QL: NEGATIVE MG/DL — SIGNIFICANT CHANGE UP
HCO3 BLDA-SCNC: 61 MMOL/L — CRITICAL HIGH (ref 21–28)
HCO3 BLDA-SCNC: 62 MMOL/L — CRITICAL HIGH (ref 21–28)
HCT VFR BLD CALC: 44.9 % — SIGNIFICANT CHANGE UP (ref 39–50)
HGB BLD-MCNC: 13.5 G/DL — SIGNIFICANT CHANGE UP (ref 13–17)
HOROWITZ INDEX BLDA+IHG-RTO: 40 — SIGNIFICANT CHANGE UP
HOROWITZ INDEX BLDA+IHG-RTO: 40 — SIGNIFICANT CHANGE UP
IMM GRANULOCYTES NFR BLD AUTO: 0.6 % — SIGNIFICANT CHANGE UP (ref 0–0.9)
INR BLD: 0.96 RATIO — SIGNIFICANT CHANGE UP (ref 0.85–1.18)
KETONES UR-MCNC: NEGATIVE MG/DL — SIGNIFICANT CHANGE UP
LEUKOCYTE ESTERASE UR-ACNC: NEGATIVE — SIGNIFICANT CHANGE UP
LYMPHOCYTES # BLD AUTO: 1.35 K/UL — SIGNIFICANT CHANGE UP (ref 1–3.3)
LYMPHOCYTES # BLD AUTO: 17.3 % — SIGNIFICANT CHANGE UP (ref 13–44)
MCHC RBC-ENTMCNC: 27.8 PG — SIGNIFICANT CHANGE UP (ref 27–34)
MCHC RBC-ENTMCNC: 30.1 G/DL — LOW (ref 32–36)
MCV RBC AUTO: 92.4 FL — SIGNIFICANT CHANGE UP (ref 80–100)
MONOCYTES # BLD AUTO: 0.49 K/UL — SIGNIFICANT CHANGE UP (ref 0–0.9)
MONOCYTES NFR BLD AUTO: 6.3 % — SIGNIFICANT CHANGE UP (ref 2–14)
NEUTROPHILS # BLD AUTO: 5.89 K/UL — SIGNIFICANT CHANGE UP (ref 1.8–7.4)
NEUTROPHILS NFR BLD AUTO: 75.3 % — SIGNIFICANT CHANGE UP (ref 43–77)
NITRITE UR-MCNC: NEGATIVE — SIGNIFICANT CHANGE UP
NRBC # BLD: 0 /100 WBCS — SIGNIFICANT CHANGE UP (ref 0–0)
NT-PROBNP SERPL-SCNC: 2348 PG/ML — HIGH (ref 0–125)
PCO2 BLDA: 80 MMHG — CRITICAL HIGH (ref 32–46)
PCO2 BLDA: 82 MMHG — CRITICAL HIGH (ref 32–46)
PH BLDA: 7.49 — HIGH (ref 7.35–7.45)
PH BLDA: 7.49 — HIGH (ref 7.35–7.45)
PH UR: 7.5 — SIGNIFICANT CHANGE UP (ref 5–8)
PLATELET # BLD AUTO: 261 K/UL — SIGNIFICANT CHANGE UP (ref 150–400)
PO2 BLDA: 67 MMHG — LOW (ref 83–108)
PO2 BLDA: 77 MMHG — LOW (ref 83–108)
POTASSIUM SERPL-MCNC: 3.9 MMOL/L — SIGNIFICANT CHANGE UP (ref 3.5–5.3)
POTASSIUM SERPL-SCNC: 3.9 MMOL/L — SIGNIFICANT CHANGE UP (ref 3.5–5.3)
PROT SERPL-MCNC: 7.3 GM/DL — SIGNIFICANT CHANGE UP (ref 6–8.3)
PROT UR-MCNC: SIGNIFICANT CHANGE UP MG/DL
PROTHROM AB SERPL-ACNC: 11.4 SEC — SIGNIFICANT CHANGE UP (ref 9.5–13)
RBC # BLD: 4.86 M/UL — SIGNIFICANT CHANGE UP (ref 4.2–5.8)
RBC # FLD: 12.7 % — SIGNIFICANT CHANGE UP (ref 10.3–14.5)
RBC CASTS # UR COMP ASSIST: 15 /HPF — HIGH (ref 0–4)
SAO2 % BLDA: 95.4 % — SIGNIFICANT CHANGE UP (ref 94–98)
SAO2 % BLDA: 97.1 % — SIGNIFICANT CHANGE UP (ref 94–98)
SARS-COV-2 RNA SPEC QL NAA+PROBE: SIGNIFICANT CHANGE UP
SODIUM SERPL-SCNC: 132 MMOL/L — LOW (ref 135–145)
SP GR SPEC: 1.01 — SIGNIFICANT CHANGE UP (ref 1–1.03)
TROPONIN I, HIGH SENSITIVITY RESULT: 511.7 NG/L — HIGH
TROPONIN I, HIGH SENSITIVITY RESULT: 548.3 NG/L — HIGH
UROBILINOGEN FLD QL: 0.2 MG/DL — SIGNIFICANT CHANGE UP (ref 0.2–1)
WBC # BLD: 7.82 K/UL — SIGNIFICANT CHANGE UP (ref 3.8–10.5)
WBC # FLD AUTO: 7.82 K/UL — SIGNIFICANT CHANGE UP (ref 3.8–10.5)
WBC UR QL: 1 /HPF — SIGNIFICANT CHANGE UP (ref 0–5)

## 2024-04-12 PROCEDURE — 71045 X-RAY EXAM CHEST 1 VIEW: CPT | Mod: 26

## 2024-04-12 PROCEDURE — 70450 CT HEAD/BRAIN W/O DYE: CPT | Mod: 26,MC

## 2024-04-12 PROCEDURE — 99223 1ST HOSP IP/OBS HIGH 75: CPT

## 2024-04-12 PROCEDURE — 99285 EMERGENCY DEPT VISIT HI MDM: CPT

## 2024-04-12 PROCEDURE — 93010 ELECTROCARDIOGRAM REPORT: CPT | Mod: 76

## 2024-04-12 RX ORDER — PANTOPRAZOLE SODIUM 20 MG/1
40 TABLET, DELAYED RELEASE ORAL
Refills: 0 | Status: DISCONTINUED | OUTPATIENT
Start: 2024-04-12 | End: 2024-04-22

## 2024-04-12 RX ORDER — CEFTRIAXONE 500 MG/1
1000 INJECTION, POWDER, FOR SOLUTION INTRAMUSCULAR; INTRAVENOUS ONCE
Refills: 0 | Status: COMPLETED | OUTPATIENT
Start: 2024-04-12 | End: 2024-04-12

## 2024-04-12 RX ORDER — METOPROLOL TARTRATE 50 MG
5 TABLET ORAL ONCE
Refills: 0 | Status: COMPLETED | OUTPATIENT
Start: 2024-04-12 | End: 2024-04-12

## 2024-04-12 RX ORDER — LANOLIN ALCOHOL/MO/W.PET/CERES
3 CREAM (GRAM) TOPICAL AT BEDTIME
Refills: 0 | Status: DISCONTINUED | OUTPATIENT
Start: 2024-04-12 | End: 2024-04-15

## 2024-04-12 RX ORDER — FUROSEMIDE 40 MG
40 TABLET ORAL ONCE
Refills: 0 | Status: COMPLETED | OUTPATIENT
Start: 2024-04-12 | End: 2024-04-12

## 2024-04-12 RX ORDER — ONDANSETRON 8 MG/1
4 TABLET, FILM COATED ORAL EVERY 8 HOURS
Refills: 0 | Status: DISCONTINUED | OUTPATIENT
Start: 2024-04-12 | End: 2024-04-15

## 2024-04-12 RX ORDER — SENNA PLUS 8.6 MG/1
2 TABLET ORAL AT BEDTIME
Refills: 0 | Status: DISCONTINUED | OUTPATIENT
Start: 2024-04-12 | End: 2024-04-22

## 2024-04-12 RX ORDER — ACETAMINOPHEN 500 MG
650 TABLET ORAL EVERY 6 HOURS
Refills: 0 | Status: DISCONTINUED | OUTPATIENT
Start: 2024-04-12 | End: 2024-04-22

## 2024-04-12 RX ORDER — HYDRALAZINE HCL 50 MG
25 TABLET ORAL THREE TIMES A DAY
Refills: 0 | Status: DISCONTINUED | OUTPATIENT
Start: 2024-04-12 | End: 2024-04-13

## 2024-04-12 RX ORDER — FUROSEMIDE 40 MG
40 TABLET ORAL
Refills: 0 | Status: DISCONTINUED | OUTPATIENT
Start: 2024-04-13 | End: 2024-04-15

## 2024-04-12 RX ORDER — AMLODIPINE BESYLATE 2.5 MG/1
10 TABLET ORAL DAILY
Refills: 0 | Status: DISCONTINUED | OUTPATIENT
Start: 2024-04-12 | End: 2024-04-18

## 2024-04-12 RX ORDER — CARVEDILOL PHOSPHATE 80 MG/1
3.12 CAPSULE, EXTENDED RELEASE ORAL EVERY 12 HOURS
Refills: 0 | Status: DISCONTINUED | OUTPATIENT
Start: 2024-04-12 | End: 2024-04-13

## 2024-04-12 RX ORDER — HEPARIN SODIUM 5000 [USP'U]/ML
5000 INJECTION INTRAVENOUS; SUBCUTANEOUS EVERY 8 HOURS
Refills: 0 | Status: DISCONTINUED | OUTPATIENT
Start: 2024-04-12 | End: 2024-04-13

## 2024-04-12 RX ORDER — ASCORBIC ACID 60 MG
500 TABLET,CHEWABLE ORAL DAILY
Refills: 0 | Status: DISCONTINUED | OUTPATIENT
Start: 2024-04-12 | End: 2024-04-22

## 2024-04-12 RX ADMIN — AMLODIPINE BESYLATE 10 MILLIGRAM(S): 2.5 TABLET ORAL at 22:10

## 2024-04-12 RX ADMIN — Medication 25 MILLIGRAM(S): at 22:10

## 2024-04-12 RX ADMIN — CEFTRIAXONE 100 MILLIGRAM(S): 500 INJECTION, POWDER, FOR SOLUTION INTRAMUSCULAR; INTRAVENOUS at 14:17

## 2024-04-12 RX ADMIN — Medication 40 MILLIGRAM(S): at 17:55

## 2024-04-12 RX ADMIN — Medication 20 MILLIGRAM(S): at 22:09

## 2024-04-12 RX ADMIN — CARVEDILOL PHOSPHATE 3.12 MILLIGRAM(S): 80 CAPSULE, EXTENDED RELEASE ORAL at 22:10

## 2024-04-12 RX ADMIN — HEPARIN SODIUM 5000 UNIT(S): 5000 INJECTION INTRAVENOUS; SUBCUTANEOUS at 22:10

## 2024-04-12 RX ADMIN — Medication 5 MILLIGRAM(S): at 21:16

## 2024-04-12 RX ADMIN — PANTOPRAZOLE SODIUM 40 MILLIGRAM(S): 20 TABLET, DELAYED RELEASE ORAL at 22:10

## 2024-04-12 NOTE — H&P ADULT - PROBLEM SELECTOR PLAN 1
New  Presents with sats of 68% on 3L NC -Baseline  Hx of LORI/OHS not on NIV  RR 17 satting 99% on BIPAP  CXR: pulmonary edema, proBNP 2348  pCO2 on presentation >122  Lasix 40mg IV BID   Strict I/O, daily weight, TTE ordered  Continue BiPAP overnight then can wean to sats >92 on baseline 3L NC  Monitor New  Presents with sats of 68% on 3L NC -Baseline  Hx of LORI/OHS not on NIV  RR 17 satting 99% on BIPAP  CXR: pulmonary edema, proBNP 2348  pCO2 on presentation >122  Lasix 40mg IV BID   Strict I/O, daily weight, TTE ordered  Continue BiPAP overnight then can wean to sats >92 on baseline 3L NC  Monitor, VBG in AM

## 2024-04-12 NOTE — ED PROVIDER NOTE - CLINICAL SUMMARY MEDICAL DECISION MAKING FREE TEXT BOX
64M BIBA from [facility] PMH HTN, PUD/GI Bleed, OA, RLE venous stasis ulcer who presents to ED w/ AMS and SOB x this AM. Discussed case w/ NH - states pt typically AAOx4 and upon morning rounds pt was only oriented to person and appeared confused, pt was found to have spO2 68% on RA, placed on NC w/ improvement to 78%, then placed on non-rebreather w/ spO2 100%. Unable to fully assess ROS due to AMS/confusion.

## 2024-04-12 NOTE — ED PROVIDER NOTE - PHYSICAL EXAMINATION
GEN: Awake, alert, interactive, NAD.  HEAD AND NECK: NC/AT. Airway patent. Neck supple.   EYES:  Clear b/l. EOMI. PERRL.   ENT: Moist mucus membranes.   CARDIAC: Regular rate, regular rhythm. No evident pedal edema.    RESP/CHEST: Normal respiratory effort with no use of accessory muscles or retractions. Clear throughout on auscultation.  ABD: Soft, obese, non-tender. No rebound, no guarding.   BACK: No midline spinal TTP. No CVAT.   EXTREMITIES: Moving all extremities with no apparent deformities. RLE w/ dressing in place, C/D/I.   SKIN: Warm, dry, intact normal color. No rash.   NEURO: AOx2, CN II-XII grossly intact, no focal deficits.   PSYCH: Appropriate mood and affect.

## 2024-04-12 NOTE — H&P ADULT - TIME BILLING
I have spent a total of 90minutes to prepare to see the patient, obtaining and reviewing history, physical examination, explaining the diagnosis, prognosis and treatment plan with the patient/family/caregiver. I also have spent the time ordering studies and testing, interpreting results, medicine reconciliation, subspecialty consultation and documentation as above.

## 2024-04-12 NOTE — H&P ADULT - ASSESSMENT
64 yr old male presenting with acute on chronic hypoxic/hypercapnic respiratory failure and afib with RVR

## 2024-04-12 NOTE — H&P ADULT - NSHPLABSRESULTS_GEN_ALL_CORE
13.5   7.82  )-----------( 261      ( 2024 13:49 )             44.9     132<L>  |  84<L>  |  8   ----------------------------<  114<H>       3.9   |  >45<HH>  |  0.44<L>    Ca    9.1      2024 13:49    TPro  7.3  /  Alb  2.8<L>  /  TBili  0.3  /  DBili  x   /  AST  25  /  ALT  16  /  AlkPhos  87      PT/INR: 11.4/0.96 (24 @ 13:49)  PTT: 35.9 (24 @ 13:49)      Pro-BNP: 2348 (24 @ 13:49)          Urinalysis Basic - ( 2024 20:20 )  Color: Yellow / Appearance: Clear / S.008 / pH: 7.5  Gluc: Negative mg/dL / Ketone: Negative mg/dL  / Bili: Negative / Urobili: 0.2 mg/dL   Blood: Small / Protein: Trace mg/dL / Nitrite: Negative   Leuk Esterase: Negative / RBC: 15 /HPF / WBC 1 /HPF   Sq Epi: x / Non Sq Epi: x / Bacteria: Occasional /HPF    EKG interpreted by myself: afib with rvr  CXR interpreted by myself: pulmonary edema    CT head interpreted by radiology: No acute intracranial hemorrhage, midline shift or mass effect. Chronic mildly expanded empty sella turcica.

## 2024-04-12 NOTE — H&P ADULT - NSICDXPASTMEDICALHX_GEN_ALL_CORE_FT
PAST MEDICAL HISTORY:  Duodenal ulcer disease     HTN (hypertension)     Knee osteoarthritis     Morbid obesity     Obesity hypoventilation syndrome     LORI (obstructive sleep apnea)     Osteoarthritis     PUD (peptic ulcer disease)

## 2024-04-12 NOTE — ED ADULT TRIAGE NOTE - CHIEF COMPLAINT QUOTE
Per EMS, pt from [facility] when AMS started last night and respiratory distress started this morning. Usual baseline is axox4 but pt currently axox1-2. States pt was sating 89-90% on RA and placed on NRB at 97%  hx HTN, GERD, obesity, OA, peptic ulcer   in triage

## 2024-04-12 NOTE — ED ADULT TRIAGE NOTE - BP NONINVASIVE SYSTOLIC (MM HG)
146
Detail Level: Zone
Continue Regimen: Isotretinoin
Plan: Start of Month #8, continue 40mg BID. This should be his final month of medication, patient will complete any remaining pills he has from previous prescriptions plus this months and simply stop the medication and call or follow up only if the acne reoccurs. He will check one more set of labs in the next few days and we will call him with those results

## 2024-04-12 NOTE — H&P ADULT - PROBLEM SELECTOR PLAN 2
New  EKG: afib with RVR - no hx of afib   [id number]: 1  Lopressor 5mg X1 ordered  Continue coreg 3.125mg BID   TTE ordered  Monitor on tele

## 2024-04-12 NOTE — ED ADULT NURSE NOTE - NSICDXPASTSURGICALHX_GEN_ALL_CORE_FT
PAST SURGICAL HISTORY:  Abdominal hernia     Gastroduodenal artery bleed IR embolization of gastroduodenal artery    H/O ventral hernia repair     
numerical 0-10

## 2024-04-12 NOTE — ED ADULT NURSE NOTE - CHIEF COMPLAINT QUOTE
Per EMS, pt from [facility] when AMS started last night and respiratory distress started this morning. Usual baseline is axox4 but pt currently axox1-2. States pt was sating 89-90% on RA and placed on NRB at 97%  hx HTN, GERD, obesity, OA, peptic ulcer   in triage

## 2024-04-12 NOTE — H&P ADULT - NSHPPHYSICALEXAM_GEN_ALL_CORE
PHYSICAL EXAM:  GENERAL: NAD, comfortable at bedside   HEAD:  Atraumatic, Normocephalic  EYES: EOMI, PERRL, conjunctiva and sclera clear  NECK: Supple, No JVD  CHEST/LUNG: decreased air entry throughout ; No wheezes, rales or rhonchi  HEART: irregularly irregular; No murmurs, rubs, or gallops, (+)S1, S2  ABDOMEN: Soft, Nontender, Nondistended; Normal Bowel sounds   EXTREMITIES:  2+ Peripheral Pulses, No clubbing, cyanosis, or edema  PSYCH: unable to assess 2/2 AMS  NEUROLOGY: AAOx1 (self), moving all extremities spontaneously   SKIN: RLE dressed

## 2024-04-12 NOTE — ED ADULT NURSE NOTE - CHPI ED NUR SYMPTOMS NEG
no change in level of consciousness/no dizziness/no fever/no loss of consciousness/no numbness/no vomiting

## 2024-04-12 NOTE — ED ADULT NURSE NOTE - NSFALLRISKINTERV_ED_ALL_ED
Assistance OOB with selected safe patient handling equipment if applicable/Assistance with ambulation/Communicate fall risk and risk factors to all staff, patient, and family/Monitor gait and stability/Monitor for mental status changes and reorient to person, place, and time, as needed/Provide visual cue: yellow wristband, yellow gown, etc/Reinforce activity limits and safety measures with patient and family/Toileting schedule using arm’s reach rule for commode and bathroom/Use of alarms - bed, stretcher, chair and/or video monitoring/Call bell, personal items and telephone in reach/Instruct patient to call for assistance before getting out of bed/chair/stretcher/Non-slip footwear applied when patient is off stretcher/[place] to call system/Physically safe environment - no spills, clutter or unnecessary equipment/Purposeful Proactive Rounding/Room/bathroom lighting operational, light cord in reach

## 2024-04-12 NOTE — ED ADULT NURSE NOTE - OBJECTIVE STATEMENT
64 yr old M AOx1. Coming from nursing home. AOx4 at baseline. C/o AMS and resp distress. 89% on RA. 100% on NRB 10L. No signs of resp distress on NRB. Placed on cardiac monitor. PMH of HTN, PUD, and OA. Pt is a poor historian. Pt denies any pain, no nonverbal indicators of pain. Wrap on RLE with bandage.

## 2024-04-12 NOTE — H&P ADULT - PROBLEM SELECTOR PLAN 4
Chronic unstable as likely contributing to above  Consider evaluation for discharge on BiPAP Chronic moderate exacerbation  /118  Continue home enalapril 20mg daily, amlodipine 10mg daily, coreg 3.125mg BID, hydralazine 25mg TID  Holding lasix 40mg PO daily as giving lasix 40mg IV BID.   Monitor

## 2024-04-12 NOTE — H&P ADULT - HISTORY OF PRESENT ILLNESS
64 yr old male with a pmh of morbid obesity with alveolar hypoventilation with chronic hypoxia/hypercarbia due to LORI/OHS on 2-3L NC, HTN, GI bleed requiring embolization, RLE wound with previous maggots, who presents with AMS and hypoxia of 68 % on baseline 3L NC.   Collateral obtained from Nursing supervisor.   Unable to obtain ROS from pt as altered   Vitals: T 98.2, , /94, RR 17 satting 99% on BiPAP

## 2024-04-12 NOTE — H&P ADULT - PROBLEM SELECTOR PLAN 3
Chronic moderate exacerbation  /118  Continue home enalapril 20mg daily, amlodipine 10mg daily, coreg 3.125mg BID, hydralazine 25mg TID  Holding lasix 40mg PO daily as giving lasix 40mg IV BID.   Monitor New  Trop 511->548  EKG afib with RVR  likely 2/2 afib with RVR will trend trop  TTE ordered

## 2024-04-12 NOTE — ED PROVIDER NOTE - ATTENDING APP SHARED VISIT CONTRIBUTION OF CARE
[place]: Pt seen w/ PA, 64M PMH HTN, PUD, OA, RLE venous stasis ulcer BIBEMS from NH d/t AMS, hypoxia noted by staff this AM. Per NH paperwork, pt AAOx4 at baseline, AAOx1-2 this AM and SPO2 60s on RA, pt placed on 4L NC w/ improvement to 70s. [place]: Pt seen w/ PA, 64M PMH HTN, PUD, OA, RLE venous stasis ulcer BIBEMS from NH d/t AMS, hypoxia noted by staff this AM. Per NH paperwork, pt AAOx4 at baseline, AAOx1-2 this AM and SPO2 60s on RA, pt placed on NRB w/ improvement. Chart review shows pt d/c'd to [facility] July 2023, hx RLE wound w/ maggots and hx acute respiratory failure a/w hypoxia, hypercarbia. Agree w/ planned w/u and dispo. [place]: Pt seen w/ PA, 64M PMH HTN, PUD, OA, RLE venous stasis ulcer BIBEMS from NH d/t AMS, hypoxia noted by staff this AM. Per NH paperwork, pt AAOx4 at baseline, AAOx1-2 this AM and SPO2 60s on RA, pt placed on NRB w/ improvement. Chart review shows pt d/c'd to [facility] July 2023, hx RLE wound w/ maggots and hx acute respiratory failure a/w hypoxia, hypercarbia. Agree w/ planned w/u, anticipate admission.

## 2024-04-12 NOTE — ED PROVIDER NOTE - CARE PLAN
1 Principal Discharge DX:	Acute respiratory failure with hypoxia and hypercapnia  Secondary Diagnosis:	New onset atrial fibrillation

## 2024-04-13 LAB
ANION GAP SERPL CALC-SCNC: <9 MMOL/L — SIGNIFICANT CHANGE UP (ref 5–17)
BASE EXCESS BLDV CALC-SCNC: 29.4 MMOL/L — HIGH (ref -2–3)
BASOPHILS # BLD AUTO: 0.05 K/UL — SIGNIFICANT CHANGE UP (ref 0–0.2)
BASOPHILS NFR BLD AUTO: 0.5 % — SIGNIFICANT CHANGE UP (ref 0–2)
BLOOD GAS COMMENTS, VENOUS: SIGNIFICANT CHANGE UP
BUN SERPL-MCNC: 15 MG/DL — SIGNIFICANT CHANGE UP (ref 7–23)
CALCIUM SERPL-MCNC: 9.2 MG/DL — SIGNIFICANT CHANGE UP (ref 8.5–10.1)
CHLORIDE BLDV-SCNC: 82 MMOL/L — LOW (ref 98–107)
CHLORIDE SERPL-SCNC: 81 MMOL/L — LOW (ref 96–108)
CO2 BLDV-SCNC: 58 MMOL/L — HIGH (ref 22–26)
CO2 SERPL-SCNC: >45 MMOL/L — CRITICAL HIGH (ref 22–31)
CREAT SERPL-MCNC: 0.8 MG/DL — SIGNIFICANT CHANGE UP (ref 0.5–1.3)
CULTURE RESULTS: NO GROWTH — SIGNIFICANT CHANGE UP
EGFR: 100 ML/MIN/1.73M2 — SIGNIFICANT CHANGE UP
EOSINOPHIL # BLD AUTO: 0.02 K/UL — SIGNIFICANT CHANGE UP (ref 0–0.5)
EOSINOPHIL NFR BLD AUTO: 0.2 % — SIGNIFICANT CHANGE UP (ref 0–6)
GAS PNL BLDV: 132 MMOL/L — LOW (ref 136–145)
GAS PNL BLDV: SIGNIFICANT CHANGE UP
GAS PNL BLDV: SIGNIFICANT CHANGE UP
GLUCOSE BLDV-MCNC: 101 MG/DL — HIGH (ref 65–95)
GLUCOSE SERPL-MCNC: 96 MG/DL — SIGNIFICANT CHANGE UP (ref 70–99)
HCO3 BLDV-SCNC: 56 MMOL/L — CRITICAL HIGH (ref 22–28)
HCT VFR BLD CALC: 43.2 % — SIGNIFICANT CHANGE UP (ref 39–50)
HCT VFR BLDA CALC: 41 % — SIGNIFICANT CHANGE UP (ref 37–47)
HGB BLD CALC-MCNC: 13.7 G/DL — SIGNIFICANT CHANGE UP (ref 12.6–17.4)
HGB BLD-MCNC: 13.1 G/DL — SIGNIFICANT CHANGE UP (ref 13–17)
HOROWITZ INDEX BLDV+IHG-RTO: 35 — SIGNIFICANT CHANGE UP
IMM GRANULOCYTES NFR BLD AUTO: 0.6 % — SIGNIFICANT CHANGE UP (ref 0–0.9)
LACTATE BLDV-MCNC: 1.2 MMOL/L — SIGNIFICANT CHANGE UP (ref 0.56–1.39)
LYMPHOCYTES # BLD AUTO: 1.86 K/UL — SIGNIFICANT CHANGE UP (ref 1–3.3)
LYMPHOCYTES # BLD AUTO: 19.5 % — SIGNIFICANT CHANGE UP (ref 13–44)
MCHC RBC-ENTMCNC: 27.4 PG — SIGNIFICANT CHANGE UP (ref 27–34)
MCHC RBC-ENTMCNC: 30.3 G/DL — LOW (ref 32–36)
MCV RBC AUTO: 90.4 FL — SIGNIFICANT CHANGE UP (ref 80–100)
MONOCYTES # BLD AUTO: 0.81 K/UL — SIGNIFICANT CHANGE UP (ref 0–0.9)
MONOCYTES NFR BLD AUTO: 8.5 % — SIGNIFICANT CHANGE UP (ref 2–14)
NEUTROPHILS # BLD AUTO: 6.74 K/UL — SIGNIFICANT CHANGE UP (ref 1.8–7.4)
NEUTROPHILS NFR BLD AUTO: 70.7 % — SIGNIFICANT CHANGE UP (ref 43–77)
NRBC # BLD: 0 /100 WBCS — SIGNIFICANT CHANGE UP (ref 0–0)
PCO2 BLDV: 60 MMHG — HIGH (ref 42–55)
PH BLDV: 7.58 — HIGH (ref 7.32–7.43)
PLATELET # BLD AUTO: 288 K/UL — SIGNIFICANT CHANGE UP (ref 150–400)
PO2 BLDV: 83 MMHG — HIGH (ref 25–45)
POTASSIUM BLDV-SCNC: 3.4 MMOL/L — LOW (ref 3.5–5.1)
POTASSIUM SERPL-MCNC: 3.3 MMOL/L — LOW (ref 3.5–5.3)
POTASSIUM SERPL-SCNC: 3.3 MMOL/L — LOW (ref 3.5–5.3)
RBC # BLD: 4.78 M/UL — SIGNIFICANT CHANGE UP (ref 4.2–5.8)
RBC # FLD: 13.2 % — SIGNIFICANT CHANGE UP (ref 10.3–14.5)
SAO2 % BLDV: 97.5 % — SIGNIFICANT CHANGE UP (ref 94–98)
SODIUM SERPL-SCNC: 135 MMOL/L — SIGNIFICANT CHANGE UP (ref 135–145)
SPECIMEN SOURCE: SIGNIFICANT CHANGE UP
TROPONIN I, HIGH SENSITIVITY RESULT: 411.5 NG/L — HIGH
WBC # BLD: 9.54 K/UL — SIGNIFICANT CHANGE UP (ref 3.8–10.5)
WBC # FLD AUTO: 9.54 K/UL — SIGNIFICANT CHANGE UP (ref 3.8–10.5)

## 2024-04-13 PROCEDURE — 99232 SBSQ HOSP IP/OBS MODERATE 35: CPT

## 2024-04-13 RX ORDER — CARVEDILOL PHOSPHATE 80 MG/1
6.25 CAPSULE, EXTENDED RELEASE ORAL EVERY 12 HOURS
Refills: 0 | Status: DISCONTINUED | OUTPATIENT
Start: 2024-04-13 | End: 2024-04-14

## 2024-04-13 RX ORDER — ACETAZOLAMIDE 250 MG/1
500 TABLET ORAL ONCE
Refills: 0 | Status: COMPLETED | OUTPATIENT
Start: 2024-04-13 | End: 2024-04-13

## 2024-04-13 RX ORDER — RIVAROXABAN 15 MG-20MG
20 KIT ORAL
Refills: 0 | Status: DISCONTINUED | OUTPATIENT
Start: 2024-04-13 | End: 2024-04-22

## 2024-04-13 RX ADMIN — PANTOPRAZOLE SODIUM 40 MILLIGRAM(S): 20 TABLET, DELAYED RELEASE ORAL at 05:27

## 2024-04-13 RX ADMIN — HEPARIN SODIUM 5000 UNIT(S): 5000 INJECTION INTRAVENOUS; SUBCUTANEOUS at 05:27

## 2024-04-13 RX ADMIN — AMLODIPINE BESYLATE 10 MILLIGRAM(S): 2.5 TABLET ORAL at 05:27

## 2024-04-13 RX ADMIN — Medication 40 MILLIGRAM(S): at 05:27

## 2024-04-13 RX ADMIN — CARVEDILOL PHOSPHATE 6.25 MILLIGRAM(S): 80 CAPSULE, EXTENDED RELEASE ORAL at 17:33

## 2024-04-13 RX ADMIN — RIVAROXABAN 20 MILLIGRAM(S): KIT at 17:34

## 2024-04-13 RX ADMIN — Medication 20 MILLIGRAM(S): at 05:27

## 2024-04-13 RX ADMIN — ACETAZOLAMIDE 110 MILLIGRAM(S): 250 TABLET ORAL at 14:28

## 2024-04-13 RX ADMIN — Medication 25 MILLIGRAM(S): at 05:32

## 2024-04-13 RX ADMIN — CARVEDILOL PHOSPHATE 3.12 MILLIGRAM(S): 80 CAPSULE, EXTENDED RELEASE ORAL at 05:28

## 2024-04-13 RX ADMIN — Medication 40 MILLIGRAM(S): at 14:28

## 2024-04-13 RX ADMIN — Medication 500 MILLIGRAM(S): at 12:41

## 2024-04-13 NOTE — PROGRESS NOTE ADULT - SUBJECTIVE AND OBJECTIVE BOX
Patient is a 64y old  Male who presents with a chief complaint of acute on chronic hypoxic/hypercapnic respiratory failure, afib with rvr (12 Apr 2024 21:52)      INTERVAL HPI/OVERNIGHT EVENTS: none     MEDICATIONS  (STANDING):  amLODIPine   Tablet 10 milliGRAM(s) Oral daily  ascorbic acid 500 milliGRAM(s) Oral daily  carvedilol 6.25 milliGRAM(s) Oral every 12 hours  enalapril 20 milliGRAM(s) Oral daily  furosemide   Injectable 40 milliGRAM(s) IV Push two times a day  heparin   Injectable 5000 Unit(s) SubCutaneous every 8 hours  pantoprazole    Tablet 40 milliGRAM(s) Oral before breakfast  senna 2 Tablet(s) Oral at bedtime    MEDICATIONS  (PRN):  acetaminophen     Tablet .. 650 milliGRAM(s) Oral every 6 hours PRN Temp greater or equal to 38C (100.4F), Mild Pain (1 - 3)  aluminum hydroxide/magnesium hydroxide/simethicone Suspension 30 milliLiter(s) Oral every 4 hours PRN Dyspepsia  melatonin 3 milliGRAM(s) Oral at bedtime PRN Insomnia  ondansetron Injectable 4 milliGRAM(s) IV Push every 8 hours PRN Nausea and/or Vomiting      Allergies    No Known Allergies    Intolerances        REVIEW OF SYSTEMS:  CONSTITUTIONAL: No fever, weight loss  EYES: No eye pain, visual disturbances, or discharge  ENMT:  No difficulty hearing, tinnitus, vertigo; No sinus or throat pain  RESPIRATORY: No cough, wheezing, chills or hemoptysis; No shortness of breath  CARDIOVASCULAR: No chest pain, palpitations, dizziness, or leg swelling  GASTROINTESTINAL: No abdominal or epigastric pain. No nausea, vomiting, or hematemesis; No diarrhea or constipation. No melena or hematochezia.  GENITOURINARY: No dysuria, frequency, hematuria, or incontinence  NEUROLOGICAL: No headaches, memory loss, loss of strength, numbness, or tremors    Vital Signs Last 24 Hrs  T(C): 36.5 (13 Apr 2024 10:22), Max: 37.2 (13 Apr 2024 00:43)  T(F): 97.7 (13 Apr 2024 10:22), Max: 99 (13 Apr 2024 00:43)  HR: 81 (13 Apr 2024 10:22) (81 - 116)  BP: 136/83 (13 Apr 2024 10:22) (101/59 - 159/97)  BP(mean): 69 (13 Apr 2024 00:43) (69 - 69)  RR: 19 (13 Apr 2024 10:22) (15 - 20)  SpO2: 96% (13 Apr 2024 10:22) (93% - 99%)    Parameters below as of 13 Apr 2024 08:44  Patient On (Oxygen Delivery Method): nasal cannula  O2 Flow (L/min): 4      PHYSICAL EXAM:  GENERAL: NAD, obese   HEAD:  Atraumatic, Normocephalic  EYES: EOMI, PERRLA, conjunctiva and sclera clear  ENMT: No tonsillar erythema, exudates, or enlargement;   NECK: Supple, Normal thyroid  NERVOUS SYSTEM:  Alert & Oriented X3, Good concentration; Motor Strength 5/5 B/L upper and lower extremities; DTRs 2+ intact and symmetric  CHEST/LUNG: CTABL; No rales, rhonchi, wheezing, or rubs  HEART: Regular rate and rhythm; No murmurs, rubs, or gallops  ABDOMEN: Soft, Nontender, Nondistended; Bowel sounds present  EXTREMITIES:  2+ Peripheral Pulses, No clubbing, cyanosis, or edema  LYMPH: No lymphadenopathy noted  SKIN: right leg venous ulcer     LABS:                        13.1   9.54  )-----------( 288      ( 13 Apr 2024 06:09 )             43.2     04-13    135  |  81<L>  |  15  ----------------------------<  96  3.3<L>   |  >45<HH>  |  0.80    Ca    9.2      13 Apr 2024 06:09    TPro  7.3  /  Alb  2.8<L>  /  TBili  0.3  /  DBili  x   /  AST  25  /  ALT  16  /  AlkPhos  87  04-12    PT/INR - ( 12 Apr 2024 13:49 )   PT: 11.4 sec;   INR: 0.96 ratio         PTT - ( 12 Apr 2024 13:49 )  PTT:35.9 sec  Urinalysis Basic - ( 13 Apr 2024 06:09 )    Color: x / Appearance: x / SG: x / pH: x  Gluc: 96 mg/dL / Ketone: x  / Bili: x / Urobili: x   Blood: x / Protein: x / Nitrite: x   Leuk Esterase: x / RBC: x / WBC x   Sq Epi: x / Non Sq Epi: x / Bacteria: x      CAPILLARY BLOOD GLUCOSE          RADIOLOGY & ADDITIONAL TESTS:    Imaging Personally Reviewed:  [ ] YES  [ ] NO    Consultant(s) Notes Reviewed:  [ ] YES  [ ] NO    Care Discussed with Consultants/Other Providers [ ] YES  [ ] NO

## 2024-04-13 NOTE — PHYSICAL THERAPY INITIAL EVALUATION ADULT - PREDICTED DURATION OF THERAPY (DAYS/WKS), PT EVAL
Pt at this time is at his baseline performance and not a rehab candidate  therefore d/c from PT program at this time.

## 2024-04-13 NOTE — PHYSICAL THERAPY INITIAL EVALUATION ADULT - PERTINENT HX OF CURRENT PROBLEM, REHAB EVAL
Patient is a 64y old  Male who presents with a chief complaint of acute on chronic hypoxic/hypercapnic respiratory failure, afib with rvr.

## 2024-04-13 NOTE — PATIENT PROFILE ADULT - FALL HARM RISK - HARM RISK INTERVENTIONS

## 2024-04-13 NOTE — PHYSICAL THERAPY INITIAL EVALUATION ADULT - GENERAL OBSERVATIONS, REHAB EVAL
Pt was seen in supine c cardiac monitor + and O2 at 4l/min  through nasal cannula + and primafit +, alert and Ox3. Pt was able to communicated well in English. Pt is dependent in all functional mobility and was non ambulatory at baseline. P.T. wound care initial evaluation performed. R lower leg with  venius ulcer. Wounds was cleaned, dressing applied and documented in flowsheet A&I  and c recommendation in flowsheet plan of care.

## 2024-04-14 LAB
ANION GAP SERPL CALC-SCNC: <8 MMOL/L — SIGNIFICANT CHANGE UP (ref 5–17)
BASE EXCESS BLDA CALC-SCNC: 26.2 MMOL/L — HIGH (ref -2–3)
BLOOD GAS COMMENTS ARTERIAL: SIGNIFICANT CHANGE UP
BUN SERPL-MCNC: 23 MG/DL — SIGNIFICANT CHANGE UP (ref 7–23)
CALCIUM SERPL-MCNC: 9 MG/DL — SIGNIFICANT CHANGE UP (ref 8.5–10.1)
CHLORIDE SERPL-SCNC: 81 MMOL/L — LOW (ref 96–108)
CO2 BLDA-SCNC: 58 MMOL/L — HIGH (ref 19–24)
CO2 SERPL-SCNC: >45 MMOL/L — CRITICAL HIGH (ref 22–31)
CREAT SERPL-MCNC: 0.89 MG/DL — SIGNIFICANT CHANGE UP (ref 0.5–1.3)
EGFR: 97 ML/MIN/1.73M2 — SIGNIFICANT CHANGE UP
GAS PNL BLDA: SIGNIFICANT CHANGE UP
GLUCOSE SERPL-MCNC: 87 MG/DL — SIGNIFICANT CHANGE UP (ref 70–99)
HCO3 BLDA-SCNC: 55 MMOL/L — CRITICAL HIGH (ref 21–28)
HOROWITZ INDEX BLDA+IHG-RTO: 40 — SIGNIFICANT CHANGE UP
PCO2 BLDA: 76 MMHG — CRITICAL HIGH (ref 32–46)
PH BLDA: 7.47 — HIGH (ref 7.35–7.45)
PO2 BLDA: 72 MMHG — LOW (ref 83–108)
POTASSIUM SERPL-MCNC: 2.7 MMOL/L — CRITICAL LOW (ref 3.5–5.3)
POTASSIUM SERPL-SCNC: 2.7 MMOL/L — CRITICAL LOW (ref 3.5–5.3)
SAO2 % BLDA: 95.2 % — SIGNIFICANT CHANGE UP (ref 94–98)
SODIUM SERPL-SCNC: 134 MMOL/L — LOW (ref 135–145)

## 2024-04-14 PROCEDURE — 99232 SBSQ HOSP IP/OBS MODERATE 35: CPT

## 2024-04-14 RX ORDER — POTASSIUM CHLORIDE 20 MEQ
10 PACKET (EA) ORAL
Refills: 0 | Status: COMPLETED | OUTPATIENT
Start: 2024-04-14 | End: 2024-04-14

## 2024-04-14 RX ORDER — POTASSIUM CHLORIDE 20 MEQ
40 PACKET (EA) ORAL EVERY 4 HOURS
Refills: 0 | Status: DISCONTINUED | OUTPATIENT
Start: 2024-04-14 | End: 2024-04-14

## 2024-04-14 RX ORDER — CARVEDILOL PHOSPHATE 80 MG/1
12.5 CAPSULE, EXTENDED RELEASE ORAL EVERY 12 HOURS
Refills: 0 | Status: DISCONTINUED | OUTPATIENT
Start: 2024-04-14 | End: 2024-04-22

## 2024-04-14 RX ORDER — POTASSIUM CHLORIDE 20 MEQ
40 PACKET (EA) ORAL EVERY 4 HOURS
Refills: 0 | Status: COMPLETED | OUTPATIENT
Start: 2024-04-14 | End: 2024-04-14

## 2024-04-14 RX ADMIN — Medication 40 MILLIEQUIVALENT(S): at 14:06

## 2024-04-14 RX ADMIN — AMLODIPINE BESYLATE 10 MILLIGRAM(S): 2.5 TABLET ORAL at 05:18

## 2024-04-14 RX ADMIN — SENNA PLUS 2 TABLET(S): 8.6 TABLET ORAL at 21:25

## 2024-04-14 RX ADMIN — Medication 40 MILLIGRAM(S): at 05:17

## 2024-04-14 RX ADMIN — Medication 500 MILLIGRAM(S): at 11:12

## 2024-04-14 RX ADMIN — RIVAROXABAN 20 MILLIGRAM(S): KIT at 17:34

## 2024-04-14 RX ADMIN — Medication 100 MILLIEQUIVALENT(S): at 09:40

## 2024-04-14 RX ADMIN — Medication 40 MILLIGRAM(S): at 14:06

## 2024-04-14 RX ADMIN — CARVEDILOL PHOSPHATE 6.25 MILLIGRAM(S): 80 CAPSULE, EXTENDED RELEASE ORAL at 05:18

## 2024-04-14 RX ADMIN — Medication 100 MILLIEQUIVALENT(S): at 10:42

## 2024-04-14 RX ADMIN — CARVEDILOL PHOSPHATE 12.5 MILLIGRAM(S): 80 CAPSULE, EXTENDED RELEASE ORAL at 17:35

## 2024-04-14 RX ADMIN — Medication 20 MILLIGRAM(S): at 05:18

## 2024-04-14 RX ADMIN — PANTOPRAZOLE SODIUM 40 MILLIGRAM(S): 20 TABLET, DELAYED RELEASE ORAL at 05:18

## 2024-04-14 RX ADMIN — Medication 40 MILLIEQUIVALENT(S): at 09:40

## 2024-04-14 NOTE — PROGRESS NOTE ADULT - PROBLEM SELECTOR PLAN 1
Newly diagnosed   Presents with sats of 68% on 3L NC -Baseline  Hx of LORI/OHS not on NIV  RR 17 satting 99% on BIPAP  CXR: pulmonary edema, proBNP 2348  pCO2 on presentation >122  Lasix 40mg IV BID   Strict I/O, daily weight, TTE ordered  Continue BiPAP overnight then can wean to sats >92 on baseline 3L NC  Monitor, VBG in AM
Newly diagnosed   Presents with sats of 68% on 3L NC -Baseline  Hx of LORI/OHS not on NIV  RR 17 satting 99% on BIPAP  CXR: pulmonary edema, proBNP 2348  pCO2 on presentation >122  Lasix 40mg IV BID   now s/p diamox due to alkalosis with imrpoved PH   Strict I/O, daily weight, TTE ordered  Continue BiPAP overnight then can wean to sats >92 on baseline 3L NC  pulm consult in the am

## 2024-04-14 NOTE — PROGRESS NOTE ADULT - PROBLEM SELECTOR PLAN 2
New  EKG: afib with RVR - no hx of afib   [id number]: 2, htn, pvd, aortic sclerosis   will start xarelto   Lopressor 5mg X1 ordered  Continue coreg 3.125mg BID   TTE ordered  Monitor on tele
New  EKG: afib with RVR - no hx of afib   [id number]: 2, htn, pvd, aortic sclerosis   will start xarelto   Lopressor 5mg X1 ordered  Continue coreg 3.125mg BID   TTE ordered  Monitor on tele

## 2024-04-14 NOTE — PROGRESS NOTE ADULT - PROBLEM SELECTOR PLAN 3
New  Trop 511->548  EKG afib with RVR  likely 2/2 afib with RVR will trend trop  TTE ordered
New  Trop 511->548  EKG afib with RVR  likely 2/2 afib with RVR will trend trop  TTE ordered

## 2024-04-14 NOTE — PROGRESS NOTE ADULT - SUBJECTIVE AND OBJECTIVE BOX
Patient is a 64y old  Male who presents with a chief complaint of acute on chronic hypoxic/hypercapnic respiratory failure, afib with rvr (12 Apr 2024 21:52)      INTERVAL HPI/OVERNIGHT EVENTS: none     MEDICATIONS  (STANDING):  amLODIPine   Tablet 10 milliGRAM(s) Oral daily  ascorbic acid 500 milliGRAM(s) Oral daily  carvedilol 6.25 milliGRAM(s) Oral every 12 hours  enalapril 20 milliGRAM(s) Oral daily  furosemide   Injectable 40 milliGRAM(s) IV Push two times a day  pantoprazole    Tablet 40 milliGRAM(s) Oral before breakfast  potassium chloride   Powder 40 milliEquivalent(s) Oral every 4 hours  potassium chloride  10 mEq/100 mL IVPB 10 milliEquivalent(s) IV Intermittent every 1 hour  rivaroxaban 20 milliGRAM(s) Oral with dinner  senna 2 Tablet(s) Oral at bedtime    MEDICATIONS  (PRN):  acetaminophen     Tablet .. 650 milliGRAM(s) Oral every 6 hours PRN Temp greater or equal to 38C (100.4F), Mild Pain (1 - 3)  aluminum hydroxide/magnesium hydroxide/simethicone Suspension 30 milliLiter(s) Oral every 4 hours PRN Dyspepsia  melatonin 3 milliGRAM(s) Oral at bedtime PRN Insomnia  ondansetron Injectable 4 milliGRAM(s) IV Push every 8 hours PRN Nausea and/or Vomiting        Allergies    No Known Allergies    Intolerances        REVIEW OF SYSTEMS:  CONSTITUTIONAL: No fever, weight loss  EYES: No eye pain, visual disturbances, or discharge  ENMT:  No difficulty hearing, tinnitus, vertigo; No sinus or throat pain  RESPIRATORY: No cough, wheezing, chills or hemoptysis; No shortness of breath  CARDIOVASCULAR: No chest pain, palpitations, dizziness, or leg swelling  GASTROINTESTINAL: No abdominal or epigastric pain. No nausea, vomiting, or hematemesis; No diarrhea or constipation. No melena or hematochezia.  GENITOURINARY: No dysuria, frequency, hematuria, or incontinence  NEUROLOGICAL: No headaches, memory loss, loss of strength, numbness, or tremors    Vital Signs Last 24 Hrs  T(C): 36.5 (13 Apr 2024 10:22), Max: 37.2 (13 Apr 2024 00:43)  T(F): 97.7 (13 Apr 2024 10:22), Max: 99 (13 Apr 2024 00:43)  HR: 81 (13 Apr 2024 10:22) (81 - 116)  BP: 136/83 (13 Apr 2024 10:22) (101/59 - 159/97)  BP(mean): 69 (13 Apr 2024 00:43) (69 - 69)  RR: 19 (13 Apr 2024 10:22) (15 - 20)  SpO2: 96% (13 Apr 2024 10:22) (93% - 99%)    Parameters below as of 13 Apr 2024 08:44  Patient On (Oxygen Delivery Method): nasal cannula  O2 Flow (L/min): 4      PHYSICAL EXAM:  GENERAL: NAD, obese   HEAD:  Atraumatic, Normocephalic  EYES: EOMI, PERRLA, conjunctiva and sclera clear  ENMT: No tonsillar erythema, exudates, or enlargement;   NECK: Supple, Normal thyroid  NERVOUS SYSTEM:  Alert & Oriented X3, Good concentration; Motor Strength 5/5 B/L upper and lower extremities; DTRs 2+ intact and symmetric  CHEST/LUNG: CTABL; No rales, rhonchi, wheezing, or rubs  HEART: Regular rate and rhythm; No murmurs, rubs, or gallops  ABDOMEN: Soft, Nontender, Nondistended; Bowel sounds present  EXTREMITIES:  2+ Peripheral Pulses, No clubbing, cyanosis, or edema  LYMPH: No lymphadenopathy noted  SKIN: right leg venous ulcer     LABS:                        13.1   9.54  )-----------( 288      ( 13 Apr 2024 06:09 )             43.2     04-14    134<L>  |  81<L>  |  23  ----------------------------<  87  2.7<LL>   |  >45<HH>  |  0.89    Ca    9.0      14 Apr 2024 07:43    TPro  7.3  /  Alb  2.8<L>  /  TBili  0.3  /  DBili  x   /  AST  25  /  ALT  16  /  AlkPhos  87  04-12    PT/INR - ( 12 Apr 2024 13:49 )   PT: 11.4 sec;   INR: 0.96 ratio         PTT - ( 12 Apr 2024 13:49 )  PTT:35.9 sec  Urinalysis Basic - ( 14 Apr 2024 07:43 )    Color: x / Appearance: x / SG: x / pH: x  Gluc: 87 mg/dL / Ketone: x  / Bili: x / Urobili: x   Blood: x / Protein: x / Nitrite: x   Leuk Esterase: x / RBC: x / WBC x   Sq Epi: x / Non Sq Epi: x / Bacteria: x          CAPILLARY BLOOD GLUCOSE          RADIOLOGY & ADDITIONAL TESTS:    Imaging Personally Reviewed:  [ ] YES  [ ] NO    Consultant(s) Notes Reviewed:  [ ] YES  [ ] NO    Care Discussed with Consultants/Other Providers [ ] YES  [ ] NO

## 2024-04-14 NOTE — PROGRESS NOTE ADULT - PROBLEM SELECTOR PLAN 4
Chronic moderate exacerbation  /118  Continue home enalapril 20mg daily, amlodipine 10mg daily, increase coreg 6.2 mg BID,   dc hydralazine 25mg TID  can uptitrate enalapril and corig if needed   Holding lasix 40mg PO daily as giving lasix 40mg IV BID.   Monitor
Chronic moderate exacerbation  /118  Continue home enalapril 20mg daily, amlodipine 10mg daily, increase coreg 6.2 mg BID,   dc hydralazine 25mg TID  can uptitrate enalapril and corig if needed   Holding lasix 40mg PO daily as giving lasix 40mg IV BID.   Monitor

## 2024-04-14 NOTE — PROGRESS NOTE ADULT - PROBLEM SELECTOR PLAN 5
Chronic unstable as likely contributing to above  Consider evaluation for discharge on BiPAP
Chronic unstable as likely contributing to above  Consider evaluation for discharge on BiPAP

## 2024-04-14 NOTE — PROGRESS NOTE ADULT - TIME BILLING
I have spent a total of 90minutes to prepare to see the patient, obtaining and reviewing history, physical examination, explaining the diagnosis, prognosis and treatment plan with the patient/family/caregiver. I also have spent the time ordering studies and testing, interpreting results, medicine reconciliation, subspecialty consultation and documentation as above.
I have spent a total of 90minutes to prepare to see the patient, obtaining and reviewing history, physical examination, explaining the diagnosis, prognosis and treatment plan with the patient/family/caregiver. I also have spent the time ordering studies and testing, interpreting results, medicine reconciliation, subspecialty consultation and documentation as above.

## 2024-04-15 LAB
ANION GAP SERPL CALC-SCNC: 5 MMOL/L — SIGNIFICANT CHANGE UP (ref 5–17)
BASE EXCESS BLDV CALC-SCNC: 23.5 MMOL/L — HIGH (ref -2–3)
BLOOD GAS COMMENTS, VENOUS: SIGNIFICANT CHANGE UP
BUN SERPL-MCNC: 26 MG/DL — HIGH (ref 7–23)
CALCIUM SERPL-MCNC: 8.9 MG/DL — SIGNIFICANT CHANGE UP (ref 8.5–10.1)
CHLORIDE BLDV-SCNC: 87 MMOL/L — LOW (ref 98–107)
CHLORIDE SERPL-SCNC: 85 MMOL/L — LOW (ref 96–108)
CO2 BLDV-SCNC: 56 MMOL/L — HIGH (ref 22–26)
CO2 SERPL-SCNC: 45 MMOL/L — CRITICAL HIGH (ref 22–31)
CREAT SERPL-MCNC: 0.76 MG/DL — SIGNIFICANT CHANGE UP (ref 0.5–1.3)
EGFR: 102 ML/MIN/1.73M2 — SIGNIFICANT CHANGE UP
GAS PNL BLDV: 134 MMOL/L — LOW (ref 136–145)
GAS PNL BLDV: SIGNIFICANT CHANGE UP
GAS PNL BLDV: SIGNIFICANT CHANGE UP
GLUCOSE BLDV-MCNC: 105 MG/DL — HIGH (ref 65–95)
GLUCOSE SERPL-MCNC: 98 MG/DL — SIGNIFICANT CHANGE UP (ref 70–99)
HCO3 BLDV-SCNC: 53 MMOL/L — CRITICAL HIGH (ref 22–28)
HCT VFR BLDA CALC: 38 % — SIGNIFICANT CHANGE UP (ref 37–47)
HGB BLD CALC-MCNC: 12.8 G/DL — SIGNIFICANT CHANGE UP (ref 12.6–17.4)
LACTATE BLDV-MCNC: 0.7 MMOL/L — SIGNIFICANT CHANGE UP (ref 0.56–1.39)
PCO2 BLDV: 84 MMHG — HIGH (ref 42–55)
PH BLDV: 7.41 — SIGNIFICANT CHANGE UP (ref 7.32–7.43)
PO2 BLDV: 143 MMHG — HIGH (ref 25–45)
POTASSIUM BLDV-SCNC: 3.3 MMOL/L — LOW (ref 3.5–5.1)
POTASSIUM SERPL-MCNC: 3.2 MMOL/L — LOW (ref 3.5–5.3)
POTASSIUM SERPL-SCNC: 3.2 MMOL/L — LOW (ref 3.5–5.3)
SAO2 % BLDV: 100 % — HIGH (ref 94–98)
SODIUM SERPL-SCNC: 135 MMOL/L — SIGNIFICANT CHANGE UP (ref 135–145)

## 2024-04-15 PROCEDURE — 99223 1ST HOSP IP/OBS HIGH 75: CPT

## 2024-04-15 PROCEDURE — 99232 SBSQ HOSP IP/OBS MODERATE 35: CPT

## 2024-04-15 RX ORDER — FUROSEMIDE 40 MG
40 TABLET ORAL DAILY
Refills: 0 | Status: DISCONTINUED | OUTPATIENT
Start: 2024-04-16 | End: 2024-04-22

## 2024-04-15 RX ORDER — POTASSIUM CHLORIDE 20 MEQ
40 PACKET (EA) ORAL ONCE
Refills: 0 | Status: COMPLETED | OUTPATIENT
Start: 2024-04-15 | End: 2024-04-15

## 2024-04-15 RX ORDER — PETROLATUM,WHITE
1 JELLY (GRAM) TOPICAL AT BEDTIME
Refills: 0 | Status: DISCONTINUED | OUTPATIENT
Start: 2024-04-15 | End: 2024-04-22

## 2024-04-15 RX ADMIN — CARVEDILOL PHOSPHATE 12.5 MILLIGRAM(S): 80 CAPSULE, EXTENDED RELEASE ORAL at 17:07

## 2024-04-15 RX ADMIN — PANTOPRAZOLE SODIUM 40 MILLIGRAM(S): 20 TABLET, DELAYED RELEASE ORAL at 05:24

## 2024-04-15 RX ADMIN — Medication 1 APPLICATION(S): at 21:32

## 2024-04-15 RX ADMIN — Medication 40 MILLIEQUIVALENT(S): at 11:50

## 2024-04-15 RX ADMIN — Medication 500 MILLIGRAM(S): at 11:08

## 2024-04-15 RX ADMIN — RIVAROXABAN 20 MILLIGRAM(S): KIT at 17:06

## 2024-04-15 NOTE — PROGRESS NOTE ADULT - SUBJECTIVE AND OBJECTIVE BOX
INTERVAL HPI/OVERNIGHT EVENTS:  Pt seen and examined at bedside.     Allergies/Intolerance: No Known Allergies      MEDICATIONS  (STANDING):  amLODIPine   Tablet 10 milliGRAM(s) Oral daily  ascorbic acid 500 milliGRAM(s) Oral daily  carvedilol 12.5 milliGRAM(s) Oral every 12 hours  enalapril 20 milliGRAM(s) Oral daily  furosemide   Injectable 40 milliGRAM(s) IV Push two times a day  pantoprazole    Tablet 40 milliGRAM(s) Oral before breakfast  rivaroxaban 20 milliGRAM(s) Oral with dinner  senna 2 Tablet(s) Oral at bedtime    MEDICATIONS  (PRN):  acetaminophen     Tablet .. 650 milliGRAM(s) Oral every 6 hours PRN Temp greater or equal to 38C (100.4F), Mild Pain (1 - 3)  aluminum hydroxide/magnesium hydroxide/simethicone Suspension 30 milliLiter(s) Oral every 4 hours PRN Dyspepsia  melatonin 3 milliGRAM(s) Oral at bedtime PRN Insomnia  ondansetron Injectable 4 milliGRAM(s) IV Push every 8 hours PRN Nausea and/or Vomiting        ROS: all systems reviewed and wnl      PHYSICAL EXAMINATION:  Vital Signs Last 24 Hrs  T(C): 36.7 (15 Apr 2024 04:40), Max: 36.7 (14 Apr 2024 23:46)  T(F): 98 (15 Apr 2024 04:40), Max: 98.1 (14 Apr 2024 23:46)  HR: 100 (15 Apr 2024 09:19) (57 - 106)  BP: 94/61 (15 Apr 2024 04:40) (94/61 - 140/68)  BP(mean): --  RR: 19 (15 Apr 2024 04:40) (19 - 19)  SpO2: 98% (15 Apr 2024 09:19) (94% - 101%)    Parameters below as of 15 Apr 2024 04:40  Patient On (Oxygen Delivery Method): nasal cannula  O2 Flow (L/min): 4    CAPILLARY BLOOD GLUCOSE          04-14 @ 07:01  -  04-15 @ 07:00  --------------------------------------------------------  IN: 1880 mL / OUT: 1900 mL / NET: -20 mL        GENERAL:   NECK: supple, No JVD  CHEST/LUNG: clear to auscultation bilaterally; no rales, rhonchi, or wheezing b/l  HEART: normal S1, S2  ABDOMEN: BS+, soft, ND, NT   EXTREMITIES:  pulses palpable; no clubbing, cyanosis, or edema b/l LEs  SKIN: no rashes or lesions      LABS:    04-15    135  |  85<L>  |  26<H>  ----------------------------<  98  3.2<L>   |  45<HH>  |  0.76    Ca    8.9      15 Apr 2024 06:30        Urinalysis Basic - ( 15 Apr 2024 06:30 )    Color: x / Appearance: x / SG: x / pH: x  Gluc: 98 mg/dL / Ketone: x  / Bili: x / Urobili: x   Blood: x / Protein: x / Nitrite: x   Leuk Esterase: x / RBC: x / WBC x   Sq Epi: x / Non Sq Epi: x / Bacteria: x             INTERVAL HPI/OVERNIGHT EVENTS:  Pt seen and examined at bedside.     Allergies/Intolerance: No Known Allergies      MEDICATIONS  (STANDING):  amLODIPine   Tablet 10 milliGRAM(s) Oral daily  ascorbic acid 500 milliGRAM(s) Oral daily  carvedilol 12.5 milliGRAM(s) Oral every 12 hours  enalapril 20 milliGRAM(s) Oral daily  furosemide   Injectable 40 milliGRAM(s) IV Push two times a day  pantoprazole    Tablet 40 milliGRAM(s) Oral before breakfast  rivaroxaban 20 milliGRAM(s) Oral with dinner  senna 2 Tablet(s) Oral at bedtime    MEDICATIONS  (PRN):  acetaminophen     Tablet .. 650 milliGRAM(s) Oral every 6 hours PRN Temp greater or equal to 38C (100.4F), Mild Pain (1 - 3)  aluminum hydroxide/magnesium hydroxide/simethicone Suspension 30 milliLiter(s) Oral every 4 hours PRN Dyspepsia  melatonin 3 milliGRAM(s) Oral at bedtime PRN Insomnia  ondansetron Injectable 4 milliGRAM(s) IV Push every 8 hours PRN Nausea and/or Vomiting        ROS: all systems reviewed and wnl      PHYSICAL EXAMINATION:  Vital Signs Last 24 Hrs  T(C): 36.7 (15 Apr 2024 04:40), Max: 36.7 (14 Apr 2024 23:46)  T(F): 98 (15 Apr 2024 04:40), Max: 98.1 (14 Apr 2024 23:46)  HR: 100 (15 Apr 2024 09:19) (57 - 106)  BP: 94/61 (15 Apr 2024 04:40) (94/61 - 140/68)  BP(mean): --  RR: 19 (15 Apr 2024 04:40) (19 - 19)  SpO2: 98% (15 Apr 2024 09:19) (94% - 101%)    Parameters below as of 15 Apr 2024 04:40  Patient On (Oxygen Delivery Method): nasal cannula  O2 Flow (L/min): 4    CAPILLARY BLOOD GLUCOSE          04-14 @ 07:01  -  04-15 @ 07:00  --------------------------------------------------------  IN: 1880 mL / OUT: 1900 mL / NET: -20 mL        GENERAL: stable, less SOB, no fevers.   NECK: supple, No JVD  CHEST/LUNG: clear to auscultation bilaterally; no rales, rhonchi, or wheezing b/l  HEART: normal S1, S2  ABDOMEN: BS+, soft, ND, NT   EXTREMITIES:  pulses palpable; no clubbing, cyanosis, or edema b/l LEs    LABS:    04-15    135  |  85<L>  |  26<H>  ----------------------------<  98  3.2<L>   |  45<HH>  |  0.76    Ca    8.9      15 Apr 2024 06:30        Urinalysis Basic - ( 15 Apr 2024 06:30 )    Color: x / Appearance: x / SG: x / pH: x  Gluc: 98 mg/dL / Ketone: x  / Bili: x / Urobili: x   Blood: x / Protein: x / Nitrite: x   Leuk Esterase: x / RBC: x / WBC x   Sq Epi: x / Non Sq Epi: x / Bacteria: x

## 2024-04-15 NOTE — CONSULT NOTE ADULT - TIME BILLING
review of chart, records, blood work, vitals, medications, imaging, direct patient care, d/w hospitalist

## 2024-04-15 NOTE — PROGRESS NOTE ADULT - ASSESSMENT
64 yr old male presenting with acute on chronic hypoxic/hypercapnic respiratory failure and afib with RVR 64 yr old male presenting with acute on chronic hypoxic/hypercapnic respiratory failure and afib with RVR.      64 yr old male presenting with acute on chronic hypoxic/hypercapnic respiratory failure and afib with RVR.       Plan:  Presents with sats of 68% on 3L NC -Baseline    Hx of OHS. RR 17 satting 99% on BIPAP, CXR notes small bilateral effusions.     CXR: pulmonary edema, proBNP 2348, will change to PO Lasix given metabolic alkalosis on chem-7, clearly chronic.     pCO2 on presentation >122    Lasix 40mg IV BID. will change to PO in AM.     s/p diamox due to alkalosis with imrpoved PH     Strict I/O, daily weight, TTE ordered, none in past 6 months.     Continue BiPAP overnight then can wean to sats >92 on baseline 3L NC    pulm consult in the am    Will continue Xarelto, Coreg, Norvasc and Vasotec, BP stable.  64 yr old male presenting with acute on chronic hypoxic/hypercapnic respiratory failure and afib with RVR.       Plan:  Presents with sats of 68% on 3L NC -Baseline    Hx of OHS. RR 17 satting 99% on BIPAP, CXR notes small bilateral effusions.  Was started on IVP Lasix bid.     CXR: pulmonary edema, proBNP 2348, will change to PO Lasix given metabolic alkalosis on chem-7, clearly chronic.     pCO2 on presentation >122    Lasix 40mg IV BID. will change to PO in AM.     s/p diamox due to alkalosis with improved PH     Strict I/O, daily weight, TTE ordered, none in past 6 months.     Continue BiPAP overnight then can wean to sats >92 on baseline 3L NC    pulm was consulted today, will see this afternoon.  Repeat lytes and VBG in AM.     Will continue Xarelto, Coreg, Norvasc and Vasotec, BP stable.  64 yr old male presenting with acute on chronic hypoxic/hypercapnic respiratory failure and afib with RVR.       Plan:  Presents with sats of 68% on 3L NC -Baseline    Hx of OHS. RR 17 satting 99% on BIPAP, CXR notes small bilateral effusions.  Was started on IVP Lasix bid.     CXR: pulmonary edema, proBNP 2348, will change to PO Lasix given metabolic alkalosis on chem-7, clearly chronic.     pCO2 on presentation >122    Lasix 40mg IV BID. will change to PO in AM.     s/p diamox due to alkalosis with improved PH     Strict I/O, daily weight, TTE ordered, none in past 6 months.     Continue BiPAP overnight then can wean to sats >92 on baseline 3L NC, likely has OHS.     Pulm was consulted today, will see this afternoon.  Repeat lytes and VBG in AM.     Will continue Xarelto, Coreg, Norvasc and Vasotec, BP stable.

## 2024-04-15 NOTE — CONSULT NOTE ADULT - ASSESSMENT
Assessment: 63M w/ HTN, morbid obesity, and obesity hypoventilation syndrome, admitted in July 2023 for cellulitis presents with with acute on chronic hypoxic/hypercapnic respiratory failure and afib RVR.  ABG performed showing chronic hypercapnia and noted hypoxia. Placed on O2 and BiPAP HS/PRN.He has never used O2 at home and has never seen pulmonary as outpt.     Recommendations:   - Acute on Chronic hypoxic respiratory failure with hypercapnia ABG with chronic C02 retention with a pCO2 70s with serum Co2 > 45 but normal pH. Given these lab abnormalities and body habitus will qualify for home bipap. Seems he did not get pulm follow up like he was supposed to.   - Saturating well on 2L NC, weaned from 4L. Given body habitus and hx patient most likely is baseline hypoxic. Wean o2 with goal saturation > 89%. Hypoxia most likely cardiac in origin with evident fluid overload. Continue aggressive diuresis. Can benefit from Diamox as needed elevate bicarb.   - f/u echo   - Continue bipap HS, will need case management to help organize home bipap   - Requires outpatient pulmonary FUP with PFTs and a sleep study. Please call [phone] or email  [email or web address] for an appointment at the Pulmonary office ([address], [address], [place], [place]).     *Plan of care discussed with Pulmonary attending MD

## 2024-04-15 NOTE — CONSULT NOTE ADULT - NS ATTEND AMEND GEN_ALL_CORE FT
pt seen and examined with NP    Pulmonary consult called for evaluation of hypercarbia/hypoxia    pt denies SOB at present time  ABD with compensated respiratory acidosis with metabolic alkalosis  currently being diuresed with lasix  was previously hospitalized and discharged home on O2 and had required NIV at that time  did not follow up with outpatient pulmonary  non smoker  former ETOH use (tequila, corona, budweiser) reports last ETOH use 2012    64M PMH HTN, HFpEF, morbid obesity with suspected alveolar hypoventilation and LORI with chronic hypoxia and hypercarbia on 2-3L NC, PUD with GI bleed requiring embolization, RLE wound with previous maggots and cellulitis who presents with AMS and hypoxia (68 % on 3L NC). ABG on admission with pCO2> 112.    DX: acute on chronic hypoxic respiratory failure, chronic hypercarbic respiratory failure, acute metabolic encephalopathy, chronic r leg edema    - ABG with normal pH. currently compensated respiratory acidosis with metabolic alkalosis  - baseline pCO2 in the 70s range with HCO3 in the 50s from prior blood gases  - mental status improved with NIV use and correction of pCO2: was placed on AVAPs  - suspect his LORI/OHS contributing to overall hypercarbia and hypoxia  - he is currently being diuresed which can elevate bicarb further  - repeat VBG in AM to monitor acid base status  - O2 supplementation to maintain O2 sat > 90%  - has underlying chronic hypoxemia  - currently on 4L NC oxygenating well  - weaned to 2L NC  - would obtain repeat 2D echo

## 2024-04-15 NOTE — CONSULT NOTE ADULT - SUBJECTIVE AND OBJECTIVE BOX
Patient is a 64y old  Male who presents with a chief complaint of acute on chronic hypoxic/hypercapnic respiratory failure, afib with rvr (15 Apr 2024 09:45)      HPI: 64 yr old male with a pmh of morbid obesity with alveolar hypoventilation with chronic hypoxia/hypercarbia due to LORI/OHS on 2-3L NC, HTN, GI bleed requiring embolization, RLE wound with previous maggots, who presents with AMS and hypoxia of 68 % on baseline 3L NC. Collateral obtained from Nursing supervisor.   Unable to obtain ROS from pt as altered. Vitals: T 98.2, , /94, RR 17 sating 99% on BiPAP (12 Apr 2024 21:52)      Allergies: No Known Allergies      MEDICATIONS  NEURO  Meds: acetaminophen     Tablet .. 650 milliGRAM(s) Oral every 6 hours PRN Temp greater or equal to 38C (100.4F), Mild Pain (1 - 3)    RESPIRATORY  ABG - ( 14 Apr 2024 05:14 )  pH: 7.47  /  pCO2: 76    /  pO2: 72    / HCO3: 55    / Base Excess: 26.2  /  SaO2: 95.2    Lactate: x          Meds:   CARDIOVASCULAR  Meds: amLODIPine   Tablet 10 milliGRAM(s) Oral daily  carvedilol 12.5 milliGRAM(s) Oral every 12 hours  enalapril 20 milliGRAM(s) Oral daily  furosemide    Tablet 40 milliGRAM(s) Oral daily    GI/NUTRITION  Meds: pantoprazole    Tablet 40 milliGRAM(s) Oral before breakfast  senna 2 Tablet(s) Oral at bedtime    GENITOURINARY  Meds: ascorbic acid 500 milliGRAM(s) Oral daily    HEMATOLOGIC  Meds: rivaroxaban 20 milliGRAM(s) Oral with dinner    [x] VTE Prophylaxis  INFECTIOUS DISEASES  Meds:   ENDOCRINE  CAPILLARY BLOOD GLUCOSE      Meds:   OTHER MEDICATIONS:  AQUAPHOR (petrolatum Ointment) 1 Application(s) Topical at bedtime      Drug Dosing Weight  Height (cm): 165.1 (12 Apr 2024 12:11)  Weight (kg): 131.9 (13 Apr 2024 01:39)  BMI (kg/m2): 48.4 (13 Apr 2024 01:39)  BSA (m2): 2.32 (13 Apr 2024 01:39)    PAST MEDICAL & SURGICAL HISTORY:  PUD (peptic ulcer disease)  Osteoarthritis  Morbid obesity  Duodenal ulcer disease  Knee osteoarthritis  HTN (hypertension)  LORI (obstructive sleep apnea)  Obesity hypoventilation syndrome  H/O ventral hernia repair  Abdominal hernia  Gastroduodenal artery bleed  IR embolization of gastroduodenal artery      SOCIAL HISTORY:  - Denies smoking or illicit drug use   - Previous ETOH heavy use, reports he hasn't drank since 2012   - Lives at home in apt alone   - Hasn't walked/mobile in "a long time"   - used to work in [employer]     ROS:   - + leg/knee pain, + lower extremity swelling   - No SOB, says breathing is at baseline   - reports that the mask overnight was good, he slept fine with it   - weaned from 4L NC to 2L NC, 02 saturation 93%       Vital Signs Last 24 Hrs  T(C): 36.8 (15 Apr 2024 15:30), Max: 36.8 (15 Apr 2024 10:43)  T(F): 98.2 (15 Apr 2024 15:30), Max: 98.2 (15 Apr 2024 10:43)  HR: 95 (15 Apr 2024 15:30) (57 - 106)  BP: 119/76 (15 Apr 2024 15:30) (94/61 - 138/67)  BP(mean): --  ABP: --  ABP(mean): --  RR: 17 (15 Apr 2024 15:30) (17 - 19)  SpO2: 96% (15 Apr 2024 15:30) (94% - 101%)    O2 Parameters below as of 15 Apr 2024 04:40  Patient On (Oxygen Delivery Method): nasal cannula  O2 Flow (L/min): 4      ABG - ( 14 Apr 2024 05:14 )  pH, Arterial: 7.47  pH, Blood: x     /  pCO2: 76    /  pO2: 72    / HCO3: 55    / Base Excess: 26.2  /  SaO2: 95.2        I&O's Detail    14 Apr 2024 07:01  -  15 Apr 2024 07:00  --------------------------------------------------------  IN:    IV PiggyBack: 200 mL    Oral Fluid: 1680 mL  Total IN: 1880 mL    OUT:    Voided (mL): 1900 mL  Total OUT: 1900 mL    Total NET: -20 mL      15 Apr 2024 07:01  -  15 Apr 2024 16:10  --------------------------------------------------------  IN:    Oral Fluid: 960 mL  Total IN: 960 mL    OUT:    Voided (mL): 800 mL  Total OUT: 800 mL    Total NET: 160 mL      PHYSICAL EXAM:  General: No acute distress. Obese male resting in bed.   HEENT: Pupils equal and symmetrically reactive to light.  PULM: Diminished to auscultation bilaterally.  CVS: Regular rate and rhythm, no murmurs, rubs, or gallops.  ABD: Obese, soft, nondistended, no masses.  EXT: Edema vs lymphadema R>L + leg wounds/ulcers. +scrotal edema   SKIN: No rashes     LABS:    04-15    135  |  85<L>  |  26<H>  ----------------------------<  98  3.2<L>   |  45<HH>  |  0.76    Ca    8.9      15 Apr 2024 06:30      CAPILLARY BLOOD GLUCOSE    Urinalysis Basic - ( 15 Apr 2024 06:30 )    Color: x / Appearance: x / SG: x / pH: x  Gluc: 98 mg/dL / Ketone: x  / Bili: x / Urobili: x   Blood: x / Protein: x / Nitrite: x   Leuk Esterase: x / RBC: x / WBC x   Sq Epi: x / Non Sq Epi: x / Bacteria: x      < from: Xray Chest 1 View- PORTABLE-Urgent (04.12.24 @ 15:26) >  ACC: [id number] EXAM:  XR CHEST PORTABLE URGENT 1V   ORDERED BY: Trang Helm     PROCEDURE DATE:  04/12/2024      INTERPRETATION:  HISTORY: ;  Sepsis;  TECHNIQUE: Portable frontal view of the chest, 1 view.  COMPARISON: 7/14/2024.  FINDINGS/  IMPRESSION:    HEART:  Enlarged.  LUNGS: Mild interstitial edema with small bilateral effusions and   probable basilar atelectasis..  BONES: degenerative changes    --- End of Report ---    < end of copied text >   Patient is a 64y old  Male who presents with a chief complaint of acute on chronic hypoxic/hypercapnic respiratory failure, afib with rvr (15 Apr 2024 09:45)      HPI: 64 yr old male with a pmh of morbid obesity with alveolar hypoventilation with chronic hypoxia/hypercarbia due to LORI/OHS on 2-3L NC, HTN, GI bleed requiring embolization, RLE wound with previous maggots, who presents with AMS and hypoxia of 68 % on baseline 3L NC. Collateral obtained from Nursing supervisor.   Unable to obtain ROS from pt as altered. Vitals: T 98.2, , /94, RR 17 sating 99% on BiPAP (12 Apr 2024 21:52)      Allergies: No Known Allergies      MEDICATIONS  NEURO  Meds: acetaminophen     Tablet .. 650 milliGRAM(s) Oral every 6 hours PRN Temp greater or equal to 38C (100.4F), Mild Pain (1 - 3)    RESPIRATORY  ABG - ( 14 Apr 2024 05:14 )  pH: 7.47  /  pCO2: 76    /  pO2: 72    / HCO3: 55    / Base Excess: 26.2  /  SaO2: 95.2    Lactate: x          Meds:   CARDIOVASCULAR  Meds: amLODIPine   Tablet 10 milliGRAM(s) Oral daily  carvedilol 12.5 milliGRAM(s) Oral every 12 hours  enalapril 20 milliGRAM(s) Oral daily  furosemide    Tablet 40 milliGRAM(s) Oral daily    GI/NUTRITION  Meds: pantoprazole    Tablet 40 milliGRAM(s) Oral before breakfast  senna 2 Tablet(s) Oral at bedtime    GENITOURINARY  Meds: ascorbic acid 500 milliGRAM(s) Oral daily    HEMATOLOGIC  Meds: rivaroxaban 20 milliGRAM(s) Oral with dinner    [x] VTE Prophylaxis  INFECTIOUS DISEASES  Meds:   ENDOCRINE  CAPILLARY BLOOD GLUCOSE      Meds:   OTHER MEDICATIONS:  AQUAPHOR (petrolatum Ointment) 1 Application(s) Topical at bedtime      Drug Dosing Weight  Height (cm): 165.1 (12 Apr 2024 12:11)  Weight (kg): 131.9 (13 Apr 2024 01:39)  BMI (kg/m2): 48.4 (13 Apr 2024 01:39)  BSA (m2): 2.32 (13 Apr 2024 01:39)    PAST MEDICAL & SURGICAL HISTORY:  PUD (peptic ulcer disease)  Osteoarthritis  Morbid obesity  Duodenal ulcer disease  Knee osteoarthritis  HTN (hypertension)  LORI (obstructive sleep apnea)  Obesity hypoventilation syndrome  H/O ventral hernia repair  Abdominal hernia  Gastroduodenal artery bleed  IR embolization of gastroduodenal artery      SOCIAL HISTORY:  - Denies smoking or illicit drug use   - Previous ETOH heavy use, reports he hasn't drank since 2012   - Lives at home in apt alone   - Hasn't walked/mobile in "a long time"   - used to work in [employer]   - from [place]    ROS:   - + leg/knee pain, + lower extremity swelling   - No SOB, says breathing is at baseline   - reports that the mask overnight was good, he slept fine with it   - weaned from 4L NC to 2L NC, 02 saturation 93%       Vital Signs Last 24 Hrs  T(C): 36.8 (15 Apr 2024 15:30), Max: 36.8 (15 Apr 2024 10:43)  T(F): 98.2 (15 Apr 2024 15:30), Max: 98.2 (15 Apr 2024 10:43)  HR: 95 (15 Apr 2024 15:30) (57 - 106)  BP: 119/76 (15 Apr 2024 15:30) (94/61 - 138/67)  RR: 17 (15 Apr 2024 15:30) (17 - 19)  SpO2: 96% (15 Apr 2024 15:30) (94% - 101%)    O2 Parameters below as of 15 Apr 2024 04:40  Patient On (Oxygen Delivery Method): nasal cannula  O2 Flow (L/min): 4      ABG - ( 14 Apr 2024 05:14 )  pH, Arterial: 7.47  pH, Blood: x     /  pCO2: 76    /  pO2: 72    / HCO3: 55    / Base Excess: 26.2  /  SaO2: 95.2        I&O's Detail    14 Apr 2024 07:01  -  15 Apr 2024 07:00  --------------------------------------------------------  IN:    IV PiggyBack: 200 mL    Oral Fluid: 1680 mL  Total IN: 1880 mL    OUT:    Voided (mL): 1900 mL  Total OUT: 1900 mL    Total NET: -20 mL      15 Apr 2024 07:01  -  15 Apr 2024 16:10  --------------------------------------------------------  IN:    Oral Fluid: 960 mL  Total IN: 960 mL    OUT:    Voided (mL): 800 mL  Total OUT: 800 mL    Total NET: 160 mL      PHYSICAL EXAM:  General: No acute distress. Obese male resting in bed.   HEENT: Pupils equal and symmetrically reactive to light.  PULM: Diminished to auscultation bilaterally.  CVS: Regular rate and rhythm, no murmurs, rubs, or gallops.  ABD: Obese, soft, nondistended, no masses.  EXT: Edema vs lymphadema R>L + leg wounds/ulcers. +scrotal edema   SKIN: No rashes     LABS:    04-15    135  |  85<L>  |  26<H>  ----------------------------<  98  3.2<L>   |  45<HH>  |  0.76    Ca    8.9      15 Apr 2024 06:30      Pro-Brain Natriuretic Peptide (04.12.24 @ 13:49)    Pro-Brain Natriuretic Peptide: 2348 pg/mL        < from: Xray Chest 1 View- PORTABLE-Urgent (04.12.24 @ 15:26) >  ACC: [id number] EXAM:  XR CHEST PORTABLE URGENT 1V   ORDERED BY: Trang Helm     PROCEDURE DATE:  04/12/2024      INTERPRETATION:  HISTORY: ;  Sepsis;  TECHNIQUE: Portable frontal view of the chest, 1 view.  COMPARISON: 7/14/2024.  FINDINGS/  IMPRESSION:    HEART:  Enlarged.  LUNGS: Mild interstitial edema with small bilateral effusions and   probable basilar atelectasis..  BONES: degenerative changes    --- End of Report ---    < end of copied text >    < from: TTE Echo Complete w/ Contrast w/ Doppler (07.19.23 @ 08:19) >  Left Ventricle: Normal left ventricular size and wall thicknesses, with   normal systolic and diastolic function.  Global LV systolic function was normal. Spectral Doppler shows normal   pattern of LV diastolic filling.  Right Ventricle: Normal right ventricular size and function.  Left Atrium: Moderately enlarged left atrium.  Right Atrium: The right atrium is normal in size.  Pericardium: There is no evidence of pericardial effusion.  Mitral Valve: Structurally normal mitral valve, with normal leaflet   excursion.  Tricuspid Valve: Structurally normal tricuspid valve, with normal leaflet   excursion. Mild tricuspid regurgitation is visualized.  Aortic Valve: Normal trileaflet aortic valve with normal opening. The   aortic valve is trileaflet. Sclerotic aortic valvewith normal opening.   No evidence of aortic valve regurgitation is seen.  Pulmonic Valve: Structurally normal pulmonic valve, with normal leaflet   excursion. Trace pulmonic valve regurgitation.  Aorta: The ascending aorta was not well visualized. The aortic arch was   not well visualized. There is dilatation of the aortic root and ascending   aorta. Dilated aortic root measuring 4.4cm. Dilated ascending aorta   measuring 4.4cm.  Pulmonary Artery: The main pulmonary artery is normal in size.  Venous: The inferior vena cava was normal sized, with respiratory size   variation greater than 50%.  In comparison to the previous echocardiogram(s): Prior examinations are   available and were reviewed for comparison purposes. No interval chnages   ascompared to prior study from 4/14/21.      Summary:   1. Normal global left ventricular systolic function.   2. Moderately enlarged left atrium.   3. Mild tricuspid regurgitation.   4. Sclerotic aortic valve with normal opening.   5. Dilatation of the aortic root and ascending aorta.   6. No interval chnages as compared to prior study from 4/14/21.

## 2024-04-16 ENCOUNTER — RESULT REVIEW (OUTPATIENT)
Age: 64
End: 2024-04-16

## 2024-04-16 LAB
ANION GAP SERPL CALC-SCNC: 5 MMOL/L — SIGNIFICANT CHANGE UP (ref 5–17)
BASE EXCESS BLDV CALC-SCNC: 24.9 MMOL/L — HIGH (ref -2–3)
BLOOD GAS COMMENTS, VENOUS: SIGNIFICANT CHANGE UP
BUN SERPL-MCNC: 20 MG/DL — SIGNIFICANT CHANGE UP (ref 7–23)
CALCIUM SERPL-MCNC: 8.9 MG/DL — SIGNIFICANT CHANGE UP (ref 8.5–10.1)
CHLORIDE SERPL-SCNC: 88 MMOL/L — LOW (ref 96–108)
CO2 BLDV-SCNC: 57 MMOL/L — HIGH (ref 22–26)
CO2 SERPL-SCNC: 45 MMOL/L — CRITICAL HIGH (ref 22–31)
CREAT SERPL-MCNC: 0.63 MG/DL — SIGNIFICANT CHANGE UP (ref 0.5–1.3)
EGFR: 108 ML/MIN/1.73M2 — SIGNIFICANT CHANGE UP
GAS PNL BLDV: SIGNIFICANT CHANGE UP
GLUCOSE SERPL-MCNC: 79 MG/DL — SIGNIFICANT CHANGE UP (ref 70–99)
HCO3 BLDV-SCNC: 54 MMOL/L — CRITICAL HIGH (ref 22–28)
HOROWITZ INDEX BLDV+IHG-RTO: 21 — SIGNIFICANT CHANGE UP
PCO2 BLDV: 78 MMHG — HIGH (ref 42–55)
PH BLDV: 7.45 — HIGH (ref 7.32–7.43)
PO2 BLDV: 77 MMHG — HIGH (ref 25–45)
POTASSIUM SERPL-MCNC: 4 MMOL/L — SIGNIFICANT CHANGE UP (ref 3.5–5.3)
POTASSIUM SERPL-SCNC: 4 MMOL/L — SIGNIFICANT CHANGE UP (ref 3.5–5.3)
SAO2 % BLDV: 96.6 % — SIGNIFICANT CHANGE UP (ref 94–98)
SODIUM SERPL-SCNC: 138 MMOL/L — SIGNIFICANT CHANGE UP (ref 135–145)

## 2024-04-16 PROCEDURE — 99233 SBSQ HOSP IP/OBS HIGH 50: CPT

## 2024-04-16 PROCEDURE — 71045 X-RAY EXAM CHEST 1 VIEW: CPT | Mod: 26

## 2024-04-16 PROCEDURE — 99232 SBSQ HOSP IP/OBS MODERATE 35: CPT

## 2024-04-16 PROCEDURE — 93306 TTE W/DOPPLER COMPLETE: CPT | Mod: 26

## 2024-04-16 RX ADMIN — PANTOPRAZOLE SODIUM 40 MILLIGRAM(S): 20 TABLET, DELAYED RELEASE ORAL at 05:12

## 2024-04-16 RX ADMIN — Medication 500 MILLIGRAM(S): at 11:17

## 2024-04-16 RX ADMIN — Medication 40 MILLIGRAM(S): at 05:13

## 2024-04-16 RX ADMIN — RIVAROXABAN 20 MILLIGRAM(S): KIT at 17:09

## 2024-04-16 RX ADMIN — AMLODIPINE BESYLATE 10 MILLIGRAM(S): 2.5 TABLET ORAL at 05:13

## 2024-04-16 RX ADMIN — SENNA PLUS 2 TABLET(S): 8.6 TABLET ORAL at 21:31

## 2024-04-16 RX ADMIN — CARVEDILOL PHOSPHATE 12.5 MILLIGRAM(S): 80 CAPSULE, EXTENDED RELEASE ORAL at 17:10

## 2024-04-16 RX ADMIN — CARVEDILOL PHOSPHATE 12.5 MILLIGRAM(S): 80 CAPSULE, EXTENDED RELEASE ORAL at 05:13

## 2024-04-16 RX ADMIN — Medication 1 APPLICATION(S): at 22:13

## 2024-04-16 RX ADMIN — Medication 20 MILLIGRAM(S): at 05:12

## 2024-04-16 NOTE — PROGRESS NOTE ADULT - ASSESSMENT
64 yr old male presenting with acute on chronic hypoxic/hypercapnic respiratory failure and afib with RVR.       Plan:  Presents with sats of 68% on 3L NC -Baseline    Hx of OHS. RR 17 satting 99% on BIPAP, CXR notes small bilateral effusions.  Was started on IVP Lasix bid.     CXR: pulmonary edema, proBNP 2348, will change to PO Lasix given metabolic alkalosis on chem-7, clearly chronic.     pCO2 on presentation >122    Lasix 40mg IV BID. will change to PO in AM.     s/p diamox due to alkalosis with improved PH     Strict I/O, daily weight, TTE ordered, none in past 6 months.     Continue BiPAP overnight then can wean to sats >92 on baseline 3L NC, likely has OHS.     Pulm was consulted today, will see this afternoon.  Repeat lytes and VBG in AM.     Will continue Xarelto, Coreg, Norvasc and Vasotec, BP stable.  64 yr old male presenting with acute on chronic hypoxic/hypercapnic respiratory failure and afib with RVR.       Plan:  Presents with sats of 68% on 3L NC -Baseline    Hx of OHS. RR 17 satting 99% on BIPAP, CXR notes small bilateral effusions.  Was started on IVP Lasix bid.     CXR: pulmonary edema, proBNP 2348, will change to PO Lasix given metabolic alkalosis on chem-7, clearly chronic.     pCO2 on presentation >122    Lasix 40mg IV BID. will change to PO in AM.     s/p diamox due to alkalosis with improved PH     Strict I/O, daily weight, TTE ordered, none in past 6 months.     Continue BiPAP overnight then can wean to sats >92 on baseline 3L NC, likely has OHS.     Pulm was consulted today, will see this afternoon.  Repeat lytes and VBG in AM.     Will continue Xarelto, Coreg, Norvasc and Vasotec, BP stable.     Agree BIPAP at night, will need in rehab.     Creatinine normal at .62.  64 yr old male presenting with acute on chronic hypoxic/hypercapnic respiratory failure and afib with RVR.       Plan:  Presents with sats of 68% on 3L NC -Baseline    Hx of OHS. RR 17 satting 99% on BIPAP, CXR notes small bilateral effusions.  Was started on IVP Lasix bid.     CXR: pulmonary edema, proBNP 2348, will change to PO Lasix given metabolic alkalosis on chem-7, clearly chronic.     pCO2 on presentation >122    Lasix 40mg IV BID. will change to PO in AM.     s/p diamox due to alkalosis with improved PH     Strict I/O, daily weight, TTE ordered, none in past 6 months.     Continue BiPAP overnight then can wean to sats >92 on baseline 3L NC, likely has OHS.     Pulm was consulted today, will see this afternoon.  Repeat lytes and VBG in AM.     Will continue Xarelto, Coreg, Norvasc and Vasotec, BP stable.     Agree BIPAP at night, will need in rehab.     Creatinine normal at .62. Stop Norvasc give low BP today with recent diuresis. Ph acceptable at 7.45.

## 2024-04-16 NOTE — PROGRESS NOTE ADULT - ASSESSMENT
Assessment: 63M w/ HTN, morbid obesity, and obesity hypoventilation syndrome, admitted in July 2023 for cellulitis presents with with acute on chronic hypoxic/hypercapnic respiratory failure and afib RVR.  ABG performed showing chronic hypercapnia and noted hypoxia. Placed on O2 and BiPAP HS/PRN.He has never used O2 at home and has never seen pulmonary as outpt.     Recommendations:   - Acute on Chronic hypoxic respiratory failure with hypercapnia ABG with chronic C02 retention with a pCO2 70s with serum Co2 > 45 but normal pH. Given these lab abnormalities and body habitus will qualify for home bipap. Seems he did not get pulm follow up like he was supposed to.   - Saturating well on 2L NC, weaned from 4L. Given body habitus and hx patient most likely is baseline hypoxic. Wean o2 with goal saturation > 89%. - - - -  -Hypoxia most likely cardiac in origin with evident fluid overload.   -Continue aggressive diuresis. Can benefit from Diamox as needed elevate bicarb- last dose 4/13  - ECHO - pending   - Continue bipap HS, will need case management to help organize home bipap   - Requires outpatient pulmonary FUP with PFTs and a sleep study. Please call [phone] or email  [email or web address] for an appointment at the Pulmonary office ([address], [address], [place], [place]).     *Plan of care discussed with Pulmonary attending MD    Assessment: 63M w/ HTN, morbid obesity, and obesity hypoventilation syndrome, admitted in July 2023 for cellulitis presents with with acute on chronic hypoxic/hypercapnic respiratory failure and afib RVR.  ABG performed showing chronic hypercapnia and noted hypoxia. Placed on O2 and BiPAP HS/PRN.He has never used O2 at home and has never seen pulmonary as outpt.     Recommendations:   - Acute on Chronic hypoxic respiratory failure with hypercapnia ABG with chronic C02 retention with a pCO2 70s with serum Co2 > 45 but normal pH. Given these lab abnormalities and body habitus will qualify for home bipap. Seems he did not get pulm follow up like he was supposed to.   - Saturating well on 2L NC, weaned from 4L. Given body habitus and hx patient most likely is baseline hypoxic. Wean o2 with goal saturation > 89%. - - - -  -Hypoxia most likely cardiac in origin with evident fluid overload.   -Continue aggressive diuresis. Can benefit from Diamox as needed elevate bicarb- last dose 4/13  - ECHO - pending   - Continue NIV HS, will need case management to help organize home bipap   - Requires outpatient pulmonary FUP with PFTs and a sleep study. Please call [phone] or email  [email or web address] for an appointment at the Pulmonary office ([address], [address], [place], [place]).     *Plan of care discussed with Pulmonary attending MD

## 2024-04-16 NOTE — PROGRESS NOTE ADULT - SUBJECTIVE AND OBJECTIVE BOX
INTERVAL HPI/OVERNIGHT EVENTS:  Pt seen and examined at bedside.     Allergies/Intolerance: No Known Allergies      MEDICATIONS  (STANDING):  amLODIPine   Tablet 10 milliGRAM(s) Oral daily  AQUAPHOR (petrolatum Ointment) 1 Application(s) Topical at bedtime  ascorbic acid 500 milliGRAM(s) Oral daily  carvedilol 12.5 milliGRAM(s) Oral every 12 hours  enalapril 20 milliGRAM(s) Oral daily  furosemide    Tablet 40 milliGRAM(s) Oral daily  pantoprazole    Tablet 40 milliGRAM(s) Oral before breakfast  rivaroxaban 20 milliGRAM(s) Oral with dinner  senna 2 Tablet(s) Oral at bedtime    MEDICATIONS  (PRN):  acetaminophen     Tablet .. 650 milliGRAM(s) Oral every 6 hours PRN Temp greater or equal to 38C (100.4F), Mild Pain (1 - 3)        ROS: all systems reviewed and wnl      PHYSICAL EXAMINATION:  Vital Signs Last 24 Hrs  T(C): 36.8 (16 Apr 2024 04:47), Max: 36.8 (15 Apr 2024 10:43)  T(F): 98.3 (16 Apr 2024 04:47), Max: 98.3 (16 Apr 2024 04:47)  HR: 102 (16 Apr 2024 08:44) (70 - 103)  BP: 110/79 (16 Apr 2024 04:47) (100/67 - 138/67)  BP(mean): --  RR: 19 (16 Apr 2024 04:47) (17 - 19)  SpO2: 95% (16 Apr 2024 08:44) (94% - 98%)      CAPILLARY BLOOD GLUCOSE          04-15 @ 07:01  -  04-16 @ 07:00  --------------------------------------------------------  IN: 960 mL / OUT: 1400 mL / NET: -440 mL        GENERAL: stable, alert, answers all questions, comfortable on NC during day, AVAPS at night.   NECK: supple, No JVD  CHEST/LUNG: clear to auscultation bilaterally; no rales, rhonchi, or wheezing b/l  HEART: normal S1, S2  ABDOMEN: BS+, soft, ND, NT   EXTREMITIES:  pulses palpable; no clubbing, cyanosis, or edema b/l LEs    LABS:    04-16    138  |  88<L>  |  20  ----------------------------<  79  4.0   |  45<HH>  |  0.63    Ca    8.9      16 Apr 2024 06:20        Urinalysis Basic - ( 16 Apr 2024 06:20 )    Color: x / Appearance: x / SG: x / pH: x  Gluc: 79 mg/dL / Ketone: x  / Bili: x / Urobili: x   Blood: x / Protein: x / Nitrite: x   Leuk Esterase: x / RBC: x / WBC x   Sq Epi: x / Non Sq Epi: x / Bacteria: x

## 2024-04-16 NOTE — PROGRESS NOTE ADULT - SUBJECTIVE AND OBJECTIVE BOX
CHIEF COMPLAINT:  ===============  HIGH BLOOD PRESSURE, RESPIRTORY DISTRESS    ACUTE RESPIRATORY FAILURE WITH HYPOXIA HYPERCAPNIA; NEW ONSE        INTERVAL EVENTS:  ==================    - T(F): , Max: 98.3 (04-16-24 @ 04:47)  SpO2:  (94% - 98%)  -     REVIEW OF SYSTEMS:  ==================  -NEGATIVE except for those listed above     HPI:  ======        OBJECTIVE:  ===========  ICU Vital Signs Last 24 Hrs  T(C): 36.8 (16 Apr 2024 04:47), Max: 36.8 (15 Apr 2024 10:43)  T(F): 98.3 (16 Apr 2024 04:47), Max: 98.3 (16 Apr 2024 04:47)  HR: 94 (16 Apr 2024 04:47) (70 - 103)  BP: 110/79 (16 Apr 2024 04:47) (100/67 - 138/67)  BP(mean): --  ABP: --  ABP(mean): --  RR: 19 (16 Apr 2024 04:47) (17 - 19)  SpO2: 96% (16 Apr 2024 04:47) (94% - 98%)          04-15 @ 07:01  -  04-16 @ 07:00  --------------------------------------------------------  IN: 960 mL / OUT: 1400 mL / NET: -440 mL      CAPILLARY BLOOD GLUCOSE          PHYSICAL EXAM:  ==============  GENERAL: NAD, well-groomed, well-developed  HEAD:  Atraumatic, Normocephalic  EYES: EOMI, PERRLA, conjunctiva and sclera clear  ENMT: No tonsillar erythema, exudates, or enlargement; Moist mucous membranes, Good dentition, No lesions  NECK: Supple, No JVD, Normal thyroid  NERVOUS SYSTEM:  Alert & Oriented X3, Good concentration; Motor Strength 5/5 B/L upper and lower extremities; DTRs 2+ intact and symmetric  CHEST/LUNG: Clear to percussion bilaterally; No rales, rhonchi, wheezing, or rubs  HEART: Regular rate and rhythm; No murmurs, rubs, or gallops  ABDOMEN: Soft, Nontender, Nondistended; Bowel sounds present  EXTREMITIES:  2+ Peripheral Pulses, No clubbing, cyanosis, or edema  LYMPH: No lymphadenopathy noted  SKIN: No rashes or lesions      HOSPITAL MEDICATIONS:  ======================  rivaroxaban 20 milliGRAM(s) Oral with dinner      amLODIPine   Tablet 10 milliGRAM(s) Oral daily  carvedilol 12.5 milliGRAM(s) Oral every 12 hours  enalapril 20 milliGRAM(s) Oral daily  furosemide    Tablet 40 milliGRAM(s) Oral daily        acetaminophen     Tablet .. 650 milliGRAM(s) Oral every 6 hours PRN    pantoprazole    Tablet 40 milliGRAM(s) Oral before breakfast  senna 2 Tablet(s) Oral at bedtime        ascorbic acid 500 milliGRAM(s) Oral daily      AQUAPHOR (petrolatum Ointment) 1 Application(s) Topical at bedtime        LABS:  =====      Hgb Trend: 13.1<--, 13.5<--  04-15    135  |  85<L>  |  26<H>  ----------------------------<  98  3.2<L>   |  45<HH>  |  0.76    Ca    8.9      15 Apr 2024 06:30      Creatinine Trend: 0.76<--, 0.89<--, 0.80<--, 0.44<--      Urinalysis Basic - ( 15 Apr 2024 06:30 )    Color: x / Appearance: x / SG: x / pH: x  Gluc: 98 mg/dL / Ketone: x  / Bili: x / Urobili: x   Blood: x / Protein: x / Nitrite: x   Leuk Esterase: x / RBC: x / WBC x   Sq Epi: x / Non Sq Epi: x / Bacteria: x        Venous Blood Gas:  04-16 @ 06:28  7.45/78/77/54/96.6  VBG Lactate: --  Venous Blood Gas:  04-15 @ 06:30  7.41/84/143/53/100.0  VBG Lactate: 0.70        MICROBIOLOGY:     Culture - Urine  Source: Clean Catch Clean Catch (Midstream)  Final Report (04-13-24 @ 20:02):    No growth                RADIOLOGY:  ==========   CT Head No Cont (04.12.24 @ 15:00) >  IMPRESSION:    No acute intracranial  hemorrhage, midline shift or mass effect.  Chronic mildly expanded empty sella turcica.      CT Lower Extremity w/ IV Cont, Right (07.15.23 @ 12:21) >  IMPRESSION:    Marked circumferential subcutaneous soft tissue edema as well as skin   thickening throughout the visualized right lower extremity that is most   prominent at the level of the inner thigh where thereadditionally is   fluid attenuation within the skin suggesting skin blistering. There is no   discrete peripherally enhancing fluid collection identified.    Severe right knee arthrosis.      PULMONARY:  ============    CARDIAC:  ========  < from: TTE Echo Complete w/ Contrast w/ Doppler (07.19.23 @ 08:19) >  Summary:   1. Normal global left ventricular systolic function.   2. Moderately enlarged left atrium.   3. Mild tricuspid regurgitation.   4. Sclerotic aortic valve with normal opening.   5. Dilatation of the aortic root and ascending aorta.   6. No interval chnages as compared to prior study from 4/14/21.    < end of copied text >   CHIEF COMPLAINT:  ===============  HIGH BLOOD PRESSURE, RESPIRATORY DISTRESS  ACUTE RESPIRATORY FAILURE WITH HYPOXIA HYPERCAPNIA; NEW ONSE    INTERVAL EVENTS:  ==================    - T(F): , Max: 98.3 (04-16-24 @ 04:47)  - SpO2:  (94% - 98%)    REVIEW OF SYSTEMS:  ==================  -NEGATIVE except for those listed above     HPI:  ======  HPI:  64 yr old male with a pmh of morbid obesity with alveolar hypoventilation with chronic hypoxia/hypercarbia due to LORI/OHS on 2-3L NC, HTN, GI bleed requiring embolization, RLE wound with previous maggots, who presents with AMS and hypoxia of 68 % on baseline 3L NC.   Collateral obtained from Nursing supervisor.   Unable to obtain ROS from pt as altered     OBJECTIVE:  ===========  ICU Vital Signs Last 24 Hrs  T(C): 36.8 (16 Apr 2024 04:47), Max: 36.8 (15 Apr 2024 10:43)  T(F): 98.3 (16 Apr 2024 04:47), Max: 98.3 (16 Apr 2024 04:47)  HR: 94 (16 Apr 2024 04:47) (70 - 103)  BP: 110/79 (16 Apr 2024 04:47) (100/67 - 138/67)  RR: 19 (16 Apr 2024 04:47) (17 - 19)  SpO2: 96% (16 Apr 2024 04:47) (94% - 98%)          04-15 @ 07:01  -  04-16 @ 07:00  --------------------------------------------------------  IN: 960 mL / OUT: 1400 mL / NET: -440 mL      CAPILLARY BLOOD GLUCOSE    PHYSICAL EXAM:  ==============  GENERAL: NAD, well-groomed, well-developed  HEAD:  Atraumatic, Normocephalic  EYES: EOMI, PERRLA, conjunctiva and sclera clear  ENMT: No tonsillar erythema, exudates, or enlargement; Moist mucous membranes, Good dentition, No lesions  NECK: Supple, No JVD, Normal thyroid  NERVOUS SYSTEM:  Alert & Oriented X3, Good concentration; Motor Strength 5/5 B/L upper and lower extremities; DTRs 2+ intact and symmetric  CHEST/LUNG: Clear to percussion bilaterally; No rales, rhonchi, wheezing, or rubs  HEART: Regular rate and rhythm; No murmurs, rubs, or gallops  ABDOMEN: Soft, Nontender, Nondistended; Bowel sounds present  EXTREMITIES:  2+ Peripheral Pulses, No clubbing, cyanosis, or edema  LYMPH: No lymphadenopathy noted  SKIN: No rashes or lesions      HOSPITAL MEDICATIONS:  ======================  rivaroxaban 20 milliGRAM(s) Oral with dinner  amLODIPine   Tablet 10 milliGRAM(s) Oral daily  carvedilol 12.5 milliGRAM(s) Oral every 12 hours  enalapril 20 milliGRAM(s) Oral daily  furosemide    Tablet 40 milliGRAM(s) Oral daily  acetaminophen     Tablet .. 650 milliGRAM(s) Oral every 6 hours PRN  pantoprazole    Tablet 40 milliGRAM(s) Oral before breakfast  senna 2 Tablet(s) Oral at bedtime  ascorbic acid 500 milliGRAM(s) Oral daily  AQUAPHOR (petrolatum Ointment) 1 Application(s) Topical at bedtime    LABS:  =====      Hgb Trend: 13.1<--, 13.5<--  04-15    135  |  85<L>  |  26<H>  ----------------------------<  98  3.2<L>   |  45<HH>  |  0.76    Ca    8.9      15 Apr 2024 06:30      Creatinine Trend: 0.76<--, 0.89<--, 0.80<--, 0.44<--      Urinalysis Basic - ( 15 Apr 2024 06:30 )    Color: x / Appearance: x / SG: x / pH: x  Gluc: 98 mg/dL / Ketone: x  / Bili: x / Urobili: x   Blood: x / Protein: x / Nitrite: x   Leuk Esterase: x / RBC: x / WBC x   Sq Epi: x / Non Sq Epi: x / Bacteria: x    Venous Blood Gas:  04-16 @ 06:28  7.45/78/77/54/96.6  VBG Lactate: --  Venous Blood Gas:  04-15 @ 06:30  7.41/84/143/53/100.0  VBG Lactate: 0.70      MICROBIOLOGY:   Culture - Urine  Source: Clean Catch Clean Catch (Midstream)  Final Report (04-13-24 @ 20:02):    No growth      RADIOLOGY:  ==========   CT Head No Cont (04.12.24 @ 15:00) >  IMPRESSION:    No acute intracranial  hemorrhage, midline shift or mass effect.  Chronic mildly expanded empty sella turcica.      CT Lower Extremity w/ IV Cont, Right (07.15.23 @ 12:21) >  IMPRESSION:    Marked circumferential subcutaneous soft tissue edema as well as skin   thickening throughout the visualized right lower extremity that is most   prominent at the level of the inner thigh where thereadditionally is   fluid attenuation within the skin suggesting skin blistering. There is no   discrete peripherally enhancing fluid collection identified.    Severe right knee arthrosis.      PULMONARY:  ============    CARDIAC:  ========  < from: TTE Echo Complete w/ Contrast w/ Doppler (07.19.23 @ 08:19) >  Summary:   1. Normal global left ventricular systolic function.   2. Moderately enlarged left atrium.   3. Mild tricuspid regurgitation.   4. Sclerotic aortic valve with normal opening.   5. Dilatation of the aortic root and ascending aorta.   6. No interval chnages as compared to prior study from 4/14/21.    < end of copied text >   CHIEF COMPLAINT:  ===============  HIGH BLOOD PRESSURE, RESPIRATORY DISTRESS  ACUTE RESPIRATORY FAILURE WITH HYPOXIA HYPERCAPNIA    INTERVAL EVENTS:  ==================    - T(F): , Max: 98.3 (04-16-24 @ 04:47)  - SpO2:  (94% - 98%)    REVIEW OF SYSTEMS:  ==================  -NEGATIVE except for those listed above     HPI:  ======  HPI:  64 yr old male with a pmh of morbid obesity with alveolar hypoventilation with chronic hypoxia/hypercarbia due to LORI/OHS on 2-3L NC, HTN, GI bleed requiring embolization, RLE wound with previous maggots, who presents with AMS and hypoxia of 68 % on baseline 3L NC.   Collateral obtained from Nursing supervisor.   Unable to obtain ROS from pt as altered     OBJECTIVE:  ===========  Vital Signs Last 24 Hrs  T(C): 36.8 (16 Apr 2024 04:47), Max: 36.8 (15 Apr 2024 10:43)  T(F): 98.3 (16 Apr 2024 04:47), Max: 98.3 (16 Apr 2024 04:47)  HR: 94 (16 Apr 2024 04:47) (70 - 103)  BP: 110/79 (16 Apr 2024 04:47) (100/67 - 138/67)  RR: 19 (16 Apr 2024 04:47) (17 - 19)  SpO2: 96% (16 Apr 2024 04:47) (94% - 98%)          04-15 @ 07:01  -  04-16 @ 07:00  --------------------------------------------------------  IN: 960 mL / OUT: 1400 mL / NET: -440 mL      CAPILLARY BLOOD GLUCOSE    PHYSICAL EXAM:  ==============  GENERAL: NAD, obese  HEAD:  Atraumatic, Normocephalic  EYES: EOMI, PERRLA, conjunctiva and sclera clear  ENMT: No tonsillar erythema, exudates, or enlargement; Moist mucous membranes, No lesions, crowded oral pharynx  NECK: Supple, No JVD  NERVOUS SYSTEM:  Alert & Oriented X2, confused   CHEST/LUNG: Clear to auscultation bilaterally; No rales, rhonchi, wheezing, or rubs  HEART: Regular rate   ABDOMEN: Soft, Nontender, Nondistended; Bowel sounds present  EXTREMITIES:  2+ Peripheral Pulses, No clubbing, cyanosis, decreased LE edema, chronic R leg wound dressing in place  LYMPH: No lymphadenopathy noted        HOSPITAL MEDICATIONS:  ======================  rivaroxaban 20 milliGRAM(s) Oral with dinner  amLODIPine   Tablet 10 milliGRAM(s) Oral daily  carvedilol 12.5 milliGRAM(s) Oral every 12 hours  enalapril 20 milliGRAM(s) Oral daily  furosemide    Tablet 40 milliGRAM(s) Oral daily  acetaminophen     Tablet .. 650 milliGRAM(s) Oral every 6 hours PRN  pantoprazole    Tablet 40 milliGRAM(s) Oral before breakfast  senna 2 Tablet(s) Oral at bedtime  ascorbic acid 500 milliGRAM(s) Oral daily  AQUAPHOR (petrolatum Ointment) 1 Application(s) Topical at bedtime    LABS:  =====      Hgb Trend: 13.1<--, 13.5<--  04-15    135  |  85<L>  |  26<H>  ----------------------------<  98  3.2<L>   |  45<HH>  |  0.76    Ca    8.9      15 Apr 2024 06:30      Creatinine Trend: 0.76<--, 0.89<--, 0.80<--, 0.44<--        Venous Blood Gas:  04-16 @ 06:28  7.45/78/77/54/96.6  VBG Lactate: --  Venous Blood Gas:  04-15 @ 06:30  7.41/84/143/53/100.0  VBG Lactate: 0.70      MICROBIOLOGY:   Culture - Urine  Source: Clean Catch Clean Catch (Midstream)  Final Report (04-13-24 @ 20:02):    No growth      RADIOLOGY:  ==========   CT Head No Cont (04.12.24 @ 15:00) >  IMPRESSION:    No acute intracranial  hemorrhage, midline shift or mass effect.  Chronic mildly expanded empty sella turcica.      CT Lower Extremity w/ IV Cont, Right (07.15.23 @ 12:21) >  IMPRESSION:    Marked circumferential subcutaneous soft tissue edema as well as skin   thickening throughout the visualized right lower extremity that is most   prominent at the level of the inner thigh where thereadditionally is   fluid attenuation within the skin suggesting skin blistering. There is no   discrete peripherally enhancing fluid collection identified.    Severe right knee arthrosis.      PULMONARY:  ============    CARDIAC:  ========  < from: TTE Echo Complete w/ Contrast w/ Doppler (07.19.23 @ 08:19) >  Summary:   1. Normal global left ventricular systolic function.   2. Moderately enlarged left atrium.   3. Mild tricuspid regurgitation.   4. Sclerotic aortic valve with normal opening.   5. Dilatation of the aortic root and ascending aorta.   6. No interval chnages as compared to prior study from 4/14/21.

## 2024-04-17 LAB
ALBUMIN SERPL ELPH-MCNC: 2.8 G/DL — LOW (ref 3.3–5)
ALP SERPL-CCNC: 73 U/L — SIGNIFICANT CHANGE UP (ref 40–120)
ALT FLD-CCNC: 16 U/L — SIGNIFICANT CHANGE UP (ref 12–78)
AMMONIA BLD-MCNC: 71 UMOL/L — HIGH (ref 11–32)
ANION GAP SERPL CALC-SCNC: <5 MMOL/L — SIGNIFICANT CHANGE UP (ref 5–17)
AST SERPL-CCNC: 23 U/L — SIGNIFICANT CHANGE UP (ref 15–37)
BASE EXCESS BLDA CALC-SCNC: 23.1 MMOL/L — HIGH (ref -2–3)
BASE EXCESS BLDA CALC-SCNC: 23.2 MMOL/L — HIGH (ref -2–3)
BASE EXCESS BLDV CALC-SCNC: 25.1 MMOL/L — HIGH (ref -2–3)
BILIRUB SERPL-MCNC: 0.3 MG/DL — SIGNIFICANT CHANGE UP (ref 0.2–1.2)
BLOOD GAS COMMENTS ARTERIAL: SIGNIFICANT CHANGE UP
BLOOD GAS COMMENTS ARTERIAL: SIGNIFICANT CHANGE UP
BLOOD GAS COMMENTS, VENOUS: SIGNIFICANT CHANGE UP
BUN SERPL-MCNC: 22 MG/DL — SIGNIFICANT CHANGE UP (ref 7–23)
CALCIUM SERPL-MCNC: 8.9 MG/DL — SIGNIFICANT CHANGE UP (ref 8.5–10.1)
CHLORIDE BLDV-SCNC: 90 MMOL/L — LOW (ref 98–107)
CHLORIDE SERPL-SCNC: 87 MMOL/L — LOW (ref 96–108)
CO2 BLDA-SCNC: 54 MMOL/L — HIGH (ref 19–24)
CO2 BLDA-SCNC: 58 MMOL/L — HIGH (ref 19–24)
CO2 BLDV-SCNC: 53 MMOL/L — HIGH (ref 22–26)
CO2 SERPL-SCNC: >45 MMOL/L — CRITICAL HIGH (ref 22–31)
CREAT SERPL-MCNC: 0.76 MG/DL — SIGNIFICANT CHANGE UP (ref 0.5–1.3)
EGFR: 102 ML/MIN/1.73M2 — SIGNIFICANT CHANGE UP
GAS PNL BLDA: SIGNIFICANT CHANGE UP
GAS PNL BLDV: 131 MMOL/L — LOW (ref 136–145)
GAS PNL BLDV: SIGNIFICANT CHANGE UP
GAS PNL BLDV: SIGNIFICANT CHANGE UP
GLUCOSE BLDC GLUCOMTR-MCNC: 141 MG/DL — HIGH (ref 70–99)
GLUCOSE BLDV-MCNC: 85 MG/DL — SIGNIFICANT CHANGE UP (ref 65–95)
GLUCOSE SERPL-MCNC: 138 MG/DL — HIGH (ref 70–99)
HCO3 BLDA-SCNC: 52 MMOL/L — CRITICAL HIGH (ref 21–28)
HCO3 BLDA-SCNC: 55 MMOL/L — CRITICAL HIGH (ref 21–28)
HCO3 BLDV-SCNC: 51 MMOL/L — CRITICAL HIGH (ref 22–28)
HCT VFR BLD CALC: 39.2 % — SIGNIFICANT CHANGE UP (ref 39–50)
HCT VFR BLDA CALC: 37 % — SIGNIFICANT CHANGE UP (ref 37–47)
HGB BLD CALC-MCNC: 12.2 G/DL — LOW (ref 12.6–17.4)
HGB BLD-MCNC: 11.7 G/DL — LOW (ref 13–17)
HOROWITZ INDEX BLDA+IHG-RTO: 32 — SIGNIFICANT CHANGE UP
HOROWITZ INDEX BLDA+IHG-RTO: 40 — SIGNIFICANT CHANGE UP
LACTATE BLDV-MCNC: 1.1 MMOL/L — SIGNIFICANT CHANGE UP (ref 0.56–1.39)
MAGNESIUM SERPL-MCNC: 1.8 MG/DL — SIGNIFICANT CHANGE UP (ref 1.6–2.6)
MCHC RBC-ENTMCNC: 28.5 PG — SIGNIFICANT CHANGE UP (ref 27–34)
MCHC RBC-ENTMCNC: 29.8 G/DL — LOW (ref 32–36)
MCV RBC AUTO: 95.6 FL — SIGNIFICANT CHANGE UP (ref 80–100)
NRBC # BLD: 0 /100 WBCS — SIGNIFICANT CHANGE UP (ref 0–0)
OTHER CELLS CSF MANUAL: SIGNIFICANT CHANGE UP ML/DL (ref 18–22)
PCO2 BLDA: 75 MMHG — CRITICAL HIGH (ref 32–46)
PCO2 BLDA: 99 MMHG — CRITICAL HIGH (ref 32–46)
PCO2 BLDV: 57 MMHG — HIGH (ref 42–55)
PH BLDA: 7.35 — SIGNIFICANT CHANGE UP (ref 7.35–7.45)
PH BLDA: 7.45 — SIGNIFICANT CHANGE UP (ref 7.35–7.45)
PH BLDV: 7.56 — HIGH (ref 7.32–7.43)
PHOSPHATE SERPL-MCNC: 2.5 MG/DL — SIGNIFICANT CHANGE UP (ref 2.5–4.5)
PLATELET # BLD AUTO: 231 K/UL — SIGNIFICANT CHANGE UP (ref 150–400)
PO2 BLDA: 143 MMHG — HIGH (ref 83–108)
PO2 BLDA: 82 MMHG — LOW (ref 83–108)
PO2 BLDV: 225 MMHG — HIGH (ref 25–45)
POTASSIUM BLDV-SCNC: 4.7 MMOL/L — SIGNIFICANT CHANGE UP (ref 3.5–5.1)
POTASSIUM SERPL-MCNC: 3.8 MMOL/L — SIGNIFICANT CHANGE UP (ref 3.5–5.3)
POTASSIUM SERPL-SCNC: 3.8 MMOL/L — SIGNIFICANT CHANGE UP (ref 3.5–5.3)
PROT SERPL-MCNC: 7 GM/DL — SIGNIFICANT CHANGE UP (ref 6–8.3)
RBC # BLD: 4.1 M/UL — LOW (ref 4.2–5.8)
RBC # FLD: 13 % — SIGNIFICANT CHANGE UP (ref 10.3–14.5)
SAO2 % BLDA: 98.6 % — HIGH (ref 94–98)
SAO2 % BLDA: 99.3 % — HIGH (ref 94–98)
SAO2 % BLDV: 99.2 % — HIGH (ref 94–98)
SODIUM SERPL-SCNC: 137 MMOL/L — SIGNIFICANT CHANGE UP (ref 135–145)
WBC # BLD: 8.07 K/UL — SIGNIFICANT CHANGE UP (ref 3.8–10.5)
WBC # FLD AUTO: 8.07 K/UL — SIGNIFICANT CHANGE UP (ref 3.8–10.5)

## 2024-04-17 PROCEDURE — 99232 SBSQ HOSP IP/OBS MODERATE 35: CPT

## 2024-04-17 PROCEDURE — 99233 SBSQ HOSP IP/OBS HIGH 50: CPT

## 2024-04-17 RX ORDER — LACTULOSE 10 G/15ML
10 SOLUTION ORAL THREE TIMES A DAY
Refills: 0 | Status: DISCONTINUED | OUTPATIENT
Start: 2024-04-17 | End: 2024-04-22

## 2024-04-17 RX ORDER — POLYETHYLENE GLYCOL 3350 17 G/17G
17 POWDER, FOR SOLUTION ORAL ONCE
Refills: 0 | Status: COMPLETED | OUTPATIENT
Start: 2024-04-17 | End: 2024-04-17

## 2024-04-17 RX ADMIN — POLYETHYLENE GLYCOL 3350 17 GRAM(S): 17 POWDER, FOR SOLUTION ORAL at 14:10

## 2024-04-17 RX ADMIN — Medication 1 APPLICATION(S): at 22:11

## 2024-04-17 RX ADMIN — AMLODIPINE BESYLATE 10 MILLIGRAM(S): 2.5 TABLET ORAL at 05:15

## 2024-04-17 RX ADMIN — Medication 500 MILLIGRAM(S): at 11:12

## 2024-04-17 RX ADMIN — CARVEDILOL PHOSPHATE 12.5 MILLIGRAM(S): 80 CAPSULE, EXTENDED RELEASE ORAL at 17:24

## 2024-04-17 RX ADMIN — LACTULOSE 10 GRAM(S): 10 SOLUTION ORAL at 21:45

## 2024-04-17 RX ADMIN — Medication 20 MILLIGRAM(S): at 05:14

## 2024-04-17 RX ADMIN — PANTOPRAZOLE SODIUM 40 MILLIGRAM(S): 20 TABLET, DELAYED RELEASE ORAL at 05:14

## 2024-04-17 RX ADMIN — RIVAROXABAN 20 MILLIGRAM(S): KIT at 17:22

## 2024-04-17 RX ADMIN — CARVEDILOL PHOSPHATE 12.5 MILLIGRAM(S): 80 CAPSULE, EXTENDED RELEASE ORAL at 05:14

## 2024-04-17 RX ADMIN — Medication 40 MILLIGRAM(S): at 05:14

## 2024-04-17 NOTE — PROVIDER CONTACT NOTE (CRITICAL VALUE NOTIFICATION) - BACKGROUND
Pt was admitted for acute on chronic respiratory failure
hypercapneia/ Obesity
pt admitted for acute respiratory failure with hypoxia
pt is A&Ox2 confused came with acute resp failure with hypoxia hypercapnia. pt refused to use AVAP at night was satting 96-98% at 4L NC. Bicarb came back 53.2
stable

## 2024-04-17 NOTE — PROGRESS NOTE ADULT - ASSESSMENT
Assessment: 63M w/ HTN, morbid obesity, and obesity hypoventilation syndrome, admitted in July 2023 for cellulitis presents with with acute on chronic hypoxic/hypercapnic respiratory failure and afib RVR.  ABG performed showing chronic hypercapnia and noted hypoxia. Placed on O2 and BiPAP HS/PRN.He has never used O2 at home and has never seen pulmonary as outpt.     Recommendations:   -on 2 L Nasal cannula - titrate as needed   -maintain O2 saturation > 90%  - Acute on Chronic hypoxic respiratory failure with hypercapnia ABG with chronic C02 retention with a pCO2 70s with serum Co2 > 45 but normal pH. Given these lab abnormalities and body habitus will qualify for home bipap. Seems he did not get pulm follow up like he was supposed to.   - Saturating well on 2L NC, weaned from 4L. Given body habitus and hx patient most likely is baseline hypoxic. Wean o2 with goal saturation > 89%. - - - -  -Hypoxia most likely cardiac in origin with evident fluid overload.   -Continue aggressive diuresis. Can benefit from Diamox as needed elevate bicarb- last dose 4/13  - ECHO - pending     - Continue NIV HS, will need case management to help organize home bipap   - Requires outpatient pulmonary FUP with PFTs and a sleep study. Please call [phone] or email  [email or web address] for an appointment at the Pulmonary office ([address], [address], [place], [place]).     *Plan of care discussed with Pulmonary attending MD    Assessment: 63M w/ HTN, morbid obesity, and obesity hypoventilation syndrome, admitted in July 2023 for cellulitis presents with with acute on chronic hypoxic/hypercapnic respiratory failure and afib RVR.  ABG performed showing chronic hypercapnia and noted hypoxia. Placed on O2 and BiPAP HS/PRN.He has never used O2 at home and has never seen pulmonary as outpt.     Recommendations:   -patient sleepy confused this afternoon,   -ABG CO2 111--> placed on Bipap  -patient only wore Bipap X 2 hours overnight   -on 2 L Nasal cannula - titrate as needed during day  -maintain O2 saturation > 90%  - Acute on Chronic hypoxic respiratory failure with hypercapnia ABG with chronic C02 retention with a pCO2 70s with serum Co2 > 45 but normal pH. Given these lab abnormalities and body habitus will qualify for home bipap. Seems he did not get pulm follow up like he was supposed to.   - Saturating well on 2L NC, weaned from 4L. Given body habitus and hx patient most likely is baseline hypoxic. Wean o2 with goal saturation > 89%. - - - -  -Hypoxia most likely cardiac in origin with evident fluid overload.   -Continue aggressive diuresis. Can benefit from Diamox as needed elevate bicarb- last dose 4/13  - ECHO - pending     - Continue NIV HS, will need case management to help organize home bipap   - Requires outpatient pulmonary FUP with PFTs and a sleep study. Please call [phone] or email  [email or web address] for an appointment at the Pulmonary office ([address], [address], [place], [place]).     *Plan of care discussed with Pulmonary attending MD

## 2024-04-17 NOTE — PROGRESS NOTE ADULT - SUBJECTIVE AND OBJECTIVE BOX
CHIEF COMPLAINT:  ===============  HIGH BLOOD PRESSURE, RESPIRTORY DISTRESS  ACUTE RESPIRATORY FAILURE WITH HYPOXIA HYPERCAPNIA; NEW ONSE    INTERVAL EVENTS:  ==================    - T(F): , Max: 98.2 (04-16-24 @ 10:15)  - SpO2:  (92% - 96%)- on 3L       REVIEW OF SYSTEMS:  ==================  -NEGATIVE except for those listed above     HPI:  ======  64 yr old male with a pmh of morbid obesity with alveolar hypoventilation with chronic hypoxia/hypercarbia due to LORI/OHS on 2-3L NC, HTN, GI bleed requiring embolization, RLE wound with previous maggots, who presents with AMS and hypoxia of 68 % on baseline 3L NC.   Collateral obtained from Nursing supervisor.   Unable to obtain ROS from pt as altered       OBJECTIVE:  ===========  ICU Vital Signs Last 24 Hrs  T(C): 36.7 (17 Apr 2024 04:43), Max: 36.8 (16 Apr 2024 10:15)  T(F): 98 (17 Apr 2024 04:43), Max: 98.2 (16 Apr 2024 10:15)  HR: 92 (17 Apr 2024 05:59) (80 - 102)  BP: 117/81 (17 Apr 2024 04:43) (96/61 - 117/81)  RR: 18 (17 Apr 2024 04:43) (18 - 18)  SpO2: 95% (17 Apr 2024 05:59) (92% - 96%)    O2 Parameters below as of 16 Apr 2024 23:47  Patient On (Oxygen Delivery Method): BiPAP/CPAP    04-16 @ 07:01  -  04-17 @ 07:00  --------------------------------------------------------  IN: 1200 mL / OUT: 500 mL / NET: 700 mL      CAPILLARY BLOOD GLUCOSE      PHYSICAL EXAM:  ==============  GENERAL: NAD, well-groomed, well-developed  HEAD:  Atraumatic, Normocephalic  EYES: EOMI, PERRLA, conjunctiva and sclera clear  ENMT: No tonsillar erythema, exudates, or enlargement; Moist mucous membranes, Good dentition, No lesions  NECK: Supple, No JVD, Normal thyroid  NERVOUS SYSTEM:  Alert & Oriented X3, Good concentration; Motor Strength 5/5 B/L upper and lower extremities; DTRs 2+ intact and symmetric  CHEST/LUNG: Clear to percussion bilaterally; No rales, rhonchi, wheezing, or rubs  HEART: Regular rate and rhythm; No murmurs, rubs, or gallops  ABDOMEN: Soft, Nontender, Nondistended; Bowel sounds present  EXTREMITIES:  2+ Peripheral Pulses, No clubbing, cyanosis, or edema  LYMPH: No lymphadenopathy noted  SKIN: No rashes or lesions      HOSPITAL MEDICATIONS:  ======================  rivaroxaban 20 milliGRAM(s) Oral with dinner  amLODIPine   Tablet 10 milliGRAM(s) Oral daily  carvedilol 12.5 milliGRAM(s) Oral every 12 hours  enalapril 20 milliGRAM(s) Oral daily  furosemide    Tablet 40 milliGRAM(s) Oral daily  acetaminophen     Tablet .. 650 milliGRAM(s) Oral every 6 hours PRN  pantoprazole    Tablet 40 milliGRAM(s) Oral before breakfast  senna 2 Tablet(s) Oral at bedtime  ascorbic acid 500 milliGRAM(s) Oral daily  AQUAPHOR (petrolatum Ointment) 1 Application(s) Topical at bedtime      LABS:  =====      Hgb Trend: 13.1<--, 13.5<--  04-16    138  |  88<L>  |  20  ----------------------------<  79  4.0   |  45<HH>  |  0.63    Ca    8.9      16 Apr 2024 06:20      Creatinine Trend: 0.63<--, 0.76<--, 0.89<--, 0.80<--, 0.44<--      Urinalysis Basic - ( 16 Apr 2024 06:20 )    Color: x / Appearance: x / SG: x / pH: x  Gluc: 79 mg/dL / Ketone: x  / Bili: x / Urobili: x   Blood: x / Protein: x / Nitrite: x   Leuk Esterase: x / RBC: x / WBC x   Sq Epi: x / Non Sq Epi: x / Bacteria: x      Arterial Blood Gas:  04-17 @ 06:19  7.45/75/143/52/99.3/23.2  ABG lactate: --    Venous Blood Gas:  04-16 @ 06:28  7.45/78/77/54/96.6  VBG Lactate: --    MICROBIOLOGY:     Culture - Urine  Source: Clean Catch Clean Catch (Midstream)  Final Report (04-13-24 @ 20:02):    No growth      RADIOLOGY:  ==========   CT Head No Cont (04.12.24 @ 15:00) >  IMPRESSION:  No acute intracranial  hemorrhage, midline shift or mass effect.  Chronic mildly expanded empty sella turcica.     Xray Chest 1 View- PORTABLE-Urgent (Xray Chest 1 View- PORTABLE-Urgent .) (04.16.24 @ 10:25) >  IMPRESSION:  Left midlung platelike atelectasis versus pleural fluid in   the major fissure.    Limited comparison of continued bilateral lower/retrocardiac opacities   which could be due to pleural effusions with associated passive   atelectasis. Atelectasis of other cause, and/or other pathology   including, but not limited to, pneumonia is not excluded.      PULMONARY:  ============    CARDIAC:  ========  < from: TTE Echo Complete w/ Contrast w/ Doppler (07.19.23 @ 08:19) >  Summary:   1. Normal global left ventricular systolic function.   2. Moderately enlarged left atrium.   3. Mild tricuspid regurgitation.   4. Sclerotic aortic valve with normal opening.   5. Dilatation of the aortic root and ascending aorta.   6. No interval chnages as compared to prior study from 4/14/21.    < end of copied text >   CHIEF COMPLAINT:  ===============  HIGH BLOOD PRESSURE, RESPIRTORY DISTRESS  ACUTE RESPIRATORY FAILURE WITH HYPOXIA HYPERCAPNIA; NEW ONSE    INTERVAL EVENTS:  ==================  - patient very sleepy, confused this AM   - T(F): , Max: 98.2 (04-16-24 @ 10:15)  - SpO2:  (92% - 96%)- on 3L       REVIEW OF SYSTEMS:  ==================  -NEGATIVE except for those listed above     HPI:  ======  64 yr old male with a pmh of morbid obesity with alveolar hypoventilation with chronic hypoxia/hypercarbia due to LORI/OHS on 2-3L NC, HTN, GI bleed requiring embolization, RLE wound with previous maggots, who presents with AMS and hypoxia of 68 % on baseline 3L NC.   Collateral obtained from Nursing supervisor.   Unable to obtain ROS from pt as altered       OBJECTIVE:  ===========  ICU Vital Signs Last 24 Hrs  T(C): 36.7 (17 Apr 2024 04:43), Max: 36.8 (16 Apr 2024 10:15)  T(F): 98 (17 Apr 2024 04:43), Max: 98.2 (16 Apr 2024 10:15)  HR: 92 (17 Apr 2024 05:59) (80 - 102)  BP: 117/81 (17 Apr 2024 04:43) (96/61 - 117/81)  RR: 18 (17 Apr 2024 04:43) (18 - 18)  SpO2: 95% (17 Apr 2024 05:59) (92% - 96%)    O2 Parameters below as of 16 Apr 2024 23:47  Patient On (Oxygen Delivery Method): BiPAP/CPAP    04-16 @ 07:01  -  04-17 @ 07:00  --------------------------------------------------------  IN: 1200 mL / OUT: 500 mL / NET: 700 mL      CAPILLARY BLOOD GLUCOSE      PHYSICAL EXAM:  ==============  GENERAL: NAD, well-groomed, well-developed  HEAD:  Atraumatic, Normocephalic  EYES: EOMI, PERRLA, conjunctiva and sclera clear  ENMT: No tonsillar erythema, exudates, or enlargement; Moist mucous membranes, Good dentition, No lesions  NECK: Supple, No JVD, Normal thyroid  NERVOUS SYSTEM:  Alert & Oriented X3, Good concentration; Motor Strength 5/5 B/L upper and lower extremities; DTRs 2+ intact and symmetric  CHEST/LUNG: Clear to percussion bilaterally; No rales, rhonchi, wheezing, or rubs  HEART: Regular rate and rhythm; No murmurs, rubs, or gallops  ABDOMEN: Soft, Nontender, Nondistended; Bowel sounds present  EXTREMITIES:  2+ Peripheral Pulses, No clubbing, cyanosis, or edema  LYMPH: No lymphadenopathy noted  SKIN: No rashes or lesions      HOSPITAL MEDICATIONS:  ======================  rivaroxaban 20 milliGRAM(s) Oral with dinner  amLODIPine   Tablet 10 milliGRAM(s) Oral daily  carvedilol 12.5 milliGRAM(s) Oral every 12 hours  enalapril 20 milliGRAM(s) Oral daily  furosemide    Tablet 40 milliGRAM(s) Oral daily  acetaminophen     Tablet .. 650 milliGRAM(s) Oral every 6 hours PRN  pantoprazole    Tablet 40 milliGRAM(s) Oral before breakfast  senna 2 Tablet(s) Oral at bedtime  ascorbic acid 500 milliGRAM(s) Oral daily  AQUAPHOR (petrolatum Ointment) 1 Application(s) Topical at bedtime      LABS:  =====      Hgb Trend: 13.1<--, 13.5<--  04-16    138  |  88<L>  |  20  ----------------------------<  79  4.0   |  45<HH>  |  0.63    Ca    8.9      16 Apr 2024 06:20      Creatinine Trend: 0.63<--, 0.76<--, 0.89<--, 0.80<--, 0.44<--      Urinalysis Basic - ( 16 Apr 2024 06:20 )    Color: x / Appearance: x / SG: x / pH: x  Gluc: 79 mg/dL / Ketone: x  / Bili: x / Urobili: x   Blood: x / Protein: x / Nitrite: x   Leuk Esterase: x / RBC: x / WBC x   Sq Epi: x / Non Sq Epi: x / Bacteria: x      Arterial Blood Gas:  04-17 @ 06:19  7.45/75/143/52/99.3/23.2  ABG lactate: --    Venous Blood Gas:  04-16 @ 06:28  7.45/78/77/54/96.6  VBG Lactate: --    MICROBIOLOGY:     Culture - Urine  Source: Clean Catch Clean Catch (Midstream)  Final Report (04-13-24 @ 20:02):    No growth      RADIOLOGY:  ==========   CT Head No Cont (04.12.24 @ 15:00) >  IMPRESSION:  No acute intracranial  hemorrhage, midline shift or mass effect.  Chronic mildly expanded empty sella turcica.     Xray Chest 1 View- PORTABLE-Urgent (Xray Chest 1 View- PORTABLE-Urgent .) (04.16.24 @ 10:25) >  IMPRESSION:  Left midlung platelike atelectasis versus pleural fluid in   the major fissure.    Limited comparison of continued bilateral lower/retrocardiac opacities   which could be due to pleural effusions with associated passive   atelectasis. Atelectasis of other cause, and/or other pathology   including, but not limited to, pneumonia is not excluded.      PULMONARY:  ============    CARDIAC:  ========  < from: TTE Echo Complete w/ Contrast w/ Doppler (07.19.23 @ 08:19) >  Summary:   1. Normal global left ventricular systolic function.   2. Moderately enlarged left atrium.   3. Mild tricuspid regurgitation.   4. Sclerotic aortic valve with normal opening.   5. Dilatation of the aortic root and ascending aorta.   6. No interval chnages as compared to prior study from 4/14/21.    < end of copied text >   CHIEF COMPLAINT:  ===============  HIGH BLOOD PRESSURE, RESPIRTORY DISTRESS  ACUTE RESPIRATORY FAILURE WITH HYPOXIA HYPERCAPNIA; NEW ONSE    INTERVAL EVENTS:  ==================  - patient very sleepy, confused this AM   - T(F): , Max: 98.2 (04-16-24 @ 10:15)  - SpO2:  (92% - 96%)- on 3L       REVIEW OF SYSTEMS:  ==================  -mild SOB  -no cough  -no chest pain    HPI:  ======  64 yr old male with a pmh of morbid obesity with alveolar hypoventilation with chronic hypoxia/hypercarbia due to LORI/OHS on 2-3L NC, HTN, GI bleed requiring embolization, RLE wound with previous maggots, who presents with AMS and hypoxia of 68 % on baseline 3L NC.   Collateral obtained from Nursing supervisor.   Unable to obtain ROS from pt as altered       OBJECTIVE:  ===========  Vital Signs Last 24 Hrs  T(C): 36.7 (17 Apr 2024 04:43), Max: 36.8 (16 Apr 2024 10:15)  T(F): 98 (17 Apr 2024 04:43), Max: 98.2 (16 Apr 2024 10:15)  HR: 92 (17 Apr 2024 05:59) (80 - 102)  BP: 117/81 (17 Apr 2024 04:43) (96/61 - 117/81)  RR: 18 (17 Apr 2024 04:43) (18 - 18)  SpO2: 95% (17 Apr 2024 05:59) (92% - 96%)    O2 Parameters below as of 16 Apr 2024 23:47  Patient On (Oxygen Delivery Method): BiPAP/CPAP    04-16 @ 07:01  -  04-17 @ 07:00  --------------------------------------------------------  IN: 1200 mL / OUT: 500 mL / NET: 700 mL      CAPILLARY BLOOD GLUCOSE      PHYSICAL EXAM:  ==============  GENERAL: NAD, well-groomed, well-developed  HEAD:  Atraumatic, Normocephalic  EYES: EOMI, PERRLA, conjunctiva and sclera clear  ENMT: No tonsillar erythema, exudates, or enlargement; Moist mucous membranes, Good dentition, No lesions  NECK: Supple, No JVD, Normal thyroid  NERVOUS SYSTEM:  Alert & Oriented X3, Good concentration; Motor Strength 5/5 B/L upper and lower extremities; DTRs 2+ intact and symmetric  CHEST/LUNG: Clear to percussion bilaterally; No rales, rhonchi, wheezing, or rubs  HEART: Regular rate and rhythm; No murmurs, rubs, or gallops  ABDOMEN: Soft, Nontender, Nondistended; Bowel sounds present  EXTREMITIES:  2+ Peripheral Pulses, No clubbing, cyanosis, or edema  LYMPH: No lymphadenopathy noted  SKIN: No rashes or lesions      HOSPITAL MEDICATIONS:  ======================  rivaroxaban 20 milliGRAM(s) Oral with dinner  amLODIPine   Tablet 10 milliGRAM(s) Oral daily  carvedilol 12.5 milliGRAM(s) Oral every 12 hours  enalapril 20 milliGRAM(s) Oral daily  furosemide    Tablet 40 milliGRAM(s) Oral daily  acetaminophen     Tablet .. 650 milliGRAM(s) Oral every 6 hours PRN  pantoprazole    Tablet 40 milliGRAM(s) Oral before breakfast  senna 2 Tablet(s) Oral at bedtime  ascorbic acid 500 milliGRAM(s) Oral daily  AQUAPHOR (petrolatum Ointment) 1 Application(s) Topical at bedtime      LABS:  =====      Hgb Trend: 13.1<--, 13.5<--  04-16    138  |  88<L>  |  20  ----------------------------<  79  4.0   |  45<HH>  |  0.63    Ca    8.9      16 Apr 2024 06:20      Creatinine Trend: 0.63<--, 0.76<--, 0.89<--, 0.80<--, 0.44<--      Urinalysis Basic - ( 16 Apr 2024 06:20 )    Color: x / Appearance: x / SG: x / pH: x  Gluc: 79 mg/dL / Ketone: x  / Bili: x / Urobili: x   Blood: x / Protein: x / Nitrite: x   Leuk Esterase: x / RBC: x / WBC x   Sq Epi: x / Non Sq Epi: x / Bacteria: x      Arterial Blood Gas:  04-17 @ 06:19  7.45/75/143/52/99.3/23.2  ABG lactate: --    Venous Blood Gas:  04-16 @ 06:28  7.45/78/77/54/96.6  VBG Lactate: --    MICROBIOLOGY:     Culture - Urine  Source: Clean Catch Clean Catch (Midstream)  Final Report (04-13-24 @ 20:02):    No growth      RADIOLOGY:  ==========   CT Head No Cont (04.12.24 @ 15:00) >  IMPRESSION:  No acute intracranial  hemorrhage, midline shift or mass effect.  Chronic mildly expanded empty sella turcica.     Xray Chest 1 View- PORTABLE-Urgent (Xray Chest 1 View- PORTABLE-Urgent .) (04.16.24 @ 10:25) >  IMPRESSION:  Left midlung platelike atelectasis versus pleural fluid in   the major fissure.    Limited comparison of continued bilateral lower/retrocardiac opacities   which could be due to pleural effusions with associated passive   atelectasis. Atelectasis of other cause, and/or other pathology   including, but not limited to, pneumonia is not excluded.      PULMONARY:  ============    CARDIAC:  ========  < from: TTE Echo Complete w/ Contrast w/ Doppler (07.19.23 @ 08:19) >  Summary:   1. Normal global left ventricular systolic function.   2. Moderately enlarged left atrium.   3. Mild tricuspid regurgitation.   4. Sclerotic aortic valve with normal opening.   5. Dilatation of the aortic root and ascending aorta.   6. No interval chnages as compared to prior study from 4/14/21.    < end of copied text >

## 2024-04-17 NOTE — PROGRESS NOTE ADULT - ASSESSMENT
64 yr old male presenting with acute on chronic hypoxic/hypercapnic respiratory failure and afib with RVR.       Plan:  Presents with sats of 68% on 3L NC -Baseline    Hx of OHS. RR 17 satting 99% on BIPAP, CXR notes small bilateral effusions.  Was started on IVP Lasix bid.     CXR: pulmonary edema, proBNP 2348, will change to PO Lasix given metabolic alkalosis on chem-7, clearly chronic.     pCO2 on presentation >122    Lasix 40mg IV BID. will change to PO in AM.     s/p diamox due to alkalosis with improved PH     Strict I/O, daily weight, TTE ordered, none in past 6 months.     Continue BiPAP overnight then can wean to sats >92 on baseline 3L NC, likely has OHS.     Pulm was consulted today, will see this afternoon.  Repeat lytes and VBG in AM.     Will continue Xarelto, Coreg, Norvasc and Vasotec, BP stable.     Agree BIPAP at night, will need in rehab.     Creatinine normal at .62. Stop Norvasc give low BP today with recent diuresis. Ph acceptable at 7.45.   64 yr old male presenting with acute on chronic hypoxic/hypercapnic respiratory failure and afib with RVR.         Plan:  Presents with sats of 68% on 3L NC -Baseline, admitted for CHF, SOB.    Hx of OHS. RR 17 satting 99% on BIPAP, CXR notes small bilateral effusions.  Was started on IVP Lasix bid.     CXR: pulmonary edema, proBNP 2348, will change to PO Lasix given metabolic alkalosis on chem-7, clearly chronic.     pCO2 on presentation >122    Lasix 40mg IV BID. will change to PO in AM.     Per Pulmonary, no need for diamox as PH well balanced.       Strict I/O, daily weight, TTE ordered, none in past 6 months.     Continue BiPAP overnight then can wean to sats >92 on baseline 3L NC, likely has OHS.     Pulm was consulted today, will see this afternoon.  Repeat lytes and VBG in AM.     Will continue Xarelto, Coreg, Norvasc and Vasotec, BP stable.     Agree BIPAP at night, will need in rehab.     Creatinine normal at .62. Stop Norvasc give low BP today with recent diuresis. Ph acceptable at 7.45.   64 yr old male presenting with acute on chronic hypoxic/hypercapnic respiratory failure and afib with RVR.         Plan:  Presents with sats of 68% on 3L NC -Baseline, admitted for CHF, SOB.    Hx of OHS. RR 17 satting 99% on BIPAP, CXR notes small bilateral effusions.  Was started on IVP Lasix bid. Stable on PO Lasix now.     CXR: pulmonary edema, proBNP 2348, will change to PO Lasix given metabolic alkalosis on chem-7, clearly chronic.     pCO2 on presentation >122    Lasix 40mg IV BID. will change to PO in AM.     Per Pulmonary, no need for diamox as PH well balanced.       Strict I/O, daily weight, TTE notes normal EF 60 %, no pulmonary HTN.      Continue BiPAP overnight then can wean to sats >92 on baseline 3L NC, likely has OHS.     Pulm was consulted today, will see this afternoon.  Repeat lytes and VBG in AM.     Will continue Xarelto, Coreg, Norvasc and Vasotec, BP stable.     Agree BIPAP at night, will need in rehab.     Creatinine normal at .62. Stop Norvasc give low BP today with recent diuresis. Ph acceptable at 7.45.      Stable for discharge to LTC with BIPAP at night.

## 2024-04-17 NOTE — PROVIDER CONTACT NOTE (CRITICAL VALUE NOTIFICATION) - SITUATION
Received call from lab stating CO2>45
patient monbiterd for resp distress
pt is A&Ox2 confused came with acute resp failure with hypoxia hypercapnia. pt refused to use AVAP at night was satting 96-98% at 4L NC. Bicarb came back 53.2
pt bicarb is 54
patient monitor for resp distress

## 2024-04-17 NOTE — PROVIDER CONTACT NOTE (CRITICAL VALUE NOTIFICATION) - RECOMMENDATIONS
FOSTER Hanley made aware. Pt is back on BiPAP from 3L NC
pt bipap at night and prn
will continue to moniter
will moniter

## 2024-04-17 NOTE — PROVIDER CONTACT NOTE (CRITICAL VALUE NOTIFICATION) - ASSESSMENT
pt is A&Ox2 confused came with acute resp failure with hypoxia hypercapnia. pt refused to use AVAP at night was satting 96-98% at 4L NC. Bicarb came back 53.2
Pt was placed back on BiPAP; tolerating the mask
patient on oxygen NC 4 l
stable

## 2024-04-17 NOTE — PROVIDER CONTACT NOTE (CRITICAL VALUE NOTIFICATION) - TEST AND RESULT REPORTED:
Bicarb 53.2
Co2 45
CO2 - 45
potassium 2.7 CO2 grater than 45
BBG, bicarb is 54
CO2 greater than 45
CO2 >45

## 2024-04-17 NOTE — PROVIDER CONTACT NOTE (CRITICAL VALUE NOTIFICATION) - PERSON GIVING RESULT:
Nasrin Degroot
Mike
Camilla from lab
Deng Mccord
Natali Bustos/ Devendra
RAUDEL Gómez Resp Therapist
Margarita natarajan

## 2024-04-17 NOTE — PROGRESS NOTE ADULT - SUBJECTIVE AND OBJECTIVE BOX
INTERVAL HPI/OVERNIGHT EVENTS:  Pt seen and examined at bedside.     Allergies/Intolerance: No Known Allergies      MEDICATIONS  (STANDING):  amLODIPine   Tablet 10 milliGRAM(s) Oral daily  AQUAPHOR (petrolatum Ointment) 1 Application(s) Topical at bedtime  ascorbic acid 500 milliGRAM(s) Oral daily  carvedilol 12.5 milliGRAM(s) Oral every 12 hours  enalapril 20 milliGRAM(s) Oral daily  furosemide    Tablet 40 milliGRAM(s) Oral daily  pantoprazole    Tablet 40 milliGRAM(s) Oral before breakfast  rivaroxaban 20 milliGRAM(s) Oral with dinner  senna 2 Tablet(s) Oral at bedtime    MEDICATIONS  (PRN):  acetaminophen     Tablet .. 650 milliGRAM(s) Oral every 6 hours PRN Temp greater or equal to 38C (100.4F), Mild Pain (1 - 3)        ROS: all systems reviewed and wnl      PHYSICAL EXAMINATION:  Vital Signs Last 24 Hrs  T(C): 36.7 (17 Apr 2024 04:43), Max: 36.8 (16 Apr 2024 10:15)  T(F): 98 (17 Apr 2024 04:43), Max: 98.2 (16 Apr 2024 10:15)  HR: 92 (17 Apr 2024 05:59) (80 - 102)  BP: 117/81 (17 Apr 2024 04:43) (96/61 - 117/81)  BP(mean): --  RR: 18 (17 Apr 2024 04:43) (18 - 18)  SpO2: 95% (17 Apr 2024 05:59) (92% - 96%)    Parameters below as of 16 Apr 2024 23:47  Patient On (Oxygen Delivery Method): BiPAP/CPAP      CAPILLARY BLOOD GLUCOSE          04-16 @ 07:01  -  04-17 @ 07:00  --------------------------------------------------------  IN: 1200 mL / OUT: 500 mL / NET: 700 mL        GENERAL: stable, on oxygen during the day, bipap at night.   NECK: supple, No JVD  CHEST/LUNG: clear to auscultation bilaterally; no rales, rhonchi, or wheezing b/l  HEART: normal S1, S2  ABDOMEN: BS+, soft, ND, NT   EXTREMITIES:  pulses palpable; no clubbing, cyanosis, or edema b/l LEs    LABS:    04-16    138  |  88<L>  |  20  ----------------------------<  79  4.0   |  45<HH>  |  0.63    Ca    8.9      16 Apr 2024 06:20        Urinalysis Basic - ( 16 Apr 2024 06:20 )    Color: x / Appearance: x / SG: x / pH: x  Gluc: 79 mg/dL / Ketone: x  / Bili: x / Urobili: x   Blood: x / Protein: x / Nitrite: x   Leuk Esterase: x / RBC: x / WBC x   Sq Epi: x / Non Sq Epi: x / Bacteria: x

## 2024-04-18 LAB
BASE EXCESS BLDA CALC-SCNC: 23.7 MMOL/L — HIGH (ref -2–3)
BLOOD GAS COMMENTS ARTERIAL: SIGNIFICANT CHANGE UP
CO2 BLDA-SCNC: 55 MMOL/L — HIGH (ref 19–24)
GAS PNL BLDA: SIGNIFICANT CHANGE UP
HCO3 BLDA-SCNC: 53 MMOL/L — CRITICAL HIGH (ref 21–28)
HOROWITZ INDEX BLDA+IHG-RTO: 40 — SIGNIFICANT CHANGE UP
PCO2 BLDA: 78 MMHG — CRITICAL HIGH (ref 32–46)
PH BLDA: 7.44 — SIGNIFICANT CHANGE UP (ref 7.35–7.45)
PO2 BLDA: 84 MMHG — SIGNIFICANT CHANGE UP (ref 83–108)
SAO2 % BLDA: 97.8 % — SIGNIFICANT CHANGE UP (ref 94–98)

## 2024-04-18 PROCEDURE — 99232 SBSQ HOSP IP/OBS MODERATE 35: CPT

## 2024-04-18 PROCEDURE — 99233 SBSQ HOSP IP/OBS HIGH 50: CPT

## 2024-04-18 RX ADMIN — Medication 20 MILLIGRAM(S): at 05:47

## 2024-04-18 RX ADMIN — LACTULOSE 10 GRAM(S): 10 SOLUTION ORAL at 21:48

## 2024-04-18 RX ADMIN — LACTULOSE 10 GRAM(S): 10 SOLUTION ORAL at 14:46

## 2024-04-18 RX ADMIN — Medication 1 APPLICATION(S): at 21:40

## 2024-04-18 RX ADMIN — RIVAROXABAN 20 MILLIGRAM(S): KIT at 17:21

## 2024-04-18 RX ADMIN — LACTULOSE 10 GRAM(S): 10 SOLUTION ORAL at 05:47

## 2024-04-18 RX ADMIN — CARVEDILOL PHOSPHATE 12.5 MILLIGRAM(S): 80 CAPSULE, EXTENDED RELEASE ORAL at 05:47

## 2024-04-18 RX ADMIN — CARVEDILOL PHOSPHATE 12.5 MILLIGRAM(S): 80 CAPSULE, EXTENDED RELEASE ORAL at 17:21

## 2024-04-18 RX ADMIN — Medication 500 MILLIGRAM(S): at 11:31

## 2024-04-18 RX ADMIN — SENNA PLUS 2 TABLET(S): 8.6 TABLET ORAL at 21:44

## 2024-04-18 RX ADMIN — Medication 40 MILLIGRAM(S): at 05:47

## 2024-04-18 RX ADMIN — AMLODIPINE BESYLATE 10 MILLIGRAM(S): 2.5 TABLET ORAL at 05:47

## 2024-04-18 RX ADMIN — PANTOPRAZOLE SODIUM 40 MILLIGRAM(S): 20 TABLET, DELAYED RELEASE ORAL at 05:47

## 2024-04-18 NOTE — PROGRESS NOTE ADULT - ASSESSMENT
64 yr old male presenting with acute on chronic hypoxic/hypercapnic respiratory failure and afib with RVR.         Plan:  Presents with sats of 68% on 3L NC -Baseline, admitted for CHF, SOB.    Hx of OHS. RR 17 satting 99% on BIPAP, CXR notes small bilateral effusions.  Was started on IVP Lasix bid. Stable on PO Lasix now.     CXR: pulmonary edema, proBNP 2348, will change to PO Lasix given metabolic alkalosis on chem-7, clearly chronic.     pCO2 on presentation >122    Lasix 40mg IV BID. will change to PO in AM.     Per Pulmonary, no need for diamox as PH well balanced.       Strict I/O, daily weight, TTE notes normal EF 60 %, no pulmonary HTN.      Continue BiPAP overnight then can wean to sats >92 on baseline 3L NC, likely has OHS.     Pulm was consulted today, will see this afternoon.  Repeat lytes and VBG in AM.     Will continue Xarelto, Coreg, Norvasc and Vasotec, BP stable.     Agree BIPAP at night, will need in rehab.     Creatinine normal at .62. Stop Norvasc give low BP today with recent diuresis. Ph acceptable at 7.45.      Stable for discharge to LTC with BIPAP at night.    64 yr old male presenting with acute on chronic hypoxic/hypercapnic respiratory failure and afib with RVR.         Plan:  Presents with sats of 68% on 3L NC -Baseline, admitted for CHF, SOB.    Hx of OHS. RR 17 satting 99% on BIPAP, CXR notes small bilateral effusions.  Was started on IVP Lasix bid. Stable on PO Lasix now.     CXR: pulmonary edema, proBNP 2348, will change to PO Lasix given metabolic alkalosis on chem-7, clearly chronic.     pCO2 on presentation >122    Lasix 40mg IV BID. will change to PO in AM 40 mg daily.      Per Pulmonary, no need for diamox as PH well balanced.       Strict I/O, daily weight, TTE notes normal EF 60 %, no pulmonary HTN.      Continue BiPAP overnight then can wean to sats >92 on baseline 3L NC, likely has OHS.     Pulm was consulted today, will see this afternoon.  Repeat lytes and VBG in AM.     Will continue Xarelto, Coreg, stop Norvasc due to low BP, continue Vasotec, BP stable.     Agree BIPAP at night, will need LTC placement.       Creatinine normal at .62. Stop Norvasc give low BP today with recent diuresis. Ph acceptable at 7.45.      Stable for discharge to LTC with BIPAP at night.   ABG better this AM, 7.44/78/82.    High amonia level, added daily lactulose.

## 2024-04-18 NOTE — PROGRESS NOTE ADULT - SUBJECTIVE AND OBJECTIVE BOX
INTERVAL HPI/OVERNIGHT EVENTS:  Pt seen and examined at bedside.     Allergies/Intolerance: No Known Allergies      MEDICATIONS  (STANDING):  amLODIPine   Tablet 10 milliGRAM(s) Oral daily  AQUAPHOR (petrolatum Ointment) 1 Application(s) Topical at bedtime  ascorbic acid 500 milliGRAM(s) Oral daily  carvedilol 12.5 milliGRAM(s) Oral every 12 hours  enalapril 20 milliGRAM(s) Oral daily  furosemide    Tablet 40 milliGRAM(s) Oral daily  lactulose Syrup 10 Gram(s) Oral three times a day  pantoprazole    Tablet 40 milliGRAM(s) Oral before breakfast  rivaroxaban 20 milliGRAM(s) Oral with dinner  senna 2 Tablet(s) Oral at bedtime    MEDICATIONS  (PRN):  acetaminophen     Tablet .. 650 milliGRAM(s) Oral every 6 hours PRN Temp greater or equal to 38C (100.4F), Mild Pain (1 - 3)        ROS: all systems reviewed and wnl      PHYSICAL EXAMINATION:  Vital Signs Last 24 Hrs  T(C): 36.7 (18 Apr 2024 04:45), Max: 36.7 (17 Apr 2024 10:34)  T(F): 98 (18 Apr 2024 04:45), Max: 98.1 (17 Apr 2024 10:34)  HR: 80 (18 Apr 2024 05:43) (69 - 99)  BP: 118/83 (18 Apr 2024 04:45) (103/69 - 118/83)  BP(mean): --  RR: 18 (18 Apr 2024 04:45) (18 - 18)  SpO2: 98% (18 Apr 2024 05:43) (91% - 98%)    Parameters below as of 18 Apr 2024 04:45  Patient On (Oxygen Delivery Method): BiPAP/CPAP      CAPILLARY BLOOD GLUCOSE      POCT Blood Glucose.: 141 mg/dL (17 Apr 2024 13:45)      04-17 @ 07:01  -  04-18 @ 07:00  --------------------------------------------------------  IN: 600 mL / OUT: 1000 mL / NET: -400 mL        GENERAL: stable, needs BIPAP every night, no fevers  NECK: supple, No JVD  CHEST/LUNG: clear to auscultation bilaterally; no rales, rhonchi, or wheezing b/l  HEART: normal S1, S2  ABDOMEN: BS+, soft, ND, NT   EXTREMITIES:  pulses palpable; no clubbing, cyanosis, or edema b/l LEs    LABS:                        11.7   8.07  )-----------( 231      ( 17 Apr 2024 14:25 )             39.2     04-17    137  |  87<L>  |  22  ----------------------------<  138<H>  3.8   |  >45<HH>  |  0.76    Ca    8.9      17 Apr 2024 14:25  Phos  2.5     04-17  Mg     1.8     04-17    TPro  7.0  /  Alb  2.8<L>  /  TBili  0.3  /  DBili  x   /  AST  23  /  ALT  16  /  AlkPhos  73  04-17      Urinalysis Basic - ( 17 Apr 2024 14:25 )    Color: x / Appearance: x / SG: x / pH: x  Gluc: 138 mg/dL / Ketone: x  / Bili: x / Urobili: x   Blood: x / Protein: x / Nitrite: x   Leuk Esterase: x / RBC: x / WBC x   Sq Epi: x / Non Sq Epi: x / Bacteria: x

## 2024-04-18 NOTE — PROGRESS NOTE ADULT - NS ATTEND AMEND GEN_ALL_CORE FT
64-year-old male with morbid obesity, OHS/LORI, heart failure with preserved ejection fraction, hypertension, chronic hypercapnic respiratory failure admitted for acute on chronic hypercapnic and hypoxic respiratory failure.    Currently the patient is fully awake and able to follow commands and hold a full conversation.  Patient was switched off BiPAP and onto nasal cannula at 4 L/min found to have an O2 sat of 97%.  Advised once again to the patient and the bedside RN to utilize the BiPAP when taking naps and at night when sleeping.    Plan  – Patient with a known history of chronic hypoxic and hypercapnic respiratory failure  – Patient responding very well on BiPAP with current settings of 18/8 and an FiO2 of 40%  – Ensure compliance with noninvasive ventilation at night and when patient takes naps during the day  – When patient is not on noninvasive ventilation with continue O2 supplementation with nasal cannula at 3 to 4 L.  Goal SaO2 should be greater than 88%  – Continue with p.o. Lasix 40 mg once a day for diuresis  – Patient will require BiPAP at the current settings on discharge  – Patient will require pulmonary follow-up on discharge as well as PFTs and a sleep study.  – Prior to discharge:  Please email: [email or web address] to setup an appointment prior to discharge. Include the patient's name, , MRN, date of discharge and contact information in the email.      Pulmonary/Sleep Clinic  [address] [place] [address]  [phone]
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 ABG on admission with pCO2> 112. Placed on AVAPS. Hospital course today with increasing confusion/AMS/lethargy. ABG with worsening hypercarbia    DX: acute on chronic hypoxic and hypercarbic respiratory failure, acute metabolic encephalopathy, respiratory acidosis, metabolic alkalosis, chronic r leg edema    - acute on chroic hypoxic and hypercarbic respiratory failure  - used AVAPS for only 2 - 2.5 hours overnight  - lethargic and confused today with blood gas with worsening pCO2: 111  - baseline pCO2 in the 70s range with HCO3 in the 50s from prior blood gases  - pt placed back on AVAPS this afternoon  - cont nocturnal AVAPS with AVAPS prn during the day for sleep/naps/lethargy  - repeat ABG in AM  - pt with morbid obesity and suspect he has LORI/OHS contributing to overall hypercarbia and hypoxia  - O2 supplementation to maintain O2 sat > 90%  - has underlying chronic hypoxemia  - currently on 3L NC   - echo reviewed with normal LV function  - on po lasix 40mg daily for diuresis  - will need home NIV set up, was previously discharged on O2  - pulmonary outpatient follow up at [address] [place] [address] [address] [place] Tel [phone]  - d/w respiratory therapist
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 ABG on admission with pCO2> 112.    DX: acute on chronic hypoxic and hypercarbic respiratory failure, acute metabolic encephalopathy, chronic r leg edema    - ABG with normal pH. currently compensated respiratory acidosis with metabolic alkalosis  - baseline pCO2 in the 70s range with HCO3 in the 50s from prior blood gases  - mental status improved with NIV use and correction of pCO2: was placed on AVAPs  - suspect his LORI/OHS contributing to overall hypercarbia and hypoxia  - he is currently being diuresed which can elevate bicarb further  - repeat VBG this AM with stable pH, remains compensated, no diamox needed  - O2 supplementation to maintain O2 sat > 90%  - has underlying chronic hypoxemia  - currently on 4L NC oxygenating well  - weaned to 2-3L NC  - will need home NIV set up, was previously discharged on O2  - pulmonary outpatient follow up at [address] [place] [address] [address] [place] Tel [phone]\  - d/w hospitalist

## 2024-04-18 NOTE — PROGRESS NOTE ADULT - ASSESSMENT
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 ABG on admission with pCO2> 112. Placed on AVAPS. Hospital course today with increasing confusion/AMS/lethargy. ABG with worsening hypercarbia    DX: acute on chronic hypoxic and hypercarbic respiratory failure, acute metabolic encephalopathy, respiratory acidosis, metabolic alkalosis, chronic r leg edema    - acute on chroic hypoxic and hypercarbic respiratory failure  - used BIPAP overnight, Am ABG today compensated, Awake no lethargy on exam today. Needs to continue Nocturnal NIV use  - baseline pCO2 in the 70s range with HCO3 in the 50s from prior blood gases  - cont nocturnal NIV with BIPAP prn during the day for sleep/naps/lethargy  - pt with morbid obesity and suspect he has LORI/OHS contributing to overall hypercarbia and hypoxia  - O2 supplementation to maintain O2 sat > 90%  - has underlying chronic hypoxemia  - currently on 4L NC with Spo2 stable  - echo reviewed with normal LV function  - on po lasix 40mg daily for diuresis  - will need home NIV set up, was previously discharged on O2  - pulmonary outpatient follow up at [address] [place] [address] [address] [place] Tel [phone]    Plan discussed with Pulm attending MD Chisholm

## 2024-04-19 ENCOUNTER — TRANSCRIPTION ENCOUNTER (OUTPATIENT)
Age: 64
End: 2024-04-19

## 2024-04-19 PROCEDURE — 99233 SBSQ HOSP IP/OBS HIGH 50: CPT

## 2024-04-19 PROCEDURE — 76700 US EXAM ABDOM COMPLETE: CPT | Mod: 26

## 2024-04-19 PROCEDURE — 99232 SBSQ HOSP IP/OBS MODERATE 35: CPT

## 2024-04-19 RX ORDER — ACETAMINOPHEN 500 MG
2 TABLET ORAL
Qty: 0 | Refills: 0 | DISCHARGE
Start: 2024-04-19

## 2024-04-19 RX ORDER — LACTULOSE 10 G/15ML
15 SOLUTION ORAL
Qty: 0 | Refills: 0 | DISCHARGE
Start: 2024-04-19

## 2024-04-19 RX ORDER — CARVEDILOL PHOSPHATE 80 MG/1
1 CAPSULE, EXTENDED RELEASE ORAL
Qty: 0 | Refills: 0 | DISCHARGE
Start: 2024-04-19

## 2024-04-19 RX ORDER — RIVAROXABAN 15 MG-20MG
1 KIT ORAL
Qty: 0 | Refills: 0 | DISCHARGE
Start: 2024-04-19

## 2024-04-19 RX ORDER — ASCORBIC ACID 60 MG
1 TABLET,CHEWABLE ORAL
Qty: 0 | Refills: 0 | DISCHARGE
Start: 2024-04-19

## 2024-04-19 RX ORDER — PETROLATUM,WHITE
1 JELLY (GRAM) TOPICAL
Qty: 0 | Refills: 0 | DISCHARGE
Start: 2024-04-19

## 2024-04-19 RX ORDER — FUROSEMIDE 40 MG
1 TABLET ORAL
Qty: 0 | Refills: 0 | DISCHARGE
Start: 2024-04-19

## 2024-04-19 RX ORDER — PANTOPRAZOLE SODIUM 20 MG/1
1 TABLET, DELAYED RELEASE ORAL
Qty: 0 | Refills: 0 | DISCHARGE
Start: 2024-04-19

## 2024-04-19 RX ORDER — SENNA PLUS 8.6 MG/1
2 TABLET ORAL
Qty: 0 | Refills: 0 | DISCHARGE
Start: 2024-04-19

## 2024-04-19 RX ADMIN — Medication 40 MILLIGRAM(S): at 06:16

## 2024-04-19 RX ADMIN — RIVAROXABAN 20 MILLIGRAM(S): KIT at 17:49

## 2024-04-19 RX ADMIN — LACTULOSE 10 GRAM(S): 10 SOLUTION ORAL at 15:45

## 2024-04-19 RX ADMIN — LACTULOSE 10 GRAM(S): 10 SOLUTION ORAL at 06:09

## 2024-04-19 RX ADMIN — Medication 500 MILLIGRAM(S): at 11:33

## 2024-04-19 RX ADMIN — Medication 20 MILLIGRAM(S): at 06:16

## 2024-04-19 RX ADMIN — CARVEDILOL PHOSPHATE 12.5 MILLIGRAM(S): 80 CAPSULE, EXTENDED RELEASE ORAL at 17:49

## 2024-04-19 RX ADMIN — PANTOPRAZOLE SODIUM 40 MILLIGRAM(S): 20 TABLET, DELAYED RELEASE ORAL at 06:16

## 2024-04-19 RX ADMIN — SENNA PLUS 2 TABLET(S): 8.6 TABLET ORAL at 21:41

## 2024-04-19 RX ADMIN — CARVEDILOL PHOSPHATE 12.5 MILLIGRAM(S): 80 CAPSULE, EXTENDED RELEASE ORAL at 06:16

## 2024-04-19 RX ADMIN — LACTULOSE 10 GRAM(S): 10 SOLUTION ORAL at 21:41

## 2024-04-19 RX ADMIN — Medication 1 APPLICATION(S): at 21:42

## 2024-04-19 NOTE — PROGRESS NOTE ADULT - NS ATTEST RISK GEN_ALL_CORE
Risk Statement (NON-critical care)
Statement Selected

## 2024-04-19 NOTE — PROGRESS NOTE ADULT - SUBJECTIVE AND OBJECTIVE BOX
CHIEF COMPLAINT:Patient is a 64y old  Male who presents with a chief complaint of acute on chronic hypoxic/hypercapnic respiratory failure, afib with rvr (19 Apr 2024 08:14)      Interval Events: Patient compliant with BiPAP last night. No complaints today.     REVIEW OF SYSTEMS:  [x] All other systems negative except per HPI   [ ] Unable to assess ROS because ________    OBJECTIVE:  ICU Vital Signs Last 24 Hrs  T(C): 36.3 (19 Apr 2024 04:43), Max: 37 (18 Apr 2024 16:08)  T(F): 97.3 (19 Apr 2024 04:43), Max: 98.6 (18 Apr 2024 16:08)  HR: 84 (19 Apr 2024 09:46) (80 - 112)  BP: 129/97 (19 Apr 2024 04:43) (112/79 - 129/97)  BP(mean): --  ABP: --  ABP(mean): --  RR: 18 (19 Apr 2024 04:43) (18 - 18)  SpO2: 96% (19 Apr 2024 09:46) (93% - 99%)    O2 Parameters below as of 18 Apr 2024 16:08  Patient On (Oxygen Delivery Method): nasal cannula  O2 Flow (L/min): 4            04-18 @ 07:01  -  04-19 @ 07:00  --------------------------------------------------------  IN: 240 mL / OUT: 1850 mL / NET: -1610 mL        PHYSICAL EXAM:  GENERAL: NAD, well-groomed, well-developed  EYES: EOMI, PERRLA, conjunctiva and sclera clear  ENMT: No tonsillar erythema, exudates, or enlargement; Moist mucous membranes, Good dentition, No lesions  CHEST/LUNG: Clear to auscultation bilaterally; No rales, rhonchi, wheezing, or rubs  HEART: Regular rate and rhythm; No murmurs, rubs, or gallops  ABDOMEN: Soft, Nontender, Nondistended; Bowel sounds present  VASCULAR:  2+ Peripheral Pulses, No clubbing, cyanosis, or edema  LYMPH: No lymphadenopathy noted  SKIN: No rashes or lesions  NERVOUS SYSTEM:  Alert & Oriented X3, Good concentration;    HOSPITAL MEDICATIONS:  MEDICATIONS  (STANDING):  AQUAPHOR (petrolatum Ointment) 1 Application(s) Topical at bedtime  ascorbic acid 500 milliGRAM(s) Oral daily  carvedilol 12.5 milliGRAM(s) Oral every 12 hours  enalapril 20 milliGRAM(s) Oral daily  furosemide    Tablet 40 milliGRAM(s) Oral daily  lactulose Syrup 10 Gram(s) Oral three times a day  pantoprazole    Tablet 40 milliGRAM(s) Oral before breakfast  rivaroxaban 20 milliGRAM(s) Oral with dinner  senna 2 Tablet(s) Oral at bedtime    MEDICATIONS  (PRN):  acetaminophen     Tablet .. 650 milliGRAM(s) Oral every 6 hours PRN Temp greater or equal to 38C (100.4F), Mild Pain (1 - 3)      LABS:    The Labs were reviewed by me   The Radiology was reviewed by me    EKG tracing reviewed by me    04-17    137  |  87<L>  |  22  ----------------------------<  138<H>  3.8   |  >45<HH>  |  0.76    Ca    8.9      17 Apr 2024 14:25  Phos  2.5     04-17  Mg     1.8     04-17    TPro  7.0  /  Alb  2.8<L>  /  TBili  0.3  /  DBili  x   /  AST  23  /  ALT  16  /  AlkPhos  73  04-17    Magnesium: 1.8 mg/dL (04-17-24 @ 14:25)    Phosphorus: 2.5 mg/dL (04-17-24 @ 14:25)                    Urinalysis Basic - ( 17 Apr 2024 14:25 )    Color: x / Appearance: x / SG: x / pH: x  Gluc: 138 mg/dL / Ketone: x  / Bili: x / Urobili: x   Blood: x / Protein: x / Nitrite: x   Leuk Esterase: x / RBC: x / WBC x   Sq Epi: x / Non Sq Epi: x / Bacteria: x      ABG - ( 18 Apr 2024 00:06 )  pH, Arterial: 7.44  pH, Blood: x     /  pCO2: 78    /  pO2: 84    / HCO3: 53    / Base Excess: 23.7  /  SaO2: 97.8                                    11.7   8.07  )-----------( 231      ( 17 Apr 2024 14:25 )             39.2     CAPILLARY BLOOD GLUCOSE        Blood Gas Source Venous: Venous (04-17-24 @ 08:36)      MICROBIOLOGY:     RADIOLOGY:  [ ] Reviewed and interpreted by me    Point of Care Ultrasound Findings:    PFT:    EKG:

## 2024-04-19 NOTE — PROGRESS NOTE ADULT - SUBJECTIVE AND OBJECTIVE BOX
INTERVAL HPI/OVERNIGHT EVENTS:  Pt seen and examined at bedside.     Allergies/Intolerance: No Known Allergies      MEDICATIONS  (STANDING):  AQUAPHOR (petrolatum Ointment) 1 Application(s) Topical at bedtime  ascorbic acid 500 milliGRAM(s) Oral daily  carvedilol 12.5 milliGRAM(s) Oral every 12 hours  enalapril 20 milliGRAM(s) Oral daily  furosemide    Tablet 40 milliGRAM(s) Oral daily  lactulose Syrup 10 Gram(s) Oral three times a day  pantoprazole    Tablet 40 milliGRAM(s) Oral before breakfast  rivaroxaban 20 milliGRAM(s) Oral with dinner  senna 2 Tablet(s) Oral at bedtime    MEDICATIONS  (PRN):  acetaminophen     Tablet .. 650 milliGRAM(s) Oral every 6 hours PRN Temp greater or equal to 38C (100.4F), Mild Pain (1 - 3)        ROS: all systems reviewed and wnl      PHYSICAL EXAMINATION:  Vital Signs Last 24 Hrs  T(C): 36.3 (19 Apr 2024 04:43), Max: 37 (18 Apr 2024 16:08)  T(F): 97.3 (19 Apr 2024 04:43), Max: 98.6 (18 Apr 2024 16:08)  HR: 80 (19 Apr 2024 05:21) (80 - 112)  BP: 129/97 (19 Apr 2024 04:43) (105/66 - 129/97)  BP(mean): --  RR: 18 (19 Apr 2024 04:43) (18 - 18)  SpO2: 95% (19 Apr 2024 05:21) (93% - 99%)    Parameters below as of 18 Apr 2024 16:08  Patient On (Oxygen Delivery Method): nasal cannula  O2 Flow (L/min): 4    CAPILLARY BLOOD GLUCOSE          04-18 @ 07:01  -  04-19 @ 07:00  --------------------------------------------------------  IN: 240 mL / OUT: 1850 mL / NET: -1610 mL        GENERAL: stable, needs BIPAP on every night for OHS  NECK: supple, No JVD  CHEST/LUNG: clear to auscultation bilaterally; no rales, rhonchi, or wheezing b/l  HEART: normal S1, S2  ABDOMEN: BS+, soft, ND, NT   EXTREMITIES:  pulses palpable; no clubbing, cyanosis, or edema b/l LEs    LABS:                        11.7   8.07  )-----------( 231      ( 17 Apr 2024 14:25 )             39.2     04-17    137  |  87<L>  |  22  ----------------------------<  138<H>  3.8   |  >45<HH>  |  0.76    Ca    8.9      17 Apr 2024 14:25  Phos  2.5     04-17  Mg     1.8     04-17    TPro  7.0  /  Alb  2.8<L>  /  TBili  0.3  /  DBili  x   /  AST  23  /  ALT  16  /  AlkPhos  73  04-17      Urinalysis Basic - ( 17 Apr 2024 14:25 )    Color: x / Appearance: x / SG: x / pH: x  Gluc: 138 mg/dL / Ketone: x  / Bili: x / Urobili: x   Blood: x / Protein: x / Nitrite: x   Leuk Esterase: x / RBC: x / WBC x   Sq Epi: x / Non Sq Epi: x / Bacteria: x

## 2024-04-19 NOTE — RAPID RESPONSE TEAM SUMMARY - NSSITUATIONBACKGROUNDRRT_GEN_ALL_CORE
Patient states that he "slid from the bed" to the floor when attempting to stand unassisted. Denies hitting his head. Denies LOC.   Patient found sitting on the floor, alert, oriented, in no acute distress with no complaint.

## 2024-04-19 NOTE — RAPID RESPONSE TEAM SUMMARY - NSADDTLFINDINGSRRT_GEN_ALL_CORE
Vital Signs Last 24 Hrs  T(C): 36.7 (19 Apr 2024 10:44), Max: 37 (18 Apr 2024 16:08)  T(F): 98.1 (19 Apr 2024 10:44), Max: 98.6 (18 Apr 2024 16:08)  HR: 82 (19 Apr 2024 10:44) (80 - 112)  BP: 100/68 (19 Apr 2024 10:44) (100/68 - 129/97)  BP(mean): --  RR: 19 (19 Apr 2024 10:44) (18 - 19)  SpO2: 96% (19 Apr 2024 10:44) (95% - 99%)    Parameters below as of 19 Apr 2024 10:44  Patient On (Oxygen Delivery Method): nasal cannula  O2 Flow (L/min): 3

## 2024-04-19 NOTE — DIETITIAN NUTRITION RISK NOTIFICATION - TREATMENT: THE FOLLOWING DIET HAS BEEN RECOMMENDED
Diet, DASH/TLC:   Sodium & Cholesterol Restricted  Calorie Controlled 1500  Liquid Protein Supplement     Qty per Day:  1 (04-19-24 @ 11:15) [Pending Verification By Attending]  Diet, DASH/TLC:   Sodium & Cholesterol Restricted (04-13-24 @ 10:02) [Active]

## 2024-04-19 NOTE — PROGRESS NOTE ADULT - ASSESSMENT
64 yr old male presenting with acute on chronic hypoxic/hypercapnic respiratory failure and afib with RVR.         Plan:  Presents with sats of 68% on 3L NC -Baseline, admitted for CHF, SOB.    Hx of OHS. RR 17 satting 99% on BIPAP, CXR notes small bilateral effusions.  Was started on IVP Lasix bid. Stable on PO Lasix now.     CXR: pulmonary edema, proBNP 2348, will change to PO Lasix given metabolic alkalosis on chem-7, clearly chronic.     pCO2 on presentation >122    Lasix 40mg IV BID. will change to PO in AM 40 mg daily.      Per Pulmonary, no need for diamox as PH well balanced.       Strict I/O, daily weight, TTE notes normal EF 60 %, no pulmonary HTN.      Continue BiPAP overnight then can wean to sats >92 on baseline 3L NC, likely has OHS.     Pulm was consulted today, will see this afternoon.  Repeat lytes and VBG in AM.     Will continue Xarelto, Coreg, stop Norvasc due to low BP, continue Vasotec, BP stable.     Agree BIPAP at night, will need LTC placement.       Creatinine normal at .62. Stop Norvasc give low BP today with recent diuresis. Ph acceptable at 7.45.      Stable for discharge to LTC with BIPAP at night.   ABG better this AM, 7.44/78/82.    High amonia level, added daily lactulose.  64 yr old male presenting with acute on chronic hypoxic/hypercapnic respiratory failure and afib with RVR.         Plan:  Presents with sats of 68% on 3L NC -Baseline, admitted for CHF, SOB.    Hx of OHS. RR 17 satting 99% on BIPAP, CXR notes small bilateral effusions.  Was started on IVP Lasix bid. Stable on PO Lasix now.     CXR: pulmonary edema, proBNP 2348, will change to PO Lasix given metabolic alkalosis on chem-7, clearly chronic.     pCO2 on presentation >122    Lasix 40mg IV BID. will change to PO in AM 40 mg daily.      Per Pulmonary, no need for diamox as PH well balanced.       Strict I/O, daily weight, TTE notes normal EF 60 %, no pulmonary HTN.      Continue BiPAP overnight then can wean to sats >92 on baseline 3L NC, likely has OHS.     Pulm was consulted today, will see this afternoon.  Repeat lytes and VBG in AM.     Will continue Xarelto, Coreg, stop Norvasc due to low BP, continue Vasotec, BP stable.     Agree BIPAP at night, will need LTC placement.       Creatinine normal at .62. Stop Norvasc give low BP today with recent diuresis. Ph acceptable at 7.45.      Stable for discharge to LTC with BIPAP at night.   ABG better this AM, 7.44/78/82.    High amonia level, added daily lactulose. Charlotte notes HSM, no cirrhosis, will leave on Lactulose.

## 2024-04-19 NOTE — DISCHARGE NOTE PROVIDER - NSDCCPCAREPLAN_GEN_ALL_CORE_FT
PRINCIPAL DISCHARGE DIAGNOSIS  Diagnosis: Acute respiratory failure with hypoxia and hypercapnia  Assessment and Plan of Treatment: If you are a smoker, the most important thing that you can do is stop smoking. Continuing to smoke will cause further lung damage and breathing trouble. Ask your health care provider for help with quitting smoking.  SEEK IMMEDIATE MEDICAL CARE IF YOU HAVE ANY OF THE FOLLOWING SYMPTOMS: shortness of breath at rest or when talking, bluish discoloration of lips, skin, fever, worsening cough, unexplained chest pain, or lightheadedness/dizziness.        SECONDARY DISCHARGE DIAGNOSES  Diagnosis: Acute on chronic systolic congestive heart failure  Assessment and Plan of Treatment: Congestive heart failure is a chronic condition in which the heart has trouble pumping blood. In some cases of heart failure, fluid may back up into your lungs or you may have swelling (edema) in your lower legs. There are many causes of heart failure including high blood pressure, coronary artery disease, abnormal heart valves, heart muscle disease, lung disease, diabetes, etc. Symptoms include shortness of breath with activity or when lying flat, cough, swelling of the legs, fatigue, or increased urination during the night.   Treatment is aimed at managing the symptoms of heart failure and may include lifestyle changes, medications, or surgical procedures. Take medicines only as directed by your health care provider and do not stop unless instructed to do so. Eat heart-healthy foods with low or no trans/saturated fats, cholesterol and salt. Weigh yourself every day for early recognition of fluid accumulation.  SEEK IMMEDIATE MEDICAL CARE IF YOU HAVE ANY OF THE FOLLOWING SYMPTOMS: shortness of breath, change in mental status, chest pain, lightheadedness/dizziness/fainting, or worsening of symptoms including not being able to conduct normal physical activity.      Diagnosis: Obesity hypoventilation syndrome  Assessment and Plan of Treatment: .Encourage weight loss via diet and exercise  Consult with pt about healthy eating habits and various exercises      Diagnosis: Benign essential HTN  Assessment and Plan of Treatment: .Continue blood pressure medication regimen as directed. Monitor for any visual changes, headaches or dizziness.  Monitor blood pressure regularly.  Follow up with your primary care provider for further management for high blood pressure.      Diagnosis: New onset atrial fibrillation  Assessment and Plan of Treatment: Atrial fibrillation is a type of irregular heartbeat (arrhythmia) where the heart quivers continuously in a chaotic pattern that makes the heart unable to pump blood normally. This can increase the risk for stroke, heart failure, and other heart-related conditions. Atrial fibrillation can be caused by a variety of conditions and may be temporary, intermittent, or permanent. Symptoms include feeling that your heart is beating rapidly or irregularly, chest discomfort, shortness of breath, or dizziness/lightheadedness that may be worse with exertion. Treatment is varied but may involve medication or electrical shock (cardioversion).  SEEK IMMEDIATE MEDICAL CARE IF YOU HAVE ANY OF THE FOLLOWING SYMPTOMS: chest pain, shortness of breath, abdominal pain, sweating, vomiting, blood in vomit/bowel movements/urine, dizziness/lightheadedness, weakness or numbness to face/arm/leg, trouble speaking or understanding, facial droop.

## 2024-04-19 NOTE — RAPID RESPONSE TEAM SUMMARY - NSOTHERINTERVENTIONSRRT_GEN_ALL_CORE
Patient assisted back to the bed with the use of demetrice lift and Physical therapy.   X-ray of pelvis and b/l hips r/o fx Patient assisted back to the bed with the use of demetrice lift and Physical therapy.   X-ray of pelvis and b/l hips r/o fx.     Agree with above, VSS alert after RRT, waiting for X-rays.

## 2024-04-19 NOTE — PROGRESS NOTE ADULT - ASSESSMENT
64-year-old male with morbid obesity, OHS/LORI, heart failure with preserved ejection fraction, hypertension, chronic hypercapnic respiratory failure admitted for acute on chronic hypercapnic and hypoxic respiratory failure.      Plan  – Patient with a known history of chronic hypoxic and hypercapnic respiratory failure  – Patient responding very well on BiPAP with current settings of 18/8 and an FiO2 of 40%  – Ensure compliance with noninvasive ventilation at night and when patient takes naps during the day  – When patient is not on noninvasive ventilation with continue O2 supplementation with nasal cannula at 3 to 4 L.  Goal SaO2 should be greater than 88%  – Continue with p.o. Lasix 40 mg once a day for diuresis  – Patient will require BiPAP at the current settings on discharge  – Patient will require pulmonary follow-up on discharge as well as PFTs and a sleep study.  – Prior to discharge:  Please email: [email or web address] to setup an appointment prior to discharge. Include the patient's name, , MRN, date of discharge and contact information in the email.      Pulmonary/Sleep Clinic  [address] [place], [place] [id number] [phone].

## 2024-04-19 NOTE — DISCHARGE NOTE PROVIDER - NSDCMRMEDTOKEN_GEN_ALL_CORE_FT
acetaminophen 325 mg oral tablet: 2 tab(s) orally every 6 hours As needed Temp greater or equal to 38C (100.4F), Mild Pain (1 - 3)  ascorbic acid 500 mg oral tablet: 1 tab(s) orally once a day  carvedilol 12.5 mg oral tablet: 1 tab(s) orally every 12 hours  enalapril 20 mg oral tablet: 1 tab(s) orally once a day  furosemide 40 mg oral tablet: 1 tab(s) orally once a day  lactulose 10 g/15 mL oral syrup: 15 milliliter(s) orally 3 times a day  pantoprazole 40 mg oral delayed release tablet: 1 tab(s) orally once a day (before a meal)  petrolatum topical ointment: 1 Apply topically to affected area once a day (at bedtime)  rivaroxaban 20 mg oral tablet: 1 tab(s) orally once a day (before a meal)  senna leaf extract oral tablet: 2 tab(s) orally once a day (at bedtime)

## 2024-04-19 NOTE — DISCHARGE NOTE PROVIDER - NSFOLLOWUPCLINICS_GEN_ALL_ED_FT
[facility]  Pulmonology  [address] [place] [address], [place] [address]  Phone: [phone]  Fax:   Follow Up Time: 2 weeks

## 2024-04-19 NOTE — PROGRESS NOTE ADULT - NS ATTEST RISK PROBLEM GEN_ALL_CORE FT
Review of patient records, including history, laboratory data, imaging. Patient evaluation and assessment. Coordination of care. Time excludes teaching.
Review of patient records, including history, laboratory data, imaging. Patient evaluation and assessment. Coordination of care. Time excludes teaching.
acute on chronic hypoxic and hypercarbic respiratory failure, metabolic encephalopathy, CHF
acute on chronic hypercarbic and hypoxic respiratory failure, acute metabolic encephalopathy

## 2024-04-19 NOTE — DISCHARGE NOTE PROVIDER - DETAILS OF MALNUTRITION DIAGNOSIS/DIAGNOSES
This patient has been assessed with a concern for Malnutrition and was treated during this hospitalization for the following Nutrition diagnosis/diagnoses:     -  04/19/2024: Morbid obesity (BMI > 40)

## 2024-04-19 NOTE — DISCHARGE NOTE PROVIDER - HOSPITAL COURSE
64 yr old male presenting with acute on chronic hypoxic/hypercapnic respiratory failure and afib with RVR.         Plan:  Presents with sats of 68% on 3L NC -Baseline, admitted for CHF, SOB.    Hx of OHS. RR 17 satting 99% on BIPAP, CXR notes small bilateral effusions.  Was started on IVP Lasix bid. Stable on PO Lasix now.     CXR: pulmonary edema, proBNP 2348, will change to PO Lasix given metabolic alkalosis on chem-7, clearly chronic.     pCO2 on presentation >122    Lasix 40mg IV BID. will change to PO in AM 40 mg daily.      Per Pulmonary, no need for diamox as PH well balanced.       Strict I/O, daily weight, TTE notes normal EF 60 %, no pulmonary HTN.      Continue BiPAP overnight then can wean to sats >92 on baseline 3L NC, likely has OHS.     Pulm was consulted today, will see this afternoon.  Repeat lytes and VBG in AM.     Will continue Xarelto, Coreg, stop Norvasc due to low BP, continue Vasotec, BP stable.     Agree BIPAP at night, will need LTC placement.       Creatinine normal at .62. Stop Norvasc give low BP today with recent diuresis. Ph acceptable at 7.45.      Stable for discharge to LTC with BIPAP at night.   ABG better this AM, 7.44/78/82.    High ammonia level, added daily lactulose. Charlotte notes HSM, no cirrhosis, will leave on Lactulose.

## 2024-04-20 PROCEDURE — 73522 X-RAY EXAM HIPS BI 3-4 VIEWS: CPT | Mod: 26,LT

## 2024-04-20 PROCEDURE — 99232 SBSQ HOSP IP/OBS MODERATE 35: CPT

## 2024-04-20 PROCEDURE — 72170 X-RAY EXAM OF PELVIS: CPT | Mod: 26

## 2024-04-20 RX ADMIN — PANTOPRAZOLE SODIUM 40 MILLIGRAM(S): 20 TABLET, DELAYED RELEASE ORAL at 05:48

## 2024-04-20 RX ADMIN — Medication 40 MILLIGRAM(S): at 05:50

## 2024-04-20 RX ADMIN — LACTULOSE 10 GRAM(S): 10 SOLUTION ORAL at 14:57

## 2024-04-20 RX ADMIN — CARVEDILOL PHOSPHATE 12.5 MILLIGRAM(S): 80 CAPSULE, EXTENDED RELEASE ORAL at 05:46

## 2024-04-20 RX ADMIN — Medication 20 MILLIGRAM(S): at 05:48

## 2024-04-20 RX ADMIN — Medication 1 APPLICATION(S): at 22:15

## 2024-04-20 RX ADMIN — Medication 500 MILLIGRAM(S): at 12:58

## 2024-04-20 RX ADMIN — CARVEDILOL PHOSPHATE 12.5 MILLIGRAM(S): 80 CAPSULE, EXTENDED RELEASE ORAL at 17:23

## 2024-04-20 RX ADMIN — LACTULOSE 10 GRAM(S): 10 SOLUTION ORAL at 05:49

## 2024-04-20 RX ADMIN — RIVAROXABAN 20 MILLIGRAM(S): KIT at 17:24

## 2024-04-20 RX ADMIN — SENNA PLUS 2 TABLET(S): 8.6 TABLET ORAL at 22:12

## 2024-04-20 RX ADMIN — LACTULOSE 10 GRAM(S): 10 SOLUTION ORAL at 22:12

## 2024-04-20 NOTE — CHART NOTE - NSCHARTNOTEFT_GEN_A_CORE
Hospitalist Medicine PA    Called by echo department for patient in need of Definity study with TTE. Procedure explained to patient along with risks/benefits of Definity contrast. Written consent obtained.    Pushed Definity 1.5 cc in 10 cc saline syringe for TTE study. 02 cc pushed. Patient tolerated procedure well.    Pre-Procedure Vital Signs:  T(C): 36.8 (04-16-24 @ 10:15), Max: 36.8 (04-15-24 @ 15:30)  HR: 93 (04-16-24 @ 10:15) (93 - 103)  BP: 96/61 (04-16-24 @ 10:15) (96/61 - 119/76)  RR: 18 (04-16-24 @ 10:15) (17 - 19)  SpO2: 94% (04-16-24 @ 10:15) (94% - 98%)    LOT# [id number]  EXP# [id number]    RN instructed to recheck VS and monitor for headache/signs of anaphylaxis once patient returns to floor.
Patient is a 64y old  Male who presents with a chief complaint of acute on chronic hypoxic/hypercapnic respiratory failure, afib with rvr (20 Apr 2024 09:35)  S/P Fall yesterday 4/19 pelvic xray performed official report still pending   Pt. to be discharged pending official read   Ambulette arrived for pickup official report pending   Informed  pt. could not leave until report is read by radiologist   Handoff presented to night ACP
Patient seen for length of stay nutrition risk rescreening. Patient has been screened for and presents negative for    -Pressure ulcers stage 2 or greater  -Enteral or Parenteral Nutrition  -BMI <18  -KjwtosfdtpW0N >8  -Chewing/Swallowing Difficulty  -Decreased appetite  -Unintentional Weight Loss  -Inadequate diet (i.e. NPO/Clear Liquids)  Pt speaks Bulgarian & some English, d/c plans reported by RN.  Pt reports good appetite.  Pt lived in Nursing Home PTA, was on no added salt, LPS(liquid protein supplement) 3 x day PTA.  Pt adm c diagnosis of acute respiratory failure c hypoxia, hypercapnia.  PMH obesity, LORI, HTN, PUD, OA.  Pt is wheelchair bound    Height (cm): 165.1 (04-12)  Weight (kg): 131.9 (04-13)  BMI (kg/m2): 48.4 (04-13)/morbidly obese  04/18, 1+(trace) edema noted  Previous adm wt. record, 07/14/23, 136.1 kg, wt. decrease noted.  Skin: wound; right lower leg venous ulcer    Diet, DASH/TLC:   Sodium & Cholesterol Restricted (04-13-24 @ 10:02) [Active].      Labs  04-17 Na137 mmol/L Glu 138 mg/dL<H> K+ 3.8 mmol/L Cr  0.76 mg/dL BUN 22 mg/dL 04-17 Phos 2.5 mg/dL 04-17 Alb 2.8 g/dL<L>04-17 ALT 16 U/L AST 23 U/L Alkaline Phosphatase 73 U/L    Estimated Energy needs ~ 0449-7865 calories(25-23 Kcal/ kg IBW=61.8 kg).  Pt educated on current restriction, encouraged portion control to promoted gradual wt. loss    Recommend monitor Glucose/ A1C%, Lipid profile post d/c.  Recommend DASH/ TLC, 1500 calorie controlled diet to promote wt. loss   + Liquid protein supplement 1 pkg=15 grams protein, 100 calories     No intervention required at this time. RD remains available.
none

## 2024-04-20 NOTE — PROGRESS NOTE ADULT - ASSESSMENT
64 yr old male presenting with acute on chronic hypoxic/hypercapnic respiratory failure and afib with RVR.         Plan:  Presents with sats of 68% on 3L NC -Baseline, admitted for CHF, SOB.    Hx of OHS. RR 17 satting 99% on BIPAP, CXR notes small bilateral effusions.  Was started on IVP Lasix bid. Stable on PO Lasix now.     CXR: pulmonary edema, proBNP 2348, will change to PO Lasix given metabolic alkalosis on chem-7, clearly chronic.     pCO2 on presentation >122    Lasix 40mg IV BID. will change to PO in AM 40 mg daily.      Per Pulmonary, no need for diamox as PH well balanced.       Strict I/O, daily weight, TTE notes normal EF 60 %, no pulmonary HTN.      Continue BiPAP overnight then can wean to sats >92 on baseline 3L NC, likely has OHS.     Pulm was consulted today, will see this afternoon.  Repeat lytes and VBG in AM.     Will continue Xarelto, Coreg, stop Norvasc due to low BP, continue Vasotec, BP stable.     Agree BIPAP at night, will need LTC placement.       Creatinine normal at .62. Stop Norvasc give low BP today with recent diuresis. Ph acceptable at 7.45.      Stable for discharge to LTC with BIPAP at night.   ABG better this AM, 7.44/78/82.    High amonia level, added daily lactulose. Charlotte notes HSM, no cirrhosis, will leave on Lactulose.  64 yr old male presenting with acute on chronic hypoxic/hypercapnic respiratory failure and afib with RVR.         Plan:  Presents with sats of 68% on 3L NC -Baseline, admitted for CHF, SOB.    Hx of OHS. RR 17 satting 99% on BIPAP, CXR notes small bilateral effusions.  Was started on IVP Lasix bid. Stable on PO Lasix now.     CXR: pulmonary edema, proBNP 2348, will change to PO Lasix given metabolic alkalosis on chem-7, clearly chronic.     pCO2 on presentation >122    Lasix 40mg IV BID. will change to PO in AM 40 mg daily.      Per Pulmonary, no need for diamox as PH well balanced.       Strict I/O, daily weight, TTE notes normal EF 60 %, no pulmonary HTN.      Continue BiPAP overnight then can wean to sats >92 on baseline 3L NC, likely has OHS.     Pulm was consulted today, will see this afternoon.  Repeat lytes and VBG in AM.     Will continue Xarelto, Coreg, stop Norvasc due to low BP, continue Vasotec, BP stable.     Agree BIPAP at night, will need LTC placement.       Creatinine normal at .62. Stop Norvasc give low BP today with recent diuresis. Ph acceptable at 7.45.      Stable for discharge to LTC with BIPAP at night.   ABG better this AM, 7.44/78/82.    High amonia level, added daily lactulose. Charlotte notes HSM, no cirrhosis, will leave on Lactulose.     Had fall off bed yesterday, if hip and pelvix X-rays negative can be discharged to LTC.  64 yr old male presenting with acute on chronic hypoxic/hypercapnic respiratory failure and afib with RVR.         Plan:  Presents with sats of 68% on 3L NC -Baseline, admitted for CHF, SOB.    Hx of OHS. RR 17 satting 99% on BIPAP, CXR notes small bilateral effusions.  Was started on IVP Lasix bid. Stable on PO Lasix now.     CXR: pulmonary edema, proBNP 2348, will change to PO Lasix given metabolic alkalosis on chem-7, clearly chronic.     pCO2 on presentation >122    Lasix 40mg IV BID. will change to PO in AM 40 mg daily.      Per Pulmonary, no need for diamox as PH well balanced.       Strict I/O, daily weight, TTE notes normal EF 60 %, no pulmonary HTN.      Continue BiPAP overnight then can wean to sats >92 on baseline 3L NC, likely has OHS.     Pulm was consulted today, will see this afternoon.  Repeat lytes and VBG in AM.     Will continue Xarelto, Coreg, stop Norvasc due to low BP, continue Vasotec, BP stable.     Agree BIPAP at night, will need LTC placement.       Creatinine normal at .62. Stop Norvasc give low BP today with recent diuresis. Ph acceptable at 7.45.      Stable for discharge to LTC with BIPAP at night.   ABG better this AM, 7.44/78/82.    High amonia level, added daily lactulose. Charlotte notes HSM, no cirrhosis, will leave on Lactulose.     Had fall off bed yesterday, if hip and pelvix X-rays negative can be discharged to LTC.     Hip and pelvic X-rays were done this AM, waiting for official read prior to discharge.

## 2024-04-20 NOTE — PROGRESS NOTE ADULT - SUBJECTIVE AND OBJECTIVE BOX
INTERVAL HPI/OVERNIGHT EVENTS:  Pt seen and examined at bedside.     Allergies/Intolerance: No Known Allergies      MEDICATIONS  (STANDING):  AQUAPHOR (petrolatum Ointment) 1 Application(s) Topical at bedtime  ascorbic acid 500 milliGRAM(s) Oral daily  carvedilol 12.5 milliGRAM(s) Oral every 12 hours  enalapril 20 milliGRAM(s) Oral daily  furosemide    Tablet 40 milliGRAM(s) Oral daily  lactulose Syrup 10 Gram(s) Oral three times a day  pantoprazole    Tablet 40 milliGRAM(s) Oral before breakfast  rivaroxaban 20 milliGRAM(s) Oral with dinner  senna 2 Tablet(s) Oral at bedtime    MEDICATIONS  (PRN):  acetaminophen     Tablet .. 650 milliGRAM(s) Oral every 6 hours PRN Temp greater or equal to 38C (100.4F), Mild Pain (1 - 3)        ROS: all systems reviewed and wnl      PHYSICAL EXAMINATION:  Vital Signs Last 24 Hrs  T(C): 36.8 (20 Apr 2024 04:48), Max: 36.9 (19 Apr 2024 15:55)  T(F): 98.2 (20 Apr 2024 04:48), Max: 98.4 (19 Apr 2024 15:55)  HR: 85 (20 Apr 2024 09:04) (80 - 90)  BP: 132/96 (20 Apr 2024 04:48) (100/68 - 132/96)  BP(mean): --  RR: 18 (20 Apr 2024 04:48) (18 - 19)  SpO2: 97% (20 Apr 2024 09:04) (93% - 97%)    Parameters below as of 19 Apr 2024 15:55  Patient On (Oxygen Delivery Method): nasal cannula  O2 Flow (L/min): 3    CAPILLARY BLOOD GLUCOSE          04-19 @ 07:01  -  04-20 @ 07:00  --------------------------------------------------------  IN: 883 mL / OUT: 300 mL / NET: 583 mL        GENERAL: stable, no hip pain with PROM both hips. need BIPAP at night.   NECK: supple, No JVD  CHEST/LUNG: clear to auscultation bilaterally; no rales, rhonchi, or wheezing b/l  HEART: normal S1, S2  ABDOMEN: BS+, soft, ND, NT   EXTREMITIES:  pulses palpable; no clubbing, cyanosis, or edema b/l LEs    LABS:

## 2024-04-21 ENCOUNTER — TRANSCRIPTION ENCOUNTER (OUTPATIENT)
Age: 64
End: 2024-04-21

## 2024-04-21 PROCEDURE — 99239 HOSP IP/OBS DSCHRG MGMT >30: CPT

## 2024-04-21 RX ADMIN — RIVAROXABAN 20 MILLIGRAM(S): KIT at 17:13

## 2024-04-21 RX ADMIN — LACTULOSE 10 GRAM(S): 10 SOLUTION ORAL at 05:52

## 2024-04-21 RX ADMIN — LACTULOSE 10 GRAM(S): 10 SOLUTION ORAL at 22:13

## 2024-04-21 RX ADMIN — Medication 20 MILLIGRAM(S): at 05:50

## 2024-04-21 RX ADMIN — CARVEDILOL PHOSPHATE 12.5 MILLIGRAM(S): 80 CAPSULE, EXTENDED RELEASE ORAL at 05:49

## 2024-04-21 RX ADMIN — Medication 40 MILLIGRAM(S): at 05:51

## 2024-04-21 RX ADMIN — Medication 1 APPLICATION(S): at 22:13

## 2024-04-21 RX ADMIN — CARVEDILOL PHOSPHATE 12.5 MILLIGRAM(S): 80 CAPSULE, EXTENDED RELEASE ORAL at 17:13

## 2024-04-21 RX ADMIN — SENNA PLUS 2 TABLET(S): 8.6 TABLET ORAL at 22:13

## 2024-04-21 RX ADMIN — LACTULOSE 10 GRAM(S): 10 SOLUTION ORAL at 14:15

## 2024-04-21 RX ADMIN — Medication 500 MILLIGRAM(S): at 11:15

## 2024-04-21 RX ADMIN — PANTOPRAZOLE SODIUM 40 MILLIGRAM(S): 20 TABLET, DELAYED RELEASE ORAL at 05:49

## 2024-04-21 NOTE — PROGRESS NOTE ADULT - ASSESSMENT
64 yr old male presenting with acute on chronic hypoxic/hypercapnic respiratory failure and afib with RVR.         Plan:  Presents with sats of 68% on 3L NC -Baseline, admitted for CHF, SOB.    Hx of OHS. RR 17 satting 99% on BIPAP, CXR notes small bilateral effusions.  Was started on IVP Lasix bid. Stable on PO Lasix now.     CXR: pulmonary edema, proBNP 2348, will change to PO Lasix given metabolic alkalosis on chem-7, clearly chronic.     pCO2 on presentation >122    Lasix 40mg IV BID. will change to PO in AM 40 mg daily.      Per Pulmonary, no need for diamox as PH well balanced.       Strict I/O, daily weight, TTE notes normal EF 60 %, no pulmonary HTN.      Continue BiPAP overnight then can wean to sats >92 on baseline 3L NC, likely has OHS.     Pulm was consulted today, will see this afternoon.  Repeat lytes and VBG in AM.     Will continue Xarelto, Coreg, stop Norvasc due to low BP, continue Vasotec, BP stable.     Agree BIPAP at night, will need LTC placement.       Creatinine normal at .62. Stop Norvasc give low BP today with recent diuresis. Ph acceptable at 7.45.      Stable for discharge to LTC with BIPAP at night.   ABG better this AM, 7.44/78/82.    High amonia level, added daily lactulose. [place] notes HSM, no cirrhosis, will leave on Lactulose.     Had fall off bed yesterday, if hip and pelvix X-rays negative can be discharged to LTC.     Hip and pelvic X-rays were done this AM, waiting for official read prior to discharge.  64 yr old male presenting with acute on chronic hypoxic/hypercapnic respiratory failure and afib with RVR.         Plan:  Presents with sats of 68% on 3L NC -Baseline, admitted for CHF, SOB.    Hx of OHS. RR 17 satting 99% on BIPAP, CXR notes small bilateral effusions.  Was started on IVP Lasix bid. Stable on PO Lasix now.     CXR: pulmonary edema, proBNP 2348, will change to PO Lasix given metabolic alkalosis on chem-7, clearly chronic.     pCO2 on presentation >122    Lasix 40mg IV BID. will change to PO in AM 40 mg daily.      Per Pulmonary, no need for diamox as PH well balanced.       Strict I/O, daily weight, TTE notes normal EF 60 %, no pulmonary HTN.      Continue BiPAP overnight then can wean to sats >92 on baseline 3L NC, likely has OHS.     Pulm was consulted today, will see this afternoon.  Repeat lytes and VBG in AM.     Will continue Xarelto, Coreg, stop Norvasc due to low BP, continue Vasotec, BP stable.     Agree BIPAP at night, will need LTC placement.       Creatinine normal at .62. Stop Norvasc give low BP today with recent diuresis. Ph acceptable at 7.45.      Stable for discharge to LTC with BIPAP at night.   ABG better this AM, 7.44/78/82.    High amonia level, added daily lactulose. Charlotte notes HSM, no cirrhosis, will leave on Lactulose.     Had fall off bed on Friday, hip and pelvix X-rays negative for fracture, can be discharged to LTC Monday.     Plan: Discharge to LTC on Monday.

## 2024-04-21 NOTE — PROGRESS NOTE ADULT - SUBJECTIVE AND OBJECTIVE BOX
INTERVAL HPI/OVERNIGHT EVENTS:  Pt seen and examined at bedside.     Allergies/Intolerance: No Known Allergies      MEDICATIONS  (STANDING):  AQUAPHOR (petrolatum Ointment) 1 Application(s) Topical at bedtime  ascorbic acid 500 milliGRAM(s) Oral daily  carvedilol 12.5 milliGRAM(s) Oral every 12 hours  enalapril 20 milliGRAM(s) Oral daily  furosemide    Tablet 40 milliGRAM(s) Oral daily  lactulose Syrup 10 Gram(s) Oral three times a day  pantoprazole    Tablet 40 milliGRAM(s) Oral before breakfast  rivaroxaban 20 milliGRAM(s) Oral with dinner  senna 2 Tablet(s) Oral at bedtime    MEDICATIONS  (PRN):  acetaminophen     Tablet .. 650 milliGRAM(s) Oral every 6 hours PRN Temp greater or equal to 38C (100.4F), Mild Pain (1 - 3)        ROS: all systems reviewed and wnl      PHYSICAL EXAMINATION:  Vital Signs Last 24 Hrs  T(C): 36.9 (21 Apr 2024 04:48), Max: 36.9 (20 Apr 2024 16:29)  T(F): 98.5 (21 Apr 2024 04:48), Max: 98.5 (21 Apr 2024 04:48)  HR: 85 (21 Apr 2024 04:59) (80 - 97)  BP: 132/88 (21 Apr 2024 04:48) (92/64 - 132/88)  BP(mean): --  RR: 18 (21 Apr 2024 04:48) (18 - 18)  SpO2: 97% (21 Apr 2024 04:59) (94% - 97%)      CAPILLARY BLOOD GLUCOSE          04-20 @ 07:01  -  04-21 @ 07:00  --------------------------------------------------------  IN: 0 mL / OUT: 750 mL / NET: -750 mL        GENERAL: stable, does well with BIPAP at night, alert, follows commands.   NECK: supple, No JVD  CHEST/LUNG: clear to auscultation bilaterally; no rales, rhonchi, or wheezing b/l  HEART: normal S1, S2  ABDOMEN: BS+, soft, ND, NT   EXTREMITIES:  pulses palpable; no clubbing, cyanosis, or edema b/l LEs    LABS:

## 2024-04-21 NOTE — PROVIDER CONTACT NOTE (OTHER) - SITUATION
Patient scheduled to transfer to [facility].  Dr Queen had instructed to clarify with NP Laura before discharging patient about the xray status.  Called NP to inform transport is here.
[facility] service arrived to  patient, unable to be discharged due to wrong size stretcher.

## 2024-04-21 NOTE — PROVIDER CONTACT NOTE (OTHER) - ACTION/TREATMENT ORDERED:
NP came to the unit and inform transport team transfer is canceled xray is still not uploaded and read yet.
Place back on continuous O2 monitoring, no acute distress

## 2024-04-21 NOTE — DISCHARGE NOTE NURSING/CASE MANAGEMENT/SOCIAL WORK - PATIENT PORTAL LINK FT
You can access the FollowMyHealth Patient Portal offered by [facility] by registering at the following website: [email or web address]. By joining [employer]’s FollowMyHealth portal, you will also be able to view your health information using other applications (apps) compatible with our system.

## 2024-04-22 VITALS
TEMPERATURE: 98 F | OXYGEN SATURATION: 96 % | RESPIRATION RATE: 18 BRPM | SYSTOLIC BLOOD PRESSURE: 126 MMHG | DIASTOLIC BLOOD PRESSURE: 89 MMHG | HEART RATE: 99 BPM

## 2024-04-22 LAB — GLUCOSE BLDC GLUCOMTR-MCNC: 112 MG/DL — HIGH (ref 70–99)

## 2024-04-22 PROCEDURE — 99239 HOSP IP/OBS DSCHRG MGMT >30: CPT

## 2024-04-22 RX ADMIN — CARVEDILOL PHOSPHATE 12.5 MILLIGRAM(S): 80 CAPSULE, EXTENDED RELEASE ORAL at 17:03

## 2024-04-22 RX ADMIN — CARVEDILOL PHOSPHATE 12.5 MILLIGRAM(S): 80 CAPSULE, EXTENDED RELEASE ORAL at 05:57

## 2024-04-22 RX ADMIN — PANTOPRAZOLE SODIUM 40 MILLIGRAM(S): 20 TABLET, DELAYED RELEASE ORAL at 05:56

## 2024-04-22 RX ADMIN — LACTULOSE 10 GRAM(S): 10 SOLUTION ORAL at 05:56

## 2024-04-22 RX ADMIN — RIVAROXABAN 20 MILLIGRAM(S): KIT at 17:03

## 2024-04-22 RX ADMIN — Medication 500 MILLIGRAM(S): at 11:05

## 2024-04-22 RX ADMIN — Medication 40 MILLIGRAM(S): at 05:56

## 2024-04-22 RX ADMIN — Medication 20 MILLIGRAM(S): at 05:56

## 2024-04-22 NOTE — PROGRESS NOTE ADULT - PROVIDER SPECIALTY LIST ADULT
Hospitalist
Pulmonology
Hospitalist
Hospitalist
Pulmonology
Hospitalist
Hospitalist
Pulmonology
Hospitalist
Pulmonology

## 2024-04-22 NOTE — PROGRESS NOTE ADULT - SUBJECTIVE AND OBJECTIVE BOX
INTERVAL HPI/OVERNIGHT EVENTS:  Pt seen and examined at bedside.     Allergies/Intolerance: No Known Allergies      MEDICATIONS  (STANDING):  AQUAPHOR (petrolatum Ointment) 1 Application(s) Topical at bedtime  ascorbic acid 500 milliGRAM(s) Oral daily  carvedilol 12.5 milliGRAM(s) Oral every 12 hours  enalapril 20 milliGRAM(s) Oral daily  furosemide    Tablet 40 milliGRAM(s) Oral daily  lactulose Syrup 10 Gram(s) Oral three times a day  pantoprazole    Tablet 40 milliGRAM(s) Oral before breakfast  rivaroxaban 20 milliGRAM(s) Oral with dinner  senna 2 Tablet(s) Oral at bedtime    MEDICATIONS  (PRN):  acetaminophen     Tablet .. 650 milliGRAM(s) Oral every 6 hours PRN Temp greater or equal to 38C (100.4F), Mild Pain (1 - 3)        ROS: all systems reviewed and wnl      PHYSICAL EXAMINATION:  Vital Signs Last 24 Hrs  T(C): 36.4 (22 Apr 2024 05:04), Max: 37.1 (21 Apr 2024 16:09)  T(F): 97.6 (22 Apr 2024 05:04), Max: 98.7 (21 Apr 2024 16:09)  HR: 89 (22 Apr 2024 05:41) (79 - 93)  BP: 111/72 (22 Apr 2024 05:04) (95/63 - 117/81)  BP(mean): --  RR: 18 (22 Apr 2024 05:04) (18 - 19)  SpO2: 95% (22 Apr 2024 05:41) (92% - 96%)      CAPILLARY BLOOD GLUCOSE            GENERAL: stable on NC during day, needs BIPAP at night.   NECK: supple, No JVD  CHEST/LUNG: clear to auscultation bilaterally; no rales, rhonchi, or wheezing b/l  HEART: normal S1, S2  ABDOMEN: BS+, soft, ND, NT   EXTREMITIES:  pulses palpable; no clubbing, cyanosis, or edema b/l LEs    LABS:

## 2024-04-22 NOTE — PROGRESS NOTE ADULT - REASON FOR ADMISSION
acute on chronic hypoxic/hypercapnic respiratory failure, afib with rvr

## 2024-04-22 NOTE — PROGRESS NOTE ADULT - ASSESSMENT
64 yr old male presenting with acute on chronic hypoxic/hypercapnic respiratory failure and afib with RVR.         Plan:  Presents with sats of 68% on 3L NC -Baseline, admitted for CHF, SOB.    Hx of OHS. RR 17 satting 99% on BIPAP, CXR notes small bilateral effusions.  Was started on IVP Lasix bid. Stable on PO Lasix now.     CXR: pulmonary edema, proBNP 2348, will change to PO Lasix given metabolic alkalosis on chem-7, clearly chronic.     pCO2 on presentation >122    Lasix 40mg IV BID. will change to PO in AM 40 mg daily.      Per Pulmonary, no need for diamox as PH well balanced.       Strict I/O, daily weight, TTE notes normal EF 60 %, no pulmonary HTN.      Continue BiPAP overnight then can wean to sats >92 on baseline 3L NC, likely has OHS.     Pulm was consulted today, will see this afternoon.  Repeat lytes and VBG in AM.     Will continue Xarelto, Coreg, stop Norvasc due to low BP, continue Vasotec, BP stable.     Agree BIPAP at night, will need LTC placement.       Creatinine normal at .62. Stop Norvasc give low BP today with recent diuresis. Ph acceptable at 7.45.      Stable for discharge to LTC with BIPAP at night.   ABG better this AM, 7.44/78/82.    High amonia level, added daily lactulose. [place] notes HSM, no cirrhosis, will leave on Lactulose.     Had fall off bed on Friday, hip and pelvix X-rays negative for fracture, can be discharged to LTC Monday.     Plan: Discharge to LTC on Monday.  64 yr old male presenting with acute on chronic hypoxic/hypercapnic respiratory failure and afib with RVR.         Plan:  Presents with sats of 68% on 3L NC -Baseline, admitted for CHF, SOB.    Hx of OHS. RR 17 satting 99% on BIPAP, CXR notes small bilateral effusions.  Was started on IVP Lasix bid. Stable on PO Lasix now.     CXR: pulmonary edema, proBNP 2348, will change to PO Lasix given metabolic alkalosis on chem-7, clearly chronic.     pCO2 on presentation >122    Lasix 40mg IV BID. will change to PO in AM 40 mg daily.      Per Pulmonary, no need for diamox as PH well balanced.       Strict I/O, daily weight, TTE notes normal EF 60 %, no pulmonary HTN.      Continue BiPAP overnight then can wean to sats >92 on baseline 3L NC, likely has OHS.     Pulm was consulted today, will see this afternoon.  Repeat lytes and VBG in AM.     Will continue Xarelto, Coreg, stop Norvasc due to low BP, continue Vasotec, BP stable.     Agree BIPAP at night, will need LTC placement.       Creatinine normal at .62. Stop Norvasc give low BP today with recent diuresis. Ph acceptable at 7.45.      Stable for discharge to LTC with BIPAP at night.   ABG better this AM, 7.44/78/82.    High amonia level, added daily lactulose. [place] notes HSM, no cirrhosis, will leave on Lactulose.     Had fall off bed on Friday, hip and pelvix X-rays negative for fracture, can be discharged to LTC Monday.     Plan: Discharge to LTC today, needs proper stretcher for transport.

## 2024-04-29 DIAGNOSIS — R00.0 TACHYCARDIA, UNSPECIFIED: ICD-10-CM

## 2024-04-29 DIAGNOSIS — J96.22 ACUTE AND CHRONIC RESPIRATORY FAILURE WITH HYPERCAPNIA: ICD-10-CM

## 2024-04-29 DIAGNOSIS — I50.23 ACUTE ON CHRONIC SYSTOLIC (CONGESTIVE) HEART FAILURE: ICD-10-CM

## 2024-04-29 DIAGNOSIS — E66.2 MORBID (SEVERE) OBESITY WITH ALVEOLAR HYPOVENTILATION: ICD-10-CM

## 2024-04-29 DIAGNOSIS — Z87.11 PERSONAL HISTORY OF PEPTIC ULCER DISEASE: ICD-10-CM

## 2024-04-29 DIAGNOSIS — Z99.3 DEPENDENCE ON WHEELCHAIR: ICD-10-CM

## 2024-04-29 DIAGNOSIS — I48.91 UNSPECIFIED ATRIAL FIBRILLATION: ICD-10-CM

## 2024-04-29 DIAGNOSIS — Z86.19 PERSONAL HISTORY OF OTHER INFECTIOUS AND PARASITIC DISEASES: ICD-10-CM

## 2024-04-29 DIAGNOSIS — I11.0 HYPERTENSIVE HEART DISEASE WITH HEART FAILURE: ICD-10-CM

## 2024-04-29 DIAGNOSIS — Z87.898 PERSONAL HISTORY OF OTHER SPECIFIED CONDITIONS: ICD-10-CM

## 2024-04-29 DIAGNOSIS — J96.21 ACUTE AND CHRONIC RESPIRATORY FAILURE WITH HYPOXIA: ICD-10-CM

## 2024-04-29 DIAGNOSIS — M19.90 UNSPECIFIED OSTEOARTHRITIS, UNSPECIFIED SITE: ICD-10-CM

## 2024-04-29 DIAGNOSIS — K21.9 GASTRO-ESOPHAGEAL REFLUX DISEASE WITHOUT ESOPHAGITIS: ICD-10-CM

## 2024-04-29 DIAGNOSIS — Z87.19 PERSONAL HISTORY OF OTHER DISEASES OF THE DIGESTIVE SYSTEM: ICD-10-CM

## 2024-04-29 DIAGNOSIS — Z79.899 OTHER LONG TERM (CURRENT) DRUG THERAPY: ICD-10-CM

## 2024-04-29 DIAGNOSIS — E87.4 MIXED DISORDER OF ACID-BASE BALANCE: ICD-10-CM

## 2024-04-29 DIAGNOSIS — G93.41 METABOLIC ENCEPHALOPATHY: ICD-10-CM

## 2024-05-21 NOTE — ED ADULT NURSE NOTE - NSIMPLEMENTINTERV_GEN_ALL_ED
35w6d Implemented All Fall Risk Interventions:  [place] to call system. Call bell, personal items and telephone within reach. Instruct patient to call for assistance. Room bathroom lighting operational. Non-slip footwear when patient is off stretcher. Physically safe environment: no spills, clutter or unnecessary equipment. Stretcher in lowest position, wheels locked, appropriate side rails in place. Provide visual cue, wrist band, yellow gown, etc. Monitor gait and stability. Monitor for mental status changes and reorient to person, place, and time. Review medications for side effects contributing to fall risk. Reinforce activity limits and safety measures with patient and family.

## 2024-06-27 NOTE — PATIENT PROFILE ADULT - NSPROEDAREADYLEARN_GEN_A_NUR
Medication: pantoprazole passed protocol.   Last office visit date: 9/21/2023  Next appointment scheduled?: Yes   Number of refills given: 3    none

## 2024-11-27 NOTE — DISCHARGE NOTE ADULT - NS DC INTERPRETER ID NUMBER
Controlled Substance Prescribing Agreement          I, Luiz Torres [PATIENT],  1944 [] a patient of  Breanna Polk MD   [PROVIDER] at [facility] [PRACTICE], have been informed that  individuals who are prescribed certain Controlled Substances including, but not limited to, narcotic pain medicines, stimulants, benzodiazepine tranquilizers, and barbiturate sedatives, can abuse those substances or may allow abuse by others, and have some risk of developing an addictive disorder or suffering a relapse of a prior addiction. Therefore, I have been informed that it is necessary to observe strict rules pertaining to their use, and I agree to follow the terms and procedures described in this Agreement as consideration for, and as a condition of, the willingness of the physician whose signature appears below to consider prescribing or to continue prescribing Controlled Substances to treat my pain.     1. I will inform my physician of any current or past substance abuse, or any current or past substance abuse of any immediate member of my immediate family.     2. I agree that I may be subject to a voluntary evaluation by psychologists and/or psychiatrists, possibly at my own expense, before any Controlled Substances will be prescribed to me. I agree that the need to be evaluated by psychologists and/or psychiatrists may be revisited every three (3) to six (6) months thereafter while taking the medication.     3. All Controlled Substances must come from a provider in the PROVIDER’S PRACTICE. My Controlled Substances will come from the PROVIDER whose signature appears below, or during his or her absence, by the covering provider, unless specific written authorization is obtained from the office for an exception.     4. I will obtain all Controlled Substances from the same pharmacy. Should the need arise to change pharmacies, I will inform the PROVIDER’S office.     5. I will  inform the PROVIDER’S office of any new medications or medical conditions, and of any adverse effects I experience from any of the medications that I take.     6. I will inform my other health care providers that I am taking the Controlled Substances listed above, and of the existence of this Agreement. In the event of an emergency, I will provide the foregoing information to emergency department providers.     7. I agree that my prescribing PROVIDER has permission to discuss all diagnostic and treatment details with other health care providers, pharmacists, or other professionals who provide my health care regarding my use of Controlled Substances for purposes of maintaining accountability.     8. I will not allow anyone else to have, use sell, or otherwise have access to these medications. The sharing of medications with anyone is absolutely forbidden and is against the law.         9. I understand that Controlled Substances may be hazardous or lethal to a person who is not tolerant to their effects, especially a child, and that I must keep them out of reach of such people for their own safety.     10. I understand that tampering with a written prescription is a felony and I will not change or tamper with the PROVIDER’S written prescription.     11. I am aware that attempting to obtain a Controlled Substance under false pretenses is illegal.     12. I agree not to alter my medication in any way, and I will take my medication whole, and it will not be broken, chewed, crushed, injected, or snorted.     13. I will take my medication as instructed and prescribed, and I will not exceed the maximum prescribed dose. Any change in dosage must be approved by the PROVIDER or a physician within the PRACTICE.     14. I understand that these drugs should not be stopped abruptly, as withdrawal syndromes may develop.     15. I will cooperate with unannounced urine or serum toxicology screenings as may be requested, as well as  any random pill counts of medication by the PROVIDER. Failure to comply may result in termination of the PROVIDER-patient relationship.     16. I understand that the presence of unauthorized and/or illegal substances in the screenings described in the paragraph above may prompt referral for assessment for a substance abuse disorder or termination of the PROVIDER-patient relationship.     17. I understand that medications may not be replaced if they are lost, damaged, or stolen. If any of these situations arise that cause me to request an early refill of my medication, a copy of a filed police report or a statement from me explaining the circumstances may be required before additional prescriptions are considered. If I request an early refill secondary to lost, damaged, or stolen prescriptions twice within a year, I may be discharged from the practice.     18. I understand that a prescription may be given early if the PROVIDER or the patient will be out of town when the refill is due. These prescriptions will contain instructions to the pharmacist that the prescriptions(s) may not be filled prior to the appropriate date.     19. If the responsible legal authorities have questions concerning my treatment, as may occur, for example, if I obtained medication at several pharmacies, all confidentiality is waived, and these authorities may be given full access to my full records of Controlled Substances administration.     20. I will keep my scheduled appointments in order to receive medication renewals. If I need to cancel my appointment, I will do so a minimum of twenty-four (24) hours before it is scheduled.     21. I understand that I may be asked to bring my medications in their original container to the PROVIDER’s office while I am on controlled medication.     22. Refills generally will not be given over the phone, after office hours, during the weekends, and on holidays.     23. I understand that any medical  treatment is initially a trial, with the goal of treatment being to improve the quality of life and ability to function and/or work. These parameters will be assessed periodically to determine the benefits of continued therapy, and continued prescription is contingent on whether my physician believes that the medication usage benefits me. I will comply with all treatments as outlined by the PROVIDER.     24. I have been explained the risks and potential benefits of these therapies, including, but not limited to, psychological addiction, physical dependence, withdrawal and over dosage.     25. I understand that failure to adhere to these policies and/or failure to comply with the PROVIDER’S treatment plan may result in cessation of therapy with Controlled Substance prescribing by the PROVIDER or referral for further specialty assessment, as well as possible discharge from the PRACTICE.     26. I, the undersigned patient, attest that the foregoing was discussed with me, and that I have read, fully understand, and agree to all of the above requirements and instructions. I affirm that I have the full right and power to sign and be bound by this  Agreement.         Date:  __________________________________________    Time:  __________________________________________    Patient Printed Name:  _____________________________    Patient Signature:  ________________________________           Date:  __________________________________________    Time:  __________________________________________    Provider Signature:  _______________________________     Javi

## 2024-12-31 NOTE — PATIENT PROFILE ADULT. - MARITAL STATUS
Isotretinoin Counseling: Patient should get monthly blood tests, not donate blood, not drive at night if vision affected, not share medication, and not undergo elective surgery for 6 months after tx completed. Side effects reviewed, pt to contact office should one occur. Azelaic Acid Counseling: Patient counseled that medicine may cause skin irritation and to avoid applying near the eyes.  In the event of skin irritation, the patient was advised to reduce the amount of the drug applied or use it less frequently.   The patient verbalized understanding of the proper use and possible adverse effects of azelaic acid.  All of the patient's questions and concerns were addressed. Sarecycline Pregnancy And Lactation Text: This medication is Pregnancy Category D and not consider safe during pregnancy. It is also excreted in breast milk. Bactrim Pregnancy And Lactation Text: This medication is Pregnancy Category D and is known to cause fetal risk.  It is also excreted in breast milk. Erythromycin Counseling:  I discussed with the patient the risks of erythromycin including but not limited to GI upset, allergic reaction, drug rash, diarrhea, increase in liver enzymes, and yeast infections. Aklief counseling:  Patient advised to apply a pea-sized amount only at bedtime and wait 30 minutes after washing their face before applying.  If too drying, patient may add a non-comedogenic moisturizer.  The most commonly reported side effects including irritation, redness, scaling, dryness, stinging, burning, itching, and increased risk of sunburn.  The patient verbalized understanding of the proper use and possible adverse effects of retinoids.  All of the patient's questions and concerns were addressed. Topical Retinoid Pregnancy And Lactation Text: This medication is Pregnancy Category C. It is unknown if this medication is excreted in breast milk. Winlevi Counseling:  I discussed with the patient the risks of topical clascoterone including but not limited to erythema, scaling, itching, and stinging. Patient voiced their understanding. Include Pregnancy/Lactation Warning?: No Tetracycline Counseling: Patient counseled regarding possible photosensitivity and increased risk for sunburn.  Patient instructed to avoid sunlight, if possible.  When exposed to sunlight, patients should wear protective clothing, sunglasses, and sunscreen.  The patient was instructed to call the office immediately if the following severe adverse effects occur:  hearing changes, easy bruising/bleeding, severe headache, or vision changes.  The patient verbalized understanding of the proper use and possible adverse effects of tetracycline.  All of the patient's questions and concerns were addressed. Patient understands to avoid pregnancy while on therapy due to potential birth defects. Benzoyl Peroxide Pregnancy And Lactation Text: This medication is Pregnancy Category C. It is unknown if benzoyl peroxide is excreted in breast milk. Azithromycin Pregnancy And Lactation Text: This medication is considered safe during pregnancy and is also secreted in breast milk. Doxycycline Counseling:  Patient counseled regarding possible photosensitivity and increased risk for sunburn.  Patient instructed to avoid sunlight, if possible.  When exposed to sunlight, patients should wear protective clothing, sunglasses, and sunscreen.  The patient was instructed to call the office immediately if the following severe adverse effects occur:  hearing changes, easy bruising/bleeding, severe headache, or vision changes.  The patient verbalized understanding of the proper use and possible adverse effects of doxycycline.  All of the patient's questions and concerns were addressed. High Dose Vitamin A Pregnancy And Lactation Text: High dose vitamin A therapy is contraindicated during pregnancy and breast feeding. Topical Sulfur Applications Counseling: Topical Sulfur Counseling: Patient counseled that this medication may cause skin irritation or allergic reactions.  In the event of skin irritation, the patient was advised to reduce the amount of the drug applied or use it less frequently.   The patient verbalized understanding of the proper use and possible adverse effects of topical sulfur application.  All of the patient's questions and concerns were addressed. Topical Clindamycin Counseling: Patient counseled that this medication may cause skin irritation or allergic reactions.  In the event of skin irritation, the patient was advised to reduce the amount of the drug applied or use it less frequently.   The patient verbalized understanding of the proper use and possible adverse effects of clindamycin.  All of the patient's questions and concerns were addressed. Azelaic Acid Pregnancy And Lactation Text: This medication is considered safe during pregnancy and breast feeding. Spironolactone Counseling: Patient advised regarding risks of diarrhea, abdominal pain, hyperkalemia, birth defects (for female patients), liver toxicity and renal toxicity. The patient may need blood work to monitor liver and kidney function and potassium levels while on therapy. The patient verbalized understanding of the proper use and possible adverse effects of spironolactone.  All of the patient's questions and concerns were addressed. Dapsone Counseling: I discussed with the patient the risks of dapsone including but not limited to hemolytic anemia, agranulocytosis, rashes, methemoglobinemia, kidney failure, peripheral neuropathy, headaches, GI upset, and liver toxicity.  Patients who start dapsone require monitoring including baseline LFTs and weekly CBCs for the first month, then every month thereafter.  The patient verbalized understanding of the proper use and possible adverse effects of dapsone.  All of the patient's questions and concerns were addressed. Isotretinoin Pregnancy And Lactation Text: This medication is Pregnancy Category X and is considered extremely dangerous during pregnancy. It is unknown if it is excreted in breast milk. Erythromycin Pregnancy And Lactation Text: This medication is Pregnancy Category B and is considered safe during pregnancy. It is also excreted in breast milk. Birth Control Pills Counseling: Birth Control Pill Counseling: I discussed with the patient the potential side effects of OCPs including but not limited to increased risk of stroke, heart attack, thrombophlebitis, deep venous thrombosis, hepatic adenomas, breast changes, GI upset, headaches, and depression.  The patient verbalized understanding of the proper use and possible adverse effects of OCPs. All of the patient's questions and concerns were addressed. Detail Level: Zone Sarecycline Counseling: Patient advised regarding possible photosensitivity and discoloration of the teeth, skin, lips, tongue and gums.  Patient instructed to avoid sunlight, if possible.  When exposed to sunlight, patients should wear protective clothing, sunglasses, and sunscreen.  The patient was instructed to call the office immediately if the following severe adverse effects occur:  hearing changes, easy bruising/bleeding, severe headache, or vision changes.  The patient verbalized understanding of the proper use and possible adverse effects of sarecycline.  All of the patient's questions and concerns were addressed. Tazorac Counseling:  Patient advised that medication is irritating and drying.  Patient may need to apply sparingly and wash off after an hour before eventually leaving it on overnight.  The patient verbalized understanding of the proper use and possible adverse effects of tazorac.  All of the patient's questions and concerns were addressed. Winlevi Pregnancy And Lactation Text: This medication is considered safe during pregnancy and breastfeeding. Aklief Pregnancy And Lactation Text: It is unknown if this medication is safe to use during pregnancy.  It is unknown if this medication is excreted in breast milk.  Breastfeeding women should use the topical cream on the smallest area of the skin for the shortest time needed while breastfeeding.  Do not apply to nipple and areola. Topical Retinoid counseling:  Patient advised to apply a pea-sized amount only at bedtime and wait 30 minutes after washing their face before applying.  If too drying, patient may add a non-comedogenic moisturizer. The patient verbalized understanding of the proper use and possible adverse effects of retinoids.  All of the patient's questions and concerns were addressed. Bactrim Counseling:  I discussed with the patient the risks of sulfa antibiotics including but not limited to GI upset, allergic reaction, drug rash, diarrhea, dizziness, photosensitivity, and yeast infections.  Rarely, more serious reactions can occur including but not limited to aplastic anemia, agranulocytosis, methemoglobinemia, blood dyscrasias, liver or kidney failure, lung infiltrates or desquamative/blistering drug rashes. Single Doxycycline Pregnancy And Lactation Text: This medication is Pregnancy Category D and not consider safe during pregnancy. It is also excreted in breast milk but is considered safe for shorter treatment courses. Minocycline Counseling: Patient advised regarding possible photosensitivity and discoloration of the teeth, skin, lips, tongue and gums.  Patient instructed to avoid sunlight, if possible.  When exposed to sunlight, patients should wear protective clothing, sunglasses, and sunscreen.  The patient was instructed to call the office immediately if the following severe adverse effects occur:  hearing changes, easy bruising/bleeding, severe headache, or vision changes.  The patient verbalized understanding of the proper use and possible adverse effects of minocycline.  All of the patient's questions and concerns were addressed. Azithromycin Counseling:  I discussed with the patient the risks of azithromycin including but not limited to GI upset, allergic reaction, drug rash, diarrhea, and yeast infections. Topical Sulfur Applications Pregnancy And Lactation Text: This medication is Pregnancy Category C and has an unknown safety profile during pregnancy. It is unknown if this topical medication is excreted in breast milk. Dapsone Pregnancy And Lactation Text: This medication is Pregnancy Category C and is not considered safe during pregnancy or breast feeding. High Dose Vitamin A Counseling: Side effects reviewed, pt to contact office should one occur. Topical Clindamycin Pregnancy And Lactation Text: This medication is Pregnancy Category B and is considered safe during pregnancy. It is unknown if it is excreted in breast milk. Benzoyl Peroxide Counseling: Patient counseled that medicine may cause skin irritation and bleach clothing.  In the event of skin irritation, the patient was advised to reduce the amount of the drug applied or use it less frequently.   The patient verbalized understanding of the proper use and possible adverse effects of benzoyl peroxide.  All of the patient's questions and concerns were addressed. Spironolactone Pregnancy And Lactation Text: This medication can cause feminization of the male fetus and should be avoided during pregnancy. The active metabolite is also found in breast milk. Birth Control Pills Pregnancy And Lactation Text: This medication should be avoided if pregnant and for the first 30 days post-partum. Tazorac Pregnancy And Lactation Text: This medication is not safe during pregnancy. It is unknown if this medication is excreted in breast milk. Detail Level: Generalized

## 2025-01-21 NOTE — ED ADULT TRIAGE NOTE - BRAND OF COVID-19 VACCINATION
APPTS ARE READY TO BE MADE: [ ] YES    Best Family or Patient Contact (if needed):    Additional Information about above appointments (if needed):    1: Cardiology  2: Vascular   3:     Other comments or requests:   
Moderna dose 1 and 2

## 2025-02-28 NOTE — ED ADULT TRIAGE NOTE - BSA (M2)
Instructions:  No changes to your current medications  Follow up in the HF clinic in 2 weeks    Continue to follow these guidelines unless otherwise instructed by your doctor:  2000mg sodium diet with NO added salt to food  Drink no more than 64 ounces of fluid per day, total  Weigh yourself daily at the same time each day and keep a record of it  Report weight gain of 3 lbs in 1 day or 5 lbs or more in 1 week to heart failure clinic     CONTINUE TO TAKE YOUR MEDICATIONS AS PRESCRIBED   BRING YOUR MEDICATION LIST TO EACH VISIT     Please call the Heart Failure Clinic at: [phone] if you are unable to keep your appointment or if you have any questions or concerns.      2.29

## 2025-03-10 NOTE — ED ADULT NURSE NOTE - HIV OFFER
Attempted to call pt to reschedule appt  Left message to call back  Will also send portal message     Previously Declined (within the last year)

## 2025-03-17 NOTE — ED PROVIDER NOTE - PMH
Duodenal ulcer disease    HTN (hypertension)    Hypertension    Hypertension    Knee osteoarthritis    Morbid obesity    Osteoarthritis    PUD (peptic ulcer disease)    
Pavel Zaidi

## 2025-06-12 NOTE — PHYSICAL THERAPY INITIAL EVALUATION ADULT - WEIGHT-BEARING RESTRICTIONS: SIT/STAND, REHAB EVAL
Daphne Jenkins MD  [facility]  Hospice Answering Service: [phone]    
full weight-bearing

## 2025-07-30 NOTE — ED PROVIDER NOTE - WET READ LAUNCH FT
1222: Contacted lab regarding unresulted BMP from 0400. Advised it is being investigated at this time.     1236: MRI scheduled for 1815.    There are no Wet Read(s) to document.